# Patient Record
Sex: FEMALE | Race: WHITE | Employment: OTHER | URBAN - NONMETROPOLITAN AREA
[De-identification: names, ages, dates, MRNs, and addresses within clinical notes are randomized per-mention and may not be internally consistent; named-entity substitution may affect disease eponyms.]

---

## 2017-01-14 ENCOUNTER — APPOINTMENT (OUTPATIENT)
Dept: GENERAL RADIOLOGY | Age: 67
End: 2017-01-14
Payer: MEDICARE

## 2017-01-14 ENCOUNTER — HOSPITAL ENCOUNTER (EMERGENCY)
Age: 67
Discharge: HOME | End: 2017-01-14
Attending: EMERGENCY MEDICINE
Payer: MEDICARE

## 2017-01-14 VITALS
BODY MASS INDEX: 33.29 KG/M2 | OXYGEN SATURATION: 96 % | RESPIRATION RATE: 18 BRPM | DIASTOLIC BLOOD PRESSURE: 89 MMHG | HEART RATE: 88 BPM | HEIGHT: 64 IN | TEMPERATURE: 97.1 F | WEIGHT: 195 LBS | SYSTOLIC BLOOD PRESSURE: 176 MMHG

## 2017-01-14 DIAGNOSIS — M25.511 RIGHT SHOULDER PAIN, UNSPECIFIED CHRONICITY: Primary | ICD-10-CM

## 2017-01-14 PROCEDURE — 73030 X-RAY EXAM OF SHOULDER: CPT

## 2017-01-14 PROCEDURE — 99282 EMERGENCY DEPT VISIT SF MDM: CPT | Performed by: EMERGENCY MEDICINE

## 2017-01-14 PROCEDURE — 99283 EMERGENCY DEPT VISIT LOW MDM: CPT

## 2017-01-14 RX ORDER — TRAMADOL HYDROCHLORIDE 50 MG/1
50 TABLET ORAL EVERY 6 HOURS PRN
Qty: 20 TABLET | Refills: 0 | Status: SHIPPED | OUTPATIENT
Start: 2017-01-14 | End: 2017-01-24

## 2017-01-14 ASSESSMENT — ENCOUNTER SYMPTOMS
ALLERGIC/IMMUNOLOGIC NEGATIVE: 1
RESPIRATORY NEGATIVE: 1
GASTROINTESTINAL NEGATIVE: 1
EYES NEGATIVE: 1

## 2017-01-14 ASSESSMENT — PAIN DESCRIPTION - LOCATION: LOCATION: SHOULDER

## 2017-01-14 ASSESSMENT — PAIN SCALES - GENERAL: PAINLEVEL_OUTOF10: 9

## 2017-01-14 ASSESSMENT — PAIN DESCRIPTION - PAIN TYPE: TYPE: ACUTE PAIN

## 2017-01-14 ASSESSMENT — PAIN DESCRIPTION - ORIENTATION: ORIENTATION: RIGHT

## 2017-03-03 ENCOUNTER — OFFICE VISIT (OUTPATIENT)
Dept: PRIMARY CARE CLINIC | Age: 67
End: 2017-03-03
Payer: MEDICARE

## 2017-03-03 VITALS
BODY MASS INDEX: 34.27 KG/M2 | TEMPERATURE: 98 F | SYSTOLIC BLOOD PRESSURE: 124 MMHG | DIASTOLIC BLOOD PRESSURE: 80 MMHG | OXYGEN SATURATION: 98 % | HEIGHT: 64 IN | HEART RATE: 87 BPM | WEIGHT: 200.75 LBS

## 2017-03-03 DIAGNOSIS — E55.9 VITAMIN D DEFICIENCY: ICD-10-CM

## 2017-03-03 DIAGNOSIS — E78.2 MIXED HYPERLIPIDEMIA: ICD-10-CM

## 2017-03-03 DIAGNOSIS — R53.82 CHRONIC FATIGUE: Primary | ICD-10-CM

## 2017-03-03 DIAGNOSIS — R23.2 HOT FLASHES: ICD-10-CM

## 2017-03-03 PROCEDURE — 3014F SCREEN MAMMO DOC REV: CPT | Performed by: NURSE PRACTITIONER

## 2017-03-03 PROCEDURE — 99214 OFFICE O/P EST MOD 30 MIN: CPT | Performed by: NURSE PRACTITIONER

## 2017-03-03 PROCEDURE — 1123F ACP DISCUSS/DSCN MKR DOCD: CPT | Performed by: NURSE PRACTITIONER

## 2017-03-03 PROCEDURE — G8399 PT W/DXA RESULTS DOCUMENT: HCPCS | Performed by: NURSE PRACTITIONER

## 2017-03-03 PROCEDURE — 3017F COLORECTAL CA SCREEN DOC REV: CPT | Performed by: NURSE PRACTITIONER

## 2017-03-03 PROCEDURE — G8419 CALC BMI OUT NRM PARAM NOF/U: HCPCS | Performed by: NURSE PRACTITIONER

## 2017-03-03 PROCEDURE — 1036F TOBACCO NON-USER: CPT | Performed by: NURSE PRACTITIONER

## 2017-03-03 PROCEDURE — G8484 FLU IMMUNIZE NO ADMIN: HCPCS | Performed by: NURSE PRACTITIONER

## 2017-03-03 PROCEDURE — G8427 DOCREV CUR MEDS BY ELIG CLIN: HCPCS | Performed by: NURSE PRACTITIONER

## 2017-03-03 PROCEDURE — 4040F PNEUMOC VAC/ADMIN/RCVD: CPT | Performed by: NURSE PRACTITIONER

## 2017-03-03 PROCEDURE — 1090F PRES/ABSN URINE INCON ASSESS: CPT | Performed by: NURSE PRACTITIONER

## 2017-03-03 RX ORDER — AMITRIPTYLINE HYDROCHLORIDE 10 MG/1
10 TABLET, FILM COATED ORAL 2 TIMES DAILY PRN
Qty: 60 TABLET | Refills: 3 | Status: SHIPPED | OUTPATIENT
Start: 2017-03-03 | End: 2017-03-17

## 2017-03-03 RX ORDER — LORAZEPAM 1 MG/1
TABLET ORAL
COMMUNITY
Start: 2016-08-09 | End: 2018-04-18 | Stop reason: SDUPTHER

## 2017-03-03 RX ORDER — BUTALBITAL, ACETAMINOPHEN AND CAFFEINE 50; 325; 40 MG/1; MG/1; MG/1
TABLET ORAL
COMMUNITY
End: 2017-05-26

## 2017-03-03 RX ORDER — ALBUTEROL SULFATE 90 UG/1
2 AEROSOL, METERED RESPIRATORY (INHALATION)
COMMUNITY
Start: 2016-09-16 | End: 2017-05-17

## 2017-03-03 ASSESSMENT — ENCOUNTER SYMPTOMS
VOMITING: 1
COUGH: 0
SORE THROAT: 0
NAUSEA: 1
CONSTIPATION: 0
EYE REDNESS: 0
BLOOD IN STOOL: 0
RHINORRHEA: 0
DIARRHEA: 0
ABDOMINAL PAIN: 0
SHORTNESS OF BREATH: 0

## 2017-03-06 DIAGNOSIS — R53.82 CHRONIC FATIGUE: ICD-10-CM

## 2017-03-06 DIAGNOSIS — E55.9 VITAMIN D DEFICIENCY: ICD-10-CM

## 2017-03-06 DIAGNOSIS — E78.2 MIXED HYPERLIPIDEMIA: ICD-10-CM

## 2017-03-06 LAB
ALBUMIN SERPL-MCNC: 3.8 G/DL (ref 3.5–5.2)
ALP BLD-CCNC: 79 U/L (ref 35–104)
ALT SERPL-CCNC: 10 U/L (ref 5–33)
ANION GAP SERPL CALCULATED.3IONS-SCNC: 10 MMOL/L (ref 7–19)
AST SERPL-CCNC: 14 U/L (ref 5–32)
BASOPHILS ABSOLUTE: 0 K/UL (ref 0–0.2)
BASOPHILS RELATIVE PERCENT: 0.4 % (ref 0–1)
BILIRUB SERPL-MCNC: 0.3 MG/DL (ref 0.2–1.2)
BUN BLDV-MCNC: 11 MG/DL (ref 8–23)
CALCIUM SERPL-MCNC: 9.4 MG/DL (ref 8.8–10.2)
CHLORIDE BLD-SCNC: 107 MMOL/L (ref 98–111)
CHOLESTEROL, TOTAL: 203 MG/DL (ref 160–199)
CO2: 26 MMOL/L (ref 22–29)
CREAT SERPL-MCNC: 0.7 MG/DL (ref 0.5–0.9)
CREATININE URINE: 100.2 MG/DL (ref 4.2–622)
EOSINOPHILS ABSOLUTE: 0.1 K/UL (ref 0–0.6)
EOSINOPHILS RELATIVE PERCENT: 1.2 % (ref 0–5)
GFR NON-AFRICAN AMERICAN: >60
GLOBULIN: 2.8 G/DL
GLUCOSE BLD-MCNC: 104 MG/DL (ref 74–109)
HCT VFR BLD CALC: 39.9 % (ref 37–47)
HDLC SERPL-MCNC: 43 MG/DL (ref 65–121)
HEMOGLOBIN: 12.7 G/DL (ref 12–16)
LDL CHOLESTEROL CALCULATED: 109 MG/DL
LYMPHOCYTES ABSOLUTE: 1.3 K/UL (ref 1.1–4.5)
LYMPHOCYTES RELATIVE PERCENT: 25.6 % (ref 20–40)
MCH RBC QN AUTO: 29.5 PG (ref 27–31)
MCHC RBC AUTO-ENTMCNC: 31.8 G/DL (ref 33–37)
MCV RBC AUTO: 92.8 FL (ref 81–99)
MICROALBUMIN UR-MCNC: <1.2 MG/DL (ref 0–19)
MICROALBUMIN/CREAT UR-RTO: NORMAL MG/G
MONOCYTES ABSOLUTE: 0.5 K/UL (ref 0–0.9)
MONOCYTES RELATIVE PERCENT: 9.1 % (ref 0–10)
NEUTROPHILS ABSOLUTE: 3.2 K/UL (ref 1.5–7.5)
NEUTROPHILS RELATIVE PERCENT: 63.5 % (ref 50–65)
PDW BLD-RTO: 12.8 % (ref 11.5–14.5)
PLATELET # BLD: 263 K/UL (ref 130–400)
PMV BLD AUTO: 9.7 FL (ref 7.4–10.4)
POTASSIUM SERPL-SCNC: 3.9 MMOL/L (ref 3.5–5)
RBC # BLD: 4.3 M/UL (ref 4.2–5.4)
SODIUM BLD-SCNC: 143 MMOL/L (ref 136–145)
T4 FREE: 0.9 NG/ML (ref 0.9–1.7)
TOTAL PROTEIN: 6.6 G/DL (ref 6.6–8.7)
TRIGL SERPL-MCNC: 254 MG/DL (ref 150–199)
TSH SERPL DL<=0.05 MIU/L-ACNC: 2.94 UIU/ML (ref 0.27–4.2)
VITAMIN B-12: 218 PG/ML (ref 211–946)
VITAMIN D 25-HYDROXY: 9.1 NG/ML
WBC # BLD: 5 K/UL (ref 4.8–10.8)

## 2017-03-07 ENCOUNTER — TELEPHONE (OUTPATIENT)
Dept: PRIMARY CARE CLINIC | Age: 67
End: 2017-03-07

## 2017-03-07 DIAGNOSIS — E78.5 ELEVATED FASTING LIPID PROFILE: ICD-10-CM

## 2017-03-07 DIAGNOSIS — E55.9 VITAMIN D DEFICIENCY: Primary | ICD-10-CM

## 2017-03-07 RX ORDER — ERGOCALCIFEROL 1.25 MG/1
50000 CAPSULE ORAL WEEKLY
Qty: 80 CAPSULE | Refills: 0 | Status: SHIPPED | OUTPATIENT
Start: 2017-03-07 | End: 2017-03-17 | Stop reason: SDUPTHER

## 2017-03-07 RX ORDER — FENOFIBRATE 160 MG/1
160 TABLET ORAL DAILY
Qty: 30 TABLET | Refills: 3 | Status: SHIPPED | OUTPATIENT
Start: 2017-03-07 | End: 2017-03-17

## 2017-03-17 ENCOUNTER — OFFICE VISIT (OUTPATIENT)
Dept: PRIMARY CARE CLINIC | Age: 67
End: 2017-03-17
Payer: MEDICARE

## 2017-03-17 VITALS
BODY MASS INDEX: 34.49 KG/M2 | WEIGHT: 202 LBS | HEART RATE: 86 BPM | TEMPERATURE: 98 F | SYSTOLIC BLOOD PRESSURE: 138 MMHG | HEIGHT: 64 IN | DIASTOLIC BLOOD PRESSURE: 80 MMHG | OXYGEN SATURATION: 98 %

## 2017-03-17 DIAGNOSIS — K21.9 GASTROESOPHAGEAL REFLUX DISEASE, ESOPHAGITIS PRESENCE NOT SPECIFIED: ICD-10-CM

## 2017-03-17 DIAGNOSIS — E53.8 VITAMIN B12 DEFICIENCY: ICD-10-CM

## 2017-03-17 DIAGNOSIS — R11.0 NAUSEA: ICD-10-CM

## 2017-03-17 DIAGNOSIS — R53.82 CHRONIC FATIGUE: ICD-10-CM

## 2017-03-17 DIAGNOSIS — E55.9 VITAMIN D DEFICIENCY: ICD-10-CM

## 2017-03-17 DIAGNOSIS — E78.2 MIXED HYPERLIPIDEMIA: ICD-10-CM

## 2017-03-17 DIAGNOSIS — R13.14 PHARYNGOESOPHAGEAL DYSPHAGIA: ICD-10-CM

## 2017-03-17 DIAGNOSIS — R10.10 PAIN OF UPPER ABDOMEN: Primary | ICD-10-CM

## 2017-03-17 PROCEDURE — 1036F TOBACCO NON-USER: CPT | Performed by: NURSE PRACTITIONER

## 2017-03-17 PROCEDURE — 99214 OFFICE O/P EST MOD 30 MIN: CPT | Performed by: NURSE PRACTITIONER

## 2017-03-17 PROCEDURE — 1123F ACP DISCUSS/DSCN MKR DOCD: CPT | Performed by: NURSE PRACTITIONER

## 2017-03-17 PROCEDURE — 3014F SCREEN MAMMO DOC REV: CPT | Performed by: NURSE PRACTITIONER

## 2017-03-17 PROCEDURE — G8399 PT W/DXA RESULTS DOCUMENT: HCPCS | Performed by: NURSE PRACTITIONER

## 2017-03-17 PROCEDURE — 4040F PNEUMOC VAC/ADMIN/RCVD: CPT | Performed by: NURSE PRACTITIONER

## 2017-03-17 PROCEDURE — G8417 CALC BMI ABV UP PARAM F/U: HCPCS | Performed by: NURSE PRACTITIONER

## 2017-03-17 PROCEDURE — 1090F PRES/ABSN URINE INCON ASSESS: CPT | Performed by: NURSE PRACTITIONER

## 2017-03-17 PROCEDURE — G8484 FLU IMMUNIZE NO ADMIN: HCPCS | Performed by: NURSE PRACTITIONER

## 2017-03-17 PROCEDURE — 3017F COLORECTAL CA SCREEN DOC REV: CPT | Performed by: NURSE PRACTITIONER

## 2017-03-17 PROCEDURE — 96372 THER/PROPH/DIAG INJ SC/IM: CPT | Performed by: NURSE PRACTITIONER

## 2017-03-17 PROCEDURE — G8427 DOCREV CUR MEDS BY ELIG CLIN: HCPCS | Performed by: NURSE PRACTITIONER

## 2017-03-17 RX ORDER — ERGOCALCIFEROL 1.25 MG/1
50000 CAPSULE ORAL WEEKLY
Qty: 4 CAPSULE | Refills: 3 | Status: SHIPPED | OUTPATIENT
Start: 2017-03-17 | End: 2017-05-18 | Stop reason: SDUPTHER

## 2017-03-17 RX ORDER — CHLORAL HYDRATE 500 MG
2000 CAPSULE ORAL 2 TIMES DAILY
Qty: 120 CAPSULE | Refills: 3 | COMMUNITY
Start: 2017-03-17 | End: 2019-01-22 | Stop reason: ALTCHOICE

## 2017-03-17 RX ORDER — OMEPRAZOLE 20 MG/1
20 CAPSULE, DELAYED RELEASE ORAL 2 TIMES DAILY
Qty: 60 CAPSULE | Refills: 11 | Status: SHIPPED | OUTPATIENT
Start: 2017-03-17 | End: 2018-03-23 | Stop reason: SDUPTHER

## 2017-03-17 RX ORDER — ONDANSETRON 4 MG/1
4 TABLET, FILM COATED ORAL EVERY 8 HOURS PRN
Qty: 20 TABLET | Refills: 0 | Status: SHIPPED | OUTPATIENT
Start: 2017-03-17 | End: 2018-03-01

## 2017-03-17 RX ORDER — CYANOCOBALAMIN 1000 UG/ML
1000 INJECTION INTRAMUSCULAR; SUBCUTANEOUS ONCE
Status: COMPLETED | OUTPATIENT
Start: 2017-03-17 | End: 2017-03-17

## 2017-03-17 RX ADMIN — CYANOCOBALAMIN 1000 MCG: 1000 INJECTION INTRAMUSCULAR; SUBCUTANEOUS at 13:27

## 2017-03-17 ASSESSMENT — ENCOUNTER SYMPTOMS
SHORTNESS OF BREATH: 0
CONSTIPATION: 0
NAUSEA: 1
ABDOMINAL PAIN: 1
SORE THROAT: 0
RHINORRHEA: 0
BLOOD IN STOOL: 0
DIARRHEA: 0
VOMITING: 1
EYE REDNESS: 0
COUGH: 0

## 2017-03-24 ENCOUNTER — PROCEDURE VISIT (OUTPATIENT)
Dept: PRIMARY CARE CLINIC | Age: 67
End: 2017-03-24
Payer: MEDICARE

## 2017-03-24 DIAGNOSIS — E53.8 B12 DEFICIENCY: Primary | ICD-10-CM

## 2017-03-24 PROCEDURE — 96372 THER/PROPH/DIAG INJ SC/IM: CPT | Performed by: NURSE PRACTITIONER

## 2017-03-24 RX ORDER — CYANOCOBALAMIN 1000 UG/ML
1000 INJECTION INTRAMUSCULAR; SUBCUTANEOUS ONCE
Status: COMPLETED | OUTPATIENT
Start: 2017-03-24 | End: 2017-03-24

## 2017-03-24 RX ADMIN — CYANOCOBALAMIN 1000 MCG: 1000 INJECTION INTRAMUSCULAR; SUBCUTANEOUS at 11:40

## 2017-03-30 ENCOUNTER — HOSPITAL ENCOUNTER (OUTPATIENT)
Dept: GENERAL RADIOLOGY | Age: 67
Discharge: HOME | End: 2017-03-30
Payer: MEDICARE

## 2017-03-30 ENCOUNTER — TELEPHONE (OUTPATIENT)
Dept: PRIMARY CARE CLINIC | Age: 67
End: 2017-03-30

## 2017-03-30 DIAGNOSIS — R13.14 PHARYNGOESOPHAGEAL DYSPHAGIA: ICD-10-CM

## 2017-03-30 DIAGNOSIS — K21.9 GASTROESOPHAGEAL REFLUX DISEASE, ESOPHAGITIS PRESENCE NOT SPECIFIED: ICD-10-CM

## 2017-03-30 DIAGNOSIS — R10.13 EPIGASTRIC PAIN: Primary | ICD-10-CM

## 2017-03-30 PROCEDURE — 74220 X-RAY XM ESOPHAGUS 1CNTRST: CPT

## 2017-03-31 ENCOUNTER — PROCEDURE VISIT (OUTPATIENT)
Dept: PRIMARY CARE CLINIC | Age: 67
End: 2017-03-31
Payer: MEDICARE

## 2017-03-31 DIAGNOSIS — E53.8 B12 DEFICIENCY: Primary | ICD-10-CM

## 2017-03-31 PROCEDURE — 96372 THER/PROPH/DIAG INJ SC/IM: CPT | Performed by: PEDIATRICS

## 2017-03-31 RX ORDER — CYANOCOBALAMIN 1000 UG/ML
1000 INJECTION INTRAMUSCULAR; SUBCUTANEOUS ONCE
Status: COMPLETED | OUTPATIENT
Start: 2017-03-31 | End: 2017-03-31

## 2017-03-31 RX ADMIN — CYANOCOBALAMIN 1000 MCG: 1000 INJECTION INTRAMUSCULAR; SUBCUTANEOUS at 10:30

## 2017-04-07 ENCOUNTER — OFFICE VISIT (OUTPATIENT)
Dept: PRIMARY CARE CLINIC | Age: 67
End: 2017-04-07
Payer: MEDICARE

## 2017-04-07 VITALS
OXYGEN SATURATION: 96 % | WEIGHT: 201.25 LBS | TEMPERATURE: 98.2 F | HEIGHT: 64 IN | BODY MASS INDEX: 34.36 KG/M2 | SYSTOLIC BLOOD PRESSURE: 142 MMHG | DIASTOLIC BLOOD PRESSURE: 86 MMHG | HEART RATE: 86 BPM

## 2017-04-07 DIAGNOSIS — E53.8 B12 DEFICIENCY: ICD-10-CM

## 2017-04-07 DIAGNOSIS — R23.2 HOT FLASHES: ICD-10-CM

## 2017-04-07 DIAGNOSIS — K21.9 GASTROESOPHAGEAL REFLUX DISEASE, ESOPHAGITIS PRESENCE NOT SPECIFIED: Primary | ICD-10-CM

## 2017-04-07 DIAGNOSIS — K22.9 ESOPHAGEAL ABNORMALITY: ICD-10-CM

## 2017-04-07 DIAGNOSIS — R10.13 EPIGASTRIC PAIN: ICD-10-CM

## 2017-04-07 PROCEDURE — G8427 DOCREV CUR MEDS BY ELIG CLIN: HCPCS | Performed by: NURSE PRACTITIONER

## 2017-04-07 PROCEDURE — 3014F SCREEN MAMMO DOC REV: CPT | Performed by: NURSE PRACTITIONER

## 2017-04-07 PROCEDURE — G8417 CALC BMI ABV UP PARAM F/U: HCPCS | Performed by: NURSE PRACTITIONER

## 2017-04-07 PROCEDURE — 99213 OFFICE O/P EST LOW 20 MIN: CPT | Performed by: NURSE PRACTITIONER

## 2017-04-07 PROCEDURE — 3017F COLORECTAL CA SCREEN DOC REV: CPT | Performed by: NURSE PRACTITIONER

## 2017-04-07 PROCEDURE — 4040F PNEUMOC VAC/ADMIN/RCVD: CPT | Performed by: NURSE PRACTITIONER

## 2017-04-07 PROCEDURE — G8399 PT W/DXA RESULTS DOCUMENT: HCPCS | Performed by: NURSE PRACTITIONER

## 2017-04-07 PROCEDURE — 1090F PRES/ABSN URINE INCON ASSESS: CPT | Performed by: NURSE PRACTITIONER

## 2017-04-07 PROCEDURE — 1123F ACP DISCUSS/DSCN MKR DOCD: CPT | Performed by: NURSE PRACTITIONER

## 2017-04-07 PROCEDURE — 96372 THER/PROPH/DIAG INJ SC/IM: CPT | Performed by: NURSE PRACTITIONER

## 2017-04-07 PROCEDURE — 1036F TOBACCO NON-USER: CPT | Performed by: NURSE PRACTITIONER

## 2017-04-07 RX ORDER — CYANOCOBALAMIN 1000 UG/ML
1000 INJECTION INTRAMUSCULAR; SUBCUTANEOUS ONCE
Status: COMPLETED | OUTPATIENT
Start: 2017-04-07 | End: 2017-04-07

## 2017-04-07 RX ORDER — CYANOCOBALAMIN 1000 UG/ML
1000 INJECTION INTRAMUSCULAR; SUBCUTANEOUS ONCE
Status: DISCONTINUED | OUTPATIENT
Start: 2017-04-07 | End: 2017-04-07

## 2017-04-07 RX ADMIN — CYANOCOBALAMIN 1000 MCG: 1000 INJECTION INTRAMUSCULAR; SUBCUTANEOUS at 14:23

## 2017-04-07 ASSESSMENT — ENCOUNTER SYMPTOMS
CONSTIPATION: 0
BLOOD IN STOOL: 0
DIARRHEA: 0
RHINORRHEA: 0
EYE REDNESS: 0
SORE THROAT: 0
COUGH: 0
ABDOMINAL PAIN: 1
SHORTNESS OF BREATH: 0
NAUSEA: 1
VOMITING: 0

## 2017-04-20 ENCOUNTER — OFFICE VISIT (OUTPATIENT)
Dept: GASTROENTEROLOGY | Age: 67
End: 2017-04-20
Payer: MEDICARE

## 2017-04-20 VITALS
DIASTOLIC BLOOD PRESSURE: 70 MMHG | WEIGHT: 200.4 LBS | HEIGHT: 64 IN | HEART RATE: 86 BPM | OXYGEN SATURATION: 95 % | SYSTOLIC BLOOD PRESSURE: 130 MMHG | BODY MASS INDEX: 34.21 KG/M2

## 2017-04-20 DIAGNOSIS — R93.3 ABNORMAL ESOPHAGRAM: ICD-10-CM

## 2017-04-20 DIAGNOSIS — K22.89 ESOPHAGEAL PAIN: Primary | ICD-10-CM

## 2017-04-20 DIAGNOSIS — R12 CHRONIC HEARTBURN: ICD-10-CM

## 2017-04-20 DIAGNOSIS — R09.89 GLOBUS SENSATION: ICD-10-CM

## 2017-04-20 PROCEDURE — 99214 OFFICE O/P EST MOD 30 MIN: CPT | Performed by: NURSE PRACTITIONER

## 2017-04-20 PROCEDURE — G8427 DOCREV CUR MEDS BY ELIG CLIN: HCPCS | Performed by: NURSE PRACTITIONER

## 2017-04-20 PROCEDURE — 1090F PRES/ABSN URINE INCON ASSESS: CPT | Performed by: NURSE PRACTITIONER

## 2017-04-20 PROCEDURE — 1123F ACP DISCUSS/DSCN MKR DOCD: CPT | Performed by: NURSE PRACTITIONER

## 2017-04-20 PROCEDURE — 3017F COLORECTAL CA SCREEN DOC REV: CPT | Performed by: NURSE PRACTITIONER

## 2017-04-20 PROCEDURE — G8399 PT W/DXA RESULTS DOCUMENT: HCPCS | Performed by: NURSE PRACTITIONER

## 2017-04-20 PROCEDURE — 1036F TOBACCO NON-USER: CPT | Performed by: NURSE PRACTITIONER

## 2017-04-20 PROCEDURE — 4040F PNEUMOC VAC/ADMIN/RCVD: CPT | Performed by: NURSE PRACTITIONER

## 2017-04-20 PROCEDURE — 3014F SCREEN MAMMO DOC REV: CPT | Performed by: NURSE PRACTITIONER

## 2017-04-20 PROCEDURE — G8417 CALC BMI ABV UP PARAM F/U: HCPCS | Performed by: NURSE PRACTITIONER

## 2017-04-20 ASSESSMENT — ENCOUNTER SYMPTOMS
BACK PAIN: 0
RECTAL PAIN: 0
ABDOMINAL PAIN: 0
VOMITING: 1
DIARRHEA: 0
SORE THROAT: 0
ANAL BLEEDING: 0
CHOKING: 0
NAUSEA: 1
PHOTOPHOBIA: 0
CONSTIPATION: 0
COUGH: 0
ABDOMINAL DISTENTION: 0
WHEEZING: 0
BLOOD IN STOOL: 0

## 2017-05-04 RX ORDER — ATORVASTATIN CALCIUM 40 MG/1
40 TABLET, FILM COATED ORAL DAILY
Qty: 30 TABLET | Refills: 5 | Status: SHIPPED | OUTPATIENT
Start: 2017-05-04 | End: 2018-04-19 | Stop reason: ALTCHOICE

## 2017-05-17 ENCOUNTER — OFFICE VISIT (OUTPATIENT)
Dept: PRIMARY CARE CLINIC | Age: 67
End: 2017-05-17
Payer: MEDICARE

## 2017-05-17 VITALS
TEMPERATURE: 98 F | HEART RATE: 83 BPM | WEIGHT: 201 LBS | HEIGHT: 64 IN | OXYGEN SATURATION: 97 % | BODY MASS INDEX: 34.31 KG/M2 | SYSTOLIC BLOOD PRESSURE: 158 MMHG | RESPIRATION RATE: 18 BRPM | DIASTOLIC BLOOD PRESSURE: 90 MMHG

## 2017-05-17 DIAGNOSIS — E78.2 MIXED HYPERLIPIDEMIA: ICD-10-CM

## 2017-05-17 DIAGNOSIS — I10 ESSENTIAL HYPERTENSION: Primary | ICD-10-CM

## 2017-05-17 DIAGNOSIS — E78.5 ELEVATED FASTING LIPID PROFILE: ICD-10-CM

## 2017-05-17 DIAGNOSIS — E55.9 VITAMIN D DEFICIENCY: ICD-10-CM

## 2017-05-17 DIAGNOSIS — E53.8 VITAMIN B 12 DEFICIENCY: ICD-10-CM

## 2017-05-17 DIAGNOSIS — K21.9 GASTROESOPHAGEAL REFLUX DISEASE, ESOPHAGITIS PRESENCE NOT SPECIFIED: ICD-10-CM

## 2017-05-17 DIAGNOSIS — R13.14 PHARYNGOESOPHAGEAL DYSPHAGIA: ICD-10-CM

## 2017-05-17 LAB
CHOLESTEROL, TOTAL: 205 MG/DL (ref 160–199)
HDLC SERPL-MCNC: 60 MG/DL (ref 65–121)
LDL CHOLESTEROL CALCULATED: 111 MG/DL
TRIGL SERPL-MCNC: 169 MG/DL (ref 150–199)
VITAMIN D 25-HYDROXY: 16.1 NG/ML

## 2017-05-17 PROCEDURE — 1090F PRES/ABSN URINE INCON ASSESS: CPT | Performed by: NURSE PRACTITIONER

## 2017-05-17 PROCEDURE — 3017F COLORECTAL CA SCREEN DOC REV: CPT | Performed by: NURSE PRACTITIONER

## 2017-05-17 PROCEDURE — 3014F SCREEN MAMMO DOC REV: CPT | Performed by: NURSE PRACTITIONER

## 2017-05-17 PROCEDURE — 1123F ACP DISCUSS/DSCN MKR DOCD: CPT | Performed by: NURSE PRACTITIONER

## 2017-05-17 PROCEDURE — 1036F TOBACCO NON-USER: CPT | Performed by: NURSE PRACTITIONER

## 2017-05-17 PROCEDURE — 96372 THER/PROPH/DIAG INJ SC/IM: CPT | Performed by: NURSE PRACTITIONER

## 2017-05-17 PROCEDURE — G8417 CALC BMI ABV UP PARAM F/U: HCPCS | Performed by: NURSE PRACTITIONER

## 2017-05-17 PROCEDURE — 99213 OFFICE O/P EST LOW 20 MIN: CPT | Performed by: NURSE PRACTITIONER

## 2017-05-17 PROCEDURE — 4040F PNEUMOC VAC/ADMIN/RCVD: CPT | Performed by: NURSE PRACTITIONER

## 2017-05-17 PROCEDURE — G8399 PT W/DXA RESULTS DOCUMENT: HCPCS | Performed by: NURSE PRACTITIONER

## 2017-05-17 PROCEDURE — G8427 DOCREV CUR MEDS BY ELIG CLIN: HCPCS | Performed by: NURSE PRACTITIONER

## 2017-05-17 RX ORDER — AMLODIPINE BESYLATE 2.5 MG/1
2.5 TABLET ORAL DAILY
Qty: 30 TABLET | Refills: 3 | Status: SHIPPED | OUTPATIENT
Start: 2017-05-17 | End: 2017-11-11 | Stop reason: SDUPTHER

## 2017-05-17 RX ORDER — CYANOCOBALAMIN 1000 UG/ML
1000 INJECTION INTRAMUSCULAR; SUBCUTANEOUS ONCE
Status: COMPLETED | OUTPATIENT
Start: 2017-05-17 | End: 2017-05-17

## 2017-05-17 RX ADMIN — CYANOCOBALAMIN 1000 MCG: 1000 INJECTION INTRAMUSCULAR; SUBCUTANEOUS at 14:54

## 2017-05-17 ASSESSMENT — ENCOUNTER SYMPTOMS
CONSTIPATION: 0
DIARRHEA: 0
SORE THROAT: 0
BLOOD IN STOOL: 0
COUGH: 0
ABDOMINAL PAIN: 0
SHORTNESS OF BREATH: 0
VOMITING: 0
EYE REDNESS: 0
NAUSEA: 1
RHINORRHEA: 0

## 2017-05-18 ENCOUNTER — TELEPHONE (OUTPATIENT)
Dept: PRIMARY CARE CLINIC | Age: 67
End: 2017-05-18

## 2017-05-18 DIAGNOSIS — E55.9 VITAMIN D DEFICIENCY: ICD-10-CM

## 2017-05-18 RX ORDER — ERGOCALCIFEROL 1.25 MG/1
50000 CAPSULE ORAL WEEKLY
Qty: 4 CAPSULE | Refills: 3 | Status: SHIPPED | OUTPATIENT
Start: 2017-05-18 | End: 2018-04-19 | Stop reason: ALTCHOICE

## 2017-05-18 RX ORDER — MULTIVIT-MIN/IRON/FOLIC ACID/K 18-600-40
2 CAPSULE ORAL DAILY
COMMUNITY
End: 2018-03-28

## 2017-05-23 ENCOUNTER — ANESTHESIA (OUTPATIENT)
Dept: ENDOSCOPY | Age: 67
End: 2017-05-23
Payer: MEDICARE

## 2017-05-23 ENCOUNTER — ANESTHESIA EVENT (OUTPATIENT)
Dept: ENDOSCOPY | Age: 67
End: 2017-05-23
Payer: MEDICARE

## 2017-05-23 ENCOUNTER — APPOINTMENT (OUTPATIENT)
Dept: CT IMAGING | Age: 67
End: 2017-05-23
Attending: INTERNAL MEDICINE
Payer: MEDICARE

## 2017-05-23 ENCOUNTER — HOSPITAL ENCOUNTER (OUTPATIENT)
Age: 67
Setting detail: OUTPATIENT SURGERY
Discharge: HOME | End: 2017-05-23
Attending: INTERNAL MEDICINE | Admitting: INTERNAL MEDICINE
Payer: MEDICARE

## 2017-05-23 VITALS
OXYGEN SATURATION: 100 % | HEART RATE: 60 BPM | HEIGHT: 64 IN | RESPIRATION RATE: 18 BRPM | TEMPERATURE: 97.5 F | BODY MASS INDEX: 34.15 KG/M2 | SYSTOLIC BLOOD PRESSURE: 126 MMHG | DIASTOLIC BLOOD PRESSURE: 67 MMHG | WEIGHT: 200 LBS

## 2017-05-23 VITALS
SYSTOLIC BLOOD PRESSURE: 110 MMHG | RESPIRATION RATE: 18 BRPM | DIASTOLIC BLOOD PRESSURE: 58 MMHG | OXYGEN SATURATION: 97 %

## 2017-05-23 DIAGNOSIS — K22.89 ESOPHAGEAL PAIN: ICD-10-CM

## 2017-05-23 DIAGNOSIS — R10.13 EPIGASTRIC PAIN: Primary | ICD-10-CM

## 2017-05-23 DIAGNOSIS — R93.3 ABNORMAL ESOPHAGRAM: ICD-10-CM

## 2017-05-23 LAB
CREAT SERPL-MCNC: 0.7 MG/DL (ref 0.5–0.9)
GFR NON-AFRICAN AMERICAN: >60

## 2017-05-23 PROCEDURE — 2580000003 HC RX 258: Performed by: INTERNAL MEDICINE

## 2017-05-23 PROCEDURE — 2720000001 HC MISC SURG SUPPLY STERILE $51-500: Performed by: INTERNAL MEDICINE

## 2017-05-23 PROCEDURE — 3700000000 HC ANESTHESIA ATTENDED CARE: Performed by: INTERNAL MEDICINE

## 2017-05-23 PROCEDURE — 43249 ESOPH EGD DILATION <30 MM: CPT | Performed by: INTERNAL MEDICINE

## 2017-05-23 PROCEDURE — 2500000003 HC RX 250 WO HCPCS: Performed by: INTERNAL MEDICINE

## 2017-05-23 PROCEDURE — C1726 CATH, BAL DIL, NON-VASCULAR: HCPCS | Performed by: INTERNAL MEDICINE

## 2017-05-23 PROCEDURE — 87077 CULTURE AEROBIC IDENTIFY: CPT

## 2017-05-23 PROCEDURE — 7100000010 HC PHASE II RECOVERY - FIRST 15 MIN: Performed by: INTERNAL MEDICINE

## 2017-05-23 PROCEDURE — 82565 ASSAY OF CREATININE: CPT

## 2017-05-23 PROCEDURE — 6360000002 HC RX W HCPCS: Performed by: NURSE ANESTHETIST, CERTIFIED REGISTERED

## 2017-05-23 PROCEDURE — 2500000003 HC RX 250 WO HCPCS: Performed by: NURSE ANESTHETIST, CERTIFIED REGISTERED

## 2017-05-23 PROCEDURE — 71270 CT THORAX DX C-/C+: CPT

## 2017-05-23 PROCEDURE — 3700000001 HC ADD 15 MINUTES (ANESTHESIA): Performed by: INTERNAL MEDICINE

## 2017-05-23 PROCEDURE — 7100000011 HC PHASE II RECOVERY - ADDTL 15 MIN: Performed by: INTERNAL MEDICINE

## 2017-05-23 PROCEDURE — 88305 TISSUE EXAM BY PATHOLOGIST: CPT

## 2017-05-23 PROCEDURE — 6360000004 HC RX CONTRAST MEDICATION: Performed by: INTERNAL MEDICINE

## 2017-05-23 PROCEDURE — 3609012500 HC EGD DILATION BALLOON: Performed by: INTERNAL MEDICINE

## 2017-05-23 PROCEDURE — 43239 EGD BIOPSY SINGLE/MULTIPLE: CPT | Performed by: INTERNAL MEDICINE

## 2017-05-23 PROCEDURE — 36415 COLL VENOUS BLD VENIPUNCTURE: CPT

## 2017-05-23 RX ORDER — PROPOFOL 10 MG/ML
INJECTION, EMULSION INTRAVENOUS PRN
Status: DISCONTINUED | OUTPATIENT
Start: 2017-05-23 | End: 2017-05-23 | Stop reason: SDUPTHER

## 2017-05-23 RX ORDER — LIDOCAINE HYDROCHLORIDE 10 MG/ML
INJECTION, SOLUTION EPIDURAL; INFILTRATION; INTRACAUDAL; PERINEURAL PRN
Status: DISCONTINUED | OUTPATIENT
Start: 2017-05-23 | End: 2017-05-23 | Stop reason: SDUPTHER

## 2017-05-23 RX ORDER — PROMETHAZINE HYDROCHLORIDE 25 MG/ML
6.25 INJECTION, SOLUTION INTRAMUSCULAR; INTRAVENOUS
Status: DISCONTINUED | OUTPATIENT
Start: 2017-05-23 | End: 2017-05-23 | Stop reason: HOSPADM

## 2017-05-23 RX ORDER — LIDOCAINE HYDROCHLORIDE 10 MG/ML
1 INJECTION, SOLUTION EPIDURAL; INFILTRATION; INTRACAUDAL; PERINEURAL ONCE
Status: COMPLETED | OUTPATIENT
Start: 2017-05-23 | End: 2017-05-23

## 2017-05-23 RX ORDER — DIPHENHYDRAMINE HYDROCHLORIDE 50 MG/ML
12.5 INJECTION INTRAMUSCULAR; INTRAVENOUS
Status: DISCONTINUED | OUTPATIENT
Start: 2017-05-23 | End: 2017-05-23 | Stop reason: HOSPADM

## 2017-05-23 RX ORDER — ONDANSETRON 2 MG/ML
4 INJECTION INTRAMUSCULAR; INTRAVENOUS
Status: DISCONTINUED | OUTPATIENT
Start: 2017-05-23 | End: 2017-05-23 | Stop reason: HOSPADM

## 2017-05-23 RX ORDER — SODIUM CHLORIDE, SODIUM LACTATE, POTASSIUM CHLORIDE, CALCIUM CHLORIDE 600; 310; 30; 20 MG/100ML; MG/100ML; MG/100ML; MG/100ML
INJECTION, SOLUTION INTRAVENOUS CONTINUOUS
Status: DISCONTINUED | OUTPATIENT
Start: 2017-05-23 | End: 2017-05-23 | Stop reason: HOSPADM

## 2017-05-23 RX ADMIN — SODIUM CHLORIDE, POTASSIUM CHLORIDE, SODIUM LACTATE AND CALCIUM CHLORIDE: 600; 310; 30; 20 INJECTION, SOLUTION INTRAVENOUS at 07:31

## 2017-05-23 RX ADMIN — LIDOCAINE HYDROCHLORIDE 1 ML: 10 INJECTION, SOLUTION EPIDURAL; INFILTRATION; INTRACAUDAL; PERINEURAL at 07:31

## 2017-05-23 RX ADMIN — IOVERSOL 90 ML: 741 INJECTION INTRA-ARTERIAL; INTRAVENOUS at 10:26

## 2017-05-23 RX ADMIN — PROPOFOL 320 MG: 10 INJECTION, EMULSION INTRAVENOUS at 08:04

## 2017-05-23 RX ADMIN — LIDOCAINE HYDROCHLORIDE 5 ML: 10 INJECTION, SOLUTION EPIDURAL; INFILTRATION; INTRACAUDAL; PERINEURAL at 08:04

## 2017-05-23 ASSESSMENT — PAIN SCALES - GENERAL: PAINLEVEL_OUTOF10: 0

## 2017-05-23 ASSESSMENT — PAIN - FUNCTIONAL ASSESSMENT: PAIN_FUNCTIONAL_ASSESSMENT: 0-10

## 2017-05-24 ENCOUNTER — TELEPHONE (OUTPATIENT)
Dept: GASTROENTEROLOGY | Age: 67
End: 2017-05-24

## 2017-05-24 LAB — CLOTEST: NEGATIVE

## 2017-05-26 ENCOUNTER — OFFICE VISIT (OUTPATIENT)
Dept: PRIMARY CARE CLINIC | Age: 67
End: 2017-05-26
Payer: MEDICARE

## 2017-05-26 VITALS
DIASTOLIC BLOOD PRESSURE: 82 MMHG | SYSTOLIC BLOOD PRESSURE: 136 MMHG | HEART RATE: 91 BPM | HEIGHT: 64 IN | OXYGEN SATURATION: 97 % | BODY MASS INDEX: 34.66 KG/M2 | TEMPERATURE: 97.9 F | WEIGHT: 203 LBS

## 2017-05-26 DIAGNOSIS — R59.1 LYMPHADENOPATHY: ICD-10-CM

## 2017-05-26 DIAGNOSIS — R91.1 NODULE OF RIGHT LUNG: Primary | ICD-10-CM

## 2017-05-26 PROCEDURE — 1090F PRES/ABSN URINE INCON ASSESS: CPT | Performed by: NURSE PRACTITIONER

## 2017-05-26 PROCEDURE — G8399 PT W/DXA RESULTS DOCUMENT: HCPCS | Performed by: NURSE PRACTITIONER

## 2017-05-26 PROCEDURE — 99213 OFFICE O/P EST LOW 20 MIN: CPT | Performed by: NURSE PRACTITIONER

## 2017-05-26 PROCEDURE — 1036F TOBACCO NON-USER: CPT | Performed by: NURSE PRACTITIONER

## 2017-05-26 PROCEDURE — G8417 CALC BMI ABV UP PARAM F/U: HCPCS | Performed by: NURSE PRACTITIONER

## 2017-05-26 PROCEDURE — 4040F PNEUMOC VAC/ADMIN/RCVD: CPT | Performed by: NURSE PRACTITIONER

## 2017-05-26 PROCEDURE — 1123F ACP DISCUSS/DSCN MKR DOCD: CPT | Performed by: NURSE PRACTITIONER

## 2017-05-26 PROCEDURE — 3017F COLORECTAL CA SCREEN DOC REV: CPT | Performed by: NURSE PRACTITIONER

## 2017-05-26 PROCEDURE — 3014F SCREEN MAMMO DOC REV: CPT | Performed by: NURSE PRACTITIONER

## 2017-05-26 PROCEDURE — G8427 DOCREV CUR MEDS BY ELIG CLIN: HCPCS | Performed by: NURSE PRACTITIONER

## 2017-05-26 RX ORDER — CYANOCOBALAMIN 1000 UG/ML
1000 INJECTION INTRAMUSCULAR; SUBCUTANEOUS
COMMUNITY
End: 2017-08-17

## 2017-05-26 ASSESSMENT — ENCOUNTER SYMPTOMS
WHEEZING: 0
EYE REDNESS: 0
DIARRHEA: 0
SHORTNESS OF BREATH: 0
COUGH: 0
EYE DISCHARGE: 0
BACK PAIN: 0
CHEST TIGHTNESS: 0
ANAL BLEEDING: 0
VOMITING: 0
EYE PAIN: 0
RHINORRHEA: 0
ABDOMINAL PAIN: 0
CONSTIPATION: 0
NAUSEA: 0
SORE THROAT: 0

## 2017-06-12 DIAGNOSIS — F33.0 MILD EPISODE OF RECURRENT MAJOR DEPRESSIVE DISORDER (HCC): ICD-10-CM

## 2017-06-12 RX ORDER — VENLAFAXINE HYDROCHLORIDE 150 MG/1
150 CAPSULE, EXTENDED RELEASE ORAL DAILY
Qty: 30 CAPSULE | Refills: 5 | Status: SHIPPED | OUTPATIENT
Start: 2017-06-12 | End: 2018-03-23 | Stop reason: SDUPTHER

## 2017-06-23 DIAGNOSIS — R60.0 LOCALIZED EDEMA: ICD-10-CM

## 2017-06-26 RX ORDER — FUROSEMIDE 40 MG/1
40 TABLET ORAL DAILY
Qty: 30 TABLET | Refills: 11 | Status: SHIPPED | OUTPATIENT
Start: 2017-06-26 | End: 2018-08-14 | Stop reason: SDUPTHER

## 2017-07-24 RX ORDER — LISINOPRIL 20 MG/1
20 TABLET ORAL DAILY
Qty: 30 TABLET | Refills: 11 | Status: SHIPPED | OUTPATIENT
Start: 2017-07-24 | End: 2018-08-14 | Stop reason: SDUPTHER

## 2017-08-17 ENCOUNTER — OFFICE VISIT (OUTPATIENT)
Dept: PRIMARY CARE CLINIC | Age: 67
End: 2017-08-17
Payer: MEDICARE

## 2017-08-17 VITALS
DIASTOLIC BLOOD PRESSURE: 80 MMHG | WEIGHT: 210 LBS | BODY MASS INDEX: 35.85 KG/M2 | TEMPERATURE: 98.4 F | SYSTOLIC BLOOD PRESSURE: 122 MMHG | HEART RATE: 81 BPM | OXYGEN SATURATION: 97 % | HEIGHT: 64 IN

## 2017-08-17 DIAGNOSIS — E55.9 VITAMIN D DEFICIENCY: ICD-10-CM

## 2017-08-17 DIAGNOSIS — E78.2 MIXED HYPERLIPIDEMIA: ICD-10-CM

## 2017-08-17 DIAGNOSIS — Z12.31 ENCOUNTER FOR SCREENING MAMMOGRAM FOR BREAST CANCER: ICD-10-CM

## 2017-08-17 DIAGNOSIS — K21.9 GASTROESOPHAGEAL REFLUX DISEASE, ESOPHAGITIS PRESENCE NOT SPECIFIED: ICD-10-CM

## 2017-08-17 DIAGNOSIS — I10 ESSENTIAL HYPERTENSION: Primary | ICD-10-CM

## 2017-08-17 DIAGNOSIS — R91.1 LUNG NODULE: ICD-10-CM

## 2017-08-17 DIAGNOSIS — Z11.59 NEED FOR HEPATITIS C SCREENING TEST: ICD-10-CM

## 2017-08-17 PROCEDURE — 4040F PNEUMOC VAC/ADMIN/RCVD: CPT | Performed by: NURSE PRACTITIONER

## 2017-08-17 PROCEDURE — 99214 OFFICE O/P EST MOD 30 MIN: CPT | Performed by: NURSE PRACTITIONER

## 2017-08-17 PROCEDURE — 1123F ACP DISCUSS/DSCN MKR DOCD: CPT | Performed by: NURSE PRACTITIONER

## 2017-08-17 PROCEDURE — G8417 CALC BMI ABV UP PARAM F/U: HCPCS | Performed by: NURSE PRACTITIONER

## 2017-08-17 PROCEDURE — G8427 DOCREV CUR MEDS BY ELIG CLIN: HCPCS | Performed by: NURSE PRACTITIONER

## 2017-08-17 PROCEDURE — 3017F COLORECTAL CA SCREEN DOC REV: CPT | Performed by: NURSE PRACTITIONER

## 2017-08-17 PROCEDURE — 1036F TOBACCO NON-USER: CPT | Performed by: NURSE PRACTITIONER

## 2017-08-17 PROCEDURE — 3014F SCREEN MAMMO DOC REV: CPT | Performed by: NURSE PRACTITIONER

## 2017-08-17 PROCEDURE — G8399 PT W/DXA RESULTS DOCUMENT: HCPCS | Performed by: NURSE PRACTITIONER

## 2017-08-17 PROCEDURE — 1090F PRES/ABSN URINE INCON ASSESS: CPT | Performed by: NURSE PRACTITIONER

## 2017-08-17 ASSESSMENT — ENCOUNTER SYMPTOMS
RHINORRHEA: 0
VOMITING: 0
BACK PAIN: 0
ANAL BLEEDING: 0
CONSTIPATION: 0
WHEEZING: 0
EYE DISCHARGE: 0
SORE THROAT: 0
CHEST TIGHTNESS: 0
EYE REDNESS: 0
ABDOMINAL PAIN: 0
DIARRHEA: 0
COUGH: 0
NAUSEA: 0
EYE PAIN: 0
SHORTNESS OF BREATH: 0

## 2017-09-07 ENCOUNTER — HOSPITAL ENCOUNTER (OUTPATIENT)
Dept: CT IMAGING | Age: 67
Discharge: HOME | End: 2017-09-07
Payer: MEDICARE

## 2017-09-07 DIAGNOSIS — R93.89 ABNORMAL FINDING ON IMAGING: ICD-10-CM

## 2017-09-07 LAB
GFR NON-AFRICAN AMERICAN: >60
PERFORMED ON: NORMAL
POC CREATININE: 0.8 MG/DL (ref 0.3–1.3)
POC SAMPLE TYPE: NORMAL

## 2017-09-07 PROCEDURE — 82565 ASSAY OF CREATININE: CPT

## 2017-09-07 PROCEDURE — 71260 CT THORAX DX C+: CPT

## 2017-09-07 PROCEDURE — 6360000004 HC RX CONTRAST MEDICATION: Performed by: INTERNAL MEDICINE

## 2017-09-07 RX ADMIN — IOVERSOL 90 ML: 741 INJECTION INTRA-ARTERIAL; INTRAVENOUS at 09:59

## 2017-09-18 ENCOUNTER — HOSPITAL ENCOUNTER (OUTPATIENT)
Dept: WOMENS IMAGING | Age: 67
Discharge: HOME | End: 2017-09-18
Payer: MEDICARE

## 2017-09-18 ENCOUNTER — TELEPHONE (OUTPATIENT)
Dept: PRIMARY CARE CLINIC | Age: 67
End: 2017-09-18

## 2017-09-18 DIAGNOSIS — Z12.31 ENCOUNTER FOR SCREENING MAMMOGRAM FOR BREAST CANCER: ICD-10-CM

## 2017-09-18 PROCEDURE — 77063 BREAST TOMOSYNTHESIS BI: CPT

## 2017-10-13 ENCOUNTER — OFFICE VISIT (OUTPATIENT)
Dept: FAMILY MEDICINE CLINIC | Facility: CLINIC | Age: 67
End: 2017-10-13

## 2017-10-13 VITALS
OXYGEN SATURATION: 97 % | SYSTOLIC BLOOD PRESSURE: 137 MMHG | HEART RATE: 76 BPM | DIASTOLIC BLOOD PRESSURE: 82 MMHG | WEIGHT: 214.6 LBS | HEIGHT: 64 IN | RESPIRATION RATE: 18 BRPM | BODY MASS INDEX: 36.64 KG/M2 | TEMPERATURE: 98.2 F

## 2017-10-13 DIAGNOSIS — Z91.09 ENVIRONMENTAL ALLERGIES: ICD-10-CM

## 2017-10-13 DIAGNOSIS — J06.9 URI WITH COUGH AND CONGESTION: Primary | ICD-10-CM

## 2017-10-13 PROCEDURE — 99213 OFFICE O/P EST LOW 20 MIN: CPT | Performed by: FAMILY MEDICINE

## 2017-10-13 RX ORDER — BENZONATATE 200 MG/1
200 CAPSULE ORAL 3 TIMES DAILY PRN
Qty: 21 CAPSULE | Refills: 0 | Status: SHIPPED | OUTPATIENT
Start: 2017-10-13 | End: 2018-06-14

## 2017-10-13 NOTE — PROGRESS NOTES
Chief Complaint   Patient presents with   • Sinusitis     Patient is here today for  a cough, sinius pressure in head and head ache. Symptoms started 2 days ago.         History:  Kaya Hatfield is a 67 y.o. female presents who today for evaluation of the above problems.  PCP currently listed as Deondre Giordano DO.   2 day history of URI symptoms, sick contacts none.  Sx started with sore throat 2 day sago and progressed to cough, dry intermittently productive of phlegm.  Nasal congestion on right side.  Rhinorrhea prominently on left side. She is concerned that cough is getting worse.  Low grade subjective fever and Right sided otalgia.   So far she has used cough medication, chloroseptic spray.  Cannot sleep without this agent.  Cough is worse symptoms.  Cough medication has not helped. No tobacco use.  No recent travel.  The patient has no history of diabetes.  She has tried to hold coughing back. She has flonase not using this regularly.  Not using claritin/allegra or zyrtec.  Using singulair.  She thought It was allergies and this did not help much.     Kaay Hatfield  has a past medical history of Anxiety; Depression; GERD (gastroesophageal reflux disease); Hyperlipidemia; and Hypertension.    Allergies   Allergen Reactions   • Atorvastatin Other (See Comments)     Muscle cramps in legs.     Past Medical History:   Diagnosis Date   • Anxiety    • Depression    • GERD (gastroesophageal reflux disease)    • Hyperlipidemia    • Hypertension      History reviewed. No pertinent surgical history.  Family History   Problem Relation Age of Onset   • Heart disease Mother    • Cancer Sister    • Liver disease Brother    • Heart disease Maternal Grandmother    • Heart disease Maternal Grandfather        Current Outpatient Prescriptions on File Prior to Visit   Medication Sig Dispense Refill   • albuterol (PROVENTIL HFA;VENTOLIN HFA) 108 (90 BASE) MCG/ACT inhaler Inhale 2 puffs every 4 (four) hours as needed for  wheezing. 1 inhaler 2   • aspirin 81 MG tablet Take 81 mg by mouth daily     • atorvastatin (LIPITOR) 40 MG tablet Take 1 tablet by mouth daily     • butalbital-acetaminophen-caffeine (FIORICET, ESGIC) -40 MG per tablet every 4 (four) hours.     • doxycycline (VIBRAMYICN) 100 MG tablet Take 1 tablet by mouth 2 (two) times a day. 14 tablet 0   • fluticasone (FLONASE) 50 MCG/ACT nasal spray 2 sprays by Nasal route daily     • furosemide (LASIX) 40 MG tablet Take 1 tablet by mouth daily     • lisinopril (PRINIVIL,ZESTRIL) 20 MG tablet Take 1 tablet by mouth daily     • LORazepam (ATIVAN) 1 MG tablet Take 1 tablet by mouth daily     • montelukast (SINGULAIR) 10 MG tablet Take 1 tablet by mouth nightly     • omeprazole (PriLOSEC) 20 MG capsule Take 1 capsule by mouth daily     • topiramate (TOPAMAX) 50 MG tablet Take 50 mg by mouth daily      • venlafaxine XR (EFFEXOR XR) 150 MG 24 hr capsule Take 1 capsule by mouth daily       No current facility-administered medications on file prior to visit.        Family history, surgical history, past medical history, Allergies and meds reviewed with patient today and updated in [facility] EMR.     ROS:  Review of Systems   Constitutional: Negative for activity change, appetite change, chills, diaphoresis, fatigue and fever.   HENT: Positive for postnasal drip, rhinorrhea, sinus pressure and trouble swallowing. Negative for congestion, dental problem, ear discharge, ear pain, hearing loss, sneezing, sore throat and tinnitus.    Eyes: Negative for photophobia, pain, discharge, redness, itching and visual disturbance.   Respiratory: Negative for apnea, cough, chest tightness, shortness of breath, wheezing and stridor.    Cardiovascular: Negative for chest pain and leg swelling.   Gastrointestinal: Negative for abdominal pain, blood in stool, constipation, diarrhea, nausea and vomiting.   Endocrine: Negative for cold intolerance, heat intolerance, polydipsia, polyphagia and polyuria.  "  Genitourinary: Negative for decreased urine volume, difficulty urinating, dysuria, flank pain, hematuria, urgency, vaginal bleeding and vaginal discharge.   Musculoskeletal: Negative for arthralgias, back pain, gait problem, myalgias and neck pain.   Skin: Negative for color change and rash.   Allergic/Immunologic: Negative for environmental allergies and food allergies.   Neurological: Negative for dizziness, seizures, facial asymmetry, speech difficulty, weakness, numbness and headaches.   Hematological: Negative for adenopathy.   Psychiatric/Behavioral: Negative for agitation, behavioral problems, confusion, decreased concentration, hallucinations, self-injury, sleep disturbance and suicidal ideas. The patient is not nervous/anxious.        OBJECTIVE:  Vitals:    10/13/17 1504   BP: 137/82   Pulse: 76   Resp: 18   Temp: 98.2 °F (36.8 °C)   SpO2: 97%   Weight: 214 lb 9.6 oz (97.3 kg)   Height: 64\" (162.6 cm)     Physical Exam   Constitutional: She is oriented to person, place, and time. She appears well-developed and well-nourished. No distress.   HENT:   Head: Normocephalic and atraumatic.   Right Ear: Tympanic membrane and external ear normal.   Left Ear: Tympanic membrane and external ear normal.   Nose: Mucosal edema and rhinorrhea present. No epistaxis.  No foreign bodies. Right sinus exhibits maxillary sinus tenderness. Left sinus exhibits maxillary sinus tenderness.   Mouth/Throat: Normal dentition. No uvula swelling. Posterior oropharyngeal erythema present. No oropharyngeal exudate, posterior oropharyngeal edema or tonsillar abscesses.   Eyes: Conjunctivae and EOM are normal. Pupils are equal, round, and reactive to light.   Neck: Normal range of motion. No thyromegaly present.   Cardiovascular: Normal rate, regular rhythm, normal heart sounds and intact distal pulses.    Pulmonary/Chest: No respiratory distress. She has decreased breath sounds. She has no wheezes. She has no rhonchi. She has no rales. " She exhibits no tenderness.   Abdominal: Soft. Bowel sounds are normal. She exhibits no mass. There is no tenderness. There is no rebound and no guarding.   Musculoskeletal: Normal range of motion. She exhibits no tenderness.   Lymphadenopathy:     She has no cervical adenopathy.   Neurological: She is alert and oriented to person, place, and time. No cranial nerve deficit.   Skin: Skin is warm. No rash noted.   Psychiatric: She has a normal mood and affect. Her behavior is normal. Judgment and thought content normal.   Nursing note and vitals reviewed.      Assessment/Plan:  Acute URI suspect viral process.  DDX includes viruses to include corona, adeno, parainfluenza, rhinovirus, noninfectious rhinitis (vasomotor and allergic). Reviewed sick contacts, reviewed recent travel.  Reviewed tobacco history.  Avoidance of all tobacco encouraged today.  Discussed abx not recommended for this treatment at present.  The patient voiced understanding. If no improvement in 3-5 days or worsening, they can contact clinic to start abx to include augmentin 875 PO BID x 10 days (unless PCN allergic and then other arrangements will be d/w patient). Reviewed risks/benefits of Abx discussed today.  Discussed allergies to medications.  Steroid injection discussed with patient today. R/B/A to this reviewed specifically with use in viral URI.  Steroids by mouth d/w patient to include R/B/A.  Nasal GC R/B/A d/w patient. A few OTC options d/w patient.  Can consider Zyrtec every am.  Dayquil per package insert discussed, concern with any BP elevation or history of HTN, DM, CAD.  If no better in 3-5 days or if worsening call and we will consider to start abx. Risks/benefits of current and new medications discussed with the patient today.  The patient is aware if there any side effects they should stop meds and call or return to clinic.  Appropriate F/U discussed. All questions answered to satisfactory state of patient.  Patient left  ambulatory.  Hand washing d/w patient. Cover sneezes/cough.  Avoid work/school for 24 hours if Temp >100.4.  No abx for now. I     Chronic environmental allergies: Continue flonase BID, continue singulair.      Orders Placed Today:  Kaya was seen today for sinusitis.    Diagnoses and all orders for this visit:    URI with cough and congestion    Environmental allergies    BMI 36.0-36.9,adult    Other orders  -     lidocaine viscous (XYLOCAINE) 2 % solution; Take 5 mL by mouth As Needed for Mild Pain .  -     benzonatate (TESSALON) 200 MG capsule; Take 1 capsule by mouth 3 (Three) Times a Day As Needed for Cough.        Risks/benefits of current and new medications discussed with the patient and or family today.  The patient/family are aware and accept that if there any side effects they should call or return to clinic as soon as possible.  Appropriate F/U discussed for topics addressed today. All questions were answered to the satisfactory state of patient/family.  Should symptoms fail to improve or worsen they agree to call or return to clinic or to go to the ER. Education handouts were offered on any new Rx if requested.  Discussed the importance of following up with any needed screening tests/labs/specialist appointments and any requested follow-up recommended by me today.  Importance of maintaining follow-up discussed and patient accepts that missed appointments can delay diagnosis and potentially lead to worsening of conditions.    An After Visit Summary was printed and given to the patient at discharge.  Follow-up: No Follow-up on file.         Yunier Mora M.D. 10/13/2017

## 2017-10-13 NOTE — PATIENT INSTRUCTIONS
Cool Mist Vaporizers  Vaporizers may help relieve the symptoms of a cough and cold. They add moisture to the air, which helps mucus to become thinner and less sticky. This makes it easier to breathe and cough up secretions. Cool mist vaporizers do not cause serious burns like hot mist vaporizers, which may also be called steamers or humidifiers. Vaporizers have not been proven to help with colds. You should not use a vaporizer if you are allergic to mold.  HOME CARE INSTRUCTIONS  · Follow the package instructions for the vaporizer.  · Do not use anything other than distilled water in the vaporizer.  · Do not run the vaporizer all of the time. This can cause mold or bacteria to grow in the vaporizer.  · Clean the vaporizer after each time it is used.  · Clean and dry the vaporizer well before storing it.  · Stop using the vaporizer if worsening respiratory symptoms develop.     This information is not intended to replace advice given to you by your health care provider. Make sure you discuss any questions you have with your health care provider.     Document Released: 09/14/2005 Document Revised: 12/23/2014 Document Reviewed: 05/07/2014  [employer] Interactive Patient Education ©2017 [employer]    Cough, Adult  Coughing is a reflex that clears your throat and your airways. Coughing helps to heal and protect your lungs. It is normal to cough occasionally, but a cough that happens with other symptoms or lasts a long time may be a sign of a condition that needs treatment. A cough may last only 2-3 weeks (acute), or it may last longer than 8 weeks (chronic).  CAUSES  Coughing is commonly caused by:  · Breathing in substances that irritate your lungs.  · A viral or bacterial respiratory infection.  · Allergies.  · Asthma.  · Postnasal drip.  · Smoking.  · Acid backing up from the stomach into the esophagus (gastroesophageal reflux).  · Certain medicines.  · Chronic lung problems, including COPD (or rarely, lung  cancer).  · Other medical conditions such as heart failure.  HOME CARE INSTRUCTIONS   Pay attention to any changes in your symptoms. Take these actions to help with your discomfort:  · Take medicines only as told by your health care provider.    If you were prescribed an antibiotic medicine, take it as told by your health care provider. Do not stop taking the antibiotic even if you start to feel better.    Talk with your health care provider before you take a cough suppressant medicine.  · Drink enough fluid to keep your urine clear or pale yellow.  · If the air is dry, use a cold steam vaporizer or humidifier in your bedroom or your home to help loosen secretions.  · Avoid anything that causes you to cough at work or at home.  · If your cough is worse at night, try sleeping in a semi-upright position.  · Avoid cigarette smoke. If you smoke, quit smoking. If you need help quitting, ask your health care provider.  · Avoid caffeine.  · Avoid alcohol.  · Rest as needed.  SEEK MEDICAL CARE IF:   · You have new symptoms.  · You cough up pus.  · Your cough does not get better after 2-3 weeks, or your cough gets worse.  · You cannot control your cough with suppressant medicines and you are losing sleep.  · You develop pain that is getting worse or pain that is not controlled with pain medicines.  · You have a fever.  · You have unexplained weight loss.  · You have night sweats.  SEEK IMMEDIATE MEDICAL CARE IF:  · You cough up blood.  · You have difficulty breathing.  · Your heartbeat is very fast.     This information is not intended to replace advice given to you by your health care provider. Make sure you discuss any questions you have with your health care provider.     Document Released: 06/15/2012 Document Revised: 09/07/2016 Document Reviewed: 02/24/2016  [employer] Interactive Patient Education ©2017 [employer]    Viral Respiratory Infection  A respiratory infection is an illness that affects part of the  respiratory system, such as the lungs, nose, or throat. Most respiratory infections are caused by either viruses or bacteria. A respiratory infection that is caused by a virus is called a viral respiratory infection.  Common types of viral respiratory infections include:  · A cold.  · The flu (influenza).  · A respiratory syncytial virus (RSV) infection.  HOW DO I KNOW IF I HAVE A VIRAL RESPIRATORY INFECTION?  Most viral respiratory infections cause:  · A stuffy or runny nose.  · Yellow or green nasal discharge.  · A cough.  · Sneezing.  · Fatigue.  · Achy muscles.  · A sore throat.  · Sweating or chills.  · A fever.  · A headache.  HOW ARE VIRAL RESPIRATORY INFECTIONS TREATED?  If influenza is diagnosed early, it may be treated with an antiviral medicine that shortens the length of time a person has symptoms. Symptoms of viral respiratory infections may be treated with over-the-counter and prescription medicines, such as:  · Expectorants. These make it easier to cough up mucus.  · Decongestant nasal sprays.  Health care providers do not prescribe antibiotic medicines for viral infections. This is because antibiotics are designed to kill bacteria. They have no effect on viruses.  HOW DO I KNOW IF I SHOULD STAY HOME FROM WORK OR SCHOOL?  To avoid exposing others to your respiratory infection, stay home if you have:  · A fever.  · A persistent cough.  · A sore throat.  · A runny nose.  · Sneezing.  · Muscles aches.  · Headaches.  · Fatigue.  · Weakness.  · Chills.  · Sweating.  · Nausea.  HOME CARE INSTRUCTIONS  · Rest as much as possible.  · Take over-the-counter and prescription medicines only as told by your health care provider.  · Drink enough fluid to keep your urine clear or pale yellow. This helps prevent dehydration and helps loosen up mucus.  · Gargle with a salt-water mixture 3-4 times per day or as needed. To make a salt-water mixture, completely dissolve ½-1 tsp of salt in 1 cup of warm water.  · Use nose  drops made from salt water to ease congestion and soften raw skin around your nose.  · Do not drink alcohol.  · Do not use tobacco products, including cigarettes, chewing tobacco, and e-cigarettes. If you need help quitting, ask your health care provider.  SEEK MEDICAL CARE IF:  · Your symptoms last for 10 days or longer.  · Your symptoms get worse over time.  · You have a fever.  · You have severe sinus pain in your face or forehead.  · The glands in your jaw or neck become very swollen.  SEEK IMMEDIATE MEDICAL CARE IF:  · You feel pain or pressure in your chest.  · You have shortness of breath.  · You faint or feel like you will faint.  · You have severe and persistent vomiting.  · You feel confused or disoriented.     This information is not intended to replace advice given to you by your health care provider. Make sure you discuss any questions you have with your health care provider.     Document Released: 09/27/2006 Document Revised: 04/10/2017 Document Reviewed: 05/25/2016  [employer] Interactive Patient Education ©2017 [employer]

## 2017-10-21 ENCOUNTER — APPOINTMENT (OUTPATIENT)
Dept: CT IMAGING | Age: 67
End: 2017-10-21
Payer: MEDICARE

## 2017-10-21 ENCOUNTER — HOSPITAL ENCOUNTER (EMERGENCY)
Age: 67
Discharge: HOME | End: 2017-10-21
Attending: EMERGENCY MEDICINE
Payer: MEDICARE

## 2017-10-21 VITALS
HEART RATE: 68 BPM | HEIGHT: 64 IN | OXYGEN SATURATION: 92 % | RESPIRATION RATE: 20 BRPM | BODY MASS INDEX: 34.15 KG/M2 | DIASTOLIC BLOOD PRESSURE: 81 MMHG | SYSTOLIC BLOOD PRESSURE: 139 MMHG | WEIGHT: 200 LBS | TEMPERATURE: 97.8 F

## 2017-10-21 DIAGNOSIS — R10.13 ABDOMINAL PAIN, EPIGASTRIC: Primary | ICD-10-CM

## 2017-10-21 DIAGNOSIS — R11.2 NON-INTRACTABLE VOMITING WITH NAUSEA, UNSPECIFIED VOMITING TYPE: ICD-10-CM

## 2017-10-21 LAB
ALBUMIN SERPL-MCNC: 4.3 G/DL (ref 3.5–5.2)
ALP BLD-CCNC: 84 U/L (ref 35–104)
ALT SERPL-CCNC: 11 U/L (ref 5–33)
ANION GAP SERPL CALCULATED.3IONS-SCNC: 13 MMOL/L (ref 7–19)
APTT: 29.2 SEC (ref 26–36.2)
AST SERPL-CCNC: 18 U/L (ref 5–32)
BASOPHILS ABSOLUTE: 0.1 K/UL (ref 0–0.2)
BASOPHILS RELATIVE PERCENT: 0.6 % (ref 0–1)
BILIRUB SERPL-MCNC: 0.3 MG/DL (ref 0.2–1.2)
BUN BLDV-MCNC: 12 MG/DL (ref 8–23)
CALCIUM SERPL-MCNC: 10.7 MG/DL (ref 8.8–10.2)
CHLORIDE BLD-SCNC: 99 MMOL/L (ref 98–111)
CO2: 30 MMOL/L (ref 22–29)
CREAT SERPL-MCNC: 0.7 MG/DL (ref 0.5–0.9)
EOSINOPHILS ABSOLUTE: 0.1 K/UL (ref 0–0.6)
EOSINOPHILS RELATIVE PERCENT: 1 % (ref 0–5)
GFR NON-AFRICAN AMERICAN: >60
GLUCOSE BLD-MCNC: 98 MG/DL (ref 74–109)
HCT VFR BLD CALC: 40.6 % (ref 37–47)
HEMOGLOBIN: 13.3 G/DL (ref 12–16)
INR BLD: 0.95 (ref 0.88–1.18)
LIPASE: 41 U/L (ref 13–60)
LYMPHOCYTES ABSOLUTE: 1.6 K/UL (ref 1.1–4.5)
LYMPHOCYTES RELATIVE PERCENT: 19.7 % (ref 20–40)
MCH RBC QN AUTO: 29.8 PG (ref 27–31)
MCHC RBC AUTO-ENTMCNC: 32.8 G/DL (ref 33–37)
MCV RBC AUTO: 90.8 FL (ref 81–99)
MONOCYTES ABSOLUTE: 0.8 K/UL (ref 0–0.9)
MONOCYTES RELATIVE PERCENT: 9.8 % (ref 0–10)
NEUTROPHILS ABSOLUTE: 5.7 K/UL (ref 1.5–7.5)
NEUTROPHILS RELATIVE PERCENT: 68.8 % (ref 50–65)
PDW BLD-RTO: 12.1 % (ref 11.5–14.5)
PLATELET # BLD: 249 K/UL (ref 130–400)
PMV BLD AUTO: 9.5 FL (ref 9.4–12.3)
POTASSIUM SERPL-SCNC: 4.2 MMOL/L (ref 3.5–5)
PROTHROMBIN TIME: 12.6 SEC (ref 12–14.6)
RBC # BLD: 4.47 M/UL (ref 4.2–5.4)
SODIUM BLD-SCNC: 142 MMOL/L (ref 136–145)
TOTAL PROTEIN: 7.9 G/DL (ref 6.6–8.7)
WBC # BLD: 8.2 K/UL (ref 4.8–10.8)

## 2017-10-21 PROCEDURE — 2500000003 HC RX 250 WO HCPCS: Performed by: EMERGENCY MEDICINE

## 2017-10-21 PROCEDURE — 85730 THROMBOPLASTIN TIME PARTIAL: CPT

## 2017-10-21 PROCEDURE — 99283 EMERGENCY DEPT VISIT LOW MDM: CPT | Performed by: EMERGENCY MEDICINE

## 2017-10-21 PROCEDURE — 96375 TX/PRO/DX INJ NEW DRUG ADDON: CPT

## 2017-10-21 PROCEDURE — 96374 THER/PROPH/DIAG INJ IV PUSH: CPT

## 2017-10-21 PROCEDURE — 6360000004 HC RX CONTRAST MEDICATION: Performed by: EMERGENCY MEDICINE

## 2017-10-21 PROCEDURE — 85025 COMPLETE CBC W/AUTO DIFF WBC: CPT

## 2017-10-21 PROCEDURE — S0028 INJECTION, FAMOTIDINE, 20 MG: HCPCS | Performed by: EMERGENCY MEDICINE

## 2017-10-21 PROCEDURE — 93005 ELECTROCARDIOGRAM TRACING: CPT

## 2017-10-21 PROCEDURE — 85610 PROTHROMBIN TIME: CPT

## 2017-10-21 PROCEDURE — 83690 ASSAY OF LIPASE: CPT

## 2017-10-21 PROCEDURE — 36415 COLL VENOUS BLD VENIPUNCTURE: CPT

## 2017-10-21 PROCEDURE — 80053 COMPREHEN METABOLIC PANEL: CPT

## 2017-10-21 PROCEDURE — 6360000002 HC RX W HCPCS: Performed by: EMERGENCY MEDICINE

## 2017-10-21 PROCEDURE — 99284 EMERGENCY DEPT VISIT MOD MDM: CPT

## 2017-10-21 PROCEDURE — 74177 CT ABD & PELVIS W/CONTRAST: CPT

## 2017-10-21 RX ORDER — MORPHINE SULFATE 10 MG/ML
6 INJECTION, SOLUTION INTRAMUSCULAR; INTRAVENOUS ONCE
Status: COMPLETED | OUTPATIENT
Start: 2017-10-21 | End: 2017-10-21

## 2017-10-21 RX ORDER — FAMOTIDINE 20 MG/1
20 TABLET, FILM COATED ORAL DAILY
Qty: 15 TABLET | Refills: 0 | Status: SHIPPED | OUTPATIENT
Start: 2017-10-21 | End: 2018-02-27

## 2017-10-21 RX ORDER — KETOROLAC TROMETHAMINE 30 MG/ML
15 INJECTION, SOLUTION INTRAMUSCULAR; INTRAVENOUS ONCE
Status: COMPLETED | OUTPATIENT
Start: 2017-10-21 | End: 2017-10-21

## 2017-10-21 RX ORDER — METOCLOPRAMIDE HYDROCHLORIDE 5 MG/ML
10 INJECTION INTRAMUSCULAR; INTRAVENOUS ONCE
Status: COMPLETED | OUTPATIENT
Start: 2017-10-21 | End: 2017-10-21

## 2017-10-21 RX ORDER — DIPHENHYDRAMINE HYDROCHLORIDE 50 MG/ML
50 INJECTION INTRAMUSCULAR; INTRAVENOUS ONCE
Status: COMPLETED | OUTPATIENT
Start: 2017-10-21 | End: 2017-10-21

## 2017-10-21 RX ADMIN — DIPHENHYDRAMINE HYDROCHLORIDE 50 MG: 50 INJECTION, SOLUTION INTRAMUSCULAR; INTRAVENOUS at 14:12

## 2017-10-21 RX ADMIN — METOCLOPRAMIDE 10 MG: 5 INJECTION, SOLUTION INTRAMUSCULAR; INTRAVENOUS at 14:12

## 2017-10-21 RX ADMIN — KETOROLAC TROMETHAMINE 15 MG: 30 INJECTION, SOLUTION INTRAMUSCULAR at 14:12

## 2017-10-21 RX ADMIN — MORPHINE SULFATE 6 MG: 10 INJECTION INTRAVENOUS at 14:12

## 2017-10-21 RX ADMIN — IOPAMIDOL 90 ML: 755 INJECTION, SOLUTION INTRAVENOUS at 15:26

## 2017-10-21 RX ADMIN — FAMOTIDINE 20 MG: 10 INJECTION, SOLUTION INTRAVENOUS at 14:12

## 2017-10-21 ASSESSMENT — ENCOUNTER SYMPTOMS
VOMITING: 1
COUGH: 0
NAUSEA: 1
CHEST TIGHTNESS: 0
SHORTNESS OF BREATH: 0
ABDOMINAL PAIN: 1
RHINORRHEA: 0
SORE THROAT: 0
BACK PAIN: 0
EYE PAIN: 0
DIARRHEA: 0
PHOTOPHOBIA: 0

## 2017-10-21 ASSESSMENT — PAIN DESCRIPTION - LOCATION: LOCATION: ABDOMEN

## 2017-10-21 ASSESSMENT — PAIN DESCRIPTION - ORIENTATION: ORIENTATION: UPPER

## 2017-10-21 ASSESSMENT — PAIN SCALES - GENERAL
PAINLEVEL_OUTOF10: 4
PAINLEVEL_OUTOF10: 3
PAINLEVEL_OUTOF10: 0

## 2017-10-21 NOTE — ED NOTES
Dr. Roderick Francisco at bedside.      [address] [place] [address]  10/21/17 [facility]

## 2017-10-21 NOTE — ED NOTES
Introduced self to pt and pt's family. Pt is resting comfortably in bed. Pt shows no s/s of distress. Call light is in reach of pt and pt encouraged to call if he needs anything.       Link Barron RN  10/21/17 7450

## 2017-10-21 NOTE — ED NOTES
Patient resting and watching TV. No s/s of distress, call light in reach.      [address] [place] [address]  10/21/17 1594

## 2017-10-21 NOTE — ED NOTES
Voided urine collected and sent.  Urine noted to be clear yellow and without sediment ,malodorous     Dorothy Nash RN  10/21/17 9501

## 2017-10-21 NOTE — ED NOTES
Patient drinking water, denies pain at this time. Tolerating PO fluids well.      [address] [facility]  10/21/17 2497

## 2017-10-21 NOTE — ED PROVIDER NOTES
Genitourinary: Negative for dysuria, flank pain and hematuria. Musculoskeletal: Negative for arthralgias, back pain, myalgias and neck pain. Skin: Negative for rash and wound. Neurological: Positive for headaches. Negative for dizziness, syncope, speech difficulty, weakness and light-headedness. Psychiatric/Behavioral: Negative for confusion, hallucinations and suicidal ideas. PAST MEDICAL HISTORY     Past Medical History:   Diagnosis Date    Anxiety     Depression     Edema     GERD (gastroesophageal reflux disease)     Headache(784.0)     migraines    Hyperlipidemia     Hypertension     Mini stroke ([facility] [address]     TIA (transient ischemic attack)        SURGICAL HISTORY       Past Surgical History:   Procedure Laterality Date    COLONOSCOPY  07/01/2015    Dr. Sulma Mcguire  08/26/2015    Dr. Sulma Mcguire  5/23/2017    Dr Osorio-w/balloon dilation, 12-13. 5-15 mm-esophageal narrowing with diverticulum at 29 cm-Kristin (-), hiatal herni, Dye's (+) dysplasia (-)--1st dx--1 yr recall-Ct chest ordered       CURRENT MEDICATIONS       Discharge Medication List as of 10/21/2017  7:05 PM      CONTINUE these medications which have NOT CHANGED    Details   lisinopril (PRINIVIL;ZESTRIL) 20 MG tablet Take 1 tablet by mouth daily, Disp-30 tablet, R-11Normal      furosemide (LASIX) 40 MG tablet Take 1 tablet by mouth daily, Disp-30 tablet, R-11Normal      venlafaxine (EFFEXOR XR) 150 MG extended release capsule Take 1 capsule by mouth daily, Disp-30 capsule, R-5Normal      Cholecalciferol (VITAMIN D) 2000 UNITS CAPS capsule Take 2 capsules by mouth dailyHistorical Med      vitamin D (ERGOCALCIFEROL) 25600 UNITS CAPS capsule Take 1 capsule by mouth once a week, Disp-4 capsule, R-3Normal      amLODIPine (NORVASC) 2.5 MG tablet Take 1 tablet by mouth daily, Disp-30 tablet, R-3Normal      atorvastatin (LIPITOR) 40 MG tablet Take 1 tablet by mouth daily, Disp-30 tablet, R-5Normal      Omega-3 Fatty Acids (FISH OIL) 1000 MG CAPS Take 2 capsules by mouth 2 times daily, Disp-120 capsule, R-3OTC      ondansetron (ZOFRAN) 4 MG tablet Take 1 tablet by mouth every 8 hours as needed for Nausea or Vomiting, Disp-20 tablet, R-0Normal      omeprazole (PRILOSEC) 20 MG delayed release capsule Take 1 capsule by mouth 2 times daily Take 30 minutes before breakfast and supper, Disp-60 capsule, R-11Normal      LORazepam (ATIVAN) 1 MG tablet Take 1 tablet by mouth dailyHistorical Med             ALLERGIES     Review of patient's allergies indicates no known allergies. FAMILY HISTORY       Family History   Problem Relation Age of Onset    Heart Disease Mother     Colon Cancer Neg Hx     Colon Polyps Neg Hx     Liver Cancer Neg Hx     Liver Disease Neg Hx     Esophageal Cancer Neg Hx     Rectal Cancer Neg Hx     Stomach Cancer Neg Hx        SOCIAL HISTORY       Social History     Social History    Marital status:      Spouse name: N/A    Number of children: N/A    Years of education: N/A     Social History Main Topics    Smoking status: Never Smoker    Smokeless tobacco: Never Used    Alcohol use No    Drug use: No    Sexual activity: Not Asked     Other Topics Concern    None     Social History Narrative    None       SCREENINGS           PHYSICAL EXAM    (up to 7 for level 4, 8 or more for level 5)     ED Triage Vitals [10/21/17 1334]   BP Temp Temp Source Pulse Resp SpO2 Height Weight   (!) 149/87 97.7 °F (36.5 °C) Oral 86 18 99 % 5' 4\" (1.626 m) 200 lb (90.7 kg)       Physical Exam   Constitutional: She is oriented to person, place, and time. She appears well-developed and well-nourished. No distress. HENT:   Head: Normocephalic and atraumatic. Mouth/Throat: Oropharynx is clear and moist.   Eyes: EOM are normal. Pupils are equal, round, and reactive to light. No scleral icterus. Neck: Normal range of motion. Neck supple.  No tracheal deviation present. Cardiovascular: Normal rate, regular rhythm, normal heart sounds and intact distal pulses. Exam reveals no gallop and no friction rub. No murmur heard. Pulmonary/Chest: Effort normal and breath sounds normal. No respiratory distress. She has no wheezes. She has no rales. She exhibits no tenderness. Abdominal: Soft. She exhibits no distension and no mass. There is tenderness. There is no rebound and no guarding. Diffuse upper abdominal tenderness. No rebound or guarding. Musculoskeletal: Normal range of motion. She exhibits no edema, tenderness or deformity. Neurological: She is alert and oriented to person, place, and time. No cranial nerve deficit. She exhibits normal muscle tone. Coordination normal.   Skin: Skin is warm and dry. No rash noted. Psychiatric: She has a normal mood and affect. Her behavior is normal. Judgment and thought content normal.   Nursing note and vitals reviewed. DIAGNOSTIC RESULTS     EKG: All EKG's are interpreted by the Emergency Department Physician who either signs or Co-signs this chart in the absence of a cardiologist.    1444: Normal sinus rhythm at a rate of 73 bpm, no ST elevations or depressions consistent with STEMI or ischemia, CA interval 36, QRS 92 ms,  ms, no ectopy, normal access. RADIOLOGY:   Non-plain film images such as CT, Ultrasound and MRI are read by the radiologist. Plain radiographic images are visualized and preliminarily interpreted by the emergency physician with the below findings:      CT ABDOMEN PELVIS W IV CONTRAST Additional Contrast? None   Final Result   Impression:   1. No acute findings within the abdomen or pelvis. 2. Enhancing lesion near the dome of the liver is of uncertain   etiology and could represent a flash filling hemangioma or may be   vascular in nature. Consider further evaluation with nonemergent liver   ultrasound. 3. Atherosclerotic disease. 4. Presumed left renal cysts.    5. Colonic diverticulosis without evidence of diverticulitis. Signed by Dr Scooby Mike on 10/21/2017 3:58 PM          LABS:  Labs Reviewed   CBC WITH AUTO DIFFERENTIAL - Abnormal; Notable for the following:        Result Value    MCHC 32.8 (*)     Neutrophils % 68.8 (*)     Lymphocytes % 19.7 (*)     All other components within normal limits   COMPREHENSIVE METABOLIC PANEL - Abnormal; Notable for the following:     CO2 30 (*)     Calcium 10.7 (*)     All other components within normal limits   APTT   PROTIME-INR   LIPASE       All other labs were within normal range or not returned as of this dictation. EMERGENCY DEPARTMENT COURSE and DIFFERENTIAL DIAGNOSIS/MDM:   Vitals:    Vitals:    10/21/17 1432 10/21/17 1502 10/21/17 1707 10/21/17 1858   BP: (!) 171/78 (!) 184/81 119/64 139/81   Pulse:    68   Resp:    20   Temp:    97.8 °F (36.6 °C)   TempSrc:    Oral   SpO2:   90% 92%   Weight:       Height:           MDM  Number of Diagnoses or Management Options  Diagnosis management comments: 79-year-old female presents to emergency department with upper abdominal pain nausea vomiting. Exam reveals tenderness diffusely around the upper abdomen. We'll obtain IV, labs, fluids, make patient nothing by mouth, antiemetics, pain relief, CT abdomen and pelvis to evaluate for any acute or life-threatening abdominal pathology. Emergency Department Course  Patient was seen and evaluated, IV placed, labs drawn and sent, medications master. CT abdomen pelvis was obtained. No findings to explain the patient's pain. No acute findings. As reviewed with patient and . Questions invited and answered. Patient feeling better after medication. By mouth challenge past. Do not feel the patient has a life-threatening cause of her abdominal pain. It is possible the patient had either a viral enteritis or potentially some food poisoning given that the patient had the symptoms a few hours after eating.  Discussed with patient need for

## 2017-10-23 ENCOUNTER — CARE COORDINATION (OUTPATIENT)
Dept: CARE COORDINATION | Age: 67
End: 2017-10-23

## 2017-10-23 LAB
EKG P AXIS: 35 DEGREES
EKG P-R INTERVAL: 140 MS
EKG Q-T INTERVAL: 386 MS
EKG QRS DURATION: 86 MS
EKG QTC CALCULATION (BAZETT): 408 MS
EKG T AXIS: 41 DEGREES

## 2017-10-23 NOTE — CARE COORDINATION
Ambulatory Care Coordination ED Follow up Call       Reason for ED Visit: ABD PAIN; EPIGASTRIC PAIN; N&V  Care Management Risk Score: CMRS 0  How are you feeling? :     significantly improved  Patient Reports the following:  none             Contact RNCC regarding any worsening symptoms from above. Did you call your PCP prior to going to the ED? No  - SHARED INFORMATION ABOUT PROVIDER ON-CALL SERVICES. Post Discharge Status:  What health concerns since you left the Emergency Room? NONE    Do you have wounds that you are caring for at home? No    Do you have a follow up appt scheduled? Yes - TRANSFERRED THE PATIENT TO [place] AT THE [facility] TO SCHEDULE A FOLLOW-UP APPT THIS [address] Review of Instructions:                                 Do you have any questions regarding your discharge instructions?:  No  Medications:    What questions do you have about your medications? No  Are you taking your medications as directed? If not - why? Yes   Can you afford your medications? yes  ADLS:  Do you need assistance of any kind at home? No   What assistance is needed? NONE      FU appts/Provider:    Future Appointments  Date Time Provider Kristyn Christopher   11/17/2017 8:30 AM Susanne Naqvi, [facility] Maintenance Due   Topic Date Due    Hepatitis C screen  1950    DTaP/Tdap/Td vaccine (1 - Tdap) 02/15/1970    Zostavax vaccine  02/15/2011    Pneumococcal low/med risk (1 of 2 - PCV13) 02/15/2016    Flu vaccine (1) 09/01/2017     Patient advised to contact PCP office to have HM items/records faxed to PCP Office directly?  no PATIENT TREATED AT [address] [place] [address] 64.     Submitted by Courtney/BEBETO

## 2017-10-25 ENCOUNTER — OFFICE VISIT (OUTPATIENT)
Dept: PRIMARY CARE CLINIC | Age: 67
End: 2017-10-25
Payer: MEDICARE

## 2017-10-25 VITALS
HEIGHT: 64 IN | BODY MASS INDEX: 35.42 KG/M2 | TEMPERATURE: 98.6 F | SYSTOLIC BLOOD PRESSURE: 142 MMHG | OXYGEN SATURATION: 96 % | HEART RATE: 62 BPM | WEIGHT: 207.5 LBS | DIASTOLIC BLOOD PRESSURE: 78 MMHG

## 2017-10-25 DIAGNOSIS — K76.9 LIVER LESION: ICD-10-CM

## 2017-10-25 DIAGNOSIS — K59.00 CONSTIPATION, UNSPECIFIED CONSTIPATION TYPE: ICD-10-CM

## 2017-10-25 DIAGNOSIS — R10.11 RUQ PAIN: ICD-10-CM

## 2017-10-25 DIAGNOSIS — R11.0 NAUSEA: Primary | ICD-10-CM

## 2017-10-25 DIAGNOSIS — R10.13 EPIGASTRIC PAIN: ICD-10-CM

## 2017-10-25 PROCEDURE — G8417 CALC BMI ABV UP PARAM F/U: HCPCS | Performed by: NURSE PRACTITIONER

## 2017-10-25 PROCEDURE — G8399 PT W/DXA RESULTS DOCUMENT: HCPCS | Performed by: NURSE PRACTITIONER

## 2017-10-25 PROCEDURE — 1090F PRES/ABSN URINE INCON ASSESS: CPT | Performed by: NURSE PRACTITIONER

## 2017-10-25 PROCEDURE — 99213 OFFICE O/P EST LOW 20 MIN: CPT | Performed by: NURSE PRACTITIONER

## 2017-10-25 PROCEDURE — G8427 DOCREV CUR MEDS BY ELIG CLIN: HCPCS | Performed by: NURSE PRACTITIONER

## 2017-10-25 PROCEDURE — 4040F PNEUMOC VAC/ADMIN/RCVD: CPT | Performed by: NURSE PRACTITIONER

## 2017-10-25 PROCEDURE — G8484 FLU IMMUNIZE NO ADMIN: HCPCS | Performed by: NURSE PRACTITIONER

## 2017-10-25 PROCEDURE — 1123F ACP DISCUSS/DSCN MKR DOCD: CPT | Performed by: NURSE PRACTITIONER

## 2017-10-25 PROCEDURE — 3014F SCREEN MAMMO DOC REV: CPT | Performed by: NURSE PRACTITIONER

## 2017-10-25 PROCEDURE — 1036F TOBACCO NON-USER: CPT | Performed by: NURSE PRACTITIONER

## 2017-10-25 PROCEDURE — 3017F COLORECTAL CA SCREEN DOC REV: CPT | Performed by: NURSE PRACTITIONER

## 2017-10-25 ASSESSMENT — ENCOUNTER SYMPTOMS
DIARRHEA: 0
RHINORRHEA: 0
NAUSEA: 1
COUGH: 0
EYE REDNESS: 0
ABDOMINAL PAIN: 1
SHORTNESS OF BREATH: 0
SORE THROAT: 0
CONSTIPATION: 1
VOMITING: 0

## 2017-10-25 NOTE — PROGRESS NOTES
Alcohol use No       Review of Systems   Constitutional: Negative for chills, fatigue and fever. HENT: Negative for congestion, ear pain, rhinorrhea and sore throat. Eyes: Negative for redness. Respiratory: Negative for cough and shortness of breath. Cardiovascular: Negative for chest pain. Gastrointestinal: Positive for abdominal pain (improved), constipation and nausea. Negative for diarrhea and vomiting. Skin: Negative for rash. Neurological: Negative for dizziness and headaches. Physical Exam   Constitutional: She appears well-developed. Overweight   HENT:   Head: Normocephalic. Right Ear: Hearing and external ear normal.   Left Ear: Hearing and external ear normal.   Nose: Nose normal.   Mouth/Throat: Oropharynx is clear and moist.   Neck: Normal range of motion. Cardiovascular: Normal rate and regular rhythm. Pulmonary/Chest: Effort normal and breath sounds normal. No respiratory distress. She has no wheezes. She has no rales. Abdominal: Soft. Bowel sounds are normal. There is tenderness in the right upper quadrant, epigastric area, periumbilical area and left upper quadrant. Neurological: She is alert. Skin: Skin is dry. Psychiatric: She has a normal mood and affect. Her behavior is normal. Judgment and thought content normal.   Vitals reviewed. ASSESSMENT      ICD-10-CM ICD-9-CM    1. Nausea R11.0 787.02 US Gallbladder Ruq  Zofran prn  Continue Pepcid and Omeprazole   2. RUQ pain R10.11 789.01 US Gallbladder Ruq  If negative will proceed with HIDA scan   3. Epigastric pain R10.13 789.06 US Gallbladder Ruq   4. Liver lesion K76.9 573.8 US Gallbladder Ruq   5. Constipation, unspecified constipation type K59.00 564.00 See GI cleanout below         PLAN    Orders Placed This Encounter   Procedures    US Gallbladder Ruq        Return in about 2 weeks (around 11/8/2017), or if symptoms worsen or fail to improve.      Patient Instructions     Patient Education Abdominal Pain: Care Instructions  Your Care Instructions    Abdominal pain has many possible causes. Some aren't serious and get better on their own in a few days. Others need more testing and treatment. If your pain continues or gets worse, you need to be rechecked and may need more tests to find out what is wrong. You may need surgery to correct the problem. Don't ignore new symptoms, such as fever, nausea and vomiting, urination problems, pain that gets worse, and dizziness. These may be signs of a more serious problem. Your doctor may have recommended a follow-up visit in the next 8 to 12 hours. If you are not getting better, you may need more tests or treatment. The doctor has checked you carefully, but problems can develop later. If you notice any problems or new symptoms, get medical treatment right away. Follow-up care is a key part of your treatment and safety. Be sure to make and go to all appointments, and call your doctor if you are having problems. It's also a good idea to know your test results and keep a list of the medicines you take. How can you care for yourself at home? · Rest until you feel better. · To prevent dehydration, drink plenty of fluids, enough so that your urine is light yellow or clear like water. Choose water and other caffeine-free clear liquids until you feel better. If you have kidney, heart, or liver disease and have to limit fluids, talk with your doctor before you increase the amount of fluids you drink. · If your stomach is upset, eat mild foods, such as rice, dry toast or crackers, bananas, and applesauce. Try eating several small meals instead of two or three large ones. · Wait until 48 hours after all symptoms have gone away before you have spicy foods, alcohol, and drinks that contain caffeine. · Do not eat foods that are high in fat. · Avoid anti-inflammatory medicines such as aspirin, ibuprofen (Advil, Motrin), and naproxen (Aleve).  These can cause stomach upset. Talk to your doctor if you take daily aspirin for another health problem. When should you call for help? Call 911 anytime you think you may need emergency care. For example, call if:  · You passed out (lost consciousness). · You pass maroon or very bloody stools. · You vomit blood or what looks like coffee grounds. · You have new, severe belly pain. Call your doctor now or seek immediate medical care if:  · Your pain gets worse, especially if it becomes focused in one area of your belly. · You have a new or higher fever. · Your stools are black and look like tar, or they have streaks of blood. · You have unexpected vaginal bleeding. · You have symptoms of a urinary tract infection. These may include:  ¨ Pain when you urinate. ¨ Urinating more often than usual.  ¨ Blood in your urine. · You are dizzy or lightheaded, or you feel like you may faint. Watch closely for changes in your health, and be sure to contact your doctor if:  · You are not getting better after 1 day (24 hours). Where can you learn more? Go to https://AppSame.NormOxys. org and sign in to your [employer] account. Enter J705 in the [employer] box to learn more about \"Abdominal Pain: Care Instructions. \"     If you do not have an account, please click on the \"Sign Up Now\" link. Current as of: March 20, 2017  Content Version: 11.3  © 7594-0780 [employer]. Care instructions adapted under license by [facility] ([facility]). If you have questions about a medical condition or this instruction, always ask your healthcare professional. [place] [address] any warranty or liability for your use of this information. Stage 1: CLEAN OUT PERIOD   -Magnesium Citrate 10 oz bottle  1 early evening        1 next morning   -Lots of fluids before, during and after.      Stage 2: MAINTENANCE PHASE (Goal is to maintain empty colon for 2 months)   Goal - 1 or 2 soft bowel movements every day     -Milk of Magnesia  1 tablespoon twice a day    Adjunct dose to achieve 1-2 stools a day     -Fiber -high fiber diet (fruits, vegetables, whole grain cereals, etc)    Substitute fiber (benefiber, fibercon, and fibersure)    1 heaping tablespoon with beverage of choice once or twice a day     It is extremely important to maintain 2 or more soft stool everyday       for at least 2 months. After 2 months continue fiber and occasional Milk of Magnesia. Controlled Substances Monitoring:  n/a            Additional Instructions: As always, patient is advised to bring in medication bottles in order to correctly reconcile with our current list.    Tegan Gregory received counseling on the following healthy behaviors: n/a    Patient given educational materials on dx    I have instructed Tegan Gregory to complete a self tracking handout on n/a and instructed them to bring it with them to her next appointment. Discussed use, benefit, and side effects of prescribed medications. Barriers to medication compliance addressed. All patient questions answered. Pt voiced understanding.      LUIZ Medina

## 2017-10-25 NOTE — PATIENT INSTRUCTIONS
These can cause stomach upset. Talk to your doctor if you take daily aspirin for another health problem. When should you call for help? Call 911 anytime you think you may need emergency care. For example, call if:  · You passed out (lost consciousness). · You pass maroon or very bloody stools. · You vomit blood or what looks like coffee grounds. · You have new, severe belly pain. Call your doctor now or seek immediate medical care if:  · Your pain gets worse, especially if it becomes focused in one area of your belly. · You have a new or higher fever. · Your stools are black and look like tar, or they have streaks of blood. · You have unexpected vaginal bleeding. · You have symptoms of a urinary tract infection. These may include:  ¨ Pain when you urinate. ¨ Urinating more often than usual.  ¨ Blood in your urine. · You are dizzy or lightheaded, or you feel like you may faint. Watch closely for changes in your health, and be sure to contact your doctor if:  · You are not getting better after 1 day (24 hours). Where can you learn more? Go to https://NiftyThrifty.BeatTheBushes. org and sign in to your [employer] account. Enter R254 in the [employer] box to learn more about \"Abdominal Pain: Care Instructions. \"     If you do not have an account, please click on the \"Sign Up Now\" link. Current as of: March 20, 2017  Content Version: 11.3  © 9657-2708 [employer]. Care instructions adapted under license by [facility] ([facility]). If you have questions about a medical condition or this instruction, always ask your healthcare professional. [place] [address] any warranty or liability for your use of this information. Stage 1: CLEAN OUT PERIOD   -Magnesium Citrate 10 oz bottle  1 early evening        1 next morning   -Lots of fluids before, during and after.      Stage 2: MAINTENANCE PHASE (Goal is to maintain empty colon for 2 months)   Goal - 1 or 2 soft bowel movements every day     -Milk of Magnesia  1 tablespoon twice a day    Adjunct dose to achieve 1-2 stools a day     -Fiber -high fiber diet (fruits, vegetables, whole grain cereals, etc)    Substitute fiber (benefiber, fibercon, and fibersure)    1 heaping tablespoon with beverage of choice once or twice a day     It is extremely important to maintain 2 or more soft stool everyday       for at least 2 months. After 2 months continue fiber and occasional Milk of Magnesia.

## 2017-10-31 ENCOUNTER — HOSPITAL ENCOUNTER (EMERGENCY)
Age: 67
Discharge: ANOTHER HOSPITAL | End: 2017-10-31
Attending: EMERGENCY MEDICINE
Payer: MEDICARE

## 2017-10-31 ENCOUNTER — TELEPHONE (OUTPATIENT)
Dept: PRIMARY CARE CLINIC | Age: 67
End: 2017-10-31

## 2017-10-31 ENCOUNTER — APPOINTMENT (OUTPATIENT)
Dept: CT IMAGING | Age: 67
End: 2017-10-31
Payer: MEDICARE

## 2017-10-31 ENCOUNTER — APPOINTMENT (OUTPATIENT)
Dept: GENERAL RADIOLOGY | Age: 67
End: 2017-10-31
Payer: MEDICARE

## 2017-10-31 VITALS
HEART RATE: 80 BPM | TEMPERATURE: 98.2 F | DIASTOLIC BLOOD PRESSURE: 79 MMHG | SYSTOLIC BLOOD PRESSURE: 153 MMHG | OXYGEN SATURATION: 94 % | HEIGHT: 64 IN | BODY MASS INDEX: 35.34 KG/M2 | RESPIRATION RATE: 18 BRPM | WEIGHT: 207 LBS

## 2017-10-31 DIAGNOSIS — S62.172A CLOSED DISPLACED FRACTURE OF TRAPEZIUM OF LEFT WRIST, INITIAL ENCOUNTER: Primary | ICD-10-CM
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
## 2017-10-31 LAB
ALBUMIN SERPL-MCNC: 4.1 G/DL (ref 3.5–5.2)
ALP BLD-CCNC: 78 U/L (ref 35–104)
ALT SERPL-CCNC: 14 U/L (ref 5–33)
ANION GAP SERPL CALCULATED.3IONS-SCNC: 11 MMOL/L (ref 7–19)
AST SERPL-CCNC: 19 U/L (ref 5–32)
BILIRUB SERPL-MCNC: 0.3 MG/DL (ref 0.2–1.2)
BUN BLDV-MCNC: 11 MG/DL (ref 8–23)
CALCIUM SERPL-MCNC: 10.3 MG/DL (ref 8.8–10.2)
CHLORIDE BLD-SCNC: 102 MMOL/L (ref 98–111)
CO2: 29 MMOL/L (ref 22–29)
CREAT SERPL-MCNC: 0.8 MG/DL (ref 0.5–0.9)
GFR NON-AFRICAN AMERICAN: >60
GLUCOSE BLD-MCNC: 120 MG/DL (ref 74–109)
HCT VFR BLD CALC: 39.3 % (ref 37–47)
HEMOGLOBIN: 13 G/DL (ref 12–16)
LIPASE: 36 U/L (ref 13–60)
MCH RBC QN AUTO: 30.8 PG (ref 27–31)
MCHC RBC AUTO-ENTMCNC: 33.1 G/DL (ref 33–37)
MCV RBC AUTO: 93.1 FL (ref 81–99)
PDW BLD-RTO: 12.2 % (ref 11.5–14.5)
PLATELET # BLD: 247 K/UL (ref 130–400)
PMV BLD AUTO: 9.6 FL (ref 9.4–12.3)
POTASSIUM SERPL-SCNC: 4.4 MMOL/L (ref 3.5–5)
RBC # BLD: 4.22 M/UL (ref 4.2–5.4)
SODIUM BLD-SCNC: 142 MMOL/L (ref 136–145)
TOTAL PROTEIN: 7.3 G/DL (ref 6.6–8.7)
WBC # BLD: 8.6 K/UL (ref 4.8–10.8)

## 2017-10-31 PROCEDURE — 73110 X-RAY EXAM OF WRIST: CPT

## 2017-10-31 PROCEDURE — 73560 X-RAY EXAM OF KNEE 1 OR 2: CPT

## 2017-10-31 PROCEDURE — 83690 ASSAY OF LIPASE: CPT

## 2017-10-31 PROCEDURE — 72125 CT NECK SPINE W/O DYE: CPT

## 2017-10-31 PROCEDURE — 6360000002 HC RX W HCPCS: Performed by: EMERGENCY MEDICINE

## 2017-10-31 PROCEDURE — 99283 EMERGENCY DEPT VISIT LOW MDM: CPT | Performed by: EMERGENCY MEDICINE

## 2017-10-31 PROCEDURE — 70450 CT HEAD/BRAIN W/O DYE: CPT

## 2017-10-31 PROCEDURE — 36415 COLL VENOUS BLD VENIPUNCTURE: CPT

## 2017-10-31 PROCEDURE — 80053 COMPREHEN METABOLIC PANEL: CPT

## 2017-10-31 PROCEDURE — 73090 X-RAY EXAM OF FOREARM: CPT

## 2017-10-31 PROCEDURE — 71260 CT THORAX DX C+: CPT

## 2017-10-31 PROCEDURE — 96374 THER/PROPH/DIAG INJ IV PUSH: CPT

## 2017-10-31 PROCEDURE — 73070 X-RAY EXAM OF ELBOW: CPT

## 2017-10-31 PROCEDURE — 85027 COMPLETE CBC AUTOMATED: CPT

## 2017-10-31 PROCEDURE — 73562 X-RAY EXAM OF KNEE 3: CPT

## 2017-10-31 PROCEDURE — 6360000004 HC RX CONTRAST MEDICATION: Performed by: EMERGENCY MEDICINE

## 2017-10-31 PROCEDURE — 99285 EMERGENCY DEPT VISIT HI MDM: CPT

## 2017-10-31 RX ORDER — MORPHINE SULFATE 10 MG/ML
4 INJECTION, SOLUTION INTRAMUSCULAR; INTRAVENOUS ONCE
Status: COMPLETED | OUTPATIENT
Start: 2017-10-31 | End: 2017-10-31

## 2017-10-31 RX ORDER — ONDANSETRON 4 MG/1
4 TABLET, ORALLY DISINTEGRATING ORAL ONCE
Status: COMPLETED | OUTPATIENT
Start: 2017-10-31 | End: 2017-10-31

## 2017-10-31 RX ADMIN — MORPHINE SULFATE 4 MG: 10 INJECTION, SOLUTION INTRAMUSCULAR; INTRAVENOUS at 12:11

## 2017-10-31 RX ADMIN — IOPAMIDOL 90 ML: 755 INJECTION, SOLUTION INTRAVENOUS at 12:20

## 2017-10-31 RX ADMIN — ONDANSETRON 4 MG: 4 TABLET, ORALLY DISINTEGRATING ORAL at 12:11

## 2017-10-31 RX ADMIN — MORPHINE SULFATE 4 MG: 10 INJECTION, SOLUTION INTRAMUSCULAR; INTRAVENOUS at 18:24

## 2017-10-31 RX ADMIN — MORPHINE SULFATE 4 MG: 10 INJECTION, SOLUTION INTRAMUSCULAR; INTRAVENOUS at 14:32

## 2017-10-31 ASSESSMENT — ENCOUNTER SYMPTOMS
EYE DISCHARGE: 0
EYES NEGATIVE: 1
EYE ITCHING: 0
COUGH: 0
EYE REDNESS: 0
EYE PAIN: 0
ABDOMINAL PAIN: 0
RESPIRATORY NEGATIVE: 1
SINUS PAIN: 0
ABDOMINAL DISTENTION: 0
APNEA: 0
CHEST TIGHTNESS: 0

## 2017-10-31 ASSESSMENT — PAIN SCALES - GENERAL
PAINLEVEL_OUTOF10: 9
PAINLEVEL_OUTOF10: 6
PAINLEVEL_OUTOF10: 6
PAINLEVEL_OUTOF10: 9

## 2017-10-31 ASSESSMENT — PAIN DESCRIPTION - ORIENTATION
ORIENTATION: LEFT
ORIENTATION: RIGHT;LEFT

## 2017-10-31 ASSESSMENT — PAIN DESCRIPTION - LOCATION
LOCATION: KNEE;WRIST
LOCATION: WRIST
LOCATION: WRIST;ARM

## 2017-10-31 ASSESSMENT — PAIN DESCRIPTION - PAIN TYPE
TYPE: ACUTE PAIN
TYPE: ACUTE PAIN
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
## 2017-10-31 NOTE — ED NOTES
[facility] about transfer to [address] [place] [address] @ [address]  10/31/17 [id number]

## 2017-10-31 NOTE — ED NOTES
[facility] has [facility] on the line to talk to [facility] @ [address] [place] [address]  10/31/17 6734
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
## 2017-11-08 ENCOUNTER — TELEPHONE (OUTPATIENT)
Dept: PRIMARY CARE CLINIC | Age: 67
End: 2017-11-08

## 2017-11-08 DIAGNOSIS — R11.0 NAUSEA: Primary | ICD-10-CM

## 2017-11-08 DIAGNOSIS — R10.11 RUQ PAIN: ICD-10-CM

## 2017-11-08 NOTE — TELEPHONE ENCOUNTER
[facility] called, stating pt was scheduled for a US gallbladder and HIDA. Needs order for HIDA if that is correct.

## 2017-11-11 DIAGNOSIS — I10 ESSENTIAL HYPERTENSION: ICD-10-CM

## 2017-11-13 RX ORDER — AMLODIPINE BESYLATE 2.5 MG/1
TABLET ORAL
Qty: 30 TABLET | Refills: 11 | Status: SHIPPED | OUTPATIENT
Start: 2017-11-13 | End: 2018-09-18 | Stop reason: SDUPTHER

## 2017-11-16 ENCOUNTER — HOSPITAL ENCOUNTER (OUTPATIENT)
Dept: NUCLEAR MEDICINE | Age: 67
Discharge: HOME | End: 2017-11-16

## 2018-01-18 DIAGNOSIS — K76.9 LIVER LESION: ICD-10-CM

## 2018-01-18 DIAGNOSIS — R12 CHRONIC HEARTBURN: ICD-10-CM

## 2018-01-18 DIAGNOSIS — N28.1 RENAL CYST: ICD-10-CM

## 2018-01-18 DIAGNOSIS — R10.13 EPIGASTRIC PAIN: ICD-10-CM

## 2018-01-18 DIAGNOSIS — R10.11 RUQ PAIN: ICD-10-CM

## 2018-01-18 DIAGNOSIS — R10.10 PAIN OF UPPER ABDOMEN: Primary | ICD-10-CM

## 2018-01-18 DIAGNOSIS — R11.2 NAUSEA AND VOMITING, INTRACTABILITY OF VOMITING NOT SPECIFIED, UNSPECIFIED VOMITING TYPE: ICD-10-CM

## 2018-01-18 DIAGNOSIS — R09.89 GLOBUS SENSATION: ICD-10-CM

## 2018-01-29 ENCOUNTER — HOSPITAL ENCOUNTER (OUTPATIENT)
Dept: NUCLEAR MEDICINE | Age: 68
Discharge: HOME | End: 2018-01-29
Payer: MEDICARE

## 2018-01-29 ENCOUNTER — TELEPHONE (OUTPATIENT)
Dept: PRIMARY CARE CLINIC | Age: 68
End: 2018-01-29

## 2018-01-29 ENCOUNTER — HOSPITAL ENCOUNTER (OUTPATIENT)
Dept: ULTRASOUND IMAGING | Age: 68
Discharge: HOME | End: 2018-01-29
Payer: MEDICARE

## 2018-01-29 DIAGNOSIS — R10.10 PAIN OF UPPER ABDOMEN: ICD-10-CM

## 2018-01-29 DIAGNOSIS — R11.2 NAUSEA AND VOMITING, INTRACTABILITY OF VOMITING NOT SPECIFIED, UNSPECIFIED VOMITING TYPE: ICD-10-CM

## 2018-01-29 DIAGNOSIS — R10.13 EPIGASTRIC PAIN: ICD-10-CM

## 2018-01-29 DIAGNOSIS — R09.89 GLOBUS SENSATION: ICD-10-CM

## 2018-01-29 DIAGNOSIS — R10.11 RUQ PAIN: ICD-10-CM

## 2018-01-29 DIAGNOSIS — R12 CHRONIC HEARTBURN: ICD-10-CM

## 2018-01-29 DIAGNOSIS — N28.1 RENAL CYST: ICD-10-CM

## 2018-01-29 DIAGNOSIS — K76.9 LIVER LESION: ICD-10-CM

## 2018-01-29 PROCEDURE — 76700 US EXAM ABDOM COMPLETE: CPT

## 2018-01-29 PROCEDURE — 3430000000 HC RX DIAGNOSTIC RADIOPHARMACEUTICAL: Performed by: NURSE PRACTITIONER

## 2018-01-29 PROCEDURE — A9537 TC99M MEBROFENIN: HCPCS | Performed by: NURSE PRACTITIONER

## 2018-01-29 PROCEDURE — 78227 HEPATOBIL SYST IMAGE W/DRUG: CPT

## 2018-01-29 PROCEDURE — 6360000004 HC RX CONTRAST MEDICATION: Performed by: NURSE PRACTITIONER

## 2018-01-29 RX ADMIN — SINCALIDE 2 MCG: 5 INJECTION, POWDER, LYOPHILIZED, FOR SOLUTION INTRAVENOUS at 14:54

## 2018-01-29 RX ADMIN — Medication 5 MILLICURIE: at 14:54

## 2018-02-27 ENCOUNTER — APPOINTMENT (OUTPATIENT)
Dept: GENERAL RADIOLOGY | Age: 68
End: 2018-02-27
Payer: MEDICARE

## 2018-02-27 ENCOUNTER — APPOINTMENT (OUTPATIENT)
Dept: CT IMAGING | Age: 68
End: 2018-02-27
Payer: MEDICARE

## 2018-02-27 ENCOUNTER — HOSPITAL ENCOUNTER (EMERGENCY)
Age: 68
Discharge: HOME | End: 2018-02-27
Payer: MEDICARE

## 2018-02-27 VITALS
DIASTOLIC BLOOD PRESSURE: 71 MMHG | TEMPERATURE: 98 F | RESPIRATION RATE: 16 BRPM | HEIGHT: 64 IN | BODY MASS INDEX: 34.15 KG/M2 | OXYGEN SATURATION: 91 % | SYSTOLIC BLOOD PRESSURE: 156 MMHG | HEART RATE: 75 BPM | WEIGHT: 200 LBS

## 2018-02-27 DIAGNOSIS — F41.1 ANXIETY STATE: ICD-10-CM

## 2018-02-27 DIAGNOSIS — R10.13 ABDOMINAL PAIN, EPIGASTRIC: Primary | ICD-10-CM

## 2018-02-27 LAB
ALBUMIN SERPL-MCNC: 4.3 G/DL (ref 3.5–5.2)
ALP BLD-CCNC: 91 U/L (ref 35–104)
ALT SERPL-CCNC: 9 U/L (ref 5–33)
ANION GAP SERPL CALCULATED.3IONS-SCNC: 18 MMOL/L (ref 7–19)
APTT: 36.5 SEC (ref 26–36.2)
AST SERPL-CCNC: 12 U/L (ref 5–32)
BASOPHILS ABSOLUTE: 0.1 K/UL (ref 0–0.2)
BASOPHILS RELATIVE PERCENT: 0.4 % (ref 0–1)
BILIRUB SERPL-MCNC: 0.3 MG/DL (ref 0.2–1.2)
BILIRUBIN URINE: NEGATIVE
BLOOD, URINE: NEGATIVE
BUN BLDV-MCNC: 8 MG/DL (ref 8–23)
CALCIUM SERPL-MCNC: 10.3 MG/DL (ref 8.8–10.2)
CHLORIDE BLD-SCNC: 97 MMOL/L (ref 98–111)
CLARITY: ABNORMAL
CO2: 25 MMOL/L (ref 22–29)
COLOR: YELLOW
CREAT SERPL-MCNC: 0.6 MG/DL (ref 0.5–0.9)
EOSINOPHILS ABSOLUTE: 0.1 K/UL (ref 0–0.6)
EOSINOPHILS RELATIVE PERCENT: 0.4 % (ref 0–5)
GFR NON-AFRICAN AMERICAN: >60
GLUCOSE BLD-MCNC: 146 MG/DL (ref 74–109)
GLUCOSE URINE: NEGATIVE MG/DL
HCT VFR BLD CALC: 41.5 % (ref 37–47)
HEMOGLOBIN: 13.7 G/DL (ref 12–16)
INR BLD: 0.96 (ref 0.88–1.18)
KETONES, URINE: 15 MG/DL
LEUKOCYTE ESTERASE, URINE: NEGATIVE
LIPASE: 20 U/L (ref 13–60)
LYMPHOCYTES ABSOLUTE: 2 K/UL (ref 1.1–4.5)
LYMPHOCYTES RELATIVE PERCENT: 16.6 % (ref 20–40)
MCH RBC QN AUTO: 29.3 PG (ref 27–31)
MCHC RBC AUTO-ENTMCNC: 33 G/DL (ref 33–37)
MCV RBC AUTO: 88.7 FL (ref 81–99)
MONOCYTES ABSOLUTE: 0.9 K/UL (ref 0–0.9)
MONOCYTES RELATIVE PERCENT: 7.3 % (ref 0–10)
NEUTROPHILS ABSOLUTE: 8.9 K/UL (ref 1.5–7.5)
NEUTROPHILS RELATIVE PERCENT: 75 % (ref 50–65)
NITRITE, URINE: NEGATIVE
PDW BLD-RTO: 12.3 % (ref 11.5–14.5)
PERFORMED ON: NORMAL
PERFORMED ON: NORMAL
PH UA: 8
PLATELET # BLD: 280 K/UL (ref 130–400)
PMV BLD AUTO: 9.5 FL (ref 9.4–12.3)
POC TROPONIN I: 0 NG/ML (ref 0–0.08)
POC TROPONIN I: 0.01 NG/ML (ref 0–0.08)
POTASSIUM SERPL-SCNC: 3.6 MMOL/L (ref 3.5–5)
PROTEIN UA: NEGATIVE MG/DL
PROTHROMBIN TIME: 12.7 SEC (ref 12–14.6)
RBC # BLD: 4.68 M/UL (ref 4.2–5.4)
SODIUM BLD-SCNC: 140 MMOL/L (ref 136–145)
SPECIFIC GRAVITY UA: 1.02
TOTAL PROTEIN: 7.9 G/DL (ref 6.6–8.7)
URINE REFLEX TO CULTURE: YES
UROBILINOGEN, URINE: 0.2 E.U./DL
WBC # BLD: 11.8 K/UL (ref 4.8–10.8)

## 2018-02-27 PROCEDURE — 81003 URINALYSIS AUTO W/O SCOPE: CPT

## 2018-02-27 PROCEDURE — 99284 EMERGENCY DEPT VISIT MOD MDM: CPT | Performed by: PHYSICIAN ASSISTANT

## 2018-02-27 PROCEDURE — 2580000003 HC RX 258: Performed by: PHYSICIAN ASSISTANT

## 2018-02-27 PROCEDURE — 74174 CTA ABD&PLVS W/CONTRAST: CPT

## 2018-02-27 PROCEDURE — 96375 TX/PRO/DX INJ NEW DRUG ADDON: CPT

## 2018-02-27 PROCEDURE — 96374 THER/PROPH/DIAG INJ IV PUSH: CPT

## 2018-02-27 PROCEDURE — 93005 ELECTROCARDIOGRAM TRACING: CPT

## 2018-02-27 PROCEDURE — 84484 ASSAY OF TROPONIN QUANT: CPT

## 2018-02-27 PROCEDURE — 83690 ASSAY OF LIPASE: CPT

## 2018-02-27 PROCEDURE — 71045 X-RAY EXAM CHEST 1 VIEW: CPT

## 2018-02-27 PROCEDURE — 99285 EMERGENCY DEPT VISIT HI MDM: CPT

## 2018-02-27 PROCEDURE — 71275 CT ANGIOGRAPHY CHEST: CPT

## 2018-02-27 PROCEDURE — 85610 PROTHROMBIN TIME: CPT

## 2018-02-27 PROCEDURE — 6360000002 HC RX W HCPCS: Performed by: PHYSICIAN ASSISTANT

## 2018-02-27 PROCEDURE — 36415 COLL VENOUS BLD VENIPUNCTURE: CPT

## 2018-02-27 PROCEDURE — 6360000004 HC RX CONTRAST MEDICATION: Performed by: PHYSICIAN ASSISTANT

## 2018-02-27 PROCEDURE — 85730 THROMBOPLASTIN TIME PARTIAL: CPT

## 2018-02-27 PROCEDURE — 80053 COMPREHEN METABOLIC PANEL: CPT

## 2018-02-27 PROCEDURE — 85025 COMPLETE CBC W/AUTO DIFF WBC: CPT

## 2018-02-27 RX ORDER — ONDANSETRON 2 MG/ML
4 INJECTION INTRAMUSCULAR; INTRAVENOUS ONCE
Status: COMPLETED | OUTPATIENT
Start: 2018-02-27 | End: 2018-02-27

## 2018-02-27 RX ORDER — 0.9 % SODIUM CHLORIDE 0.9 %
1000 INTRAVENOUS SOLUTION INTRAVENOUS ONCE
Status: COMPLETED | OUTPATIENT
Start: 2018-02-27 | End: 2018-02-27

## 2018-02-27 RX ORDER — FAMOTIDINE 20 MG/1
20 TABLET, FILM COATED ORAL DAILY
Qty: 15 TABLET | Refills: 0 | Status: SHIPPED | OUTPATIENT
Start: 2018-02-27 | End: 2018-04-18 | Stop reason: SDUPTHER

## 2018-02-27 RX ORDER — MORPHINE SULFATE 4 MG/ML
4 INJECTION, SOLUTION INTRAMUSCULAR; INTRAVENOUS ONCE
Status: COMPLETED | OUTPATIENT
Start: 2018-02-27 | End: 2018-02-27

## 2018-02-27 RX ORDER — ONDANSETRON 4 MG/1
4 TABLET, ORALLY DISINTEGRATING ORAL EVERY 8 HOURS PRN
Qty: 20 TABLET | Refills: 0 | Status: SHIPPED | OUTPATIENT
Start: 2018-02-27 | End: 2019-01-22 | Stop reason: ALTCHOICE

## 2018-02-27 RX ORDER — MIDAZOLAM HYDROCHLORIDE 1 MG/ML
INJECTION INTRAMUSCULAR; INTRAVENOUS
Status: DISCONTINUED
Start: 2018-02-27 | End: 2018-02-27

## 2018-02-27 RX ORDER — HYDROMORPHONE HCL 110MG/55ML
0.5 PATIENT CONTROLLED ANALGESIA SYRINGE INTRAVENOUS ONCE
Status: COMPLETED | OUTPATIENT
Start: 2018-02-27 | End: 2018-02-27

## 2018-02-27 RX ADMIN — SODIUM CHLORIDE 1000 ML: 9 INJECTION, SOLUTION INTRAVENOUS at 20:15

## 2018-02-27 RX ADMIN — Medication 4 MG: at 20:15

## 2018-02-27 RX ADMIN — ONDANSETRON 4 MG: 2 INJECTION, SOLUTION INTRAMUSCULAR; INTRAVENOUS at 20:15

## 2018-02-27 RX ADMIN — HYDROMORPHONE HYDROCHLORIDE 0.5 MG: 2 INJECTION INTRAMUSCULAR; INTRAVENOUS; SUBCUTANEOUS at 20:58

## 2018-02-27 RX ADMIN — IOPAMIDOL 90 ML: 755 INJECTION, SOLUTION INTRAVENOUS at 20:22

## 2018-02-27 ASSESSMENT — ENCOUNTER SYMPTOMS
EYE PAIN: 0
SHORTNESS OF BREATH: 0
CHEST TIGHTNESS: 0
ABDOMINAL DISTENTION: 0
NAUSEA: 1
ABDOMINAL PAIN: 1
WHEEZING: 0
APNEA: 0
VOMITING: 1
COUGH: 0
SORE THROAT: 0
RHINORRHEA: 0
SINUS PRESSURE: 0
CONSTIPATION: 0
DIARRHEA: 0

## 2018-02-27 ASSESSMENT — PAIN SCALES - GENERAL
PAINLEVEL_OUTOF10: 10
PAINLEVEL_OUTOF10: 9
PAINLEVEL_OUTOF10: 10

## 2018-02-27 ASSESSMENT — PAIN DESCRIPTION - PAIN TYPE: TYPE: ACUTE PAIN

## 2018-02-28 NOTE — ED PROVIDER NOTES
eMERGENCY dEPARTMENT eNCOUnter      Pt Name: Nury Aviles  MRN: [id number]  [place] 1950  Date of evaluation: 2/27/2018  Provider: Jacki Griffith Dr       Chief Complaint   Patient presents with    Chest Pain     c/o chest pain, Like someone\" is pushing iN\" C/O HEADACHE, BACK PAIN ,CHEST PAIN, ABD PAIN    Abdominal Pain    Hypertension         HISTORY OF PRESENT ILLNESS  (Location/Symptom, Timing/Onset, Context/Setting, Quality, Duration, Modifying Factors, Severity.)   Nury Aviles is a 80 y.o. female who presents to the emergency department Chest pain abdominal pain and back pain. Patient is writhing on the exam bed, vomiting. Patient states it started this afternoon while she was at home. Feels like somebody is standing on her stomach. Bowel movements have been normal.  She denies any fever at home. No prior history of aneurysm or cardiac infarction. She denies syncope. Patient states they've been working her up for gallbladder problems recently through her primary care doctor's office. States the pain started earlier this afternoon after she ate dinner. Nursing Notes were reviewed and I agree. REVIEW OF SYSTEMS    (2-9 systems for level 4, 10 or more for level 5)     Review of Systems   Constitutional: Negative for activity change, chills, fatigue and fever. HENT: Negative for ear pain, hearing loss, postnasal drip, rhinorrhea, sinus pressure and sore throat. Eyes: Negative for pain and visual disturbance. Respiratory: Negative for apnea, cough, chest tightness, shortness of breath and wheezing. Cardiovascular: Positive for chest pain. Gastrointestinal: Positive for abdominal pain, nausea and vomiting. Negative for abdominal distention, constipation and diarrhea. Genitourinary: Negative for difficulty urinating, dysuria, flank pain and hematuria. Musculoskeletal: Negative for arthralgias and joint swelling. Skin: Negative for rash. Neurological: Negative for dizziness, syncope, light-headedness and headaches. Psychiatric/Behavioral: Negative for agitation and confusion. Except as noted above the remainder of the review of systems was reviewed and negative. PAST MEDICAL HISTORY     Past Medical History:   Diagnosis Date    Anxiety     Depression     Edema     GERD (gastroesophageal reflux disease)     Headache(784.0)     migraines    Hyperlipidemia     Hypertension     Mini stroke ([facility] [address]     TIA (transient ischemic attack)          SURGICAL HISTORY       Past Surgical History:   Procedure Laterality Date    COLONOSCOPY  07/01/2015    Dr. Christina Sr  08/26/2015    Dr. Christina Sr  5/23/2017    Dr Osorio-w/balloon dilation, 12-13. 5-15 mm-esophageal narrowing with diverticulum at 29 cm-Kristin (-), hiatal herni, Dye's (+) dysplasia (-)--1st dx--1 yr recall-Ct chest ordered         CURRENT MEDICATIONS       Discharge Medication List as of 2/27/2018 10:12 PM      CONTINUE these medications which have NOT CHANGED    Details   amLODIPine (NORVASC) 2.5 MG tablet TAKE ONE TABLET BY MOUTH ONCE DAILY, Disp-30 tablet, R-11Please consider 90 day supplies to promote better adherenceNormal      lisinopril (PRINIVIL;ZESTRIL) 20 MG tablet Take 1 tablet by mouth daily, Disp-30 tablet, R-11Normal      furosemide (LASIX) 40 MG tablet Take 1 tablet by mouth daily, Disp-30 tablet, R-11Normal      venlafaxine (EFFEXOR XR) 150 MG extended release capsule Take 1 capsule by mouth daily, Disp-30 capsule, R-5Normal      Cholecalciferol (VITAMIN D) 2000 UNITS CAPS capsule Take 2 capsules by mouth dailyHistorical Med      vitamin D (ERGOCALCIFEROL) 30462 UNITS CAPS capsule Take 1 capsule by mouth once a week, Disp-4 capsule, R-3Normal      atorvastatin (LIPITOR) 40 MG tablet Take 1 tablet by mouth daily, Disp-30 tablet, R-5Normal      Omega-3 Fatty Acids (FISH OIL) 1000 MG CAPS Take 2 capsules by mouth 2 times daily, Disp-120 capsule, R-3OTC      ondansetron (ZOFRAN) 4 MG tablet Take 1 tablet by mouth every 8 hours as needed for Nausea or Vomiting, Disp-20 tablet, R-0Normal      omeprazole (PRILOSEC) 20 MG delayed release capsule Take 1 capsule by mouth 2 times daily Take 30 minutes before breakfast and supper, Disp-60 capsule, R-11Normal      LORazepam (ATIVAN) 1 MG tablet Take 1 tablet by mouth dailyHistorical Med             ALLERGIES     Patient has no known allergies. FAMILY HISTORY       Family History   Problem Relation Age of Onset    Heart Disease Mother     Colon Cancer Neg Hx     Colon Polyps Neg Hx     Liver Cancer Neg Hx     Liver Disease Neg Hx     Esophageal Cancer Neg Hx     Rectal Cancer Neg Hx     Stomach Cancer Neg Hx           SOCIAL HISTORY       Social History     Social History    Marital status:      Spouse name: N/A    Number of children: N/A    Years of education: N/A     Social History Main Topics    Smoking status: Never Smoker    Smokeless tobacco: Never Used    Alcohol use No    Drug use: No    Sexual activity: Not Asked     Other Topics Concern    None     Social History Narrative    None       SCREENINGS           PHYSICAL EXAM    (up to 7 for level 4, 8 or more for level 5)     ED Triage Vitals   BP Temp Temp src Pulse Resp SpO2 Height Weight   02/27/18 1808 02/27/18 1807 -- 02/27/18 1808 02/27/18 1808 02/27/18 1808 02/27/18 1809 02/27/18 1809   (!) 184/92 97.6 °F (36.4 °C)  81 20 99 % 5' 4\" (1.626 m) 200 lb (90.7 kg)       Physical Exam   Constitutional: She is oriented to person, place, and time. She appears well-developed and well-nourished. She appears distressed. Appears to be in pain   HENT:   Head: Normocephalic and atraumatic. Cardiovascular: Normal rate, regular rhythm and normal heart sounds. Exam reveals no gallop and no friction rub. No murmur heard.   Pulmonary/Chest: Effort normal and breath sounds normal. No respiratory distress. She has no wheezes. She has no rales. Abdominal: Soft. Bowel sounds are normal. She exhibits no distension, no fluid wave, no ascites and no mass. There is tenderness. There is no rigidity, no rebound, no guarding and no CVA tenderness. Neurological: She is alert and oriented to person, place, and time. Skin: Skin is warm and dry. No rash noted. She is not diaphoretic. Psychiatric: She has a normal mood and affect. Nursing note and vitals reviewed. DIAGNOSTIC RESULTS     RADIOLOGY:   Non-plain film images such as CT, Ultrasound and MRI are read by the radiologist.   Interpretation per the Radiologist below, if available at the time of this note:     [facility] [address]   Final Result   1. Normal CT angiogram of the abdomen and pelvis. Signed by Dr Jean Paul Mix on 2/27/2018 8:52 PM      CTA CHEST [address]   Final Result   1. The aorta is well visualized there is no evidence of dissection. The pulmonary arteries are not well opacified on this exam.       Signed by Dr Jean Paul Mix on 2/27/2018 8:45 PM      XR CHEST PORTABLE   Final Result   1. No radiographic evidence of acute cardiopulmonary process.        Signed by Dr Jean Paul Mix on 2/27/2018 8:07 PM          LABS:  Labs Reviewed   CBC WITH AUTO DIFFERENTIAL - Abnormal; Notable for the following:        Result Value    WBC 11.8 (*)     Neutrophils % 75.0 (*)     Lymphocytes % 16.6 (*)     Neutrophils # 8.9 (*)     All other components within normal limits   COMPREHENSIVE METABOLIC PANEL - Abnormal; Notable for the following:     Chloride 97 (*)     Glucose 146 (*)     Calcium 10.3 (*)     All other components within normal limits   URINE RT REFLEX TO CULTURE - Abnormal; Notable for the following:     Clarity, UA CLOUDY (*)     Ketones, Urine 15 (*)     All other components within normal limits   APTT - Abnormal; Notable for the following:     aPTT 36.5 (*)     All other components within normal limits   LIPASE   PROTIME-INR   POCT TROPONIN   POCT VENOUS   POCT VENOUS       All other labs were within normal range or not returned as of this dictation. EMERGENCY DEPARTMENT COURSE and DIFFERENTIAL DIAGNOSIS/MDM:   Vitals:    Vitals:    02/27/18 2101 02/27/18 2135 02/27/18 2202 02/27/18 2232   BP: (!) 170/86 (!) 164/71 (!) 144/79 (!) 156/71   Pulse: 71 70 70 75   Resp: 16 16 16 16   Temp:    98 °F (36.7 °C)   TempSrc:    Oral   SpO2: 98% 94% 93% 91%   Weight:       Height:               MDM  Number of Diagnoses or Management Options  Abdominal pain, epigastric:   Anxiety state:   Diagnosis management comments: EKG interpreted by Dr. Yi Ojeda, normal sinus rhythm. Cardiac workup has been negative for any evidence of an acute ischemic event. Patient's pain resolved completely after she received pain medicine in the emergency department. Tolerating p.o. liquids without difficulty. CTA of the chest and abdomen were normal, no acute abnormalities. Lipase is not elevated. Patient had a HIDA scan done at the end of January which showed a normal ejection fraction of the gallbladder. However patient's presentation could be consistent with a gallbladder attack. There are strong return precautions the ED if symptoms change or worsen in any way. Follow-up with her primary care doctor this week. Stable ready for discharge. PROCEDURES:    Procedures      FINAL IMPRESSION      1. Abdominal pain, epigastric    2. Anxiety state          DISPOSITION/PLAN   DISPOSITION Decision To Discharge 02/27/2018 10:10:20 PM      Patient was told that if symptoms worsen or new symptoms develop they are to return to the emergency department immediately. Patient was educated on diagnosis and treatment plan. All of patient's questions were answered, and the patient understands the discharge plan. I do not feel the patient has a life-threatening condition at this time. Patient is to be discharged.        PATIENT REFERRED

## 2018-03-01 ENCOUNTER — HOSPITAL ENCOUNTER (OUTPATIENT)
Dept: GENERAL RADIOLOGY | Age: 68
Discharge: HOME | DRG: 418 | End: 2018-03-01
Payer: MEDICARE

## 2018-03-01 ENCOUNTER — OFFICE VISIT (OUTPATIENT)
Dept: PRIMARY CARE CLINIC | Age: 68
End: 2018-03-01
Payer: MEDICARE

## 2018-03-01 ENCOUNTER — HOSPITAL ENCOUNTER (INPATIENT)
Age: 68
LOS: 6 days | Discharge: HOME | DRG: 418 | End: 2018-03-07
Attending: INTERNAL MEDICINE | Admitting: INTERNAL MEDICINE
Payer: MEDICARE

## 2018-03-01 VITALS
WEIGHT: 182 LBS | HEART RATE: 110 BPM | OXYGEN SATURATION: 95 % | DIASTOLIC BLOOD PRESSURE: 76 MMHG | SYSTOLIC BLOOD PRESSURE: 124 MMHG | TEMPERATURE: 98.4 F | BODY MASS INDEX: 31.24 KG/M2

## 2018-03-01 DIAGNOSIS — R11.2 NAUSEA AND VOMITING, INTRACTABILITY OF VOMITING NOT SPECIFIED, UNSPECIFIED VOMITING TYPE: ICD-10-CM

## 2018-03-01 DIAGNOSIS — K85.90 ACUTE PANCREATITIS, UNSPECIFIED COMPLICATION STATUS, UNSPECIFIED PANCREATITIS TYPE: Primary | ICD-10-CM

## 2018-03-01 DIAGNOSIS — R10.10 PAIN OF UPPER ABDOMEN: ICD-10-CM

## 2018-03-01 DIAGNOSIS — K75.9 HEPATITIS: ICD-10-CM

## 2018-03-01 DIAGNOSIS — K56.7 ILEUS (HCC): Primary | ICD-10-CM

## 2018-03-01 LAB
ALBUMIN SERPL-MCNC: 3.7 G/DL (ref 3.5–5.2)
ALP BLD-CCNC: 217 U/L (ref 35–104)
ALT SERPL-CCNC: 228 U/L (ref 5–33)
AMYLASE: 201 U/L (ref 28–100)
ANION GAP SERPL CALCULATED.3IONS-SCNC: 12 MMOL/L (ref 7–19)
AST SERPL-CCNC: 166 U/L (ref 5–32)
BACTERIA: ABNORMAL /HPF
BASOPHILS ABSOLUTE: 0 K/UL (ref 0–0.2)
BASOPHILS RELATIVE PERCENT: 0.4 % (ref 0–1)
BILIRUB SERPL-MCNC: 3.4 MG/DL (ref 0.2–1.2)
BILIRUB SERPL-MCNC: 3.5 MG/DL (ref 0.2–1.2)
BILIRUBIN DIRECT: 2.6 MG/DL (ref 0–0.3)
BILIRUBIN URINE: ABNORMAL
BILIRUBIN, INDIRECT: 0.8 MG/DL (ref 0.1–1)
BLOOD, URINE: NEGATIVE
BUN BLDV-MCNC: 15 MG/DL (ref 8–23)
CALCIUM SERPL-MCNC: 10.2 MG/DL (ref 8.8–10.2)
CHLORIDE BLD-SCNC: 93 MMOL/L (ref 98–111)
CLARITY: ABNORMAL
CO2: 29 MMOL/L (ref 22–29)
COLOR: ABNORMAL
CREAT SERPL-MCNC: 0.7 MG/DL (ref 0.5–0.9)
EOSINOPHILS ABSOLUTE: 0 K/UL (ref 0–0.6)
EOSINOPHILS RELATIVE PERCENT: 0.4 % (ref 0–5)
EPITHELIAL CELLS, UA: 3 /HPF (ref 0–5)
EPITHELIAL CELLS, UA: ABNORMAL /HPF
GFR NON-AFRICAN AMERICAN: >60
GLUCOSE BLD-MCNC: 90 MG/DL (ref 74–109)
GLUCOSE URINE: NEGATIVE MG/DL
HCT VFR BLD CALC: 39.1 % (ref 37–47)
HEMOGLOBIN: 12.6 G/DL (ref 12–16)
HYALINE CASTS: 5 /HPF (ref 0–8)
KETONES, URINE: ABNORMAL MG/DL
LACTIC ACID: 1.3 MMOL/L (ref 0.5–1.9)
LEUKOCYTE ESTERASE, URINE: ABNORMAL
LIPASE: 579 U/L (ref 13–60)
LYMPHOCYTES ABSOLUTE: 1.1 K/UL (ref 1.1–4.5)
LYMPHOCYTES RELATIVE PERCENT: 9.9 % (ref 20–40)
MCH RBC QN AUTO: 29.4 PG (ref 27–31)
MCHC RBC AUTO-ENTMCNC: 32.2 G/DL (ref 33–37)
MCV RBC AUTO: 91.4 FL (ref 81–99)
MONOCYTES ABSOLUTE: 1.1 K/UL (ref 0–0.9)
MONOCYTES RELATIVE PERCENT: 10.1 % (ref 0–10)
MUCUS: ABNORMAL /LPF
NEUTROPHILS ABSOLUTE: 8.9 K/UL (ref 1.5–7.5)
NEUTROPHILS RELATIVE PERCENT: 78.8 % (ref 50–65)
NITRITE, URINE: POSITIVE
PDW BLD-RTO: 12.6 % (ref 11.5–14.5)
PERFORMED ON: NORMAL
PH UA: 6
PLATELET # BLD: 211 K/UL (ref 130–400)
PMV BLD AUTO: 9.4 FL (ref 9.4–12.3)
POC TROPONIN I: 0 NG/ML (ref 0–0.08)
POTASSIUM SERPL-SCNC: 3.8 MMOL/L (ref 3.5–5)
PROTEIN UA: 30 MG/DL
RBC # BLD: 4.28 M/UL (ref 4.2–5.4)
SODIUM BLD-SCNC: 134 MMOL/L (ref 136–145)
SPECIFIC GRAVITY UA: 1.02
TOTAL PROTEIN: 7 G/DL (ref 6.6–8.7)
UROBILINOGEN, URINE: 1 E.U./DL
WBC # BLD: 11.2 K/UL (ref 4.8–10.8)
WBC UA: 4 /HPF (ref 0–5)
WBC UA: ABNORMAL /HPF (ref 0–5)

## 2018-03-01 PROCEDURE — 80074 ACUTE HEPATITIS PANEL: CPT

## 2018-03-01 PROCEDURE — 82248 BILIRUBIN DIRECT: CPT

## 2018-03-01 PROCEDURE — 93005 ELECTROCARDIOGRAM TRACING: CPT

## 2018-03-01 PROCEDURE — 6360000002 HC RX W HCPCS: Performed by: NURSE PRACTITIONER

## 2018-03-01 PROCEDURE — G8417 CALC BMI ABV UP PARAM F/U: HCPCS | Performed by: NURSE PRACTITIONER

## 2018-03-01 PROCEDURE — 74022 RADEX COMPL AQT ABD SERIES: CPT

## 2018-03-01 PROCEDURE — 80053 COMPREHEN METABOLIC PANEL: CPT

## 2018-03-01 PROCEDURE — 83690 ASSAY OF LIPASE: CPT

## 2018-03-01 PROCEDURE — G8399 PT W/DXA RESULTS DOCUMENT: HCPCS | Performed by: NURSE PRACTITIONER

## 2018-03-01 PROCEDURE — G8484 FLU IMMUNIZE NO ADMIN: HCPCS | Performed by: NURSE PRACTITIONER

## 2018-03-01 PROCEDURE — 3017F COLORECTAL CA SCREEN DOC REV: CPT | Performed by: NURSE PRACTITIONER

## 2018-03-01 PROCEDURE — 1123F ACP DISCUSS/DSCN MKR DOCD: CPT | Performed by: NURSE PRACTITIONER

## 2018-03-01 PROCEDURE — 85025 COMPLETE CBC W/AUTO DIFF WBC: CPT

## 2018-03-01 PROCEDURE — 87086 URINE CULTURE/COLONY COUNT: CPT

## 2018-03-01 PROCEDURE — 99285 EMERGENCY DEPT VISIT HI MDM: CPT | Performed by: EMERGENCY MEDICINE

## 2018-03-01 PROCEDURE — 96375 TX/PRO/DX INJ NEW DRUG ADDON: CPT

## 2018-03-01 PROCEDURE — 82150 ASSAY OF AMYLASE: CPT

## 2018-03-01 PROCEDURE — 99285 EMERGENCY DEPT VISIT HI MDM: CPT

## 2018-03-01 PROCEDURE — 4040F PNEUMOC VAC/ADMIN/RCVD: CPT | Performed by: NURSE PRACTITIONER

## 2018-03-01 PROCEDURE — 83605 ASSAY OF LACTIC ACID: CPT

## 2018-03-01 PROCEDURE — 1111F DSCHRG MED/CURRENT MED MERGE: CPT | Performed by: NURSE PRACTITIONER

## 2018-03-01 PROCEDURE — 1210000000 HC MED SURG R&B

## 2018-03-01 PROCEDURE — 99214 OFFICE O/P EST MOD 30 MIN: CPT | Performed by: NURSE PRACTITIONER

## 2018-03-01 PROCEDURE — 81001 URINALYSIS AUTO W/SCOPE: CPT

## 2018-03-01 PROCEDURE — 99223 1ST HOSP IP/OBS HIGH 75: CPT | Performed by: INTERNAL MEDICINE

## 2018-03-01 PROCEDURE — 1036F TOBACCO NON-USER: CPT | Performed by: NURSE PRACTITIONER

## 2018-03-01 PROCEDURE — 1090F PRES/ABSN URINE INCON ASSESS: CPT | Performed by: NURSE PRACTITIONER

## 2018-03-01 PROCEDURE — G8427 DOCREV CUR MEDS BY ELIG CLIN: HCPCS | Performed by: NURSE PRACTITIONER

## 2018-03-01 PROCEDURE — 82247 BILIRUBIN TOTAL: CPT

## 2018-03-01 PROCEDURE — 36415 COLL VENOUS BLD VENIPUNCTURE: CPT

## 2018-03-01 PROCEDURE — 96374 THER/PROPH/DIAG INJ IV PUSH: CPT

## 2018-03-01 PROCEDURE — 3014F SCREEN MAMMO DOC REV: CPT | Performed by: NURSE PRACTITIONER

## 2018-03-01 PROCEDURE — 2580000003 HC RX 258: Performed by: NURSE PRACTITIONER

## 2018-03-01 PROCEDURE — 84484 ASSAY OF TROPONIN QUANT: CPT

## 2018-03-01 RX ORDER — MORPHINE SULFATE 4 MG/ML
2 INJECTION, SOLUTION INTRAMUSCULAR; INTRAVENOUS
Status: COMPLETED | OUTPATIENT
Start: 2018-03-01 | End: 2018-03-06

## 2018-03-01 RX ORDER — ONDANSETRON 2 MG/ML
4 INJECTION INTRAMUSCULAR; INTRAVENOUS ONCE
Status: COMPLETED | OUTPATIENT
Start: 2018-03-01 | End: 2018-03-01

## 2018-03-01 RX ORDER — MORPHINE SULFATE 4 MG/ML
2 INJECTION, SOLUTION INTRAMUSCULAR; INTRAVENOUS ONCE
Status: COMPLETED | OUTPATIENT
Start: 2018-03-01 | End: 2018-03-01

## 2018-03-01 RX ORDER — 0.9 % SODIUM CHLORIDE 0.9 %
1000 INTRAVENOUS SOLUTION INTRAVENOUS ONCE
Status: COMPLETED | OUTPATIENT
Start: 2018-03-01 | End: 2018-03-01

## 2018-03-01 RX ADMIN — ONDANSETRON 4 MG: 2 INJECTION INTRAMUSCULAR; INTRAVENOUS at 20:50

## 2018-03-01 RX ADMIN — SODIUM CHLORIDE 1000 ML: 9 INJECTION, SOLUTION INTRAVENOUS at 20:38

## 2018-03-01 RX ADMIN — Medication 2 MG: at 20:50

## 2018-03-01 ASSESSMENT — ENCOUNTER SYMPTOMS
DIARRHEA: 0
RHINORRHEA: 0
VOMITING: 1
ABDOMINAL PAIN: 1
EYES NEGATIVE: 1
SHORTNESS OF BREATH: 0
SORE THROAT: 0
SINUS PRESSURE: 0
CONSTIPATION: 1
ALLERGIC/IMMUNOLOGIC NEGATIVE: 1
TROUBLE SWALLOWING: 0
NAUSEA: 1
COUGH: 0
RESPIRATORY NEGATIVE: 1
VOMITING: 1
ABDOMINAL PAIN: 1

## 2018-03-01 ASSESSMENT — PAIN SCALES - GENERAL
PAINLEVEL_OUTOF10: 3
PAINLEVEL_OUTOF10: 9

## 2018-03-01 ASSESSMENT — PAIN DESCRIPTION - PAIN TYPE: TYPE: ACUTE PAIN

## 2018-03-01 NOTE — PROGRESS NOTES
K/uL    Monocytes # 0.90 0.00 - 0.90 K/uL    Eosinophils # 0.10 0.00 - 0.60 K/uL    Basophils # 0.10 0.00 - 0.20 K/uL   Comprehensive Metabolic Panel   Result Value Ref Range    Sodium 140 136 - 145 mmol/L    Potassium 3.6 3.5 - 5.0 mmol/L    Chloride 97 (L) 98 - 111 mmol/L    CO2 25 22 - 29 mmol/L    Anion Gap 18 7 - 19 mmol/L    Glucose 146 (H) 74 - 109 mg/dL    BUN 8 8 - 23 mg/dL    CREATININE 0.6 0.5 - 0.9 mg/dL    GFR Non-African American >60 >60    Calcium 10.3 (H) 8.8 - 10.2 mg/dL    Total Protein 7.9 6.6 - 8.7 g/dL    Alb 4.3 3.5 - 5.2 g/dL    Total Bilirubin 0.3 0.2 - 1.2 mg/dL    Alkaline Phosphatase 91 35 - 104 U/L    ALT 9 5 - 33 U/L    AST 12 5 - 32 U/L   Urinalysis Reflex to Culture   Result Value Ref Range    Color, UA YELLOW Straw/Yellow    Clarity, UA CLOUDY (A) Clear    Glucose, Ur Negative Negative mg/dL    Bilirubin Urine Negative Negative    Ketones, Urine 15 (A) Negative mg/dL    Specific Gravity, UA 1.022 1.005 - 1.030    Blood, Urine Negative Negative    pH, UA 8.0 5.0 - 8.0    Protein, UA Negative Negative mg/dL    Urobilinogen, Urine 0.2 <2.0 E.U./dL    Nitrite, Urine Negative Negative    Leukocyte Esterase, Urine Negative Negative    Urine Reflex to Culture YES    Lipase   Result Value Ref Range    Lipase 20 13 - 60 U/L   Protime-INR   Result Value Ref Range    Protime 12.7 12.0 - 14.6 sec    INR 0.96 0.88 - 1.18   APTT   Result Value Ref Range    aPTT 36.5 (H) 26.0 - 36.2 sec   POCT Venous   Result Value Ref Range    POC Troponin I 0.00 0.00 - 0.08 ng/mL    Performed on i-Stat    POCT Venous   Result Value Ref Range    POC Troponin I 0.01 0.00 - 0.08 ng/mL    Performed on i-Stat    EKG 12 Lead   Result Value Ref Range    P-R Interval 126 ms    QRS Duration 82 ms    Q-T Interval 366 ms    QTc Calculation (Bacurtistt) 397 ms    P Axis 6 degrees    T Axis 38 degrees       I have reviewed the following with the Ms.  [address]   Lab Review   Admission on 02/27/2018, Discharged on 02/27/2018 on 02/27/2018 i-[facility]     POC Troponin I 02/27/2018 0.01     Performed on 02/27/2018 i-Stat    Admission on 10/31/2017, Discharged on 10/31/2017   Component Date Value    WBC 10/31/2017 8.6     RBC 10/31/2017 4.22     Hemoglobin 10/31/2017 13.0     Hematocrit 10/31/2017 39.3     MCV 10/31/2017 93.1     MCH 10/31/2017 30.8     MCHC 10/31/2017 33.1     RDW 10/31/2017 12.2     Platelets 35/37/5640 247     MPV 10/31/2017 9.6     Sodium 10/31/2017 142     Potassium 10/31/2017 4.4     Chloride 10/31/2017 102     CO2 10/31/2017 29     Anion Gap 10/31/2017 11     Glucose 10/31/2017 120*    BUN 10/31/2017 11     CREATININE 10/31/2017 0.8     GFR Non- 10/31/2017 >60     Calcium 10/31/2017 10.3*    Total Protein 10/31/2017 7.3     Alb 10/31/2017 4.1     Total Bilirubin 10/31/2017 0.3     Alkaline Phosphatase 10/31/2017 78     ALT 10/31/2017 14     AST 10/31/2017 19     Lipase 10/31/2017 36    Admission on 10/21/2017, Discharged on 10/21/2017   Component Date Value    P-R Interval 10/21/2017 140     QRS Duration 10/21/2017 86     Q-T Interval 10/21/2017 386     QTc Calculation (Bazett) 10/21/2017 408     P Axis 10/21/2017 35     T Axis 10/21/2017 41     aPTT 10/21/2017 29.2     Protime 10/21/2017 12.6     INR 10/21/2017 0.95     WBC 10/21/2017 8.2     RBC 10/21/2017 4.47     Hemoglobin 10/21/2017 13.3     Hematocrit 10/21/2017 40.6     MCV 10/21/2017 90.8     MCH 10/21/2017 29.8     MCHC 10/21/2017 32.8*    RDW 10/21/2017 12.1     Platelets 95/61/5106 249     MPV 10/21/2017 9.5     Neutrophils % 10/21/2017 68.8*    Lymphocytes % 10/21/2017 19.7*    Monocytes % 10/21/2017 9.8     Eosinophils % 10/21/2017 1.0     Basophils % 10/21/2017 0.6     Neutrophils # 10/21/2017 5.7     Lymphocytes # 10/21/2017 1.6     Monocytes # 10/21/2017 0.80     Eosinophils # 10/21/2017 0.10     Basophils # 10/21/2017 0.10     Sodium 10/21/2017 142     Potassium 10/21/2017 4.2 oriented to person, place, and time. She appears well-developed and well-nourished. HENT:   Head: Normocephalic and atraumatic. Right Ear: Tympanic membrane, external ear and ear canal normal.   Left Ear: Tympanic membrane, external ear and ear canal normal.   Nose: Nose normal.   Mouth/Throat: Oropharynx is clear and moist. Mucous membranes are dry. Eyes: Conjunctivae are normal. Pupils are equal, round, and reactive to light. No scleral icterus. Neck: Normal range of motion. Neck supple. Cardiovascular: Regular rhythm, normal heart sounds and intact distal pulses. Tachycardia present. No murmur heard. Pulmonary/Chest: Effort normal and breath sounds normal.   Abdominal: Soft. Bowel sounds are normal. She exhibits no distension. There is tenderness (generalized). There is no rebound. Musculoskeletal: Normal range of motion. She exhibits no edema. Neurological: She is alert and oriented to person, place, and time. Skin: Skin is warm, dry and intact. No lesion and no rash noted. Psychiatric: She has a normal mood and affect. Her speech is normal and behavior is normal. Judgment and thought content normal.   Vitals reviewed. ASSESSMENT      ICD-10-CM ICD-9-CM    1. Ileus ([facility]) K56.7 560.1    2. Pain of upper abdomen R10.10 789.09 XR Acute Abd Series Chest 1 VW      [place] DISCHARGE MEDS RECONCILED W/ CURRENT OUTPATIENT MED LIST   3. Nausea and vomiting, intractability of vomiting not specified, unspecified vomiting type R11.2 787.01 XR Acute Abd Series Chest 1 VW      [place] DISCHARGE MEDS RECONCILED W/ CURRENT OUTPATIENT MED LIST         PLAN  1. Pain of upper abdomen    - XR Acute Abd Series Chest 1 VW; Future  - [place] DISCHARGE MEDS RECONCILED W/ CURRENT OUTPATIENT MED LIST    2. Nausea and vomiting, intractability of vomiting not specified, unspecified vomiting type    - XR Acute Abd Series Chest 1 VW; Future  - [place] DISCHARGE MEDS RECONCILED W/ CURRENT OUTPATIENT MED LIST    3.  Ileus ([address]  Spoke

## 2018-03-02 ENCOUNTER — APPOINTMENT (OUTPATIENT)
Dept: MRI IMAGING | Age: 68
DRG: 418 | End: 2018-03-02
Payer: MEDICARE

## 2018-03-02 ENCOUNTER — APPOINTMENT (OUTPATIENT)
Dept: NUCLEAR MEDICINE | Age: 68
DRG: 418 | End: 2018-03-02
Payer: MEDICARE

## 2018-03-02 ENCOUNTER — APPOINTMENT (OUTPATIENT)
Dept: ULTRASOUND IMAGING | Age: 68
DRG: 418 | End: 2018-03-02
Payer: MEDICARE

## 2018-03-02 PROBLEM — R79.89 LFTS ABNORMAL: Status: ACTIVE | Noted: 2018-03-02

## 2018-03-02 PROBLEM — K85.10 ACUTE GALLSTONE PANCREATITIS: Status: ACTIVE | Noted: 2018-03-01

## 2018-03-02 LAB
ALBUMIN SERPL-MCNC: 2.8 G/DL (ref 3.5–5.2)
ALP BLD-CCNC: 181 U/L (ref 35–104)
ALT SERPL-CCNC: 165 U/L (ref 5–33)
ANION GAP SERPL CALCULATED.3IONS-SCNC: 13 MMOL/L (ref 7–19)
AST SERPL-CCNC: 111 U/L (ref 5–32)
BILIRUB SERPL-MCNC: 2.2 MG/DL (ref 0.2–1.2)
BUN BLDV-MCNC: 13 MG/DL (ref 8–23)
CA 19-9: 26 U/ML (ref 0–35)
CALCIUM SERPL-MCNC: 9.2 MG/DL (ref 8.8–10.2)
CHLORIDE BLD-SCNC: 101 MMOL/L (ref 98–111)
CO2: 24 MMOL/L (ref 22–29)
CREAT SERPL-MCNC: 0.5 MG/DL (ref 0.5–0.9)
GFR NON-AFRICAN AMERICAN: >60
GLUCOSE BLD-MCNC: 84 MG/DL (ref 74–109)
HAV IGM SER IA-ACNC: REACTIVE
HCT VFR BLD CALC: 36.1 % (ref 37–47)
HEMOGLOBIN: 11.5 G/DL (ref 12–16)
HEPATITIS B CORE IGM ANTIBODY: NORMAL
HEPATITIS B SURFACE ANTIGEN INTERPRETATION: NORMAL
HEPATITIS C ANTIBODY INTERPRETATION: NORMAL
LACTIC ACID: 0.7 MMOL/L (ref 0.5–1.9)
LIPASE: 589 U/L (ref 13–60)
MCH RBC QN AUTO: 29.9 PG (ref 27–31)
MCHC RBC AUTO-ENTMCNC: 31.9 G/DL (ref 33–37)
MCV RBC AUTO: 93.8 FL (ref 81–99)
PDW BLD-RTO: 12.6 % (ref 11.5–14.5)
PLATELET # BLD: 184 K/UL (ref 130–400)
PMV BLD AUTO: 10 FL (ref 9.4–12.3)
POTASSIUM SERPL-SCNC: 3.9 MMOL/L (ref 3.5–5)
RBC # BLD: 3.85 M/UL (ref 4.2–5.4)
SODIUM BLD-SCNC: 138 MMOL/L (ref 136–145)
TOTAL PROTEIN: 5.9 G/DL (ref 6.6–8.7)
WBC # BLD: 8.2 K/UL (ref 4.8–10.8)

## 2018-03-02 PROCEDURE — 2580000003 HC RX 258: Performed by: INTERNAL MEDICINE

## 2018-03-02 PROCEDURE — 86301 IMMUNOASSAY TUMOR CA 19-9: CPT

## 2018-03-02 PROCEDURE — 99221 1ST HOSP IP/OBS SF/LOW 40: CPT | Performed by: INTERNAL MEDICINE

## 2018-03-02 PROCEDURE — 74181 MRI ABDOMEN W/O CONTRAST: CPT

## 2018-03-02 PROCEDURE — APPSS30 APP SPLIT SHARED TIME 16-30 MINUTES: Performed by: NURSE PRACTITIONER

## 2018-03-02 PROCEDURE — 99221 1ST HOSP IP/OBS SF/LOW 40: CPT | Performed by: PHYSICIAN ASSISTANT

## 2018-03-02 PROCEDURE — 6360000002 HC RX W HCPCS: Performed by: INTERNAL MEDICINE

## 2018-03-02 PROCEDURE — 2500000003 HC RX 250 WO HCPCS: Performed by: INTERNAL MEDICINE

## 2018-03-02 PROCEDURE — 3430000000 HC RX DIAGNOSTIC RADIOPHARMACEUTICAL: Performed by: INTERNAL MEDICINE

## 2018-03-02 PROCEDURE — 6360000002 HC RX W HCPCS: Performed by: EMERGENCY MEDICINE

## 2018-03-02 PROCEDURE — 1210000000 HC MED SURG R&B

## 2018-03-02 PROCEDURE — 85027 COMPLETE CBC AUTOMATED: CPT

## 2018-03-02 PROCEDURE — 36415 COLL VENOUS BLD VENIPUNCTURE: CPT

## 2018-03-02 PROCEDURE — 83690 ASSAY OF LIPASE: CPT

## 2018-03-02 PROCEDURE — C9113 INJ PANTOPRAZOLE SODIUM, VIA: HCPCS | Performed by: INTERNAL MEDICINE

## 2018-03-02 PROCEDURE — 76700 US EXAM ABDOM COMPLETE: CPT

## 2018-03-02 PROCEDURE — 80053 COMPREHEN METABOLIC PANEL: CPT

## 2018-03-02 PROCEDURE — 83605 ASSAY OF LACTIC ACID: CPT

## 2018-03-02 PROCEDURE — 78226 HEPATOBILIARY SYSTEM IMAGING: CPT

## 2018-03-02 PROCEDURE — A9537 TC99M MEBROFENIN: HCPCS | Performed by: INTERNAL MEDICINE

## 2018-03-02 RX ORDER — 0.9 % SODIUM CHLORIDE 0.9 %
1000 INTRAVENOUS SOLUTION INTRAVENOUS ONCE
Status: COMPLETED | OUTPATIENT
Start: 2018-03-02 | End: 2018-03-02

## 2018-03-02 RX ORDER — LORAZEPAM 1 MG/1
1 TABLET ORAL DAILY PRN
Status: DISCONTINUED | OUTPATIENT
Start: 2018-03-02 | End: 2018-03-07 | Stop reason: HOSPADM

## 2018-03-02 RX ORDER — DEXTROSE, SODIUM CHLORIDE, AND POTASSIUM CHLORIDE 5; .45; .15 G/100ML; G/100ML; G/100ML
INJECTION INTRAVENOUS CONTINUOUS
Status: DISCONTINUED | OUTPATIENT
Start: 2018-03-02 | End: 2018-03-03

## 2018-03-02 RX ORDER — SODIUM CHLORIDE 0.9 % (FLUSH) 0.9 %
10 SYRINGE (ML) INJECTION EVERY 12 HOURS SCHEDULED
Status: DISCONTINUED | OUTPATIENT
Start: 2018-03-02 | End: 2018-03-06 | Stop reason: SDUPTHER

## 2018-03-02 RX ORDER — ONDANSETRON 2 MG/ML
4 INJECTION INTRAMUSCULAR; INTRAVENOUS EVERY 6 HOURS PRN
Status: DISCONTINUED | OUTPATIENT
Start: 2018-03-02 | End: 2018-03-07 | Stop reason: HOSPADM

## 2018-03-02 RX ORDER — ACETAMINOPHEN 325 MG/1
650 TABLET ORAL EVERY 4 HOURS PRN
Status: DISCONTINUED | OUTPATIENT
Start: 2018-03-02 | End: 2018-03-02

## 2018-03-02 RX ORDER — BISACODYL 10 MG
10 SUPPOSITORY, RECTAL RECTAL DAILY PRN
Status: DISCONTINUED | OUTPATIENT
Start: 2018-03-02 | End: 2018-03-07 | Stop reason: HOSPADM

## 2018-03-02 RX ORDER — SODIUM CHLORIDE 0.9 % (FLUSH) 0.9 %
10 SYRINGE (ML) INJECTION PRN
Status: DISCONTINUED | OUTPATIENT
Start: 2018-03-02 | End: 2018-03-06 | Stop reason: SDUPTHER

## 2018-03-02 RX ORDER — PANTOPRAZOLE SODIUM 40 MG/10ML
40 INJECTION, POWDER, LYOPHILIZED, FOR SOLUTION INTRAVENOUS DAILY
Status: DISCONTINUED | OUTPATIENT
Start: 2018-03-02 | End: 2018-03-07 | Stop reason: HOSPADM

## 2018-03-02 RX ADMIN — DEXTROSE MONOHYDRATE, SODIUM CHLORIDE, AND POTASSIUM CHLORIDE: 50; 4.5; 1.49 INJECTION, SOLUTION INTRAVENOUS at 17:46

## 2018-03-02 RX ADMIN — Medication 10 ML: at 10:10

## 2018-03-02 RX ADMIN — VALPROATE SODIUM 250 MG: 100 INJECTION, SOLUTION INTRAVENOUS at 19:28

## 2018-03-02 RX ADMIN — Medication 5 MILLICURIE: at 17:26

## 2018-03-02 RX ADMIN — PANTOPRAZOLE SODIUM 40 MG: 40 INJECTION, POWDER, FOR SOLUTION INTRAVENOUS at 10:07

## 2018-03-02 RX ADMIN — SODIUM CHLORIDE 1000 ML: 9 INJECTION, SOLUTION INTRAVENOUS at 02:01

## 2018-03-02 RX ADMIN — ENOXAPARIN SODIUM 40 MG: 40 INJECTION SUBCUTANEOUS at 10:07

## 2018-03-02 RX ADMIN — Medication 2 MG: at 04:01

## 2018-03-02 RX ADMIN — DEXTROSE MONOHYDRATE, SODIUM CHLORIDE, AND POTASSIUM CHLORIDE: 50; 4.5; 1.49 INJECTION, SOLUTION INTRAVENOUS at 04:01

## 2018-03-02 ASSESSMENT — ENCOUNTER SYMPTOMS
COUGH: 0
BACK PAIN: 1
SPUTUM PRODUCTION: 0
HEARTBURN: 1
ABDOMINAL PAIN: 1
EYES NEGATIVE: 1
CONSTIPATION: 1
VOMITING: 1
NAUSEA: 1

## 2018-03-02 ASSESSMENT — PAIN SCALES - GENERAL: PAINLEVEL_OUTOF10: 8

## 2018-03-03 LAB
ALBUMIN SERPL-MCNC: 3 G/DL (ref 3.5–5.2)
ALP BLD-CCNC: 167 U/L (ref 35–104)
ALT SERPL-CCNC: 108 U/L (ref 5–33)
ANION GAP SERPL CALCULATED.3IONS-SCNC: 10 MMOL/L (ref 7–19)
APTT: 36.4 SEC (ref 26–36.2)
AST SERPL-CCNC: 49 U/L (ref 5–32)
BILIRUB SERPL-MCNC: 0.6 MG/DL (ref 0.2–1.2)
BUN BLDV-MCNC: 10 MG/DL (ref 8–23)
CALCIUM SERPL-MCNC: 9.4 MG/DL (ref 8.8–10.2)
CHLORIDE BLD-SCNC: 107 MMOL/L (ref 98–111)
CO2: 24 MMOL/L (ref 22–29)
CREAT SERPL-MCNC: 0.5 MG/DL (ref 0.5–0.9)
GFR NON-AFRICAN AMERICAN: >60
GLUCOSE BLD-MCNC: 110 MG/DL (ref 74–109)
HCT VFR BLD CALC: 36.5 % (ref 37–47)
HEMOGLOBIN: 11.7 G/DL (ref 12–16)
INR BLD: 0.98 (ref 0.88–1.18)
LIPASE: 453 U/L (ref 13–60)
MCH RBC QN AUTO: 29.3 PG (ref 27–31)
MCHC RBC AUTO-ENTMCNC: 32.1 G/DL (ref 33–37)
MCV RBC AUTO: 91.5 FL (ref 81–99)
PDW BLD-RTO: 12.4 % (ref 11.5–14.5)
PLATELET # BLD: 203 K/UL (ref 130–400)
PMV BLD AUTO: 9.6 FL (ref 9.4–12.3)
POTASSIUM REFLEX MAGNESIUM: 3.9 MMOL/L (ref 3.5–5)
PROTHROMBIN TIME: 12.9 SEC (ref 12–14.6)
RBC # BLD: 3.99 M/UL (ref 4.2–5.4)
SODIUM BLD-SCNC: 141 MMOL/L (ref 136–145)
TOTAL PROTEIN: 6.1 G/DL (ref 6.6–8.7)
WBC # BLD: 5.9 K/UL (ref 4.8–10.8)

## 2018-03-03 PROCEDURE — 83690 ASSAY OF LIPASE: CPT

## 2018-03-03 PROCEDURE — 80053 COMPREHEN METABOLIC PANEL: CPT

## 2018-03-03 PROCEDURE — 6370000000 HC RX 637 (ALT 250 FOR IP): Performed by: PHYSICIAN ASSISTANT

## 2018-03-03 PROCEDURE — 6360000002 HC RX W HCPCS: Performed by: INTERNAL MEDICINE

## 2018-03-03 PROCEDURE — 85610 PROTHROMBIN TIME: CPT

## 2018-03-03 PROCEDURE — 1210000000 HC MED SURG R&B

## 2018-03-03 PROCEDURE — 36415 COLL VENOUS BLD VENIPUNCTURE: CPT

## 2018-03-03 PROCEDURE — C9113 INJ PANTOPRAZOLE SODIUM, VIA: HCPCS | Performed by: INTERNAL MEDICINE

## 2018-03-03 PROCEDURE — 2580000003 HC RX 258: Performed by: INTERNAL MEDICINE

## 2018-03-03 PROCEDURE — 85027 COMPLETE CBC AUTOMATED: CPT

## 2018-03-03 PROCEDURE — 99232 SBSQ HOSP IP/OBS MODERATE 35: CPT | Performed by: SURGERY

## 2018-03-03 PROCEDURE — APPSS30 APP SPLIT SHARED TIME 16-30 MINUTES: Performed by: PHYSICIAN ASSISTANT

## 2018-03-03 PROCEDURE — 99232 SBSQ HOSP IP/OBS MODERATE 35: CPT | Performed by: INTERNAL MEDICINE

## 2018-03-03 PROCEDURE — 85730 THROMBOPLASTIN TIME PARTIAL: CPT

## 2018-03-03 RX ORDER — SODIUM CHLORIDE 9 MG/ML
INJECTION, SOLUTION INTRAVENOUS CONTINUOUS
Status: DISCONTINUED | OUTPATIENT
Start: 2018-03-03 | End: 2018-03-07 | Stop reason: HOSPADM

## 2018-03-03 RX ORDER — LISINOPRIL 20 MG/1
20 TABLET ORAL DAILY
Status: DISCONTINUED | OUTPATIENT
Start: 2018-03-03 | End: 2018-03-07 | Stop reason: HOSPADM

## 2018-03-03 RX ADMIN — Medication 10 ML: at 10:53

## 2018-03-03 RX ADMIN — ENOXAPARIN SODIUM 40 MG: 40 INJECTION SUBCUTANEOUS at 10:53

## 2018-03-03 RX ADMIN — PANTOPRAZOLE SODIUM 40 MG: 40 INJECTION, POWDER, FOR SOLUTION INTRAVENOUS at 10:53

## 2018-03-03 RX ADMIN — LISINOPRIL 20 MG: 20 TABLET ORAL at 15:27

## 2018-03-03 RX ADMIN — SODIUM CHLORIDE: 9 INJECTION, SOLUTION INTRAVENOUS at 15:27

## 2018-03-03 ASSESSMENT — PAIN - FUNCTIONAL ASSESSMENT: PAIN_FUNCTIONAL_ASSESSMENT: 0-10

## 2018-03-04 LAB
ALBUMIN SERPL-MCNC: 3.1 G/DL (ref 3.5–5.2)
ALP BLD-CCNC: 159 U/L (ref 35–104)
ALT SERPL-CCNC: 77 U/L (ref 5–33)
ANION GAP SERPL CALCULATED.3IONS-SCNC: 12 MMOL/L (ref 7–19)
AST SERPL-CCNC: 28 U/L (ref 5–32)
BASOPHILS ABSOLUTE: 0 K/UL (ref 0–0.2)
BASOPHILS RELATIVE PERCENT: 0.3 % (ref 0–1)
BILIRUB SERPL-MCNC: 0.5 MG/DL (ref 0.2–1.2)
BUN BLDV-MCNC: 7 MG/DL (ref 8–23)
CALCIUM SERPL-MCNC: 9.4 MG/DL (ref 8.8–10.2)
CHLORIDE BLD-SCNC: 108 MMOL/L (ref 98–111)
CO2: 24 MMOL/L (ref 22–29)
CREAT SERPL-MCNC: 0.6 MG/DL (ref 0.5–0.9)
EOSINOPHILS ABSOLUTE: 0.2 K/UL (ref 0–0.6)
EOSINOPHILS RELATIVE PERCENT: 2.9 % (ref 0–5)
GFR NON-AFRICAN AMERICAN: >60
GLUCOSE BLD-MCNC: 89 MG/DL (ref 74–109)
HCT VFR BLD CALC: 34.7 % (ref 37–47)
HEMOGLOBIN: 11.2 G/DL (ref 12–16)
LIPASE: 236 U/L (ref 13–60)
LYMPHOCYTES ABSOLUTE: 1 K/UL (ref 1.1–4.5)
LYMPHOCYTES RELATIVE PERCENT: 17.5 % (ref 20–40)
MCH RBC QN AUTO: 29.1 PG (ref 27–31)
MCHC RBC AUTO-ENTMCNC: 32.3 G/DL (ref 33–37)
MCV RBC AUTO: 90.1 FL (ref 81–99)
MONOCYTES ABSOLUTE: 0.5 K/UL (ref 0–0.9)
MONOCYTES RELATIVE PERCENT: 9 % (ref 0–10)
NEUTROPHILS ABSOLUTE: 4 K/UL (ref 1.5–7.5)
NEUTROPHILS RELATIVE PERCENT: 70 % (ref 50–65)
PDW BLD-RTO: 12.4 % (ref 11.5–14.5)
PLATELET # BLD: 222 K/UL (ref 130–400)
PMV BLD AUTO: 9.9 FL (ref 9.4–12.3)
POTASSIUM SERPL-SCNC: 3.7 MMOL/L (ref 3.5–5)
RBC # BLD: 3.85 M/UL (ref 4.2–5.4)
SODIUM BLD-SCNC: 144 MMOL/L (ref 136–145)
TOTAL PROTEIN: 6 G/DL (ref 6.6–8.7)
URINE CULTURE, ROUTINE: NORMAL
WBC # BLD: 5.8 K/UL (ref 4.8–10.8)

## 2018-03-04 PROCEDURE — 6370000000 HC RX 637 (ALT 250 FOR IP): Performed by: PHYSICIAN ASSISTANT

## 2018-03-04 PROCEDURE — 83690 ASSAY OF LIPASE: CPT

## 2018-03-04 PROCEDURE — APPSS30 APP SPLIT SHARED TIME 16-30 MINUTES: Performed by: PHYSICIAN ASSISTANT

## 2018-03-04 PROCEDURE — C9113 INJ PANTOPRAZOLE SODIUM, VIA: HCPCS | Performed by: INTERNAL MEDICINE

## 2018-03-04 PROCEDURE — 99232 SBSQ HOSP IP/OBS MODERATE 35: CPT | Performed by: INTERNAL MEDICINE

## 2018-03-04 PROCEDURE — 6370000000 HC RX 637 (ALT 250 FOR IP): Performed by: INTERNAL MEDICINE

## 2018-03-04 PROCEDURE — 6360000002 HC RX W HCPCS: Performed by: INTERNAL MEDICINE

## 2018-03-04 PROCEDURE — 80053 COMPREHEN METABOLIC PANEL: CPT

## 2018-03-04 PROCEDURE — 36415 COLL VENOUS BLD VENIPUNCTURE: CPT

## 2018-03-04 PROCEDURE — 1210000000 HC MED SURG R&B

## 2018-03-04 PROCEDURE — 2580000003 HC RX 258: Performed by: INTERNAL MEDICINE

## 2018-03-04 PROCEDURE — 85025 COMPLETE CBC W/AUTO DIFF WBC: CPT

## 2018-03-04 RX ADMIN — Medication 10 ML: at 08:27

## 2018-03-04 RX ADMIN — ENOXAPARIN SODIUM 40 MG: 40 INJECTION SUBCUTANEOUS at 08:27

## 2018-03-04 RX ADMIN — PANTOPRAZOLE SODIUM 40 MG: 40 INJECTION, POWDER, FOR SOLUTION INTRAVENOUS at 08:27

## 2018-03-04 RX ADMIN — LISINOPRIL 20 MG: 20 TABLET ORAL at 08:26

## 2018-03-04 RX ADMIN — LORAZEPAM 1 MG: 1 TABLET ORAL at 08:26

## 2018-03-04 ASSESSMENT — PAIN SCALES - GENERAL: PAINLEVEL_OUTOF10: 0

## 2018-03-05 LAB
ANION GAP SERPL CALCULATED.3IONS-SCNC: 10 MMOL/L (ref 7–19)
BUN BLDV-MCNC: 6 MG/DL (ref 8–23)
CALCIUM SERPL-MCNC: 9.6 MG/DL (ref 8.8–10.2)
CHLORIDE BLD-SCNC: 108 MMOL/L (ref 98–111)
CO2: 27 MMOL/L (ref 22–29)
CREAT SERPL-MCNC: 0.6 MG/DL (ref 0.5–0.9)
GFR NON-AFRICAN AMERICAN: >60
GLUCOSE BLD-MCNC: 115 MG/DL (ref 74–109)
HCT VFR BLD CALC: 34.6 % (ref 37–47)
HEMOGLOBIN: 11.2 G/DL (ref 12–16)
LIPASE: 474 U/L (ref 13–60)
MCH RBC QN AUTO: 29 PG (ref 27–31)
MCHC RBC AUTO-ENTMCNC: 32.4 G/DL (ref 33–37)
MCV RBC AUTO: 89.6 FL (ref 81–99)
PDW BLD-RTO: 12.4 % (ref 11.5–14.5)
PLATELET # BLD: 220 K/UL (ref 130–400)
PMV BLD AUTO: 9.4 FL (ref 9.4–12.3)
POTASSIUM SERPL-SCNC: 3.8 MMOL/L (ref 3.5–5)
RBC # BLD: 3.86 M/UL (ref 4.2–5.4)
SODIUM BLD-SCNC: 145 MMOL/L (ref 136–145)
WBC # BLD: 5.4 K/UL (ref 4.8–10.8)

## 2018-03-05 PROCEDURE — 85027 COMPLETE CBC AUTOMATED: CPT

## 2018-03-05 PROCEDURE — 80048 BASIC METABOLIC PNL TOTAL CA: CPT

## 2018-03-05 PROCEDURE — 83690 ASSAY OF LIPASE: CPT

## 2018-03-05 PROCEDURE — 1210000000 HC MED SURG R&B

## 2018-03-05 PROCEDURE — 6370000000 HC RX 637 (ALT 250 FOR IP): Performed by: INTERNAL MEDICINE

## 2018-03-05 PROCEDURE — 2580000003 HC RX 258: Performed by: INTERNAL MEDICINE

## 2018-03-05 PROCEDURE — 99232 SBSQ HOSP IP/OBS MODERATE 35: CPT | Performed by: INTERNAL MEDICINE

## 2018-03-05 PROCEDURE — 36415 COLL VENOUS BLD VENIPUNCTURE: CPT

## 2018-03-05 PROCEDURE — 6360000002 HC RX W HCPCS: Performed by: INTERNAL MEDICINE

## 2018-03-05 PROCEDURE — 6370000000 HC RX 637 (ALT 250 FOR IP): Performed by: PHYSICIAN ASSISTANT

## 2018-03-05 PROCEDURE — C9113 INJ PANTOPRAZOLE SODIUM, VIA: HCPCS | Performed by: INTERNAL MEDICINE

## 2018-03-05 RX ADMIN — ENOXAPARIN SODIUM 40 MG: 40 INJECTION SUBCUTANEOUS at 09:11

## 2018-03-05 RX ADMIN — PANTOPRAZOLE SODIUM 40 MG: 40 INJECTION, POWDER, FOR SOLUTION INTRAVENOUS at 09:11

## 2018-03-05 RX ADMIN — SODIUM CHLORIDE: 9 INJECTION, SOLUTION INTRAVENOUS at 16:05

## 2018-03-05 RX ADMIN — LORAZEPAM 1 MG: 1 TABLET ORAL at 09:14

## 2018-03-05 RX ADMIN — LISINOPRIL 20 MG: 20 TABLET ORAL at 09:11

## 2018-03-06 ENCOUNTER — ANESTHESIA EVENT (OUTPATIENT)
Dept: OPERATING ROOM | Age: 68
DRG: 418 | End: 2018-03-06
Payer: MEDICARE

## 2018-03-06 ENCOUNTER — ANESTHESIA (OUTPATIENT)
Dept: OPERATING ROOM | Age: 68
DRG: 418 | End: 2018-03-06
Payer: MEDICARE

## 2018-03-06 VITALS
SYSTOLIC BLOOD PRESSURE: 116 MMHG | RESPIRATION RATE: 21 BRPM | DIASTOLIC BLOOD PRESSURE: 64 MMHG | OXYGEN SATURATION: 97 % | TEMPERATURE: 98.1 F

## 2018-03-06 LAB
ANION GAP SERPL CALCULATED.3IONS-SCNC: 13 MMOL/L (ref 7–19)
BASOPHILS ABSOLUTE: 0 K/UL (ref 0–0.2)
BASOPHILS RELATIVE PERCENT: 0.5 % (ref 0–1)
BUN BLDV-MCNC: 8 MG/DL (ref 8–23)
CALCIUM SERPL-MCNC: 9.6 MG/DL (ref 8.8–10.2)
CHLORIDE BLD-SCNC: 107 MMOL/L (ref 98–111)
CO2: 23 MMOL/L (ref 22–29)
CREAT SERPL-MCNC: 0.6 MG/DL (ref 0.5–0.9)
EOSINOPHILS ABSOLUTE: 0.1 K/UL (ref 0–0.6)
EOSINOPHILS RELATIVE PERCENT: 1.9 % (ref 0–5)
GFR NON-AFRICAN AMERICAN: >60
GLUCOSE BLD-MCNC: 116 MG/DL (ref 74–109)
HCT VFR BLD CALC: 34.7 % (ref 37–47)
HEMOGLOBIN: 11.4 G/DL (ref 12–16)
LIPASE: 295 U/L (ref 13–60)
LYMPHOCYTES ABSOLUTE: 1.6 K/UL (ref 1.1–4.5)
LYMPHOCYTES RELATIVE PERCENT: 24.4 % (ref 20–40)
MCH RBC QN AUTO: 29.4 PG (ref 27–31)
MCHC RBC AUTO-ENTMCNC: 32.9 G/DL (ref 33–37)
MCV RBC AUTO: 89.4 FL (ref 81–99)
MONOCYTES ABSOLUTE: 0.6 K/UL (ref 0–0.9)
MONOCYTES RELATIVE PERCENT: 8.8 % (ref 0–10)
NEUTROPHILS ABSOLUTE: 4.1 K/UL (ref 1.5–7.5)
NEUTROPHILS RELATIVE PERCENT: 63.8 % (ref 50–65)
PDW BLD-RTO: 12.5 % (ref 11.5–14.5)
PLATELET # BLD: 250 K/UL (ref 130–400)
PMV BLD AUTO: 9.7 FL (ref 9.4–12.3)
POTASSIUM SERPL-SCNC: 3.6 MMOL/L (ref 3.5–5)
RBC # BLD: 3.88 M/UL (ref 4.2–5.4)
SODIUM BLD-SCNC: 143 MMOL/L (ref 136–145)
WBC # BLD: 6.5 K/UL (ref 4.8–10.8)

## 2018-03-06 PROCEDURE — 85025 COMPLETE CBC W/AUTO DIFF WBC: CPT

## 2018-03-06 PROCEDURE — 3700000001 HC ADD 15 MINUTES (ANESTHESIA): Performed by: SURGERY

## 2018-03-06 PROCEDURE — 6360000002 HC RX W HCPCS: Performed by: EMERGENCY MEDICINE

## 2018-03-06 PROCEDURE — 7100000001 HC PACU RECOVERY - ADDTL 15 MIN: Performed by: SURGERY

## 2018-03-06 PROCEDURE — 1210000000 HC MED SURG R&B

## 2018-03-06 PROCEDURE — 6360000002 HC RX W HCPCS: Performed by: INTERNAL MEDICINE

## 2018-03-06 PROCEDURE — 3600000004 HC SURGERY LEVEL 4 BASE: Performed by: SURGERY

## 2018-03-06 PROCEDURE — 6360000002 HC RX W HCPCS

## 2018-03-06 PROCEDURE — 2500000003 HC RX 250 WO HCPCS

## 2018-03-06 PROCEDURE — 47562 LAPAROSCOPIC CHOLECYSTECTOMY: CPT | Performed by: SURGERY

## 2018-03-06 PROCEDURE — 3700000000 HC ANESTHESIA ATTENDED CARE: Performed by: SURGERY

## 2018-03-06 PROCEDURE — 2500000003 HC RX 250 WO HCPCS: Performed by: SURGERY

## 2018-03-06 PROCEDURE — 3600000014 HC SURGERY LEVEL 4 ADDTL 15MIN: Performed by: SURGERY

## 2018-03-06 PROCEDURE — 2580000003 HC RX 258: Performed by: ANESTHESIOLOGY

## 2018-03-06 PROCEDURE — 0FT44ZZ RESECTION OF GALLBLADDER, PERCUTANEOUS ENDOSCOPIC APPROACH: ICD-10-PCS | Performed by: SURGERY

## 2018-03-06 PROCEDURE — 83690 ASSAY OF LIPASE: CPT

## 2018-03-06 PROCEDURE — 36415 COLL VENOUS BLD VENIPUNCTURE: CPT

## 2018-03-06 PROCEDURE — 80048 BASIC METABOLIC PNL TOTAL CA: CPT

## 2018-03-06 PROCEDURE — 99232 SBSQ HOSP IP/OBS MODERATE 35: CPT | Performed by: INTERNAL MEDICINE

## 2018-03-06 PROCEDURE — 2700000000 HC OXYGEN THERAPY PER DAY

## 2018-03-06 PROCEDURE — 6360000002 HC RX W HCPCS: Performed by: SURGERY

## 2018-03-06 PROCEDURE — 2720000010 HC SURG SUPPLY STERILE: Performed by: SURGERY

## 2018-03-06 PROCEDURE — 2720000001 HC MISC SURG SUPPLY STERILE $51-500: Performed by: SURGERY

## 2018-03-06 PROCEDURE — 7100000000 HC PACU RECOVERY - FIRST 15 MIN: Performed by: SURGERY

## 2018-03-06 PROCEDURE — 88304 TISSUE EXAM BY PATHOLOGIST: CPT

## 2018-03-06 RX ORDER — MORPHINE SULFATE 4 MG/ML
2 INJECTION, SOLUTION INTRAMUSCULAR; INTRAVENOUS EVERY 5 MIN PRN
Status: DISCONTINUED | OUTPATIENT
Start: 2018-03-06 | End: 2018-03-06 | Stop reason: HOSPADM

## 2018-03-06 RX ORDER — EPINEPHRINE 1 MG/ML
INJECTION, SOLUTION, CONCENTRATE INTRAVENOUS PRN
Status: DISCONTINUED | OUTPATIENT
Start: 2018-03-06 | End: 2018-03-06 | Stop reason: HOSPADM

## 2018-03-06 RX ORDER — SODIUM CHLORIDE 0.9 % (FLUSH) 0.9 %
10 SYRINGE (ML) INJECTION EVERY 12 HOURS SCHEDULED
Status: DISCONTINUED | OUTPATIENT
Start: 2018-03-06 | End: 2018-03-07 | Stop reason: HOSPADM

## 2018-03-06 RX ORDER — LIDOCAINE HYDROCHLORIDE 10 MG/ML
INJECTION, SOLUTION EPIDURAL; INFILTRATION; INTRACAUDAL; PERINEURAL PRN
Status: DISCONTINUED | OUTPATIENT
Start: 2018-03-06 | End: 2018-03-06 | Stop reason: SDUPTHER

## 2018-03-06 RX ORDER — MEPERIDINE HYDROCHLORIDE 50 MG/ML
12.5 INJECTION INTRAMUSCULAR; INTRAVENOUS; SUBCUTANEOUS EVERY 5 MIN PRN
Status: DISCONTINUED | OUTPATIENT
Start: 2018-03-06 | End: 2018-03-06 | Stop reason: HOSPADM

## 2018-03-06 RX ORDER — SODIUM CHLORIDE 0.9 % (FLUSH) 0.9 %
10 SYRINGE (ML) INJECTION PRN
Status: DISCONTINUED | OUTPATIENT
Start: 2018-03-06 | End: 2018-03-06 | Stop reason: HOSPADM

## 2018-03-06 RX ORDER — SODIUM CHLORIDE 0.9 % (FLUSH) 0.9 %
10 SYRINGE (ML) INJECTION EVERY 12 HOURS SCHEDULED
Status: DISCONTINUED | OUTPATIENT
Start: 2018-03-06 | End: 2018-03-06 | Stop reason: HOSPADM

## 2018-03-06 RX ORDER — SCOLOPAMINE TRANSDERMAL SYSTEM 1 MG/1
1 PATCH, EXTENDED RELEASE TRANSDERMAL ONCE
Status: DISCONTINUED | OUTPATIENT
Start: 2018-03-06 | End: 2018-03-06 | Stop reason: HOSPADM

## 2018-03-06 RX ORDER — METOCLOPRAMIDE HYDROCHLORIDE 5 MG/ML
10 INJECTION INTRAMUSCULAR; INTRAVENOUS
Status: DISCONTINUED | OUTPATIENT
Start: 2018-03-06 | End: 2018-03-06 | Stop reason: HOSPADM

## 2018-03-06 RX ORDER — PROPOFOL 10 MG/ML
INJECTION, EMULSION INTRAVENOUS PRN
Status: DISCONTINUED | OUTPATIENT
Start: 2018-03-06 | End: 2018-03-06 | Stop reason: SDUPTHER

## 2018-03-06 RX ORDER — HYDROCODONE BITARTRATE AND ACETAMINOPHEN 5; 325 MG/1; MG/1
1 TABLET ORAL EVERY 4 HOURS PRN
Status: DISCONTINUED | OUTPATIENT
Start: 2018-03-06 | End: 2018-03-07 | Stop reason: HOSPADM

## 2018-03-06 RX ORDER — HYDRALAZINE HYDROCHLORIDE 20 MG/ML
5 INJECTION INTRAMUSCULAR; INTRAVENOUS EVERY 10 MIN PRN
Status: DISCONTINUED | OUTPATIENT
Start: 2018-03-06 | End: 2018-03-06 | Stop reason: HOSPADM

## 2018-03-06 RX ORDER — MORPHINE SULFATE 4 MG/ML
4 INJECTION, SOLUTION INTRAMUSCULAR; INTRAVENOUS EVERY 5 MIN PRN
Status: DISCONTINUED | OUTPATIENT
Start: 2018-03-06 | End: 2018-03-06 | Stop reason: HOSPADM

## 2018-03-06 RX ORDER — BUPIVACAINE HYDROCHLORIDE 2.5 MG/ML
INJECTION, SOLUTION INFILTRATION; PERINEURAL PRN
Status: DISCONTINUED | OUTPATIENT
Start: 2018-03-06 | End: 2018-03-06 | Stop reason: HOSPADM

## 2018-03-06 RX ORDER — SODIUM CHLORIDE, SODIUM LACTATE, POTASSIUM CHLORIDE, CALCIUM CHLORIDE 600; 310; 30; 20 MG/100ML; MG/100ML; MG/100ML; MG/100ML
INJECTION, SOLUTION INTRAVENOUS CONTINUOUS
Status: DISCONTINUED | OUTPATIENT
Start: 2018-03-06 | End: 2018-03-06

## 2018-03-06 RX ORDER — LIDOCAINE HYDROCHLORIDE 10 MG/ML
1 INJECTION, SOLUTION EPIDURAL; INFILTRATION; INTRACAUDAL; PERINEURAL
Status: DISCONTINUED | OUTPATIENT
Start: 2018-03-06 | End: 2018-03-06 | Stop reason: HOSPADM

## 2018-03-06 RX ORDER — HYDROCODONE BITARTRATE AND ACETAMINOPHEN 5; 325 MG/1; MG/1
2 TABLET ORAL EVERY 4 HOURS PRN
Status: DISCONTINUED | OUTPATIENT
Start: 2018-03-06 | End: 2018-03-07 | Stop reason: HOSPADM

## 2018-03-06 RX ORDER — FENTANYL CITRATE 50 UG/ML
50 INJECTION, SOLUTION INTRAMUSCULAR; INTRAVENOUS
Status: DISCONTINUED | OUTPATIENT
Start: 2018-03-06 | End: 2018-03-06 | Stop reason: HOSPADM

## 2018-03-06 RX ORDER — ROCURONIUM BROMIDE 10 MG/ML
INJECTION, SOLUTION INTRAVENOUS PRN
Status: DISCONTINUED | OUTPATIENT
Start: 2018-03-06 | End: 2018-03-06 | Stop reason: SDUPTHER

## 2018-03-06 RX ORDER — SUCCINYLCHOLINE/SOD CL,ISO/PF 100 MG/5ML
SYRINGE (ML) INTRAVENOUS PRN
Status: DISCONTINUED | OUTPATIENT
Start: 2018-03-06 | End: 2018-03-06 | Stop reason: SDUPTHER

## 2018-03-06 RX ORDER — MIDAZOLAM HYDROCHLORIDE 1 MG/ML
2 INJECTION INTRAMUSCULAR; INTRAVENOUS
Status: DISCONTINUED | OUTPATIENT
Start: 2018-03-06 | End: 2018-03-06 | Stop reason: HOSPADM

## 2018-03-06 RX ORDER — PROMETHAZINE HYDROCHLORIDE 25 MG/ML
6.25 INJECTION, SOLUTION INTRAMUSCULAR; INTRAVENOUS
Status: DISCONTINUED | OUTPATIENT
Start: 2018-03-06 | End: 2018-03-06 | Stop reason: HOSPADM

## 2018-03-06 RX ORDER — BUPIVACAINE HYDROCHLORIDE AND EPINEPHRINE 2.5; 5 MG/ML; UG/ML
INJECTION, SOLUTION INFILTRATION; PERINEURAL PRN
Status: DISCONTINUED | OUTPATIENT
Start: 2018-03-06 | End: 2018-03-06 | Stop reason: HOSPADM

## 2018-03-06 RX ORDER — SODIUM CHLORIDE 0.9 % (FLUSH) 0.9 %
10 SYRINGE (ML) INJECTION PRN
Status: DISCONTINUED | OUTPATIENT
Start: 2018-03-06 | End: 2018-03-07 | Stop reason: HOSPADM

## 2018-03-06 RX ORDER — ONDANSETRON 2 MG/ML
INJECTION INTRAMUSCULAR; INTRAVENOUS PRN
Status: DISCONTINUED | OUTPATIENT
Start: 2018-03-06 | End: 2018-03-06 | Stop reason: SDUPTHER

## 2018-03-06 RX ORDER — ACETAMINOPHEN 325 MG/1
650 TABLET ORAL EVERY 4 HOURS PRN
Status: DISCONTINUED | OUTPATIENT
Start: 2018-03-06 | End: 2018-03-07 | Stop reason: HOSPADM

## 2018-03-06 RX ORDER — KETOROLAC TROMETHAMINE 30 MG/ML
INJECTION, SOLUTION INTRAMUSCULAR; INTRAVENOUS PRN
Status: DISCONTINUED | OUTPATIENT
Start: 2018-03-06 | End: 2018-03-06 | Stop reason: SDUPTHER

## 2018-03-06 RX ORDER — DIPHENHYDRAMINE HYDROCHLORIDE 50 MG/ML
12.5 INJECTION INTRAMUSCULAR; INTRAVENOUS
Status: DISCONTINUED | OUTPATIENT
Start: 2018-03-06 | End: 2018-03-06 | Stop reason: HOSPADM

## 2018-03-06 RX ORDER — MORPHINE SULFATE 4 MG/ML
4 INJECTION, SOLUTION INTRAMUSCULAR; INTRAVENOUS
Status: DISCONTINUED | OUTPATIENT
Start: 2018-03-06 | End: 2018-03-06 | Stop reason: HOSPADM

## 2018-03-06 RX ORDER — LABETALOL HYDROCHLORIDE 5 MG/ML
5 INJECTION, SOLUTION INTRAVENOUS EVERY 10 MIN PRN
Status: DISCONTINUED | OUTPATIENT
Start: 2018-03-06 | End: 2018-03-06 | Stop reason: HOSPADM

## 2018-03-06 RX ORDER — DEXAMETHASONE SODIUM PHOSPHATE 10 MG/ML
INJECTION INTRAMUSCULAR; INTRAVENOUS PRN
Status: DISCONTINUED | OUTPATIENT
Start: 2018-03-06 | End: 2018-03-06 | Stop reason: SDUPTHER

## 2018-03-06 RX ORDER — EPHEDRINE SULFATE 50 MG/ML
INJECTION, SOLUTION INTRAVENOUS PRN
Status: DISCONTINUED | OUTPATIENT
Start: 2018-03-06 | End: 2018-03-06 | Stop reason: SDUPTHER

## 2018-03-06 RX ORDER — FENTANYL CITRATE 50 UG/ML
INJECTION, SOLUTION INTRAMUSCULAR; INTRAVENOUS PRN
Status: DISCONTINUED | OUTPATIENT
Start: 2018-03-06 | End: 2018-03-06 | Stop reason: SDUPTHER

## 2018-03-06 RX ORDER — MIDAZOLAM HYDROCHLORIDE 1 MG/ML
INJECTION INTRAMUSCULAR; INTRAVENOUS PRN
Status: DISCONTINUED | OUTPATIENT
Start: 2018-03-06 | End: 2018-03-06 | Stop reason: SDUPTHER

## 2018-03-06 RX ADMIN — SUGAMMADEX 169 MG: 100 INJECTION, SOLUTION INTRAVENOUS at 13:13

## 2018-03-06 RX ADMIN — HYDROMORPHONE HYDROCHLORIDE 0.5 MG: 1 INJECTION, SOLUTION INTRAMUSCULAR; INTRAVENOUS; SUBCUTANEOUS at 12:58

## 2018-03-06 RX ADMIN — KETOROLAC TROMETHAMINE 30 MG: 30 INJECTION, SOLUTION INTRAMUSCULAR at 13:04

## 2018-03-06 RX ADMIN — FENTANYL CITRATE 100 MCG: 50 INJECTION, SOLUTION INTRAMUSCULAR; INTRAVENOUS at 12:18

## 2018-03-06 RX ADMIN — Medication 2 MG: at 07:56

## 2018-03-06 RX ADMIN — MIDAZOLAM HYDROCHLORIDE 2 MG: 1 INJECTION, SOLUTION INTRAMUSCULAR; INTRAVENOUS at 12:02

## 2018-03-06 RX ADMIN — SODIUM CHLORIDE, SODIUM LACTATE, POTASSIUM CHLORIDE, AND CALCIUM CHLORIDE: 600; 310; 30; 20 INJECTION, SOLUTION INTRAVENOUS at 12:03

## 2018-03-06 RX ADMIN — Medication 100 MG: at 12:08

## 2018-03-06 RX ADMIN — LIDOCAINE HYDROCHLORIDE 50 MG: 10 INJECTION, SOLUTION EPIDURAL; INFILTRATION; INTRACAUDAL; PERINEURAL at 12:08

## 2018-03-06 RX ADMIN — PROPOFOL 50 MG: 10 INJECTION, EMULSION INTRAVENOUS at 12:18

## 2018-03-06 RX ADMIN — ONDANSETRON HYDROCHLORIDE 4 MG: 2 SOLUTION INTRAMUSCULAR; INTRAVENOUS at 13:04

## 2018-03-06 RX ADMIN — PROPOFOL 150 MG: 10 INJECTION, EMULSION INTRAVENOUS at 12:08

## 2018-03-06 RX ADMIN — Medication 2 G: at 12:13

## 2018-03-06 RX ADMIN — HYDROMORPHONE HYDROCHLORIDE 0.5 MG: 1 INJECTION, SOLUTION INTRAMUSCULAR; INTRAVENOUS; SUBCUTANEOUS at 13:07

## 2018-03-06 RX ADMIN — EPHEDRINE SULFATE 5 MG: 50 INJECTION, SOLUTION INTRAMUSCULAR; INTRAVENOUS; SUBCUTANEOUS at 12:37

## 2018-03-06 RX ADMIN — ROCURONIUM BROMIDE 40 MG: 10 INJECTION INTRAVENOUS at 12:17

## 2018-03-06 RX ADMIN — ONDANSETRON 4 MG: 2 INJECTION INTRAMUSCULAR; INTRAVENOUS at 23:17

## 2018-03-06 RX ADMIN — DEXAMETHASONE SODIUM PHOSPHATE 10 MG: 10 INJECTION INTRAMUSCULAR; INTRAVENOUS at 12:19

## 2018-03-06 RX ADMIN — FENTANYL CITRATE 50 MCG: 50 INJECTION, SOLUTION INTRAMUSCULAR; INTRAVENOUS at 12:08

## 2018-03-06 ASSESSMENT — PAIN SCALES - GENERAL
PAINLEVEL_OUTOF10: 0
PAINLEVEL_OUTOF10: 0
PAINLEVEL_OUTOF10: 8

## 2018-03-06 NOTE — ANESTHESIA PRE PROCEDURE
Department of Anesthesiology  Preprocedure Note       Name:  Amee Perales   Age:  80 y.o.  :  1950                                          MRN:  [id number]         Date:  3/6/2018      Surgeon: Gerry Arredondo):  Amanda Ray MD    Procedure: Procedure(s):  CHOLECYSTECTOMY LAPAROSCOPIC    Medications prior to admission:   Prior to Admission medications    Medication Sig Start Date End Date Taking?  Authorizing Provider   famotidine (PEPCID) 20 MG tablet Take 1 tablet by mouth daily for 15 days 2/27/18 3/14/18  CARISA Guerrier   ondansetron (ZOFRAN ODT) 4 MG disintegrating tablet Take 1 tablet by mouth every 8 hours as needed for Nausea or Vomiting 18   CARISA Reyna   amLODIPine (NORVASC) 2.5 MG tablet TAKE ONE TABLET BY MOUTH ONCE DAILY 17   LUIZ Castañeda   lisinopril (PRINIVIL;ZESTRIL) 20 MG tablet Take 1 tablet by mouth daily 17   AZ Aguilera DO   furosemide (LASIX) 40 MG tablet Take 1 tablet by mouth daily 17   AZ Aguilera DO   venlafaxine (EFFEXOR XR) 150 MG extended release capsule Take 1 capsule by mouth daily 17   LUIZ Castañeda   Cholecalciferol (VITAMIN D) 2000 UNITS CAPS capsule Take 2 capsules by mouth daily    Historical Provider, MD   vitamin D (ERGOCALCIFEROL) 23771 UNITS CAPS capsule Take 1 capsule by mouth once a week 17   LUIZ Castañeda   atorvastatin (LIPITOR) 40 MG tablet Take 1 tablet by mouth daily 17   AZ Aguilera DO   Omega-3 Fatty Acids (FISH OIL) 1000 MG CAPS Take 2 capsules by mouth 2 times daily 3/17/17   LUIZ Castañeda   omeprazole (PRILOSEC) 20 MG delayed release capsule Take 1 capsule by mouth 2 times daily Take 30 minutes before breakfast and supper 3/17/17   LUIZ Castañeda   LORazepam (ATIVAN) 1 MG tablet Take 1 tablet by mouth daily 16   Historical Provider, MD       Current medications:    Current Facility-Administered Medications   Medication Dose Route Frequency Provider Last R93.3    Class 1 obesity due to excess calories without serious comorbidity in adult E66.09    Acute pancreatitis K85.90    LFTs abnormal R79.89       Past Medical History:        Diagnosis Date    Anxiety     Depression     Edema     GERD (gastroesophageal reflux disease)     Headache(784.0)     migraines    Hyperlipidemia     Hypertension     Mini stroke (HCC)     TIA (transient ischemic attack)        Past Surgical History:        Procedure Laterality Date    COLONOSCOPY  07/01/2015    Dr. Maria Antonia Hernandez  08/26/2015    Dr. Maria Antonia Hernandez  5/23/2017    Dr Osorio-w/balloon dilation, 12-13. 5-15 mm-esophageal narrowing with diverticulum at 29 cm-Kristin (-), hiatal herni, Dye's (+) dysplasia (-)--1st dx--1 yr recall-Ct chest ordered       Social History:    Social History   Substance Use Topics    Smoking status: Never Smoker    Smokeless tobacco: Never Used    Alcohol use No                                Counseling given: Not Answered      Vital Signs (Current):   Vitals:    03/06/18 0018 03/06/18 0423 03/06/18 0645 03/06/18 1111   BP: (!) 148/84 138/84 (!) 143/90 (!) 162/85   Pulse: 89 83 83 85   Resp: 16 18 16 16   Temp: 98.8 °F (37.1 °C) 97.2 °F (36.2 °C) 97.6 °F (36.4 °C) 97.6 °F (36.4 °C)   TempSrc: Temporal Temporal Temporal Temporal   SpO2: 94% 97% 97% 96%   Weight:       Height:                                                  BP Readings from Last 3 Encounters:   03/06/18 (!) 162/85   03/01/18 124/76   02/27/18 (!) 156/71       NPO Status: Time of last liquid consumption: 2100                        Time of last solid consumption: 1800                        Date of last liquid consumption: 03/05/18                        Date of last solid food consumption: 03/05/18    BMI:   Wt Readings from Last 3 Encounters:   03/01/18 186 lb (84.4 kg)   03/01/18 182 lb (82.6 kg)   02/27/18 200 lb (90.7 kg)     Body mass index is 31.93 kg/m².    CBC:   Lab Results   Component Value Date    WBC 6.5 03/06/2018    RBC 3.88 03/06/2018    HGB 11.4 03/06/2018    HCT 34.7 03/06/2018    HCT 38.2 03/16/2011    MCV 89.4 03/06/2018    RDW 12.5 03/06/2018     03/06/2018     03/16/2011       CMP:   Lab Results   Component Value Date     03/06/2018     03/14/2011    K 3.6 03/06/2018    K 3.9 03/03/2018    K 4.0 03/14/2011     03/06/2018     03/14/2011    CO2 23 03/06/2018    BUN 8 03/06/2018    CREATININE 0.6 03/06/2018    CREATININE 0.8 03/14/2011    LABGLOM >60 03/06/2018    GLUCOSE 116 03/06/2018    PROT 6.0 03/04/2018    PROT 7.4 03/14/2011    CALCIUM 9.6 03/06/2018    BILITOT 0.5 03/04/2018    ALKPHOS 159 03/04/2018    ALKPHOS 90 03/14/2011    AST 28 03/04/2018    ALT 77 03/04/2018       POC Tests: No results for input(s): POCGLU, POCNA, POCK, POCCL, POCBUN, POCHEMO, POCHCT in the last 72 hours. Coags:   Lab Results   Component Value Date    PROTIME 12.9 03/03/2018    PROTIME 10.80 03/14/2011    INR 0.98 03/03/2018    APTT 36.4 03/03/2018       HCG (If Applicable): No results found for: PREGTESTUR, PREGSERUM, HCG, HCGQUANT     ABGs: No results found for: PHART, PO2ART, DYM8COH, USD3FWR, BEART, U5UIDXAD     Type & Screen (If Applicable):  No results found for: LABABO, [address]    Anesthesia Evaluation  Patient summary reviewed and Nursing notes reviewed  Airway: Mallampati: II  TM distance: >3 FB   Neck ROM: full  Mouth opening: > = 3 FB Dental: normal exam   (+) edentulous      Pulmonary:Negative Pulmonary ROS and normal exam  breath sounds clear to auscultation                             Cardiovascular:Negative CV ROS    (+) hypertension:,         Rhythm: regular  Rate: normal                    Neuro/Psych:   (+) TIA, headaches:, psychiatric history: stable with treatment            GI/Hepatic/Renal:   (+) GERD:,          ROS comment: pancreatits from stones.    Endo/Other:    (+) blood dyscrasia: anemia:.,

## 2018-03-07 VITALS
DIASTOLIC BLOOD PRESSURE: 69 MMHG | RESPIRATION RATE: 17 BRPM | HEART RATE: 86 BPM | WEIGHT: 186 LBS | BODY MASS INDEX: 31.76 KG/M2 | TEMPERATURE: 97.9 F | SYSTOLIC BLOOD PRESSURE: 149 MMHG | HEIGHT: 64 IN | OXYGEN SATURATION: 91 %

## 2018-03-07 PROBLEM — B15.9 VIRAL HEPATITIS A WITHOUT HEPATIC COMA: Status: ACTIVE | Noted: 2018-03-07

## 2018-03-07 LAB
ANION GAP SERPL CALCULATED.3IONS-SCNC: 14 MMOL/L (ref 7–19)
BASOPHILS ABSOLUTE: 0 K/UL (ref 0–0.2)
BASOPHILS RELATIVE PERCENT: 0.1 % (ref 0–1)
BUN BLDV-MCNC: 13 MG/DL (ref 8–23)
CALCIUM SERPL-MCNC: 9.8 MG/DL (ref 8.8–10.2)
CHLORIDE BLD-SCNC: 104 MMOL/L (ref 98–111)
CO2: 22 MMOL/L (ref 22–29)
CREAT SERPL-MCNC: 0.7 MG/DL (ref 0.5–0.9)
EOSINOPHILS ABSOLUTE: 0 K/UL (ref 0–0.6)
EOSINOPHILS RELATIVE PERCENT: 0.1 % (ref 0–5)
GFR NON-AFRICAN AMERICAN: >60
GLUCOSE BLD-MCNC: 141 MG/DL (ref 74–109)
HCT VFR BLD CALC: 36.3 % (ref 37–47)
HEMOGLOBIN: 11.7 G/DL (ref 12–16)
LIPASE: 80 U/L (ref 13–60)
LYMPHOCYTES ABSOLUTE: 1.2 K/UL (ref 1.1–4.5)
LYMPHOCYTES RELATIVE PERCENT: 8.6 % (ref 20–40)
MCH RBC QN AUTO: 29 PG (ref 27–31)
MCHC RBC AUTO-ENTMCNC: 32.2 G/DL (ref 33–37)
MCV RBC AUTO: 90.1 FL (ref 81–99)
MONOCYTES ABSOLUTE: 1 K/UL (ref 0–0.9)
MONOCYTES RELATIVE PERCENT: 7.1 % (ref 0–10)
NEUTROPHILS ABSOLUTE: 11.4 K/UL (ref 1.5–7.5)
NEUTROPHILS RELATIVE PERCENT: 83.3 % (ref 50–65)
PDW BLD-RTO: 12.4 % (ref 11.5–14.5)
PLATELET # BLD: 288 K/UL (ref 130–400)
PMV BLD AUTO: 9.7 FL (ref 9.4–12.3)
POTASSIUM SERPL-SCNC: 4.1 MMOL/L (ref 3.5–5)
RBC # BLD: 4.03 M/UL (ref 4.2–5.4)
SODIUM BLD-SCNC: 140 MMOL/L (ref 136–145)
WBC # BLD: 13.7 K/UL (ref 4.8–10.8)

## 2018-03-07 PROCEDURE — 99024 POSTOP FOLLOW-UP VISIT: CPT | Performed by: SURGERY

## 2018-03-07 PROCEDURE — 99238 HOSP IP/OBS DSCHRG MGMT 30/<: CPT | Performed by: INTERNAL MEDICINE

## 2018-03-07 PROCEDURE — 85025 COMPLETE CBC W/AUTO DIFF WBC: CPT

## 2018-03-07 PROCEDURE — 6360000002 HC RX W HCPCS: Performed by: SURGERY

## 2018-03-07 PROCEDURE — 6360000002 HC RX W HCPCS: Performed by: INTERNAL MEDICINE

## 2018-03-07 PROCEDURE — 36415 COLL VENOUS BLD VENIPUNCTURE: CPT

## 2018-03-07 PROCEDURE — 6370000000 HC RX 637 (ALT 250 FOR IP): Performed by: INTERNAL MEDICINE

## 2018-03-07 PROCEDURE — APPSS30 APP SPLIT SHARED TIME 16-30 MINUTES: Performed by: PHYSICIAN ASSISTANT

## 2018-03-07 PROCEDURE — 83690 ASSAY OF LIPASE: CPT

## 2018-03-07 PROCEDURE — C9113 INJ PANTOPRAZOLE SODIUM, VIA: HCPCS | Performed by: INTERNAL MEDICINE

## 2018-03-07 PROCEDURE — 80048 BASIC METABOLIC PNL TOTAL CA: CPT

## 2018-03-07 PROCEDURE — 6370000000 HC RX 637 (ALT 250 FOR IP): Performed by: PHYSICIAN ASSISTANT

## 2018-03-07 RX ORDER — OXYCODONE HYDROCHLORIDE 5 MG/1
TABLET ORAL
Qty: 30 TABLET | Refills: 0 | Status: SHIPPED | OUTPATIENT
Start: 2018-03-07 | End: 2018-03-14

## 2018-03-07 RX ORDER — HYDROCODONE BITARTRATE AND ACETAMINOPHEN 5; 325 MG/1; MG/1
1 TABLET ORAL EVERY 6 HOURS PRN
Qty: 20 TABLET | Refills: 0 | Status: SHIPPED | OUTPATIENT
Start: 2018-03-07 | End: 2018-03-07 | Stop reason: HOSPADM

## 2018-03-07 RX ADMIN — LORAZEPAM 1 MG: 1 TABLET ORAL at 10:03

## 2018-03-07 RX ADMIN — ENOXAPARIN SODIUM 40 MG: 40 INJECTION SUBCUTANEOUS at 09:45

## 2018-03-07 RX ADMIN — PANTOPRAZOLE SODIUM 40 MG: 40 INJECTION, POWDER, FOR SOLUTION INTRAVENOUS at 09:45

## 2018-03-07 RX ADMIN — LISINOPRIL 20 MG: 20 TABLET ORAL at 09:45

## 2018-03-08 ENCOUNTER — CARE COORDINATION (OUTPATIENT)
Dept: CASE MANAGEMENT | Age: 68
End: 2018-03-08

## 2018-03-08 DIAGNOSIS — K85.90 ACUTE PANCREATITIS, UNSPECIFIED COMPLICATION STATUS, UNSPECIFIED PANCREATITIS TYPE: Primary | ICD-10-CM

## 2018-03-08 LAB
EKG P AXIS: 26 DEGREES
EKG P-R INTERVAL: 136 MS
EKG Q-T INTERVAL: 360 MS
EKG QRS DURATION: 88 MS
EKG QTC CALCULATION (BAZETT): 402 MS
EKG T AXIS: 38 DEGREES

## 2018-03-08 PROCEDURE — 1111F DSCHRG MED/CURRENT MED MERGE: CPT | Performed by: NURSE PRACTITIONER

## 2018-03-13 ENCOUNTER — CARE COORDINATION (OUTPATIENT)
Dept: CASE MANAGEMENT | Age: 68
End: 2018-03-13

## 2018-03-15 ENCOUNTER — OFFICE VISIT (OUTPATIENT)
Dept: PRIMARY CARE CLINIC | Age: 68
End: 2018-03-15
Payer: MEDICARE

## 2018-03-15 ENCOUNTER — TELEPHONE (OUTPATIENT)
Dept: PRIMARY CARE CLINIC | Age: 68
End: 2018-03-15

## 2018-03-15 VITALS
HEIGHT: 64 IN | BODY MASS INDEX: 33.33 KG/M2 | SYSTOLIC BLOOD PRESSURE: 110 MMHG | HEART RATE: 91 BPM | OXYGEN SATURATION: 96 % | DIASTOLIC BLOOD PRESSURE: 80 MMHG | WEIGHT: 195.25 LBS | TEMPERATURE: 97.6 F

## 2018-03-15 DIAGNOSIS — K85.90 ACUTE PANCREATITIS, UNSPECIFIED COMPLICATION STATUS, UNSPECIFIED PANCREATITIS TYPE: Primary | ICD-10-CM

## 2018-03-15 DIAGNOSIS — K80.20 GALLSTONES: ICD-10-CM

## 2018-03-15 DIAGNOSIS — I10 ESSENTIAL HYPERTENSION: Chronic | ICD-10-CM

## 2018-03-15 DIAGNOSIS — K85.90 ACUTE PANCREATITIS, UNSPECIFIED COMPLICATION STATUS, UNSPECIFIED PANCREATITIS TYPE: ICD-10-CM

## 2018-03-15 LAB
ALBUMIN SERPL-MCNC: 3.9 G/DL (ref 3.5–5.2)
ALP BLD-CCNC: 114 U/L (ref 35–104)
ALT SERPL-CCNC: 14 U/L (ref 5–33)
ANION GAP SERPL CALCULATED.3IONS-SCNC: 18 MMOL/L (ref 7–19)
AST SERPL-CCNC: 13 U/L (ref 5–32)
BASOPHILS ABSOLUTE: 0.1 K/UL (ref 0–0.2)
BASOPHILS RELATIVE PERCENT: 0.7 % (ref 0–1)
BILIRUB SERPL-MCNC: 0.3 MG/DL (ref 0.2–1.2)
BUN BLDV-MCNC: 10 MG/DL (ref 8–23)
CALCIUM SERPL-MCNC: 10.2 MG/DL (ref 8.8–10.2)
CHLORIDE BLD-SCNC: 100 MMOL/L (ref 98–111)
CO2: 24 MMOL/L (ref 22–29)
CREAT SERPL-MCNC: 0.7 MG/DL (ref 0.5–0.9)
EOSINOPHILS ABSOLUTE: 0.1 K/UL (ref 0–0.6)
EOSINOPHILS RELATIVE PERCENT: 1 % (ref 0–5)
GFR NON-AFRICAN AMERICAN: >60
GLUCOSE BLD-MCNC: 85 MG/DL (ref 74–109)
HCT VFR BLD CALC: 41.4 % (ref 37–47)
HEMOGLOBIN: 13.3 G/DL (ref 12–16)
LYMPHOCYTES ABSOLUTE: 1.8 K/UL (ref 1.1–4.5)
LYMPHOCYTES RELATIVE PERCENT: 19.7 % (ref 20–40)
MCH RBC QN AUTO: 29.1 PG (ref 27–31)
MCHC RBC AUTO-ENTMCNC: 32.1 G/DL (ref 33–37)
MCV RBC AUTO: 90.6 FL (ref 81–99)
MONOCYTES ABSOLUTE: 0.8 K/UL (ref 0–0.9)
MONOCYTES RELATIVE PERCENT: 8.4 % (ref 0–10)
NEUTROPHILS ABSOLUTE: 6.3 K/UL (ref 1.5–7.5)
NEUTROPHILS RELATIVE PERCENT: 69.9 % (ref 50–65)
PDW BLD-RTO: 12.5 % (ref 11.5–14.5)
PLATELET # BLD: 346 K/UL (ref 130–400)
PMV BLD AUTO: 9.8 FL (ref 9.4–12.3)
POTASSIUM SERPL-SCNC: 4 MMOL/L (ref 3.5–5)
RBC # BLD: 4.57 M/UL (ref 4.2–5.4)
SODIUM BLD-SCNC: 142 MMOL/L (ref 136–145)
TOTAL PROTEIN: 7.4 G/DL (ref 6.6–8.7)
WBC # BLD: 9 K/UL (ref 4.8–10.8)

## 2018-03-15 PROCEDURE — 1123F ACP DISCUSS/DSCN MKR DOCD: CPT | Performed by: NURSE PRACTITIONER

## 2018-03-15 PROCEDURE — 1090F PRES/ABSN URINE INCON ASSESS: CPT | Performed by: NURSE PRACTITIONER

## 2018-03-15 PROCEDURE — 3014F SCREEN MAMMO DOC REV: CPT | Performed by: NURSE PRACTITIONER

## 2018-03-15 PROCEDURE — 1111F DSCHRG MED/CURRENT MED MERGE: CPT | Performed by: NURSE PRACTITIONER

## 2018-03-15 PROCEDURE — G8427 DOCREV CUR MEDS BY ELIG CLIN: HCPCS | Performed by: NURSE PRACTITIONER

## 2018-03-15 PROCEDURE — 99213 OFFICE O/P EST LOW 20 MIN: CPT | Performed by: NURSE PRACTITIONER

## 2018-03-15 PROCEDURE — 1036F TOBACCO NON-USER: CPT | Performed by: NURSE PRACTITIONER

## 2018-03-15 PROCEDURE — G8484 FLU IMMUNIZE NO ADMIN: HCPCS | Performed by: NURSE PRACTITIONER

## 2018-03-15 PROCEDURE — G8417 CALC BMI ABV UP PARAM F/U: HCPCS | Performed by: NURSE PRACTITIONER

## 2018-03-15 PROCEDURE — 3017F COLORECTAL CA SCREEN DOC REV: CPT | Performed by: NURSE PRACTITIONER

## 2018-03-15 PROCEDURE — 4040F PNEUMOC VAC/ADMIN/RCVD: CPT | Performed by: NURSE PRACTITIONER

## 2018-03-15 PROCEDURE — G8399 PT W/DXA RESULTS DOCUMENT: HCPCS | Performed by: NURSE PRACTITIONER

## 2018-03-15 ASSESSMENT — ENCOUNTER SYMPTOMS
SORE THROAT: 0
CONSTIPATION: 0
DIARRHEA: 0
SHORTNESS OF BREATH: 0
ABDOMINAL PAIN: 1
RHINORRHEA: 0
VOMITING: 0
EYE REDNESS: 0
COUGH: 0

## 2018-03-15 NOTE — PROGRESS NOTES
99.0 fL    MCH 29.4 27.0 - 31.0 pg    MCHC 32.2 (L) 33.0 - 37.0 g/dL    RDW 12.6 11.5 - 14.5 %    Platelets 833 388 - 243 K/uL    MPV 9.4 9.4 - 12.3 fL    Neutrophils % 78.8 (H) 50.0 - 65.0 %    Lymphocytes % 9.9 (L) 20.0 - 40.0 %    Monocytes % 10.1 (H) 0.0 - 10.0 %    Eosinophils % 0.4 0.0 - 5.0 %    Basophils % 0.4 0.0 - 1.0 %    Neutrophils # 8.9 (H) 1.5 - 7.5 K/uL    Lymphocytes # 1.1 1.1 - 4.5 K/uL    Monocytes # 1.10 (H) 0.00 - 0.90 K/uL    Eosinophils # 0.00 0.00 - 0.60 K/uL    Basophils # 0.00 0.00 - 0.20 K/uL   Comprehensive Metabolic Panel   Result Value Ref Range    Sodium 134 (L) 136 - 145 mmol/L    Potassium 3.8 3.5 - 5.0 mmol/L    Chloride 93 (L) 98 - 111 mmol/L    CO2 29 22 - 29 mmol/L    Anion Gap 12 7 - 19 mmol/L    Glucose 90 74 - 109 mg/dL    BUN 15 8 - 23 mg/dL    CREATININE 0.7 0.5 - 0.9 mg/dL    GFR Non-African American >60 >60    Calcium 10.2 8.8 - 10.2 mg/dL    Total Protein 7.0 6.6 - 8.7 g/dL    Alb 3.7 3.5 - 5.2 g/dL    Total Bilirubin 3.5 (H) 0.2 - 1.2 mg/dL    Alkaline Phosphatase 217 (H) 35 - 104 U/L     (H) 5 - 33 U/L     (H) 5 - 32 U/L   Lipase   Result Value Ref Range    Lipase 579 (H) 13 - 60 U/L   Urinalysis   Result Value Ref Range    Color, UA ORANGE (A) Straw/Yellow    Clarity, UA CLOUDY (A) Clear    Glucose, Ur Negative Negative mg/dL    Bilirubin Urine LARGE (A) Negative    Ketones, Urine TRACE (A) Negative mg/dL    Specific Gravity, UA 1.020 1.005 - 1.030    Blood, Urine Negative Negative    pH, UA 6.0 5.0 - 8.0    Protein, UA 30 (A) Negative mg/dL    Urobilinogen, Urine 1.0 <2.0 E.U./dL    Nitrite, Urine POSITIVE (A) Negative    Leukocyte Esterase, Urine SMALL (A) Negative   Lactic Acid, Plasma   Result Value Ref Range    Lactic Acid 1.3 0.5 - 1.9 mmol/L   Microscopic Urinalysis   Result Value Ref Range    Mucus, UA 1+ (A) /LPF    WBC, UA 0-1 0 - 5 /HPF    Epi Cells 0-2 /HPF    Bacteria, UA trace /HPF    Hyaline Casts, UA 5 0 - 8 /HPF    WBC, UA 4 0 - 5 /HPF Epi Cells 3 0 - 5 /HPF   Amylase   Result Value Ref Range    Amylase 201 (H) 28 - 100 U/L   HEPATITIS PANEL, ACUTE   Result Value Ref Range    Hep A IgM Reactive Non-reactive    Hep B Core Ab, IgM Non-Reactive Non-reactive    Hep B S Ag Interp Non-Reactive Non-reactive    Hep C Ab Interp Non-Reactive    BILIRUBIN TOTAL DIRECT & INDIRECT   Result Value Ref Range    Total Bilirubin 3.4 (H) 0.2 - 1.2 mg/dL    Bilirubin, Direct 2.6 (H) 0.0 - 0.3 mg/dL    Bilirubin, Indirect 0.8 0.1 - 1.0 mg/dL   Comprehensive Metabolic Panel   Result Value Ref Range    Sodium 138 136 - 145 mmol/L    Potassium 3.9 3.5 - 5.0 mmol/L    Chloride 101 98 - 111 mmol/L    CO2 24 22 - 29 mmol/L    Anion Gap 13 7 - 19 mmol/L    Glucose 84 74 - 109 mg/dL    BUN 13 8 - 23 mg/dL    CREATININE 0.5 0.5 - 0.9 mg/dL    GFR Non-African American >60 >60    Calcium 9.2 8.8 - 10.2 mg/dL    Total Protein 5.9 (L) 6.6 - 8.7 g/dL    Alb 2.8 (L) 3.5 - 5.2 g/dL    Total Bilirubin 2.2 (H) 0.2 - 1.2 mg/dL    Alkaline Phosphatase 181 (H) 35 - 104 U/L     (H) 5 - 33 U/L     (H) 5 - 32 U/L   CBC   Result Value Ref Range    WBC 8.2 4.8 - 10.8 K/uL    RBC 3.85 (L) 4.20 - 5.40 M/uL    Hemoglobin 11.5 (L) 12.0 - 16.0 g/dL    Hematocrit 36.1 (L) 37.0 - 47.0 %    MCV 93.8 81.0 - 99.0 fL    MCH 29.9 27.0 - 31.0 pg    MCHC 31.9 (L) 33.0 - 37.0 g/dL    RDW 12.6 11.5 - 14.5 %    Platelets 433 619 - 904 K/uL    MPV 10.0 9.4 - 12.3 fL   Lactic Acid, Plasma   Result Value Ref Range    Lactic Acid 0.7 0.5 - 1.9 mmol/L   Lipase   Result Value Ref Range    Lipase 589 (H) 13 - 60 U/L   Cancer Antigen 19-9   Result Value Ref Range    CA 19-9 26 0 - 35 U/mL   Comprehensive Metabolic Panel w/ Reflex to MG   Result Value Ref Range    Sodium 141 136 - 145 mmol/L    Potassium reflex Magnesium 3.9 3.5 - 5.0 mmol/L    Chloride 107 98 - 111 mmol/L    CO2 24 22 - 29 mmol/L    Anion Gap 10 7 - 19 mmol/L    Glucose 110 (H) 74 - 109 mg/dL    BUN 10 8 - 23 mg/dL    CREATININE 0.5 0.5 0.5 0.0 - 1.0 %    Neutrophils # 4.1 1.5 - 7.5 K/uL    Lymphocytes # 1.6 1.1 - 4.5 K/uL    Monocytes # 0.60 0.00 - 0.90 K/uL    Eosinophils # 0.10 0.00 - 0.60 K/uL    Basophils # 0.00 0.00 - 0.20 K/uL   CBC Auto Differential   Result Value Ref Range    WBC 13.7 (H) 4.8 - 10.8 K/uL    RBC 4.03 (L) 4.20 - 5.40 M/uL    Hemoglobin 11.7 (L) 12.0 - 16.0 g/dL    Hematocrit 36.3 (L) 37.0 - 47.0 %    MCV 90.1 81.0 - 99.0 fL    MCH 29.0 27.0 - 31.0 pg    MCHC 32.2 (L) 33.0 - 37.0 g/dL    RDW 12.4 11.5 - 14.5 %    Platelets 627 845 - 185 K/uL    MPV 9.7 9.4 - 12.3 fL    Neutrophils % 83.3 (H) 50.0 - 65.0 %    Lymphocytes % 8.6 (L) 20.0 - 40.0 %    Monocytes % 7.1 0.0 - 10.0 %    Eosinophils % 0.1 0.0 - 5.0 %    Basophils % 0.1 0.0 - 1.0 %    Neutrophils # 11.4 (H) 1.5 - 7.5 K/uL    Lymphocytes # 1.2 1.1 - 4.5 K/uL    Monocytes # 1.00 (H) 0.00 - 0.90 K/uL    Eosinophils # 0.00 0.00 - 0.60 K/uL    Basophils # 0.00 0.00 - 0.20 K/uL   Basic Metabolic Panel   Result Value Ref Range    Sodium 140 136 - 145 mmol/L    Potassium 4.1 3.5 - 5.0 mmol/L    Chloride 104 98 - 111 mmol/L    CO2 22 22 - 29 mmol/L    Anion Gap 14 7 - 19 mmol/L    Glucose 141 (H) 74 - 109 mg/dL    BUN 13 8 - 23 mg/dL    CREATININE 0.7 0.5 - 0.9 mg/dL    GFR Non-African American >60 >60    Calcium 9.8 8.8 - 10.2 mg/dL   Lipase   Result Value Ref Range    Lipase 80 (H) 13 - 60 U/L   POCT Venous   Result Value Ref Range    POC Troponin I 0.00 0.00 - 0.08 ng/mL    Performed on i-Stat    EKG 12 Lead   Result Value Ref Range    P-R Interval 136 ms    QRS Duration 88 ms    Q-T Interval 360 ms    QTc Calculation (Bazett) 402 ms    P Axis 26 degrees    T Axis 38 degrees       I have reviewed the following with the Ms. [address]   Lab Review   Admission on 03/01/2018, Discharged on 03/07/2018   No results displayed because visit has over 200 results.       Admission on 02/27/2018, Discharged on 02/27/2018   Component Date Value    WBC 02/27/2018 11.8*    RBC 02/27/2018 4.68 10/21/2017 13     Glucose 10/21/2017 98     BUN 10/21/2017 12     CREATININE 10/21/2017 0.7     GFR Non- 10/21/2017 >60     Calcium 10/21/2017 10.7*    Total Protein 10/21/2017 7.9     Alb 10/21/2017 4.3     Total Bilirubin 10/21/2017 0.3     Alkaline Phosphatase 10/21/2017 84     ALT 10/21/2017 11     AST 10/21/2017 18     Lipase 10/21/2017 41      Copies of these are in the chart. Prior to Visit Medications    Medication Sig Taking? Authorizing Provider   famotidine (PEPCID) 20 MG tablet Take 1 tablet by mouth daily for 15 days  CARISA Steward   ondansetron (ZOFRAN ODT) 4 MG disintegrating tablet Take 1 tablet by mouth every 8 hours as needed for Nausea or Vomiting  CARISA Steward   amLODIPine (NORVASC) 2.5 MG tablet TAKE ONE TABLET BY MOUTH ONCE DAILY  LUIZ Castañeda   lisinopril (PRINIVIL;ZESTRIL) 20 MG tablet Take 1 tablet by mouth daily  AZ Manning DO   furosemide (LASIX) 40 MG tablet Take 1 tablet by mouth daily  AZ Manning DO   venlafaxine (EFFEXOR XR) 150 MG extended release capsule Take 1 capsule by mouth daily  LUIZ Castaeñda   Cholecalciferol (VITAMIN D) 2000 UNITS CAPS capsule Take 2 capsules by mouth daily  Historical Provider, MD   vitamin D (ERGOCALCIFEROL) 74821 UNITS CAPS capsule Take 1 capsule by mouth once a week  LUIZ Castañeda   atorvastatin (LIPITOR) 40 MG tablet Take 1 tablet by mouth daily  AZ Manning DO   Omega-3 Fatty Acids (FISH OIL) 1000 MG CAPS Take 2 capsules by mouth 2 times daily  LUIZ Castañeda   omeprazole (PRILOSEC) 20 MG delayed release capsule Take 1 capsule by mouth 2 times daily Take 30 minutes before breakfast and supper  LUIZ Castañeda   LORazepam (ATIVAN) 1 MG tablet Take 1 tablet by mouth daily  Historical Provider, MD       Allergies: Patient has no known allergies.     Past Medical History:   Diagnosis Date    Anxiety     Depression     Edema     GERD (gastroesophageal reflux Care instructions adapted under license by [facility] ([facility]). If you have questions about a medical condition or this instruction, always ask your healthcare professional. [place] [address] any warranty or liability for your use of this information. Controlled Substances Monitoring:  n/a            Additional Instructions: As always, patient is advised to bring in medication bottles in order to correctly reconcile with our current list.    Shelli Jeff received counseling on the following healthy behaviors: n/a    Patient given educational materials on dx    I have instructed Shelli Jeff to complete a self tracking handout on n/a and instructed them to bring it with them to her next appointment. Discussed use, benefit, and side effects of prescribed medications. Barriers to medication compliance addressed. All patient questions answered. Pt voiced understanding.      LUIZ Martin

## 2018-03-16 ENCOUNTER — CARE COORDINATION (OUTPATIENT)
Dept: CASE MANAGEMENT | Age: 68
End: 2018-03-16

## 2018-03-19 ENCOUNTER — CARE COORDINATION (OUTPATIENT)
Dept: CASE MANAGEMENT | Age: 68
End: 2018-03-19

## 2018-03-22 ENCOUNTER — OFFICE VISIT (OUTPATIENT)
Dept: SURGERY | Age: 68
End: 2018-03-22

## 2018-03-22 VITALS
DIASTOLIC BLOOD PRESSURE: 72 MMHG | HEIGHT: 64 IN | SYSTOLIC BLOOD PRESSURE: 138 MMHG | WEIGHT: 195 LBS | TEMPERATURE: 97.9 F | BODY MASS INDEX: 33.29 KG/M2

## 2018-03-22 DIAGNOSIS — K80.00 ACUTE CHOLECYSTITIS DUE TO BILIARY CALCULUS: Primary | ICD-10-CM

## 2018-03-22 PROCEDURE — 99024 POSTOP FOLLOW-UP VISIT: CPT | Performed by: PHYSICIAN ASSISTANT

## 2018-03-23 DIAGNOSIS — F33.0 MILD EPISODE OF RECURRENT MAJOR DEPRESSIVE DISORDER (HCC): ICD-10-CM

## 2018-03-23 DIAGNOSIS — R10.10 PAIN OF UPPER ABDOMEN: ICD-10-CM

## 2018-03-23 DIAGNOSIS — K21.9 GASTROESOPHAGEAL REFLUX DISEASE, ESOPHAGITIS PRESENCE NOT SPECIFIED: ICD-10-CM

## 2018-03-23 RX ORDER — OMEPRAZOLE 20 MG/1
20 CAPSULE, DELAYED RELEASE ORAL 2 TIMES DAILY
Qty: 60 CAPSULE | Refills: 11 | Status: SHIPPED | OUTPATIENT
Start: 2018-03-23 | End: 2019-03-25 | Stop reason: SDUPTHER

## 2018-03-23 RX ORDER — VENLAFAXINE HYDROCHLORIDE 150 MG/1
CAPSULE, EXTENDED RELEASE ORAL
Qty: 30 CAPSULE | Refills: 5 | Status: SHIPPED | OUTPATIENT
Start: 2018-03-23 | End: 2019-01-22 | Stop reason: SDUPTHER

## 2018-03-23 NOTE — TELEPHONE ENCOUNTER
Pt seen 3/15/18      Requested Prescriptions     Pending Prescriptions Disp Refills    omeprazole (PRILOSEC) 20 MG delayed release capsule [Pharmacy Med Name: OMEPRAZOLE 20MG CAP] 60 capsule 11     Sig: TAKE ONE CAPSULE BY MOUTH TWICE DAILY 30  MINUTES  BEFORE  BREAKFAST  AND  SUPPER    venlafaxine (EFFEXOR XR) 150 MG extended release capsule [Pharmacy Med Name: VENLAFAXINE ER 150MG CAP] 30 capsule 5     Sig: TAKE ONE CAPSULE BY MOUTH ONCE DAILY

## 2018-03-26 ENCOUNTER — CARE COORDINATION (OUTPATIENT)
Dept: CASE MANAGEMENT | Age: 68
End: 2018-03-26

## 2018-03-28 ENCOUNTER — TELEPHONE (OUTPATIENT)
Dept: GASTROENTEROLOGY | Age: 68
End: 2018-03-28

## 2018-03-28 ENCOUNTER — OFFICE VISIT (OUTPATIENT)
Dept: GASTROENTEROLOGY | Age: 68
End: 2018-03-28
Payer: MEDICARE

## 2018-03-28 VITALS
HEIGHT: 64 IN | SYSTOLIC BLOOD PRESSURE: 126 MMHG | HEART RATE: 96 BPM | DIASTOLIC BLOOD PRESSURE: 76 MMHG | OXYGEN SATURATION: 97 % | BODY MASS INDEX: 32.95 KG/M2 | WEIGHT: 193 LBS

## 2018-03-28 DIAGNOSIS — Z90.49 S/P CHOLECYSTECTOMY: ICD-10-CM

## 2018-03-28 DIAGNOSIS — K22.70 BARRETT'S ESOPHAGUS DETERMINED BY BIOPSY: Primary | ICD-10-CM

## 2018-03-28 DIAGNOSIS — R11.0 CHRONIC NAUSEA: ICD-10-CM

## 2018-03-28 DIAGNOSIS — Z87.19 HISTORY OF ACUTE PANCREATITIS: ICD-10-CM

## 2018-03-28 LAB
ALBUMIN SERPL-MCNC: 4.1 G/DL (ref 3.5–5.2)
ALP BLD-CCNC: 103 U/L (ref 35–104)
ALT SERPL-CCNC: 11 U/L (ref 5–33)
AST SERPL-CCNC: 15 U/L (ref 5–32)
BILIRUB SERPL-MCNC: 0.3 MG/DL (ref 0.2–1.2)
BILIRUBIN DIRECT: 0.1 MG/DL (ref 0–0.3)
BILIRUBIN, INDIRECT: 0.2 MG/DL (ref 0.1–1)
LIPASE: 65 U/L (ref 13–60)
TOTAL PROTEIN: 7.4 G/DL (ref 6.6–8.7)

## 2018-03-28 PROCEDURE — 1036F TOBACCO NON-USER: CPT | Performed by: NURSE PRACTITIONER

## 2018-03-28 PROCEDURE — 99215 OFFICE O/P EST HI 40 MIN: CPT | Performed by: NURSE PRACTITIONER

## 2018-03-28 PROCEDURE — 4040F PNEUMOC VAC/ADMIN/RCVD: CPT | Performed by: NURSE PRACTITIONER

## 2018-03-28 PROCEDURE — G8427 DOCREV CUR MEDS BY ELIG CLIN: HCPCS | Performed by: NURSE PRACTITIONER

## 2018-03-28 PROCEDURE — 1090F PRES/ABSN URINE INCON ASSESS: CPT | Performed by: NURSE PRACTITIONER

## 2018-03-28 PROCEDURE — G8484 FLU IMMUNIZE NO ADMIN: HCPCS | Performed by: NURSE PRACTITIONER

## 2018-03-28 PROCEDURE — G8399 PT W/DXA RESULTS DOCUMENT: HCPCS | Performed by: NURSE PRACTITIONER

## 2018-03-28 PROCEDURE — 3017F COLORECTAL CA SCREEN DOC REV: CPT | Performed by: NURSE PRACTITIONER

## 2018-03-28 PROCEDURE — 1111F DSCHRG MED/CURRENT MED MERGE: CPT | Performed by: NURSE PRACTITIONER

## 2018-03-28 PROCEDURE — 1123F ACP DISCUSS/DSCN MKR DOCD: CPT | Performed by: NURSE PRACTITIONER

## 2018-03-28 PROCEDURE — G8417 CALC BMI ABV UP PARAM F/U: HCPCS | Performed by: NURSE PRACTITIONER

## 2018-03-28 PROCEDURE — 3014F SCREEN MAMMO DOC REV: CPT | Performed by: NURSE PRACTITIONER

## 2018-03-28 ASSESSMENT — ENCOUNTER SYMPTOMS
VOMITING: 0
BLOOD IN STOOL: 0
ABDOMINAL DISTENTION: 0
DIARRHEA: 0
NAUSEA: 1
COUGH: 0
ABDOMINAL PAIN: 0
BACK PAIN: 0
PHOTOPHOBIA: 0
SORE THROAT: 0
ANAL BLEEDING: 0
CONSTIPATION: 0
VOICE CHANGE: 0
SHORTNESS OF BREATH: 0
TROUBLE SWALLOWING: 0
RECTAL PAIN: 0

## 2018-03-28 NOTE — TELEPHONE ENCOUNTER
I called and spoke with Dr. Suman Coffman office. Pt was seen last year for her enlarged lymph node from the CT scan in 2017. I went ahead and scheduled her an appointment on 4/12/18 at 10:30. I called to talk to the pt about this and she was not home. I will call back later.

## 2018-03-28 NOTE — PROGRESS NOTES
normal  (1) kristin negative  (2)   DISTAL ESOPHAGUS, BIOPSY:    ---  MODERATE CHRONIC ESOPHAGOGASTRITIS.   ---  NO INTESTINAL METAPLASIA.     Last colonoscopy 7/2015 ([facility])- polyp; 5 year recall  (1) RECTAL POLYP, BIOPSY:   --- ADENOMATOUS POLYP, TUBULAR TYPE.        Family HX: Pt denies family hx of colon polyps, colon CA, inflammatory bowel dx, gastric CA and esophageal CA. Past Medical History:   Diagnosis Date    Anxiety     Depression     Edema     GERD (gastroesophageal reflux disease)     Headache(784.0)     migraines    Hyperlipidemia     Hypertension     Mini stroke ([address]     TIA (transient ischemic attack)      Past Surgical History:   Procedure Laterality Date    CHOLECYSTECTOMY      COLONOSCOPY  07/01/2015    Dr. Tracy Barrientos N/A 3/6/2018    CHOLECYSTECTOMY LAPAROSCOPIC performed by Bekah Munguia MD at [address]  08/26/2015    Dr. Inna Houser  5/23/2017    Dr Osorio-w/balloon dilation, 12-13. 5-15 mm-esophageal narrowing with diverticulum at 34 cm-Kristin (-), hiatal herni, Dye's (+) dysplasia (-)--1st dx--1 yr recall-Ct chest ordered     Social History     Social History    Marital status:      Spouse name: N/A    Number of children: N/A    Years of education: N/A     Social History Main Topics    Smoking status: Never Smoker    Smokeless tobacco: Never Used    Alcohol use No    Drug use: No    Sexual activity: Not Asked     Other Topics Concern    None     Social History Narrative    None     No Known Allergies  Current Outpatient Prescriptions   Medication Sig Dispense Refill    Multiple Vitamins-Minerals (MULTIVITAMIN ADULT PO) Take by mouth      omeprazole (PRILOSEC) 20 MG delayed release capsule Take 1 capsule by mouth 2 times daily 30 minutes before breakfast and supper 60 capsule 11    venlafaxine (EFFEXOR XR) 150 MG extended release capsule TAKE ONE CAPSULE BY MOUTH ONCE

## 2018-03-28 NOTE — TELEPHONE ENCOUNTER
Eugenio Colindres, please let [facility] know that her liver enzymes are normal and her lipase (pancreatic enzyme) is essentially normal as well. All good news!

## 2018-03-30 ENCOUNTER — CARE COORDINATION (OUTPATIENT)
Dept: CASE MANAGEMENT | Age: 68
End: 2018-03-30

## 2018-04-06 ENCOUNTER — CARE COORDINATION (OUTPATIENT)
Dept: CASE MANAGEMENT | Age: 68
End: 2018-04-06

## 2018-04-13 ENCOUNTER — CARE COORDINATION (OUTPATIENT)
Dept: CASE MANAGEMENT | Age: 68
End: 2018-04-13

## 2018-04-17 ENCOUNTER — CARE COORDINATION (OUTPATIENT)
Dept: CARE COORDINATION | Age: 68
End: 2018-04-17

## 2018-04-18 ENCOUNTER — OFFICE VISIT (OUTPATIENT)
Dept: PRIMARY CARE CLINIC | Age: 68
End: 2018-04-18
Payer: MEDICARE

## 2018-04-18 VITALS
HEIGHT: 64 IN | WEIGHT: 193.25 LBS | SYSTOLIC BLOOD PRESSURE: 124 MMHG | BODY MASS INDEX: 32.99 KG/M2 | OXYGEN SATURATION: 98 % | DIASTOLIC BLOOD PRESSURE: 70 MMHG | TEMPERATURE: 98.8 F | HEART RATE: 90 BPM

## 2018-04-18 DIAGNOSIS — E78.2 MIXED HYPERLIPIDEMIA: Chronic | ICD-10-CM

## 2018-04-18 DIAGNOSIS — R53.82 CHRONIC FATIGUE: ICD-10-CM

## 2018-04-18 DIAGNOSIS — G47.33 OBSTRUCTIVE SLEEP APNEA SYNDROME: ICD-10-CM

## 2018-04-18 DIAGNOSIS — K21.9 GASTROESOPHAGEAL REFLUX DISEASE WITHOUT ESOPHAGITIS: Chronic | ICD-10-CM

## 2018-04-18 DIAGNOSIS — R53.82 CHRONIC FATIGUE: Primary | ICD-10-CM

## 2018-04-18 DIAGNOSIS — E55.9 VITAMIN D DEFICIENCY: ICD-10-CM

## 2018-04-18 DIAGNOSIS — I10 ESSENTIAL HYPERTENSION: Chronic | ICD-10-CM

## 2018-04-18 DIAGNOSIS — F41.9 ANXIETY: ICD-10-CM

## 2018-04-18 LAB
ALBUMIN SERPL-MCNC: 4.1 G/DL (ref 3.5–5.2)
ALP BLD-CCNC: 99 U/L (ref 35–104)
ALT SERPL-CCNC: 11 U/L (ref 5–33)
ANION GAP SERPL CALCULATED.3IONS-SCNC: 13 MMOL/L (ref 7–19)
AST SERPL-CCNC: 17 U/L (ref 5–32)
BILIRUB SERPL-MCNC: <0.2 MG/DL (ref 0.2–1.2)
BUN BLDV-MCNC: 12 MG/DL (ref 8–23)
CALCIUM SERPL-MCNC: 10.4 MG/DL (ref 8.8–10.2)
CHLORIDE BLD-SCNC: 100 MMOL/L (ref 98–111)
CHOLESTEROL, TOTAL: 200 MG/DL (ref 160–199)
CO2: 29 MMOL/L (ref 22–29)
CREAT SERPL-MCNC: 0.7 MG/DL (ref 0.5–0.9)
GFR NON-AFRICAN AMERICAN: >60
GLUCOSE BLD-MCNC: 99 MG/DL (ref 74–109)
HDLC SERPL-MCNC: 56 MG/DL (ref 65–121)
LDL CHOLESTEROL CALCULATED: 107 MG/DL
POTASSIUM SERPL-SCNC: 3.9 MMOL/L (ref 3.5–5)
SODIUM BLD-SCNC: 142 MMOL/L (ref 136–145)
T4 FREE: 1 NG/DL (ref 0.9–1.7)
TOTAL PROTEIN: 7.4 G/DL (ref 6.6–8.7)
TRIGL SERPL-MCNC: 185 MG/DL (ref 0–149)
TSH SERPL DL<=0.05 MIU/L-ACNC: 2.15 UIU/ML (ref 0.27–4.2)
VITAMIN D 25-HYDROXY: 45.5 NG/ML

## 2018-04-18 PROCEDURE — G8427 DOCREV CUR MEDS BY ELIG CLIN: HCPCS | Performed by: NURSE PRACTITIONER

## 2018-04-18 PROCEDURE — G8417 CALC BMI ABV UP PARAM F/U: HCPCS | Performed by: NURSE PRACTITIONER

## 2018-04-18 PROCEDURE — G8399 PT W/DXA RESULTS DOCUMENT: HCPCS | Performed by: NURSE PRACTITIONER

## 2018-04-18 PROCEDURE — 4040F PNEUMOC VAC/ADMIN/RCVD: CPT | Performed by: NURSE PRACTITIONER

## 2018-04-18 PROCEDURE — 1036F TOBACCO NON-USER: CPT | Performed by: NURSE PRACTITIONER

## 2018-04-18 PROCEDURE — 1090F PRES/ABSN URINE INCON ASSESS: CPT | Performed by: NURSE PRACTITIONER

## 2018-04-18 PROCEDURE — 1123F ACP DISCUSS/DSCN MKR DOCD: CPT | Performed by: NURSE PRACTITIONER

## 2018-04-18 PROCEDURE — 3014F SCREEN MAMMO DOC REV: CPT | Performed by: NURSE PRACTITIONER

## 2018-04-18 PROCEDURE — 3017F COLORECTAL CA SCREEN DOC REV: CPT | Performed by: NURSE PRACTITIONER

## 2018-04-18 PROCEDURE — 99214 OFFICE O/P EST MOD 30 MIN: CPT | Performed by: NURSE PRACTITIONER

## 2018-04-18 RX ORDER — FAMOTIDINE 20 MG/1
20 TABLET, FILM COATED ORAL 2 TIMES DAILY
Qty: 60 TABLET | Refills: 5 | Status: SHIPPED | OUTPATIENT
Start: 2018-04-18 | End: 2019-10-02 | Stop reason: SDUPTHER

## 2018-04-18 RX ORDER — MELOXICAM 7.5 MG/1
7.5 TABLET ORAL
COMMUNITY
Start: 2018-04-06 | End: 2018-04-18

## 2018-04-18 RX ORDER — MELOXICAM 7.5 MG/1
7.5 TABLET ORAL
Qty: 30 TABLET | Status: CANCELLED | OUTPATIENT
Start: 2018-04-18 | End: 2019-04-19

## 2018-04-18 RX ORDER — LORAZEPAM 1 MG/1
1 TABLET ORAL EVERY 8 HOURS PRN
Qty: 30 TABLET | Refills: 0 | Status: SHIPPED | OUTPATIENT
Start: 2018-04-18 | End: 2019-01-25

## 2018-04-18 ASSESSMENT — ENCOUNTER SYMPTOMS
VOMITING: 0
ABDOMINAL PAIN: 0
DIARRHEA: 0
SHORTNESS OF BREATH: 0
RHINORRHEA: 0
EYE REDNESS: 0
CONSTIPATION: 0
SORE THROAT: 0
COUGH: 0

## 2018-04-18 ASSESSMENT — PATIENT HEALTH QUESTIONNAIRE - PHQ9
1. LITTLE INTEREST OR PLEASURE IN DOING THINGS: 1
SUM OF ALL RESPONSES TO PHQ9 QUESTIONS 1 & 2: 2
2. FEELING DOWN, DEPRESSED OR HOPELESS: 1
SUM OF ALL RESPONSES TO PHQ QUESTIONS 1-9: 2

## 2018-04-19 ENCOUNTER — TELEPHONE (OUTPATIENT)
Dept: PRIMARY CARE CLINIC | Age: 68
End: 2018-04-19

## 2018-04-19 DIAGNOSIS — E78.00 ELEVATED CHOLESTEROL: Primary | ICD-10-CM

## 2018-04-19 LAB — VITAMIN B-12: 639 PG/ML (ref 211–946)

## 2018-04-19 RX ORDER — ROSUVASTATIN CALCIUM 20 MG/1
20 TABLET, COATED ORAL NIGHTLY
Qty: 30 TABLET | Refills: 3 | Status: SHIPPED | OUTPATIENT
Start: 2018-04-19 | End: 2018-07-27 | Stop reason: ALTCHOICE

## 2018-04-19 RX ORDER — CHOLECALCIFEROL (VITAMIN D3) 50 MCG
2000 TABLET ORAL DAILY
Qty: 30 TABLET | Refills: 0 | Status: ON HOLD | OUTPATIENT
Start: 2018-04-19 | End: 2018-05-08 | Stop reason: ALTCHOICE

## 2018-04-20 ENCOUNTER — TELEPHONE (OUTPATIENT)
Dept: PRIMARY CARE CLINIC | Age: 68
End: 2018-04-20

## 2018-05-08 ENCOUNTER — ANESTHESIA (OUTPATIENT)
Dept: ENDOSCOPY | Age: 68
End: 2018-05-08
Payer: MEDICARE

## 2018-05-08 ENCOUNTER — HOSPITAL ENCOUNTER (OUTPATIENT)
Age: 68
Setting detail: OUTPATIENT SURGERY
Discharge: HOME | End: 2018-05-08
Attending: INTERNAL MEDICINE | Admitting: INTERNAL MEDICINE
Payer: MEDICARE

## 2018-05-08 ENCOUNTER — ANESTHESIA EVENT (OUTPATIENT)
Dept: ENDOSCOPY | Age: 68
End: 2018-05-08
Payer: MEDICARE

## 2018-05-08 VITALS
HEART RATE: 88 BPM | TEMPERATURE: 97.6 F | HEIGHT: 64 IN | WEIGHT: 195 LBS | BODY MASS INDEX: 33.29 KG/M2 | RESPIRATION RATE: 18 BRPM | SYSTOLIC BLOOD PRESSURE: 149 MMHG | OXYGEN SATURATION: 98 % | DIASTOLIC BLOOD PRESSURE: 82 MMHG

## 2018-05-08 VITALS — DIASTOLIC BLOOD PRESSURE: 83 MMHG | OXYGEN SATURATION: 93 % | SYSTOLIC BLOOD PRESSURE: 139 MMHG

## 2018-05-08 PROCEDURE — 3700000000 HC ANESTHESIA ATTENDED CARE: Performed by: INTERNAL MEDICINE

## 2018-05-08 PROCEDURE — 2500000003 HC RX 250 WO HCPCS: Performed by: NURSE ANESTHETIST, CERTIFIED REGISTERED

## 2018-05-08 PROCEDURE — 6360000002 HC RX W HCPCS: Performed by: NURSE ANESTHETIST, CERTIFIED REGISTERED

## 2018-05-08 PROCEDURE — 7100000010 HC PHASE II RECOVERY - FIRST 15 MIN: Performed by: INTERNAL MEDICINE

## 2018-05-08 PROCEDURE — 2580000003 HC RX 258: Performed by: INTERNAL MEDICINE

## 2018-05-08 PROCEDURE — 87077 CULTURE AEROBIC IDENTIFY: CPT

## 2018-05-08 PROCEDURE — 43239 EGD BIOPSY SINGLE/MULTIPLE: CPT | Performed by: INTERNAL MEDICINE

## 2018-05-08 PROCEDURE — 3609012400 HC EGD TRANSORAL BIOPSY SINGLE/MULTIPLE: Performed by: INTERNAL MEDICINE

## 2018-05-08 PROCEDURE — 7100000011 HC PHASE II RECOVERY - ADDTL 15 MIN: Performed by: INTERNAL MEDICINE

## 2018-05-08 PROCEDURE — 88305 TISSUE EXAM BY PATHOLOGIST: CPT

## 2018-05-08 RX ORDER — SODIUM CHLORIDE, SODIUM LACTATE, POTASSIUM CHLORIDE, CALCIUM CHLORIDE 600; 310; 30; 20 MG/100ML; MG/100ML; MG/100ML; MG/100ML
INJECTION, SOLUTION INTRAVENOUS CONTINUOUS
Status: DISCONTINUED | OUTPATIENT
Start: 2018-05-08 | End: 2018-05-08 | Stop reason: HOSPADM

## 2018-05-08 RX ORDER — LIDOCAINE HYDROCHLORIDE 10 MG/ML
1 INJECTION, SOLUTION EPIDURAL; INFILTRATION; INTRACAUDAL; PERINEURAL ONCE
Status: DISCONTINUED | OUTPATIENT
Start: 2018-05-08 | End: 2018-05-08 | Stop reason: HOSPADM

## 2018-05-08 RX ORDER — PROPOFOL 10 MG/ML
INJECTION, EMULSION INTRAVENOUS PRN
Status: DISCONTINUED | OUTPATIENT
Start: 2018-05-08 | End: 2018-05-08 | Stop reason: SDUPTHER

## 2018-05-08 RX ORDER — LIDOCAINE HYDROCHLORIDE 20 MG/ML
INJECTION, SOLUTION INFILTRATION; PERINEURAL PRN
Status: DISCONTINUED | OUTPATIENT
Start: 2018-05-08 | End: 2018-05-08 | Stop reason: SDUPTHER

## 2018-05-08 RX ADMIN — PROPOFOL 200 MG: 10 INJECTION, EMULSION INTRAVENOUS at 13:52

## 2018-05-08 RX ADMIN — LIDOCAINE HYDROCHLORIDE 40 MG: 20 INJECTION, SOLUTION INFILTRATION; PERINEURAL at 13:52

## 2018-05-08 RX ADMIN — SODIUM CHLORIDE, POTASSIUM CHLORIDE, SODIUM LACTATE AND CALCIUM CHLORIDE: 600; 310; 30; 20 INJECTION, SOLUTION INTRAVENOUS at 10:42

## 2018-05-08 ASSESSMENT — PAIN DESCRIPTION - DESCRIPTORS: DESCRIPTORS: HEADACHE

## 2018-05-08 ASSESSMENT — PAIN SCALES - GENERAL
PAINLEVEL_OUTOF10: 0
PAINLEVEL_OUTOF10: 0

## 2018-05-08 ASSESSMENT — PAIN - FUNCTIONAL ASSESSMENT: PAIN_FUNCTIONAL_ASSESSMENT: 0-10

## 2018-05-09 LAB — CLOTEST: NEGATIVE

## 2018-05-10 LAB
EKG P AXIS: 6 DEGREES
EKG P-R INTERVAL: 126 MS
EKG Q-T INTERVAL: 366 MS
EKG QRS DURATION: 82 MS
EKG QTC CALCULATION (BAZETT): 397 MS
EKG T AXIS: 38 DEGREES

## 2018-06-14 ENCOUNTER — OFFICE VISIT (OUTPATIENT)
Dept: RETAIL CLINIC | Facility: CLINIC | Age: 68
End: 2018-06-14

## 2018-06-14 VITALS
HEART RATE: 102 BPM | BODY MASS INDEX: 34.33 KG/M2 | TEMPERATURE: 98.6 F | OXYGEN SATURATION: 98 % | RESPIRATION RATE: 20 BRPM | WEIGHT: 200 LBS | DIASTOLIC BLOOD PRESSURE: 74 MMHG | SYSTOLIC BLOOD PRESSURE: 134 MMHG

## 2018-06-14 DIAGNOSIS — J20.9 ACUTE BRONCHITIS, UNSPECIFIED ORGANISM: Primary | ICD-10-CM

## 2018-06-14 PROCEDURE — 99214 OFFICE O/P EST MOD 30 MIN: CPT | Performed by: NURSE PRACTITIONER

## 2018-06-14 RX ORDER — METHYLPREDNISOLONE 4 MG/1
TABLET ORAL
Qty: 21 EACH | Refills: 0 | Status: SHIPPED | OUTPATIENT
Start: 2018-06-14 | End: 2018-12-21

## 2018-06-14 RX ORDER — ALBUTEROL SULFATE 90 UG/1
2 AEROSOL, METERED RESPIRATORY (INHALATION) EVERY 4 HOURS PRN
Qty: 1 INHALER | Refills: 0 | Status: SHIPPED | OUTPATIENT
Start: 2018-06-14

## 2018-06-14 RX ORDER — AMOXICILLIN AND CLAVULANATE POTASSIUM 875; 125 MG/1; MG/1
1 TABLET, FILM COATED ORAL 2 TIMES DAILY
Qty: 20 TABLET | Refills: 0 | Status: SHIPPED | OUTPATIENT
Start: 2018-06-14 | End: 2018-06-24

## 2018-06-14 RX ORDER — LORATADINE 10 MG/1
10 TABLET ORAL DAILY PRN
Qty: 30 TABLET | Refills: 0 | Status: SHIPPED | OUTPATIENT
Start: 2018-06-14

## 2018-06-14 RX ORDER — DEXTROMETHORPHAN HYDROBROMIDE AND PROMETHAZINE HYDROCHLORIDE 15; 6.25 MG/5ML; MG/5ML
5 SYRUP ORAL 4 TIMES DAILY PRN
Qty: 180 ML | Refills: 0 | Status: SHIPPED | OUTPATIENT
Start: 2018-06-14 | End: 2018-12-21

## 2018-06-14 NOTE — PROGRESS NOTES
Subjective   Kaya Hatfield is a 68 y.o. female.     Cough   This is a new problem. The current episode started in the past 7 days. The problem has been rapidly worsening. The problem occurs constantly. The cough is productive of sputum. Associated symptoms include chills, a fever, headaches, myalgias, nasal congestion, postnasal drip, rhinorrhea and a sore throat. Pertinent negatives include no chest pain or wheezing. Nothing aggravates the symptoms. She has tried OTC cough suppressant (Delsym, OTC antinistamine, not sure which one) for the symptoms. The treatment provided no relief.        The following portions of the patient's history were reviewed and updated as appropriate: allergies, current medications, past family history, past medical history, past social history, past surgical history and problem list.    Review of Systems   Constitutional: Positive for chills, fatigue and fever.   HENT: Positive for congestion, postnasal drip, rhinorrhea, sore throat and voice change.    Respiratory: Positive for cough. Negative for wheezing.    Cardiovascular: Negative for chest pain.   Gastrointestinal: Negative for nausea and vomiting.   Musculoskeletal: Positive for myalgias. Negative for neck pain and neck stiffness.   Neurological: Positive for headache.       Objective      /74   Pulse 102   Temp 98.6 °F (37 °C) (Tympanic)   Resp 20   Wt 90.7 kg (200 lb)   LMP  (LMP Unknown)   SpO2 98%   BMI 34.33 kg/m²     Physical Exam   Constitutional: She is oriented to person, place, and time. She has a sickly appearance.   obese   HENT:   Head: Normocephalic and atraumatic.   Right Ear: Hearing, tympanic membrane, external ear and ear canal normal.   Left Ear: Hearing, tympanic membrane, external ear and ear canal normal.   Nose: Rhinorrhea and congestion present.   Mouth/Throat: Uvula is midline. Posterior oropharyngeal erythema present.       Eyes: Conjunctivae and EOM are normal. Pupils are equal, round, and  reactive to light.   Neck: Normal range of motion. Neck supple.   Cardiovascular: Normal rate and regular rhythm.    Pulmonary/Chest: She has rales.   Frequent bronchospastic cough   Abdominal: Soft.   Musculoskeletal: Normal range of motion.   Lymphadenopathy:     She has no cervical adenopathy.   Neurological: She is alert and oriented to person, place, and time. No cranial nerve deficit.   Skin: Capillary refill takes less than 2 seconds.   Psychiatric: She has a normal mood and affect. Her behavior is normal. Judgment and thought content normal.         Assessment/Plan   Kaya was seen today for sore throat.    Diagnoses and all orders for this visit:    Acute bronchitis, unspecified organism    Other orders  -     promethazine-dextromethorphan (PROMETHAZINE-DM) 6.25-15 MG/5ML syrup; Take 5 mL by mouth 4 (Four) Times a Day As Needed for Cough.  -     amoxicillin-clavulanate (AUGMENTIN) 875-125 MG per tablet; Take 1 tablet by mouth 2 (Two) Times a Day for 10 days.  -     MethylPREDNISolone (MEDROL, TRACY,) 4 MG tablet; Take as directed on package instructions.  -     loratadine (CLARITIN) 10 MG tablet; Take 1 tablet by mouth Daily As Needed for Allergies.  -     albuterol (PROVENTIL HFA;VENTOLIN HFA) 108 (90 Base) MCG/ACT inhaler; Inhale 2 puffs Every 4 (Four) Hours As Needed for Wheezing.    SEEK IMMEDIATE MEDICAL CARE IF:   · You feel little or no relief with your inhalers. You are still wheezing and are feeling shortness of breath or tightness in your chest or both.  · You have dizziness, headaches, or a fast heart rate.  · You have a noticeable increase in phlegm production, or there is blood in the phlegm.    If symptoms persist or worsen, seek higher level of medical care.    JOVAN Marsh

## 2018-07-18 ENCOUNTER — OFFICE VISIT (OUTPATIENT)
Dept: PRIMARY CARE CLINIC | Age: 68
End: 2018-07-18
Payer: MEDICARE

## 2018-07-18 VITALS
DIASTOLIC BLOOD PRESSURE: 80 MMHG | SYSTOLIC BLOOD PRESSURE: 120 MMHG | WEIGHT: 201.25 LBS | HEIGHT: 64 IN | BODY MASS INDEX: 34.36 KG/M2 | OXYGEN SATURATION: 97 % | TEMPERATURE: 97.6 F | HEART RATE: 92 BPM

## 2018-07-18 DIAGNOSIS — M15.9 PRIMARY OSTEOARTHRITIS INVOLVING MULTIPLE JOINTS: ICD-10-CM

## 2018-07-18 DIAGNOSIS — M25.511 CHRONIC RIGHT SHOULDER PAIN: ICD-10-CM

## 2018-07-18 DIAGNOSIS — M67.911 TENDINOPATHY OF RIGHT ROTATOR CUFF: ICD-10-CM

## 2018-07-18 DIAGNOSIS — E78.2 MIXED HYPERLIPIDEMIA: Chronic | ICD-10-CM

## 2018-07-18 DIAGNOSIS — G89.29 CHRONIC RIGHT SHOULDER PAIN: ICD-10-CM

## 2018-07-18 DIAGNOSIS — Z00.00 ROUTINE GENERAL MEDICAL EXAMINATION AT A HEALTH CARE FACILITY: Primary | ICD-10-CM

## 2018-07-18 DIAGNOSIS — I10 ESSENTIAL HYPERTENSION: Chronic | ICD-10-CM

## 2018-07-18 PROCEDURE — 4040F PNEUMOC VAC/ADMIN/RCVD: CPT | Performed by: NURSE PRACTITIONER

## 2018-07-18 PROCEDURE — G0439 PPPS, SUBSEQ VISIT: HCPCS | Performed by: NURSE PRACTITIONER

## 2018-07-18 RX ORDER — PAROXETINE HYDROCHLORIDE 40 MG/1
TABLET, FILM COATED ORAL
COMMUNITY
End: 2018-07-18 | Stop reason: ALTCHOICE

## 2018-07-18 RX ORDER — HYDROXYZINE 50 MG/1
TABLET, FILM COATED ORAL
COMMUNITY

## 2018-07-18 RX ORDER — LORATADINE 10 MG/1
10 TABLET ORAL
COMMUNITY
Start: 2018-06-14 | End: 2018-11-29

## 2018-07-18 RX ORDER — MELOXICAM 15 MG/1
15 TABLET ORAL DAILY
Qty: 30 TABLET | Refills: 3 | Status: SHIPPED | OUTPATIENT
Start: 2018-07-18 | End: 2018-09-18

## 2018-07-18 ASSESSMENT — LIFESTYLE VARIABLES: HOW OFTEN DO YOU HAVE A DRINK CONTAINING ALCOHOL: 0

## 2018-07-18 ASSESSMENT — ANXIETY QUESTIONNAIRES: GAD7 TOTAL SCORE: 0

## 2018-07-18 NOTE — PROGRESS NOTES
respiratory distress  Cardiovascular: normal rate, regular rhythm, normal S1 and S2, distal pulses intact, no carotid bruits  Abdomen: soft, non-tender, non-distended, normal bowel sounds  Extremities: no cyanosis, clubbing or edema, right shoulder pain with decreased ROM  Musculoskeletal: normal range of motion, no joint swelling, deformity or tenderness  Neurologic: reflexes normal and symmetric, no cranial nerve deficit, gait, coordination and speech normal    Patient's complete Health Risk Assessment and screening values have been reviewed and are found in Flowsheets. The following problems were reviewed today and where indicated follow up appointments were made and/or referrals ordered. Positive Risk Factor Screenings with Interventions:     Depression:  PHQ-2 Score: 3  PHQ-9 Total Score: 6  Depression Screening Interpretation: (!) PHQ-9 Score 5-9: Mild Depression  Depression Interventions:  · Patient declines any further evaluation/treatment for this issue   · Moods are well controlled on Effexor 150 mg and Ativan prn  · She does not need an Ativan refill at this time. General Health:  General  In general, how would you say your health is?: Fair  In the past 7 days, have you experienced any of the following?: (!) New or Increased Pain  Do you get the social and emotional support that you need?: Yes  Do you have a Living Will?: (!) No  General Health Risk Interventions:  · Pain issues: orthopaedics referral ordered for evaluation/treatment of right shoulder pain/rotator cuff tear    Health Habits/Nutrition:  Health Habits/Nutrition  Do you exercise for at least 20 minutes 2-3 times per week?: Yes  Have you lost any weight without trying in the past 3 months?: No  Do you eat fewer than 2 meals per day?: No  Have you seen a dentist within the past year?: Yes  Body mass index is 34.54 kg/m².   Health Habits/Nutrition Interventions:  · None indicated    Hearing/Vision:  Hearing/Vision  Do you or your family notice any trouble with your hearing?: (!) Yes  Do you have difficulty driving, watching TV, or doing any of your daily activities because of your eyesight?: (!) Yes  Have you had an eye exam within the past year?: (!) No  Hearing/Vision Interventions:  · Hearing concerns:  patient is going to set up an audiogram  · Vision concerns:  patient encouraged to make appointment with his/her eye specialist    Safety:  Safety  Do you have working smoke detectors?: Yes  Have all throw rugs been removed or fastened?:  (na)  Do you have non-slip mats in all bathtubs?: Yes  Do all of your stairways have a railing or banister?: (!) No  Are your doorways, halls and stairs free of clutter?: Yes  Do you always fasten your seatbelt when you are in a car?: Yes  Safety Interventions:  · Home safety tips provided    ADL:  ADLs  In the past 7 days, did you need help from others to perform any of the following everyday activities?: (!) Dressing  In the past 7 days, did you need help from others to take care of any of the following?: None  ADL Interventions:  · Pain with dressing is due to the shoulder pain. · Orthopaedics referral provided    Personalized Preventive Plan   Current Health Maintenance Status    There is no immunization history on file for this patient. Health Maintenance   Topic Date Due    DTaP/Tdap/Td vaccine (1 - Tdap) 02/15/1970    Shingles Vaccine (1 of 2 - 2 Dose Series) 02/15/2001    Pneumococcal low/med risk (1 of 2 - PCV13) 02/15/2016    Flu vaccine (1) 09/01/2018    Potassium monitoring  04/18/2019    Creatinine monitoring  04/18/2019    Breast cancer screen  09/18/2019    Colon cancer screen colonoscopy  07/01/2020    Lipid screen  04/18/2023    DEXA (modify frequency per FRAX score)  Completed    Hepatitis C screen  Completed     Recommendations for Preventive Services Due: see orders.   Recommended screening schedule for the next 5-10 years is provided to the patient in written form: see Patient lower your cholesterol and your risk. The way you choose to lower your risk will depend on how high your risk is for heart attack and stroke. It will also depend on how you feel about taking medicines. Follow-up care is a key part of your treatment and safety. Be sure to make and go to all appointments, and call your doctor if you are having problems. It's also a good idea to know your test results and keep a list of the medicines you take. How can you care for yourself at home? · Eat a variety of foods every day. Good choices include fruits, vegetables, whole grains (like oatmeal), dried beans and peas, nuts and seeds, soy products (like tofu), and fat-free or low-fat dairy products. · Replace butter, margarine, and hydrogenated or partially hydrogenated oils with olive and canola oils. (Canola oil margarine without trans fat is fine.)  · Replace red meat with fish, poultry, and soy protein (like tofu). · Limit processed and packaged foods like chips, crackers, and cookies. · Bake, broil, or steam foods. Don't barry them. · Be physically active. Get at least 30 minutes of exercise on most days of the week. Walking is a good choice. You also may want to do other activities, such as running, swimming, cycling, or playing tennis or team sports. · Stay at a healthy weight or lose weight by making the changes in eating and physical activity listed above. Losing just a small amount of weight, even 5 to 10 pounds, can reduce your risk for having a heart attack or stroke. · Do not smoke. When should you call for help? Watch closely for changes in your health, and be sure to contact your doctor if:    · You need help making lifestyle changes.     · You have questions about your medicine. Where can you learn more? Go to https://chpepiceweb.[employer]. org and sign in to your [employer] account. Enter F890 in the ShopPad box to learn more about \"High Cholesterol: Care Instructions. \"     If you

## 2018-07-26 ENCOUNTER — TELEPHONE (OUTPATIENT)
Dept: PRIMARY CARE CLINIC | Age: 68
End: 2018-07-26

## 2018-07-26 DIAGNOSIS — E78.2 MIXED HYPERLIPIDEMIA: Chronic | ICD-10-CM

## 2018-07-26 DIAGNOSIS — E78.00 ELEVATED CHOLESTEROL: ICD-10-CM

## 2018-07-26 DIAGNOSIS — E78.2 MIXED HYPERLIPIDEMIA: Primary | Chronic | ICD-10-CM

## 2018-07-26 LAB
ALBUMIN SERPL-MCNC: 3.9 G/DL (ref 3.5–5.2)
ALP BLD-CCNC: 70 U/L (ref 35–104)
ALT SERPL-CCNC: 9 U/L (ref 5–33)
ANION GAP SERPL CALCULATED.3IONS-SCNC: 13 MMOL/L (ref 7–19)
AST SERPL-CCNC: 13 U/L (ref 5–32)
BILIRUB SERPL-MCNC: <0.2 MG/DL (ref 0.2–1.2)
BUN BLDV-MCNC: 10 MG/DL (ref 8–23)
CALCIUM SERPL-MCNC: 10 MG/DL (ref 8.8–10.2)
CHLORIDE BLD-SCNC: 103 MMOL/L (ref 98–111)
CHOLESTEROL, TOTAL: 314 MG/DL (ref 160–199)
CO2: 27 MMOL/L (ref 22–29)
CREAT SERPL-MCNC: 0.7 MG/DL (ref 0.5–0.9)
GFR NON-AFRICAN AMERICAN: >60
GLUCOSE BLD-MCNC: 95 MG/DL (ref 74–109)
HDLC SERPL-MCNC: 46 MG/DL (ref 65–121)
LDL CHOLESTEROL CALCULATED: 189 MG/DL
POTASSIUM SERPL-SCNC: 4.1 MMOL/L (ref 3.5–5)
SODIUM BLD-SCNC: 143 MMOL/L (ref 136–145)
TOTAL PROTEIN: 6.8 G/DL (ref 6.6–8.7)
TRIGL SERPL-MCNC: 397 MG/DL (ref 0–149)

## 2018-07-26 NOTE — TELEPHONE ENCOUNTER
----- Message from LUIZ Colón sent at 7/26/2018  1:51 PM CDT -----  Patient's cholesterol is much higher than 3 months ago. If the patient actually taking her Crestor? Her total cholesterol was 200 three months ago. Today it was 314. Her LDL was 107. Today it is 189. Also the patient's triglycerides were elevated. This is secondary to dietary intake. Need to decrease carbohydrates. Again is the patient taking her quite Crestor? I recommend the patient take this daily. CMP: Within normal limits.  This includes normal electrolytes, kidney function and liver function

## 2018-07-27 RX ORDER — ATORVASTATIN CALCIUM 80 MG/1
80 TABLET, FILM COATED ORAL DAILY
Qty: 30 TABLET | Refills: 5 | Status: SHIPPED | OUTPATIENT
Start: 2018-07-27 | End: 2019-08-19 | Stop reason: SDUPTHER

## 2018-07-27 NOTE — TELEPHONE ENCOUNTER
Since the patient reports she has been taking Crestor regularly, and heart cholesterol is higher since she's been on this medication. I recommend going back to the Lipitor.

## 2018-08-14 DIAGNOSIS — R60.0 LOCALIZED EDEMA: ICD-10-CM

## 2018-08-14 RX ORDER — FUROSEMIDE 40 MG/1
40 TABLET ORAL DAILY
Qty: 30 TABLET | Refills: 11 | Status: SHIPPED | OUTPATIENT
Start: 2018-08-14 | End: 2019-08-19 | Stop reason: SDUPTHER

## 2018-08-14 RX ORDER — LISINOPRIL 20 MG/1
20 TABLET ORAL DAILY
Qty: 30 TABLET | Refills: 11 | Status: SHIPPED | OUTPATIENT
Start: 2018-08-14 | End: 2018-10-30 | Stop reason: SDUPTHER

## 2018-09-18 ENCOUNTER — OFFICE VISIT (OUTPATIENT)
Dept: PRIMARY CARE CLINIC | Age: 68
End: 2018-09-18
Payer: MEDICARE

## 2018-09-18 VITALS
TEMPERATURE: 98.4 F | OXYGEN SATURATION: 96 % | DIASTOLIC BLOOD PRESSURE: 84 MMHG | HEIGHT: 64 IN | SYSTOLIC BLOOD PRESSURE: 144 MMHG | WEIGHT: 203.38 LBS | HEART RATE: 88 BPM | BODY MASS INDEX: 34.72 KG/M2

## 2018-09-18 DIAGNOSIS — I10 ESSENTIAL HYPERTENSION: Chronic | ICD-10-CM

## 2018-09-18 DIAGNOSIS — M67.911 TENDINOPATHY OF RIGHT ROTATOR CUFF: ICD-10-CM

## 2018-09-18 DIAGNOSIS — M25.511 CHRONIC RIGHT SHOULDER PAIN: ICD-10-CM

## 2018-09-18 DIAGNOSIS — G89.29 CHRONIC RIGHT SHOULDER PAIN: ICD-10-CM

## 2018-09-18 DIAGNOSIS — E78.2 MIXED HYPERLIPIDEMIA: Primary | Chronic | ICD-10-CM

## 2018-09-18 PROCEDURE — 1090F PRES/ABSN URINE INCON ASSESS: CPT | Performed by: NURSE PRACTITIONER

## 2018-09-18 PROCEDURE — 1101F PT FALLS ASSESS-DOCD LE1/YR: CPT | Performed by: NURSE PRACTITIONER

## 2018-09-18 PROCEDURE — 3017F COLORECTAL CA SCREEN DOC REV: CPT | Performed by: NURSE PRACTITIONER

## 2018-09-18 PROCEDURE — G8427 DOCREV CUR MEDS BY ELIG CLIN: HCPCS | Performed by: NURSE PRACTITIONER

## 2018-09-18 PROCEDURE — 4040F PNEUMOC VAC/ADMIN/RCVD: CPT | Performed by: NURSE PRACTITIONER

## 2018-09-18 PROCEDURE — 1123F ACP DISCUSS/DSCN MKR DOCD: CPT | Performed by: NURSE PRACTITIONER

## 2018-09-18 PROCEDURE — G8417 CALC BMI ABV UP PARAM F/U: HCPCS | Performed by: NURSE PRACTITIONER

## 2018-09-18 PROCEDURE — G8399 PT W/DXA RESULTS DOCUMENT: HCPCS | Performed by: NURSE PRACTITIONER

## 2018-09-18 PROCEDURE — 99213 OFFICE O/P EST LOW 20 MIN: CPT | Performed by: NURSE PRACTITIONER

## 2018-09-18 PROCEDURE — 1036F TOBACCO NON-USER: CPT | Performed by: NURSE PRACTITIONER

## 2018-09-18 RX ORDER — MELOXICAM 15 MG/1
15 TABLET ORAL DAILY
Qty: 30 TABLET | Refills: 3 | Status: SHIPPED | OUTPATIENT
Start: 2018-09-18 | End: 2019-01-22 | Stop reason: ALTCHOICE

## 2018-09-18 RX ORDER — AMLODIPINE BESYLATE 5 MG/1
5 TABLET ORAL DAILY
Qty: 30 TABLET | Refills: 5 | Status: SHIPPED | OUTPATIENT
Start: 2018-09-18 | End: 2019-09-04 | Stop reason: SDUPTHER

## 2018-09-18 ASSESSMENT — ENCOUNTER SYMPTOMS
EYE REDNESS: 0
DIARRHEA: 0
CONSTIPATION: 0
SHORTNESS OF BREATH: 0
COUGH: 0
VOMITING: 0
SORE THROAT: 0
ABDOMINAL PAIN: 0
RHINORRHEA: 0

## 2018-09-18 NOTE — PROGRESS NOTES
104 U/L    ALT 9 5 - 33 U/L    AST 13 5 - 32 U/L       I have reviewed the following with the Ms. Ibanez   Lab Review   Orders Only on 07/26/2018   Component Date Value    Cholesterol, Total 07/26/2018 314*    Triglycerides 07/26/2018 397*    HDL 07/26/2018 46*    LDL Calculated 07/26/2018 189     Sodium 07/26/2018 143     Potassium 07/26/2018 4.1     Chloride 07/26/2018 103     CO2 07/26/2018 27     Anion Gap 07/26/2018 13     Glucose 07/26/2018 95     BUN 07/26/2018 10     CREATININE 07/26/2018 0.7     GFR Non- 07/26/2018 >60     Calcium 07/26/2018 10.0     Total Protein 07/26/2018 6.8     Alb 07/26/2018 3.9     Total Bilirubin 07/26/2018 <0.2     Alkaline Phosphatase 07/26/2018 70     ALT 07/26/2018 9     AST 07/26/2018 13    Admission on 05/08/2018, Discharged on 05/08/2018   Component Date Value    Clotest 05/08/2018 Negative    Orders Only on 04/18/2018   Component Date Value    T4 Free 04/18/2018 1.0     TSH 04/18/2018 2.150     Vit D, 25-Hydroxy 04/18/2018 45.5     Vitamin B-12 04/18/2018 639     Cholesterol, Total 04/18/2018 200*    Triglycerides 04/18/2018 185*    HDL 04/18/2018 56*    LDL Calculated 04/18/2018 107     Sodium 04/18/2018 142     Potassium 04/18/2018 3.9     Chloride 04/18/2018 100     CO2 04/18/2018 29     Anion Gap 04/18/2018 13     Glucose 04/18/2018 99     BUN 04/18/2018 12     CREATININE 04/18/2018 0.7     GFR Non- 04/18/2018 >60     Calcium 04/18/2018 10.4*    Total Protein 04/18/2018 7.4     Alb 04/18/2018 4.1     Total Bilirubin 04/18/2018 <0.2     Alkaline Phosphatase 04/18/2018 99     ALT 04/18/2018 11     AST 04/18/2018 17    Orders Only on 03/28/2018   Component Date Value    Lipase 03/28/2018 65*    Total Protein 03/28/2018 7.4     Alb 03/28/2018 4.1     Alkaline Phosphatase 03/28/2018 103     ALT 03/28/2018 11     AST 03/28/2018 15     Total Bilirubin 03/28/2018 0.3     Bilirubin, Direct Hypertension     Mini stroke ([facility] [address]     TIA (transient ischemic attack)        Past Surgical History:   Procedure Laterality Date    CHOLECYSTECTOMY      COLONOSCOPY  07/01/2015    Dr. Eliz Ram EGD TRANSORAL BIOPSY SINGLE/MULTIPLE N/A 5/8/2018    Dr Jaye Sims (-) Kristin (+) Dye's (-) dysplasia--3 yr recall    PA LAP,CHOLECYSTECTOMY N/A 3/6/2018    CHOLECYSTECTOMY LAPAROSCOPIC performed by Nicanor Díaz MD at [address]  08/26/2015    Dr. Yamilet Escobar  5/23/2017    Dr Osorio-w/balloon dilation, 12-13. 5-15 mm-esophageal narrowing with diverticulum at 29 cm-Kristin (-), hiatal herni, Dye's (+) dysplasia (-)--1st dx--1 yr recall-Ct chest ordered    WRIST FRACTURE SURGERY         Social History   Substance Use Topics    Smoking status: Never Smoker    Smokeless tobacco: Never Used    Alcohol use No       Review of Systems   Constitutional: Positive for fatigue. Negative for chills and fever. HENT: Negative for congestion, ear pain, rhinorrhea and sore throat. Eyes: Negative for redness. Respiratory: Negative for cough and shortness of breath. Cardiovascular: Negative for chest pain. Gastrointestinal: Negative for abdominal pain, constipation, diarrhea and vomiting. GERD: well controlled   Musculoskeletal: Positive for arthralgias (right shoulder). Skin: Negative for rash. Neurological: Negative for dizziness and headaches. Physical Exam   Constitutional: She appears well-developed. Overweight   HENT:   Head: Normocephalic. Right Ear: Tympanic membrane and external ear normal.   Left Ear: Tympanic membrane and external ear normal.   Nose: Nose normal.   Mouth/Throat: Oropharynx is clear and moist.   Neck: Normal range of motion. Carotid bruit is not present. Cardiovascular: Normal rate and regular rhythm. Pulmonary/Chest: Effort normal and breath sounds normal. No respiratory distress. She has no wheezes.  She has no rales. Abdominal: Soft. Bowel sounds are normal.   Musculoskeletal:        Right shoulder: She exhibits decreased range of motion, tenderness and pain. Neurological: She is alert. Skin: Skin is dry. Psychiatric: She has a normal mood and affect. Her behavior is normal. Judgment and thought content normal.   Vitals reviewed. ASSESSMENT      ICD-10-CM ICD-9-CM    1. Mixed hyperlipidemia E78.2 272.2 CBC Auto Differential      Comprehensive Metabolic Panel      Lipid Panel  Continue Lipitor 80 mg   2. Essential hypertension I10 401.9 CBC Auto Differential      Comprehensive Metabolic Panel      Lipid Panel      amLODIPine (NORVASC) 5 MG tablet (increased from 2.5 mg)  Continue Lisinopril 20 mg daily  Patient is asked to monitor BP at home or work, several times per month and return with written values at next office visit. 3. Chronic right shoulder pain M25.511 719.41 meloxicam (MOBIC) 15 MG tablet    G89.29 338.29    4. Tendinopathy of right rotator cuff M67.911 727.9 meloxicam (MOBIC) 15 MG tablet  Okay to proceed with repeat shoulder injection after 10/27/2018         PLAN    Orders Placed This Encounter   Procedures    CBC Auto Differential    Comprehensive Metabolic Panel    Lipid Panel        Return in about 1 week (around 9/25/2018), or if symptoms worsen or fail to improve. Patient Instructions     Patient Education          meloxicam  Pronunciation:  jon OKS faisal estrada  Brand:  Mobic, Vivlodex  What is the most important information I should know about meloxicam?  Meloxicam can increase your risk of fatal heart attack or stroke, especially if you use it long term or take high doses, or if you have heart disease. Do not use this medicine just before or after heart bypass surgery (coronary artery bypass graft, or CABG).   Meloxicam may also cause stomach or intestinal bleeding, which can be fatal. These conditions can occur without warning while you are using meloxicam, especially in older 1-800-FDA-1088. What other drugs will affect meloxicam?  Ask your doctor before using meloxicam if you take an antidepressant such as citalopram, escitalopram, fluoxetine (Prozac), fluvoxamine, paroxetine, sertraline (Zoloft), trazodone, or vilazodone. Taking any of these medicines with an NSAID may cause you to bruise or bleed easily. Tell your doctor about all your current medicines and any you start or stop using, especially:  · cyclosporine;  · lithium;  · methotrexate;  · sodium polystyrene sulfonate (Kayexalate);  · a blood thinner (warfarin, Coumadin, Jantoven);  · heart or blood pressure medication, including a diuretic or \"water pill\"; or  · steroid medicine (such as prednisone). This list is not complete. Other drugs may interact with meloxicam, including prescription and over-the-counter medicines, vitamins, and herbal products. Not all possible interactions are listed in this medication guide. Where can I get more information? Your pharmacist can provide more information about meloxicam.    Remember, keep this and all other medicines out of the reach of children, never share your medicines with others, and use this medication only for the indication prescribed. Every effort has been made to ensure that the information provided by [facility] [address] is accurate, up-to-date, and complete, but no guarantee is made to that effect. Drug information contained herein may be time sensitive. [facility] information has been compiled for use by healthcare practitioners and consumers in the [place] and therefore [facility] does not warrant that uses outside of the [place] are appropriate, unless specifically indicated otherwise. [facility]'s drug information does not endorse drugs, diagnose patients or recommend therapy.  [facility]'s drug information is an informational resource designed to assist licensed healthcare practitioners in caring for their patients and/or to serve consumers viewing this service as a supplement to, and not a substitute for, the expertise, skill, knowledge and judgment of healthcare practitioners. The absence of a warning for a given drug or drug combination in no way should be construed to indicate that the drug or drug combination is safe, effective or appropriate for any given patient. [facility] does not assume any responsibility for any aspect of healthcare administered with the aid of information [facility] provides. The information contained herein is not intended to cover all possible uses, directions, precautions, warnings, drug interactions, allergic reactions, or adverse effects. If you have questions about the drugs you are taking, check with your doctor, nurse or pharmacist.  Copyright 6769-8807 [address]. Version: 12.03. Revision date: 12/4/2015. Care instructions adapted under license by [facility] ([facility]). If you have questions about a medical condition or this instruction, always ask your healthcare professional. [place] [address] any warranty or liability for your use of this information. Controlled Substances Monitoring:    n/a          Additional Instructions: As always, patient is advised to bring in medication bottles in order to correctly reconcile with our current list.    Ekta La received counseling on the following healthy behaviors: n/a    Patient given educational materials on dx    I have instructed Ekta La to complete a self tracking handout on n/a and instructed them to bring it with them to her next appointment. Discussed use, benefit, and side effects of prescribed medications. Barriers to medication compliance addressed. All patient questions answered. Pt voiced understanding.      LUIZ Davidson

## 2018-09-18 NOTE — PATIENT INSTRUCTIONS
dose if it is almost time for your next scheduled dose. Do not take extra medicine to make up the missed dose. What happens if I overdose? Seek emergency medical attention or call the Poison Help line at [phone]. What should I avoid while taking meloxicam?  Avoid drinking alcohol. It may increase your risk of stomach bleeding. Avoid taking aspirin while you are taking meloxicam.  Ask a doctor or pharmacist before using any cold, allergy, or pain medication. Many medicines available over the counter contain aspirin or other medicines similar to meloxicam. Taking certain products together can cause you to get too much of this type of medication. Check the label to see if a medicine contains aspirin, ibuprofen, ketoprofen, or naproxen. What are the possible side effects of meloxicam?  Get emergency medical help if you have signs of an allergic reaction: sneezing, runny or stuffy nose; wheezing or trouble breathing; hives; swelling of your face, lips, tongue, or throat. Get emergency medical help if you have signs of a heart attack or stroke: chest pain spreading to your jaw or shoulder, sudden numbness or weakness on one side of the body, slurred speech, feeling short of breath. Stop using meloxicam and call your doctor at once if you have:  · the first sign of any skin rash, no matter how mild;  · shortness of breath (even with mild exertion);   · swelling or rapid weight gain;  · signs of stomach bleeding --bloody or tarry stools, coughing up blood or vomit that looks like coffee grounds;  · liver problems --nausea, upper stomach pain, itching, tired feeling, flu-like symptoms, loss of appetite, dark urine, shailesh-colored stools, jaundice (yellowing of the skin or eyes);  · kidney problems --little or no urinating, painful or difficult urination, swelling in your feet or ankles, feeling tired or short of breath;  · low red blood cells (anemia) --pale skin, feeling light-headed or short of breath, rapid heart rate, trouble concentrating; or  · severe skin reaction --fever, sore throat, swelling in your face or tongue, burning in your eyes, skin pain followed by a red or purple skin rash that spreads (especially in the face or upper body) and causes blistering and peeling. Common side effects may include:  · upset stomach, nausea, vomiting, heartburn;  · diarrhea, constipation, gas;  · dizziness; or  · cold symptoms, flu symptoms. This is not a complete list of side effects and others may occur. Call your doctor for medical advice about side effects. You may report side effects to FDA at 3-308-DHV-4068. What other drugs will affect meloxicam?  Ask your doctor before using meloxicam if you take an antidepressant such as citalopram, escitalopram, fluoxetine (Prozac), fluvoxamine, paroxetine, sertraline (Zoloft), trazodone, or vilazodone. Taking any of these medicines with an NSAID may cause you to bruise or bleed easily. Tell your doctor about all your current medicines and any you start or stop using, especially:  · cyclosporine;  · lithium;  · methotrexate;  · sodium polystyrene sulfonate (Kayexalate);  · a blood thinner (warfarin, Coumadin, Jantoven);  · heart or blood pressure medication, including a diuretic or \"water pill\"; or  · steroid medicine (such as prednisone). This list is not complete. Other drugs may interact with meloxicam, including prescription and over-the-counter medicines, vitamins, and herbal products. Not all possible interactions are listed in this medication guide. Where can I get more information? Your pharmacist can provide more information about meloxicam.    Remember, keep this and all other medicines out of the reach of children, never share your medicines with others, and use this medication only for the indication prescribed.   Every effort has been made to ensure that the information provided by [address] is accurate, up-to-date, and complete, but no guarantee is made to that effect. Drug information contained herein may be time sensitive. [employer] information has been compiled for use by healthcare practitioners and consumers in the [place] and therefore [employer] does not warrant that uses outside of the [place] are appropriate, unless specifically indicated otherwise. [facility]'s drug information does not endorse drugs, diagnose patients or recommend therapy. [facility] drug information is an informational resource designed to assist licensed healthcare practitioners in caring for their patients and/or to serve consumers viewing this service as a supplement to, and not a substitute for, the expertise, skill, knowledge and judgment of healthcare practitioners. The absence of a warning for a given drug or drug combination in no way should be construed to indicate that the drug or drug combination is safe, effective or appropriate for any given patient. [facility] does not assume any responsibility for any aspect of healthcare administered with the aid of information [facility] provides. The information contained herein is not intended to cover all possible uses, directions, precautions, warnings, drug interactions, allergic reactions, or adverse effects. If you have questions about the drugs you are taking, check with your doctor, nurse or pharmacist.  Copyright 8499-3602 [address]. Version: 12.03. Revision date: 12/4/2015. Care instructions adapted under license by [facility] ([facility]). If you have questions about a medical condition or this instruction, always ask your healthcare professional. [place] [address] any warranty or liability for your use of this information.

## 2018-10-01 ENCOUNTER — TELEPHONE (OUTPATIENT)
Dept: PRIMARY CARE CLINIC | Age: 68
End: 2018-10-01

## 2018-10-01 DIAGNOSIS — E78.2 MIXED HYPERLIPIDEMIA: Chronic | ICD-10-CM

## 2018-10-01 DIAGNOSIS — I10 ESSENTIAL HYPERTENSION: Chronic | ICD-10-CM

## 2018-10-01 LAB
ALBUMIN SERPL-MCNC: 3.9 G/DL (ref 3.5–5.2)
ALP BLD-CCNC: 87 U/L (ref 35–104)
ALT SERPL-CCNC: 12 U/L (ref 5–33)
ANION GAP SERPL CALCULATED.3IONS-SCNC: 14 MMOL/L (ref 7–19)
AST SERPL-CCNC: 14 U/L (ref 5–32)
BASOPHILS ABSOLUTE: 0 K/UL (ref 0–0.2)
BASOPHILS RELATIVE PERCENT: 0.4 % (ref 0–1)
BILIRUB SERPL-MCNC: 0.3 MG/DL (ref 0.2–1.2)
BUN BLDV-MCNC: 13 MG/DL (ref 8–23)
CALCIUM SERPL-MCNC: 10.2 MG/DL (ref 8.8–10.2)
CHLORIDE BLD-SCNC: 106 MMOL/L (ref 98–111)
CHOLESTEROL, TOTAL: 175 MG/DL (ref 160–199)
CO2: 25 MMOL/L (ref 22–29)
CREAT SERPL-MCNC: 0.7 MG/DL (ref 0.5–0.9)
EOSINOPHILS ABSOLUTE: 0.1 K/UL (ref 0–0.6)
EOSINOPHILS RELATIVE PERCENT: 2.5 % (ref 0–5)
GFR NON-AFRICAN AMERICAN: >60
GLUCOSE BLD-MCNC: 99 MG/DL (ref 74–109)
HCT VFR BLD CALC: 36.8 % (ref 37–47)
HDLC SERPL-MCNC: 50 MG/DL (ref 65–121)
HEMOGLOBIN: 11.5 G/DL (ref 12–16)
LDL CHOLESTEROL CALCULATED: 94 MG/DL
LYMPHOCYTES ABSOLUTE: 1.3 K/UL (ref 1.1–4.5)
LYMPHOCYTES RELATIVE PERCENT: 23 % (ref 20–40)
MCH RBC QN AUTO: 29.4 PG (ref 27–31)
MCHC RBC AUTO-ENTMCNC: 31.3 G/DL (ref 33–37)
MCV RBC AUTO: 94.1 FL (ref 81–99)
MONOCYTES ABSOLUTE: 0.6 K/UL (ref 0–0.9)
MONOCYTES RELATIVE PERCENT: 9.7 % (ref 0–10)
NEUTROPHILS ABSOLUTE: 3.7 K/UL (ref 1.5–7.5)
NEUTROPHILS RELATIVE PERCENT: 64 % (ref 50–65)
PDW BLD-RTO: 12.6 % (ref 11.5–14.5)
PLATELET # BLD: 206 K/UL (ref 130–400)
PMV BLD AUTO: 9.4 FL (ref 9.4–12.3)
POTASSIUM SERPL-SCNC: 3.9 MMOL/L (ref 3.5–5)
RBC # BLD: 3.91 M/UL (ref 4.2–5.4)
SODIUM BLD-SCNC: 145 MMOL/L (ref 136–145)
TOTAL PROTEIN: 6.6 G/DL (ref 6.6–8.7)
TRIGL SERPL-MCNC: 157 MG/DL (ref 0–149)
WBC # BLD: 5.7 K/UL (ref 4.8–10.8)

## 2018-10-10 ENCOUNTER — PROCEDURE VISIT (OUTPATIENT)
Dept: PRIMARY CARE CLINIC | Age: 68
End: 2018-10-10
Payer: MEDICARE

## 2018-10-10 DIAGNOSIS — Z12.11 COLON CANCER SCREENING: Primary | ICD-10-CM

## 2018-10-10 LAB
CONTROL: NORMAL
HEMOCCULT STL QL: NEGATIVE

## 2018-10-10 PROCEDURE — 82274 ASSAY TEST FOR BLOOD FECAL: CPT | Performed by: PEDIATRICS

## 2018-10-11 ENCOUNTER — TELEPHONE (OUTPATIENT)
Dept: PRIMARY CARE CLINIC | Age: 68
End: 2018-10-11

## 2018-10-30 ENCOUNTER — OFFICE VISIT (OUTPATIENT)
Dept: PRIMARY CARE CLINIC | Age: 68
End: 2018-10-30
Payer: MEDICARE

## 2018-10-30 ENCOUNTER — HOSPITAL ENCOUNTER (OUTPATIENT)
Dept: GENERAL RADIOLOGY | Age: 68
Discharge: HOME | End: 2018-10-30
Payer: MEDICARE

## 2018-10-30 VITALS
TEMPERATURE: 98.7 F | HEART RATE: 89 BPM | OXYGEN SATURATION: 98 % | DIASTOLIC BLOOD PRESSURE: 89 MMHG | HEIGHT: 64 IN | WEIGHT: 210 LBS | BODY MASS INDEX: 35.85 KG/M2 | SYSTOLIC BLOOD PRESSURE: 143 MMHG

## 2018-10-30 DIAGNOSIS — I10 ESSENTIAL HYPERTENSION: Chronic | ICD-10-CM

## 2018-10-30 DIAGNOSIS — R35.0 URINARY FREQUENCY: ICD-10-CM

## 2018-10-30 DIAGNOSIS — N30.01 ACUTE CYSTITIS WITH HEMATURIA: Primary | ICD-10-CM

## 2018-10-30 DIAGNOSIS — N30.01 ACUTE CYSTITIS WITH HEMATURIA: ICD-10-CM

## 2018-10-30 DIAGNOSIS — M19.011 PRIMARY OSTEOARTHRITIS OF RIGHT SHOULDER: ICD-10-CM

## 2018-10-30 DIAGNOSIS — M54.41 ACUTE BILATERAL LOW BACK PAIN WITH RIGHT-SIDED SCIATICA: ICD-10-CM

## 2018-10-30 LAB
APPEARANCE FLUID: CLEAR
BILIRUBIN, POC: ABNORMAL
BLOOD URINE, POC: ABNORMAL
CLARITY, POC: CLEAR
COLOR, POC: YELLOW
GLUCOSE URINE, POC: ABNORMAL
KETONES, POC: ABNORMAL
LEUKOCYTE EST, POC: ABNORMAL
NITRITE, POC: ABNORMAL
PH, POC: 7
PROTEIN, POC: 30
SPECIFIC GRAVITY, POC: 1.02
UROBILINOGEN, POC: 2

## 2018-10-30 PROCEDURE — G8399 PT W/DXA RESULTS DOCUMENT: HCPCS | Performed by: NURSE PRACTITIONER

## 2018-10-30 PROCEDURE — 76770 US EXAM ABDO BACK WALL COMP: CPT

## 2018-10-30 PROCEDURE — 4040F PNEUMOC VAC/ADMIN/RCVD: CPT | Performed by: NURSE PRACTITIONER

## 2018-10-30 PROCEDURE — 1101F PT FALLS ASSESS-DOCD LE1/YR: CPT | Performed by: NURSE PRACTITIONER

## 2018-10-30 PROCEDURE — 1090F PRES/ABSN URINE INCON ASSESS: CPT | Performed by: NURSE PRACTITIONER

## 2018-10-30 PROCEDURE — 81002 URINALYSIS NONAUTO W/O SCOPE: CPT | Performed by: NURSE PRACTITIONER

## 2018-10-30 PROCEDURE — G8417 CALC BMI ABV UP PARAM F/U: HCPCS | Performed by: NURSE PRACTITIONER

## 2018-10-30 PROCEDURE — 1036F TOBACCO NON-USER: CPT | Performed by: NURSE PRACTITIONER

## 2018-10-30 PROCEDURE — G8484 FLU IMMUNIZE NO ADMIN: HCPCS | Performed by: NURSE PRACTITIONER

## 2018-10-30 PROCEDURE — G8427 DOCREV CUR MEDS BY ELIG CLIN: HCPCS | Performed by: NURSE PRACTITIONER

## 2018-10-30 PROCEDURE — 99213 OFFICE O/P EST LOW 20 MIN: CPT | Performed by: NURSE PRACTITIONER

## 2018-10-30 PROCEDURE — 3017F COLORECTAL CA SCREEN DOC REV: CPT | Performed by: NURSE PRACTITIONER

## 2018-10-30 PROCEDURE — 1123F ACP DISCUSS/DSCN MKR DOCD: CPT | Performed by: NURSE PRACTITIONER

## 2018-10-30 RX ORDER — LISINOPRIL 40 MG/1
40 TABLET ORAL DAILY
Qty: 30 TABLET | Refills: 11 | Status: SHIPPED | OUTPATIENT
Start: 2018-10-30 | End: 2019-10-02 | Stop reason: SDUPTHER

## 2018-10-30 RX ORDER — METHYLPREDNISOLONE 4 MG/1
TABLET ORAL
Qty: 1 KIT | Refills: 0 | Status: SHIPPED | OUTPATIENT
Start: 2018-10-30 | End: 2018-11-05

## 2018-10-30 RX ORDER — NITROFURANTOIN 25; 75 MG/1; MG/1
100 CAPSULE ORAL 2 TIMES DAILY
Qty: 20 CAPSULE | Refills: 0 | Status: SHIPPED | OUTPATIENT
Start: 2018-10-30 | End: 2018-11-09

## 2018-10-30 ASSESSMENT — ENCOUNTER SYMPTOMS
DIARRHEA: 0
COUGH: 0
SORE THROAT: 0
SHORTNESS OF BREATH: 0
EYE REDNESS: 0
CONSTIPATION: 0
RHINORRHEA: 0
VOMITING: 0
BACK PAIN: 1

## 2018-10-30 NOTE — PROGRESS NOTES
Alie Su  Phone [phone]   Fax [phone]      OFFICE VISIT: 10/30/2018    Holley Goldberg- : 1950    Chief Complaint:Anabel is a 80 y.o. female who is here for Follow-up     HPI   Low back pain:  The patient presents today for evaluation of low back pain. This started about 3 days ago. Reports R>L low back pain. The right lower back pain is radiating to the pelvic area at times. Reports low back with getting up and getting down. Denies a known injury. Denies a history of kidney stones. Reports urinary urgency and frequency. Right shoulder pain:  This has improved. \"I have been taking it easy with it. \"  \"I have been taking an arthritis pain pill and that seems to be helping some. \"  The Mobic has been helping with the shoulder pain. Hypertension:  Last night it was 119/73. \"I know it is a little high today. \"  She has been taking the Norvasc 5 mg and Lisinopril 20 mg.     height is 5' 4\" (1.626 m) and weight is 210 lb (95.3 kg). Her temporal temperature is 98.7 °F (37.1 °C). Her blood pressure is 143/89 (abnormal) and her pulse is 89. Her oxygen saturation is 98%. Body mass index is 36.05 kg/m². Results for orders placed or performed in visit on 10/30/18   POCT Urinalysis no Micro   Result Value Ref Range    Color, UA yellow     Clarity, UA clear     Glucose, UA POC neg     Bilirubin, UA neg     Ketones, UA neg     Spec Grav, UA 1.025     Blood, UA POC moderate (A)     pH, UA 7.0     Protein, UA POC 30 (A)     Urobilinogen, UA 2.0 (A)     Leukocytes, UA trace (A)     Nitrite, UA neg     Appearance, Fluid Clear Clear, Slightly Cloudy       I have reviewed the following with the Ms. Nichelle Jackson [address]   Lab Review   Procedure visit on 10/10/2018   Component Date Value    OCCULT BLOOD FECAL 10/10/2018 negative    Orders Only on 10/01/2018   Component Date Value    WBC 10/01/2018 5.7     RBC 10/01/2018 3.91*    Hemoglobin 10/01/2018 11.5*    Hematocrit 10/01/2018

## 2018-10-31 ENCOUNTER — APPOINTMENT (OUTPATIENT)
Dept: CT IMAGING | Age: 68
End: 2018-10-31
Payer: MEDICARE

## 2018-10-31 ENCOUNTER — OFFICE VISIT (OUTPATIENT)
Dept: PRIMARY CARE CLINIC | Age: 68
End: 2018-10-31
Payer: MEDICARE

## 2018-10-31 ENCOUNTER — HOSPITAL ENCOUNTER (EMERGENCY)
Age: 68
Discharge: HOME | End: 2018-10-31
Attending: EMERGENCY MEDICINE
Payer: MEDICARE

## 2018-10-31 ENCOUNTER — TELEPHONE (OUTPATIENT)
Dept: PRIMARY CARE CLINIC | Age: 68
End: 2018-10-31

## 2018-10-31 VITALS
DIASTOLIC BLOOD PRESSURE: 104 MMHG | BODY MASS INDEX: 35.68 KG/M2 | HEART RATE: 91 BPM | HEIGHT: 64 IN | WEIGHT: 209 LBS | TEMPERATURE: 96.3 F | OXYGEN SATURATION: 98 % | SYSTOLIC BLOOD PRESSURE: 188 MMHG

## 2018-10-31 VITALS
HEIGHT: 64 IN | HEART RATE: 84 BPM | OXYGEN SATURATION: 96 % | DIASTOLIC BLOOD PRESSURE: 84 MMHG | BODY MASS INDEX: 34.15 KG/M2 | SYSTOLIC BLOOD PRESSURE: 138 MMHG | TEMPERATURE: 97.4 F | WEIGHT: 200 LBS | RESPIRATION RATE: 16 BRPM

## 2018-10-31 DIAGNOSIS — R34 DECREASED URINE OUTPUT: ICD-10-CM

## 2018-10-31 DIAGNOSIS — M54.50 ACUTE LEFT-SIDED LOW BACK PAIN WITHOUT SCIATICA: ICD-10-CM

## 2018-10-31 DIAGNOSIS — N20.1 URETEROLITHIASIS: ICD-10-CM

## 2018-10-31 DIAGNOSIS — N20.0 KIDNEY STONE: Primary | ICD-10-CM

## 2018-10-31 DIAGNOSIS — R10.12 LEFT UPPER QUADRANT PAIN: ICD-10-CM

## 2018-10-31 DIAGNOSIS — R10.32 LEFT LOWER QUADRANT PAIN: Primary | ICD-10-CM

## 2018-10-31 DIAGNOSIS — I10 ESSENTIAL HYPERTENSION: Chronic | ICD-10-CM

## 2018-10-31 DIAGNOSIS — R39.11 URINARY HESITANCY: ICD-10-CM

## 2018-10-31 LAB
ALBUMIN SERPL-MCNC: 4.1 G/DL (ref 3.5–5.2)
ALP BLD-CCNC: 101 U/L (ref 35–104)
ALT SERPL-CCNC: 22 U/L (ref 5–33)
ANION GAP SERPL CALCULATED.3IONS-SCNC: 13 MMOL/L (ref 7–19)
AST SERPL-CCNC: 23 U/L (ref 5–32)
BACTERIA: NEGATIVE /HPF
BASOPHILS ABSOLUTE: 0 K/UL (ref 0–0.2)
BASOPHILS RELATIVE PERCENT: 0.2 % (ref 0–1)
BILIRUB SERPL-MCNC: 0.3 MG/DL (ref 0.2–1.2)
BILIRUBIN URINE: NEGATIVE
BLOOD, URINE: ABNORMAL
BUN BLDV-MCNC: 10 MG/DL (ref 8–23)
CALCIUM SERPL-MCNC: 10.4 MG/DL (ref 8.8–10.2)
CHLORIDE BLD-SCNC: 104 MMOL/L (ref 98–111)
CLARITY: CLEAR
CO2: 25 MMOL/L (ref 22–29)
COLOR: YELLOW
CREAT SERPL-MCNC: 0.6 MG/DL (ref 0.5–0.9)
EOSINOPHILS ABSOLUTE: 0 K/UL (ref 0–0.6)
EOSINOPHILS RELATIVE PERCENT: 0.1 % (ref 0–5)
EPITHELIAL CELLS, UA: 3 /HPF (ref 0–5)
GFR NON-AFRICAN AMERICAN: >60
GLUCOSE BLD-MCNC: 140 MG/DL (ref 74–109)
GLUCOSE URINE: NEGATIVE MG/DL
HCT VFR BLD CALC: 39 % (ref 37–47)
HEMOGLOBIN: 12.8 G/DL (ref 12–16)
HYALINE CASTS: 2 /HPF (ref 0–8)
KETONES, URINE: NEGATIVE MG/DL
LEUKOCYTE ESTERASE, URINE: NEGATIVE
LIPASE: 21 U/L (ref 13–60)
LYMPHOCYTES ABSOLUTE: 0.8 K/UL (ref 1.1–4.5)
LYMPHOCYTES RELATIVE PERCENT: 5.7 % (ref 20–40)
MCH RBC QN AUTO: 29.9 PG (ref 27–31)
MCHC RBC AUTO-ENTMCNC: 32.8 G/DL (ref 33–37)
MCV RBC AUTO: 91.1 FL (ref 81–99)
MONOCYTES ABSOLUTE: 0.4 K/UL (ref 0–0.9)
MONOCYTES RELATIVE PERCENT: 3.1 % (ref 0–10)
NEUTROPHILS ABSOLUTE: 12.2 K/UL (ref 1.5–7.5)
NEUTROPHILS RELATIVE PERCENT: 90.4 % (ref 50–65)
NITRITE, URINE: NEGATIVE
PDW BLD-RTO: 12.2 % (ref 11.5–14.5)
PH UA: 7
PLATELET # BLD: 225 K/UL (ref 130–400)
PMV BLD AUTO: 9.2 FL (ref 9.4–12.3)
POTASSIUM SERPL-SCNC: 4 MMOL/L (ref 3.5–5)
PROTEIN UA: NEGATIVE MG/DL
RBC # BLD: 4.28 M/UL (ref 4.2–5.4)
RBC UA: 7 /HPF (ref 0–4)
SODIUM BLD-SCNC: 142 MMOL/L (ref 136–145)
SPECIFIC GRAVITY UA: 1.01
TOTAL PROTEIN: 7.2 G/DL (ref 6.6–8.7)
URINE REFLEX TO CULTURE: ABNORMAL
UROBILINOGEN, URINE: 0.2 E.U./DL
WBC # BLD: 13.5 K/UL (ref 4.8–10.8)
WBC UA: 4 /HPF (ref 0–5)

## 2018-10-31 PROCEDURE — 3017F COLORECTAL CA SCREEN DOC REV: CPT | Performed by: NURSE PRACTITIONER

## 2018-10-31 PROCEDURE — 83690 ASSAY OF LIPASE: CPT

## 2018-10-31 PROCEDURE — 6360000004 HC RX CONTRAST MEDICATION: Performed by: EMERGENCY MEDICINE

## 2018-10-31 PROCEDURE — 80053 COMPREHEN METABOLIC PANEL: CPT

## 2018-10-31 PROCEDURE — 2580000003 HC RX 258: Performed by: EMERGENCY MEDICINE

## 2018-10-31 PROCEDURE — 1036F TOBACCO NON-USER: CPT | Performed by: NURSE PRACTITIONER

## 2018-10-31 PROCEDURE — 6360000002 HC RX W HCPCS: Performed by: EMERGENCY MEDICINE

## 2018-10-31 PROCEDURE — 96375 TX/PRO/DX INJ NEW DRUG ADDON: CPT

## 2018-10-31 PROCEDURE — 99284 EMERGENCY DEPT VISIT MOD MDM: CPT

## 2018-10-31 PROCEDURE — G8427 DOCREV CUR MEDS BY ELIG CLIN: HCPCS | Performed by: NURSE PRACTITIONER

## 2018-10-31 PROCEDURE — G8484 FLU IMMUNIZE NO ADMIN: HCPCS | Performed by: NURSE PRACTITIONER

## 2018-10-31 PROCEDURE — 85025 COMPLETE CBC W/AUTO DIFF WBC: CPT

## 2018-10-31 PROCEDURE — 36415 COLL VENOUS BLD VENIPUNCTURE: CPT

## 2018-10-31 PROCEDURE — 99284 EMERGENCY DEPT VISIT MOD MDM: CPT | Performed by: EMERGENCY MEDICINE

## 2018-10-31 PROCEDURE — 4040F PNEUMOC VAC/ADMIN/RCVD: CPT | Performed by: NURSE PRACTITIONER

## 2018-10-31 PROCEDURE — 1101F PT FALLS ASSESS-DOCD LE1/YR: CPT | Performed by: NURSE PRACTITIONER

## 2018-10-31 PROCEDURE — 1123F ACP DISCUSS/DSCN MKR DOCD: CPT | Performed by: NURSE PRACTITIONER

## 2018-10-31 PROCEDURE — 96374 THER/PROPH/DIAG INJ IV PUSH: CPT

## 2018-10-31 PROCEDURE — 81001 URINALYSIS AUTO W/SCOPE: CPT

## 2018-10-31 PROCEDURE — 99213 OFFICE O/P EST LOW 20 MIN: CPT | Performed by: NURSE PRACTITIONER

## 2018-10-31 PROCEDURE — 1090F PRES/ABSN URINE INCON ASSESS: CPT | Performed by: NURSE PRACTITIONER

## 2018-10-31 PROCEDURE — G8399 PT W/DXA RESULTS DOCUMENT: HCPCS | Performed by: NURSE PRACTITIONER

## 2018-10-31 PROCEDURE — G8417 CALC BMI ABV UP PARAM F/U: HCPCS | Performed by: NURSE PRACTITIONER

## 2018-10-31 PROCEDURE — 74177 CT ABD & PELVIS W/CONTRAST: CPT

## 2018-10-31 RX ORDER — ONDANSETRON 4 MG/1
4 TABLET, FILM COATED ORAL EVERY 8 HOURS PRN
Qty: 30 TABLET | Refills: 0 | Status: SHIPPED | OUTPATIENT
Start: 2018-10-31 | End: 2018-11-29

## 2018-10-31 RX ORDER — MORPHINE SULFATE/0.9% NACL/PF 1 MG/ML
4 SYRINGE (ML) INJECTION ONCE
Status: COMPLETED | OUTPATIENT
Start: 2018-10-31 | End: 2018-10-31

## 2018-10-31 RX ORDER — HYDROCODONE BITARTRATE AND ACETAMINOPHEN 5; 325 MG/1; MG/1
1 TABLET ORAL EVERY 6 HOURS PRN
Qty: 10 TABLET | Refills: 0 | Status: SHIPPED | OUTPATIENT
Start: 2018-10-31 | End: 2018-11-02 | Stop reason: SDUPTHER

## 2018-10-31 RX ORDER — TAMSULOSIN HYDROCHLORIDE 0.4 MG/1
0.4 CAPSULE ORAL DAILY
Qty: 30 CAPSULE | Refills: 3 | Status: SHIPPED | OUTPATIENT
Start: 2018-10-31 | End: 2019-10-02 | Stop reason: SDUPTHER

## 2018-10-31 RX ORDER — CEPHALEXIN 500 MG/1
500 CAPSULE ORAL 4 TIMES DAILY
Qty: 28 CAPSULE | Refills: 0 | Status: SHIPPED | OUTPATIENT
Start: 2018-10-31 | End: 2018-11-07

## 2018-10-31 RX ORDER — ONDANSETRON 2 MG/ML
4 INJECTION INTRAMUSCULAR; INTRAVENOUS ONCE
Status: COMPLETED | OUTPATIENT
Start: 2018-10-31 | End: 2018-10-31

## 2018-10-31 RX ORDER — 0.9 % SODIUM CHLORIDE 0.9 %
1000 INTRAVENOUS SOLUTION INTRAVENOUS ONCE
Status: COMPLETED | OUTPATIENT
Start: 2018-10-31 | End: 2018-10-31

## 2018-10-31 RX ADMIN — SODIUM CHLORIDE 1000 ML: 9 INJECTION, SOLUTION INTRAVENOUS at 13:52

## 2018-10-31 RX ADMIN — IOPAMIDOL 90 ML: 755 INJECTION, SOLUTION INTRAVENOUS at 14:46

## 2018-10-31 RX ADMIN — Medication 4 MG: at 13:51

## 2018-10-31 RX ADMIN — ONDANSETRON 4 MG: 2 INJECTION INTRAMUSCULAR; INTRAVENOUS at 13:51

## 2018-10-31 ASSESSMENT — PAIN DESCRIPTION - PAIN TYPE: TYPE: ACUTE PAIN

## 2018-10-31 ASSESSMENT — ENCOUNTER SYMPTOMS
EYE DISCHARGE: 0
APNEA: 0
SORE THROAT: 0
ABDOMINAL DISTENTION: 0
COUGH: 0
CONSTIPATION: 0
NAUSEA: 0
DIARRHEA: 0
SHORTNESS OF BREATH: 0
RHINORRHEA: 0
ABDOMINAL PAIN: 1
COUGH: 0
SHORTNESS OF BREATH: 0
ABDOMINAL PAIN: 0
BACK PAIN: 1
PHOTOPHOBIA: 0
EYE PAIN: 0
COLOR CHANGE: 0
RHINORRHEA: 0
SORE THROAT: 0
BACK PAIN: 0
EYE REDNESS: 0
VOMITING: 1

## 2018-10-31 ASSESSMENT — PAIN SCALES - GENERAL: PAINLEVEL_OUTOF10: 9

## 2018-10-31 ASSESSMENT — PAIN DESCRIPTION - ORIENTATION: ORIENTATION: LEFT

## 2018-10-31 ASSESSMENT — PAIN DESCRIPTION - LOCATION: LOCATION: FLANK

## 2018-10-31 NOTE — TELEPHONE ENCOUNTER
LUIZ Castañeda  P [facility] [address] Clinical Staff             Renal ultrasound did not show any evidence of renal stone. This is good news. Both kidneys measure normally. Therefore your low back pain is likely related to the urinary tract infection and possible muscle strain which we discussed in office today. Recommending treatment as prescribed. There was an incidental finding of a 2.5 cm area in the left renal pelvis. This is thought to possibly be a dilated renal pelvis or a peripelvic cyst. Both of these are likely benign etiologies however recommend referral to nephrology for further evaluation to ensure nothing further.

## 2018-10-31 NOTE — ED PROVIDER NOTES
[facility] EMERGENCY DEPT  eMERGENCYdEPARTMENT eNCOUnter      Pt Name: Quique Lee  MRN: [id number]  [place] 1950  Date of evaluation: 10/31/2018  Provider:CARISA Moulton    CHIEF COMPLAINT       Chief Complaint   Patient presents with    Flank Pain     Patient c/o left flank pain starting approx 2-3 days ago. States aching and sent by PCP. HISTORY OF PRESENT ILLNESS  (Location/Symptom, Timing/Onset, Context/Setting, Quality, Duration, Modifying Factors, Severity.)   Quique Lee is a 80 y.o. female who presents to the emergency department Flank pain duration 24 hours. Patient states that her pain started in her right side yesterday she was seen at primary care given a Medrol Dosepak and Macrobid. She continues to have symptoms that worsen. Now she states that her pain is worse with left side being worse than right. She still has right-sided pain but left is now worse. She also admits to suprapubic pain. She denies nausea. She denies fever or cold chills. She does admit to frequency and hesitancy. History of kidney stones pyelonephritis but does admit to the occasional urinary tract infection. HPI    Nursing Notes were reviewed and I agree. REVIEW OF SYSTEMS    (2-9 systems for level 4, 10 or more for level 5)     Review of Systems   Constitutional: Negative for activity change, appetite change, chills and fever. HENT: Negative for congestion, postnasal drip, rhinorrhea and sore throat. Eyes: Negative for photophobia, pain, discharge and visual disturbance. Respiratory: Negative for apnea, cough and shortness of breath. Cardiovascular: Negative for chest pain and leg swelling. Gastrointestinal: Negative for abdominal distention, abdominal pain and nausea. Genitourinary: Positive for dysuria, flank pain, frequency and urgency. Negative for decreased urine volume, vaginal bleeding and vaginal discharge.    Musculoskeletal: Negative for arthralgias, back pain, joint swelling, neck mild dilatation of the   left ureter and left renal collecting system. There is mild stranding   of the fat around the left kidney related to the obstruction. 2. Small low-density renal lesions bilaterally that are likely cysts   but are too small to fully characterize. The largest is on the left   measuring about 1 cm. 3. Small hiatal hernia. 4. Fatty infiltration of the liver. Prior cholecystectomy. 5. Diverticulosis of the colon. No CT findings of diverticulitis. No   small bowel distention. Normal appendix. 6. Other nonacute findings, as discussed above. The full report of this exam was immediately signed and available to   the emergency room. The patient is currently in the emergency room. Signed by Dr Kathy Izquierdo on 10/31/2018 3:01 PM          LABS:  Labs Reviewed   CBC WITH AUTO DIFFERENTIAL - Abnormal; Notable for the following:        Result Value    WBC 13.5 (*)     MCHC 32.8 (*)     MPV 9.2 (*)     Neutrophils % 90.4 (*)     Lymphocytes % 5.7 (*)     Neutrophils # 12.2 (*)     Lymphocytes # 0.8 (*)     All other components within normal limits   COMPREHENSIVE METABOLIC PANEL - Abnormal; Notable for the following:     Glucose 140 (*)     Calcium 10.4 (*)     All other components within normal limits   URINE RT REFLEX TO CULTURE - Abnormal; Notable for the following:     Blood, Urine MODERATE (*)     All other components within normal limits   MICROSCOPIC URINALYSIS - Abnormal; Notable for the following:     RBC, UA 7 (*)     All other components within normal limits   LIPASE       All other labs were within normal range or notreturned as of this dictation.     RE-ASSESSMENT          EMERGENCY DEPARTMENT COURSE and DIFFERENTIAL DIAGNOSIS/MDM:   Vitals:    Vitals:    10/31/18 1318 10/31/18 1321   BP:  (!) 149/82   Pulse:  89   Resp:  16   Temp:  97.4 °F (36.3 °C)   SpO2:  95%   Weight: 200 lb (90.7 kg)    Height: 5' 4\" (1.626 m)          MDM  Number of Diagnoses or Management Options  Kidney

## 2018-10-31 NOTE — PATIENT INSTRUCTIONS
These can cause stomach upset. Talk to your doctor if you take daily aspirin for another health problem. When should you call for help? Call 911 anytime you think you may need emergency care. For example, call if:    · You passed out (lost consciousness).     · You pass maroon or very bloody stools.     · You vomit blood or what looks like coffee grounds.     · You have new, severe belly pain.    Call your doctor now or seek immediate medical care if:    · Your pain gets worse, especially if it becomes focused in one area of your belly.     · You have a new or higher fever.     · Your stools are black and look like tar, or they have streaks of blood.     · You have unexpected vaginal bleeding.     · You have symptoms of a urinary tract infection. These may include:  ¨ Pain when you urinate. ¨ Urinating more often than usual.  ¨ Blood in your urine.     · You are dizzy or lightheaded, or you feel like you may faint.    Watch closely for changes in your health, and be sure to contact your doctor if:    · You are not getting better after 1 day (24 hours). Where can you learn more? Go to [email or web address].[email or web address] org and sign in to your [employer] account. Enter R253 in the [employer] box to learn more about \"Abdominal Pain: Care Instructions. \"     If you do not have an account, please click on the \"Sign Up Now\" link. Current as of: November 20, 2017  Content Version: 11.7  © 0223-1696 [employer]. Care instructions adapted under license by [facility] ([facility]). If you have questions about a medical condition or this instruction, always ask your healthcare professional. [place] [address] any warranty or liability for your use of this information.

## 2018-10-31 NOTE — PROGRESS NOTES
Appearance, Fluid Clear Clear, Slightly Cloudy       I have reviewed the following with the Ms.  Nichelle Jackson [address]   Lab Review   Office Visit on 10/30/2018   Component Date Value    Color, UA 10/30/2018 yellow     Clarity, UA 10/30/2018 clear     Glucose, UA POC 10/30/2018 neg     Bilirubin, UA 10/30/2018 neg     Ketones, UA 10/30/2018 neg     Spec Grav, UA 10/30/2018 1.025     Blood, UA POC 10/30/2018 moderate*    pH, UA 10/30/2018 7.0     Protein, UA POC 10/30/2018 30*    Urobilinogen, UA 10/30/2018 2.0*    Leukocytes, UA 10/30/2018 trace*    Nitrite, UA 10/30/2018 neg     Appearance, Fluid 10/30/2018 Clear    Procedure visit on 10/10/2018   Component Date Value    OCCULT BLOOD FECAL 10/10/2018 negative    Orders Only on 10/01/2018   Component Date Value    WBC 10/01/2018 5.7     RBC 10/01/2018 3.91*    Hemoglobin 10/01/2018 11.5*    Hematocrit 10/01/2018 36.8*    MCV 10/01/2018 94.1     MCH 10/01/2018 29.4     MCHC 10/01/2018 31.3*    RDW 10/01/2018 12.6     Platelets 69/77/2187 206     MPV 10/01/2018 9.4     Neutrophils % 10/01/2018 64.0     Lymphocytes % 10/01/2018 23.0     Monocytes % 10/01/2018 9.7     Eosinophils % 10/01/2018 2.5     Basophils % 10/01/2018 0.4     Neutrophils # 10/01/2018 3.7     Lymphocytes # 10/01/2018 1.3     Monocytes # 10/01/2018 0.60     Eosinophils # 10/01/2018 0.10     Basophils # 10/01/2018 0.00     Sodium 10/01/2018 145     Potassium 10/01/2018 3.9     Chloride 10/01/2018 106     CO2 10/01/2018 25     Anion Gap 10/01/2018 14     Glucose 10/01/2018 99     BUN 10/01/2018 13     CREATININE 10/01/2018 0.7     GFR Non- 10/01/2018 >60     Calcium 10/01/2018 10.2     Total Protein 10/01/2018 6.6     Alb 10/01/2018 3.9     Total Bilirubin 10/01/2018 0.3     Alkaline Phosphatase 10/01/2018 87     ALT 10/01/2018 12     AST 10/01/2018 14     Cholesterol, Total 10/01/2018 175     Triglycerides 10/01/2018 157*    HDL 10/01/2018 50*    LDL Calculated 10/01/2018 94    Orders Only on 07/26/2018   Component Date Value    Cholesterol, Total 07/26/2018 314*    Triglycerides 07/26/2018 397*    HDL 07/26/2018 46*    LDL Calculated 07/26/2018 189     Sodium 07/26/2018 143     Potassium 07/26/2018 4.1     Chloride 07/26/2018 103     CO2 07/26/2018 27     Anion Gap 07/26/2018 13     Glucose 07/26/2018 95     BUN 07/26/2018 10     CREATININE 07/26/2018 0.7     GFR Non- 07/26/2018 >60     Calcium 07/26/2018 10.0     Total Protein 07/26/2018 6.8     Alb 07/26/2018 3.9     Total Bilirubin 07/26/2018 <0.2     Alkaline Phosphatase 07/26/2018 70     ALT 07/26/2018 9     AST 07/26/2018 13    Admission on 05/08/2018, Discharged on 05/08/2018   Component Date Value    Clotest 05/08/2018 Negative      Copies of these are in the chart. Prior to Visit Medications    Medication Sig Taking? Authorizing Provider   nitrofurantoin, macrocrystal-monohydrate, (MACROBID) 100 MG capsule Take 1 capsule by mouth 2 times daily for 10 days Yes LUIZ Castañeda   methylPREDNISolone (MEDROL, CARLOS,) 4 MG tablet Take by mouth. Yes LUIZ Castañeda   lisinopril (PRINIVIL;ZESTRIL) 40 MG tablet Take 1 tablet by mouth daily Yes LUIZ Castañeda   amLODIPine (NORVASC) 5 MG tablet Take 1 tablet by mouth daily Yes LUIZ Castañeda   meloxicam (MOBIC) 15 MG tablet Take 1 tablet by mouth daily Yes LUIZ Castañeda   furosemide (LASIX) 40 MG tablet Take 1 tablet by mouth daily Yes LUIZ Champagne   atorvastatin (LIPITOR) 80 MG tablet Take 1 tablet by mouth daily Yes LUIZ Castañeda   aspirin 81 MG tablet Aspir-81 81 mg tablet,delayed release   Take 1 tablet every day by oral route.  Yes Historical Provider, MD   loratadine (CLARITIN) 10 MG tablet Take 10 mg by mouth Yes Historical Provider, MD   hydrOXYzine (ATARAX) 50 MG tablet hydroxyzine HCl 50 mg tablet   TAKE ONE TABLET BY MOUTH TWICE DAILY AS NEEDED Yes Historical Provider, MD   famotidine (PEPCID) 20 MG tablet Take 1 tablet by mouth 2 times daily Yes LUIZ Castañeda   Multiple Vitamins-Minerals (MULTIVITAMIN ADULT PO) Take by mouth Yes Historical Provider, MD   omeprazole (PRILOSEC) 20 MG delayed release capsule Take 1 capsule by mouth 2 times daily 30 minutes before breakfast and supper Yes LUIZ Bee   venlafaxine (EFFEXOR XR) 150 MG extended release capsule TAKE ONE CAPSULE BY MOUTH ONCE DAILY Yes EzzaLUIZ Coronel   ondansetron (ZOFRAN ODT) 4 MG disintegrating tablet Take 1 tablet by mouth every 8 hours as needed for Nausea or Vomiting Yes CARISA Steward   Omega-3 Fatty Acids (FISH OIL) 1000 MG CAPS Take 2 capsules by mouth 2 times daily Yes LUIZ Castañeda       Allergies: Rosuvastatin calcium    Past Medical History:   Diagnosis Date    Anxiety     Depression     Edema     GERD (gastroesophageal reflux disease)     Headache(784.0)     migraines    Hyperlipidemia     Hypertension     Mini stroke ([facility] [address]     TIA (transient ischemic attack)        Past Surgical History:   Procedure Laterality Date    CHOLECYSTECTOMY      COLONOSCOPY  07/01/2015    Dr. Alba Melissa EGD TRANSORAL BIOPSY SINGLE/MULTIPLE N/A 5/8/2018    Dr Martha Castro (-) Kristin (+) Dye's (-) dysplasia--3 yr recall    DC LAP,CHOLECYSTECTOMY N/A 3/6/2018    CHOLECYSTECTOMY LAPAROSCOPIC performed by Marichuy Arevalo MD at [facility]  08/26/2015    Dr. Laury Howe ENDOSCOPY  5/23/2017    Dr Osorio-w/balloon dilation, 12-13. 5-15 mm-esophageal narrowing with diverticulum at 29 cm-Kristin (-), hiatal herni, Dye's (+) dysplasia (-)--1st dx--1 yr recall-Ct chest ordered    WRIST FRACTURE SURGERY         Social History   Substance Use Topics    Smoking status: Never Smoker    Smokeless tobacco: Never Used    Alcohol use No       Review of Systems   Constitutional: Negative for chills, fatigue and fever.    HENT: Negative for the upper and lower quadrant as well as the left lower back. We discussed options of working this up as an outpatient here in our office with a CAT scan and laboratory work and/or going to the ED for further evaluation and pain treatment. Unfortunately, we would not be able to receive her lab work until after 5 this evening. Therefore the patient decided to proceed with workup at the ED. 2. Essential hypertension I10    3. Left upper quadrant pain R10.12 C-Reactive Protein     Lipase     Comprehensive Metabolic Panel     CBC Auto Differential     CT ABDOMEN PELVIS W IV CONTRAST Additional Contrast? None   4. Acute left-sided low back pain without sciatica M54.5    5. Decreased urine output R34 Urinalysis   6. Urinary hesitancy R39.11 Urinalysis         PLAN    Orders Placed This Encounter   Procedures    CT ABDOMEN PELVIS W IV CONTRAST Additional Contrast? None    C-Reactive Protein    Lipase    Comprehensive Metabolic Panel    CBC Auto Differential    Urinalysis      Call placed to [facility] ED. Report given to the charge nurse. Return if symptoms worsen or fail to improve. Patient Instructions       Patient Education        Abdominal Pain: Care Instructions  Your Care Instructions    Abdominal pain has many possible causes. Some aren't serious and get better on their own in a few days. Others need more testing and treatment. If your pain continues or gets worse, you need to be rechecked and may need more tests to find out what is wrong. You may need surgery to correct the problem. Don't ignore new symptoms, such as fever, nausea and vomiting, urination problems, pain that gets worse, and dizziness. These may be signs of a more serious problem. Your doctor may have recommended a follow-up visit in the next 8 to 12 hours. If you are not getting better, you may need more tests or treatment. The doctor has checked you carefully, but problems can develop later.  If you notice any problems or

## 2018-11-02 ENCOUNTER — OFFICE VISIT (OUTPATIENT)
Dept: UROLOGY | Age: 68
End: 2018-11-02
Payer: MEDICARE

## 2018-11-02 VITALS
HEART RATE: 85 BPM | WEIGHT: 209 LBS | HEIGHT: 64 IN | DIASTOLIC BLOOD PRESSURE: 72 MMHG | SYSTOLIC BLOOD PRESSURE: 126 MMHG | BODY MASS INDEX: 35.68 KG/M2 | TEMPERATURE: 96.5 F

## 2018-11-02 DIAGNOSIS — N28.1 BILATERAL RENAL CYSTS: ICD-10-CM

## 2018-11-02 DIAGNOSIS — N20.1 LEFT URETERAL STONE: Primary | ICD-10-CM

## 2018-11-02 PROBLEM — R60.9 EDEMA: Status: ACTIVE | Noted: 2018-11-02

## 2018-11-02 PROBLEM — F41.9 ANXIETY: Status: ACTIVE | Noted: 2018-11-02

## 2018-11-02 PROBLEM — G43.909 MIGRAINE: Status: ACTIVE | Noted: 2018-11-02

## 2018-11-02 PROBLEM — S22.49XA RIB FRACTURES: Status: ACTIVE | Noted: 2017-12-05

## 2018-11-02 PROBLEM — R06.2 WHEEZING: Status: ACTIVE | Noted: 2018-11-02

## 2018-11-02 PROBLEM — J40 BRONCHITIS: Status: ACTIVE | Noted: 2018-11-02

## 2018-11-02 PROBLEM — T14.90XA TRAUMA: Status: ACTIVE | Noted: 2017-11-02

## 2018-11-02 PROBLEM — S52.92XD CLOSED FRACTURE OF LEFT RADIUS AND ULNA WITH ROUTINE HEALING: Status: ACTIVE | Noted: 2017-12-05

## 2018-11-02 PROBLEM — M25.569 KNEE PAIN: Status: ACTIVE | Noted: 2018-11-02

## 2018-11-02 PROBLEM — R05.9 COUGH: Status: ACTIVE | Noted: 2018-11-02

## 2018-11-02 PROBLEM — S82.001A CLOSED FRACTURE OF RIGHT PATELLA: Status: ACTIVE | Noted: 2017-12-05

## 2018-11-02 PROBLEM — R41.3 MEMORY IMPAIRMENT: Status: ACTIVE | Noted: 2018-11-02

## 2018-11-02 PROBLEM — S63.269A: Status: ACTIVE | Noted: 2017-12-05

## 2018-11-02 PROBLEM — S52.202D CLOSED FRACTURE OF LEFT RADIUS AND ULNA WITH ROUTINE HEALING: Status: ACTIVE | Noted: 2017-12-05

## 2018-11-02 LAB
BILIRUBIN, POC: 0
BLOOD URINE, POC: NORMAL
CLARITY, POC: CLEAR
COLOR, POC: YELLOW
GLUCOSE URINE, POC: NORMAL
KETONES, POC: NORMAL
LEUKOCYTE EST, POC: NORMAL
NITRITE, POC: NORMAL
PH, POC: 5.5
PROTEIN, POC: NORMAL
SPECIFIC GRAVITY, POC: 1.03
UROBILINOGEN, POC: 0.2

## 2018-11-02 PROCEDURE — 1123F ACP DISCUSS/DSCN MKR DOCD: CPT | Performed by: NURSE PRACTITIONER

## 2018-11-02 PROCEDURE — 1036F TOBACCO NON-USER: CPT | Performed by: NURSE PRACTITIONER

## 2018-11-02 PROCEDURE — G8399 PT W/DXA RESULTS DOCUMENT: HCPCS | Performed by: NURSE PRACTITIONER

## 2018-11-02 PROCEDURE — 99213 OFFICE O/P EST LOW 20 MIN: CPT | Performed by: NURSE PRACTITIONER

## 2018-11-02 PROCEDURE — G8484 FLU IMMUNIZE NO ADMIN: HCPCS | Performed by: NURSE PRACTITIONER

## 2018-11-02 PROCEDURE — 81003 URINALYSIS AUTO W/O SCOPE: CPT | Performed by: NURSE PRACTITIONER

## 2018-11-02 PROCEDURE — G8417 CALC BMI ABV UP PARAM F/U: HCPCS | Performed by: NURSE PRACTITIONER

## 2018-11-02 PROCEDURE — G8427 DOCREV CUR MEDS BY ELIG CLIN: HCPCS | Performed by: NURSE PRACTITIONER

## 2018-11-02 PROCEDURE — 4040F PNEUMOC VAC/ADMIN/RCVD: CPT | Performed by: NURSE PRACTITIONER

## 2018-11-02 PROCEDURE — 3017F COLORECTAL CA SCREEN DOC REV: CPT | Performed by: NURSE PRACTITIONER

## 2018-11-02 PROCEDURE — 1101F PT FALLS ASSESS-DOCD LE1/YR: CPT | Performed by: NURSE PRACTITIONER

## 2018-11-02 PROCEDURE — 1090F PRES/ABSN URINE INCON ASSESS: CPT | Performed by: NURSE PRACTITIONER

## 2018-11-02 RX ORDER — HYDROCODONE BITARTRATE AND ACETAMINOPHEN 5; 325 MG/1; MG/1
1 TABLET ORAL EVERY 8 HOURS PRN
Qty: 10 TABLET | Refills: 0 | Status: SHIPPED | OUTPATIENT
Start: 2018-11-02 | End: 2018-11-05

## 2018-11-02 NOTE — PROGRESS NOTES
Never Used    Alcohol use No      Current Outpatient Prescriptions   Medication Sig Dispense Refill    HYDROcodone-acetaminophen (NORCO) 5-325 MG per tablet Take 1 tablet by mouth every 8 hours as needed for Pain for up to 3 days. Intended supply: 3 days. Take lowest dose possible to manage pain. 10 tablet 0    tamsulosin (FLOMAX) 0.4 MG capsule Take 1 capsule by mouth daily 30 capsule 3    cephALEXin (KEFLEX) 500 MG capsule Take 1 capsule by mouth 4 times daily for 7 days 28 capsule 0    ondansetron (ZOFRAN) 4 MG tablet Take 1 tablet by mouth every 8 hours as needed for Nausea or Vomiting 30 tablet 0    nitrofurantoin, macrocrystal-monohydrate, (MACROBID) 100 MG capsule Take 1 capsule by mouth 2 times daily for 10 days 20 capsule 0    methylPREDNISolone (MEDROL, CARLOS,) 4 MG tablet Take by mouth. 1 kit 0    lisinopril (PRINIVIL;ZESTRIL) 40 MG tablet Take 1 tablet by mouth daily 30 tablet 11    amLODIPine (NORVASC) 5 MG tablet Take 1 tablet by mouth daily 30 tablet 5    meloxicam (MOBIC) 15 MG tablet Take 1 tablet by mouth daily 30 tablet 3    furosemide (LASIX) 40 MG tablet Take 1 tablet by mouth daily 30 tablet 11    atorvastatin (LIPITOR) 80 MG tablet Take 1 tablet by mouth daily 30 tablet 5    aspirin 81 MG tablet Aspir-81 81 mg tablet,delayed release   Take 1 tablet every day by oral route.       loratadine (CLARITIN) 10 MG tablet Take 10 mg by mouth      hydrOXYzine (ATARAX) 50 MG tablet hydroxyzine HCl 50 mg tablet   TAKE ONE TABLET BY MOUTH TWICE DAILY AS NEEDED      Multiple Vitamins-Minerals (MULTIVITAMIN ADULT PO) Take by mouth      omeprazole (PRILOSEC) 20 MG delayed release capsule Take 1 capsule by mouth 2 times daily 30 minutes before breakfast and supper 60 capsule 11    venlafaxine (EFFEXOR XR) 150 MG extended release capsule TAKE ONE CAPSULE BY MOUTH ONCE DAILY 30 capsule 5    ondansetron (ZOFRAN ODT) 4 MG disintegrating tablet Take 1 tablet by mouth every 8 hours as needed for Nausea or Vomiting 20 tablet 0    Omega-3 Fatty Acids (FISH OIL) 1000 MG CAPS Take 2 capsules by mouth 2 times daily 120 capsule 3    famotidine (PEPCID) 20 MG tablet Take 1 tablet by mouth 2 times daily 60 tablet 5     No current facility-administered medications for this visit. Allergies   Allergen Reactions    Rosuvastatin Calcium      Muscle cramps         Subjective:      Review of Systems   Constitutional: Negative for chills and fever. Genitourinary: Positive for flank pain and pelvic pain. Negative for dysuria, hematuria and urgency. All other systems reviewed and are negative. Objective:     Physical Exam   Constitutional: She is oriented to person, place, and time. She appears well-developed and well-nourished. No distress. HENT:   Head: Normocephalic and atraumatic. Eyes: Pupils are equal, round, and reactive to light. Conjunctivae are normal.   Neck: Normal range of motion. Neck supple. Cardiovascular: Normal rate. Pulmonary/Chest: Effort normal. No respiratory distress. Abdominal: Soft. There is CVA tenderness (minimal bilateral). Musculoskeletal: Normal range of motion. Neurological: She is alert and oriented to person, place, and time. Skin: Skin is warm and dry. Psychiatric: She has a normal mood and affect. Her behavior is normal. Judgment and thought content normal.   Vitals reviewed.     /72   Pulse 85   Temp 96.5 °F (35.8 °C) (Temporal)   Ht 5' 4\" (1.626 m)   Wt 209 lb (94.8 kg)   BMI 35.87 kg/m²       DATA:    Results for orders placed or performed in visit on 11/02/18   POCT Urinalysis No Micro (Auto)   Result Value Ref Range    Color, UA yellow     Clarity, UA clear     Glucose, UA POC neg     Bilirubin, UA 0     Ketones, UA neg     Spec Grav, UA 1.030     Blood, UA POC neg     pH, UA 5.5     Protein, UA POC neg     Urobilinogen, UA 0.2     Leukocytes, UA neg     Nitrite, UA neg        IMAGING:   CT abdomen and pelvis with contrast 10/31/18

## 2018-11-13 ENCOUNTER — OFFICE VISIT (OUTPATIENT)
Dept: UROLOGY | Age: 68
End: 2018-11-13
Payer: MEDICARE

## 2018-11-13 VITALS
BODY MASS INDEX: 35.51 KG/M2 | HEIGHT: 64 IN | DIASTOLIC BLOOD PRESSURE: 79 MMHG | SYSTOLIC BLOOD PRESSURE: 143 MMHG | HEART RATE: 100 BPM | TEMPERATURE: 97.4 F | WEIGHT: 208 LBS

## 2018-11-13 DIAGNOSIS — N20.1 LEFT URETERAL STONE: Primary | ICD-10-CM

## 2018-11-13 LAB
APPEARANCE FLUID: CLEAR
BILIRUBIN, POC: NEGATIVE
BLOOD URINE, POC: ABNORMAL
CLARITY, POC: CLEAR
COLOR, POC: YELLOW
GLUCOSE URINE, POC: NEGATIVE
KETONES, POC: NEGATIVE
LEUKOCYTE EST, POC: NEGATIVE
NITRITE, POC: NEGATIVE
PH, POC: 5.5
PROTEIN, POC: NEGATIVE
SPECIFIC GRAVITY, POC: 1.02
UROBILINOGEN, POC: 0.2

## 2018-11-13 PROCEDURE — G8417 CALC BMI ABV UP PARAM F/U: HCPCS | Performed by: NURSE PRACTITIONER

## 2018-11-13 PROCEDURE — 99213 OFFICE O/P EST LOW 20 MIN: CPT | Performed by: NURSE PRACTITIONER

## 2018-11-13 PROCEDURE — 4040F PNEUMOC VAC/ADMIN/RCVD: CPT | Performed by: NURSE PRACTITIONER

## 2018-11-13 PROCEDURE — G8484 FLU IMMUNIZE NO ADMIN: HCPCS | Performed by: NURSE PRACTITIONER

## 2018-11-13 PROCEDURE — 3017F COLORECTAL CA SCREEN DOC REV: CPT | Performed by: NURSE PRACTITIONER

## 2018-11-13 PROCEDURE — 1101F PT FALLS ASSESS-DOCD LE1/YR: CPT | Performed by: NURSE PRACTITIONER

## 2018-11-13 PROCEDURE — G8427 DOCREV CUR MEDS BY ELIG CLIN: HCPCS | Performed by: NURSE PRACTITIONER

## 2018-11-13 PROCEDURE — 1123F ACP DISCUSS/DSCN MKR DOCD: CPT | Performed by: NURSE PRACTITIONER

## 2018-11-13 PROCEDURE — 1036F TOBACCO NON-USER: CPT | Performed by: NURSE PRACTITIONER

## 2018-11-13 PROCEDURE — G8399 PT W/DXA RESULTS DOCUMENT: HCPCS | Performed by: NURSE PRACTITIONER

## 2018-11-13 PROCEDURE — 81002 URINALYSIS NONAUTO W/O SCOPE: CPT | Performed by: NURSE PRACTITIONER

## 2018-11-13 PROCEDURE — 1090F PRES/ABSN URINE INCON ASSESS: CPT | Performed by: NURSE PRACTITIONER

## 2018-11-13 ASSESSMENT — ENCOUNTER SYMPTOMS: ABDOMINAL PAIN: 1

## 2018-11-13 NOTE — PROGRESS NOTES
Tahir Sousa is a 80 y.o. female who presents today   Chief Complaint   Patient presents with    Follow-up     Patient presents today for a follow up for left ureteral stone. Patient states she does not think she has passed the stone, she states she is still having pain. HPI:       Tahir Sousa presents For follow-up of left UVJ stone. She was seen at [facility] ED on 10/31/18, she was then seen at [facility] urology on 11/2/18. A CT of the abdomen and pelvis with IV contrast that was obtained in the ED showed mild dilation of the ureter and renal collecting system. Patient started Flomax on 11/2/18, pain has been controlled. At this time patient has not passed the stone and she continues to have left lower quadrant pain. Urinalysis is negative for infection. Past Medical History:   Diagnosis Date    Anxiety     Depression     Edema     GERD (gastroesophageal reflux disease)     Headache(784.0)     migraines    Hyperlipidemia     Hypertension     Mini stroke ([address]     TIA (transient ischemic attack)       Past Surgical History:   Procedure Laterality Date    CHOLECYSTECTOMY      COLONOSCOPY  07/01/2015    Dr. Remy Wells EGD TRANSORAL BIOPSY SINGLE/MULTIPLE N/A 5/8/2018    Dr Fly Zayas (-) Kristin (+) Dye's (-) dysplasia--3 yr recall    [place] LAP,CHOLECYSTECTOMY N/A 3/6/2018    CHOLECYSTECTOMY LAPAROSCOPIC performed by Baldemar Orourke MD at [address] [place] [address]  08/26/2015    Dr. Rosibel Lugo  5/23/2017    Dr Osorio-w/balloon dilation, 12-13. 5-15 mm-esophageal narrowing with diverticulum at 34 cm-Kristin (-), hiatal herni, Dye's (+) dysplasia (-)--1st dx--1 yr recall-Ct chest ordered    WRIST FRACTURE SURGERY         Family History   Problem Relation Age of Onset    Heart Disease Mother     Colon Cancer Neg Hx     Colon Polyps Neg Hx     Liver Cancer Neg Hx     Liver Disease Neg Hx     Esophageal Cancer Neg Hx     Rectal Cancer Neg Hx  Stomach Cancer Neg Hx        Social History   Substance Use Topics    Smoking status: Never Smoker    Smokeless tobacco: Never Used    Alcohol use No      Current Outpatient Prescriptions   Medication Sig Dispense Refill    tamsulosin (FLOMAX) 0.4 MG capsule Take 1 capsule by mouth daily 30 capsule 3    ondansetron (ZOFRAN) 4 MG tablet Take 1 tablet by mouth every 8 hours as needed for Nausea or Vomiting 30 tablet 0    lisinopril (PRINIVIL;ZESTRIL) 40 MG tablet Take 1 tablet by mouth daily 30 tablet 11    amLODIPine (NORVASC) 5 MG tablet Take 1 tablet by mouth daily 30 tablet 5    meloxicam (MOBIC) 15 MG tablet Take 1 tablet by mouth daily 30 tablet 3    furosemide (LASIX) 40 MG tablet Take 1 tablet by mouth daily 30 tablet 11    atorvastatin (LIPITOR) 80 MG tablet Take 1 tablet by mouth daily 30 tablet 5    aspirin 81 MG tablet Aspir-81 81 mg tablet,delayed release   Take 1 tablet every day by oral route.  loratadine (CLARITIN) 10 MG tablet Take 10 mg by mouth      hydrOXYzine (ATARAX) 50 MG tablet hydroxyzine HCl 50 mg tablet   TAKE ONE TABLET BY MOUTH TWICE DAILY AS NEEDED      Multiple Vitamins-Minerals (MULTIVITAMIN ADULT PO) Take by mouth      omeprazole (PRILOSEC) 20 MG delayed release capsule Take 1 capsule by mouth 2 times daily 30 minutes before breakfast and supper 60 capsule 11    venlafaxine (EFFEXOR XR) 150 MG extended release capsule TAKE ONE CAPSULE BY MOUTH ONCE DAILY 30 capsule 5    ondansetron (ZOFRAN ODT) 4 MG disintegrating tablet Take 1 tablet by mouth every 8 hours as needed for Nausea or Vomiting 20 tablet 0    Omega-3 Fatty Acids (FISH OIL) 1000 MG CAPS Take 2 capsules by mouth 2 times daily 120 capsule 3    famotidine (PEPCID) 20 MG tablet Take 1 tablet by mouth 2 times daily 60 tablet 5     No current facility-administered medications for this visit. Allergies   Allergen Reactions    Lipitor [Atorvastatin] Other (See Comments)     Other reaction(s):  Other There is a small distal left ureteral stone near the ureterovesical   junction measuring 2-3 mm in size. There is mild dilatation of the   left ureter and left renal collecting system. There is mild stranding   of the fat around the left kidney related to the obstruction. 2. Small low-density renal lesions bilaterally that are likely cysts   but are too small to fully characterize. The largest is on the left   measuring about 1 cm. 3. Small hiatal hernia. 4. Fatty infiltration of the liver. Prior cholecystectomy. 5. Diverticulosis of the colon. No CT findings of diverticulitis. No   small bowel distention. Normal appendix. 6. Other nonacute findings, as discussed above. Assessment/ Plan       Diagnosis Orders   1. Left ureteral stone     2. Pain with urination  POCT Urinalysis no Micro     Risk and benefits of surgery discussed with patient including infection, postop pain, anesthesia events, perforation of ureter. Patient chooses to proceed with cystoscopy, ureteroscopy, laser lithotripsy, and placement of double-J stent. Discussed use, benefit, and side effects of prescribed medications. All patient questions answered. Pt voiced understanding. Patient agreed with treatment plan.        Electronically signed by LUIZ Jenkins on 11/13/2018 at 5:03 PM

## 2018-11-29 ENCOUNTER — HOSPITAL ENCOUNTER (OUTPATIENT)
Dept: PREADMISSION TESTING | Age: 68
Discharge: HOME | End: 2018-12-03
Payer: MEDICARE

## 2018-11-29 VITALS — BODY MASS INDEX: 35.51 KG/M2 | WEIGHT: 208 LBS | HEIGHT: 64 IN

## 2018-11-29 LAB
ANION GAP SERPL CALCULATED.3IONS-SCNC: 10 MMOL/L (ref 7–19)
BASOPHILS ABSOLUTE: 0.1 K/UL (ref 0–0.2)
BASOPHILS RELATIVE PERCENT: 0.6 % (ref 0–1)
BILIRUBIN URINE: NEGATIVE
BLOOD, URINE: NEGATIVE
BUN BLDV-MCNC: 13 MG/DL (ref 8–23)
CALCIUM SERPL-MCNC: 10 MG/DL (ref 8.8–10.2)
CHLORIDE BLD-SCNC: 104 MMOL/L (ref 98–111)
CLARITY: ABNORMAL
CO2: 27 MMOL/L (ref 22–29)
COLOR: YELLOW
CREAT SERPL-MCNC: 0.6 MG/DL (ref 0.5–0.9)
EKG P AXIS: 36 DEGREES
EKG P-R INTERVAL: 136 MS
EKG Q-T INTERVAL: 362 MS
EKG QRS DURATION: 80 MS
EKG QTC CALCULATION (BAZETT): 388 MS
EKG T AXIS: 34 DEGREES
EOSINOPHILS ABSOLUTE: 0.1 K/UL (ref 0–0.6)
EOSINOPHILS RELATIVE PERCENT: 1.6 % (ref 0–5)
GFR NON-AFRICAN AMERICAN: >60
GLUCOSE BLD-MCNC: 99 MG/DL (ref 74–109)
GLUCOSE URINE: NEGATIVE MG/DL
HCT VFR BLD CALC: 36.6 % (ref 37–47)
HEMOGLOBIN: 11.8 G/DL (ref 12–16)
KETONES, URINE: NEGATIVE MG/DL
LEUKOCYTE ESTERASE, URINE: NEGATIVE
LYMPHOCYTES ABSOLUTE: 1.7 K/UL (ref 1.1–4.5)
LYMPHOCYTES RELATIVE PERCENT: 22.1 % (ref 20–40)
MCH RBC QN AUTO: 29.9 PG (ref 27–31)
MCHC RBC AUTO-ENTMCNC: 32.2 G/DL (ref 33–37)
MCV RBC AUTO: 92.9 FL (ref 81–99)
MONOCYTES ABSOLUTE: 0.7 K/UL (ref 0–0.9)
MONOCYTES RELATIVE PERCENT: 9 % (ref 0–10)
NEUTROPHILS ABSOLUTE: 5.2 K/UL (ref 1.5–7.5)
NEUTROPHILS RELATIVE PERCENT: 66.3 % (ref 50–65)
NITRITE, URINE: NEGATIVE
PDW BLD-RTO: 11.9 % (ref 11.5–14.5)
PH UA: 6
PLATELET # BLD: 226 K/UL (ref 130–400)
PMV BLD AUTO: 9.5 FL (ref 9.4–12.3)
POTASSIUM SERPL-SCNC: 3.9 MMOL/L (ref 3.5–5)
PROTEIN UA: NEGATIVE MG/DL
RBC # BLD: 3.94 M/UL (ref 4.2–5.4)
SODIUM BLD-SCNC: 141 MMOL/L (ref 136–145)
SPECIFIC GRAVITY UA: 1.02
URINE REFLEX TO CULTURE: ABNORMAL
UROBILINOGEN, URINE: 0.2 E.U./DL
WBC # BLD: 7.9 K/UL (ref 4.8–10.8)

## 2018-11-29 PROCEDURE — 85025 COMPLETE CBC W/AUTO DIFF WBC: CPT

## 2018-11-29 PROCEDURE — 93005 ELECTROCARDIOGRAM TRACING: CPT

## 2018-11-29 PROCEDURE — 80048 BASIC METABOLIC PNL TOTAL CA: CPT

## 2018-11-29 PROCEDURE — 81003 URINALYSIS AUTO W/O SCOPE: CPT

## 2018-11-29 RX ORDER — CIPROFLOXACIN 2 MG/ML
400 INJECTION, SOLUTION INTRAVENOUS ONCE
Status: CANCELLED | OUTPATIENT
Start: 2018-12-06

## 2018-12-02 PROBLEM — R05.9 COUGH: Status: RESOLVED | Noted: 2018-11-02 | Resolved: 2018-12-02

## 2018-12-04 ENCOUNTER — TELEPHONE (OUTPATIENT)
Dept: UROLOGY | Age: 68
End: 2018-12-04

## 2018-12-05 ENCOUNTER — ANESTHESIA EVENT (OUTPATIENT)
Dept: OPERATING ROOM | Age: 68
End: 2018-12-05
Payer: MEDICARE

## 2018-12-05 ENCOUNTER — APPOINTMENT (OUTPATIENT)
Dept: GENERAL RADIOLOGY | Age: 68
End: 2018-12-05
Attending: UROLOGY
Payer: MEDICARE

## 2018-12-05 ENCOUNTER — ANESTHESIA (OUTPATIENT)
Dept: OPERATING ROOM | Age: 68
End: 2018-12-05
Payer: MEDICARE

## 2018-12-05 ENCOUNTER — HOSPITAL ENCOUNTER (OUTPATIENT)
Age: 68
Setting detail: OUTPATIENT SURGERY
Discharge: HOME | End: 2018-12-05
Attending: UROLOGY | Admitting: UROLOGY
Payer: MEDICARE

## 2018-12-05 VITALS
SYSTOLIC BLOOD PRESSURE: 130 MMHG | HEIGHT: 64 IN | WEIGHT: 208 LBS | DIASTOLIC BLOOD PRESSURE: 66 MMHG | BODY MASS INDEX: 35.51 KG/M2 | HEART RATE: 85 BPM | OXYGEN SATURATION: 94 % | RESPIRATION RATE: 18 BRPM | TEMPERATURE: 97.8 F

## 2018-12-05 VITALS
TEMPERATURE: 97 F | SYSTOLIC BLOOD PRESSURE: 114 MMHG | OXYGEN SATURATION: 96 % | RESPIRATION RATE: 4 BRPM | DIASTOLIC BLOOD PRESSURE: 62 MMHG

## 2018-12-05 DIAGNOSIS — G89.18 POSTOPERATIVE PAIN: Primary | ICD-10-CM

## 2018-12-05 PROBLEM — N20.1 LEFT URETERAL STONE: Status: RESOLVED | Noted: 2018-11-13 | Resolved: 2018-12-05

## 2018-12-05 PROBLEM — N81.4 UTERINE PROLAPSE: Status: ACTIVE | Noted: 2018-12-05

## 2018-12-05 PROCEDURE — 74420 UROGRAPHY RTRGR +-KUB: CPT

## 2018-12-05 PROCEDURE — 52332 CYSTOSCOPY AND TREATMENT: CPT | Performed by: UROLOGY

## 2018-12-05 PROCEDURE — C1726 CATH, BAL DIL, NON-VASCULAR: HCPCS | Performed by: UROLOGY

## 2018-12-05 PROCEDURE — 3700000001 HC ADD 15 MINUTES (ANESTHESIA): Performed by: UROLOGY

## 2018-12-05 PROCEDURE — 3700000000 HC ANESTHESIA ATTENDED CARE: Performed by: UROLOGY

## 2018-12-05 PROCEDURE — 6360000002 HC RX W HCPCS: Performed by: UROLOGY

## 2018-12-05 PROCEDURE — 7100000010 HC PHASE II RECOVERY - FIRST 15 MIN: Performed by: UROLOGY

## 2018-12-05 PROCEDURE — 2580000003 HC RX 258: Performed by: ANESTHESIOLOGY

## 2018-12-05 PROCEDURE — 6360000002 HC RX W HCPCS: Performed by: NURSE ANESTHETIST, CERTIFIED REGISTERED

## 2018-12-05 PROCEDURE — 6360000002 HC RX W HCPCS: Performed by: ANESTHESIOLOGY

## 2018-12-05 PROCEDURE — C1769 GUIDE WIRE: HCPCS | Performed by: UROLOGY

## 2018-12-05 PROCEDURE — 6370000000 HC RX 637 (ALT 250 FOR IP): Performed by: UROLOGY

## 2018-12-05 PROCEDURE — 2500000003 HC RX 250 WO HCPCS: Performed by: NURSE ANESTHETIST, CERTIFIED REGISTERED

## 2018-12-05 PROCEDURE — 6370000000 HC RX 637 (ALT 250 FOR IP): Performed by: ANESTHESIOLOGY

## 2018-12-05 PROCEDURE — 3600000004 HC SURGERY LEVEL 4 BASE: Performed by: UROLOGY

## 2018-12-05 PROCEDURE — C2617 STENT, NON-COR, TEM W/O DEL: HCPCS | Performed by: UROLOGY

## 2018-12-05 PROCEDURE — 2709999900 HC NON-CHARGEABLE SUPPLY: Performed by: UROLOGY

## 2018-12-05 PROCEDURE — 3600000014 HC SURGERY LEVEL 4 ADDTL 15MIN: Performed by: UROLOGY

## 2018-12-05 PROCEDURE — 99999 PR OFFICE/OUTPT VISIT,PROCEDURE ONLY: CPT | Performed by: UROLOGY

## 2018-12-05 PROCEDURE — 7100000000 HC PACU RECOVERY - FIRST 15 MIN: Performed by: UROLOGY

## 2018-12-05 PROCEDURE — C1758 CATHETER, URETERAL: HCPCS | Performed by: UROLOGY

## 2018-12-05 PROCEDURE — 52351 CYSTOURETERO & OR PYELOSCOPE: CPT | Performed by: UROLOGY

## 2018-12-05 PROCEDURE — 7100000001 HC PACU RECOVERY - ADDTL 15 MIN: Performed by: UROLOGY

## 2018-12-05 PROCEDURE — 7100000011 HC PHASE II RECOVERY - ADDTL 15 MIN: Performed by: UROLOGY

## 2018-12-05 PROCEDURE — 74420 UROGRAPHY RTRGR +-KUB: CPT | Performed by: UROLOGY

## 2018-12-05 DEVICE — URETERAL STENT
Type: IMPLANTABLE DEVICE | Site: URETER | Status: FUNCTIONAL
Brand: POLARIS™ ULTRA

## 2018-12-05 RX ORDER — LIDOCAINE HYDROCHLORIDE 10 MG/ML
1 INJECTION, SOLUTION EPIDURAL; INFILTRATION; INTRACAUDAL; PERINEURAL
Status: DISCONTINUED | OUTPATIENT
Start: 2018-12-05 | End: 2018-12-05 | Stop reason: HOSPADM

## 2018-12-05 RX ORDER — LABETALOL HYDROCHLORIDE 5 MG/ML
5 INJECTION, SOLUTION INTRAVENOUS EVERY 10 MIN PRN
Status: DISCONTINUED | OUTPATIENT
Start: 2018-12-05 | End: 2018-12-05 | Stop reason: HOSPADM

## 2018-12-05 RX ORDER — PHENAZOPYRIDINE HYDROCHLORIDE 100 MG/1
100 TABLET, FILM COATED ORAL 3 TIMES DAILY PRN
Qty: 30 TABLET | Refills: 1 | Status: SHIPPED | OUTPATIENT
Start: 2018-12-05 | End: 2019-01-22 | Stop reason: ALTCHOICE

## 2018-12-05 RX ORDER — OXYCODONE HYDROCHLORIDE AND ACETAMINOPHEN 5; 325 MG/1; MG/1
2 TABLET ORAL EVERY 4 HOURS PRN
Status: DISCONTINUED | OUTPATIENT
Start: 2018-12-05 | End: 2018-12-05 | Stop reason: HOSPADM

## 2018-12-05 RX ORDER — HYDRALAZINE HYDROCHLORIDE 20 MG/ML
5 INJECTION INTRAMUSCULAR; INTRAVENOUS EVERY 10 MIN PRN
Status: DISCONTINUED | OUTPATIENT
Start: 2018-12-05 | End: 2018-12-05 | Stop reason: HOSPADM

## 2018-12-05 RX ORDER — LIDOCAINE HYDROCHLORIDE 10 MG/ML
INJECTION, SOLUTION INFILTRATION; PERINEURAL PRN
Status: DISCONTINUED | OUTPATIENT
Start: 2018-12-05 | End: 2018-12-05 | Stop reason: SDUPTHER

## 2018-12-05 RX ORDER — MIDAZOLAM HYDROCHLORIDE 1 MG/ML
2 INJECTION INTRAMUSCULAR; INTRAVENOUS
Status: DISCONTINUED | OUTPATIENT
Start: 2018-12-05 | End: 2018-12-05 | Stop reason: HOSPADM

## 2018-12-05 RX ORDER — SODIUM CHLORIDE 9 MG/ML
INJECTION, SOLUTION INTRAVENOUS CONTINUOUS
Status: DISCONTINUED | OUTPATIENT
Start: 2018-12-05 | End: 2018-12-05 | Stop reason: HOSPADM

## 2018-12-05 RX ORDER — MORPHINE SULFATE 2 MG/ML
4 INJECTION, SOLUTION INTRAMUSCULAR; INTRAVENOUS EVERY 5 MIN PRN
Status: DISCONTINUED | OUTPATIENT
Start: 2018-12-05 | End: 2018-12-05 | Stop reason: HOSPADM

## 2018-12-05 RX ORDER — PROPOFOL 10 MG/ML
INJECTION, EMULSION INTRAVENOUS PRN
Status: DISCONTINUED | OUTPATIENT
Start: 2018-12-05 | End: 2018-12-05 | Stop reason: SDUPTHER

## 2018-12-05 RX ORDER — KETAMINE HYDROCHLORIDE 100 MG/ML
INJECTION, SOLUTION INTRAMUSCULAR; INTRAVENOUS PRN
Status: DISCONTINUED | OUTPATIENT
Start: 2018-12-05 | End: 2018-12-05 | Stop reason: SDUPTHER

## 2018-12-05 RX ORDER — FENTANYL CITRATE 50 UG/ML
50 INJECTION, SOLUTION INTRAMUSCULAR; INTRAVENOUS
Status: DISCONTINUED | OUTPATIENT
Start: 2018-12-05 | End: 2018-12-05 | Stop reason: HOSPADM

## 2018-12-05 RX ORDER — ATROPA BELLADONNA AND OPIUM 16.2; 6 MG/1; MG/1
SUPPOSITORY RECTAL PRN
Status: DISCONTINUED | OUTPATIENT
Start: 2018-12-05 | End: 2018-12-05 | Stop reason: HOSPADM

## 2018-12-05 RX ORDER — APREPITANT 40 MG/1
40 CAPSULE ORAL ONCE
Status: COMPLETED | OUTPATIENT
Start: 2018-12-05 | End: 2018-12-05

## 2018-12-05 RX ORDER — SODIUM CHLORIDE, SODIUM LACTATE, POTASSIUM CHLORIDE, CALCIUM CHLORIDE 600; 310; 30; 20 MG/100ML; MG/100ML; MG/100ML; MG/100ML
INJECTION, SOLUTION INTRAVENOUS CONTINUOUS
Status: DISCONTINUED | OUTPATIENT
Start: 2018-12-05 | End: 2018-12-05 | Stop reason: HOSPADM

## 2018-12-05 RX ORDER — DIPHENHYDRAMINE HYDROCHLORIDE 50 MG/ML
12.5 INJECTION INTRAMUSCULAR; INTRAVENOUS
Status: DISCONTINUED | OUTPATIENT
Start: 2018-12-05 | End: 2018-12-05 | Stop reason: HOSPADM

## 2018-12-05 RX ORDER — OXYCODONE HYDROCHLORIDE AND ACETAMINOPHEN 5; 325 MG/1; MG/1
1 TABLET ORAL EVERY 6 HOURS PRN
Qty: 20 TABLET | Refills: 0 | Status: SHIPPED | OUTPATIENT
Start: 2018-12-05 | End: 2018-12-10

## 2018-12-05 RX ORDER — FENTANYL CITRATE 50 UG/ML
25 INJECTION, SOLUTION INTRAMUSCULAR; INTRAVENOUS
Status: DISCONTINUED | OUTPATIENT
Start: 2018-12-05 | End: 2018-12-05 | Stop reason: HOSPADM

## 2018-12-05 RX ORDER — EPHEDRINE SULFATE 50 MG/ML
INJECTION, SOLUTION INTRAVENOUS PRN
Status: DISCONTINUED | OUTPATIENT
Start: 2018-12-05 | End: 2018-12-05 | Stop reason: SDUPTHER

## 2018-12-05 RX ORDER — MEPERIDINE HYDROCHLORIDE 50 MG/ML
12.5 INJECTION INTRAMUSCULAR; INTRAVENOUS; SUBCUTANEOUS EVERY 5 MIN PRN
Status: DISCONTINUED | OUTPATIENT
Start: 2018-12-05 | End: 2018-12-05 | Stop reason: HOSPADM

## 2018-12-05 RX ORDER — CIPROFLOXACIN 500 MG/1
500 TABLET, FILM COATED ORAL 2 TIMES DAILY
Qty: 6 TABLET | Refills: 0 | Status: SHIPPED | OUTPATIENT
Start: 2018-12-05 | End: 2018-12-08

## 2018-12-05 RX ORDER — PHENAZOPYRIDINE HYDROCHLORIDE 100 MG/1
100 TABLET, FILM COATED ORAL ONCE
Status: COMPLETED | OUTPATIENT
Start: 2018-12-05 | End: 2018-12-05

## 2018-12-05 RX ORDER — KETOROLAC TROMETHAMINE 30 MG/ML
15 INJECTION, SOLUTION INTRAMUSCULAR; INTRAVENOUS ONCE
Status: COMPLETED | OUTPATIENT
Start: 2018-12-05 | End: 2018-12-05

## 2018-12-05 RX ORDER — ENALAPRILAT 2.5 MG/2ML
1.25 INJECTION INTRAVENOUS
Status: DISCONTINUED | OUTPATIENT
Start: 2018-12-05 | End: 2018-12-05 | Stop reason: HOSPADM

## 2018-12-05 RX ORDER — SODIUM CHLORIDE 0.9 % (FLUSH) 0.9 %
10 SYRINGE (ML) INJECTION PRN
Status: DISCONTINUED | OUTPATIENT
Start: 2018-12-05 | End: 2018-12-05 | Stop reason: HOSPADM

## 2018-12-05 RX ORDER — TAMSULOSIN HYDROCHLORIDE 0.4 MG/1
0.4 CAPSULE ORAL ONCE
Status: COMPLETED | OUTPATIENT
Start: 2018-12-05 | End: 2018-12-05

## 2018-12-05 RX ORDER — FENTANYL CITRATE 50 UG/ML
INJECTION, SOLUTION INTRAMUSCULAR; INTRAVENOUS PRN
Status: DISCONTINUED | OUTPATIENT
Start: 2018-12-05 | End: 2018-12-05 | Stop reason: SDUPTHER

## 2018-12-05 RX ORDER — SODIUM CHLORIDE 0.9 % (FLUSH) 0.9 %
10 SYRINGE (ML) INJECTION EVERY 12 HOURS SCHEDULED
Status: DISCONTINUED | OUTPATIENT
Start: 2018-12-05 | End: 2018-12-05 | Stop reason: HOSPADM

## 2018-12-05 RX ORDER — METOCLOPRAMIDE HYDROCHLORIDE 5 MG/ML
10 INJECTION INTRAMUSCULAR; INTRAVENOUS
Status: DISCONTINUED | OUTPATIENT
Start: 2018-12-05 | End: 2018-12-05 | Stop reason: HOSPADM

## 2018-12-05 RX ORDER — DEXAMETHASONE SODIUM PHOSPHATE 10 MG/ML
INJECTION INTRAMUSCULAR; INTRAVENOUS PRN
Status: DISCONTINUED | OUTPATIENT
Start: 2018-12-05 | End: 2018-12-05 | Stop reason: SDUPTHER

## 2018-12-05 RX ORDER — ROCURONIUM BROMIDE 10 MG/ML
INJECTION, SOLUTION INTRAVENOUS PRN
Status: DISCONTINUED | OUTPATIENT
Start: 2018-12-05 | End: 2018-12-05 | Stop reason: SDUPTHER

## 2018-12-05 RX ORDER — MORPHINE SULFATE 2 MG/ML
2 INJECTION, SOLUTION INTRAMUSCULAR; INTRAVENOUS EVERY 5 MIN PRN
Status: DISCONTINUED | OUTPATIENT
Start: 2018-12-05 | End: 2018-12-05 | Stop reason: HOSPADM

## 2018-12-05 RX ORDER — SCOLOPAMINE TRANSDERMAL SYSTEM 1 MG/1
1 PATCH, EXTENDED RELEASE TRANSDERMAL ONCE
Status: DISCONTINUED | OUTPATIENT
Start: 2018-12-05 | End: 2018-12-05 | Stop reason: HOSPADM

## 2018-12-05 RX ORDER — PROMETHAZINE HYDROCHLORIDE 25 MG/ML
6.25 INJECTION, SOLUTION INTRAMUSCULAR; INTRAVENOUS
Status: DISCONTINUED | OUTPATIENT
Start: 2018-12-05 | End: 2018-12-05 | Stop reason: HOSPADM

## 2018-12-05 RX ORDER — ONDANSETRON 2 MG/ML
4 INJECTION INTRAMUSCULAR; INTRAVENOUS EVERY 4 HOURS PRN
Status: DISCONTINUED | OUTPATIENT
Start: 2018-12-05 | End: 2018-12-05 | Stop reason: HOSPADM

## 2018-12-05 RX ORDER — CIPROFLOXACIN 2 MG/ML
400 INJECTION, SOLUTION INTRAVENOUS ONCE
Status: COMPLETED | OUTPATIENT
Start: 2018-12-05 | End: 2018-12-05

## 2018-12-05 RX ORDER — ONDANSETRON 2 MG/ML
INJECTION INTRAMUSCULAR; INTRAVENOUS PRN
Status: DISCONTINUED | OUTPATIENT
Start: 2018-12-05 | End: 2018-12-05 | Stop reason: SDUPTHER

## 2018-12-05 RX ADMIN — SUGAMMADEX 200 MG: 100 INJECTION, SOLUTION INTRAVENOUS at 13:31

## 2018-12-05 RX ADMIN — EPHEDRINE SULFATE 10 MG: 50 INJECTION, SOLUTION INTRAMUSCULAR; INTRAVENOUS; SUBCUTANEOUS at 12:49

## 2018-12-05 RX ADMIN — PROPOFOL 150 MG: 10 INJECTION, EMULSION INTRAVENOUS at 12:44

## 2018-12-05 RX ADMIN — FENTANYL CITRATE 50 MCG: 50 INJECTION INTRAMUSCULAR; INTRAVENOUS at 12:44

## 2018-12-05 RX ADMIN — PHENAZOPYRIDINE HYDROCHLORIDE 100 MG: 100 TABLET ORAL at 14:16

## 2018-12-05 RX ADMIN — CIPROFLOXACIN 400 MG: 2 INJECTION, SOLUTION INTRAVENOUS at 12:50

## 2018-12-05 RX ADMIN — KETOROLAC TROMETHAMINE 15 MG: 30 INJECTION, SOLUTION INTRAMUSCULAR; INTRAVENOUS at 14:17

## 2018-12-05 RX ADMIN — TAMSULOSIN HYDROCHLORIDE 0.4 MG: 0.4 CAPSULE ORAL at 14:16

## 2018-12-05 RX ADMIN — Medication 30 MG: at 12:44

## 2018-12-05 RX ADMIN — ROCURONIUM BROMIDE 40 MG: 10 INJECTION INTRAVENOUS at 12:44

## 2018-12-05 RX ADMIN — LIDOCAINE HYDROCHLORIDE 50 MG: 10 INJECTION, SOLUTION INFILTRATION; PERINEURAL at 12:44

## 2018-12-05 RX ADMIN — DEXAMETHASONE SODIUM PHOSPHATE 10 MG: 10 INJECTION INTRAMUSCULAR; INTRAVENOUS at 12:51

## 2018-12-05 RX ADMIN — SODIUM CHLORIDE, SODIUM LACTATE, POTASSIUM CHLORIDE, AND CALCIUM CHLORIDE: 600; 310; 30; 20 INJECTION, SOLUTION INTRAVENOUS at 10:18

## 2018-12-05 RX ADMIN — ONDANSETRON HYDROCHLORIDE 4 MG: 2 INJECTION, SOLUTION INTRAMUSCULAR; INTRAVENOUS at 13:31

## 2018-12-05 RX ADMIN — EPHEDRINE SULFATE 10 MG: 50 INJECTION, SOLUTION INTRAMUSCULAR; INTRAVENOUS; SUBCUTANEOUS at 12:57

## 2018-12-05 RX ADMIN — APREPITANT 40 MG: 40 CAPSULE ORAL at 10:27

## 2018-12-05 RX ADMIN — FENTANYL CITRATE 50 MCG: 50 INJECTION INTRAMUSCULAR; INTRAVENOUS at 13:13

## 2018-12-05 RX ADMIN — EPHEDRINE SULFATE 10 MG: 50 INJECTION, SOLUTION INTRAMUSCULAR; INTRAVENOUS; SUBCUTANEOUS at 13:04

## 2018-12-05 RX ADMIN — ROCURONIUM BROMIDE 10 MG: 10 INJECTION INTRAVENOUS at 12:53

## 2018-12-05 ASSESSMENT — PAIN SCALES - GENERAL
PAINLEVEL_OUTOF10: 0

## 2018-12-05 ASSESSMENT — LIFESTYLE VARIABLES: SMOKING_STATUS: 0

## 2018-12-05 ASSESSMENT — ENCOUNTER SYMPTOMS: SHORTNESS OF BREATH: 0

## 2018-12-05 NOTE — ANESTHESIA PRE PROCEDURE
Department of Anesthesiology  Preprocedure Note       Name:  Sandra Sweeney   Age:  80 y.o.  :  1950                                          MRN:  [id number]         Date:  2018      Surgeon: Tabitha Beltran):  Papo Chaparro MD    Procedure: CYSTOSCOPY URETEROSCOPY LASER LITHOTRIPSY STONE BASKET EXTRACTION PLACEMENT DOUBLE J STENT (Left )    Medications prior to admission:   Prior to Admission medications    Medication Sig Start Date End Date Taking? Authorizing Provider   tamsulosin (FLOMAX) 0.4 MG capsule Take 1 capsule by mouth daily 10/31/18   Avril De La Garza MD   lisinopril (PRINIVIL;ZESTRIL) 40 MG tablet Take 1 tablet by mouth daily 10/30/18   LUIZ Castañeda   amLODIPine (NORVASC) 5 MG tablet Take 1 tablet by mouth daily 18   LUIZ Castañeda   meloxicam (MOBIC) 15 MG tablet Take 1 tablet by mouth daily 18   LUIZ Castañeda   furosemide (LASIX) 40 MG tablet Take 1 tablet by mouth daily 18   LUIZ Proctor   atorvastatin (LIPITOR) 80 MG tablet Take 1 tablet by mouth daily 18   LUIZ Castañeda   aspirin 81 MG tablet Aspir-81 81 mg tablet,delayed release   Take 1 tablet every day by oral route.     Historical Provider, MD   hydrOXYzine (ATARAX) 50 MG tablet hydroxyzine HCl 50 mg tablet   TAKE ONE TABLET BY MOUTH TWICE DAILY AS NEEDED    Historical Provider, MD   famotidine (PEPCID) 20 MG tablet Take 1 tablet by mouth 2 times daily 4/18/18 10/31/18  LUIZ Castañeda   Multiple Vitamins-Minerals (MULTIVITAMIN ADULT PO) Take by mouth    Historical Provider, MD   omeprazole (PRILOSEC) 20 MG delayed release capsule Take 1 capsule by mouth 2 times daily 30 minutes before breakfast and supper 3/23/18   LUIZ Keith   venlafaxine (EFFEXOR XR) 150 MG extended release capsule TAKE ONE CAPSULE BY MOUTH ONCE DAILY 3/23/18   LUIZ Keith   ondansetron (ZOFRAN ODT) 4 MG disintegrating tablet Take 1 tablet by mouth every 8 hours as needed for Nausea or Surgical History:        Procedure Laterality Date    CHOLECYSTECTOMY      COLONOSCOPY  07/01/2015    Dr. Esequiel Clark EGD TRANSORAL BIOPSY SINGLE/MULTIPLE N/A 5/8/2018    Dr Vinicio Quiroz (-) Kristin (+) Dye's (-) dysplasia--3 yr recall    [place] LAP,CHOLECYSTECTOMY N/A 3/6/2018    CHOLECYSTECTOMY LAPAROSCOPIC performed by Harsha Mackay MD at [address]  08/26/2015    Dr. Nico Araya  5/23/2017    Dr Osorio-w/balloon dilation, 12-13. 5-15 mm-esophageal narrowing with diverticulum at 29 cm-Kristin (-), hiatal herni, Dye's (+) dysplasia (-)--1st dx--1 yr recall-Ct chest ordered    WRIST FRACTURE SURGERY         Social History:    Social History   Substance Use Topics    Smoking status: Never Smoker    Smokeless tobacco: Never Used    Alcohol use No                                Counseling given: Not Answered      Vital Signs (Current): There were no vitals filed for this visit.                                            BP Readings from Last 3 Encounters:   11/13/18 (!) 143/79   11/02/18 126/72   10/31/18 138/84       NPO Status:                                                                                 BMI:   Wt Readings from Last 3 Encounters:   11/29/18 208 lb (94.3 kg)   11/13/18 208 lb (94.3 kg)   11/02/18 209 lb (94.8 kg)     There is no height or weight on file to calculate BMI.    CBC:   Lab Results   Component Value Date    WBC 7.9 11/29/2018    RBC 3.94 11/29/2018    HGB 11.8 11/29/2018    HCT 36.6 11/29/2018    HCT 38.2 03/16/2011    MCV 92.9 11/29/2018    RDW 11.9 11/29/2018     11/29/2018     03/16/2011       CMP:   Lab Results   Component Value Date     11/29/2018     03/14/2011    K 3.9 11/29/2018    K 3.9 03/03/2018    K 4.0 03/14/2011     11/29/2018     03/14/2011    CO2 27 11/29/2018    BUN 13 11/29/2018    CREATININE 0.6 11/29/2018    CREATININE 0.8 03/14/2011    LABGLOM >60 11/29/2018

## 2018-12-05 NOTE — H&P (VIEW-ONLY)
Pilar De Anda is a 80 y.o. female who presents today   Chief Complaint   Patient presents with    Follow-up     Patient presents today for a follow up for left ureteral stone. Patient states she does not think she has passed the stone, she states she is still having pain. HPI:       Pilar De Anda presents For follow-up of left UVJ stone. She was seen at [facility] ED on 10/31/18, she was then seen at [facility] urology on 11/2/18. A CT of the abdomen and pelvis with IV contrast that was obtained in the ED showed mild dilation of the ureter and renal collecting system. Patient started Flomax on 11/2/18, pain has been controlled. At this time patient has not passed the stone and she continues to have left lower quadrant pain. Urinalysis is negative for infection. Past Medical History:   Diagnosis Date    Anxiety     Depression     Edema     GERD (gastroesophageal reflux disease)     Headache(784.0)     migraines    Hyperlipidemia     Hypertension     Mini stroke ([address]     TIA (transient ischemic attack)       Past Surgical History:   Procedure Laterality Date    CHOLECYSTECTOMY      COLONOSCOPY  07/01/2015    Dr. Aicha Rivas EGD TRANSORAL BIOPSY SINGLE/MULTIPLE N/A 5/8/2018    Dr Loni Solomon (-) Kristin (+) Dye's (-) dysplasia--3 yr recall    [place] LAP,CHOLECYSTECTOMY N/A 3/6/2018    CHOLECYSTECTOMY LAPAROSCOPIC performed by Divya Darby MD at [address] [facility]  08/26/2015    Dr. Nayeli Byrne  5/23/2017    Dr Osorio-w/balloon dilation, 12-13. 5-15 mm-esophageal narrowing with diverticulum at 34 cm-Kristin (-), hiatal herni, Dye's (+) dysplasia (-)--1st dx--1 yr recall-Ct chest ordered    WRIST FRACTURE SURGERY         Family History   Problem Relation Age of Onset    Heart Disease Mother     Colon Cancer Neg Hx     Colon Polyps Neg Hx     Liver Cancer Neg Hx     Liver Disease Neg Hx     Esophageal Cancer Neg Hx     Rectal Cancer Neg Hx  Stomach Cancer Neg Hx        Social History   Substance Use Topics    Smoking status: Never Smoker    Smokeless tobacco: Never Used    Alcohol use No      Current Outpatient Prescriptions   Medication Sig Dispense Refill    tamsulosin (FLOMAX) 0.4 MG capsule Take 1 capsule by mouth daily 30 capsule 3    ondansetron (ZOFRAN) 4 MG tablet Take 1 tablet by mouth every 8 hours as needed for Nausea or Vomiting 30 tablet 0    lisinopril (PRINIVIL;ZESTRIL) 40 MG tablet Take 1 tablet by mouth daily 30 tablet 11    amLODIPine (NORVASC) 5 MG tablet Take 1 tablet by mouth daily 30 tablet 5    meloxicam (MOBIC) 15 MG tablet Take 1 tablet by mouth daily 30 tablet 3    furosemide (LASIX) 40 MG tablet Take 1 tablet by mouth daily 30 tablet 11    atorvastatin (LIPITOR) 80 MG tablet Take 1 tablet by mouth daily 30 tablet 5    aspirin 81 MG tablet Aspir-81 81 mg tablet,delayed release   Take 1 tablet every day by oral route.  loratadine (CLARITIN) 10 MG tablet Take 10 mg by mouth      hydrOXYzine (ATARAX) 50 MG tablet hydroxyzine HCl 50 mg tablet   TAKE ONE TABLET BY MOUTH TWICE DAILY AS NEEDED      Multiple Vitamins-Minerals (MULTIVITAMIN ADULT PO) Take by mouth      omeprazole (PRILOSEC) 20 MG delayed release capsule Take 1 capsule by mouth 2 times daily 30 minutes before breakfast and supper 60 capsule 11    venlafaxine (EFFEXOR XR) 150 MG extended release capsule TAKE ONE CAPSULE BY MOUTH ONCE DAILY 30 capsule 5    ondansetron (ZOFRAN ODT) 4 MG disintegrating tablet Take 1 tablet by mouth every 8 hours as needed for Nausea or Vomiting 20 tablet 0    Omega-3 Fatty Acids (FISH OIL) 1000 MG CAPS Take 2 capsules by mouth 2 times daily 120 capsule 3    famotidine (PEPCID) 20 MG tablet Take 1 tablet by mouth 2 times daily 60 tablet 5     No current facility-administered medications for this visit. Allergies   Allergen Reactions    Lipitor [Atorvastatin] Other (See Comments)     Other reaction(s):  Other There is a small distal left ureteral stone near the ureterovesical   junction measuring 2-3 mm in size. There is mild dilatation of the   left ureter and left renal collecting system. There is mild stranding   of the fat around the left kidney related to the obstruction. 2. Small low-density renal lesions bilaterally that are likely cysts   but are too small to fully characterize. The largest is on the left   measuring about 1 cm. 3. Small hiatal hernia. 4. Fatty infiltration of the liver. Prior cholecystectomy. 5. Diverticulosis of the colon. No CT findings of diverticulitis. No   small bowel distention. Normal appendix. 6. Other nonacute findings, as discussed above. Assessment/ Plan       Diagnosis Orders   1. Left ureteral stone     2. Pain with urination  POCT Urinalysis no Micro     Risk and benefits of surgery discussed with patient including infection, postop pain, anesthesia events, perforation of ureter. Patient chooses to proceed with cystoscopy, ureteroscopy, laser lithotripsy, and placement of double-J stent. Discussed use, benefit, and side effects of prescribed medications. All patient questions answered. Pt voiced understanding. Patient agreed with treatment plan.        Electronically signed by LUIZ Hawkins on 11/13/2018 at 5:03 PM

## 2018-12-06 ENCOUNTER — OFFICE VISIT (OUTPATIENT)
Dept: OBGYN | Age: 68
End: 2018-12-06
Payer: MEDICARE

## 2018-12-06 ENCOUNTER — HOSPITAL ENCOUNTER (OUTPATIENT)
Age: 68
Setting detail: SPECIMEN
Discharge: HOME | End: 2018-12-06
Payer: MEDICARE

## 2018-12-06 VITALS
BODY MASS INDEX: 36.54 KG/M2 | WEIGHT: 214 LBS | TEMPERATURE: 99.8 F | SYSTOLIC BLOOD PRESSURE: 142 MMHG | HEART RATE: 81 BPM | HEIGHT: 64 IN | DIASTOLIC BLOOD PRESSURE: 74 MMHG

## 2018-12-06 DIAGNOSIS — N81.4 UTERINE PROLAPSE: Primary | ICD-10-CM

## 2018-12-06 PROCEDURE — 1123F ACP DISCUSS/DSCN MKR DOCD: CPT | Performed by: OBSTETRICS & GYNECOLOGY

## 2018-12-06 PROCEDURE — 1036F TOBACCO NON-USER: CPT | Performed by: OBSTETRICS & GYNECOLOGY

## 2018-12-06 PROCEDURE — 1090F PRES/ABSN URINE INCON ASSESS: CPT | Performed by: OBSTETRICS & GYNECOLOGY

## 2018-12-06 PROCEDURE — 3017F COLORECTAL CA SCREEN DOC REV: CPT | Performed by: OBSTETRICS & GYNECOLOGY

## 2018-12-06 PROCEDURE — 4040F PNEUMOC VAC/ADMIN/RCVD: CPT | Performed by: OBSTETRICS & GYNECOLOGY

## 2018-12-06 PROCEDURE — 88305 TISSUE EXAM BY PATHOLOGIST: CPT

## 2018-12-06 PROCEDURE — G8399 PT W/DXA RESULTS DOCUMENT: HCPCS | Performed by: OBSTETRICS & GYNECOLOGY

## 2018-12-06 PROCEDURE — G8427 DOCREV CUR MEDS BY ELIG CLIN: HCPCS | Performed by: OBSTETRICS & GYNECOLOGY

## 2018-12-06 PROCEDURE — 99203 OFFICE O/P NEW LOW 30 MIN: CPT | Performed by: OBSTETRICS & GYNECOLOGY

## 2018-12-06 PROCEDURE — 58100 BIOPSY OF UTERUS LINING: CPT | Performed by: OBSTETRICS & GYNECOLOGY

## 2018-12-06 PROCEDURE — G8417 CALC BMI ABV UP PARAM F/U: HCPCS | Performed by: OBSTETRICS & GYNECOLOGY

## 2018-12-06 PROCEDURE — 1101F PT FALLS ASSESS-DOCD LE1/YR: CPT | Performed by: OBSTETRICS & GYNECOLOGY

## 2018-12-06 PROCEDURE — G8484 FLU IMMUNIZE NO ADMIN: HCPCS | Performed by: OBSTETRICS & GYNECOLOGY

## 2018-12-06 ASSESSMENT — ENCOUNTER SYMPTOMS
GASTROINTESTINAL NEGATIVE: 1
EYES NEGATIVE: 1
RESPIRATORY NEGATIVE: 1

## 2018-12-06 NOTE — PROGRESS NOTES
Hx      Social History   Substance Use Topics    Smoking status: Never Smoker    Smokeless tobacco: Never Used    Alcohol use No       Current Outpatient Prescriptions   Medication Sig Dispense Refill    phenazopyridine (PYRIDIUM) 100 MG tablet Take 1 tablet by mouth 3 times daily as needed for Pain (for burning and spasms) 30 tablet 1    oxyCODONE-acetaminophen (PERCOCET) 5-325 MG per tablet Take 1 tablet by mouth every 6 hours as needed for Pain for up to 5 days. . 20 tablet 0    ciprofloxacin (CIPRO) 500 MG tablet Take 1 tablet by mouth 2 times daily for 3 days 6 tablet 0    tamsulosin (FLOMAX) 0.4 MG capsule Take 1 capsule by mouth daily 30 capsule 3    lisinopril (PRINIVIL;ZESTRIL) 40 MG tablet Take 1 tablet by mouth daily 30 tablet 11    amLODIPine (NORVASC) 5 MG tablet Take 1 tablet by mouth daily 30 tablet 5    meloxicam (MOBIC) 15 MG tablet Take 1 tablet by mouth daily 30 tablet 3    furosemide (LASIX) 40 MG tablet Take 1 tablet by mouth daily 30 tablet 11    atorvastatin (LIPITOR) 80 MG tablet Take 1 tablet by mouth daily 30 tablet 5    aspirin 81 MG tablet Aspir-81 81 mg tablet,delayed release   Take 1 tablet every day by oral route.       hydrOXYzine (ATARAX) 50 MG tablet hydroxyzine HCl 50 mg tablet   TAKE ONE TABLET BY MOUTH TWICE DAILY AS NEEDED      famotidine (PEPCID) 20 MG tablet Take 1 tablet by mouth 2 times daily 60 tablet 5    Multiple Vitamins-Minerals (MULTIVITAMIN ADULT PO) Take by mouth      omeprazole (PRILOSEC) 20 MG delayed release capsule Take 1 capsule by mouth 2 times daily 30 minutes before breakfast and supper 60 capsule 11    venlafaxine (EFFEXOR XR) 150 MG extended release capsule TAKE ONE CAPSULE BY MOUTH ONCE DAILY 30 capsule 5    ondansetron (ZOFRAN ODT) 4 MG disintegrating tablet Take 1 tablet by mouth every 8 hours as needed for Nausea or Vomiting 20 tablet 0    Omega-3 Fatty Acids (FISH OIL) 1000 MG CAPS Take 2 capsules by mouth 2 times daily 120 capsule 3 the anterior lip of the cervix for stabilization. A sterile uterine sound was used to sound the uterus to a depth of 10 cm. A Pipelle endometrial aspirator was used to sample the endometrium. Sample was sent for pathologic examination. Condition:  Stable    Complications:  None    Plan:    The patient was advised to call for any fever or for prolonged or severe pain or bleeding. She was advised to use OTC ibuprofen as needed for mild to moderate pain. She was advised to avoid vaginal intercourse for 48 hours or until the bleeding has completely stopped. Attending Physician Documentation:  I was present for or participated in the entire procedure, including opening and closing. Diagnosis Orders   1. Uterine prolapse  46961 - DE BIOPSY OF UTERUS LINING    Specimen to Pathology Outpatient       MEDICATIONS:  No orders of the defined types were placed in this encounter. ORDERS:  Orders Placed This Encounter   Procedures    Specimen to Pathology Outpatient    75537 - DE BIOPSY OF UTERUS LINING       PLAN:    Will plan on hysterectomy for treatment. Surgery tentatively aaron'd on 1/31/19 and pt will RTO for preop. EMB obtained today prior to surgery. All questions answered. Maynor Oleary MD, personally performed services described in this document as scribed by Jaime Jefferson RN in my presence, and it is both accurate and complete. There are no Patient Instructions on file for this visit.

## 2018-12-06 NOTE — OP NOTE
[facility]ON[facility] OF [facility] [address] [facility]                                OPERATIVE REPORT    PATIENT NAME: Stephanie Bridges                     :        1950  MED REC NO:   [id number]                              ROOM:  ACCOUNT NO:   [de-identified]                           ADMIT DATE: 2018  PROVIDER:     Ginger Jackson MD    DATE OF PROCEDURE:  2018    TITLE OF OPERATION:  1. Cystoscopy, left ureteroscopy and placement of a left 5-Italian x 26  cm double-J ureteral stent. 2.  Right ureteral catheterization. 3.  Right retrograde pyelogram.    PREOPERATIVE DIAGNOSES:  1. Left ureteral calculus. 2.  Abdominal/pelvic pain. POSTOPERATIVE DIAGNOSES:  1.  No stone seen. 2.  Normal bilateral ureters without stones. 3.  Uterine prolapse. ANESTHESIA:  General anesthetic. SURGEON:  Ginger Jackson MD    HISTORY:  The patient is a 50-year-old female who was seen for symptoms  of pelvic pain, localized to the left side. She had a CT urogram, which  showed left hydronephrosis down to what appeared to be about 2-3 mm  calcific density in the area of the distal left ureter. It was felt  that this was a distal left UVJ calculus. She failed to pass this with  conservative management. She continued to complain of pain and actually  complained of bilateral pelvic pain. Therefore, she now is to undergo  left ureteroscopy, stone extraction and stent placement. Because of the  pain on the right side as well, I planned a right retrograde pyelogram.   The risks and complications of the procedure were discussed with her. OPERATIVE PROCEDURE:  The patient was brought to the operating room and  underwent general anesthetic. She was placed in the lithotomy position. It became immediate that she had prolapse of her vaginal cuff and cervix  all the way out the introitus.   I did a bimanual examination, and  indeed, this confirmed that she had

## 2018-12-11 ENCOUNTER — TELEPHONE (OUTPATIENT)
Dept: OBGYN | Age: 68
End: 2018-12-11

## 2018-12-11 ENCOUNTER — APPOINTMENT (OUTPATIENT)
Dept: CT IMAGING | Age: 68
End: 2018-12-11
Payer: MEDICARE

## 2018-12-11 ENCOUNTER — OFFICE VISIT (OUTPATIENT)
Dept: UROLOGY | Age: 68
End: 2018-12-11
Payer: MEDICARE

## 2018-12-11 ENCOUNTER — HOSPITAL ENCOUNTER (EMERGENCY)
Age: 68
Discharge: HOME | End: 2018-12-11
Attending: EMERGENCY MEDICINE
Payer: MEDICARE

## 2018-12-11 VITALS
WEIGHT: 204 LBS | BODY MASS INDEX: 34.83 KG/M2 | HEART RATE: 87 BPM | OXYGEN SATURATION: 94 % | DIASTOLIC BLOOD PRESSURE: 66 MMHG | SYSTOLIC BLOOD PRESSURE: 136 MMHG | HEIGHT: 64 IN | TEMPERATURE: 97.9 F | RESPIRATION RATE: 17 BRPM

## 2018-12-11 VITALS — TEMPERATURE: 96.8 F | HEIGHT: 64 IN | BODY MASS INDEX: 34.83 KG/M2 | WEIGHT: 204 LBS

## 2018-12-11 DIAGNOSIS — N20.1 LEFT URETERAL STONE: ICD-10-CM

## 2018-12-11 DIAGNOSIS — Z46.6 ENCOUNTER FOR REMOVAL OF URETERAL STENT: Primary | ICD-10-CM

## 2018-12-11 DIAGNOSIS — B37.31 VAGINAL YEAST INFECTION: ICD-10-CM

## 2018-12-11 DIAGNOSIS — Z87.442 HISTORY OF NEPHROLITHIASIS: ICD-10-CM

## 2018-12-11 DIAGNOSIS — N12 PYELONEPHRITIS: Primary | ICD-10-CM

## 2018-12-11 DIAGNOSIS — N81.4 UTERINE PROLAPSE: ICD-10-CM

## 2018-12-11 LAB
ALBUMIN SERPL-MCNC: 4 G/DL (ref 3.5–5.2)
ALP BLD-CCNC: 97 U/L (ref 35–104)
ALT SERPL-CCNC: 12 U/L (ref 5–33)
ANION GAP SERPL CALCULATED.3IONS-SCNC: 17 MMOL/L (ref 7–19)
AST SERPL-CCNC: 13 U/L (ref 5–32)
BACTERIA URINE, POC: NORMAL
BASOPHILS ABSOLUTE: 0.1 K/UL (ref 0–0.2)
BASOPHILS RELATIVE PERCENT: 0.5 % (ref 0–1)
BILIRUB SERPL-MCNC: 0.4 MG/DL (ref 0.2–1.2)
BILIRUBIN URINE: 1 MG/DL
BLOOD, URINE: POSITIVE
BUN BLDV-MCNC: 12 MG/DL (ref 8–23)
CALCIUM SERPL-MCNC: 10.5 MG/DL (ref 8.8–10.2)
CASTS URINE, POC: NORMAL
CHLORIDE BLD-SCNC: 99 MMOL/L (ref 98–111)
CLARITY: CLEAR
CO2: 24 MMOL/L (ref 22–29)
COLOR: NORMAL
CREAT SERPL-MCNC: 0.8 MG/DL (ref 0.5–0.9)
CRYSTALS URINE, POC: NORMAL
EOSINOPHILS ABSOLUTE: 0.1 K/UL (ref 0–0.6)
EOSINOPHILS RELATIVE PERCENT: 1.2 % (ref 0–5)
EPI CELLS URINE, POC: NORMAL
GFR NON-AFRICAN AMERICAN: >60
GLUCOSE BLD-MCNC: 182 MG/DL (ref 74–109)
GLUCOSE URINE: POSITIVE
HCT VFR BLD CALC: 42.8 % (ref 37–47)
HEMOGLOBIN: 13.7 G/DL (ref 12–16)
KETONES, URINE: NEGATIVE
LEUKOCYTE EST, POC: NORMAL
LYMPHOCYTES ABSOLUTE: 1.1 K/UL (ref 1.1–4.5)
LYMPHOCYTES RELATIVE PERCENT: 9.8 % (ref 20–40)
MCH RBC QN AUTO: 29.5 PG (ref 27–31)
MCHC RBC AUTO-ENTMCNC: 32 G/DL (ref 33–37)
MCV RBC AUTO: 92.2 FL (ref 81–99)
MONOCYTES ABSOLUTE: 0.7 K/UL (ref 0–0.9)
MONOCYTES RELATIVE PERCENT: 6 % (ref 0–10)
NEUTROPHILS ABSOLUTE: 9 K/UL (ref 1.5–7.5)
NEUTROPHILS RELATIVE PERCENT: 82.1 % (ref 50–65)
NITRITE, URINE: POSITIVE
PDW BLD-RTO: 12.2 % (ref 11.5–14.5)
PH UA: 6 (ref 4.5–8)
PLATELET # BLD: 286 K/UL (ref 130–400)
PMV BLD AUTO: 9.6 FL (ref 9.4–12.3)
POTASSIUM SERPL-SCNC: 3.8 MMOL/L (ref 3.5–5)
PROTEIN UA: POSITIVE
RBC # BLD: 4.64 M/UL (ref 4.2–5.4)
RBC URINE, POC: NORMAL
SODIUM BLD-SCNC: 140 MMOL/L (ref 136–145)
SPECIFIC GRAVITY UA: 1.03 (ref 1–1.03)
TOTAL PROTEIN: 7.7 G/DL (ref 6.6–8.7)
UROBILINOGEN, URINE: NORMAL
WBC # BLD: 11 K/UL (ref 4.8–10.8)
WBC URINE, POC: NORMAL
YEAST URINE, POC: NORMAL

## 2018-12-11 PROCEDURE — 96376 TX/PRO/DX INJ SAME DRUG ADON: CPT

## 2018-12-11 PROCEDURE — 1036F TOBACCO NON-USER: CPT | Performed by: NURSE PRACTITIONER

## 2018-12-11 PROCEDURE — 81001 URINALYSIS AUTO W/SCOPE: CPT | Performed by: NURSE PRACTITIONER

## 2018-12-11 PROCEDURE — G8427 DOCREV CUR MEDS BY ELIG CLIN: HCPCS | Performed by: NURSE PRACTITIONER

## 2018-12-11 PROCEDURE — 80053 COMPREHEN METABOLIC PANEL: CPT

## 2018-12-11 PROCEDURE — 74150 CT ABDOMEN W/O CONTRAST: CPT

## 2018-12-11 PROCEDURE — 2580000003 HC RX 258: Performed by: EMERGENCY MEDICINE

## 2018-12-11 PROCEDURE — 99284 EMERGENCY DEPT VISIT MOD MDM: CPT

## 2018-12-11 PROCEDURE — G8484 FLU IMMUNIZE NO ADMIN: HCPCS | Performed by: NURSE PRACTITIONER

## 2018-12-11 PROCEDURE — 6360000002 HC RX W HCPCS: Performed by: EMERGENCY MEDICINE

## 2018-12-11 PROCEDURE — 1090F PRES/ABSN URINE INCON ASSESS: CPT | Performed by: NURSE PRACTITIONER

## 2018-12-11 PROCEDURE — 99213 OFFICE O/P EST LOW 20 MIN: CPT | Performed by: NURSE PRACTITIONER

## 2018-12-11 PROCEDURE — 85025 COMPLETE CBC W/AUTO DIFF WBC: CPT

## 2018-12-11 PROCEDURE — G8417 CALC BMI ABV UP PARAM F/U: HCPCS | Performed by: NURSE PRACTITIONER

## 2018-12-11 PROCEDURE — 4040F PNEUMOC VAC/ADMIN/RCVD: CPT | Performed by: NURSE PRACTITIONER

## 2018-12-11 PROCEDURE — 1123F ACP DISCUSS/DSCN MKR DOCD: CPT | Performed by: NURSE PRACTITIONER

## 2018-12-11 PROCEDURE — 96374 THER/PROPH/DIAG INJ IV PUSH: CPT

## 2018-12-11 PROCEDURE — 36415 COLL VENOUS BLD VENIPUNCTURE: CPT

## 2018-12-11 PROCEDURE — G8399 PT W/DXA RESULTS DOCUMENT: HCPCS | Performed by: NURSE PRACTITIONER

## 2018-12-11 PROCEDURE — 1101F PT FALLS ASSESS-DOCD LE1/YR: CPT | Performed by: NURSE PRACTITIONER

## 2018-12-11 PROCEDURE — 99284 EMERGENCY DEPT VISIT MOD MDM: CPT | Performed by: EMERGENCY MEDICINE

## 2018-12-11 PROCEDURE — 3017F COLORECTAL CA SCREEN DOC REV: CPT | Performed by: NURSE PRACTITIONER

## 2018-12-11 RX ORDER — 0.9 % SODIUM CHLORIDE 0.9 %
1000 INTRAVENOUS SOLUTION INTRAVENOUS ONCE
Status: COMPLETED | OUTPATIENT
Start: 2018-12-11 | End: 2018-12-11

## 2018-12-11 RX ORDER — LEVOFLOXACIN 500 MG/1
500 TABLET, FILM COATED ORAL DAILY
Qty: 14 TABLET | Refills: 0 | Status: SHIPPED | OUTPATIENT
Start: 2018-12-11 | End: 2018-12-25

## 2018-12-11 RX ORDER — OXYCODONE AND ACETAMINOPHEN 7.5; 325 MG/1; MG/1
1 TABLET ORAL EVERY 6 HOURS PRN
Qty: 15 TABLET | Refills: 0 | Status: SHIPPED | OUTPATIENT
Start: 2018-12-11 | End: 2018-12-18

## 2018-12-11 RX ADMIN — Medication 1 MG: at 16:41

## 2018-12-11 RX ADMIN — SODIUM CHLORIDE 1000 ML: 9 INJECTION, SOLUTION INTRAVENOUS at 14:58

## 2018-12-11 RX ADMIN — Medication 1 MG: at 14:58

## 2018-12-11 ASSESSMENT — PAIN SCALES - GENERAL
PAINLEVEL_OUTOF10: 10
PAINLEVEL_OUTOF10: 7
PAINLEVEL_OUTOF10: 5
PAINLEVEL_OUTOF10: 10

## 2018-12-11 ASSESSMENT — ENCOUNTER SYMPTOMS
DIARRHEA: 0
ABDOMINAL PAIN: 1
SHORTNESS OF BREATH: 0
CHEST TIGHTNESS: 0
VOMITING: 0
SHORTNESS OF BREATH: 0

## 2018-12-11 ASSESSMENT — PAIN DESCRIPTION - ORIENTATION: ORIENTATION: LEFT

## 2018-12-11 ASSESSMENT — PAIN DESCRIPTION - LOCATION: LOCATION: FLANK

## 2018-12-11 NOTE — PROGRESS NOTES
Rajesh Arenas is a 80 y.o. female who presents today   Chief Complaint   Patient presents with    Follow-up     pt here today for stent removal  pt still having flank pain  and burning while urinating            HPI:       Rajesh Arenas presents For stent removal.  Patient was seen 11/02/18 for left flank pain, a CT showed a 2-3 mm distal left ureteral stone at the UVJ. Patient then followed up on 11/13/18, at which time the stone was still present. She was then underwent cystoscopy, ureteroscopy and removal of the stone on 12/5/18. No stone was seen in the OR, right ureteral catheterization, right retrograde pyelogram, and placement of double-J stent was completed. Due to bilateral pelvic pain patient was referred to gynecology for uterine prolapse. Past Medical History:   Diagnosis Date    Anxiety     Depression     Edema     GERD (gastroesophageal reflux disease)     Headache(784.0)     migraines    Hyperlipidemia     Hypertension     Mini stroke (HCC)     Sleep apnea     CPAP    TIA (transient ischemic attack)       Past Surgical History:   Procedure Laterality Date    CHOLECYSTECTOMY      COLONOSCOPY  07/01/2015    Dr. Sada Gonzalez Left 12/5/2018    CYSTOSCOPY URETEROSCOPY  PLACEMENT DOUBLE J STENT ON LEFT RIGHT  RETROGRADE PYELOGRAMS performed by Bj Yates MD at [facility] EGD TRANSORAL BIOPSY SINGLE/MULTIPLE N/A 5/8/2018    Dr Ac Conley (-) Kristin (+) Dye's (-) dysplasia--3 yr recall    [place] LAP,CHOLECYSTECTOMY N/A 3/6/2018    CHOLECYSTECTOMY LAPAROSCOPIC performed by Caridad Dale MD at [facility]  08/26/2015    Dr. Andrzej Marie  5/23/2017    Dr Osorio-w/balloon dilation, 12-13. 5-15 mm-esophageal narrowing with diverticulum at 29 cm-Kristin (-), hiatal herni, Dye's (+) dysplasia (-)--1st dx--1 yr recall-Ct chest ordered    WRIST FRACTURE SURGERY         Family History   Problem Relation Age of Onset    Heart Disease Mother     Colon Cancer Neg Hx     Colon Polyps Neg Hx     Liver Cancer Neg Hx     Liver Disease Neg Hx     Esophageal Cancer Neg Hx     Rectal Cancer Neg Hx     Stomach Cancer Neg Hx        Social History   Substance Use Topics    Smoking status: Never Smoker    Smokeless tobacco: Never Used    Alcohol use No      Current Outpatient Prescriptions   Medication Sig Dispense Refill    terconazole (TERAZOL 3) 0.8 % vaginal cream Place vaginally nightly for 3 nights. 20 g 0    phenazopyridine (PYRIDIUM) 100 MG tablet Take 1 tablet by mouth 3 times daily as needed for Pain (for burning and spasms) 30 tablet 1    tamsulosin (FLOMAX) 0.4 MG capsule Take 1 capsule by mouth daily 30 capsule 3    lisinopril (PRINIVIL;ZESTRIL) 40 MG tablet Take 1 tablet by mouth daily 30 tablet 11    amLODIPine (NORVASC) 5 MG tablet Take 1 tablet by mouth daily 30 tablet 5    meloxicam (MOBIC) 15 MG tablet Take 1 tablet by mouth daily 30 tablet 3    furosemide (LASIX) 40 MG tablet Take 1 tablet by mouth daily 30 tablet 11    atorvastatin (LIPITOR) 80 MG tablet Take 1 tablet by mouth daily 30 tablet 5    aspirin 81 MG tablet Aspir-81 81 mg tablet,delayed release   Take 1 tablet every day by oral route.       hydrOXYzine (ATARAX) 50 MG tablet hydroxyzine HCl 50 mg tablet   TAKE ONE TABLET BY MOUTH TWICE DAILY AS NEEDED      Multiple Vitamins-Minerals (MULTIVITAMIN ADULT PO) Take by mouth      omeprazole (PRILOSEC) 20 MG delayed release capsule Take 1 capsule by mouth 2 times daily 30 minutes before breakfast and supper 60 capsule 11    venlafaxine (EFFEXOR XR) 150 MG extended release capsule TAKE ONE CAPSULE BY MOUTH ONCE DAILY 30 capsule 5    ondansetron (ZOFRAN ODT) 4 MG disintegrating tablet Take 1 tablet by mouth every 8 hours as needed for Nausea or Vomiting 20 tablet 0    Omega-3 Fatty Acids (FISH OIL) 1000 MG CAPS Take 2 capsules by mouth 2 times daily 120 capsule 3    famotidine (PEPCID) 20 MG

## 2018-12-11 NOTE — ED PROVIDER NOTES
[facility]  eMERGENCY dEPARTMENT eNCOUnter      Pt Name: Jake Hu  MRN: [id number]  [place] 1950  Date of evaluation: 12/11/2018  Provider: Lis Fallon MD    CHIEF COMPLAINT       Chief Complaint   Patient presents with    Post-op Problem         HISTORY OF PRESENT ILLNESS   (Location/Symptom, Timing/Onset,Context/Setting, Quality, Duration, Modifying Factors, Severity)  Note limiting factors. Jake Hu is a 80 y.o. female who presents to the emergency department For evaluation of moderate severity left flank pain. Patient reports that she was seen in the office of Dr. Brianna Chen today and underwent stent removal states that after the stent was removed and she went in Turkish sharp in nature, located over the left flank with some radiation to the left lower quadrant area. States that it has had no relieving factors. In review of her records from the office and appears to stent removal was unremarkable. Patient states that she has not had any fevers, chills or vomiting. HPI    NursingNotes were reviewed. REVIEW OF SYSTEMS    (2-9 systems for level 4, 10 or more for level 5)     Review of Systems   Constitutional: Negative for chills and fever. Respiratory: Negative for chest tightness and shortness of breath. Gastrointestinal: Positive for abdominal pain. Negative for diarrhea and vomiting. Genitourinary: Positive for flank pain. All other systems reviewed and are negative.            PAST MEDICALHISTORY     Past Medical History:   Diagnosis Date    Anxiety     Depression     Edema     GERD (gastroesophageal reflux disease)     Headache(784.0)     migraines    Hyperlipidemia     Hypertension     Mini stroke ([address]     Sleep apnea     CPAP    TIA (transient ischemic attack)          SURGICAL HISTORY       Past Surgical History:   Procedure Laterality Date    CHOLECYSTECTOMY      COLONOSCOPY  07/01/2015    Dr. Hubert Metcalf Left 12/5/2018    CYSTOSCOPY person, place, and time. She has normal strength. Skin: Capillary refill takes less than 2 seconds. No rash (left flank, left lower quadrant) noted. No pallor. Nursing note and vitals reviewed. DIAGNOSTIC RESULTS       RADIOLOGY:  Non-plain film images such as CT, Ultrasound and MRI are read by the radiologist. Plain radiographic images are visualized and preliminarily interpreted bythe emergency physician with the below findings:        CT Kidney WO Contrast   Final Result   Impression:   1. Mild to moderate left hydronephrosis and hydroureter to the level   of the urinary bladder. Marked surrounding inflammatory change   suggests infection. Urinary bladder wall thickening is felt to be   related to inflammation/infection. A mass is difficult to exclude   without contrast. Correlate clinically. 2. Mild left perinephric stranding. 3. Atherosclerotic disease. 4. Small hiatal hernia. Signed by Dr Adonis Tabor on 12/11/2018 3:46 PM              LABS:  Labs Reviewed   COMPREHENSIVE METABOLIC PANEL - Abnormal; Notable for the following:        Result Value    Glucose 182 (*)     Calcium 10.5 (*)     All other components within normal limits   CBC WITH AUTO DIFFERENTIAL - Abnormal; Notable for the following:     WBC 11.0 (*)     MCHC 32.0 (*)     Neutrophils % 82.1 (*)     Lymphocytes % 9.8 (*)     Neutrophils # 9.0 (*)     All other components within normal limits   URINE RT REFLEX TO CULTURE       All other labs were within normal range or not returned as of this dictation.     EMERGENCY DEPARTMENT COURSE and DIFFERENTIAL DIAGNOSIS/MDM:   Vitals:    Vitals:    12/11/18 1419 12/11/18 1606   BP: (!) 158/99 (!) 151/76   Pulse: 86 88   Resp: 19 18   Temp: 97.9 °F (36.6 °C)    SpO2: 100% 93%   Weight: 204 lb (92.5 kg)    Height: 5' 4\" (1.626 m)        MDM  Number of Diagnoses or Management Options  Pyelonephritis: new and requires workup     Amount and/or Complexity of Data Reviewed  Clinical lab tests: ordered and reviewed  Tests in the radiology section of CPT®: ordered and reviewed  Discuss the patient with other providers: yes  Independent visualization of images, tracings, or specimens: yes    Risk of Complications, Morbidity, and/or Mortality  Presenting problems: high  Management options: moderate        Reassessment    Patient's pain is improved at this time. I discussed with her the plan of care and she is agreeable. CONSULTS:    Case was reviewed with our Mary Dancer for Dr. Rambo Camarena regarding urology consultation. We reviewed patient's CT scan findings. Patient's pain is controlled at this time. We'll plan to start her on oral antibiotics and she will follow-up in the office on Friday of this week. PROCEDURES:  Unless otherwise noted below, none     Procedures    FINAL IMPRESSION      1. Pyelonephritis          DISPOSITION/PLAN   DISPOSITION Discharge - Pending Orders Complete 12/11/2018 04:21:03 PM      PATIENT REFERRED TO:  Jonathon Solsasha, [address]  [place] [address] [address]      Appointment Scheduled - Friday December 14th @ 1:30pm      DISCHARGE MEDICATIONS:  New Prescriptions    LEVOFLOXACIN (LEVAQUIN) 500 MG TABLET    Take 1 tablet by mouth daily for 14 days    OXYCODONE-ACETAMINOPHEN (PERCOCET) 7.5-325 MG PER TABLET    Take 1 tablet by mouth every 6 hours as needed for Pain for up to 7 days. .          (Please note that portions of this note were completed with a voice recognition program.  Efforts were made to edit thedictations but occasionally words are mis-transcribed.)    Fredo Maldonado MD (electronically signed)  Attending Emergency Physician         Fredo Maldonado MD  12/11/18 [id number]

## 2018-12-14 ENCOUNTER — OFFICE VISIT (OUTPATIENT)
Dept: UROLOGY | Age: 68
End: 2018-12-14
Payer: MEDICARE

## 2018-12-14 VITALS
HEART RATE: 92 BPM | TEMPERATURE: 98.1 F | HEIGHT: 64 IN | OXYGEN SATURATION: 95 % | BODY MASS INDEX: 35.56 KG/M2 | WEIGHT: 208.3 LBS | SYSTOLIC BLOOD PRESSURE: 162 MMHG | DIASTOLIC BLOOD PRESSURE: 84 MMHG

## 2018-12-14 DIAGNOSIS — N81.4 UTERINE PROLAPSE: ICD-10-CM

## 2018-12-14 DIAGNOSIS — N12 PYELONEPHRITIS: Primary | ICD-10-CM

## 2018-12-14 LAB
BACTERIA URINE, POC: NORMAL
BILIRUBIN URINE: 0 MG/DL
BLOOD, URINE: NEGATIVE
CASTS URINE, POC: NORMAL
CLARITY: CLEAR
COLOR: YELLOW
CRYSTALS URINE, POC: NORMAL
EPI CELLS URINE, POC: NORMAL
GLUCOSE URINE: NORMAL
KETONES, URINE: NEGATIVE
LEUKOCYTE EST, POC: NORMAL
NITRITE, URINE: NEGATIVE
PH UA: 7 (ref 4.5–8)
PROTEIN UA: NEGATIVE
RBC URINE, POC: NORMAL
SPECIFIC GRAVITY UA: 1.02 (ref 1–1.03)
UROBILINOGEN, URINE: NORMAL
WBC URINE, POC: NORMAL
YEAST URINE, POC: NORMAL

## 2018-12-14 PROCEDURE — 1090F PRES/ABSN URINE INCON ASSESS: CPT | Performed by: NURSE PRACTITIONER

## 2018-12-14 PROCEDURE — G8417 CALC BMI ABV UP PARAM F/U: HCPCS | Performed by: NURSE PRACTITIONER

## 2018-12-14 PROCEDURE — G8484 FLU IMMUNIZE NO ADMIN: HCPCS | Performed by: NURSE PRACTITIONER

## 2018-12-14 PROCEDURE — 4040F PNEUMOC VAC/ADMIN/RCVD: CPT | Performed by: NURSE PRACTITIONER

## 2018-12-14 PROCEDURE — G8427 DOCREV CUR MEDS BY ELIG CLIN: HCPCS | Performed by: NURSE PRACTITIONER

## 2018-12-14 PROCEDURE — 1036F TOBACCO NON-USER: CPT | Performed by: NURSE PRACTITIONER

## 2018-12-14 PROCEDURE — 3017F COLORECTAL CA SCREEN DOC REV: CPT | Performed by: NURSE PRACTITIONER

## 2018-12-14 PROCEDURE — 81001 URINALYSIS AUTO W/SCOPE: CPT | Performed by: NURSE PRACTITIONER

## 2018-12-14 PROCEDURE — 99213 OFFICE O/P EST LOW 20 MIN: CPT | Performed by: NURSE PRACTITIONER

## 2018-12-14 PROCEDURE — 1101F PT FALLS ASSESS-DOCD LE1/YR: CPT | Performed by: NURSE PRACTITIONER

## 2018-12-14 PROCEDURE — G8399 PT W/DXA RESULTS DOCUMENT: HCPCS | Performed by: NURSE PRACTITIONER

## 2018-12-14 PROCEDURE — 1123F ACP DISCUSS/DSCN MKR DOCD: CPT | Performed by: NURSE PRACTITIONER

## 2018-12-14 RX ORDER — LEVOFLOXACIN 500 MG/1
500 TABLET, FILM COATED ORAL DAILY
Qty: 7 TABLET | Refills: 0 | Status: SHIPPED | OUTPATIENT
Start: 2018-12-14 | End: 2018-12-21

## 2018-12-14 ASSESSMENT — ENCOUNTER SYMPTOMS: SHORTNESS OF BREATH: 0

## 2018-12-21 ENCOUNTER — OFFICE VISIT (OUTPATIENT)
Dept: RETAIL CLINIC | Facility: CLINIC | Age: 68
End: 2018-12-21

## 2018-12-21 VITALS
HEART RATE: 113 BPM | TEMPERATURE: 100.3 F | SYSTOLIC BLOOD PRESSURE: 138 MMHG | OXYGEN SATURATION: 96 % | DIASTOLIC BLOOD PRESSURE: 86 MMHG

## 2018-12-21 DIAGNOSIS — R68.89 FLU-LIKE SYMPTOMS: Primary | ICD-10-CM

## 2018-12-21 LAB
EXPIRATION DATE: NORMAL
FLUAV AG NPH QL: NEGATIVE
FLUBV AG NPH QL: NEGATIVE
INTERNAL CONTROL: NORMAL
Lab: NORMAL

## 2018-12-21 PROCEDURE — 87804 INFLUENZA ASSAY W/OPTIC: CPT | Performed by: NURSE PRACTITIONER

## 2018-12-21 PROCEDURE — 99213 OFFICE O/P EST LOW 20 MIN: CPT | Performed by: NURSE PRACTITIONER

## 2018-12-21 RX ORDER — DEXTROMETHORPHAN HYDROBROMIDE AND PROMETHAZINE HYDROCHLORIDE 15; 6.25 MG/5ML; MG/5ML
5 SYRUP ORAL NIGHTLY PRN
Qty: 118 ML | Refills: 0 | Status: SHIPPED | OUTPATIENT
Start: 2018-12-21 | End: 2019-12-03 | Stop reason: SDUPTHER

## 2018-12-21 RX ORDER — ACETAMINOPHEN 500 MG
1000 TABLET ORAL ONCE
Status: SHIPPED | OUTPATIENT
Start: 2018-12-21

## 2018-12-21 NOTE — PROGRESS NOTES
Subjective   Kaya Hatfield is a 68 y.o. female.     URI    This is a new problem. The current episode started in the past 7 days. The problem has been gradually worsening. The maximum temperature recorded prior to her arrival was 101 - 101.9 F. Associated symptoms include congestion (One side stopped up; the other side runs), coughing, headaches and a sore throat (Has improved). Pertinent negatives include no diarrhea, dysuria, nausea, vomiting or wheezing.    Patient just finished 21 day course of Levaquin for pyelonephritis within the last couple days.  She denies any of these symptoms.      The following portions of the patient's history were reviewed and updated as appropriate: allergies, current medications, past family history, past medical history, past social history, past surgical history and problem list.    Review of Systems   Constitutional: Positive for fatigue and fever.   HENT: Positive for congestion (One side stopped up; the other side runs) and sore throat (Has improved).    Respiratory: Positive for cough. Negative for shortness of breath and wheezing.    Gastrointestinal: Negative for diarrhea, nausea and vomiting.   Genitourinary: Negative for dysuria, flank pain, frequency, hematuria and urgency.   Musculoskeletal: Positive for myalgias.   Neurological: Positive for headache.       Objective   Physical Exam   Constitutional: She appears well-developed and well-nourished. She appears ill (mild; looks like she doesn't feel well). No distress.   HENT:   Right Ear: External ear normal. Tympanic membrane is bulging. Tympanic membrane is not erythematous (Pink).   Left Ear: External ear normal. Tympanic membrane is bulging. Tympanic membrane is not erythematous.   Nose: Right sinus exhibits no maxillary sinus tenderness and no frontal sinus tenderness. Left sinus exhibits no maxillary sinus tenderness and no frontal sinus tenderness.   Mouth/Throat: Posterior oropharyngeal erythema (mild) present.  No oropharyngeal exudate.   Neck: Neck supple.   Cardiovascular: Normal rate, regular rhythm and normal heart sounds. Exam reveals no gallop and no friction rub.   No murmur heard.  Pulmonary/Chest: Effort normal and breath sounds normal. No stridor. No respiratory distress. She has no decreased breath sounds. She has no wheezes. She has no rhonchi. She has no rales. She exhibits no tenderness.   Lymphadenopathy:     She has no cervical adenopathy.   Neurological: She is alert.   Skin: She is not diaphoretic.   Psychiatric: She has a normal mood and affect. Her behavior is normal.         Assessment/Plan   Kaya was seen today for uri.    Diagnoses and all orders for this visit:    Flu-like symptoms  -     POC Influenza A / B  -     acetaminophen (TYLENOL) tablet 1,000 mg; Take 2 tablets by mouth 1 (One) Time.    Other orders  -     promethazine-dextromethorphan (PROMETHAZINE-DM) 6.25-15 MG/5ML syrup; Take 5 mL by mouth At Night As Needed for Cough.    Flu negative    FLUIDS FLUIDS FLUIDS!  Take deep breaths and cough to keep lungs clear  REST!  May use Tylenol as needed for fever/body aches  If no improvement over the next 2 days or symptoms worsen, return to clinic for re-revaluation.

## 2018-12-21 NOTE — PATIENT INSTRUCTIONS
"FLUIDS FLUIDS FLUIDS!  Take deep breaths and cough to keep lungs clear  REST!  May use Tylenol as needed for fever/body aches  If no improvement over the next 2 days or symptoms worsen, return to clinic for re-revaluation.      Influenza, Adult  Influenza (“the flu\") is an infection in the lungs, nose, and throat (respiratory tract). It is caused by a virus. The flu causes many common cold symptoms, as well as a high fever and body aches. It can make you feel very sick.  The flu spreads easily from person to person (is contagious). Getting a flu shot (influenza vaccination) every year is the best way to prevent the flu.  Follow these instructions at home:  · Take over-the-counter and prescription medicines only as told by your doctor.  · Use a cool mist humidifier to add moisture (humidity) to the air in your home. This can make it easier to breathe.  · Rest as needed.  · Drink enough fluid to keep your pee (urine) clear or pale yellow.  · Cover your mouth and nose when you cough or sneeze.  · Wash your hands with soap and water often, especially after you cough or sneeze. If you cannot use soap and water, use hand .  · Stay home from work or school as told by your doctor. Unless you are visiting your doctor, try to avoid leaving home until your fever has been gone for 24 hours without the use of medicine.  · Keep all follow-up visits as told by your doctor. This is important.  How is this prevented?  · Getting a yearly (annual) flu shot is the best way to avoid getting the flu. You may get the flu shot in late summer, fall, or winter. Ask your doctor when you should get your flu shot.  · Wash your hands often or use hand  often.  · Avoid contact with people who are sick during cold and flu season.  · Eat healthy foods.  · Drink plenty of fluids.  · Get enough sleep.  · Exercise regularly.  Contact a doctor if:  · You get new symptoms.  · You have:  ? Chest pain.  ? Watery poop (diarrhea).  ? A " fever.  · Your cough gets worse.  · You start to have more mucus.  · You feel sick to your stomach (nauseous).  · You throw up (vomit).  Get help right away if:  · You start to be short of breath or have trouble breathing.  · Your skin or nails turn a bluish color.  · You have very bad pain or stiffness in your neck.  · You get a sudden headache.  · You get sudden pain in your face or ear.  · You cannot stop throwing up.  This information is not intended to replace advice given to you by your health care provider. Make sure you discuss any questions you have with your health care provider.  Document Released: 09/26/2009 Document Revised: 05/25/2017 Document Reviewed: 10/11/2016  [employer] Interactive Patient Education © 2017 [employer]

## 2018-12-22 ENCOUNTER — TELEPHONE (OUTPATIENT)
Dept: RETAIL CLINIC | Facility: CLINIC | Age: 68
End: 2018-12-22

## 2018-12-23 NOTE — TELEPHONE ENCOUNTER
Called to check on Mrs. Hatfield and she reports she is feeling better. She is still tired, but better. No new symptoms. Ask her to call or follow up if any new or worsening symptoms develop.

## 2019-01-10 ENCOUNTER — OFFICE VISIT (OUTPATIENT)
Dept: UROLOGY | Age: 69
End: 2019-01-10
Payer: MEDICARE

## 2019-01-10 ENCOUNTER — HOSPITAL ENCOUNTER (OUTPATIENT)
Dept: ULTRASOUND IMAGING | Age: 69
Discharge: HOME | End: 2019-01-10
Payer: MEDICARE

## 2019-01-10 VITALS — TEMPERATURE: 97.5 F | HEIGHT: 66 IN | BODY MASS INDEX: 33.91 KG/M2 | WEIGHT: 211 LBS

## 2019-01-10 DIAGNOSIS — Z87.442 HISTORY OF NEPHROLITHIASIS: Primary | ICD-10-CM

## 2019-01-10 DIAGNOSIS — N81.4 UTERINE PROLAPSE: ICD-10-CM

## 2019-01-10 DIAGNOSIS — Z87.442 HISTORY OF NEPHROLITHIASIS: ICD-10-CM

## 2019-01-10 LAB
BACTERIA URINE, POC: NORMAL
BILIRUBIN URINE: 0 MG/DL
BLOOD, URINE: NEGATIVE
CASTS URINE, POC: NORMAL
CLARITY: CLEAR
COLOR: YELLOW
CRYSTALS URINE, POC: NORMAL
EPI CELLS URINE, POC: NORMAL
GLUCOSE URINE: NORMAL
KETONES, URINE: NEGATIVE
LEUKOCYTE EST, POC: NORMAL
NITRITE, URINE: NEGATIVE
PH UA: 6 (ref 4.5–8)
PROTEIN UA: NEGATIVE
RBC URINE, POC: NORMAL
SPECIFIC GRAVITY UA: 1.03 (ref 1–1.03)
UROBILINOGEN, URINE: NORMAL
WBC URINE, POC: NORMAL
YEAST URINE, POC: NORMAL

## 2019-01-10 PROCEDURE — 76770 US EXAM ABDO BACK WALL COMP: CPT

## 2019-01-10 PROCEDURE — 1090F PRES/ABSN URINE INCON ASSESS: CPT | Performed by: NURSE PRACTITIONER

## 2019-01-10 PROCEDURE — 81001 URINALYSIS AUTO W/SCOPE: CPT | Performed by: NURSE PRACTITIONER

## 2019-01-10 PROCEDURE — 99213 OFFICE O/P EST LOW 20 MIN: CPT | Performed by: NURSE PRACTITIONER

## 2019-01-10 PROCEDURE — G8428 CUR MEDS NOT DOCUMENT: HCPCS | Performed by: NURSE PRACTITIONER

## 2019-01-10 PROCEDURE — 1123F ACP DISCUSS/DSCN MKR DOCD: CPT | Performed by: NURSE PRACTITIONER

## 2019-01-10 PROCEDURE — G8484 FLU IMMUNIZE NO ADMIN: HCPCS | Performed by: NURSE PRACTITIONER

## 2019-01-10 PROCEDURE — 4040F PNEUMOC VAC/ADMIN/RCVD: CPT | Performed by: NURSE PRACTITIONER

## 2019-01-10 PROCEDURE — 1036F TOBACCO NON-USER: CPT | Performed by: NURSE PRACTITIONER

## 2019-01-10 PROCEDURE — 1101F PT FALLS ASSESS-DOCD LE1/YR: CPT | Performed by: NURSE PRACTITIONER

## 2019-01-10 PROCEDURE — G8399 PT W/DXA RESULTS DOCUMENT: HCPCS | Performed by: NURSE PRACTITIONER

## 2019-01-10 PROCEDURE — 3017F COLORECTAL CA SCREEN DOC REV: CPT | Performed by: NURSE PRACTITIONER

## 2019-01-10 PROCEDURE — G8417 CALC BMI ABV UP PARAM F/U: HCPCS | Performed by: NURSE PRACTITIONER

## 2019-01-22 ENCOUNTER — APPOINTMENT (OUTPATIENT)
Dept: CT IMAGING | Age: 69
End: 2019-01-22
Payer: MEDICARE

## 2019-01-22 ENCOUNTER — HOSPITAL ENCOUNTER (EMERGENCY)
Age: 69
Discharge: HOME | End: 2019-01-22
Payer: MEDICARE

## 2019-01-22 VITALS
BODY MASS INDEX: 34.15 KG/M2 | TEMPERATURE: 98 F | DIASTOLIC BLOOD PRESSURE: 77 MMHG | OXYGEN SATURATION: 90 % | HEIGHT: 64 IN | HEART RATE: 111 BPM | WEIGHT: 200 LBS | RESPIRATION RATE: 18 BRPM | SYSTOLIC BLOOD PRESSURE: 158 MMHG

## 2019-01-22 DIAGNOSIS — F41.1 ANXIETY STATE: Primary | ICD-10-CM

## 2019-01-22 DIAGNOSIS — K62.5 RECTAL BLEEDING: ICD-10-CM

## 2019-01-22 DIAGNOSIS — F33.0 MILD EPISODE OF RECURRENT MAJOR DEPRESSIVE DISORDER (HCC): ICD-10-CM

## 2019-01-22 LAB
ALBUMIN SERPL-MCNC: 4.1 G/DL (ref 3.5–5.2)
ALP BLD-CCNC: 95 U/L (ref 35–104)
ALT SERPL-CCNC: 27 U/L (ref 5–33)
ANION GAP SERPL CALCULATED.3IONS-SCNC: 7 MMOL/L (ref 7–19)
AST SERPL-CCNC: 23 U/L (ref 5–32)
BASOPHILS ABSOLUTE: 0 K/UL (ref 0–0.2)
BASOPHILS RELATIVE PERCENT: 0.3 % (ref 0–1)
BILIRUB SERPL-MCNC: 0.3 MG/DL (ref 0.2–1.2)
BUN BLDV-MCNC: 13 MG/DL (ref 8–23)
CALCIUM SERPL-MCNC: 10.2 MG/DL (ref 8.8–10.2)
CHLORIDE BLD-SCNC: 103 MMOL/L (ref 98–111)
CO2: 32 MMOL/L (ref 22–29)
CREAT SERPL-MCNC: 0.8 MG/DL (ref 0.5–0.9)
EOSINOPHILS ABSOLUTE: 0.1 K/UL (ref 0–0.6)
EOSINOPHILS RELATIVE PERCENT: 0.8 % (ref 0–5)
GFR NON-AFRICAN AMERICAN: >60
GLUCOSE BLD-MCNC: 106 MG/DL (ref 74–109)
HCT VFR BLD CALC: 39.6 % (ref 37–47)
HEMOGLOBIN: 12.8 G/DL (ref 12–16)
INR BLD: 0.97 (ref 0.88–1.18)
LIPASE: 25 U/L (ref 13–60)
LYMPHOCYTES ABSOLUTE: 1.3 K/UL (ref 1.1–4.5)
LYMPHOCYTES RELATIVE PERCENT: 16.1 % (ref 20–40)
MCH RBC QN AUTO: 29.6 PG (ref 27–31)
MCHC RBC AUTO-ENTMCNC: 32.3 G/DL (ref 33–37)
MCV RBC AUTO: 91.7 FL (ref 81–99)
MONOCYTES ABSOLUTE: 0.8 K/UL (ref 0–0.9)
MONOCYTES RELATIVE PERCENT: 10.3 % (ref 0–10)
NEUTROPHILS ABSOLUTE: 5.8 K/UL (ref 1.5–7.5)
NEUTROPHILS RELATIVE PERCENT: 72.2 % (ref 50–65)
PDW BLD-RTO: 12.5 % (ref 11.5–14.5)
PLATELET # BLD: 215 K/UL (ref 130–400)
PMV BLD AUTO: 9.3 FL (ref 9.4–12.3)
POTASSIUM SERPL-SCNC: 3.9 MMOL/L (ref 3.5–5)
PROTHROMBIN TIME: 12.3 SEC (ref 12–14.6)
RBC # BLD: 4.32 M/UL (ref 4.2–5.4)
SODIUM BLD-SCNC: 142 MMOL/L (ref 136–145)
TOTAL PROTEIN: 6.8 G/DL (ref 6.6–8.7)
WBC # BLD: 8 K/UL (ref 4.8–10.8)

## 2019-01-22 PROCEDURE — 2580000003 HC RX 258: Performed by: NURSE PRACTITIONER

## 2019-01-22 PROCEDURE — 99284 EMERGENCY DEPT VISIT MOD MDM: CPT | Performed by: NURSE PRACTITIONER

## 2019-01-22 PROCEDURE — 80053 COMPREHEN METABOLIC PANEL: CPT

## 2019-01-22 PROCEDURE — 85025 COMPLETE CBC W/AUTO DIFF WBC: CPT

## 2019-01-22 PROCEDURE — 83690 ASSAY OF LIPASE: CPT

## 2019-01-22 PROCEDURE — 6360000002 HC RX W HCPCS: Performed by: NURSE PRACTITIONER

## 2019-01-22 PROCEDURE — 96374 THER/PROPH/DIAG INJ IV PUSH: CPT

## 2019-01-22 PROCEDURE — 85610 PROTHROMBIN TIME: CPT

## 2019-01-22 PROCEDURE — 36415 COLL VENOUS BLD VENIPUNCTURE: CPT

## 2019-01-22 PROCEDURE — 99283 EMERGENCY DEPT VISIT LOW MDM: CPT

## 2019-01-22 PROCEDURE — 74177 CT ABD & PELVIS W/CONTRAST: CPT

## 2019-01-22 PROCEDURE — 6360000004 HC RX CONTRAST MEDICATION: Performed by: NURSE PRACTITIONER

## 2019-01-22 RX ORDER — LORAZEPAM 2 MG/ML
1 INJECTION INTRAMUSCULAR ONCE
Status: COMPLETED | OUTPATIENT
Start: 2019-01-22 | End: 2019-01-22

## 2019-01-22 RX ORDER — 0.9 % SODIUM CHLORIDE 0.9 %
500 INTRAVENOUS SOLUTION INTRAVENOUS ONCE
Status: COMPLETED | OUTPATIENT
Start: 2019-01-22 | End: 2019-01-22

## 2019-01-22 RX ORDER — VENLAFAXINE HYDROCHLORIDE 150 MG/1
150 CAPSULE, EXTENDED RELEASE ORAL DAILY
Qty: 30 CAPSULE | Refills: 5 | Status: SHIPPED | OUTPATIENT
Start: 2019-01-22 | End: 2019-01-25 | Stop reason: SDUPTHER

## 2019-01-22 RX ADMIN — SODIUM CHLORIDE 500 ML: 9 INJECTION, SOLUTION INTRAVENOUS at 20:23

## 2019-01-22 RX ADMIN — LORAZEPAM 1 MG: 2 INJECTION, SOLUTION INTRAMUSCULAR; INTRAVENOUS at 19:22

## 2019-01-22 RX ADMIN — IOPAMIDOL 95 ML: 755 INJECTION, SOLUTION INTRAVENOUS at 20:29

## 2019-01-22 ASSESSMENT — ENCOUNTER SYMPTOMS
HEMATOCHEZIA: 1
BLOOD IN STOOL: 1
CONSTIPATION: 0
DIARRHEA: 0
VOMITING: 0
ABDOMINAL PAIN: 0

## 2019-01-22 ASSESSMENT — PAIN SCALES - GENERAL: PAINLEVEL_OUTOF10: 2

## 2019-01-23 ENCOUNTER — OFFICE VISIT (OUTPATIENT)
Dept: OBGYN | Age: 69
End: 2019-01-23

## 2019-01-23 VITALS
TEMPERATURE: 98.5 F | HEIGHT: 64 IN | WEIGHT: 215 LBS | DIASTOLIC BLOOD PRESSURE: 95 MMHG | HEART RATE: 100 BPM | BODY MASS INDEX: 36.7 KG/M2 | SYSTOLIC BLOOD PRESSURE: 156 MMHG

## 2019-01-23 DIAGNOSIS — N81.4 UTERINE PROLAPSE: Primary | ICD-10-CM

## 2019-01-23 PROCEDURE — 99213 OFFICE O/P EST LOW 20 MIN: CPT | Performed by: OBSTETRICS & GYNECOLOGY

## 2019-01-23 ASSESSMENT — ENCOUNTER SYMPTOMS
RESPIRATORY NEGATIVE: 1
EYES NEGATIVE: 1
ANAL BLEEDING: 1

## 2019-01-25 ENCOUNTER — OFFICE VISIT (OUTPATIENT)
Dept: PRIMARY CARE CLINIC | Age: 69
End: 2019-01-25
Payer: MEDICARE

## 2019-01-25 VITALS
WEIGHT: 215 LBS | BODY MASS INDEX: 36.7 KG/M2 | DIASTOLIC BLOOD PRESSURE: 106 MMHG | TEMPERATURE: 97.8 F | OXYGEN SATURATION: 98 % | HEIGHT: 64 IN | SYSTOLIC BLOOD PRESSURE: 180 MMHG | HEART RATE: 95 BPM

## 2019-01-25 DIAGNOSIS — F41.1 GAD (GENERALIZED ANXIETY DISORDER): ICD-10-CM

## 2019-01-25 DIAGNOSIS — F33.1 MODERATE EPISODE OF RECURRENT MAJOR DEPRESSIVE DISORDER (HCC): Primary | ICD-10-CM

## 2019-01-25 DIAGNOSIS — R45.89 CRYING ASSOCIATED WITH MOOD: ICD-10-CM

## 2019-01-25 DIAGNOSIS — I10 ESSENTIAL HYPERTENSION: Chronic | ICD-10-CM

## 2019-01-25 PROCEDURE — 4040F PNEUMOC VAC/ADMIN/RCVD: CPT | Performed by: NURSE PRACTITIONER

## 2019-01-25 PROCEDURE — G8427 DOCREV CUR MEDS BY ELIG CLIN: HCPCS | Performed by: NURSE PRACTITIONER

## 2019-01-25 PROCEDURE — G8484 FLU IMMUNIZE NO ADMIN: HCPCS | Performed by: NURSE PRACTITIONER

## 2019-01-25 PROCEDURE — 1101F PT FALLS ASSESS-DOCD LE1/YR: CPT | Performed by: NURSE PRACTITIONER

## 2019-01-25 PROCEDURE — G8399 PT W/DXA RESULTS DOCUMENT: HCPCS | Performed by: NURSE PRACTITIONER

## 2019-01-25 PROCEDURE — G8417 CALC BMI ABV UP PARAM F/U: HCPCS | Performed by: NURSE PRACTITIONER

## 2019-01-25 PROCEDURE — 3017F COLORECTAL CA SCREEN DOC REV: CPT | Performed by: NURSE PRACTITIONER

## 2019-01-25 PROCEDURE — 1090F PRES/ABSN URINE INCON ASSESS: CPT | Performed by: NURSE PRACTITIONER

## 2019-01-25 PROCEDURE — 1123F ACP DISCUSS/DSCN MKR DOCD: CPT | Performed by: NURSE PRACTITIONER

## 2019-01-25 PROCEDURE — 1036F TOBACCO NON-USER: CPT | Performed by: NURSE PRACTITIONER

## 2019-01-25 PROCEDURE — 99213 OFFICE O/P EST LOW 20 MIN: CPT | Performed by: NURSE PRACTITIONER

## 2019-01-25 RX ORDER — VENLAFAXINE HYDROCHLORIDE 150 MG/1
150 CAPSULE, EXTENDED RELEASE ORAL DAILY
Qty: 30 CAPSULE | Refills: 5 | Status: SHIPPED | OUTPATIENT
Start: 2019-01-25 | End: 2019-10-02 | Stop reason: SDUPTHER

## 2019-01-25 RX ORDER — DIAZEPAM 5 MG/1
5 TABLET ORAL EVERY 8 HOURS PRN
Qty: 60 TABLET | Refills: 0 | Status: SHIPPED | OUTPATIENT
Start: 2019-01-25 | End: 2019-08-21

## 2019-01-25 RX ORDER — VENLAFAXINE HYDROCHLORIDE 75 MG/1
75 CAPSULE, EXTENDED RELEASE ORAL DAILY
Qty: 30 CAPSULE | Refills: 3 | Status: SHIPPED | OUTPATIENT
Start: 2019-01-25 | End: 2019-10-02 | Stop reason: SDUPTHER

## 2019-01-25 RX ORDER — METOPROLOL SUCCINATE 50 MG/1
50 TABLET, EXTENDED RELEASE ORAL DAILY
Qty: 30 TABLET | Refills: 3 | Status: SHIPPED | OUTPATIENT
Start: 2019-01-25 | End: 2019-08-21 | Stop reason: SDUPTHER

## 2019-01-25 ASSESSMENT — ENCOUNTER SYMPTOMS
RHINORRHEA: 0
DIARRHEA: 0
VOMITING: 0
CONSTIPATION: 0
EYE REDNESS: 0
COUGH: 0
SHORTNESS OF BREATH: 0
SORE THROAT: 0

## 2019-01-31 ENCOUNTER — ANESTHESIA (OUTPATIENT)
Dept: OPERATING ROOM | Age: 69
End: 2019-01-31
Payer: MEDICARE

## 2019-01-31 ENCOUNTER — HOSPITAL ENCOUNTER (OUTPATIENT)
Age: 69
Discharge: HOME | End: 2019-02-01
Attending: OBSTETRICS & GYNECOLOGY | Admitting: OBSTETRICS & GYNECOLOGY
Payer: MEDICARE

## 2019-01-31 ENCOUNTER — ANESTHESIA EVENT (OUTPATIENT)
Dept: OPERATING ROOM | Age: 69
End: 2019-01-31
Payer: MEDICARE

## 2019-01-31 VITALS
TEMPERATURE: 97 F | RESPIRATION RATE: 14 BRPM | SYSTOLIC BLOOD PRESSURE: 127 MMHG | OXYGEN SATURATION: 98 % | DIASTOLIC BLOOD PRESSURE: 92 MMHG

## 2019-01-31 DIAGNOSIS — G89.18 POSTOPERATIVE PAIN: Primary | ICD-10-CM

## 2019-01-31 PROBLEM — N81.4 UTERINE PROLAPSE: Status: ACTIVE | Noted: 2019-01-31

## 2019-01-31 PROBLEM — N81.3 COMPLETE UTERINE PROLAPSE: Status: ACTIVE | Noted: 2018-12-05

## 2019-01-31 LAB
ABO/RH: NORMAL
ANTIBODY SCREEN: NORMAL

## 2019-01-31 PROCEDURE — 6370000000 HC RX 637 (ALT 250 FOR IP): Performed by: OBSTETRICS & GYNECOLOGY

## 2019-01-31 PROCEDURE — 36415 COLL VENOUS BLD VENIPUNCTURE: CPT

## 2019-01-31 PROCEDURE — 3600000009 HC SURGERY ROBOT BASE: Performed by: OBSTETRICS & GYNECOLOGY

## 2019-01-31 PROCEDURE — 6360000002 HC RX W HCPCS: Performed by: ANESTHESIOLOGY

## 2019-01-31 PROCEDURE — 58571 TLH W/T/O 250 G OR LESS: CPT | Performed by: OBSTETRICS & GYNECOLOGY

## 2019-01-31 PROCEDURE — 7100000000 HC PACU RECOVERY - FIRST 15 MIN: Performed by: OBSTETRICS & GYNECOLOGY

## 2019-01-31 PROCEDURE — 2500000003 HC RX 250 WO HCPCS: Performed by: NURSE ANESTHETIST, CERTIFIED REGISTERED

## 2019-01-31 PROCEDURE — 6360000002 HC RX W HCPCS: Performed by: NURSE ANESTHETIST, CERTIFIED REGISTERED

## 2019-01-31 PROCEDURE — S2900 ROBOTIC SURGICAL SYSTEM: HCPCS | Performed by: OBSTETRICS & GYNECOLOGY

## 2019-01-31 PROCEDURE — 3700000000 HC ANESTHESIA ATTENDED CARE: Performed by: OBSTETRICS & GYNECOLOGY

## 2019-01-31 PROCEDURE — 6360000002 HC RX W HCPCS: Performed by: OBSTETRICS & GYNECOLOGY

## 2019-01-31 PROCEDURE — 2580000003 HC RX 258: Performed by: OBSTETRICS & GYNECOLOGY

## 2019-01-31 PROCEDURE — 94664 DEMO&/EVAL PT USE INHALER: CPT

## 2019-01-31 PROCEDURE — 2580000003 HC RX 258: Performed by: ANESTHESIOLOGY

## 2019-01-31 PROCEDURE — 2709999900 HC NON-CHARGEABLE SUPPLY: Performed by: OBSTETRICS & GYNECOLOGY

## 2019-01-31 PROCEDURE — 3600000019 HC SURGERY ROBOT ADDTL 15MIN: Performed by: OBSTETRICS & GYNECOLOGY

## 2019-01-31 PROCEDURE — 88305 TISSUE EXAM BY PATHOLOGIST: CPT

## 2019-01-31 PROCEDURE — 6370000000 HC RX 637 (ALT 250 FOR IP): Performed by: ANESTHESIOLOGY

## 2019-01-31 PROCEDURE — 86900 BLOOD TYPING SEROLOGIC ABO: CPT

## 2019-01-31 PROCEDURE — 86850 RBC ANTIBODY SCREEN: CPT

## 2019-01-31 PROCEDURE — 7100000001 HC PACU RECOVERY - ADDTL 15 MIN: Performed by: OBSTETRICS & GYNECOLOGY

## 2019-01-31 PROCEDURE — 86901 BLOOD TYPING SEROLOGIC RH(D): CPT

## 2019-01-31 PROCEDURE — 3700000001 HC ADD 15 MINUTES (ANESTHESIA): Performed by: OBSTETRICS & GYNECOLOGY

## 2019-01-31 RX ORDER — DOCUSATE SODIUM 100 MG/1
100 CAPSULE, LIQUID FILLED ORAL 2 TIMES DAILY
Status: DISCONTINUED | OUTPATIENT
Start: 2019-01-31 | End: 2019-02-01 | Stop reason: HOSPADM

## 2019-01-31 RX ORDER — FENTANYL CITRATE 50 UG/ML
INJECTION, SOLUTION INTRAMUSCULAR; INTRAVENOUS PRN
Status: DISCONTINUED | OUTPATIENT
Start: 2019-01-31 | End: 2019-01-31 | Stop reason: SDUPTHER

## 2019-01-31 RX ORDER — DEXAMETHASONE SODIUM PHOSPHATE 10 MG/ML
INJECTION INTRAMUSCULAR; INTRAVENOUS PRN
Status: DISCONTINUED | OUTPATIENT
Start: 2019-01-31 | End: 2019-01-31 | Stop reason: SDUPTHER

## 2019-01-31 RX ORDER — MORPHINE SULFATE/0.9% NACL/PF 1 MG/ML
2 SYRINGE (ML) INJECTION EVERY 5 MIN PRN
Status: DISCONTINUED | OUTPATIENT
Start: 2019-01-31 | End: 2019-01-31 | Stop reason: HOSPADM

## 2019-01-31 RX ORDER — TAMSULOSIN HYDROCHLORIDE 0.4 MG/1
0.4 CAPSULE ORAL DAILY
Status: DISCONTINUED | OUTPATIENT
Start: 2019-02-01 | End: 2019-02-01 | Stop reason: HOSPADM

## 2019-01-31 RX ORDER — HYDROCODONE BITARTRATE AND ACETAMINOPHEN 5; 325 MG/1; MG/1
1 TABLET ORAL EVERY 4 HOURS PRN
Status: DISCONTINUED | OUTPATIENT
Start: 2019-01-31 | End: 2019-02-01 | Stop reason: HOSPADM

## 2019-01-31 RX ORDER — MORPHINE SULFATE/0.9% NACL/PF 1 MG/ML
4 SYRINGE (ML) INJECTION
Status: DISCONTINUED | OUTPATIENT
Start: 2019-01-31 | End: 2019-02-01 | Stop reason: HOSPADM

## 2019-01-31 RX ORDER — SODIUM CHLORIDE 0.9 % (FLUSH) 0.9 %
10 SYRINGE (ML) INJECTION EVERY 12 HOURS SCHEDULED
Status: DISCONTINUED | OUTPATIENT
Start: 2019-01-31 | End: 2019-02-01 | Stop reason: HOSPADM

## 2019-01-31 RX ORDER — KETOROLAC TROMETHAMINE 30 MG/ML
INJECTION, SOLUTION INTRAMUSCULAR; INTRAVENOUS PRN
Status: DISCONTINUED | OUTPATIENT
Start: 2019-01-31 | End: 2019-01-31 | Stop reason: SDUPTHER

## 2019-01-31 RX ORDER — ROCURONIUM BROMIDE 10 MG/ML
INJECTION, SOLUTION INTRAVENOUS PRN
Status: DISCONTINUED | OUTPATIENT
Start: 2019-01-31 | End: 2019-01-31 | Stop reason: SDUPTHER

## 2019-01-31 RX ORDER — EPHEDRINE SULFATE 50 MG/ML
INJECTION, SOLUTION INTRAVENOUS PRN
Status: DISCONTINUED | OUTPATIENT
Start: 2019-01-31 | End: 2019-01-31 | Stop reason: SDUPTHER

## 2019-01-31 RX ORDER — PHENAZOPYRIDINE HYDROCHLORIDE 100 MG/1
100 TABLET, FILM COATED ORAL ONCE
Status: COMPLETED | OUTPATIENT
Start: 2019-01-31 | End: 2019-01-31

## 2019-01-31 RX ORDER — MIDAZOLAM HYDROCHLORIDE 1 MG/ML
2 INJECTION INTRAMUSCULAR; INTRAVENOUS
Status: COMPLETED | OUTPATIENT
Start: 2019-01-31 | End: 2019-01-31

## 2019-01-31 RX ORDER — SUCCINYLCHOLINE CHLORIDE 20 MG/ML
INJECTION INTRAMUSCULAR; INTRAVENOUS PRN
Status: DISCONTINUED | OUTPATIENT
Start: 2019-01-31 | End: 2019-01-31 | Stop reason: SDUPTHER

## 2019-01-31 RX ORDER — METOPROLOL SUCCINATE 50 MG/1
50 TABLET, EXTENDED RELEASE ORAL DAILY
Status: DISCONTINUED | OUTPATIENT
Start: 2019-02-01 | End: 2019-02-01 | Stop reason: HOSPADM

## 2019-01-31 RX ORDER — DIAZEPAM 5 MG/1
5 TABLET ORAL EVERY 8 HOURS PRN
Status: DISCONTINUED | OUTPATIENT
Start: 2019-01-31 | End: 2019-02-01 | Stop reason: HOSPADM

## 2019-01-31 RX ORDER — LIDOCAINE HYDROCHLORIDE 10 MG/ML
INJECTION, SOLUTION INFILTRATION; PERINEURAL PRN
Status: DISCONTINUED | OUTPATIENT
Start: 2019-01-31 | End: 2019-01-31 | Stop reason: SDUPTHER

## 2019-01-31 RX ORDER — SODIUM CHLORIDE 0.9 % (FLUSH) 0.9 %
10 SYRINGE (ML) INJECTION PRN
Status: DISCONTINUED | OUTPATIENT
Start: 2019-01-31 | End: 2019-01-31 | Stop reason: HOSPADM

## 2019-01-31 RX ORDER — PROMETHAZINE HYDROCHLORIDE 25 MG/ML
6.25 INJECTION, SOLUTION INTRAMUSCULAR; INTRAVENOUS
Status: DISCONTINUED | OUTPATIENT
Start: 2019-01-31 | End: 2019-01-31 | Stop reason: HOSPADM

## 2019-01-31 RX ORDER — MORPHINE SULFATE/0.9% NACL/PF 1 MG/ML
4 SYRINGE (ML) INJECTION EVERY 5 MIN PRN
Status: DISCONTINUED | OUTPATIENT
Start: 2019-01-31 | End: 2019-01-31 | Stop reason: HOSPADM

## 2019-01-31 RX ORDER — AMLODIPINE BESYLATE 5 MG/1
5 TABLET ORAL DAILY
Status: DISCONTINUED | OUTPATIENT
Start: 2019-01-31 | End: 2019-02-01 | Stop reason: HOSPADM

## 2019-01-31 RX ORDER — FAMOTIDINE 20 MG/1
20 TABLET, FILM COATED ORAL 2 TIMES DAILY
Status: DISCONTINUED | OUTPATIENT
Start: 2019-01-31 | End: 2019-02-01 | Stop reason: HOSPADM

## 2019-01-31 RX ORDER — ONDANSETRON 2 MG/ML
4 INJECTION INTRAMUSCULAR; INTRAVENOUS EVERY 6 HOURS PRN
Status: DISCONTINUED | OUTPATIENT
Start: 2019-01-31 | End: 2019-02-01 | Stop reason: HOSPADM

## 2019-01-31 RX ORDER — METOCLOPRAMIDE HYDROCHLORIDE 5 MG/ML
10 INJECTION INTRAMUSCULAR; INTRAVENOUS
Status: DISCONTINUED | OUTPATIENT
Start: 2019-01-31 | End: 2019-01-31 | Stop reason: HOSPADM

## 2019-01-31 RX ORDER — SODIUM CHLORIDE 0.9 % (FLUSH) 0.9 %
10 SYRINGE (ML) INJECTION PRN
Status: DISCONTINUED | OUTPATIENT
Start: 2019-01-31 | End: 2019-02-01 | Stop reason: HOSPADM

## 2019-01-31 RX ORDER — PROPOFOL 10 MG/ML
INJECTION, EMULSION INTRAVENOUS PRN
Status: DISCONTINUED | OUTPATIENT
Start: 2019-01-31 | End: 2019-01-31 | Stop reason: SDUPTHER

## 2019-01-31 RX ORDER — LISINOPRIL 20 MG/1
40 TABLET ORAL DAILY
Status: DISCONTINUED | OUTPATIENT
Start: 2019-02-01 | End: 2019-02-01 | Stop reason: HOSPADM

## 2019-01-31 RX ORDER — LIDOCAINE HYDROCHLORIDE 10 MG/ML
1 INJECTION, SOLUTION EPIDURAL; INFILTRATION; INTRACAUDAL; PERINEURAL
Status: DISCONTINUED | OUTPATIENT
Start: 2019-01-31 | End: 2019-01-31 | Stop reason: HOSPADM

## 2019-01-31 RX ORDER — SODIUM CHLORIDE 0.9 % (FLUSH) 0.9 %
10 SYRINGE (ML) INJECTION EVERY 12 HOURS SCHEDULED
Status: DISCONTINUED | OUTPATIENT
Start: 2019-01-31 | End: 2019-01-31 | Stop reason: HOSPADM

## 2019-01-31 RX ORDER — DIPHENHYDRAMINE HYDROCHLORIDE 50 MG/ML
12.5 INJECTION INTRAMUSCULAR; INTRAVENOUS
Status: DISCONTINUED | OUTPATIENT
Start: 2019-01-31 | End: 2019-01-31 | Stop reason: HOSPADM

## 2019-01-31 RX ORDER — MEPERIDINE HYDROCHLORIDE 50 MG/ML
12.5 INJECTION INTRAMUSCULAR; INTRAVENOUS; SUBCUTANEOUS EVERY 5 MIN PRN
Status: DISCONTINUED | OUTPATIENT
Start: 2019-01-31 | End: 2019-01-31 | Stop reason: HOSPADM

## 2019-01-31 RX ORDER — KETAMINE HYDROCHLORIDE 100 MG/ML
INJECTION, SOLUTION INTRAMUSCULAR; INTRAVENOUS PRN
Status: DISCONTINUED | OUTPATIENT
Start: 2019-01-31 | End: 2019-01-31 | Stop reason: SDUPTHER

## 2019-01-31 RX ORDER — HYDROCODONE BITARTRATE AND ACETAMINOPHEN 5; 325 MG/1; MG/1
2 TABLET ORAL EVERY 4 HOURS PRN
Status: DISCONTINUED | OUTPATIENT
Start: 2019-01-31 | End: 2019-02-01 | Stop reason: HOSPADM

## 2019-01-31 RX ORDER — ONDANSETRON 2 MG/ML
INJECTION INTRAMUSCULAR; INTRAVENOUS PRN
Status: DISCONTINUED | OUTPATIENT
Start: 2019-01-31 | End: 2019-01-31 | Stop reason: SDUPTHER

## 2019-01-31 RX ORDER — LABETALOL HYDROCHLORIDE 5 MG/ML
5 INJECTION, SOLUTION INTRAVENOUS EVERY 10 MIN PRN
Status: DISCONTINUED | OUTPATIENT
Start: 2019-01-31 | End: 2019-01-31 | Stop reason: HOSPADM

## 2019-01-31 RX ORDER — SODIUM CHLORIDE, SODIUM LACTATE, POTASSIUM CHLORIDE, CALCIUM CHLORIDE 600; 310; 30; 20 MG/100ML; MG/100ML; MG/100ML; MG/100ML
INJECTION, SOLUTION INTRAVENOUS CONTINUOUS
Status: DISCONTINUED | OUTPATIENT
Start: 2019-01-31 | End: 2019-01-31

## 2019-01-31 RX ORDER — SCOLOPAMINE TRANSDERMAL SYSTEM 1 MG/1
1 PATCH, EXTENDED RELEASE TRANSDERMAL ONCE
Status: DISCONTINUED | OUTPATIENT
Start: 2019-01-31 | End: 2019-01-31

## 2019-01-31 RX ORDER — MORPHINE SULFATE 4 MG/ML
4 INJECTION, SOLUTION INTRAMUSCULAR; INTRAVENOUS
Status: DISCONTINUED | OUTPATIENT
Start: 2019-01-31 | End: 2019-01-31 | Stop reason: HOSPADM

## 2019-01-31 RX ORDER — HYDRALAZINE HYDROCHLORIDE 20 MG/ML
5 INJECTION INTRAMUSCULAR; INTRAVENOUS EVERY 10 MIN PRN
Status: DISCONTINUED | OUTPATIENT
Start: 2019-01-31 | End: 2019-01-31 | Stop reason: HOSPADM

## 2019-01-31 RX ORDER — FENTANYL CITRATE 50 UG/ML
50 INJECTION, SOLUTION INTRAMUSCULAR; INTRAVENOUS
Status: DISCONTINUED | OUTPATIENT
Start: 2019-01-31 | End: 2019-01-31 | Stop reason: HOSPADM

## 2019-01-31 RX ORDER — SIMETHICONE 80 MG
160 TABLET,CHEWABLE ORAL ONCE
Status: COMPLETED | OUTPATIENT
Start: 2019-01-31 | End: 2019-01-31

## 2019-01-31 RX ORDER — FUROSEMIDE 40 MG/1
40 TABLET ORAL DAILY
Status: DISCONTINUED | OUTPATIENT
Start: 2019-01-31 | End: 2019-02-01 | Stop reason: HOSPADM

## 2019-01-31 RX ADMIN — LIDOCAINE HYDROCHLORIDE 50 MG: 10 INJECTION, SOLUTION INFILTRATION; PERINEURAL at 10:12

## 2019-01-31 RX ADMIN — FAMOTIDINE 20 MG: 20 TABLET ORAL at 21:54

## 2019-01-31 RX ADMIN — Medication 40 MG: at 10:12

## 2019-01-31 RX ADMIN — ONDANSETRON HYDROCHLORIDE 4 MG: 2 INJECTION, SOLUTION INTRAMUSCULAR; INTRAVENOUS at 11:10

## 2019-01-31 RX ADMIN — HYDROCODONE BITARTRATE AND ACETAMINOPHEN 2 TABLET: 5; 325 TABLET ORAL at 13:13

## 2019-01-31 RX ADMIN — Medication 4 MG: at 14:39

## 2019-01-31 RX ADMIN — DOCUSATE SODIUM 100 MG: 100 CAPSULE, LIQUID FILLED ORAL at 14:32

## 2019-01-31 RX ADMIN — MIDAZOLAM 2 MG: 1 INJECTION INTRAMUSCULAR; INTRAVENOUS at 10:07

## 2019-01-31 RX ADMIN — DEXAMETHASONE SODIUM PHOSPHATE 10 MG: 10 INJECTION INTRAMUSCULAR; INTRAVENOUS at 10:20

## 2019-01-31 RX ADMIN — SIMETHICONE CHEW TAB 80 MG 160 MG: 80 TABLET ORAL at 14:32

## 2019-01-31 RX ADMIN — FAMOTIDINE 20 MG: 20 TABLET ORAL at 14:33

## 2019-01-31 RX ADMIN — EPHEDRINE SULFATE 15 MG: 50 INJECTION, SOLUTION INTRAMUSCULAR; INTRAVENOUS; SUBCUTANEOUS at 10:56

## 2019-01-31 RX ADMIN — FENTANYL CITRATE 50 MCG: 50 INJECTION INTRAMUSCULAR; INTRAVENOUS at 11:24

## 2019-01-31 RX ADMIN — SUGAMMADEX 200 MG: 100 INJECTION, SOLUTION INTRAVENOUS at 11:14

## 2019-01-31 RX ADMIN — AMLODIPINE BESYLATE 5 MG: 5 TABLET ORAL at 14:33

## 2019-01-31 RX ADMIN — PHENAZOPYRIDINE HYDROCHLORIDE 100 MG: 100 TABLET ORAL at 08:13

## 2019-01-31 RX ADMIN — SUCCINYLCHOLINE CHLORIDE 100 MG: 20 INJECTION, SOLUTION INTRAMUSCULAR; INTRAVENOUS; PARENTERAL at 10:14

## 2019-01-31 RX ADMIN — FUROSEMIDE 40 MG: 40 TABLET ORAL at 14:32

## 2019-01-31 RX ADMIN — ONDANSETRON 4 MG: 2 INJECTION INTRAMUSCULAR; INTRAVENOUS at 23:01

## 2019-01-31 RX ADMIN — PROPOFOL 150 MG: 10 INJECTION, EMULSION INTRAVENOUS at 10:12

## 2019-01-31 RX ADMIN — FENTANYL CITRATE 50 MCG: 50 INJECTION INTRAMUSCULAR; INTRAVENOUS at 10:12

## 2019-01-31 RX ADMIN — SODIUM CHLORIDE, POTASSIUM CHLORIDE, SODIUM LACTATE AND CALCIUM CHLORIDE: 600; 310; 30; 20 INJECTION, SOLUTION INTRAVENOUS at 08:34

## 2019-01-31 RX ADMIN — KETOROLAC TROMETHAMINE 15 MG: 30 INJECTION, SOLUTION INTRAMUSCULAR; INTRAVENOUS at 11:23

## 2019-01-31 RX ADMIN — FENTANYL CITRATE 25 MCG: 50 INJECTION INTRAMUSCULAR; INTRAVENOUS at 11:15

## 2019-01-31 RX ADMIN — FENTANYL CITRATE 50 MCG: 50 INJECTION INTRAMUSCULAR; INTRAVENOUS at 10:32

## 2019-01-31 RX ADMIN — Medication 10 ML: at 21:55

## 2019-01-31 RX ADMIN — DOCUSATE SODIUM 100 MG: 100 CAPSULE, LIQUID FILLED ORAL at 21:54

## 2019-01-31 RX ADMIN — ROCURONIUM BROMIDE 50 MG: 10 INJECTION INTRAVENOUS at 10:24

## 2019-01-31 RX ADMIN — WATER 2 G: 1 INJECTION INTRAMUSCULAR; INTRAVENOUS; SUBCUTANEOUS at 10:23

## 2019-01-31 ASSESSMENT — PAIN DESCRIPTION - LOCATION: LOCATION: ABDOMEN

## 2019-01-31 ASSESSMENT — ENCOUNTER SYMPTOMS: SHORTNESS OF BREATH: 0

## 2019-01-31 ASSESSMENT — LIFESTYLE VARIABLES: SMOKING_STATUS: 0

## 2019-01-31 ASSESSMENT — PAIN DESCRIPTION - PAIN TYPE: TYPE: SURGICAL PAIN

## 2019-01-31 ASSESSMENT — PAIN SCALES - GENERAL
PAINLEVEL_OUTOF10: 7
PAINLEVEL_OUTOF10: 7
PAINLEVEL_OUTOF10: 0

## 2019-02-01 VITALS
DIASTOLIC BLOOD PRESSURE: 69 MMHG | RESPIRATION RATE: 18 BRPM | HEIGHT: 64 IN | BODY MASS INDEX: 35.51 KG/M2 | SYSTOLIC BLOOD PRESSURE: 124 MMHG | OXYGEN SATURATION: 96 % | TEMPERATURE: 98.9 F | HEART RATE: 74 BPM | WEIGHT: 208 LBS

## 2019-02-01 LAB
BASOPHILS ABSOLUTE: 0 K/UL (ref 0–0.2)
BASOPHILS RELATIVE PERCENT: 0.2 % (ref 0–1)
EOSINOPHILS ABSOLUTE: 0 K/UL (ref 0–0.6)
EOSINOPHILS RELATIVE PERCENT: 0.2 % (ref 0–5)
HCT VFR BLD CALC: 37.6 % (ref 37–47)
HEMOGLOBIN: 12 G/DL (ref 12–16)
LYMPHOCYTES ABSOLUTE: 1.4 K/UL (ref 1.1–4.5)
LYMPHOCYTES RELATIVE PERCENT: 10.7 % (ref 20–40)
MCH RBC QN AUTO: 29.3 PG (ref 27–31)
MCHC RBC AUTO-ENTMCNC: 31.9 G/DL (ref 33–37)
MCV RBC AUTO: 91.7 FL (ref 81–99)
MONOCYTES ABSOLUTE: 1.1 K/UL (ref 0–0.9)
MONOCYTES RELATIVE PERCENT: 8.6 % (ref 0–10)
NEUTROPHILS ABSOLUTE: 10.4 K/UL (ref 1.5–7.5)
NEUTROPHILS RELATIVE PERCENT: 80 % (ref 50–65)
PDW BLD-RTO: 12.4 % (ref 11.5–14.5)
PLATELET # BLD: 216 K/UL (ref 130–400)
PMV BLD AUTO: 9.3 FL (ref 9.4–12.3)
RBC # BLD: 4.1 M/UL (ref 4.2–5.4)
WBC # BLD: 13 K/UL (ref 4.8–10.8)

## 2019-02-01 PROCEDURE — 2580000003 HC RX 258: Performed by: OBSTETRICS & GYNECOLOGY

## 2019-02-01 PROCEDURE — 36415 COLL VENOUS BLD VENIPUNCTURE: CPT

## 2019-02-01 PROCEDURE — 6370000000 HC RX 637 (ALT 250 FOR IP): Performed by: OBSTETRICS & GYNECOLOGY

## 2019-02-01 PROCEDURE — 85025 COMPLETE CBC W/AUTO DIFF WBC: CPT

## 2019-02-01 RX ORDER — HYDROCODONE BITARTRATE AND ACETAMINOPHEN 5; 325 MG/1; MG/1
2 TABLET ORAL EVERY 4 HOURS PRN
Qty: 36 TABLET | Refills: 0 | Status: SHIPPED | OUTPATIENT
Start: 2019-02-01 | End: 2019-02-04

## 2019-02-01 RX ADMIN — METOPROLOL SUCCINATE 50 MG: 50 TABLET, EXTENDED RELEASE ORAL at 08:36

## 2019-02-01 RX ADMIN — AMLODIPINE BESYLATE 5 MG: 5 TABLET ORAL at 08:36

## 2019-02-01 RX ADMIN — DOCUSATE SODIUM 100 MG: 100 CAPSULE, LIQUID FILLED ORAL at 08:36

## 2019-02-01 RX ADMIN — TAMSULOSIN HYDROCHLORIDE 0.4 MG: 0.4 CAPSULE ORAL at 08:36

## 2019-02-01 RX ADMIN — FAMOTIDINE 20 MG: 20 TABLET ORAL at 08:36

## 2019-02-01 RX ADMIN — Medication 10 ML: at 08:38

## 2019-02-01 RX ADMIN — FUROSEMIDE 40 MG: 40 TABLET ORAL at 08:36

## 2019-02-01 RX ADMIN — LISINOPRIL 40 MG: 20 TABLET ORAL at 08:37

## 2019-02-04 ENCOUNTER — TELEPHONE (OUTPATIENT)
Dept: PRIMARY CARE CLINIC | Age: 69
End: 2019-02-04

## 2019-02-04 ENCOUNTER — TELEPHONE (OUTPATIENT)
Dept: OBGYN | Age: 69
End: 2019-02-04

## 2019-02-08 ENCOUNTER — OFFICE VISIT (OUTPATIENT)
Dept: PRIMARY CARE CLINIC | Age: 69
End: 2019-02-08
Payer: MEDICARE

## 2019-02-08 VITALS
DIASTOLIC BLOOD PRESSURE: 75 MMHG | HEIGHT: 64 IN | WEIGHT: 206 LBS | OXYGEN SATURATION: 97 % | TEMPERATURE: 97.9 F | HEART RATE: 88 BPM | BODY MASS INDEX: 35.17 KG/M2 | SYSTOLIC BLOOD PRESSURE: 127 MMHG

## 2019-02-08 DIAGNOSIS — N81.3 COMPLETE UTERINE PROLAPSE: Primary | ICD-10-CM

## 2019-02-08 DIAGNOSIS — F41.1 GAD (GENERALIZED ANXIETY DISORDER): ICD-10-CM

## 2019-02-08 DIAGNOSIS — J02.0 STREP THROAT: ICD-10-CM

## 2019-02-08 DIAGNOSIS — R09.81 CONGESTED NOSE: ICD-10-CM

## 2019-02-08 DIAGNOSIS — R50.9 FEVER, UNSPECIFIED FEVER CAUSE: ICD-10-CM

## 2019-02-08 DIAGNOSIS — R05.9 COUGH: ICD-10-CM

## 2019-02-08 DIAGNOSIS — J02.9 SORE THROAT: ICD-10-CM

## 2019-02-08 PROBLEM — R60.9 EDEMA: Status: RESOLVED | Noted: 2018-11-02 | Resolved: 2019-02-08

## 2019-02-08 PROBLEM — N81.4 UTERINE PROLAPSE: Status: RESOLVED | Noted: 2019-01-31 | Resolved: 2019-02-08

## 2019-02-08 LAB
INFLUENZA A ANTIBODY: NEGATIVE
INFLUENZA B ANTIBODY: NEGATIVE
S PYO AG THROAT QL: POSITIVE

## 2019-02-08 PROCEDURE — 1111F DSCHRG MED/CURRENT MED MERGE: CPT | Performed by: NURSE PRACTITIONER

## 2019-02-08 PROCEDURE — 87880 STREP A ASSAY W/OPTIC: CPT | Performed by: NURSE PRACTITIONER

## 2019-02-08 PROCEDURE — 99495 TRANSJ CARE MGMT MOD F2F 14D: CPT | Performed by: NURSE PRACTITIONER

## 2019-02-08 PROCEDURE — 87804 INFLUENZA ASSAY W/OPTIC: CPT | Performed by: NURSE PRACTITIONER

## 2019-02-08 RX ORDER — AMOXICILLIN AND CLAVULANATE POTASSIUM 875; 125 MG/1; MG/1
1 TABLET, FILM COATED ORAL 2 TIMES DAILY
Qty: 20 TABLET | Refills: 0 | Status: SHIPPED | OUTPATIENT
Start: 2019-02-08 | End: 2019-02-18

## 2019-02-08 RX ORDER — DEXTROMETHORPHAN HYDROBROMIDE AND PROMETHAZINE HYDROCHLORIDE 15; 6.25 MG/5ML; MG/5ML
5 SYRUP ORAL 4 TIMES DAILY PRN
Qty: 118 ML | Refills: 0 | Status: SHIPPED | OUTPATIENT
Start: 2019-02-08 | End: 2019-02-15

## 2019-02-08 ASSESSMENT — ENCOUNTER SYMPTOMS
EYE REDNESS: 0
SINUS PRESSURE: 0
CONSTIPATION: 0
WHEEZING: 1
VOMITING: 0
SHORTNESS OF BREATH: 0
DIARRHEA: 0
SINUS PAIN: 0
RHINORRHEA: 1
SORE THROAT: 1
COUGH: 1

## 2019-02-12 ENCOUNTER — OFFICE VISIT (OUTPATIENT)
Dept: GASTROENTEROLOGY | Age: 69
End: 2019-02-12
Payer: MEDICARE

## 2019-02-12 VITALS
HEART RATE: 90 BPM | HEIGHT: 64 IN | DIASTOLIC BLOOD PRESSURE: 70 MMHG | SYSTOLIC BLOOD PRESSURE: 123 MMHG | WEIGHT: 205 LBS | BODY MASS INDEX: 35 KG/M2 | OXYGEN SATURATION: 98 %

## 2019-02-12 DIAGNOSIS — Z86.010 PERSONAL HISTORY OF COLONIC POLYPS: ICD-10-CM

## 2019-02-12 DIAGNOSIS — K62.5 BRBPR (BRIGHT RED BLOOD PER RECTUM): Primary | ICD-10-CM

## 2019-02-12 PROBLEM — Z86.0100 PERSONAL HISTORY OF COLONIC POLYPS: Status: ACTIVE | Noted: 2019-02-12

## 2019-02-12 PROCEDURE — G8427 DOCREV CUR MEDS BY ELIG CLIN: HCPCS | Performed by: NURSE PRACTITIONER

## 2019-02-12 PROCEDURE — 1036F TOBACCO NON-USER: CPT | Performed by: NURSE PRACTITIONER

## 2019-02-12 PROCEDURE — 4040F PNEUMOC VAC/ADMIN/RCVD: CPT | Performed by: NURSE PRACTITIONER

## 2019-02-12 PROCEDURE — 99214 OFFICE O/P EST MOD 30 MIN: CPT | Performed by: NURSE PRACTITIONER

## 2019-02-12 PROCEDURE — G8399 PT W/DXA RESULTS DOCUMENT: HCPCS | Performed by: NURSE PRACTITIONER

## 2019-02-12 PROCEDURE — 1090F PRES/ABSN URINE INCON ASSESS: CPT | Performed by: NURSE PRACTITIONER

## 2019-02-12 PROCEDURE — 1123F ACP DISCUSS/DSCN MKR DOCD: CPT | Performed by: NURSE PRACTITIONER

## 2019-02-12 PROCEDURE — G8484 FLU IMMUNIZE NO ADMIN: HCPCS | Performed by: NURSE PRACTITIONER

## 2019-02-12 PROCEDURE — 3017F COLORECTAL CA SCREEN DOC REV: CPT | Performed by: NURSE PRACTITIONER

## 2019-02-12 PROCEDURE — G8417 CALC BMI ABV UP PARAM F/U: HCPCS | Performed by: NURSE PRACTITIONER

## 2019-02-12 PROCEDURE — 1101F PT FALLS ASSESS-DOCD LE1/YR: CPT | Performed by: NURSE PRACTITIONER

## 2019-02-12 ASSESSMENT — ENCOUNTER SYMPTOMS
CONSTIPATION: 0
ANAL BLEEDING: 1
RECTAL PAIN: 0
SHORTNESS OF BREATH: 0
VOMITING: 0
SORE THROAT: 1
BACK PAIN: 0
COUGH: 1
ABDOMINAL PAIN: 0
TROUBLE SWALLOWING: 0
DIARRHEA: 0
NAUSEA: 0
ABDOMINAL DISTENTION: 0
VOICE CHANGE: 0

## 2019-02-14 ENCOUNTER — OFFICE VISIT (OUTPATIENT)
Dept: OBGYN | Age: 69
End: 2019-02-14

## 2019-02-14 VITALS
DIASTOLIC BLOOD PRESSURE: 81 MMHG | TEMPERATURE: 98.2 F | HEART RATE: 84 BPM | HEIGHT: 64 IN | WEIGHT: 212 LBS | SYSTOLIC BLOOD PRESSURE: 137 MMHG | BODY MASS INDEX: 36.19 KG/M2

## 2019-02-14 DIAGNOSIS — Z09 POSTOPERATIVE FOLLOW-UP: Primary | ICD-10-CM

## 2019-02-14 PROCEDURE — 99024 POSTOP FOLLOW-UP VISIT: CPT | Performed by: OBSTETRICS & GYNECOLOGY

## 2019-02-14 ASSESSMENT — ENCOUNTER SYMPTOMS
EYES NEGATIVE: 1
GASTROINTESTINAL NEGATIVE: 1
RESPIRATORY NEGATIVE: 1

## 2019-03-14 ENCOUNTER — HOSPITAL ENCOUNTER (OUTPATIENT)
Dept: WOMENS IMAGING | Age: 69
Discharge: HOME | End: 2019-03-14
Payer: MEDICARE

## 2019-03-14 ENCOUNTER — OFFICE VISIT (OUTPATIENT)
Dept: OBGYN | Age: 69
End: 2019-03-14

## 2019-03-14 VITALS
HEIGHT: 64 IN | HEART RATE: 83 BPM | WEIGHT: 211 LBS | DIASTOLIC BLOOD PRESSURE: 85 MMHG | BODY MASS INDEX: 36.02 KG/M2 | SYSTOLIC BLOOD PRESSURE: 159 MMHG

## 2019-03-14 DIAGNOSIS — Z09 POSTOPERATIVE FOLLOW-UP: Primary | ICD-10-CM

## 2019-03-14 DIAGNOSIS — Z12.31 ENCOUNTER FOR SCREENING MAMMOGRAM FOR MALIGNANT NEOPLASM OF BREAST: ICD-10-CM

## 2019-03-14 PROCEDURE — 99024 POSTOP FOLLOW-UP VISIT: CPT | Performed by: OBSTETRICS & GYNECOLOGY

## 2019-03-14 PROCEDURE — 77063 BREAST TOMOSYNTHESIS BI: CPT

## 2019-03-14 ASSESSMENT — ENCOUNTER SYMPTOMS
RESPIRATORY NEGATIVE: 1
GASTROINTESTINAL NEGATIVE: 1
EYES NEGATIVE: 1

## 2019-03-18 ENCOUNTER — TELEPHONE (OUTPATIENT)
Dept: OBGYN | Age: 69
End: 2019-03-18

## 2019-03-20 ENCOUNTER — TELEPHONE (OUTPATIENT)
Dept: WOMENS IMAGING | Age: 69
End: 2019-03-20

## 2019-03-25 ENCOUNTER — HOSPITAL ENCOUNTER (OUTPATIENT)
Dept: WOMENS IMAGING | Age: 69
Discharge: HOME | End: 2019-03-25
Payer: MEDICARE

## 2019-03-25 DIAGNOSIS — R92.8 ABNORMAL MAMMOGRAM: ICD-10-CM

## 2019-03-25 DIAGNOSIS — R10.10 PAIN OF UPPER ABDOMEN: ICD-10-CM

## 2019-03-25 DIAGNOSIS — K21.9 GASTROESOPHAGEAL REFLUX DISEASE, ESOPHAGITIS PRESENCE NOT SPECIFIED: ICD-10-CM

## 2019-03-25 PROCEDURE — 76642 ULTRASOUND BREAST LIMITED: CPT

## 2019-03-25 PROCEDURE — G0279 TOMOSYNTHESIS, MAMMO: HCPCS

## 2019-03-26 RX ORDER — OMEPRAZOLE 20 MG/1
20 CAPSULE, DELAYED RELEASE ORAL
Qty: 180 CAPSULE | Refills: 3 | Status: SHIPPED | OUTPATIENT
Start: 2019-03-26 | End: 2019-10-02 | Stop reason: SDUPTHER

## 2019-06-05 ENCOUNTER — LAB REQUISITION (OUTPATIENT)
Dept: LAB | Facility: HOSPITAL | Age: 69
End: 2019-06-05

## 2019-06-05 DIAGNOSIS — Z00.00 ROUTINE GENERAL MEDICAL EXAMINATION AT A HEALTH CARE FACILITY: ICD-10-CM

## 2019-06-05 PROCEDURE — 88305 TISSUE EXAM BY PATHOLOGIST: CPT | Performed by: INTERNAL MEDICINE

## 2019-06-07 LAB
CYTO UR: NORMAL
LAB AP CASE REPORT: NORMAL
LAB AP CLINICAL INFORMATION: NORMAL
PATH REPORT.FINAL DX SPEC: NORMAL
PATH REPORT.GROSS SPEC: NORMAL

## 2019-08-19 DIAGNOSIS — E78.2 MIXED HYPERLIPIDEMIA: Chronic | ICD-10-CM

## 2019-08-19 DIAGNOSIS — R60.0 LOCALIZED EDEMA: ICD-10-CM

## 2019-08-19 RX ORDER — FUROSEMIDE 40 MG/1
40 TABLET ORAL DAILY
Qty: 30 TABLET | Refills: 0 | Status: SHIPPED | OUTPATIENT
Start: 2019-08-19 | End: 2019-10-02 | Stop reason: SDUPTHER

## 2019-08-19 RX ORDER — ATORVASTATIN CALCIUM 80 MG/1
80 TABLET, FILM COATED ORAL NIGHTLY
Qty: 30 TABLET | Refills: 0 | Status: SHIPPED | OUTPATIENT
Start: 2019-08-19 | End: 2019-10-02 | Stop reason: SDUPTHER

## 2019-08-19 NOTE — TELEPHONE ENCOUNTER
Received fax from pharmacy requesting refill on pts medication(s). Pt was last seen in office on 2/8/2019  and has a follow up scheduled for 8/21/2019. Will send request to  [facility]  for patient.      Requested Prescriptions     Pending Prescriptions Disp Refills    atorvastatin (LIPITOR) 80 MG tablet [Pharmacy Med Name: ATORVASTATIN 80MG   TAB] 30 tablet 5     Sig: TAKE 1 TABLET BY MOUTH ONCE DAILY

## 2019-08-21 ENCOUNTER — OFFICE VISIT (OUTPATIENT)
Dept: PRIMARY CARE CLINIC | Age: 69
End: 2019-08-21
Payer: MEDICARE

## 2019-08-21 VITALS
HEART RATE: 85 BPM | OXYGEN SATURATION: 97 % | WEIGHT: 217.38 LBS | HEIGHT: 64 IN | DIASTOLIC BLOOD PRESSURE: 102 MMHG | BODY MASS INDEX: 37.11 KG/M2 | SYSTOLIC BLOOD PRESSURE: 176 MMHG | TEMPERATURE: 98.2 F

## 2019-08-21 DIAGNOSIS — Z28.21 REFUSED PNEUMOCOCCAL VACCINATION: ICD-10-CM

## 2019-08-21 DIAGNOSIS — Z96.1 BILATERAL ARTIFICIAL LENS IMPLANT: ICD-10-CM

## 2019-08-21 DIAGNOSIS — Z00.00 ROUTINE GENERAL MEDICAL EXAMINATION AT A HEALTH CARE FACILITY: Primary | ICD-10-CM

## 2019-08-21 DIAGNOSIS — R73.9 ELEVATED BLOOD SUGAR: ICD-10-CM

## 2019-08-21 DIAGNOSIS — E55.9 VITAMIN D DEFICIENCY: ICD-10-CM

## 2019-08-21 DIAGNOSIS — E78.2 MIXED HYPERLIPIDEMIA: Chronic | ICD-10-CM

## 2019-08-21 DIAGNOSIS — I10 ESSENTIAL HYPERTENSION: Chronic | ICD-10-CM

## 2019-08-21 DIAGNOSIS — H53.8 BLURRED VISION: ICD-10-CM

## 2019-08-21 PROBLEM — S22.49XA RIB FRACTURES: Status: RESOLVED | Noted: 2017-12-05 | Resolved: 2019-08-21

## 2019-08-21 PROBLEM — T14.90XA TRAUMA: Status: RESOLVED | Noted: 2017-11-02 | Resolved: 2019-08-21

## 2019-08-21 PROBLEM — R06.2 WHEEZING: Status: RESOLVED | Noted: 2018-11-02 | Resolved: 2019-08-21

## 2019-08-21 PROCEDURE — G8399 PT W/DXA RESULTS DOCUMENT: HCPCS | Performed by: NURSE PRACTITIONER

## 2019-08-21 PROCEDURE — 99213 OFFICE O/P EST LOW 20 MIN: CPT | Performed by: NURSE PRACTITIONER

## 2019-08-21 PROCEDURE — 1090F PRES/ABSN URINE INCON ASSESS: CPT | Performed by: NURSE PRACTITIONER

## 2019-08-21 PROCEDURE — G0439 PPPS, SUBSEQ VISIT: HCPCS | Performed by: NURSE PRACTITIONER

## 2019-08-21 PROCEDURE — 1036F TOBACCO NON-USER: CPT | Performed by: NURSE PRACTITIONER

## 2019-08-21 PROCEDURE — 3017F COLORECTAL CA SCREEN DOC REV: CPT | Performed by: NURSE PRACTITIONER

## 2019-08-21 PROCEDURE — G8417 CALC BMI ABV UP PARAM F/U: HCPCS | Performed by: NURSE PRACTITIONER

## 2019-08-21 PROCEDURE — 4040F PNEUMOC VAC/ADMIN/RCVD: CPT | Performed by: NURSE PRACTITIONER

## 2019-08-21 PROCEDURE — 1123F ACP DISCUSS/DSCN MKR DOCD: CPT | Performed by: NURSE PRACTITIONER

## 2019-08-21 PROCEDURE — G8427 DOCREV CUR MEDS BY ELIG CLIN: HCPCS | Performed by: NURSE PRACTITIONER

## 2019-08-21 RX ORDER — METOPROLOL SUCCINATE 100 MG/1
100 TABLET, EXTENDED RELEASE ORAL DAILY
Qty: 30 TABLET | Refills: 11 | Status: SHIPPED | OUTPATIENT
Start: 2019-08-21 | End: 2020-09-18

## 2019-08-21 ASSESSMENT — ENCOUNTER SYMPTOMS
VOMITING: 0
DIARRHEA: 0
CONSTIPATION: 0
ABDOMINAL PAIN: 0
SHORTNESS OF BREATH: 0
RHINORRHEA: 0
NAUSEA: 0
COUGH: 0
SORE THROAT: 0
EYE REDNESS: 0

## 2019-08-21 ASSESSMENT — PATIENT HEALTH QUESTIONNAIRE - PHQ9
SUM OF ALL RESPONSES TO PHQ QUESTIONS 1-9: 0
SUM OF ALL RESPONSES TO PHQ QUESTIONS 1-9: 0

## 2019-08-21 ASSESSMENT — LIFESTYLE VARIABLES: HOW OFTEN DO YOU HAVE A DRINK CONTAINING ALCOHOL: 0

## 2019-08-21 NOTE — PROGRESS NOTES
rectal cream Place rectally 2 times daily. Patient taking differently: Place rectally 2 times daily as needed Place rectally 2 times daily. Yes LUIZ Mistry   tamsulosin (FLOMAX) 0.4 MG capsule Take 1 capsule by mouth daily Yes Latoya Go MD   lisinopril (PRINIVIL;ZESTRIL) 40 MG tablet Take 1 tablet by mouth daily Yes LUIZ Castañeda   amLODIPine (NORVASC) 5 MG tablet Take 1 tablet by mouth daily Yes LUIZ Castañeda   aspirin 81 MG tablet Aspir-81 81 mg tablet,delayed release   Take 1 tablet every day by oral route. Yes Historical Provider, MD   hydrOXYzine (ATARAX) 50 MG tablet hydroxyzine HCl 50 mg tablet   TAKE ONE TABLET BY MOUTH TWICE DAILY AS NEEDED Yes Historical Provider, MD   famotidine (PEPCID) 20 MG tablet Take 1 tablet by mouth 2 times daily Yes LUIZ Castañeda   Multiple Vitamins-Minerals (MULTIVITAMIN ADULT PO) Take by mouth Yes Historical Provider, MD    up before refills Yes LUIZ Snell   omeprazole (PRILOSEC) 20 MG delayed release capsule Take 1 capsule by mouth 2 times daily (before meals) Yes LUIZ Castañeda   diazepam (VALIUM) 5 MG tablet Take 1 tablet by mouth every 8 hours as needed for Anxiety or Sleep for up to 30 days. Aleisha Basia LUIZ Castañeda   venlafaxine (EFFEXOR XR) 150 MG extended release capsule Take 1 capsule by mouth daily Yes LUIZ Castañeda   venlafaxine (EFFEXOR XR) 75 MG extended release capsule Take 1 capsule by mouth daily Yes LUIZ Castañeda   hydrocortisone (ANUSOL-HC) 2.5 % rectal cream Place rectally 2 times daily. Patient taking differently: Place rectally 2 times daily as needed Place rectally 2 times daily.  Yes LUIZ Mistry   tamsulosin (FLOMAX) 0.4 MG capsule Take 1 capsule by mouth daily Yes Latoya Go MD   lisinopril (PRINIVIL;ZESTRIL) 40 MG tablet Take 1 tablet by mouth daily Yes LUIZ Castañeda   amLODIPine (NORVASC) 5 MG tablet Take 1 tablet by mouth daily Yes LUIZ Castañeda   aspirin 81 MG tablet Aspir-81 81 mg tablet,delayed release   Take 1 tablet every day by oral route. Yes Historical Provider, MD   hydrOXYzine (ATARAX) 50 MG tablet hydroxyzine HCl 50 mg tablet   TAKE ONE TABLET BY MOUTH TWICE DAILY AS NEEDED Yes Historical Provider, MD   famotidine (PEPCID) 20 MG tablet Take 1 tablet by mouth 2 times daily Yes LUIZ Castañeda   Multiple Vitamins-Minerals (MULTIVITAMIN ADULT PO) Take by mouth Yes Historical Provider, MD      Diagnosis Date    Anxiety     Depression     Edema     GERD (gastroesophageal reflux disease)     Headache(784.0)     migraines    Hyperlipidemia     Hypertension     Mini stroke ([address]     Sleep apnea     CPAP    TIA (transient ischemic attack)      Past Surgical History:   Procedure Laterality Date    CHOLECYSTECTOMY      COLONOSCOPY  07/01/2015    Dr. Gabriella Grace COLONOSCOPY  06/05/2019    Dr [facility]) Non-bleeding internal hemorrhoids, diverticulosis-Tubular AP (-) dysplasia    CYSTOSCOPY Left 12/5/2018    CYSTOSCOPY URETEROSCOPY  PLACEMENT DOUBLE J STENT ON LEFT RIGHT  RETROGRADE PYELOGRAMS performed by Rolinda Phoenix, MD at [address] Bilateral 1/31/2019    TOTAL LAPAROSCOPIC HYSTERECTOMY BILATERAL SALPINGOOPHERECTOMY WITH Gerline Kingfisher performed by Marta Biswas MD at [facility]      IN EGD TRANSORAL BIOPSY SINGLE/MULTIPLE N/A 5/8/2018    Dr Arley Blair (-) Kristin (+) Dye's (-) dysplasia--3 yr recall    IN LAP,CHOLECYSTECTOMY N/A 3/6/2018    CHOLECYSTECTOMY LAPAROSCOPIC performed by Gabriella Carreno MD at [address] [facility]  08/26/2015    Dr. Daisy East  5/23/2017    Dr Osorio-w/balloon dilation, 12-13. 5-15 mm-esophageal narrowing with diverticulum at 29 cm-Kristin (-), hiatal herni, Dye's (+) dysplasia (-)--1st dx--1 yr recall-Ct chest ordered    WRIST FRACTURE SURGERY         Family History   Problem Relation Age of Onset    Heart Disease Mother     Colon Cancer Neg Hx     Colon Polyps Neg Hx     Liver Cancer Neg Hx     Liver Disease Neg Hx     Esophageal Cancer Neg Hx     Rectal Cancer Neg Hx     Stomach Cancer Neg Hx        CareTeam (Including outside providers/suppliers regularly involved in providing care):   Patient Care Team:  LUIZ Pelaez as PCP - General (Family Nurse Practitioner)  LUIZ Pelaez as PCP - [facility] Empaneled Provider  William Melendez DO as Consulting Physician (Gastroenterology)  Tra Bailey MD as Consulting Physician (Obstetrics & Gynecology)  LUIZ Bonilla as Advanced Practice Nurse (Gastroenterology)    Wt Readings from Last 3 Encounters:   08/21/19 217 lb 6 oz (98.6 kg)   03/14/19 211 lb (95.7 kg)   02/14/19 212 lb (96.2 kg)     Vitals:    08/21/19 1406 08/21/19 1436   BP: (!) 171/94 (!) 176/102   Pulse: 85 85   Temp: 98.2 °F (36.8 °C)    TempSrc: Temporal    SpO2: 97%    Weight: 217 lb 6 oz (98.6 kg)    Height: 5' 4\" (1.626 m)      Body mass index is 37.31 kg/m². Based upon direct observation of the patient, evaluation of cognition reveals recent and remote memory intact. Patient's complete Health Risk Assessment and screening values have been reviewed and are found in Flowsheets. The following problems were reviewed today and where indicated follow up appointments were made and/or referrals ordered. Positive Risk Factor Screenings with Interventions:     Health Habits/Nutrition:  Health Habits/Nutrition  Do you exercise for at least 20 minutes 2-3 times per week?: Yes  Have you lost any weight without trying in the past 3 months?: No  Do you eat fewer than 2 meals per day?: No  Have you seen a dentist within the past year?: Yes  Body mass index is 37.31 kg/m².   Health Habits/Nutrition Interventions:  · No concerns    Hearing/Vision:  No exam data present  Hearing/Vision  Do you or your family notice any trouble with your hearing?: No  Do you have difficulty driving, watching TV, or doing extended release tablet; Take 1 tablet by mouth daily    Refused pneumococcal vaccination    Elevated blood sugar  -     Hemoglobin A1C; Future    Vitamin D deficiency  -     Vitamin D 25 Hydroxy; Future    Mixed hyperlipidemia  -     Comprehensive Metabolic Panel; Future  -     Lipid Panel; Future            [address]  Jeovany Breaux  Phone [phone]   Fax [phone]      OFFICE VISIT: 2019    Frantz Shankar- : 1950    Chief Complaint:Anabel is a 77 y.o. female who is here for Medicare AWV     HPI  The patient presents today for Medicare Annual Wellness Exam.    VISION:  Reports bilateral blurred vision; however, the left is worse. About 6-7 years, she had lens implants per Dr. Yoandy Magdaleno. Recently she went to [facility]. She got new glasses and vision remains blurry. The left eye is blurry all the time. The right eye is blurry when she goes outside. HTN:  She is taking Toprol XL 50 mg, Lisinopril 40 mg and Norvasc 5 mg. She tolerating regimen. She sometimes checks her BP at home. height is 5' 4\" (1.626 m) and weight is 217 lb 6 oz (98.6 kg). Her temporal temperature is 98.2 °F (36.8 °C). Her blood pressure is 176/102 (abnormal) and her pulse is 85. Her oxygen saturation is 97%. Body mass index is 37.31 kg/m². Results for orders placed or performed in visit on 19   POCT Influenza A/B   Result Value Ref Range    Influenza A Ab negative     Influenza B Ab negative    POCT rapid strep A   Result Value Ref Range    Strep A Ag Positive (A) None Detected     have reviewed the following with the Ms. Nichelle Jackson    Lab Review   No visits with results within 6 Month(s) from this visit. Latest known visit with results is:   Office Visit on 2019   Component Date Value    Influenza A Ab 2019 negative     Influenza B Ab 2019 negative     Strep A Ag 2019 Positive*     Copies of these are in the chart. Prior to Visit Medications    Medication Sig Taking? Authorizing Provider   metoprolol succinate (TOPROL XL) 100 MG extended release tablet Take 1 tablet by mouth daily Yes LUIZ Castañeda   furosemide (LASIX) 40 MG tablet Take 1 tablet by mouth daily Needs labs and follow up before refills Yes Maite Organ APRJERAMIE   atorvastatin (LIPITOR) 80 MG tablet Take 1 tablet by mouth nightly Needs labs and follow up before refills Yes Maite Organ, APRJERAMIE   omeprazole (PRILOSEC) 20 MG delayed release capsule Take 1 capsule by mouth 2 times daily (before meals) Yes LUIZ Castañeda   diazepam (VALIUM) 5 MG tablet Take 1 tablet by mouth every 8 hours as needed for Anxiety or Sleep for up to 30 days. Itzel Crowley LUIZ Castañeda   venlafaxine (EFFEXOR XR) 150 MG extended release capsule Take 1 capsule by mouth daily Yes LUIZ Castañeda   venlafaxine (EFFEXOR XR) 75 MG extended release capsule Take 1 capsule by mouth daily Yes LUIZ Castañeda   hydrocortisone (ANUSOL-HC) 2.5 % rectal cream Place rectally 2 times daily. Patient taking differently: Place rectally 2 times daily as needed Place rectally 2 times daily. Yes LUIZ Montano   tamsulosin (FLOMAX) 0.4 MG capsule Take 1 capsule by mouth daily Yes Claudia Drake MD   lisinopril (PRINIVIL;ZESTRIL) 40 MG tablet Take 1 tablet by mouth daily Yes LUIZ Castañeda   amLODIPine (NORVASC) 5 MG tablet Take 1 tablet by mouth daily Yes LUIZ Castañeda   aspirin 81 MG tablet Aspir-81 81 mg tablet,delayed release   Take 1 tablet every day by oral route.  Yes Historical Provider, MD   hydrOXYzine (ATARAX) 50 MG tablet hydroxyzine HCl 50 mg tablet   TAKE ONE TABLET BY MOUTH TWICE DAILY AS NEEDED Yes Historical Provider, MD   famotidine (PEPCID) 20 MG tablet Take 1 tablet by mouth 2 times daily Yes LUIZ Castañeda   Multiple Vitamins-Minerals (MULTIVITAMIN ADULT PO) Take by mouth Yes Historical Provider, MD       Allergies: Atorvastatin; Nabumetone; and Rosuvastatin calcium    Past Medical History: Diagnosis Date    Anxiety     Depression     Edema     GERD (gastroesophageal reflux disease)     Headache(784.0)     migraines    Hyperlipidemia     Hypertension     Mini stroke (HCC)     Sleep apnea     CPAP    TIA (transient ischemic attack)        Past Surgical History:   Procedure Laterality Date    CHOLECYSTECTOMY      COLONOSCOPY  07/01/2015    Dr. Anthony Zhu COLONOSCOPY  06/05/2019    Dr [facility]) Non-bleeding internal hemorrhoids, diverticulosis-Tubular AP (-) dysplasia    CYSTOSCOPY Left 12/5/2018    CYSTOSCOPY URETEROSCOPY  PLACEMENT DOUBLE J STENT ON LEFT RIGHT  RETROGRADE PYELOGRAMS performed by Hammad Pollard MD at [address] Bilateral 1/31/2019    TOTAL LAPAROSCOPIC HYSTERECTOMY BILATERAL SALPINGOOPHERECTOMY WITH Benny Holley performed by Rylee Baker MD at [facility], VAGINAL      [place] EGD TRANSORAL BIOPSY SINGLE/MULTIPLE N/A 5/8/2018    Dr Melvin Sims (-) Kristin (+) Dye's (-) dysplasia--3 yr recall    [place] LAP,CHOLECYSTECTOMY N/A 3/6/2018    CHOLECYSTECTOMY LAPAROSCOPIC performed by Vani Connolly MD at [address] [place] [address]  08/26/2015    Dr. Tutu Murray  5/23/2017    Dr Osorio-w/balloon dilation, 12-13. 5-15 mm-esophageal narrowing with diverticulum at 34 cm-Kristin (-), hiatal herni, Dye's (+) dysplasia (-)--1st dx--1 yr recall-Ct chest ordered    WRIST FRACTURE SURGERY         Social History     Tobacco Use    Smoking status: Never Smoker    Smokeless tobacco: Never Used   Substance Use Topics    Alcohol use: No     Alcohol/week: 0.0 standard drinks       Family History   Problem Relation Age of Onset    Heart Disease Mother     Colon Cancer Neg Hx     Colon Polyps Neg Hx     Liver Cancer Neg Hx     Liver Disease Neg Hx     Esophageal Cancer Neg Hx     Rectal Cancer Neg Hx     Stomach Cancer Neg Hx        Review of Systems   Constitutional: Negative for chills, glaucoma; cataracts, macular degeneration, and other eye disorders. · A preventive dental visit is recommended every 6 months. · Try to get at least 150 minutes of exercise per week or 10,000 steps per day on a pedometer . · Order or download the FREE \"Exercise & Physical Activity: Your Everyday Guide\" from [employer]. Call 0-118.523.7393 or search [employer] online. · You need 2933-8044 mg of calcium and 8829-7965 IU of vitamin D per day. It is possible to meet your calcium requirement with diet alone, but a vitamin D supplement is usually necessary to meet this goal.  · When exposed to the sun, use a sunscreen that protects against both UVA and UVB radiation with an SPF of 30 or greater. Reapply every 2 to 3 hours or after sweating, drying off with a towel, or swimming. · Always wear a seat belt when traveling in a car. Always wear a helmet when riding a bicycle or motorcycle. Controlled Substances Monitoring:    n/a          Additional Instructions: As always, patient is advised to bring in medication bottles in order to correctly reconcile with our current list.    Delmi Malik received counseling on the following healthy behaviors: DASH    Patient given educational materials on dx    I have instructed Delmi Malik to complete a self tracking handout on BP's and instructed them to bring it with them to her next appointment. Discussed use, benefit, and side effects of prescribed medications. Barriers to medication compliance addressed. All patient questions answered. Pt voiced understanding.      LUIZ Mcclain

## 2019-08-22 ENCOUNTER — TELEPHONE (OUTPATIENT)
Dept: PRIMARY CARE CLINIC | Age: 69
End: 2019-08-22

## 2019-08-22 DIAGNOSIS — E78.2 MIXED HYPERLIPIDEMIA: Primary | ICD-10-CM

## 2019-08-22 DIAGNOSIS — R10.32 LEFT LOWER QUADRANT PAIN: ICD-10-CM

## 2019-08-22 DIAGNOSIS — E55.9 VITAMIN D DEFICIENCY: ICD-10-CM

## 2019-08-22 DIAGNOSIS — R34 DECREASED URINE OUTPUT: ICD-10-CM

## 2019-08-22 DIAGNOSIS — R73.9 ELEVATED BLOOD SUGAR: ICD-10-CM

## 2019-08-22 DIAGNOSIS — E78.2 MIXED HYPERLIPIDEMIA: Chronic | ICD-10-CM

## 2019-08-22 DIAGNOSIS — R39.11 URINARY HESITANCY: ICD-10-CM

## 2019-08-22 DIAGNOSIS — R10.12 LEFT UPPER QUADRANT PAIN: ICD-10-CM

## 2019-08-22 DIAGNOSIS — I10 ESSENTIAL HYPERTENSION: Chronic | ICD-10-CM

## 2019-08-22 LAB
ALBUMIN SERPL-MCNC: 4.2 G/DL (ref 3.5–5.2)
ALP BLD-CCNC: 88 U/L (ref 35–104)
ALT SERPL-CCNC: 12 U/L (ref 5–33)
ANION GAP SERPL CALCULATED.3IONS-SCNC: 12 MMOL/L (ref 7–19)
AST SERPL-CCNC: 14 U/L (ref 5–32)
BASOPHILS ABSOLUTE: 0 K/UL (ref 0–0.2)
BASOPHILS RELATIVE PERCENT: 0.7 % (ref 0–1)
BILIRUB SERPL-MCNC: <0.2 MG/DL (ref 0.2–1.2)
BILIRUBIN URINE: NEGATIVE
BLOOD, URINE: NEGATIVE
BUN BLDV-MCNC: 11 MG/DL (ref 8–23)
C-REACTIVE PROTEIN: 0.34 MG/DL (ref 0–0.5)
CALCIUM SERPL-MCNC: 9.7 MG/DL (ref 8.8–10.2)
CHLORIDE BLD-SCNC: 103 MMOL/L (ref 98–111)
CHOLESTEROL, TOTAL: 205 MG/DL (ref 160–199)
CLARITY: CLEAR
CO2: 27 MMOL/L (ref 22–29)
COLOR: YELLOW
CREAT SERPL-MCNC: 0.7 MG/DL (ref 0.5–0.9)
CREATININE URINE: 43.1 MG/DL (ref 4.2–622)
EOSINOPHILS ABSOLUTE: 0.1 K/UL (ref 0–0.6)
EOSINOPHILS RELATIVE PERCENT: 1.3 % (ref 0–5)
GFR NON-AFRICAN AMERICAN: >60
GLUCOSE BLD-MCNC: 111 MG/DL (ref 74–109)
GLUCOSE URINE: >=1000 MG/DL
HBA1C MFR BLD: 5.8 % (ref 4–6)
HCT VFR BLD CALC: 39.8 % (ref 37–47)
HDLC SERPL-MCNC: 51 MG/DL (ref 65–121)
HEMOGLOBIN: 12.6 G/DL (ref 12–16)
IMMATURE GRANULOCYTES #: 0 K/UL
KETONES, URINE: NEGATIVE MG/DL
LDL CHOLESTEROL CALCULATED: 109 MG/DL
LEUKOCYTE ESTERASE, URINE: NEGATIVE
LIPASE: 27 U/L (ref 13–60)
LYMPHOCYTES ABSOLUTE: 1.3 K/UL (ref 1.1–4.5)
LYMPHOCYTES RELATIVE PERCENT: 24.1 % (ref 20–40)
MCH RBC QN AUTO: 29.3 PG (ref 27–31)
MCHC RBC AUTO-ENTMCNC: 31.7 G/DL (ref 33–37)
MCV RBC AUTO: 92.6 FL (ref 81–99)
MICROALBUMIN UR-MCNC: <1.2 MG/DL (ref 0–19)
MICROALBUMIN/CREAT UR-RTO: NORMAL MG/G
MONOCYTES ABSOLUTE: 0.6 K/UL (ref 0–0.9)
MONOCYTES RELATIVE PERCENT: 11.5 % (ref 0–10)
NEUTROPHILS ABSOLUTE: 3.4 K/UL (ref 1.5–7.5)
NEUTROPHILS RELATIVE PERCENT: 62 % (ref 50–65)
NITRITE, URINE: NEGATIVE
PDW BLD-RTO: 12.4 % (ref 11.5–14.5)
PH UA: 6 (ref 5–8)
PLATELET # BLD: 231 K/UL (ref 130–400)
PMV BLD AUTO: 9.9 FL (ref 9.4–12.3)
POTASSIUM SERPL-SCNC: 4.2 MMOL/L (ref 3.5–5)
PROTEIN UA: NEGATIVE MG/DL
RBC # BLD: 4.3 M/UL (ref 4.2–5.4)
SODIUM BLD-SCNC: 142 MMOL/L (ref 136–145)
SPECIFIC GRAVITY UA: 1.01 (ref 1–1.03)
T4 FREE: 0.9 NG/DL (ref 0.9–1.7)
TOTAL PROTEIN: 6.9 G/DL (ref 6.6–8.7)
TRIGL SERPL-MCNC: 223 MG/DL (ref 0–149)
TSH SERPL DL<=0.05 MIU/L-ACNC: 2.8 UIU/ML (ref 0.27–4.2)
UROBILINOGEN, URINE: 0.2 E.U./DL
VITAMIN D 25-HYDROXY: 21.9 NG/ML
WBC # BLD: 5.6 K/UL (ref 4.8–10.8)

## 2019-08-22 RX ORDER — ERGOCALCIFEROL 1.25 MG/1
50000 CAPSULE ORAL WEEKLY
Qty: 4 CAPSULE | Refills: 5 | Status: SHIPPED | OUTPATIENT
Start: 2019-08-22 | End: 2021-05-25 | Stop reason: ALTCHOICE

## 2019-08-22 RX ORDER — EZETIMIBE 10 MG/1
10 TABLET ORAL DAILY
Qty: 30 TABLET | Refills: 11 | Status: SHIPPED | OUTPATIENT
Start: 2019-08-22

## 2019-08-22 NOTE — TELEPHONE ENCOUNTER
----- Message from LUIZ Salinas sent at 8/22/2019 12:40 PM CDT -----  No significant microalbumin in the urine  Urinalysis is negative for infection  Thyroid is within normal limits  CMP is within normal limits except for a mildly elevated blood sugar at 111. This includes normal electrolytes, kidney function and liver function  Cholesterol is higher than 10 months ago. Total cholesterol is 205. We recommend this value be less than 200. LDL is 109. We recommend this be less than 100 and even closer to 70 heart attack and stroke. According to patient's medication list she is on the highest dose of atorvastatin. Please ensure she is taking atorvastatin 80 mg nightly. Also it is reported that Crestor a allergy. Therefore recommend adding Zetia 10 mg daily and rechecking lipid profile in 3 months  Lipase is within normal limits  CRP is within normal limits  A1c is within normal limits  CBC is within normal limits.   No evidence of infection or anemia

## 2019-08-22 NOTE — TELEPHONE ENCOUNTER
LUIZ Castañeda [facility] [address] Clinical Staff             Vitamin D is low.    Recommend vitamin D 50,000 units, 1 tablet weekly.  Dispense #4.  Refills #5.  Recommend rechecking vitamin D in 3 months

## 2019-09-04 ENCOUNTER — OFFICE VISIT (OUTPATIENT)
Dept: PRIMARY CARE CLINIC | Age: 69
End: 2019-09-04
Payer: MEDICARE

## 2019-09-04 ENCOUNTER — HOSPITAL ENCOUNTER (OUTPATIENT)
Dept: GENERAL RADIOLOGY | Age: 69
Discharge: HOME | End: 2019-09-04
Payer: MEDICARE

## 2019-09-04 ENCOUNTER — TELEPHONE (OUTPATIENT)
Dept: PRIMARY CARE CLINIC | Age: 69
End: 2019-09-04

## 2019-09-04 VITALS
OXYGEN SATURATION: 98 % | BODY MASS INDEX: 36.7 KG/M2 | SYSTOLIC BLOOD PRESSURE: 143 MMHG | WEIGHT: 215 LBS | HEART RATE: 69 BPM | HEIGHT: 64 IN | TEMPERATURE: 98.3 F | DIASTOLIC BLOOD PRESSURE: 84 MMHG

## 2019-09-04 DIAGNOSIS — M79.642 LEFT HAND PAIN: ICD-10-CM

## 2019-09-04 DIAGNOSIS — I10 ESSENTIAL HYPERTENSION: Chronic | ICD-10-CM

## 2019-09-04 DIAGNOSIS — M79.632 LEFT FOREARM PAIN: ICD-10-CM

## 2019-09-04 DIAGNOSIS — S62.102D CLOSED FRACTURE OF LEFT WRIST WITH ROUTINE HEALING, SUBSEQUENT ENCOUNTER: ICD-10-CM
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
## 2019-09-04 DIAGNOSIS — M25.532 LEFT WRIST PAIN: ICD-10-CM

## 2019-09-04 PROCEDURE — 73110 X-RAY EXAM OF WRIST: CPT

## 2019-09-04 PROCEDURE — 1090F PRES/ABSN URINE INCON ASSESS: CPT | Performed by: NURSE PRACTITIONER

## 2019-09-04 PROCEDURE — G8427 DOCREV CUR MEDS BY ELIG CLIN: HCPCS | Performed by: NURSE PRACTITIONER

## 2019-09-04 PROCEDURE — 1123F ACP DISCUSS/DSCN MKR DOCD: CPT | Performed by: NURSE PRACTITIONER

## 2019-09-04 PROCEDURE — 3017F COLORECTAL CA SCREEN DOC REV: CPT | Performed by: NURSE PRACTITIONER

## 2019-09-04 PROCEDURE — 73090 X-RAY EXAM OF FOREARM: CPT

## 2019-09-04 PROCEDURE — 73130 X-RAY EXAM OF HAND: CPT

## 2019-09-04 PROCEDURE — 4040F PNEUMOC VAC/ADMIN/RCVD: CPT | Performed by: NURSE PRACTITIONER

## 2019-09-04 PROCEDURE — 99213 OFFICE O/P EST LOW 20 MIN: CPT | Performed by: NURSE PRACTITIONER

## 2019-09-04 PROCEDURE — G8399 PT W/DXA RESULTS DOCUMENT: HCPCS | Performed by: NURSE PRACTITIONER

## 2019-09-04 PROCEDURE — G8417 CALC BMI ABV UP PARAM F/U: HCPCS | Performed by: NURSE PRACTITIONER

## 2019-09-04 PROCEDURE — 1036F TOBACCO NON-USER: CPT | Performed by: NURSE PRACTITIONER

## 2019-09-04 RX ORDER — AMLODIPINE BESYLATE 10 MG/1
10 TABLET ORAL DAILY
Qty: 30 TABLET | Refills: 5 | Status: SHIPPED | OUTPATIENT
Start: 2019-09-04 | End: 2019-11-13 | Stop reason: DRUGHIGH

## 2019-09-04 ASSESSMENT — ENCOUNTER SYMPTOMS
DIARRHEA: 0
COUGH: 0
CONSTIPATION: 0
RHINORRHEA: 0
SORE THROAT: 0
VOMITING: 0
SHORTNESS OF BREATH: 0
EYE REDNESS: 0

## 2019-09-04 NOTE — PATIENT INSTRUCTIONS
that will allow you to sit while showering. · Get into a tub or shower by putting the weaker leg in first. Get out of a tub or shower with your strong side first.  · Repair loose toilet seats and consider installing a raised toilet seat to make getting on and off the toilet easier. · Keep your bathroom door unlocked while you are in the shower. Where can you learn more? Go to [email or web address] and sign in to your [employer] account. Enter [phone] in the [employer] box to learn more about \"Preventing Falls: Care Instructions. \"     If you do not have an account, please click on the \"Sign Up Now\" link. Current as of: November 7, 2018  Content Version: 12.1  © 5200-3601 Healthwise, Incorporated. Care instructions adapted under license by [facility] ([facility]). If you have questions about a medical condition or this instruction, always ask your healthcare professional. [address] any warranty or liability for your use of this information.

## 2019-09-04 NOTE — PROGRESS NOTES
08/22/2019 Negative     Leukocyte Esterase, Urine 08/22/2019 Negative      Copies of these are in the chart. Prior to Visit Medications    Medication Sig Taking? Authorizing Provider   vitamin D (ERGOCALCIFEROL) 50372 units CAPS capsule Take 1 capsule by mouth once a week Yes LUIZ Castañeda   ezetimibe (ZETIA) 10 MG tablet Take 1 tablet by mouth daily Yes LUIZ Castañeda   metoprolol succinate (TOPROL XL) 100 MG extended release tablet Take 1 tablet by mouth daily Yes LUIZ Castañeda   furosemide (LASIX) 40 MG tablet Take 1 tablet by mouth daily Needs labs and follow up before refills Yes LUIZ Bahena   atorvastatin (LIPITOR) 80 MG tablet Take 1 tablet by mouth nightly Needs labs and follow up before refills Yes LUIZ Bahena   omeprazole (PRILOSEC) 20 MG delayed release capsule Take 1 capsule by mouth 2 times daily (before meals) Yes LUIZ Castañeda   venlafaxine (EFFEXOR XR) 150 MG extended release capsule Take 1 capsule by mouth daily Yes LUIZ Castañeda   venlafaxine (EFFEXOR XR) 75 MG extended release capsule Take 1 capsule by mouth daily Yes LUIZ Castañeda   hydrocortisone (ANUSOL-HC) 2.5 % rectal cream Place rectally 2 times daily. Patient taking differently: Place rectally 2 times daily as needed Place rectally 2 times daily. Yes LUIZ Henderson   tamsulosin (FLOMAX) 0.4 MG capsule Take 1 capsule by mouth daily Yes Tono Whitten MD   lisinopril (PRINIVIL;ZESTRIL) 40 MG tablet Take 1 tablet by mouth daily Yes LUIZ Castañeda   amLODIPine (NORVASC) 5 MG tablet Take 1 tablet by mouth daily Yes LUZI Castañeda   aspirin 81 MG tablet Aspir-81 81 mg tablet,delayed release   Take 1 tablet every day by oral route.  Yes Historical Provider, MD   hydrOXYzine (ATARAX) 50 MG tablet hydroxyzine HCl 50 mg tablet   TAKE ONE TABLET BY MOUTH TWICE DAILY AS NEEDED Yes Historical Provider, MD   famotidine (PEPCID) 20 MG tablet Take 1 tablet by mouth 2 times medication compliance addressed. All patient questions answered. Pt voiced understanding.      LUIZ Dominguez

## 2019-09-17 ENCOUNTER — OFFICE VISIT (OUTPATIENT)
Dept: PRIMARY CARE CLINIC | Age: 69
End: 2019-09-17
Payer: MEDICARE

## 2019-09-17 ENCOUNTER — TELEPHONE (OUTPATIENT)
Dept: PRIMARY CARE CLINIC | Age: 69
End: 2019-09-17

## 2019-09-17 ENCOUNTER — HOSPITAL ENCOUNTER (OUTPATIENT)
Dept: GENERAL RADIOLOGY | Age: 69
Discharge: HOME | End: 2019-09-17
Payer: MEDICARE

## 2019-09-17 VITALS
HEIGHT: 64 IN | BODY MASS INDEX: 36.7 KG/M2 | TEMPERATURE: 96.9 F | DIASTOLIC BLOOD PRESSURE: 90 MMHG | HEART RATE: 100 BPM | SYSTOLIC BLOOD PRESSURE: 170 MMHG | WEIGHT: 215 LBS | OXYGEN SATURATION: 98 %

## 2019-09-17 DIAGNOSIS — R05.9 COUGH: ICD-10-CM

## 2019-09-17 DIAGNOSIS — M25.532 LEFT WRIST PAIN: ICD-10-CM

## 2019-09-17 DIAGNOSIS — J31.0 CHRONIC RHINITIS: ICD-10-CM

## 2019-09-17 DIAGNOSIS — M25.532 LEFT WRIST PAIN: Primary | ICD-10-CM
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
## 2019-09-17 DIAGNOSIS — I10 ESSENTIAL HYPERTENSION: Chronic | ICD-10-CM

## 2019-09-17 DIAGNOSIS — S62.102D CLOSED FRACTURE OF LEFT WRIST WITH ROUTINE HEALING, SUBSEQUENT ENCOUNTER: ICD-10-CM

## 2019-09-17 DIAGNOSIS — J01.90 ACUTE NON-RECURRENT SINUSITIS, UNSPECIFIED LOCATION: Primary | ICD-10-CM

## 2019-09-17 PROCEDURE — 3017F COLORECTAL CA SCREEN DOC REV: CPT | Performed by: NURSE PRACTITIONER

## 2019-09-17 PROCEDURE — 4040F PNEUMOC VAC/ADMIN/RCVD: CPT | Performed by: NURSE PRACTITIONER

## 2019-09-17 PROCEDURE — 1090F PRES/ABSN URINE INCON ASSESS: CPT | Performed by: NURSE PRACTITIONER

## 2019-09-17 PROCEDURE — 73110 X-RAY EXAM OF WRIST: CPT

## 2019-09-17 PROCEDURE — 1123F ACP DISCUSS/DSCN MKR DOCD: CPT | Performed by: NURSE PRACTITIONER

## 2019-09-17 PROCEDURE — G8427 DOCREV CUR MEDS BY ELIG CLIN: HCPCS | Performed by: NURSE PRACTITIONER

## 2019-09-17 PROCEDURE — 99213 OFFICE O/P EST LOW 20 MIN: CPT | Performed by: NURSE PRACTITIONER

## 2019-09-17 PROCEDURE — 1036F TOBACCO NON-USER: CPT | Performed by: NURSE PRACTITIONER

## 2019-09-17 PROCEDURE — G8417 CALC BMI ABV UP PARAM F/U: HCPCS | Performed by: NURSE PRACTITIONER

## 2019-09-17 PROCEDURE — G8399 PT W/DXA RESULTS DOCUMENT: HCPCS | Performed by: NURSE PRACTITIONER

## 2019-09-17 RX ORDER — GUAIFENESIN 600 MG/1
1200 TABLET, EXTENDED RELEASE ORAL 2 TIMES DAILY
Qty: 56 TABLET | Refills: 1 | Status: SHIPPED | OUTPATIENT
Start: 2019-09-17 | End: 2019-10-01

## 2019-09-17 RX ORDER — DEXTROMETHORPHAN HYDROBROMIDE AND PROMETHAZINE HYDROCHLORIDE 15; 6.25 MG/5ML; MG/5ML
5 SYRUP ORAL 4 TIMES DAILY PRN
Qty: 1 BOTTLE | Refills: 0 | Status: SHIPPED | OUTPATIENT
Start: 2019-09-17 | End: 2019-09-24

## 2019-09-17 RX ORDER — AMOXICILLIN AND CLAVULANATE POTASSIUM 875; 125 MG/1; MG/1
1 TABLET, FILM COATED ORAL 2 TIMES DAILY
Qty: 20 TABLET | Refills: 0 | Status: SHIPPED | OUTPATIENT
Start: 2019-09-17 | End: 2019-09-27

## 2019-09-17 ASSESSMENT — ENCOUNTER SYMPTOMS
VOMITING: 1
CONSTIPATION: 0
NAUSEA: 1
COUGH: 1
SINUS PRESSURE: 0
EYE REDNESS: 0
SORE THROAT: 1
DIARRHEA: 1
ABDOMINAL PAIN: 0
SHORTNESS OF BREATH: 1
RHINORRHEA: 1

## 2019-09-17 NOTE — PROGRESS NOTES
Discussed use, benefit, and side effects of prescribed medications. Barriers to medication compliance addressed. All patient questions answered. Pt voiced understanding.      LUIZ England

## 2019-09-17 NOTE — PATIENT INSTRUCTIONS
pregnant. Amoxicillin and clavulanate potassium can make birth control pills less effective. Ask your doctor about using a non-hormonal birth control (condom, diaphragm with spermicide) to prevent pregnancy. Amoxicillin and clavulanate potassium can pass into breast milk and may affect the nursing baby. Tell your doctor if you are breast-feeding. Do not give this medicine to a child without medical advice. The liquid or chewable tablet may contain phenylalanine. Talk to your doctor before using these forms of this medicine if you have phenylketonuria (PKU). How should I take amoxicillin and clavulanate potassium? Follow all directions on your prescription label. Do not take this medicine in larger or smaller amounts or for longer than recommended. Take the medicine every 12 hours, at the start of a meal to reduce stomach upset. Do not crush or chew the extended-release tablet. Swallow the pill whole, or break the pill in half and take both halves one at a time. If you have trouble swallowing a whole or half pill, talk with your doctor about using another form of amoxicillin and clavulanate potassium. The chewable tablet must be chewed before you swallow it. Shake the liquid medicine well just before you measure a dose. Measure liquid medicine with the dosing syringe provided, or with a special dose-measuring spoon or medicine cup. If you do not have a dose-measuring device, ask your pharmacist for one. Use this medicine for the full prescribed length of time. Your symptoms may improve before the infection is completely cleared. Skipping doses may also increase your risk of further infection that is resistant to antibiotics. Amoxicillin and clavulanate potassium will not treat a viral infection such as the flu or a common cold. This medicine can cause unusual results with certain lab tests for glucose (sugar) in the urine.  Tell any doctor who treats you that you are using amoxicillin and clavulanate effects and others may occur. Call your doctor for medical advice about side effects. You may report side effects to FDA at 7-121-BSR-5716. What other drugs will affect amoxicillin and clavulanate potassium? Tell your doctor about all your current medicines and any you start or stop using, especially:  · allopurinol;  · probenecid; or  · a blood thinner --warfarin, Coumadin, Jantoven. This list is not complete. Other drugs may interact with amoxicillin and clavulanate potassium, including prescription and over-the-counter medicines, vitamins, and herbal products. Not all possible interactions are listed in this medication guide. Where can I get more information? Your pharmacist can provide more information about amoxicillin and clavulanate potassium. Remember, keep this and all other medicines out of the reach of children, never share your medicines with others, and use this medication only for the indication prescribed. Every effort has been made to ensure that the information provided by [address] is accurate, up-to-date, and complete, but no guarantee is made to that effect. Drug information contained herein may be time sensitive. [employer] information has been compiled for use by healthcare practitioners and consumers in the [place] and therefore [employer] does not warrant that uses outside of the [place] are appropriate, unless specifically indicated otherwise. [facility]'s drug information does not endorse drugs, diagnose patients or recommend therapy. [facility] drug information is an informational resource designed to assist licensed healthcare practitioners in caring for their patients and/or to serve consumers viewing this service as a supplement to, and not a substitute for, the expertise, skill, knowledge and judgment of healthcare practitioners.  The absence of a warning for a given drug or drug combination in no way should be construed to indicate that the drug or drug

## 2019-09-19 ENCOUNTER — TELEPHONE (OUTPATIENT)
Dept: PRIMARY CARE CLINIC | Age: 69
End: 2019-09-19

## 2019-10-02 ENCOUNTER — TELEPHONE (OUTPATIENT)
Dept: PRIMARY CARE CLINIC | Age: 69
End: 2019-10-02

## 2019-10-02 ENCOUNTER — OFFICE VISIT (OUTPATIENT)
Dept: PRIMARY CARE CLINIC | Age: 69
End: 2019-10-02
Payer: MEDICARE

## 2019-10-02 VITALS
BODY MASS INDEX: 37.3 KG/M2 | TEMPERATURE: 98.4 F | WEIGHT: 218.5 LBS | HEIGHT: 64 IN | SYSTOLIC BLOOD PRESSURE: 158 MMHG | HEART RATE: 91 BPM | DIASTOLIC BLOOD PRESSURE: 84 MMHG | OXYGEN SATURATION: 98 %

## 2019-10-02 DIAGNOSIS — I10 ESSENTIAL HYPERTENSION: Primary | ICD-10-CM

## 2019-10-02 DIAGNOSIS — E78.2 MIXED HYPERLIPIDEMIA: Chronic | ICD-10-CM

## 2019-10-02 DIAGNOSIS — E55.9 VITAMIN D DEFICIENCY: ICD-10-CM

## 2019-10-02 DIAGNOSIS — K21.9 GASTROESOPHAGEAL REFLUX DISEASE WITHOUT ESOPHAGITIS: Chronic | ICD-10-CM

## 2019-10-02 DIAGNOSIS — K21.9 GASTROESOPHAGEAL REFLUX DISEASE, ESOPHAGITIS PRESENCE NOT SPECIFIED: ICD-10-CM

## 2019-10-02 DIAGNOSIS — Z23 NEED FOR PNEUMOCOCCAL VACCINATION: ICD-10-CM

## 2019-10-02 DIAGNOSIS — R45.89 CRYING ASSOCIATED WITH MOOD: ICD-10-CM

## 2019-10-02 DIAGNOSIS — E78.2 MIXED HYPERLIPIDEMIA: ICD-10-CM

## 2019-10-02 DIAGNOSIS — F41.1 GAD (GENERALIZED ANXIETY DISORDER): ICD-10-CM

## 2019-10-02 DIAGNOSIS — R10.10 PAIN OF UPPER ABDOMEN: ICD-10-CM

## 2019-10-02 DIAGNOSIS — M25.532 LEFT WRIST PAIN: ICD-10-CM

## 2019-10-02 DIAGNOSIS — I10 ESSENTIAL HYPERTENSION: Chronic | ICD-10-CM

## 2019-10-02 DIAGNOSIS — R60.0 LOCALIZED EDEMA: ICD-10-CM

## 2019-10-02 DIAGNOSIS — F33.1 MODERATE EPISODE OF RECURRENT MAJOR DEPRESSIVE DISORDER (HCC): ICD-10-CM

## 2019-10-02 LAB
CHOLESTEROL, TOTAL: 149 MG/DL (ref 160–199)
HDLC SERPL-MCNC: 50 MG/DL (ref 65–121)
LDL CHOLESTEROL CALCULATED: 67 MG/DL
TRIGL SERPL-MCNC: 162 MG/DL (ref 0–149)
VITAMIN D 25-HYDROXY: 29.9 NG/ML

## 2019-10-02 PROCEDURE — G8427 DOCREV CUR MEDS BY ELIG CLIN: HCPCS | Performed by: NURSE PRACTITIONER

## 2019-10-02 PROCEDURE — 90670 PCV13 VACCINE IM: CPT | Performed by: NURSE PRACTITIONER

## 2019-10-02 PROCEDURE — 1036F TOBACCO NON-USER: CPT | Performed by: NURSE PRACTITIONER

## 2019-10-02 PROCEDURE — 99213 OFFICE O/P EST LOW 20 MIN: CPT | Performed by: NURSE PRACTITIONER

## 2019-10-02 PROCEDURE — G0009 ADMIN PNEUMOCOCCAL VACCINE: HCPCS | Performed by: NURSE PRACTITIONER

## 2019-10-02 PROCEDURE — 4040F PNEUMOC VAC/ADMIN/RCVD: CPT | Performed by: NURSE PRACTITIONER

## 2019-10-02 PROCEDURE — G8399 PT W/DXA RESULTS DOCUMENT: HCPCS | Performed by: NURSE PRACTITIONER

## 2019-10-02 PROCEDURE — 1123F ACP DISCUSS/DSCN MKR DOCD: CPT | Performed by: NURSE PRACTITIONER

## 2019-10-02 PROCEDURE — G8484 FLU IMMUNIZE NO ADMIN: HCPCS | Performed by: NURSE PRACTITIONER

## 2019-10-02 PROCEDURE — 1090F PRES/ABSN URINE INCON ASSESS: CPT | Performed by: NURSE PRACTITIONER

## 2019-10-02 PROCEDURE — G8417 CALC BMI ABV UP PARAM F/U: HCPCS | Performed by: NURSE PRACTITIONER

## 2019-10-02 PROCEDURE — 3017F COLORECTAL CA SCREEN DOC REV: CPT | Performed by: NURSE PRACTITIONER

## 2019-10-02 RX ORDER — HYDRALAZINE HYDROCHLORIDE 10 MG/1
10 TABLET, FILM COATED ORAL 3 TIMES DAILY
Qty: 90 TABLET | Refills: 3 | Status: SHIPPED | OUTPATIENT
Start: 2019-10-02 | End: 2021-02-17

## 2019-10-02 ASSESSMENT — ENCOUNTER SYMPTOMS
RHINORRHEA: 0
COUGH: 0
EYE REDNESS: 0
VOMITING: 0
DIARRHEA: 0
SORE THROAT: 0
CONSTIPATION: 0
SHORTNESS OF BREATH: 0

## 2019-10-03 RX ORDER — FAMOTIDINE 20 MG/1
20 TABLET, FILM COATED ORAL 2 TIMES DAILY
Qty: 60 TABLET | Refills: 5 | Status: SHIPPED | OUTPATIENT
Start: 2019-10-03 | End: 2019-11-13 | Stop reason: SDUPTHER

## 2019-10-03 RX ORDER — ATORVASTATIN CALCIUM 80 MG/1
80 TABLET, FILM COATED ORAL NIGHTLY
Qty: 30 TABLET | Refills: 5 | Status: SHIPPED | OUTPATIENT
Start: 2019-10-03 | End: 2020-04-13

## 2019-10-03 RX ORDER — OMEPRAZOLE 20 MG/1
20 CAPSULE, DELAYED RELEASE ORAL
Qty: 180 CAPSULE | Refills: 3 | Status: SHIPPED | OUTPATIENT
Start: 2019-10-03 | End: 2020-05-26

## 2019-10-03 RX ORDER — LISINOPRIL 40 MG/1
40 TABLET ORAL DAILY
Qty: 30 TABLET | Refills: 11 | Status: SHIPPED | OUTPATIENT
Start: 2019-10-03 | End: 2020-09-18

## 2019-10-03 RX ORDER — FUROSEMIDE 40 MG/1
40 TABLET ORAL DAILY
Qty: 30 TABLET | Refills: 5 | Status: SHIPPED | OUTPATIENT
Start: 2019-10-03 | End: 2019-11-13 | Stop reason: SDUPTHER

## 2019-10-03 RX ORDER — VENLAFAXINE HYDROCHLORIDE 75 MG/1
75 CAPSULE, EXTENDED RELEASE ORAL DAILY
Qty: 30 CAPSULE | Refills: 5 | Status: SHIPPED | OUTPATIENT
Start: 2019-10-03 | End: 2020-08-27

## 2019-10-03 RX ORDER — AMLODIPINE BESYLATE 5 MG/1
5 TABLET ORAL DAILY
Qty: 90 TABLET | Refills: 3 | Status: SHIPPED | OUTPATIENT
Start: 2019-10-03 | End: 2022-01-14 | Stop reason: SDUPTHER

## 2019-10-03 RX ORDER — VENLAFAXINE HYDROCHLORIDE 150 MG/1
150 CAPSULE, EXTENDED RELEASE ORAL DAILY
Qty: 30 CAPSULE | Refills: 5 | Status: SHIPPED | OUTPATIENT
Start: 2019-10-03 | End: 2020-07-06

## 2019-10-03 RX ORDER — TAMSULOSIN HYDROCHLORIDE 0.4 MG/1
0.4 CAPSULE ORAL DAILY
Qty: 30 CAPSULE | Refills: 5 | Status: SHIPPED | OUTPATIENT
Start: 2019-10-03 | End: 2020-07-06

## 2019-10-09 ENCOUNTER — TELEPHONE (OUTPATIENT)
Dept: PRIMARY CARE CLINIC | Age: 69
End: 2019-10-09

## 2019-11-13 ENCOUNTER — OFFICE VISIT (OUTPATIENT)
Dept: PRIMARY CARE CLINIC | Age: 69
End: 2019-11-13
Payer: MEDICARE

## 2019-11-13 VITALS
DIASTOLIC BLOOD PRESSURE: 77 MMHG | SYSTOLIC BLOOD PRESSURE: 125 MMHG | TEMPERATURE: 98.1 F | HEIGHT: 64 IN | OXYGEN SATURATION: 98 % | HEART RATE: 78 BPM | WEIGHT: 231.13 LBS | BODY MASS INDEX: 39.46 KG/M2

## 2019-11-13 DIAGNOSIS — K21.9 GASTROESOPHAGEAL REFLUX DISEASE WITHOUT ESOPHAGITIS: Chronic | ICD-10-CM

## 2019-11-13 DIAGNOSIS — I10 ESSENTIAL HYPERTENSION: Primary | ICD-10-CM

## 2019-11-13 DIAGNOSIS — R60.0 LOCALIZED EDEMA: ICD-10-CM

## 2019-11-13 PROCEDURE — 99213 OFFICE O/P EST LOW 20 MIN: CPT | Performed by: NURSE PRACTITIONER

## 2019-11-13 RX ORDER — FUROSEMIDE 40 MG/1
40 TABLET ORAL DAILY
Qty: 30 TABLET | Refills: 5 | Status: SHIPPED | OUTPATIENT
Start: 2019-11-13 | End: 2020-10-19

## 2019-11-13 RX ORDER — FAMOTIDINE 20 MG/1
20 TABLET, FILM COATED ORAL 2 TIMES DAILY
Qty: 60 TABLET | Refills: 11 | Status: SHIPPED | OUTPATIENT
Start: 2019-11-13 | End: 2021-02-18

## 2019-11-13 ASSESSMENT — ENCOUNTER SYMPTOMS
SHORTNESS OF BREATH: 0
SORE THROAT: 0
DIARRHEA: 0
CONSTIPATION: 0
RHINORRHEA: 0
EYE REDNESS: 0
COUGH: 0
VOMITING: 0

## 2019-12-03 ENCOUNTER — OFFICE VISIT (OUTPATIENT)
Dept: FAMILY MEDICINE CLINIC | Facility: CLINIC | Age: 69
End: 2019-12-03

## 2019-12-03 VITALS
OXYGEN SATURATION: 98 % | BODY MASS INDEX: 38.1 KG/M2 | HEIGHT: 64 IN | HEART RATE: 103 BPM | TEMPERATURE: 98.4 F | DIASTOLIC BLOOD PRESSURE: 76 MMHG | WEIGHT: 223.2 LBS | SYSTOLIC BLOOD PRESSURE: 150 MMHG | RESPIRATION RATE: 18 BRPM

## 2019-12-03 DIAGNOSIS — J20.9 ACUTE BRONCHITIS, UNSPECIFIED ORGANISM: ICD-10-CM

## 2019-12-03 DIAGNOSIS — H65.93 BILATERAL NON-SUPPURATIVE OTITIS MEDIA: Primary | ICD-10-CM

## 2019-12-03 DIAGNOSIS — J02.9 PHARYNGITIS, UNSPECIFIED ETIOLOGY: ICD-10-CM

## 2019-12-03 DIAGNOSIS — J30.2 SEASONAL ALLERGIES: ICD-10-CM

## 2019-12-03 PROCEDURE — 99213 OFFICE O/P EST LOW 20 MIN: CPT | Performed by: NURSE PRACTITIONER

## 2019-12-03 PROCEDURE — 96372 THER/PROPH/DIAG INJ SC/IM: CPT | Performed by: NURSE PRACTITIONER

## 2019-12-03 RX ORDER — AMLODIPINE BESYLATE 5 MG/1
5 TABLET ORAL DAILY
Refills: 3 | COMMUNITY
Start: 2019-10-04

## 2019-12-03 RX ORDER — AMITRIPTYLINE HYDROCHLORIDE 10 MG/1
TABLET, FILM COATED ORAL
COMMUNITY
Start: 2017-03-03 | End: 2019-12-03

## 2019-12-03 RX ORDER — TOPIRAMATE 50 MG/1
TABLET, FILM COATED ORAL EVERY 12 HOURS SCHEDULED
COMMUNITY
Start: 2017-03-04 | End: 2019-12-03

## 2019-12-03 RX ORDER — BETHANECHOL CHLORIDE 25 MG/1
TABLET ORAL
COMMUNITY
End: 2019-12-03

## 2019-12-03 RX ORDER — PRAVASTATIN SODIUM 40 MG
TABLET ORAL
COMMUNITY

## 2019-12-03 RX ORDER — DEXTROMETHORPHAN HYDROBROMIDE AND PROMETHAZINE HYDROCHLORIDE 15; 6.25 MG/5ML; MG/5ML
5 SYRUP ORAL NIGHTLY PRN
Qty: 118 ML | Refills: 0 | Status: SHIPPED | OUTPATIENT
Start: 2019-12-03 | End: 2019-12-23

## 2019-12-03 RX ORDER — FLUTICASONE PROPIONATE 50 MCG
1 SPRAY, SUSPENSION (ML) NASAL DAILY
Qty: 1 BOTTLE | Refills: 2 | Status: SHIPPED | OUTPATIENT
Start: 2019-12-03

## 2019-12-03 RX ORDER — DEXAMETHASONE SODIUM PHOSPHATE 10 MG/ML
10 INJECTION INTRAMUSCULAR; INTRAVENOUS ONCE
Status: COMPLETED | OUTPATIENT
Start: 2019-12-03 | End: 2019-12-03

## 2019-12-03 RX ORDER — LISINOPRIL 40 MG/1
40 TABLET ORAL DAILY
Refills: 11 | COMMUNITY
Start: 2019-11-30 | End: 2019-12-03 | Stop reason: SDUPTHER

## 2019-12-03 RX ORDER — AMOXICILLIN AND CLAVULANATE POTASSIUM 875; 125 MG/1; MG/1
1 TABLET, FILM COATED ORAL 2 TIMES DAILY
Qty: 14 TABLET | Refills: 0 | Status: SHIPPED | OUTPATIENT
Start: 2019-12-03 | End: 2019-12-10

## 2019-12-03 RX ORDER — METOPROLOL SUCCINATE 100 MG/1
100 TABLET, EXTENDED RELEASE ORAL DAILY
Refills: 11 | COMMUNITY
Start: 2019-11-30

## 2019-12-03 RX ORDER — HYDROXYZINE 50 MG/1
TABLET, FILM COATED ORAL
COMMUNITY
End: 2019-12-03

## 2019-12-03 RX ORDER — TAMSULOSIN HYDROCHLORIDE 0.4 MG/1
CAPSULE ORAL
COMMUNITY
Start: 2019-10-03

## 2019-12-03 RX ORDER — FUROSEMIDE 40 MG/1
TABLET ORAL
COMMUNITY
Start: 2017-11-11 | End: 2019-12-03 | Stop reason: SDUPTHER

## 2019-12-03 RX ORDER — MULTIVIT-MIN/IRON/FOLIC ACID/K 18-600-40
1 CAPSULE ORAL WEEKLY
COMMUNITY
Start: 2017-11-11

## 2019-12-03 RX ORDER — HYDRALAZINE HYDROCHLORIDE 10 MG/1
10 TABLET, FILM COATED ORAL
COMMUNITY
Start: 2019-10-02 | End: 2020-10-01

## 2019-12-03 RX ORDER — ATORVASTATIN CALCIUM 80 MG/1
TABLET, FILM COATED ORAL
COMMUNITY
Start: 2019-10-03

## 2019-12-03 RX ADMIN — DEXAMETHASONE SODIUM PHOSPHATE 10 MG: 10 INJECTION INTRAMUSCULAR; INTRAVENOUS at 11:13

## 2019-12-03 NOTE — PROGRESS NOTES
Chief Complaint   Patient presents with   • URI     Cough, congestion and complaints of ears stopped up.          Allergies   Allergen Reactions   • Atorvastatin Other (See Comments)     Muscle cramps in legs.   • Crestor  [Rosuvastatin Calcium] Other (See Comments)   • Nabumetone Other (See Comments)     Patient unsure of this medication; may not be allergy       HPI:  Kaya Hatfield is a 69 y.o. female presents today with complaints of URI, sore throat, ears plugged and painful, hoarse, sinus pain and pressure for the last 2 weeks.  She has tried otc cold and cough meds with no improvement.  She says now it sounds like she has water running in her ears all the time and doesn't feel good.  Says the ears have gotten progressively worse over the last 3 days.  Pt has no idea what meds she is taking and doesn't have a list.  Meds were reviewed with nurse and she said she was on them, when I asked about meds she said no I don't take this. Educated her to bring either a list of meds and dosage or bring pill bottles to appointment    Chronic problems: hyperlipidemia stable with pravastatin, atorvastatin and rosuvastatin, HTN stable with lisinopril, amlodipine and hydralazine, edema stable with furosemide and flomax, seasonal allergies stable with flonase and loratadine, anxiety stable with lorazepam, gerd stable with omeprazole, depression stable with venlafaxine      Past Medical History:   Diagnosis Date   • Anxiety    • Depression    • GERD (gastroesophageal reflux disease)    • Hyperlipidemia    • Hypertension      History reviewed. No pertinent surgical history.  Social History     Socioeconomic History   • Marital status:      Spouse name: Not on file   • Number of children: Not on file   • Years of education: Not on file   • Highest education level: Not on file   Tobacco Use   • Smoking status: Never Smoker   • Smokeless tobacco: Never Used   Substance and Sexual Activity   • Alcohol use: No   • Drug use:  No   • Sexual activity: Defer     Family History   Problem Relation Age of Onset   • Heart disease Mother    • Cancer Sister    • Liver disease Brother    • Heart disease Maternal Grandmother    • Heart disease Maternal Grandfather        Current Outpatient Medications on File Prior to Visit   Medication Sig Dispense Refill   • albuterol (PROVENTIL HFA;VENTOLIN HFA) 108 (90 Base) MCG/ACT inhaler Inhale 2 puffs Every 4 (Four) Hours As Needed for Wheezing. 1 inhaler 0   • amLODIPine (NORVASC) 5 MG tablet Take 5 mg by mouth Daily.  3   • aspirin 81 MG tablet Take 81 mg by mouth daily     • atorvastatin (LIPITOR) 80 MG tablet atorvastatin 80 mg tablet   daily     • bethanechol (URECHOLINE) 25 MG tablet bethanechol chloride 25 mg tablet     • butalbital-acetaminophen-caffeine (FIORICET, ESGIC) -40 MG per tablet every 4 (four) hours.     • fluticasone (FLONASE) 50 MCG/ACT nasal spray 2 sprays by Nasal route daily     • furosemide (LASIX) 40 MG tablet Take 1 tablet by mouth daily     • furosemide (LASIX) 40 MG tablet furosemide 40 mg tablet   Take 1 tablet every day by oral route.     • hydrALAZINE (APRESOLINE) 10 MG tablet Take 10 mg by mouth.     • lisinopril (PRINIVIL,ZESTRIL) 20 MG tablet Take 1 tablet by mouth daily     • loratadine (CLARITIN) 10 MG tablet Take 1 tablet by mouth Daily As Needed for Allergies. 30 tablet 0   • LORazepam (ATIVAN) 1 MG tablet Take 1 tablet by mouth daily     • omeprazole (PriLOSEC) 20 MG capsule Take 1 capsule by mouth daily     • OMEPRAZOLE PO Take 20 mg by mouth Daily.     • pravastatin (PRAVACHOL) 40 MG tablet pravastatin 40 mg tablet   TAKE ONE TABLET BY MOUTH ONCE DAILY     • promethazine-dextromethorphan (PROMETHAZINE-DM) 6.25-15 MG/5ML syrup Take 5 mL by mouth At Night As Needed for Cough. 118 mL 0   • Rosuvastatin Calcium (CRESTOR PO) Take  by mouth.     • tamsulosin (FLOMAX) 0.4 MG capsule 24 hr capsule tamsulosin 0.4 mg capsule   daily     • topiramate (TOPAMAX) 50 MG  "tablet Take 50 mg by mouth daily      • topiramate (TOPAMAX) 50 MG tablet Every 12 (Twelve) Hours.     • venlafaxine XR (EFFEXOR XR) 150 MG 24 hr capsule Take 1 capsule by mouth daily     • Vitamin D, Cholecalciferol, 50 MCG (2000 UT) capsule Take 1 tablet by mouth 1 (One) Time Per Week.     • lisinopril (PRINIVIL,ZESTRIL) 40 MG tablet Take 40 mg by mouth Daily.  11   • metoprolol succinate XL (TOPROL-XL) 100 MG 24 hr tablet Take 100 mg by mouth Daily.  11   • [DISCONTINUED] amitriptyline (ELAVIL) 10 MG tablet      • [DISCONTINUED] hydrOXYzine (ATARAX) 50 MG tablet hydroxyzine HCl 50 mg tablet   TAKE ONE TABLET BY MOUTH TWICE DAILY AS NEEDED       Current Facility-Administered Medications on File Prior to Visit   Medication Dose Route Frequency Provider Last Rate Last Dose   • acetaminophen (TYLENOL) tablet 1,000 mg  1,000 mg Oral Once Brenda Parsons APRN            ROS:  REVIEW OF SYMPTOMS: (Positives bolded)  General:  weight loss, fever, chills, night sweats, fatigue, appetite loss  HEENT:   sore throat, tinnitus, bloody nose, hearing loss, sinus pain/pressure, ear pain/pressure.   Respiratory: shortness of breath, cough, hemoptysis, wheezing, pleurisy,   Cardiovascular:  chest pain, PND, palpitation, edema, orthopnea, syncope, swelling of extremities  Gastro: Nausea, vomiting, diarrhea, hematemesis, abdominal pain, constipation  Neuro:  Migraine, numbness, ataxia, tremor, vertigo, weakness, memory loss, Irritability, dizziness  Allergic/immune: allergic rhinitis, hay fever, asthma, hives  \"All other systems reviewed and negative, except as listed above.”      OBJECTIVE:  Constitutional:  Alert, oriented x 3, well developed, well nourished. Consistent with stated age. Not in acute distress.  Has normal posture. Gait and station normal. .  Behavior appropriate. Patient is pleasant and cooperative with the interview and exam.    Skin: No rashes, no visible scars or suspicious moles noted.  Skin is warm to touch. " Normal appropriate skin turgor.  Capillary refill is normal bilateral Upper and lower extremity.     Head/Neck: Head is normocephalic and atraumatic.  Neck without visible/palpable lumps.  No thyromegaly.    Eye: Bilaterally EOMI.  PERRLA.  Sclera/conjunctiva is normal, no discharge. Cornea is normal and clear. Lens is normal.  Eyeball normal. Upper eyelid normal.  Lower eyelid normal.     OROPHARYNX: Mucosa pink and moist, posterior pharynx erythematous. Dentition average for age. No obvious dental carries. No lesions. Tongue normal.    EARS: Bilateral auditory canal normal, without redness or cerumen impaction. Bilateral Tympanic membrane Normal, pearly gray with good cone of light and landmarks.  Hearing grossly intact to normal conversation.     NOSE: Purulent drainage, mucosa is erythematous, edematous and congested, nares patent    SINUS:  Frontal and maxillary sinus tenderness on palpation.       CHEST/LUNG: No use of accessory muscles, chest non-tender on palpation.  Breath sounds normal throughout all lung fields.  No wheezes, rales, rhonchi.    CARDIOVASCULAR:  Inspection: Carotid artery bilateral normal, no bruits, pulse regular.  Palpation/Percussion Bilateral normal pulsations.  Auscultation: Regular rate and rhythm. No murmur noted in sitting, supine, standing or squatting positions.    LYMPH: Cervical Nodes-normal, size; non-tender to palpation. Axillary Nodes- normal size; non-tender to palpation.     Assessment:  Kaya was seen today for uri.    Diagnoses and all orders for this visit:    Bilateral non-suppurative otitis media  -     amoxicillin-clavulanate (AUGMENTIN) 875-125 MG per tablet; Take 1 tablet by mouth 2 (Two) Times a Day for 7 days.    Acute bronchitis, unspecified organism  -     dexamethasone (DECADRON) injection 10 mg  -     promethazine-dextromethorphan (PROMETHAZINE-DM) 6.25-15 MG/5ML syrup; Take 5 mL by mouth At Night As Needed for Cough.    Pharyngitis, unspecified etiology  -      amoxicillin-clavulanate (AUGMENTIN) 875-125 MG per tablet; Take 1 tablet by mouth 2 (Two) Times a Day for 7 days.    Seasonal allergies  -     fluticasone (FLONASE) 50 MCG/ACT nasal spray; 1 spray into the nostril(s) as directed by provider Daily.      • Adjunctive Therapy  a. Intranasal saline irrigation with either physiologic or hypertonic saline is recommended  b. Intranasal corticosteroids in addition to antibiotics  c. Netti pot  d. Good handwashing  e. If smoke-recommend smoking cessation  f.  Humidify the air; steam inhalation and warm compresses often help relieve pressure  g. Increase fluid intake  h. Sleep with bed elevated  i. Avoid allergens and excessively dry heat  j. Avoid swimming/diving and air travel during acute period  k. Avoid use of antihistamines unless there is an allergic basis   l. Teach proper application of nasal sprays      I spoke with the patient about the benefits and risks associated with antibiotic therapy; the benefits include treating a bacterial infection, preventing the spread of disease, and minimizing serious complications associated with bacterial diseases; the risks include antibiotic resistance, allergic reactions, oral and/ or vaginal candidia, and GI related side effects such as an upset stomach and diarrhea, as well as placing the patient at an increase risk for C-diff; verbal acknowledgement of these risk were obtained; based on the patients complaint and clinical finding, no antibiotics are required at this time.             Return in about 1 week (around 12/10/2019), or if symptoms worsen or fail to improve.    Electronically signed by Pretty Burt, MARION, APRN, 12/03/19, 10:59 AM.

## 2019-12-03 NOTE — PATIENT INSTRUCTIONS
"Upper Respiratory Infection, Adult  An upper respiratory infection (URI) affects the nose, throat, and upper air passages. URIs are caused by germs (viruses). The most common type of URI is often called \"the common cold.\"  Medicines cannot cure URIs, but you can do things at home to relieve your symptoms. URIs usually get better within 7-10 days.  Follow these instructions at home:  Activity  · Rest as needed.  · If you have a fever, stay home from work or school until your fever is gone, or until your doctor says you may return to work or school.  ? You should stay home until you cannot spread the infection anymore (you are not contagious).  ? Your doctor may have you wear a face mask so you have less risk of spreading the infection.  Relieving symptoms  · Gargle with a salt-water mixture 3-4 times a day or as needed. To make a salt-water mixture, completely dissolve ½-1 tsp of salt in 1 cup of warm water.  · Use a cool-mist humidifier to add moisture to the air. This can help you breathe more easily.  Eating and drinking    · Drink enough fluid to keep your pee (urine) pale yellow.  · Eat soups and other clear broths.  General instructions    · Take over-the-counter and prescription medicines only as told by your doctor. These include cold medicines, fever reducers, and cough suppressants.  · Do not use any products that contain nicotine or tobacco. These include cigarettes and e-cigarettes. If you need help quitting, ask your doctor.  · Avoid being where people are smoking (avoid secondhand smoke).  · Make sure you get regular shots and get the flu shot every year.  · Keep all follow-up visits as told by your doctor. This is important.  How to avoid spreading infection to others    · Wash your hands often with soap and water. If you do not have soap and water, use hand .  · Avoid touching your mouth, face, eyes, or nose.  · Cough or sneeze into a tissue or your sleeve or elbow. Do not cough or sneeze " "into your hand or into the air.  Contact a doctor if:  · You are getting worse, not better.  · You have any of these:  ? A fever.  ? Chills.  ? Brown or red mucus in your nose.  ? Yellow or brown fluid (discharge)coming from your nose.  ? Pain in your face, especially when you bend forward.  ? Swollen neck glands.  ? Pain with swallowing.  ? White areas in the back of your throat.  Get help right away if:  · You have shortness of breath that gets worse.  · You have very bad or constant:  ? Headache.  ? Ear pain.  ? Pain in your forehead, behind your eyes, and over your cheekbones (sinus pain).  ? Chest pain.  · You have long-lasting (chronic) lung disease along with any of these:  ? Wheezing.  ? Long-lasting cough.  ? Coughing up blood.  ? A change in your usual mucus.  · You have a stiff neck.  · You have changes in your:  ? Vision.  ? Hearing.  ? Thinking.  ? Mood.  Summary  · An upper respiratory infection (URI) is caused by a germ called a virus. The most common type of URI is often called \"the common cold.\"  · URIs usually get better within 7-10 days.  · Take over-the-counter and prescription medicines only as told by your doctor.  This information is not intended to replace advice given to you by your health care provider. Make sure you discuss any questions you have with your health care provider.  Document Released: 06/05/2009 Document Revised: 08/10/2018 Document Reviewed: 08/10/2018  [employer] Interactive Patient Education © 2019 [employer]    "

## 2019-12-23 ENCOUNTER — OFFICE VISIT (OUTPATIENT)
Dept: RETAIL CLINIC | Facility: CLINIC | Age: 69
End: 2019-12-23

## 2019-12-23 VITALS
SYSTOLIC BLOOD PRESSURE: 128 MMHG | RESPIRATION RATE: 18 BRPM | HEART RATE: 98 BPM | OXYGEN SATURATION: 99 % | TEMPERATURE: 97.5 F | DIASTOLIC BLOOD PRESSURE: 74 MMHG

## 2019-12-23 DIAGNOSIS — Z20.828 EXPOSURE TO THE FLU: ICD-10-CM

## 2019-12-23 DIAGNOSIS — J21.9 ACUTE BRONCHIOLITIS DUE TO UNSPECIFIED ORGANISM: Primary | ICD-10-CM

## 2019-12-23 PROCEDURE — 99213 OFFICE O/P EST LOW 20 MIN: CPT | Performed by: NURSE PRACTITIONER

## 2019-12-23 RX ORDER — AZITHROMYCIN 250 MG/1
TABLET, FILM COATED ORAL
Qty: 6 TABLET | Refills: 0 | Status: SHIPPED | OUTPATIENT
Start: 2019-12-23

## 2019-12-23 RX ORDER — HYDROXYZINE 50 MG/1
TABLET, FILM COATED ORAL
COMMUNITY

## 2019-12-23 RX ORDER — OMEPRAZOLE 20 MG/1
20 CAPSULE, DELAYED RELEASE ORAL
COMMUNITY
Start: 2019-10-03

## 2019-12-23 RX ORDER — FAMOTIDINE 20 MG/1
20 TABLET, FILM COATED ORAL
COMMUNITY
Start: 2019-11-13

## 2019-12-23 RX ORDER — DEXTROMETHORPHAN HYDROBROMIDE AND PROMETHAZINE HYDROCHLORIDE 15; 6.25 MG/5ML; MG/5ML
5 SYRUP ORAL NIGHTLY PRN
Qty: 118 ML | Refills: 0 | Status: SHIPPED | OUTPATIENT
Start: 2019-12-23

## 2019-12-23 RX ORDER — OSELTAMIVIR PHOSPHATE 75 MG/1
75 CAPSULE ORAL DAILY
Qty: 10 CAPSULE | Refills: 0 | Status: SHIPPED | OUTPATIENT
Start: 2019-12-23 | End: 2020-01-02

## 2019-12-23 RX ORDER — METHYLPREDNISOLONE 4 MG/1
TABLET ORAL
Qty: 21 TABLET | Refills: 0 | Status: SHIPPED | OUTPATIENT
Start: 2019-12-23

## 2019-12-23 RX ORDER — ERGOCALCIFEROL 1.25 MG/1
50000 CAPSULE ORAL
COMMUNITY
Start: 2019-08-22

## 2019-12-23 NOTE — PROGRESS NOTES
Chief Complaint   Patient presents with   • Cough     Subjective   Kaya Hatfield is a 69 y.o. female who presents to the clinic today with complaints of: cough. She has been sick for about 4 weeks. She was seen on 12/3 and started antibiotics and had a steroid injection. She was feeling better, but suddenly got worse again. She had some Tesselon Perles, but these did not help so she stopped taking them. She does not feel any worse, but is not any better. She is here today with her granddaughter who is positive for influenza A.   Cough   This is a new problem. The current episode started 1 to 4 weeks ago. The problem has been unchanged. The problem occurs every few minutes. The cough is non-productive. Associated symptoms include ear congestion, nasal congestion, postnasal drip and wheezing. Pertinent negatives include no chest pain, chills, ear pain, fever, headaches, heartburn, hemoptysis, myalgias, rash, rhinorrhea, sore throat, shortness of breath, sweats or weight loss. Nothing aggravates the symptoms. She has tried a beta-agonist inhaler (antibiotic and steroid shot. ) for the symptoms. The treatment provided mild relief. There is no history of asthma, bronchiectasis, bronchitis, COPD, emphysema, environmental allergies or pneumonia.         Current Outpatient Medications:   •  albuterol (PROVENTIL HFA;VENTOLIN HFA) 108 (90 Base) MCG/ACT inhaler, Inhale 2 puffs Every 4 (Four) Hours As Needed for Wheezing., Disp: 1 inhaler, Rfl: 0  •  amLODIPine (NORVASC) 5 MG tablet, Take 5 mg by mouth Daily., Disp: , Rfl: 3  •  ergocalciferol (ERGOCALCIFEROL) 1.25 MG (70479 UT) capsule, Take 50,000 Units by mouth., Disp: , Rfl:   •  fluticasone (FLONASE) 50 MCG/ACT nasal spray, 1 spray into the nostril(s) as directed by provider Daily., Disp: 1 bottle, Rfl: 2  •  furosemide (LASIX) 40 MG tablet, Take 1 tablet by mouth daily, Disp: , Rfl:   •  hydrALAZINE (APRESOLINE) 10 MG tablet, Take 10 mg by mouth., Disp: , Rfl:   •   lisinopril (PRINIVIL,ZESTRIL) 20 MG tablet, Take 1 tablet by mouth daily, Disp: , Rfl:   •  loratadine (CLARITIN) 10 MG tablet, Take 1 tablet by mouth Daily As Needed for Allergies., Disp: 30 tablet, Rfl: 0  •  LORazepam (ATIVAN) 1 MG tablet, Take 1 tablet by mouth daily, Disp: , Rfl:   •  metoprolol succinate XL (TOPROL-XL) 100 MG 24 hr tablet, Take 100 mg by mouth Daily., Disp: , Rfl: 11  •  omeprazole (PrilOSEC) 20 MG capsule, Take 20 mg by mouth., Disp: , Rfl:   •  tamsulosin (FLOMAX) 0.4 MG capsule 24 hr capsule, tamsulosin 0.4 mg capsule  daily, Disp: , Rfl:   •  venlafaxine XR (EFFEXOR XR) 150 MG 24 hr capsule, Take 1 capsule by mouth daily, Disp: , Rfl:   •  Vitamin D, Cholecalciferol, 50 MCG (2000 UT) capsule, Take 1 tablet by mouth 1 (One) Time Per Week., Disp: , Rfl:   •  aspirin 81 MG tablet, Aspir-81 81 mg tablet,delayed release  Take 1 tablet every day by oral route., Disp: , Rfl:   •  atorvastatin (LIPITOR) 80 MG tablet, atorvastatin 80 mg tablet  daily, Disp: , Rfl:   •  azithromycin (ZITHROMAX Z-TRACY) 250 MG tablet, Take 2 tablets the first day, then 1 tablet daily for 4 days., Disp: 6 tablet, Rfl: 0  •  famotidine (PEPCID) 20 MG tablet, Take 20 mg by mouth., Disp: , Rfl:   •  hydrOXYzine (ATARAX) 50 MG tablet, hydroxyzine HCl 50 mg tablet  TAKE ONE TABLET BY MOUTH TWICE DAILY AS NEEDED, Disp: , Rfl:   •  methylPREDNISolone (MEDROL, TRACY,) 4 MG tablet, Take as directed on package instructions., Disp: 21 tablet, Rfl: 0  •  oseltamivir (TAMIFLU) 75 MG capsule, Take 1 capsule by mouth Daily for 10 days., Disp: 10 capsule, Rfl: 0  •  pravastatin (PRAVACHOL) 40 MG tablet, pravastatin 40 mg tablet  TAKE ONE TABLET BY MOUTH ONCE DAILY, Disp: , Rfl:   •  promethazine-dextromethorphan (PROMETHAZINE-DM) 6.25-15 MG/5ML syrup, Take 5 mL by mouth At Night As Needed for Cough., Disp: 118 mL, Rfl: 0  •  Rosuvastatin Calcium (CRESTOR PO), Take  by mouth., Disp: , Rfl:     Current Facility-Administered Medications:    •  acetaminophen (TYLENOL) tablet 1,000 mg, 1,000 mg, Oral, Once, [place], Brenda, APRN    Allergies:  Atorvastatin; Crestor  [rosuvastatin calcium]; and Nabumetone    Past Medical History:   Diagnosis Date   • Anxiety    • Depression    • GERD (gastroesophageal reflux disease)    • Hyperlipidemia    • Hypertension      No past surgical history on file.  Family History   Problem Relation Age of Onset   • Heart disease Mother    • Cancer Sister    • Liver disease Brother    • Heart disease Maternal Grandmother    • Heart disease Maternal Grandfather      Social History     Tobacco Use   • Smoking status: Never Smoker   • Smokeless tobacco: Never Used   Substance Use Topics   • Alcohol use: No   • Drug use: No       Review of Systems  Review of Systems   Constitutional: Positive for fatigue. Negative for chills, diaphoresis, fever and weight loss.   HENT: Positive for postnasal drip. Negative for congestion, ear pain, rhinorrhea, sinus pressure, sinus pain, sneezing and sore throat.    Respiratory: Positive for cough and wheezing. Negative for hemoptysis and shortness of breath.    Cardiovascular: Negative for chest pain.   Gastrointestinal: Negative for heartburn.   Musculoskeletal: Negative for myalgias.   Skin: Negative for rash.   Allergic/Immunologic: Negative for environmental allergies.   Neurological: Negative for headaches.   Hematological: Negative for adenopathy.       Objective   /74 (BP Location: Right arm, Patient Position: Sitting, Cuff Size: Adult)   Pulse 98   Temp 97.5 °F (36.4 °C) (Tympanic)   Resp 18   LMP  (LMP Unknown)   SpO2 99%       Physical Exam   Constitutional: She is oriented to person, place, and time. She appears well-developed and well-nourished.   HENT:   Head: Normocephalic and atraumatic.   Right Ear: Hearing, tympanic membrane, external ear and ear canal normal.   Left Ear: Hearing, tympanic membrane, external ear and ear canal normal.   Nose: Nose normal. Right sinus  exhibits no maxillary sinus tenderness and no frontal sinus tenderness. Left sinus exhibits no maxillary sinus tenderness and no frontal sinus tenderness.   Mouth/Throat: Uvula is midline, oropharynx is clear and moist and mucous membranes are normal. No tonsillar exudate.   Eyes: Pupils are equal, round, and reactive to light.   Neck: Normal range of motion. Neck supple.   Cardiovascular: Normal rate, regular rhythm and normal heart sounds.   Pulmonary/Chest: Effort normal and breath sounds normal. No stridor. No respiratory distress. She has no wheezes. She has no rales. She exhibits no tenderness.   Harsh nonproductive cough noted during exam.    Lymphadenopathy:     She has no cervical adenopathy.   Neurological: She is alert and oriented to person, place, and time.   Skin: Skin is warm and dry.   Vitals reviewed.      Assessment/Plan     Kaya was seen today for cough.    Diagnoses and all orders for this visit:    Acute bronchiolitis due to unspecified organism    Exposure to the flu    Other orders  -     methylPREDNISolone (MEDROL, TRACY,) 4 MG tablet; Take as directed on package instructions.  -     azithromycin (ZITHROMAX Z-TRACY) 250 MG tablet; Take 2 tablets the first day, then 1 tablet daily for 4 days.  -     promethazine-dextromethorphan (PROMETHAZINE-DM) 6.25-15 MG/5ML syrup; Take 5 mL by mouth At Night As Needed for Cough.  -     oseltamivir (TAMIFLU) 75 MG capsule; Take 1 capsule by mouth Daily for 10 days.      Take Tamiflu (oseltamivir) once per day for 10 days. If you develop flu like symptoms then start taking it twice per day ( am and pm) until gone.   Follow up if cough does not resolve with treatment or if it worsens   Follow up at emergency room for any shortness of breath or worsening of symptoms.

## 2019-12-23 NOTE — PATIENT INSTRUCTIONS
Take Tamiflu (oseltamivir) once per day for 10 days. If you develop flu like symptoms then start taking it twice per day ( am and pm) until gone.   Follow up if cough does not resolve with treatment or if it worsens           Acute Bronchitis, Adult  Acute bronchitis is when air tubes (bronchi) in the lungs suddenly get swollen. The condition can make it hard to breathe. It can also cause these symptoms:  · A cough.  · Coughing up clear, yellow, or green mucus.  · Wheezing.  · Chest congestion.  · Shortness of breath.  · A fever.  · Body aches.  · Chills.  · A sore throat.  Follow these instructions at home:    Medicines  · Take over-the-counter and prescription medicines only as told by your doctor.  · If you were prescribed an antibiotic medicine, take it as told by your doctor. Do not stop taking the antibiotic even if you start to feel better.  General instructions  · Rest.  · Drink enough fluids to keep your pee (urine) pale yellow.  · Avoid smoking and secondhand smoke. If you smoke and you need help quitting, ask your doctor. Quitting will help your lungs heal faster.  · Use an inhaler, cool mist vaporizer, or humidifier as told by your doctor.  · Keep all follow-up visits as told by your doctor. This is important.  How is this prevented?  To lower your risk of getting this condition again:  · Wash your hands often with soap and water. If you cannot use soap and water, use hand .  · Avoid contact with people who have cold symptoms.  · Try not to touch your hands to your mouth, nose, or eyes.  · Make sure to get the flu shot every year.  Contact a doctor if:  · Your symptoms do not get better in 2 weeks.  Get help right away if:  · You cough up blood.  · You have chest pain.  · You have very bad shortness of breath.  · You become dehydrated.  · You faint (pass out) or keep feeling like you are going to pass out.  · You keep throwing up (vomiting).  · You have a very bad headache.  · Your fever or  chills gets worse.  This information is not intended to replace advice given to you by your health care provider. Make sure you discuss any questions you have with your health care provider.  Document Released: 06/05/2009 Document Revised: 08/01/2018 Document Reviewed: 06/07/2017  [employer] Interactive Patient Education © 2019 [employer]

## 2020-01-05 ENCOUNTER — OFFICE VISIT (OUTPATIENT)
Dept: URGENT CARE | Age: 70
End: 2020-01-05
Payer: MEDICARE

## 2020-01-05 VITALS
OXYGEN SATURATION: 98 % | HEIGHT: 64 IN | HEART RATE: 86 BPM | SYSTOLIC BLOOD PRESSURE: 108 MMHG | TEMPERATURE: 98.3 F | RESPIRATION RATE: 16 BRPM | BODY MASS INDEX: 38.07 KG/M2 | WEIGHT: 223 LBS | DIASTOLIC BLOOD PRESSURE: 69 MMHG

## 2020-01-05 LAB
INFLUENZA A ANTIBODY: NEGATIVE
INFLUENZA B ANTIBODY: NEGATIVE

## 2020-01-05 PROCEDURE — 99213 OFFICE O/P EST LOW 20 MIN: CPT | Performed by: FAMILY MEDICINE

## 2020-01-05 PROCEDURE — 87804 INFLUENZA ASSAY W/OPTIC: CPT | Performed by: FAMILY MEDICINE

## 2020-01-05 RX ORDER — CEFDINIR 300 MG/1
300 CAPSULE ORAL 2 TIMES DAILY
Qty: 20 CAPSULE | Refills: 0 | Status: SHIPPED | OUTPATIENT
Start: 2020-01-05 | End: 2020-01-15

## 2020-01-05 RX ORDER — FLUTICASONE PROPIONATE 50 MCG
2 SPRAY, SUSPENSION (ML) NASAL DAILY
Qty: 1 BOTTLE | Refills: 0 | Status: SHIPPED | OUTPATIENT
Start: 2020-01-05 | End: 2021-05-25 | Stop reason: SDUPTHER

## 2020-01-05 RX ORDER — BROMPHENIRAMINE MALEATE, PSEUDOEPHEDRINE HYDROCHLORIDE, AND DEXTROMETHORPHAN HYDROBROMIDE 2; 30; 10 MG/5ML; MG/5ML; MG/5ML
5 SYRUP ORAL 4 TIMES DAILY PRN
Qty: 180 ML | Refills: 0 | Status: SHIPPED | OUTPATIENT
Start: 2020-01-05 | End: 2020-01-09

## 2020-01-05 ASSESSMENT — ENCOUNTER SYMPTOMS
EYE PAIN: 0
CONSTIPATION: 0
RHINORRHEA: 1
SORE THROAT: 1
DIARRHEA: 0
SHORTNESS OF BREATH: 0
EYE DISCHARGE: 0
VOMITING: 0
COUGH: 1
ABDOMINAL PAIN: 0
NAUSEA: 0

## 2020-01-05 NOTE — PATIENT INSTRUCTIONS
good.  When should you call for help? Call 911 anytime you think you may need emergency care. For example, call if:    · You have severe trouble breathing.    Call your doctor now or seek immediate medical care if:    · You have new or worse trouble breathing.     · You cough up dark brown or bloody mucus (sputum).     · You have a new or higher fever.     · You have a new rash.    Watch closely for changes in your health, and be sure to contact your doctor if:    · You cough more deeply or more often, especially if you notice more mucus or a change in the color of your mucus.     · You are not getting better as expected. Where can you learn more? Go to [email or web address]://[email or web address]. org and sign in to your [employer] account. Enter H333 in the [employer] box to learn more about \"Bronchitis: Care Instructions. \"     If you do not have an account, please click on the \"Sign Up Now\" link. Current as of: September 5, 2018  Content Version: 12.1  © 3027-1548 Healthwise, Incorporated. Care instructions adapted under license by [facility] ([facility]). If you have questions about a medical condition or this instruction, always ask your healthcare professional. [place] [address] any warranty or liability for your use of this information.

## 2020-01-05 NOTE — PROGRESS NOTES
Lizbeth Mcarthur is a 77 y.o. female who presents today for   Chief Complaint   Patient presents with    Cough     exposure to flu    Fever    Chills    Ear Fullness    Generalized Body Aches       Chief Complaint: cough    HPI  Patient reports that she has been having a cough with some earache and decreased hearing as well as a sore throat with fever and chills is been ongoing for the past 3 days. She states that she has been sick on and off for the past 5 weeks and has been taking Mucinex DM and Motrin. About 2 or 3 weeks ago she was prescribed Tamiflu. She denies any specific aggravating relieving factors for symptoms. She reports that she does have multiple sick contacts that have had the flu and is reached a T-max of 102. Review of Systems   Constitutional: Positive for chills and fever. Negative for activity change and appetite change. HENT: Positive for congestion, ear pain, hearing loss, rhinorrhea and sore throat. Eyes: Negative for pain and discharge. Respiratory: Positive for cough. Negative for shortness of breath. Cardiovascular: Negative for chest pain and palpitations. Gastrointestinal: Negative for abdominal pain, constipation, diarrhea, nausea and vomiting. Endocrine: Negative for cold intolerance and heat intolerance. Genitourinary: Negative for dysuria and hematuria. Musculoskeletal: Positive for myalgias. Negative for gait problem and neck pain. Skin: Negative for rash and wound.        Past Medical History:   Diagnosis Date    Anxiety     Depression     Edema     GERD (gastroesophageal reflux disease)     Headache(784.0)     migraines    Hyperlipidemia     Hypertension     Mini stroke (HCC)     Sleep apnea     CPAP    TIA (transient ischemic attack)        Current Outpatient Medications   Medication Sig Dispense Refill    cefdinir (OMNICEF) 300 MG capsule Take 1 capsule by mouth 2 times daily for 10 days 20 capsule 0    brompheniramine-pseudoephedrine-DM rectally 2 times daily. (Patient taking differently: Place rectally 2 times daily as needed Place rectally 2 times daily. ) 1 Tube 0     No current facility-administered medications for this visit. Allergies   Allergen Reactions    Atorvastatin Other (See Comments)     Other reaction(s): Other (See Comments)  Muscle cramps in legs. Other reaction(s): Other (See Comments)  Muscle cramps in legs. Pt is back on it  Muscle cramps in legs. Muscle cramps in legs.  Nabumetone Other (See Comments)     Patient unsure of this medication; may not be allergy    Rosuvastatin Calcium Other (See Comments)     Muscle cramps       Past Surgical History:   Procedure Laterality Date    CHOLECYSTECTOMY      COLONOSCOPY  07/01/2015    Dr. Emily Coppola COLONOSCOPY  06/05/2019    Dr Harley [facility]) Non-bleeding internal hemorrhoids, diverticulosis-Tubular AP (-) dysplasia    CYSTOSCOPY Left 12/5/2018    CYSTOSCOPY URETEROSCOPY  PLACEMENT DOUBLE J STENT ON LEFT RIGHT  RETROGRADE PYELOGRAMS performed by Deepali Chavira MD at [address] Bilateral 1/31/2019    TOTAL LAPAROSCOPIC HYSTERECTOMY BILATERAL SALPINGOOPHERECTOMY WITH Radha Font performed by Bernice Cedeno MD at [facility], [facility]      [facility] EGD TRANSORAL BIOPSY SINGLE/MULTIPLE N/A 5/8/2018    Dr Jason Tran (-) Kristin (+) Dye's (-) dysplasia--3 yr recall    [place] LAP,CHOLECYSTECTOMY N/A 3/6/2018    CHOLECYSTECTOMY LAPAROSCOPIC performed by Claudia Art MD at [facility].  08/26/2015    Dr. Jolly Mcdowell  5/23/2017    Dr Osorio-w/balloon dilation, 12-13. 5-15 mm-esophageal narrowing with diverticulum at 29 cm-Kristin (-), hiatal herni, Dye's (+) dysplasia (-)--1st dx--1 yr recall-Ct chest ordered    WRIST FRACTURE SURGERY         Social History     Tobacco Use    Smoking status: Never Smoker    Smokeless tobacco: Never Used   Substance Use Topics    instructed. Patient agrees to follow up as instructed and sooner if needed. Patient agrees to go to ER if condition becomes emergent. Return if symptoms worsen or fail to improve, for next scheduled follow up with PCP.

## 2020-01-09 ENCOUNTER — TELEPHONE (OUTPATIENT)
Dept: PRIMARY CARE CLINIC | Age: 70
End: 2020-01-09

## 2020-01-09 ENCOUNTER — HOSPITAL ENCOUNTER (OUTPATIENT)
Dept: GENERAL RADIOLOGY | Age: 70
Discharge: HOME | End: 2020-01-09
Payer: MEDICARE

## 2020-01-09 ENCOUNTER — OFFICE VISIT (OUTPATIENT)
Dept: PRIMARY CARE CLINIC | Age: 70
End: 2020-01-09
Payer: MEDICARE

## 2020-01-09 VITALS
WEIGHT: 216 LBS | OXYGEN SATURATION: 97 % | SYSTOLIC BLOOD PRESSURE: 136 MMHG | DIASTOLIC BLOOD PRESSURE: 72 MMHG | HEART RATE: 126 BPM | TEMPERATURE: 98.6 F | BODY MASS INDEX: 36.88 KG/M2 | HEIGHT: 64 IN

## 2020-01-09 DIAGNOSIS — R50.9 FEVER, UNSPECIFIED FEVER CAUSE: ICD-10-CM

## 2020-01-09 DIAGNOSIS — R05.9 COUGH: ICD-10-CM

## 2020-01-09 LAB
APPEARANCE FLUID: NORMAL
BILIRUBIN, POC: NORMAL
BLOOD URINE, POC: NORMAL
CLARITY, POC: CLEAR
COLOR, POC: NORMAL
GLUCOSE URINE, POC: 100
INFLUENZA A ANTIBODY: ABNORMAL
INFLUENZA B ANTIBODY: ABNORMAL
KETONES, POC: NORMAL
LEUKOCYTE EST, POC: NORMAL
NITRITE, POC: NORMAL
PH, POC: 6
PROTEIN, POC: 30
SPECIFIC GRAVITY, POC: 1.02
UROBILINOGEN, POC: 0.2

## 2020-01-09 PROCEDURE — 99214 OFFICE O/P EST MOD 30 MIN: CPT | Performed by: NURSE PRACTITIONER

## 2020-01-09 PROCEDURE — 87804 INFLUENZA ASSAY W/OPTIC: CPT | Performed by: NURSE PRACTITIONER

## 2020-01-09 PROCEDURE — 81002 URINALYSIS NONAUTO W/O SCOPE: CPT | Performed by: NURSE PRACTITIONER

## 2020-01-09 PROCEDURE — 71046 X-RAY EXAM CHEST 2 VIEWS: CPT

## 2020-01-09 PROCEDURE — 87086 URINE CULTURE/COLONY COUNT: CPT

## 2020-01-09 PROCEDURE — 96372 THER/PROPH/DIAG INJ SC/IM: CPT | Performed by: NURSE PRACTITIONER

## 2020-01-09 RX ORDER — PREDNISONE 10 MG/1
10 TABLET ORAL DAILY
Qty: 42 TABLET | Refills: 0 | Status: SHIPPED | OUTPATIENT
Start: 2020-01-09 | End: 2020-01-19

## 2020-01-09 RX ORDER — TRIAMCINOLONE ACETONIDE 40 MG/ML
60 INJECTION, SUSPENSION INTRA-ARTICULAR; INTRAMUSCULAR ONCE
Status: COMPLETED | OUTPATIENT
Start: 2020-01-09 | End: 2020-01-09

## 2020-01-09 RX ADMIN — TRIAMCINOLONE ACETONIDE 60 MG: 40 INJECTION, SUSPENSION INTRA-ARTICULAR; INTRAMUSCULAR at 10:48

## 2020-01-09 ASSESSMENT — ENCOUNTER SYMPTOMS
TROUBLE SWALLOWING: 0
COUGH: 1
RHINORRHEA: 1
BLOOD IN STOOL: 0
EYE REDNESS: 0
ABDOMINAL PAIN: 0
SORE THROAT: 0
CONSTIPATION: 0
EYE DISCHARGE: 0
DIARRHEA: 0
WHEEZING: 1

## 2020-01-09 ASSESSMENT — PATIENT HEALTH QUESTIONNAIRE - PHQ9
2. FEELING DOWN, DEPRESSED OR HOPELESS: 0
SUM OF ALL RESPONSES TO PHQ QUESTIONS 1-9: 0
1. LITTLE INTEREST OR PLEASURE IN DOING THINGS: 0
SUM OF ALL RESPONSES TO PHQ QUESTIONS 1-9: 0
SUM OF ALL RESPONSES TO PHQ9 QUESTIONS 1 & 2: 0

## 2020-01-09 NOTE — PROGRESS NOTES
After obtaining consent, and per orders of LUIZ Kaba, injection of Kenalog 60 mg given in Left upper quad. gluteus by Carmelina Fontaine. Patient instructed to remain in clinic for 20 minutes afterwards, and to report any adverse reaction to me immediately.

## 2020-01-09 NOTE — TELEPHONE ENCOUNTER
----- Message from LUIZ Sandhu sent at 1/9/2020 11:33 AM CST -----  Please inform patient results show  No pneumonia is seen.  Start inhaler and prednisone and continue omnicef

## 2020-01-09 NOTE — PROGRESS NOTES
Margaret Shea MD at [facility] ENDOSCOPY  08/26/2015    Dr. Bud Gottlieb  5/23/2017    Dr Osorio-w/balloon dilation, 12-13. 5-15 mm-esophageal narrowing with diverticulum at 29 cm-Kristin (-), hiatal herni, Dye's (+) dysplasia (-)--1st dx--1 yr recall-Ct chest ordered    WRIST FRACTURE SURGERY         Family History   Problem Relation Age of Onset    Heart Disease Mother     Colon Cancer Neg Hx     Colon Polyps Neg Hx     Liver Cancer Neg Hx     Liver Disease Neg Hx     Esophageal Cancer Neg Hx     Rectal Cancer Neg Hx     Stomach Cancer Neg Hx        Social History     Tobacco Use    Smoking status: Never Smoker    Smokeless tobacco: Never Used   Substance Use Topics    Alcohol use: No     Alcohol/week: 0.0 standard drinks      Current Outpatient Medications   Medication Sig Dispense Refill    mometasone-formoterol (DULERA) 100-5 MCG/ACT inhaler Inhale 2 puffs into the lungs 2 times daily 1 Inhaler 1    predniSONE (DELTASONE) 10 MG tablet Take 1 tablet by mouth daily for 10 days Days 1-2 (6 tablets), 3-4 (5 tabs), 5-6 (4 tabs), 7-8 (3 tabs), 9-10 (2 tabs) and 11-12 (1 tab) 42 tablet 0    cefdinir (OMNICEF) 300 MG capsule Take 1 capsule by mouth 2 times daily for 10 days 20 capsule 0    fluticasone (FLONASE) 50 MCG/ACT nasal spray 2 sprays by Each Nostril route daily 1 Bottle 0    furosemide (LASIX) 40 MG tablet Take 1 tablet by mouth daily 30 tablet 5    amLODIPine (NORVASC) 5 MG tablet Take 1 tablet by mouth daily 90 tablet 3    atorvastatin (LIPITOR) 80 MG tablet Take 1 tablet by mouth nightly Needs labs and follow up before refills 30 tablet 5    omeprazole (PRILOSEC) 20 MG delayed release capsule Take 1 capsule by mouth 2 times daily (before meals) 180 capsule 3    venlafaxine (EFFEXOR XR) 150 MG extended release capsule Take 1 capsule by mouth daily 30 capsule 5    venlafaxine (EFFEXOR XR) 75 MG extended release capsule Take 1 capsule by mouth or Prediabetic)  08/22/2020    Potassium monitoring  08/22/2020    Creatinine monitoring  08/22/2020    Lipid screen  10/02/2020    Pneumococcal 65+ years Vaccine (2 of 2 - PPSV23) 10/02/2020    Breast cancer screen  03/25/2021    Colon cancer screen colonoscopy  06/05/2022    DEXA (modify frequency per FRAX score)  Completed    Hepatitis C screen  Completed        Subjective:     Review of Systems   Constitutional: Positive for appetite change, fatigue and fever. Negative for unexpected weight change. HENT: Positive for congestion and rhinorrhea. Negative for sore throat and trouble swallowing. Eyes: Negative for discharge and redness. Respiratory: Positive for cough and wheezing. Cardiovascular: Negative for chest pain. Gastrointestinal: Negative for abdominal pain, blood in stool, constipation and diarrhea. Genitourinary: Negative for dysuria. Skin: Negative for rash. Neurological: Negative for weakness. Hematological: Negative for adenopathy. Objective:      Physical Exam  Vitals signs reviewed. Constitutional:       Appearance: She is well-developed. HENT:      Head: Normocephalic. Right Ear: A middle ear effusion is present. Tympanic membrane is erythematous. Left Ear: A middle ear effusion is present. Eyes:      Conjunctiva/sclera: Conjunctivae normal.   Neck:      Musculoskeletal: Normal range of motion and neck supple. Cardiovascular:      Rate and Rhythm: Normal rate and regular rhythm. Heart sounds: Normal heart sounds. Pulmonary:      Effort: Pulmonary effort is normal.      Breath sounds: Wheezing present. Abdominal:      General: Bowel sounds are normal.      Palpations: Abdomen is soft. Tenderness: There is no tenderness. Musculoskeletal: Normal range of motion. Skin:     General: Skin is warm and dry. Neurological:      Mental Status: She is alert and oriented to person, place, and time.    Psychiatric:         Behavior: Behavior normal.       /72   Pulse 126   Temp 98.6 °F (37 °C) (Temporal)   Ht 5' 4\" (1.626 m)   Wt 216 lb (98 kg)   SpO2 97%   BMI 37.08 kg/m²     Assessment:           ICD-10-CM    1. Influenza A J10.1    2. Cough R05 POCT Influenza A/B     XR CHEST STANDARD (2 VW)     CBC Auto Differential     Basic Metabolic Panel     Urine Culture   3. Fever, unspecified fever cause R50.9 POCT Influenza A/B     XR CHEST STANDARD (2 VW)     CBC Auto Differential     Basic Metabolic Panel     Urine Culture     POCT Urinalysis no Micro   4. Bronchitis J40 mometasone-formoterol (DULERA) 100-5 MCG/ACT inhaler     predniSONE (DELTASONE) 10 MG tablet       Plan:   She was negative for the flu previously and now she is positive. Return in about 1 week (around 1/16/2020). Orders Placed This Encounter   Procedures    Urine Culture     Standing Status:   Future     Standing Expiration Date:   1/8/2021     Order Specific Question:   Specify (ex-cath, midstream, cysto, etc)? Answer:   midstream    XR CHEST STANDARD (2 VW)     Standing Status:   Future     Number of Occurrences:   1     Standing Expiration Date:   1/8/2021    CBC Auto Differential     Standing Status:   Future     Standing Expiration Date:   1/8/2021    Basic Metabolic Panel     Standing Status:   Future     Standing Expiration Date:   1/8/2021    POCT Influenza A/B    POCT Urinalysis no Micro     Orders Placed This Encounter   Medications    mometasone-formoterol (DULERA) 100-5 MCG/ACT inhaler     Sig: Inhale 2 puffs into the lungs 2 times daily     Dispense:  1 Inhaler     Refill:  1    predniSONE (DELTASONE) 10 MG tablet     Sig: Take 1 tablet by mouth daily for 10 days Days 1-2 (6 tablets), 3-4 (5 tabs), 5-6 (4 tabs), 7-8 (3 tabs), 9-10 (2 tabs) and 11-12 (1 tab)     Dispense:  42 tablet     Refill:  0    triamcinolone acetonide (KENALOG-40) injection 60 mg         Discussed use, benefit, and side effectsof prescribed medications.   All patient questions answered. Pt voiced understanding. Reviewed health maintenance. .  Patient agreed with treatment plan. Follow up asdirected. There are no Patient Instructions on file for this visit.       Electronically signed by LUIZ Cordero on1/9/2020 at 12:06 PM

## 2020-01-10 ENCOUNTER — TELEPHONE (OUTPATIENT)
Dept: PRIMARY CARE CLINIC | Age: 70
End: 2020-01-10

## 2020-01-11 LAB — URINE CULTURE, ROUTINE: NORMAL

## 2020-03-23 ENCOUNTER — LAB REQUISITION (OUTPATIENT)
Dept: LAB | Facility: HOSPITAL | Age: 70
End: 2020-03-23

## 2020-03-23 ENCOUNTER — HOSPITAL ENCOUNTER (EMERGENCY)
Age: 70
Discharge: HOME | End: 2020-03-23
Attending: EMERGENCY MEDICINE
Payer: MEDICARE

## 2020-03-23 ENCOUNTER — APPOINTMENT (OUTPATIENT)
Dept: GENERAL RADIOLOGY | Age: 70
End: 2020-03-23
Payer: MEDICARE

## 2020-03-23 VITALS
SYSTOLIC BLOOD PRESSURE: 148 MMHG | WEIGHT: 210 LBS | DIASTOLIC BLOOD PRESSURE: 79 MMHG | OXYGEN SATURATION: 95 % | HEART RATE: 76 BPM | RESPIRATION RATE: 17 BRPM | HEIGHT: 64 IN | TEMPERATURE: 98.3 F | BODY MASS INDEX: 35.85 KG/M2

## 2020-03-23 DIAGNOSIS — Z00.00 ROUTINE GENERAL MEDICAL EXAMINATION AT A HEALTH CARE FACILITY: ICD-10-CM

## 2020-03-23 LAB
ALBUMIN SERPL-MCNC: 3.6 G/DL (ref 3.5–5.2)
ALP BLD-CCNC: 85 U/L (ref 35–104)
ALT SERPL-CCNC: 12 U/L (ref 5–33)
ANION GAP SERPL CALCULATED.3IONS-SCNC: 13 MMOL/L (ref 7–19)
AST SERPL-CCNC: 13 U/L (ref 5–32)
B PARAPERT DNA SPEC QL NAA+PROBE: NOT DETECTED
B PERT DNA SPEC QL NAA+PROBE: NOT DETECTED
BASOPHILS ABSOLUTE: 0 K/UL (ref 0–0.2)
BASOPHILS RELATIVE PERCENT: 0.5 % (ref 0–1)
BILIRUB SERPL-MCNC: <0.2 MG/DL (ref 0.2–1.2)
BUN BLDV-MCNC: 12 MG/DL (ref 8–23)
C PNEUM DNA NPH QL NAA+NON-PROBE: NOT DETECTED
CALCIUM SERPL-MCNC: 9.8 MG/DL (ref 8.8–10.2)
CHLORIDE BLD-SCNC: 106 MMOL/L (ref 98–111)
CO2: 25 MMOL/L (ref 22–29)
CREAT SERPL-MCNC: 0.7 MG/DL (ref 0.5–0.9)
EOSINOPHILS ABSOLUTE: 0.1 K/UL (ref 0–0.6)
EOSINOPHILS RELATIVE PERCENT: 1.4 % (ref 0–5)
FLUAV H1 2009 PAND RNA NPH QL NAA+PROBE: NOT DETECTED
FLUAV H1 HA GENE NPH QL NAA+PROBE: NOT DETECTED
FLUAV H3 RNA NPH QL NAA+PROBE: NOT DETECTED
FLUAV SUBTYP SPEC NAA+PROBE: NOT DETECTED
FLUBV RNA ISLT QL NAA+PROBE: NOT DETECTED
GFR NON-AFRICAN AMERICAN: >60
GLUCOSE BLD-MCNC: 120 MG/DL (ref 74–109)
HADV DNA SPEC NAA+PROBE: NOT DETECTED
HCOV 229E RNA SPEC QL NAA+PROBE: NOT DETECTED
HCOV HKU1 RNA SPEC QL NAA+PROBE: NOT DETECTED
HCOV NL63 RNA SPEC QL NAA+PROBE: NOT DETECTED
HCOV OC43 RNA SPEC QL NAA+PROBE: NOT DETECTED
HCT VFR BLD CALC: 39.1 % (ref 37–47)
HEMOGLOBIN: 12.5 G/DL (ref 12–16)
HMPV RNA NPH QL NAA+NON-PROBE: NOT DETECTED
HPIV1 RNA SPEC QL NAA+PROBE: NOT DETECTED
HPIV2 RNA SPEC QL NAA+PROBE: NOT DETECTED
HPIV3 RNA NPH QL NAA+PROBE: NOT DETECTED
HPIV4 P GENE NPH QL NAA+PROBE: NOT DETECTED
IMMATURE GRANULOCYTES #: 0 K/UL
INR BLD: 0.85 (ref 0.88–1.18)
LACTIC ACID: 2.3 MMOL/L (ref 0.5–1.9)
LIPASE: 28 U/L (ref 13–60)
LYMPHOCYTES ABSOLUTE: 1.2 K/UL (ref 1.1–4.5)
LYMPHOCYTES RELATIVE PERCENT: 18.6 % (ref 20–40)
Lab: NORMAL
M PNEUMO IGG SER IA-ACNC: NOT DETECTED
MCH RBC QN AUTO: 30.3 PG (ref 27–31)
MCHC RBC AUTO-ENTMCNC: 32 G/DL (ref 33–37)
MCV RBC AUTO: 94.7 FL (ref 81–99)
MONOCYTES ABSOLUTE: 0.6 K/UL (ref 0–0.9)
MONOCYTES RELATIVE PERCENT: 8.6 % (ref 0–10)
NEUTROPHILS ABSOLUTE: 4.5 K/UL (ref 1.5–7.5)
NEUTROPHILS RELATIVE PERCENT: 70.6 % (ref 50–65)
PDW BLD-RTO: 12.3 % (ref 11.5–14.5)
PLATELET # BLD: 204 K/UL (ref 130–400)
PMV BLD AUTO: 9.7 FL (ref 9.4–12.3)
POTASSIUM SERPL-SCNC: 3.7 MMOL/L (ref 3.5–5)
PRO-BNP: 129 PG/ML (ref 0–900)
PROTHROMBIN TIME: 11.4 SEC (ref 12–14.6)
RAPID INFLUENZA  B AGN: NEGATIVE
RAPID INFLUENZA A AGN: NEGATIVE
RBC # BLD: 4.13 M/UL (ref 4.2–5.4)
REPORT: NORMAL
RHINOVIRUS RNA SPEC NAA+PROBE: NOT DETECTED
RSV RNA NPH QL NAA+NON-PROBE: NOT DETECTED
SODIUM BLD-SCNC: 144 MMOL/L (ref 136–145)
THIS TEST SENT TO: NORMAL
TOTAL PROTEIN: 6.3 G/DL (ref 6.6–8.7)
TROPONIN: <0.01 NG/ML (ref 0–0.03)
WBC # BLD: 6.4 K/UL (ref 4.8–10.8)

## 2020-03-23 PROCEDURE — 85610 PROTHROMBIN TIME: CPT

## 2020-03-23 PROCEDURE — 83605 ASSAY OF LACTIC ACID: CPT

## 2020-03-23 PROCEDURE — 83690 ASSAY OF LIPASE: CPT

## 2020-03-23 PROCEDURE — 87040 BLOOD CULTURE FOR BACTERIA: CPT

## 2020-03-23 PROCEDURE — 85025 COMPLETE CBC W/AUTO DIFF WBC: CPT

## 2020-03-23 PROCEDURE — 84484 ASSAY OF TROPONIN QUANT: CPT

## 2020-03-23 PROCEDURE — 83880 ASSAY OF NATRIURETIC PEPTIDE: CPT

## 2020-03-23 PROCEDURE — 87804 INFLUENZA ASSAY W/OPTIC: CPT

## 2020-03-23 PROCEDURE — 71045 X-RAY EXAM CHEST 1 VIEW: CPT

## 2020-03-23 PROCEDURE — 36415 COLL VENOUS BLD VENIPUNCTURE: CPT

## 2020-03-23 PROCEDURE — 80053 COMPREHEN METABOLIC PANEL: CPT

## 2020-03-23 PROCEDURE — 93005 ELECTROCARDIOGRAM TRACING: CPT | Performed by: EMERGENCY MEDICINE

## 2020-03-23 PROCEDURE — 0100U HC BIOFIRE FILMARRAY RESP PANEL 2: CPT

## 2020-03-23 PROCEDURE — 99285 EMERGENCY DEPT VISIT HI MDM: CPT

## 2020-03-23 PROCEDURE — 0100U HC RESPIRPTHGN MULT REV TRANS & AMP PRB TECH 21 TRGT: CPT

## 2020-03-23 RX ORDER — DEXTROMETHORPHAN HYDROBROMIDE AND PROMETHAZINE HYDROCHLORIDE 15; 6.25 MG/5ML; MG/5ML
5 SYRUP ORAL 4 TIMES DAILY PRN
Qty: 1 BOTTLE | Refills: 0 | Status: SHIPPED | OUTPATIENT
Start: 2020-03-23 | End: 2020-03-28

## 2020-03-23 RX ORDER — ALBUTEROL SULFATE 90 UG/1
2 AEROSOL, METERED RESPIRATORY (INHALATION) EVERY 6 HOURS PRN
Qty: 1 INHALER | Refills: 0 | Status: SHIPPED | OUTPATIENT
Start: 2020-03-23 | End: 2021-10-18

## 2020-03-23 ASSESSMENT — ENCOUNTER SYMPTOMS
BACK PAIN: 1
WHEEZING: 1
COUGH: 1
RHINORRHEA: 1
SHORTNESS OF BREATH: 1
ABDOMINAL PAIN: 0

## 2020-03-23 NOTE — ED PROVIDER NOTES
[facility] EMERGENCY DEPT  eMERGENCY dEPARTMENT eNCOUnter      Pt Name: Valeria Person  MRN: [id number]  [place] 1950  Date of evaluation: 3/23/2020  Provider: Samantha Villa MD    CHIEF COMPLAINT       Chief Complaint   Patient presents with    Cough    Shortness of Breath         HISTORY OF PRESENT ILLNESS   (Location/Symptom, Timing/Onset,Context/Setting, Quality, Duration, Modifying Factors, Severity)  Note limiting factors. Valeria Person is a 72 y.o. female who presents to the emergency department with shortness of breath and cough x10 days. The patient states that she has clear sputum production. She had subjective chills no objective fever. She denies tobacco abuse she denies any cardiac or COPD issues. She states her -3year-old great granddaughter is sick with a cough. Times days. Patient feels like there is some pain in her back as well with cough. Is on both sides. The patient has no history of DVT or PE. Patient denies any trauma. The patient has not been on antibiotics or steroids uses nose inhalers. She tells me in terms of COVID exposure that she has a granddaughter who works with another lady whose  apparently is admitted at [facility]. This happened in the last couple days. Otherwise she has no known direct contact with a person with Covid. On arrival to the ER and walking up the hill the patient had a sat of 91%, her heart rate was 120. The patient was brought back into a room. The history is provided by the patient. NursingNotes were reviewed. REVIEW OF SYSTEMS    (2-9 systems for level 4, 10 or more for level 5)     Review of Systems   Constitutional: Positive for chills. Negative for fever. HENT: Positive for postnasal drip and rhinorrhea. Respiratory: Positive for cough, shortness of breath and wheezing. Cardiovascular: Positive for chest pain. Negative for palpitations and leg swelling. Gastrointestinal: Negative for abdominal pain. Genitourinary: Negative for dysuria. Musculoskeletal: Positive for back pain. Neurological: Negative for seizures and syncope. Psychiatric/Behavioral: Negative for confusion. A complete review of systems was performed and is negative except as noted above in the HPI. PAST MEDICAL HISTORY     Past Medical History:   Diagnosis Date    Anxiety     Depression     Edema     GERD (gastroesophageal reflux disease)     Headache(784.0)     migraines    Hyperlipidemia     Hypertension     Mini stroke ([address]     Sleep apnea     CPAP    TIA (transient ischemic attack)          SURGICAL HISTORY       Past Surgical History:   Procedure Laterality Date    CHOLECYSTECTOMY      COLONOSCOPY  07/01/2015    Dr. Maria R Mendoza COLONOSCOPY  06/05/2019    Dr Baldev Sy [place] [facility]) Non-bleeding internal hemorrhoids, diverticulosis-Tubular AP (-) dysplasia    CYSTOSCOPY Left 12/5/2018    CYSTOSCOPY URETEROSCOPY  PLACEMENT DOUBLE J STENT ON LEFT RIGHT  RETROGRADE PYELOGRAMS performed by Zahraa Pineda MD at [address] Bilateral 1/31/2019    TOTAL LAPAROSCOPIC HYSTERECTOMY BILATERAL SALPINGOOPHERECTOMY WITH Mavis Imam performed by Monae Shepherd MD at [facility]      [facility] EGD TRANSORAL BIOPSY SINGLE/MULTIPLE N/A 5/8/2018    Dr Vitaliy Burrell (-) Kristin (+) Dye's (-) dysplasia--3 yr recall    [facility] LAP,CHOLECYSTECTOMY N/A 3/6/2018    CHOLECYSTECTOMY LAPAROSCOPIC performed by Luanne Fields MD at [address] [place] [address]  08/26/2015    Dr. Meza Para  5/23/2017    Dr Osorio-w/balloon dilation, 12-13. 5-15 mm-esophageal narrowing with diverticulum at 29 cm-Kristin (-), hiatal herni, Dye's (+) dysplasia (-)--1st dx--1 yr recall-Ct chest ordered    WRIST FRACTURE SURGERY           CURRENT MEDICATIONS       Discharge Medication List as of 3/23/2020 10:59 AM      CONTINUE these medications which have NOT CHANGED    Details Mood and Affect: Mood normal.         Behavior: Behavior normal.         DIAGNOSTIC RESULTS     EKG: All EKG's are interpreted by the Emergency Department Physician who either signs or Co-signs this chart in the absence of a cardiologist.    EKG sinus nonspecific T wave flattening somewhat low voltage no acute ischemia    RADIOLOGY:   Non-plain film images such as CT, Ultrasound and MRI are read by the radiologist. Patricia Bars images are visualized and preliminarily interpreted by the emergency physician with the below findings:        Interpretation per the Radiologist below, if available at the time of this note:    XR CHEST PORTABLE   Final Result   No acute cardiopulmonary process.    Signed by Dr Genevieve Fofana on 3/23/2020 9:48 AM            ED BEDSIDE ULTRASOUND:   Performed by ED Physician - none    LABS:  Labs Reviewed   COMPREHENSIVE METABOLIC PANEL - Abnormal; Notable for the following components:       Result Value    Glucose 120 (*)     Total Protein 6.3 (*)     All other components within normal limits   CBC WITH AUTO DIFFERENTIAL - Abnormal; Notable for the following components:    RBC 4.13 (*)     MCHC 32.0 (*)     Neutrophils % 70.6 (*)     Lymphocytes % 18.6 (*)     All other components within normal limits   PROTIME-INR - Abnormal; Notable for the following components:    Protime 11.4 (*)     INR 0.85 (*)     All other components within normal limits   LACTIC ACID, PLASMA - Abnormal; Notable for the following components:    Lactic Acid 2.3 (*)     All other components within normal limits    Narrative:     Bartley January tel. ,  Chemistry results called to and read back by Ale/WANG/ER, 03/23/2020 10:12,  by [facility]   CULTURE, BLOOD 1    Narrative:     ORDER#: [id number]                          ORDERED BY: TRINA SHABAZZ  SOURCE: Blood rt ac                        COLLECTED:  03/23/20 09:25  ANTIBIOTICS AT TRU.:                      RECEIVED :  03/23/20 09:59   CULTURE, BLOOD 2    Narrative: ORDER#: [id number]                          ORDERED BY: TRINA SHABAZZ  SOURCE: Blood lt ac                        COLLECTED:  03/23/20 09:40  ANTIBIOTICS AT TRU.:                      RECEIVED :  03/23/20 09:59   RAPID INFLUENZA A/B ANTIGENS   LIPASE   TROPONIN   BRAIN NATRIURETIC PEPTIDE   MISCELLANEOUS SENDOUT 1   URINE RT REFLEX TO CULTURE       All other labs were within normal range or not returned as of this dictation. EMERGENCY DEPARTMENT COURSE and DIFFERENTIALDIAGNOSIS/MDM:   Vitals:    Vitals:    03/23/20 0903 03/23/20 0904 03/23/20 1000 03/23/20 1100   BP:  (!) 159/80 (!) 160/74 (!) 148/79   Pulse:  80 77 76   Resp: 22 22 17 17   Temp:  98.3 °F (36.8 °C)     SpO2: 95% 94% 95% 95%   Weight: 210 lb (95.3 kg)      Height: 5' 4\" (1.626 m)          MDM  Number of Diagnoses or Management Options  Bronchitis:   Cough:   Diagnosis management comments:     10:55 AM CDT  Patient in no distress well-appearing has not taken any  medications today. Patient lives at home with her  in [address] [place] [address] she drove herself here. She been sick x10 days. [id number]. Pt in nad has no sob and is able to walk fine. No direct known covid exposures or travel. The story was the granddaughter may have been around a person with covid unknown time frame as well. Review of chart shows uri and cough issues since last dec and chart reviewed    Pt asking for some cough med    PCR resp panel neg    Flu neg      Start albuterol as well prn      3/26/2020    I called the patient and checked on her. I thought I had ordered the COVID test on Monday. It is not in the computer I called her to check to see how she was doing. The patient states she is improved. She is not any distress she is been sick for 13 days now she feels better. She tells me that her family member who could have come in contact with a Covid patient actually had the test and it was NEG.     So she has had no travel or exposure to anyone

## 2020-03-24 LAB
EKG P AXIS: 29 DEGREES
EKG P-R INTERVAL: 130 MS
EKG Q-T INTERVAL: 376 MS
EKG QRS DURATION: 78 MS
EKG QTC CALCULATION (BAZETT): 398 MS
EKG T AXIS: 48 DEGREES

## 2020-03-24 PROCEDURE — 93010 ELECTROCARDIOGRAM REPORT: CPT | Performed by: INTERNAL MEDICINE

## 2020-03-28 LAB
BLOOD CULTURE, ROUTINE: NORMAL
CULTURE, BLOOD 2: NORMAL

## 2020-04-13 RX ORDER — ATORVASTATIN CALCIUM 80 MG/1
80 TABLET, FILM COATED ORAL NIGHTLY
Qty: 90 TABLET | Refills: 0 | Status: SHIPPED | OUTPATIENT
Start: 2020-04-13 | End: 2020-08-11

## 2020-04-13 NOTE — TELEPHONE ENCOUNTER
Received fax from pharmacy requesting refill on pts medication(s). Pt was last seen in office on 1/9/2020  and has a follow up scheduled for 5/13/2020. Will send request to  [facility]  for patient.      Requested Prescriptions     Pending Prescriptions Disp Refills    atorvastatin (LIPITOR) 80 MG tablet [Pharmacy Med Name: Atorvastatin Calcium 80 MG Oral Tablet] 90 tablet 0     Sig: TAKE 1 TABLET BY MOUTH NIGHTLY -  NEEDS  NEW  LABS  AND  FOLLOW  UP  BEFORE  REFILLS

## 2020-05-13 ENCOUNTER — VIRTUAL VISIT (OUTPATIENT)
Dept: PRIMARY CARE CLINIC | Age: 70
End: 2020-05-13
Payer: MEDICARE

## 2020-05-13 PROBLEM — F33.1 MODERATE EPISODE OF RECURRENT MAJOR DEPRESSIVE DISORDER (HCC): Status: ACTIVE | Noted: 2020-05-13

## 2020-05-13 PROCEDURE — 99442 PR PHYS/QHP TELEPHONE EVALUATION 11-20 MIN: CPT | Performed by: NURSE PRACTITIONER

## 2020-05-13 ASSESSMENT — ENCOUNTER SYMPTOMS
VOMITING: 0
NAUSEA: 0
COUGH: 0
SORE THROAT: 0
ABDOMINAL PAIN: 0
DIARRHEA: 0
RHINORRHEA: 0
SHORTNESS OF BREATH: 0
SINUS PRESSURE: 0

## 2020-05-13 NOTE — PROGRESS NOTES
Result Value Ref Range    WBC 6.4 4.8 - 10.8 K/uL    RBC 4.13 (L) 4.20 - 5.40 M/uL    Hemoglobin 12.5 12.0 - 16.0 g/dL    Hematocrit 39.1 37.0 - 47.0 %    MCV 94.7 81.0 - 99.0 fL    MCH 30.3 27.0 - 31.0 pg    MCHC 32.0 (L) 33.0 - 37.0 g/dL    RDW 12.3 11.5 - 14.5 %    Platelets 542 389 - 241 K/uL    MPV 9.7 9.4 - 12.3 fL    Neutrophils % 70.6 (H) 50.0 - 65.0 %    Lymphocytes % 18.6 (L) 20.0 - 40.0 %    Monocytes % 8.6 0.0 - 10.0 %    Eosinophils % 1.4 0.0 - 5.0 %    Basophils % 0.5 0.0 - 1.0 %    Neutrophils Absolute 4.5 1.5 - 7.5 K/uL    Immature Granulocytes # 0.0 K/uL    Lymphocytes Absolute 1.2 1.1 - 4.5 K/uL    Monocytes Absolute 0.60 0.00 - 0.90 K/uL    Eosinophils Absolute 0.10 0.00 - 0.60 K/uL    Basophils Absolute 0.00 0.00 - 0.20 K/uL   Lipase   Result Value Ref Range    Lipase 28 13 - 60 U/L   Protime-INR   Result Value Ref Range    Protime 11.4 (L) 12.0 - 14.6 sec    INR 0.85 (L) 0.88 - 1.18   Troponin   Result Value Ref Range    Troponin <0.01 0.00 - 0.03 ng/mL   Brain Natriuretic Peptide   Result Value Ref Range    Pro- 0 - 900 pg/mL   Lactic Acid, Plasma   Result Value Ref Range    Lactic Acid 2.3 (HH) 0.5 - 1.9 mmol/L   Miscellaneous Sendout 1   Result Value Ref Range    test code respiratory pcr     This Test Sent To [facility]     Report See report    EKG 12 Lead   Result Value Ref Range    P-R Interval 130 ms    QRS Duration 78 ms    Q-T Interval 376 ms    QTc Calculation (Bazett) 398 ms    P Axis 29 degrees    T Axis 48 degrees     have reviewed the following with the Ms.  Nichelle Jackson    Lab Review   Admission on 03/23/2020, Discharged on 03/23/2020   Component Date Value    Sodium 03/23/2020 144     Potassium 03/23/2020 3.7     Chloride 03/23/2020 106     CO2 03/23/2020 25     Anion Gap 03/23/2020 13     Glucose 03/23/2020 120*    BUN 03/23/2020 12     CREATININE 03/23/2020 0.7     GFR Non- 03/23/2020 >60     Calcium 03/23/2020 9.8     Total Protein 03/23/2020 6.3*    01/09/2020 neg     Ketones, UA 01/09/2020 neg     Spec Grav, UA 01/09/2020 1.020     Blood, UA POC 01/09/2020 neg     pH, UA 01/09/2020 6.0     Protein, UA POC 01/09/2020 30     Urobilinogen, UA 01/09/2020 0.2     Leukocytes, UA 01/09/2020 neg     Nitrite, UA 01/09/2020 neg    Office Visit on 01/05/2020   Component Date Value    Influenza A Ab 01/05/2020 Negative     Influenza B Ab 01/05/2020 Negative      Copies of these are in the chart. Prior to Visit Medications    Medication Sig Taking?  Authorizing Provider   atorvastatin (LIPITOR) 80 MG tablet Take 1 tablet by mouth nightly  LUIZ Valverde   albuterol sulfate HFA (PROVENTIL HFA) 108 (90 Base) MCG/ACT inhaler Inhale 2 puffs into the lungs every 6 hours as needed for Wheezing  Ghassan Salvador MD   mometasone-formoterol [facility]) 100-5 MCG/ACT inhaler Inhale 2 puffs into the lungs 2 times daily  LUIZ Mccullough   fluticasone (FLONASE) 50 MCG/ACT nasal spray 2 sprays by Each Nostril route daily  Timothy Brink MD   famotidine (PEPCID) 20 MG tablet Take 1 tablet by mouth 2 times daily  LUIZ Castañeda   furosemide (LASIX) 40 MG tablet Take 1 tablet by mouth daily  LUIZ Castañeda   amLODIPine (NORVASC) 5 MG tablet Take 1 tablet by mouth daily  LUIZ Valverde   omeprazole (PRILOSEC) 20 MG delayed release capsule Take 1 capsule by mouth 2 times daily (before meals)  LUIZ Valverde   venlafaxine (EFFEXOR XR) 150 MG extended release capsule Take 1 capsule by mouth daily  LUIZ Valverde   venlafaxine (EFFEXOR XR) 75 MG extended release capsule Take 1 capsule by mouth daily  LUIZ Valverde   tamsulosin (FLOMAX) 0.4 MG capsule Take 1 capsule by mouth daily  LUIZ Valverde   lisinopril (PRINIVIL;ZESTRIL) 40 MG tablet Take 1 tablet by mouth daily  LUIZ Valverde   hydrALAZINE (APRESOLINE) 10 MG tablet Take 1 tablet by mouth 3 times daily  Patient taking differently: Take 10 mg by mouth daily   LUIZ Castañeda   vitamin D (ERGOCALCIFEROL) 44786 units CAPS capsule Take 1 capsule by mouth once a week  LUIZ Castañeda   ezetimibe (ZETIA) 10 MG tablet Take 1 tablet by mouth daily  LUIZ Castañeda   metoprolol succinate (TOPROL XL) 100 MG extended release tablet Take 1 tablet by mouth daily  LUIZ Castañeda   hydrocortisone (ANUSOL-HC) 2.5 % rectal cream Place rectally 2 times daily. Patient taking differently: Place rectally 2 times daily as needed Place rectally 2 times daily. LUIZ Thomas   aspirin 81 MG tablet Aspir-81 81 mg tablet,delayed release   Take 1 tablet every day by oral route.   Historical Provider, MD   hydrOXYzine (ATARAX) 50 MG tablet hydroxyzine HCl 50 mg tablet   TAKE ONE TABLET BY MOUTH TWICE DAILY AS NEEDED  Historical Provider, MD   Multiple Vitamins-Minerals (MULTIVITAMIN ADULT PO) Take by mouth  Historical Provider, MD       Allergies: Atorvastatin; Nabumetone; and Rosuvastatin calcium    Past Medical History:   Diagnosis Date    Anxiety     Depression     Edema     GERD (gastroesophageal reflux disease)     Headache(784.0)     migraines    Hyperlipidemia     Hypertension     Mini stroke ([address]     Sleep apnea     CPAP    TIA (transient ischemic attack)        Past Surgical History:   Procedure Laterality Date    CHOLECYSTECTOMY      COLONOSCOPY  07/01/2015    Dr. Vinicio Stephenson COLONOSCOPY  06/05/2019    Dr Doron Corrales ([facility]) Non-bleeding internal hemorrhoids, diverticulosis-Tubular AP (-) dysplasia, 3 yr recall    CYSTOSCOPY Left 12/5/2018    CYSTOSCOPY URETEROSCOPY  PLACEMENT DOUBLE J STENT ON LEFT RIGHT  RETROGRADE PYELOGRAMS performed by Thuy Crowley MD at [address] Bilateral 1/31/2019    TOTAL LAPAROSCOPIC HYSTERECTOMY BILATERAL SALPINGOOPHERECTOMY WITH Marilyn Vargas performed by Aleena Freedman MD at [facility]

## 2020-05-26 RX ORDER — OMEPRAZOLE 20 MG/1
20 CAPSULE, DELAYED RELEASE ORAL DAILY
Qty: 180 CAPSULE | Refills: 3 | Status: SHIPPED | OUTPATIENT
Start: 2020-05-26 | End: 2021-09-10

## 2020-05-26 NOTE — TELEPHONE ENCOUNTER
Received fax from pharmacy requesting refill on pts medication(s). Pt was last seen in office on 1/9/2020  and has a follow up scheduled for 8/25/2020. Will send request to  [facility]  for patient.      Requested Prescriptions     Pending Prescriptions Disp Refills    omeprazole (PRILOSEC) 20 MG delayed release capsule [Pharmacy Med Name: Omeprazole 20 MG Oral Capsule Delayed Release] 180 capsule 0     Sig: TAKE 1 CAPSULE BY MOUTH TWICE DAILY BEFORE  MEALS

## 2020-06-15 DIAGNOSIS — E78.2 MIXED HYPERLIPIDEMIA: Chronic | ICD-10-CM

## 2020-06-15 DIAGNOSIS — I10 ESSENTIAL HYPERTENSION: Chronic | ICD-10-CM

## 2020-06-15 LAB
ALBUMIN SERPL-MCNC: 3.8 G/DL (ref 3.5–5.2)
ALP BLD-CCNC: 85 U/L (ref 35–104)
ALT SERPL-CCNC: 15 U/L (ref 5–33)
ANION GAP SERPL CALCULATED.3IONS-SCNC: 8 MMOL/L (ref 7–19)
AST SERPL-CCNC: 14 U/L (ref 5–32)
BASOPHILS ABSOLUTE: 0 K/UL (ref 0–0.2)
BASOPHILS RELATIVE PERCENT: 0.6 % (ref 0–1)
BILIRUB SERPL-MCNC: <0.2 MG/DL (ref 0.2–1.2)
BUN BLDV-MCNC: 12 MG/DL (ref 8–23)
CALCIUM SERPL-MCNC: 9.5 MG/DL (ref 8.8–10.2)
CHLORIDE BLD-SCNC: 106 MMOL/L (ref 98–111)
CHOLESTEROL, TOTAL: 156 MG/DL (ref 160–199)
CO2: 26 MMOL/L (ref 22–29)
CREAT SERPL-MCNC: 0.8 MG/DL (ref 0.5–0.9)
CREATININE URINE: 206.2 MG/DL (ref 4.2–622)
EOSINOPHILS ABSOLUTE: 0.1 K/UL (ref 0–0.6)
EOSINOPHILS RELATIVE PERCENT: 1.1 % (ref 0–5)
GFR NON-AFRICAN AMERICAN: >60
GLUCOSE BLD-MCNC: 107 MG/DL (ref 74–109)
HCT VFR BLD CALC: 39.2 % (ref 37–47)
HDLC SERPL-MCNC: 48 MG/DL (ref 65–121)
HEMOGLOBIN: 12.5 G/DL (ref 12–16)
IMMATURE GRANULOCYTES #: 0 K/UL
LDL CHOLESTEROL CALCULATED: 80 MG/DL
LYMPHOCYTES ABSOLUTE: 1.3 K/UL (ref 1.1–4.5)
LYMPHOCYTES RELATIVE PERCENT: 21 % (ref 20–40)
MCH RBC QN AUTO: 30.2 PG (ref 27–31)
MCHC RBC AUTO-ENTMCNC: 31.9 G/DL (ref 33–37)
MCV RBC AUTO: 94.7 FL (ref 81–99)
MICROALBUMIN UR-MCNC: <1.2 MG/DL (ref 0–19)
MICROALBUMIN/CREAT UR-RTO: NORMAL MG/G
MONOCYTES ABSOLUTE: 0.7 K/UL (ref 0–0.9)
MONOCYTES RELATIVE PERCENT: 10.9 % (ref 0–10)
NEUTROPHILS ABSOLUTE: 4.1 K/UL (ref 1.5–7.5)
NEUTROPHILS RELATIVE PERCENT: 65.9 % (ref 50–65)
PDW BLD-RTO: 12.5 % (ref 11.5–14.5)
PLATELET # BLD: 231 K/UL (ref 130–400)
PMV BLD AUTO: 10.1 FL (ref 9.4–12.3)
POTASSIUM SERPL-SCNC: 4 MMOL/L (ref 3.5–5)
RBC # BLD: 4.14 M/UL (ref 4.2–5.4)
SODIUM BLD-SCNC: 140 MMOL/L (ref 136–145)
T4 FREE: 0.99 NG/DL (ref 0.93–1.7)
TOTAL PROTEIN: 6.3 G/DL (ref 6.6–8.7)
TRIGL SERPL-MCNC: 140 MG/DL (ref 0–149)
TSH SERPL DL<=0.05 MIU/L-ACNC: 2.46 UIU/ML (ref 0.27–4.2)
WBC # BLD: 6.3 K/UL (ref 4.8–10.8)

## 2020-06-16 ENCOUNTER — TELEPHONE (OUTPATIENT)
Dept: PRIMARY CARE CLINIC | Age: 70
End: 2020-06-16

## 2020-07-06 RX ORDER — VENLAFAXINE HYDROCHLORIDE 150 MG/1
150 CAPSULE, EXTENDED RELEASE ORAL DAILY
Qty: 30 CAPSULE | Refills: 11 | Status: SHIPPED | OUTPATIENT
Start: 2020-07-06 | End: 2021-08-02

## 2020-07-06 RX ORDER — TAMSULOSIN HYDROCHLORIDE 0.4 MG/1
0.4 CAPSULE ORAL DAILY
Qty: 30 CAPSULE | Refills: 11 | Status: SHIPPED | OUTPATIENT
Start: 2020-07-06 | End: 2021-08-17

## 2020-08-11 RX ORDER — ATORVASTATIN CALCIUM 80 MG/1
80 TABLET, FILM COATED ORAL NIGHTLY
Qty: 90 TABLET | Refills: 0 | Status: SHIPPED | OUTPATIENT
Start: 2020-08-11 | End: 2020-09-18

## 2020-08-27 ENCOUNTER — OFFICE VISIT (OUTPATIENT)
Dept: PRIMARY CARE CLINIC | Age: 70
End: 2020-08-27
Payer: MEDICARE

## 2020-08-27 VITALS
DIASTOLIC BLOOD PRESSURE: 70 MMHG | TEMPERATURE: 96.8 F | OXYGEN SATURATION: 98 % | WEIGHT: 232 LBS | SYSTOLIC BLOOD PRESSURE: 114 MMHG | HEIGHT: 64 IN | HEART RATE: 73 BPM | BODY MASS INDEX: 39.61 KG/M2

## 2020-08-27 PROCEDURE — 99213 OFFICE O/P EST LOW 20 MIN: CPT | Performed by: NURSE PRACTITIONER

## 2020-08-27 PROCEDURE — G0439 PPPS, SUBSEQ VISIT: HCPCS | Performed by: NURSE PRACTITIONER

## 2020-08-27 PROCEDURE — G0444 DEPRESSION SCREEN ANNUAL: HCPCS | Performed by: NURSE PRACTITIONER

## 2020-08-27 PROCEDURE — G8431 POS CLIN DEPRES SCRN F/U DOC: HCPCS | Performed by: NURSE PRACTITIONER

## 2020-08-27 ASSESSMENT — LIFESTYLE VARIABLES: HOW OFTEN DO YOU HAVE A DRINK CONTAINING ALCOHOL: 0

## 2020-08-27 ASSESSMENT — ENCOUNTER SYMPTOMS
SORE THROAT: 0
RHINORRHEA: 0
CONSTIPATION: 0
DIARRHEA: 0
SHORTNESS OF BREATH: 0
ABDOMINAL PAIN: 0
VOMITING: 0
EYE REDNESS: 0
NAUSEA: 0
COUGH: 0

## 2020-08-27 ASSESSMENT — PATIENT HEALTH QUESTIONNAIRE - PHQ9: SUM OF ALL RESPONSES TO PHQ QUESTIONS 1-9: 14

## 2020-08-27 NOTE — PROGRESS NOTES
On the basis of positive PHQ-9 screening (PHQ-9 Total Score: 14), the following plan was implemented: medication prescribed: Effexor- 150 mg- patient will call for any significant medication side effects or worsening symptoms of depression. Patient will follow-up in 3 month(s) with PCP. Medicare Annual Wellness Visit  Name: Stef Rose Mary Date: 2020   MRN: [id number] Sex: Female   Age: 72 y.o. Ethnicity: Non-/Non    : 1950 Race: Nuvia Peter is here for Medicare AWV    Screenings for behavioral, psychosocial and functional/safety risks, and cognitive dysfunction are all negative except as indicated below. These results, as well as other patient data from the [address] form, are documented in Flowsheets linked to this Encounter. Allergies   Allergen Reactions    Atorvastatin Other (See Comments)     Other reaction(s): Other (See Comments)  Muscle cramps in legs. Other reaction(s): Other (See Comments)  Muscle cramps in legs. Pt is back on it  Muscle cramps in legs. Muscle cramps in legs.  Nabumetone Other (See Comments)     Patient unsure of this medication; may not be allergy    Rosuvastatin Calcium Other (See Comments)     Muscle cramps         Prior to Visit Medications    Medication Sig Taking?  Authorizing Provider   atorvastatin (LIPITOR) 80 MG tablet Take 1 tablet by mouth nightly Yes LUIZ Jimenez   tamsulosin (FLOMAX) 0.4 MG capsule Take 1 capsule by mouth daily Yes LUIZ Jimenez   venlafaxine (EFFEXOR XR) 150 MG extended release capsule Take 1 capsule by mouth daily Yes LUIZ Jimenez   omeprazole (PRILOSEC) 20 MG delayed release capsule Take 1 capsule by mouth Daily Yes LUIZ Jimenez   albuterol sulfate HFA (PROVENTIL HFA) 108 (90 Base) MCG/ACT inhaler Inhale 2 puffs into the lungs every 6 hours as needed for Wheezing Yes Derrick Cazares MD   fluticasone [facility]) Dr Xavier Cheese [facility]) Non-bleeding internal hemorrhoids, diverticulosis-Tubular AP (-) dysplasia, 3 yr recall    CYSTOSCOPY Left 12/5/2018    CYSTOSCOPY URETEROSCOPY  PLACEMENT DOUBLE J STENT ON LEFT RIGHT  RETROGRADE PYELOGRAMS performed by Yumi Gross MD at [address] Bilateral 1/31/2019    TOTAL LAPAROSCOPIC HYSTERECTOMY BILATERAL SALPINGOOPHERECTOMY WITH Lillette Lab performed by Patricio Jesus MD at [facility], VAGINAL      [place] EGD TRANSORAL BIOPSY SINGLE/MULTIPLE N/A 5/8/2018    Dr Sivakumar Keane (-) Kristin (+) Dye's (-) dysplasia--3 yr recall    [place] LAP,CHOLECYSTECTOMY N/A 3/6/2018    CHOLECYSTECTOMY LAPAROSCOPIC performed by Karlene Patterson MD at [address]  08/26/2015    Dr. Noriega Doctor  5/23/2017    Dr Osorio-w/balloon dilation, 12-13. 5-15 mm-esophageal narrowing with diverticulum at 34 cm-Kristin (-), hiatal herni, Dye's (+) dysplasia (-)--1st dx--1 yr recall-Ct chest ordered    WRIST FRACTURE SURGERY           Family History   Problem Relation Age of Onset    Heart Disease Mother     Colon Cancer Neg Hx     Colon Polyps Neg Hx     Liver Cancer Neg Hx     Liver Disease Neg Hx     Esophageal Cancer Neg Hx     Rectal Cancer Neg Hx     Stomach Cancer Neg Hx        CareTeam (Including outside providers/suppliers regularly involved in providing care):   Patient Care Team:  LUIZ Garcia as PCP - General (Family Nurse Practitioner)  LUIZ Garcia as PCP - [address] [place] [address] Provider  Donaldo Slater DO as Consulting Physician (Gastroenterology)  Patricio Jesus MD as Consulting Physician (Obstetrics & Gynecology)  LUIZ Munoz as Advanced Practice Nurse (Gastroenterology)    Wt Readings from Last 3 Encounters:   08/27/20 232 lb (105.2 kg)   03/23/20 210 lb (95.3 kg)   01/09/20 216 lb (98 kg)     Vitals:    08/27/20 1125   BP: 114/70   Pulse: 73   Temp: 96.8 °F (36 °C)   TempSrc: Hydralazine 10 mg  She is tolerating this medication regimen. Denies any BP lows. MOODS:  \"The only thing I am depressed about is my weight. \"  She continues on Effexor 150 mg daily. SLEEP APNEA:  She continues on her CPAP. She is sleeping well other than waking up and being hungry. GERD:  Well controlled  She is taking Pepcid 20 mg BID & Prilosec once daily. LABS, 6-:  No pathologic protein in urine. Normal cholesterol   Normal Thyroid   Your metabolic profile is normal.  This includes kidney and liver functions as well as electrolytes. Blood counts are normal      height is 5' 4\" (1.626 m) and weight is 232 lb (105.2 kg). Her temporal temperature is 96.8 °F (36 °C). Her blood pressure is 114/70 and her pulse is 73. Her oxygen saturation is 98%. Body mass index is 39.82 kg/m².     Results for orders placed or performed in visit on 06/15/20   Microalbumin / Creatinine Urine Ratio   Result Value Ref Range    Microalbumin, Random Urine <1.20 0.00 - 19.00 mg/dL    Creatinine, Ur 206.2 4.2 - 622.0 mg/dL    Microalbumin Creatinine Ratio see below mg/g   Lipid Panel   Result Value Ref Range    Cholesterol, Total 156 (L) 160 - 199 mg/dL    Triglycerides 140 0 - 149 mg/dL    HDL 48 (L) 65 - 121 mg/dL    LDL Calculated 80 <100 mg/dL   T4, Free   Result Value Ref Range    T4 Free 0.99 0.93 - 1.70 ng/dL   TSH without Reflex   Result Value Ref Range    TSH 2.460 0.270 - 4.200 uIU/mL   Comprehensive Metabolic Panel   Result Value Ref Range    Sodium 140 136 - 145 mmol/L    Potassium 4.0 3.5 - 5.0 mmol/L    Chloride 106 98 - 111 mmol/L    CO2 26 22 - 29 mmol/L    Anion Gap 8 7 - 19 mmol/L    Glucose 107 74 - 109 mg/dL    BUN 12 8 - 23 mg/dL    CREATININE 0.8 0.5 - 0.9 mg/dL    GFR Non-African American >60 >60    Calcium 9.5 8.8 - 10.2 mg/dL    Total Protein 6.3 (L) 6.6 - 8.7 g/dL    Alb 3.8 3.5 - 5.2 g/dL    Total Bilirubin <0.2 0.2 - 1.2 mg/dL    Alkaline Phosphatase 85 35 - 104 U/L    ALT 15 5 - 33 U/L    AST 14 5 - 32 U/L   CBC Auto Differential   Result Value Ref Range    WBC 6.3 4.8 - 10.8 K/uL    RBC 4.14 (L) 4.20 - 5.40 M/uL    Hemoglobin 12.5 12.0 - 16.0 g/dL    Hematocrit 39.2 37.0 - 47.0 %    MCV 94.7 81.0 - 99.0 fL    MCH 30.2 27.0 - 31.0 pg    MCHC 31.9 (L) 33.0 - 37.0 g/dL    RDW 12.5 11.5 - 14.5 %    Platelets 610 011 - 092 K/uL    MPV 10.1 9.4 - 12.3 fL    Neutrophils % 65.9 (H) 50.0 - 65.0 %    Lymphocytes % 21.0 20.0 - 40.0 %    Monocytes % 10.9 (H) 0.0 - 10.0 %    Eosinophils % 1.1 0.0 - 5.0 %    Basophils % 0.6 0.0 - 1.0 %    Neutrophils Absolute 4.1 1.5 - 7.5 K/uL    Immature Granulocytes # 0.0 K/uL    Lymphocytes Absolute 1.3 1.1 - 4.5 K/uL    Monocytes Absolute 0.70 0.00 - 0.90 K/uL    Eosinophils Absolute 0.10 0.00 - 0.60 K/uL    Basophils Absolute 0.00 0.00 - 0.20 K/uL     have reviewed the following with the Ms.  Nichelle Jackson    Lab Review   Orders Only on 06/15/2020   Component Date Value    Microalbumin, Random Uri* 06/15/2020 <1.20     Creatinine, Ur 06/15/2020 206.2     Microalbumin Creatinine * 06/15/2020 see below     Cholesterol, Total 06/15/2020 156*    Triglycerides 06/15/2020 140     HDL 06/15/2020 48*    LDL Calculated 06/15/2020 80     T4 Free 06/15/2020 0.99     TSH 06/15/2020 2.460     Sodium 06/15/2020 140     Potassium 06/15/2020 4.0     Chloride 06/15/2020 106     CO2 06/15/2020 26     Anion Gap 06/15/2020 8     Glucose 06/15/2020 107     BUN 06/15/2020 12     CREATININE 06/15/2020 0.8     GFR Non- 06/15/2020 >60     Calcium 06/15/2020 9.5     Total Protein 06/15/2020 6.3*    Alb 06/15/2020 3.8     Total Bilirubin 06/15/2020 <0.2     Alkaline Phosphatase 06/15/2020 85     ALT 06/15/2020 15     AST 06/15/2020 14     WBC 06/15/2020 6.3     RBC 06/15/2020 4.14*    Hemoglobin 06/15/2020 12.5     Hematocrit 06/15/2020 39.2     MCV 06/15/2020 94.7     MCH 06/15/2020 30.2     MCHC 06/15/2020 31.9*    RDW 06/15/2020 12.5     Platelets 06/15/2020 231     MPV 06/15/2020 10.1     Neutrophils % 06/15/2020 65.9*    Lymphocytes % 06/15/2020 21.0     Monocytes % 06/15/2020 10.9*    Eosinophils % 06/15/2020 1.1     Basophils % 06/15/2020 0.6     Neutrophils Absolute 06/15/2020 4.1     Immature Granulocytes # 06/15/2020 0.0     Lymphocytes Absolute 06/15/2020 1.3     Monocytes Absolute 06/15/2020 0.70     Eosinophils Absolute 06/15/2020 0.10     Basophils Absolute 06/15/2020 0.00    Admission on 03/23/2020, Discharged on 03/23/2020   Component Date Value    Sodium 03/23/2020 144     Potassium 03/23/2020 3.7     Chloride 03/23/2020 106     CO2 03/23/2020 25     Anion Gap 03/23/2020 13     Glucose 03/23/2020 120*    BUN 03/23/2020 12     CREATININE 03/23/2020 0.7     GFR Non- 03/23/2020 >60     Calcium 03/23/2020 9.8     Total Protein 03/23/2020 6.3*    Alb 03/23/2020 3.6     Total Bilirubin 03/23/2020 <0.2     Alkaline Phosphatase 03/23/2020 85     ALT 03/23/2020 12     AST 03/23/2020 13     WBC 03/23/2020 6.4     RBC 03/23/2020 4.13*    Hemoglobin 03/23/2020 12.5     Hematocrit 03/23/2020 39.1     MCV 03/23/2020 94.7     MCH 03/23/2020 30.3     MCHC 03/23/2020 32.0*    RDW 03/23/2020 12.3     Platelets 12/91/4687 204     MPV 03/23/2020 9.7     Neutrophils % 03/23/2020 70.6*    Lymphocytes % 03/23/2020 18.6*    Monocytes % 03/23/2020 8.6     Eosinophils % 03/23/2020 1.4     Basophils % 03/23/2020 0.5     Neutrophils Absolute 03/23/2020 4.5     Immature Granulocytes # 03/23/2020 0.0     Lymphocytes Absolute 03/23/2020 1.2     Monocytes Absolute 03/23/2020 0.60     Eosinophils Absolute 03/23/2020 0.10     Basophils Absolute 03/23/2020 0.00     Lipase 03/23/2020 28     Protime 03/23/2020 11.4*    INR 03/23/2020 0.85*    P-R Interval 03/23/2020 130     QRS Duration 03/23/2020 78     Q-T Interval 03/23/2020 376     QTc Calculation (Bazett) 03/23/2020 398     P Axis 03/23/2020 29     T Axis 03/23/2020 48     Troponin 03/23/2020 <0.01     Pro-BNP 03/23/2020 129     Lactic Acid 03/23/2020 2.3*    Blood Culture, Routine 03/23/2020 No growth after 5 days of incubation.  Culture, Blood 2 03/23/2020 No growth after 5 days of incubation.  Rapid Influenza A Ag 03/23/2020 Negative     Rapid Influenza B Ag 03/23/2020 Negative     test code 03/23/2020 respiratory pcr     This Test Sent To 03/23/2020 Yarsanism     Report 03/23/2020 See report      Copies of these are in the chart. Prior to Visit Medications    Medication Sig Taking?  Authorizing Provider   atorvastatin (LIPITOR) 80 MG tablet Take 1 tablet by mouth nightly Yes LUIZ Castaneda   tamsulosin (FLOMAX) 0.4 MG capsule Take 1 capsule by mouth daily Yes LUIZ Castaneda   venlafaxine (EFFEXOR XR) 150 MG extended release capsule Take 1 capsule by mouth daily Yes LUIZ Castaneda   omeprazole (PRILOSEC) 20 MG delayed release capsule Take 1 capsule by mouth Daily Yes LUIZ Castaneda   albuterol sulfate HFA (PROVENTIL HFA) 108 (90 Base) MCG/ACT inhaler Inhale 2 puffs into the lungs every 6 hours as needed for Wheezing Yes Rodger Parr MD   fluticasone (FLONASE) 50 MCG/ACT nasal spray 2 sprays by Each Nostril route daily Yes Kuldeep Nicolas MD   famotidine (PEPCID) 20 MG tablet Take 1 tablet by mouth 2 times daily Yes LUIZ Castañeda   furosemide (LASIX) 40 MG tablet Take 1 tablet by mouth daily Yes LUIZ Castañeda   amLODIPine (NORVASC) 5 MG tablet Take 1 tablet by mouth daily Yes LUIZ Castaneda   lisinopril (PRINIVIL;ZESTRIL) 40 MG tablet Take 1 tablet by mouth daily Yes LUIZ Castaneda   hydrALAZINE (APRESOLINE) 10 MG tablet Take 1 tablet by mouth 3 times daily  Patient taking differently: Take 10 mg by mouth daily  Yes LUIZ Castañeda   vitamin D (ERGOCALCIFEROL) 06633 units CAPS capsule Take 1 capsule by mouth once a week Yes Damien Mera LUIZ Larson   ezetimibe (ZETIA) 10 MG tablet Take 1 tablet by mouth daily Yes LUIZ Castañeda   metoprolol succinate (TOPROL XL) 100 MG extended release tablet Take 1 tablet by mouth daily Yes LUIZ Castañeda   hydrocortisone (ANUSOL-HC) 2.5 % rectal cream Place rectally 2 times daily. Patient taking differently: Place rectally 2 times daily as needed Place rectally 2 times daily. Yes LUIZ Mcdaniel   aspirin 81 MG tablet Aspir-81 81 mg tablet,delayed release   Take 1 tablet every day by oral route.  Yes Historical Provider, MD   hydrOXYzine (ATARAX) 50 MG tablet hydroxyzine HCl 50 mg tablet   TAKE ONE TABLET BY MOUTH TWICE DAILY AS NEEDED Yes Historical Provider, MD   Multiple Vitamins-Minerals (MULTIVITAMIN ADULT PO) Take by mouth Yes Historical Provider, MD       Allergies: Atorvastatin; Nabumetone; and Rosuvastatin calcium    Past Medical History:   Diagnosis Date    Anxiety     Depression     Edema     GERD (gastroesophageal reflux disease)     Headache(784.0)     migraines    Hyperlipidemia     Hypertension     Mini stroke ([address]     Sleep apnea     CPAP    TIA (transient ischemic attack)        Past Surgical History:   Procedure Laterality Date    CHOLECYSTECTOMY      COLONOSCOPY  07/01/2015    Dr. Rochelle Gagnon COLONOSCOPY  06/05/2019    Dr Rosalio Mcghee [facility]) Non-bleeding internal hemorrhoids, diverticulosis-Tubular AP (-) dysplasia, 3 yr recall    CYSTOSCOPY Left 12/5/2018    CYSTOSCOPY URETEROSCOPY  PLACEMENT DOUBLE J STENT ON LEFT RIGHT  RETROGRADE PYELOGRAMS performed by Monica Graf MD at [address] Bilateral 1/31/2019    TOTAL LAPAROSCOPIC HYSTERECTOMY BILATERAL SALPINGOOPHERECTOMY WITH Kathryn Hilt performed by Boni Velasquez MD at [facility], VAGINAL      [place] E[place] TRANSORAL BIOPSY SINGLE/MULTIPLE N/A 5/8/2018    Dr Horacio Rhodes (-) Kristin (+) Dye's (-) dysplasia--3 yr recall    [place] LAP,CHOLECYSTECTOMY N/A 3/6/2018    CHOLECYSTECTOMY LAPAROSCOPIC performed by Curtis Allen MD at [address]  08/26/2015    Dr. Tripp Holguin  5/23/2017    Dr Osorio-w/balloon dilation, 12-13. 5-15 mm-esophageal narrowing with diverticulum at 34 cm-Kristin (-), hiatal herni, Dye's (+) dysplasia (-)--1st dx--1 yr recall-Ct chest ordered    WRIST FRACTURE SURGERY         Social History     Tobacco Use    Smoking status: Never Smoker    Smokeless tobacco: Never Used   Substance Use Topics    Alcohol use: No     Alcohol/week: 0.0 standard drinks       Family History   Problem Relation Age of Onset    Heart Disease Mother     Colon Cancer Neg Hx     Colon Polyps Neg Hx     Liver Cancer Neg Hx     Liver Disease Neg Hx     Esophageal Cancer Neg Hx     Rectal Cancer Neg Hx     Stomach Cancer Neg Hx        Review of Systems   Constitutional: Negative for chills, fatigue and fever. HENT: Negative for congestion, ear pain, rhinorrhea and sore throat. Eyes: Negative for redness. Respiratory: Negative for cough and shortness of breath. Cardiovascular: Negative for chest pain. Gastrointestinal: Negative for abdominal pain, constipation, diarrhea, nausea and vomiting. Genitourinary: Negative for dysuria, frequency and urgency. Skin: Negative for rash. Neurological: Negative for dizziness and headaches. Psychiatric/Behavioral: Positive for sleep disturbance (stable with CPAP). The patient is nervous/anxious (stable with Effexor). Physical Exam  Vitals signs reviewed. Constitutional:       Appearance: She is well-developed. She is obese. HENT:      Head: Normocephalic. Right Ear: Tympanic membrane and external ear normal.      Left Ear: Tympanic membrane and external ear normal.      Nose: Nose normal.   Neck:      Musculoskeletal: Normal range of motion. Vascular: No carotid bruit. Cardiovascular:      Rate and Rhythm: Normal rate and regular rhythm.    Pulmonary: Effort: Pulmonary effort is normal.      Breath sounds: Normal breath sounds. No wheezing, rhonchi or rales. Abdominal:      General: Bowel sounds are normal.      Palpations: Abdomen is soft. Skin:     General: Skin is dry. Neurological:      General: No focal deficit present. Mental Status: She is alert and oriented to person, place, and time. Mental status is at baseline. Psychiatric:         Mood and Affect: Mood normal.         Behavior: Behavior normal.         Thought Content: Thought content normal.         Judgment: Judgment normal.       ASSESSMENT      ICD-10-CM    1. Routine general medical examination at a health care facility  Z00.00    2. Positive depression screening  Z13.31 Positive Screen for Clinical Depression with a Documented Follow-up Plan    3. Essential hypertension  I10 The current medical regimen is effective;  continue present plan and medications. Patient is asked to monitor BP at home or work, several times per month and return with written values at next office visit. 4. Mixed hyperlipidemia  E78.2 Continue Lipitor 80 mg   5. Gastroesophageal reflux disease without esophagitis  K21.9 Continue Prilosec   6. Anxiety  F41.9 Continue Effexor 150 mg   7. Class 2 severe obesity due to excess calories with serious comorbidity and body mass index (BMI) of 39.0 to 39.9 in adult [facility])  E66.01 Recommend diet and exercise  Recommend speaking with dietician. Offered [facility] health department  She defers, has a family member that is a dietician    Z68.39    8. Obstructive sleep apnea syndrome  G47.33 Continue CPAP   9.  Encounter for screening mammogram for breast cancer  Z12.31 MYLES DIGITAL SCREEN W OR WO CAD BILATERAL         PLAN    Orders Placed This Encounter   Procedures    MYLES DIGITAL SCREEN W OR WO CAD BILATERAL    Positive Screen for Clinical Depression with a Documented Follow-up Plan         Return in 3 months (on 11/27/2020), or if symptoms worsen or fail to improve, for Medicare Annual Wellness Visit in 1 year. Patient Instructions          Learning About Low-Carbohydrate Diets  What is a low-carbohydrate diet? A low-carbohydrate (or \"low-carb\") diet limits foods and drinks that have carbohydrates. This includes grains, fruits, milk and yogurt, and starchy vegetables like potatoes, beans, and corn. It also avoids foods and drinks that have added sugar. Instead, low-carb diets include foods that are high in protein and fat. Why might you follow a low-carb diet? Low-carb diets may be used for a variety of reasons, such as for weight loss. People who have diabetes may use a low-carb diet to help manage their blood sugar levels. What should you do before you start the diet? Talk to your doctor before you try any diet. This is even more important if you have health problems like kidney disease, heart disease, or diabetes. Your doctor may suggest that you meet with a registered dietitian. A dietitian can help you make an eating plan that works for you. What foods do you eat on a low-carb diet? On a low-carb diet, you choose foods that are high in protein and fat. Examples of these are:  · Meat, poultry, and fish. · Eggs. · Nuts, such as walnuts, pecans, almonds, and peanuts. · Peanut butter and other nut butters. · Tofu. · Avocado. · Allison Arianne. · Non-starchy vegetables like broccoli, cauliflower, green beans, mushrooms, peppers, lettuce, and spinach. · Unsweetened non-dairy milks like almond milk and coconut milk. · Cheese, cottage cheese, and cream cheese. Current as of: August 22, 2019               Content Version: 12.5  © 4434-5602 Healthwise, Incorporated. Care instructions adapted under license by [facility] ([facility]). If you have questions about a medical condition or this instruction, always ask your healthcare professional. [address] any warranty or liability for your use of this information.            Eating Healthy Foods: Care Instructions  Your Care Instructions     Eating healthy foods can help lower your risk for disease. Healthy food gives you energy and keeps your heart strong, your brain active, your muscles working, and your bones strong. A healthy diet includes a variety of foods from the basic food groups: grains, vegetables, fruits, milk and milk products, and meat and beans. Some people may eat more of their favorite foods from only one food group and, as a result, miss getting the nutrients they need. So, it is important to pay attention not only to what you eat but also to what you are missing from your diet. You can eat a healthy, balanced diet by making a few small changes. Follow-up care is a key part of your treatment and safety. Be sure to make and go to all appointments, and call your doctor if you are having problems. It's also a good idea to know your test results and keep a list of the medicines you take. How can you care for yourself at home? Look at what you eat  · Keep a food diary for a week or two and record everything you eat or drink. Track the number of servings you eat from each food group. · For a balanced diet every day, eat a variety of:  ? 6 or more ounce-equivalents of grains, such as cereals, breads, crackers, rice, or pasta, every day. An ounce-equivalent is 1 slice of bread, 1 cup of ready-to-eat cereal, or ½ cup of cooked rice, cooked pasta, or cooked cereal.  ? 2½ cups of vegetables, especially:  § Dark-green vegetables such as broccoli and spinach. § Orange vegetables such as carrots and sweet potatoes. § Dry beans (such as fitzpatrick and kidney beans) and peas (such as lentils). ? 2 cups of fresh, frozen, or canned fruit. A small apple or 1 banana or orange equals 1 cup. ? 3 cups of nonfat or low-fat milk, yogurt, or other milk products. ? 5½ ounces of meat and beans, such as chicken, fish, lean meat, beans, nuts, and seeds.  One egg, 1 tablespoon of peanut butter, ½ ounce nuts or seeds, or grilled chicken wrap. ? Broiled or poached food instead of fried or breaded items. Make healthy choices easy  · Buy packaged, prewashed, ready-to-eat fresh vegetables and fruits, such as baby carrots, salad mixes, and chopped or shredded broccoli and cauliflower. · Buy packaged, presliced fruits, such as melon or pineapple. · Choose 100% fruit or vegetable juice instead of soda. Limit juice intake to 4 to 6 oz (½ to ¾ cup) a day. · Blend low-fat yogurt, fruit juice, and canned or frozen fruit to make a smoothie for breakfast or a snack. Where can you learn more? Go to https://AquapdesignspeBlueSwarmeb.Cartiva. org and sign in to your [employer] account. Enter C082 in the Popular Pays box to learn more about \"Eating Healthy Foods: Care Instructions. \"     If you do not have an account, please click on the \"Sign Up Now\" link. Current as of: August 22, 2019               Content Version: 12.5  © 8212-6793 Healthwise, Incorporated. Care instructions adapted under license by [facility] ([facility]). If you have questions about a medical condition or this instruction, always ask your healthcare professional. [address] any warranty or liability for your use of this information. Personalized Preventive Plan for Arielle Jewell - 8/27/2020  Medicare offers a range of preventive health benefits. Some of the tests and screenings are paid in full while other may be subject to a deductible, co-insurance, and/or copay. Some of these benefits include a comprehensive review of your medical history including lifestyle, illnesses that may run in your family, and various assessments and screenings as appropriate. After reviewing your medical record and screening and assessments performed today your provider may have ordered immunizations, labs, imaging, and/or referrals for you.   A list of these orders (if applicable) as well as your Preventive Care list are included within your After Visit Summary for your review. Other Preventive Recommendations:    · A preventive eye exam performed by an eye specialist is recommended every 1-2 years to screen for glaucoma; cataracts, macular degeneration, and other eye disorders. · A preventive dental visit is recommended every 6 months. · Try to get at least 150 minutes of exercise per week or 10,000 steps per day on a pedometer . · Order or download the FREE \"Exercise & Physical Activity: Your Everyday Guide\" from [employer]. Call 3-777.630.8122 or search [employer] online. · You need 0744-7336 mg of calcium and 2339-3599 IU of vitamin D per day. It is possible to meet your calcium requirement with diet alone, but a vitamin D supplement is usually necessary to meet this goal.  · When exposed to the sun, use a sunscreen that protects against both UVA and UVB radiation with an SPF of 30 or greater. Reapply every 2 to 3 hours or after sweating, drying off with a towel, or swimming. · Always wear a seat belt when traveling in a car. Always wear a helmet when riding a bicycle or motorcycle. Controlled Substances Monitoring:  n/a            Additional Instructions: As always, patient is advised to bring in medication bottles in order to correctly reconcile with our current list.    Tiffany Beckwith received counseling on the following healthy behaviors: n/a    Patient given educational materials on dx    I have instructed Lizabethjovany Beckwith to complete a self tracking handout on n/a and instructed them to bring it with them to her next appointment. Discussed use, benefit, and side effects of prescribed medications. Barriers to medication compliance addressed. All patient questions answered. Pt voiced understanding.      LUIZ Valdovinos

## 2020-08-27 NOTE — PATIENT INSTRUCTIONS
Learning About Low-Carbohydrate Diets  What is a low-carbohydrate diet? A low-carbohydrate (or \"low-carb\") diet limits foods and drinks that have carbohydrates. This includes grains, fruits, milk and yogurt, and starchy vegetables like potatoes, beans, and corn. It also avoids foods and drinks that have added sugar. Instead, low-carb diets include foods that are high in protein and fat. Why might you follow a low-carb diet? Low-carb diets may be used for a variety of reasons, such as for weight loss. People who have diabetes may use a low-carb diet to help manage their blood sugar levels. What should you do before you start the diet? Talk to your doctor before you try any diet. This is even more important if you have health problems like kidney disease, heart disease, or diabetes. Your doctor may suggest that you meet with a registered dietitian. A dietitian can help you make an eating plan that works for you. What foods do you eat on a low-carb diet? On a low-carb diet, you choose foods that are high in protein and fat. Examples of these are:  · Meat, poultry, and fish. · Eggs. · Nuts, such as walnuts, pecans, almonds, and peanuts. · Peanut butter and other nut butters. · Tofu. · Avocado. · Lylia Purdue. · Non-starchy vegetables like broccoli, cauliflower, green beans, mushrooms, peppers, lettuce, and spinach. · Unsweetened non-dairy milks like almond milk and coconut milk. · Cheese, cottage cheese, and cream cheese. Current as of: August 22, 2019               Content Version: 12.5  © 8043-5159 Healthwise, [employer]. Care instructions adapted under license by [facility] ([facility]). If you have questions about a medical condition or this instruction, always ask your healthcare professional. [address] any warranty or liability for your use of this information.            Eating Healthy Foods: Care Instructions  Your Care Instructions     Eating healthy foods can help lower your risk for disease. Healthy food gives you energy and keeps your heart strong, your brain active, your muscles working, and your bones strong. A healthy diet includes a variety of foods from the basic food groups: grains, vegetables, fruits, milk and milk products, and meat and beans. Some people may eat more of their favorite foods from only one food group and, as a result, miss getting the nutrients they need. So, it is important to pay attention not only to what you eat but also to what you are missing from your diet. You can eat a healthy, balanced diet by making a few small changes. Follow-up care is a key part of your treatment and safety. Be sure to make and go to all appointments, and call your doctor if you are having problems. It's also a good idea to know your test results and keep a list of the medicines you take. How can you care for yourself at home? Look at what you eat  · Keep a food diary for a week or two and record everything you eat or drink. Track the number of servings you eat from each food group. · For a balanced diet every day, eat a variety of:  ? 6 or more ounce-equivalents of grains, such as cereals, breads, crackers, rice, or pasta, every day. An ounce-equivalent is 1 slice of bread, 1 cup of ready-to-eat cereal, or ½ cup of cooked rice, cooked pasta, or cooked cereal.  ? 2½ cups of vegetables, especially:  § Dark-green vegetables such as broccoli and spinach. § Orange vegetables such as carrots and sweet potatoes. § Dry beans (such as fitzpatrick and kidney beans) and peas (such as lentils). ? 2 cups of fresh, frozen, or canned fruit. A small apple or 1 banana or orange equals 1 cup. ? 3 cups of nonfat or low-fat milk, yogurt, or other milk products. ? 5½ ounces of meat and beans, such as chicken, fish, lean meat, beans, nuts, and seeds. One egg, 1 tablespoon of peanut butter, ½ ounce nuts or seeds, or ¼ cup of cooked beans equals 1 ounce of meat.   · Learn how to read food labels for serving sizes and ingredients. Fast-food and convenience-food meals often contain few or no fruits or vegetables. Make sure you eat some fruits and vegetables to make the meal more nutritious. · Look at your food diary. For each food group, add up what you have eaten and then divide the total by the number of days. This will give you an idea of how much you are eating from each food group. See if you can find some ways to change your diet to make it more healthy. Start small  · Do not try to make dramatic changes to your diet all at once. You might feel that you are missing out on your favorite foods and then be more likely to fail. · Start slowly, and gradually change your habits. Try some of the following:  ? Use whole wheat bread instead of white bread. ? Use nonfat or low-fat milk instead of whole milk. ? Eat brown rice instead of white rice, and eat whole wheat pasta instead of white-flour pasta. ? Try low-fat cheeses and low-fat yogurt. ? Add more fruits and vegetables to meals and have them for snacks. ? Add lettuce, tomato, cucumber, and onion to sandwiches. ? Add fruit to yogurt and cereal.  Enjoy food  · You can still eat your favorite foods. You just may need to eat less of them. If your favorite foods are high in fat, salt, and sugar, limit how often you eat them, but do not cut them out entirely. · Eat a wide variety of foods. Make healthy choices when eating out  · The type of restaurant you choose can help you make healthy choices. Even fast-food chains are now offering more low-fat or healthier choices on the menu. · Choose smaller portions, or take half of your meal home. · When eating out, try:  ? A veggie pizza with a whole wheat crust or grilled chicken (instead of sausage or pepperoni). ? Pasta with roasted vegetables, grilled chicken, or marinara sauce instead of cream sauce. ? A vegetable wrap or grilled chicken wrap. ?  Broiled or poached food instead of fried or breaded items. Make healthy choices easy  · Buy packaged, prewashed, ready-to-eat fresh vegetables and fruits, such as baby carrots, salad mixes, and chopped or shredded broccoli and cauliflower. · Buy packaged, presliced fruits, such as melon or pineapple. · Choose 100% fruit or vegetable juice instead of soda. Limit juice intake to 4 to 6 oz (½ to ¾ cup) a day. · Blend low-fat yogurt, fruit juice, and canned or frozen fruit to make a smoothie for breakfast or a snack. Where can you learn more? Go to https://[employer] Mindpe[employer]iceweb.[employer]. org and sign in to your [employer] account. Enter W022 in the Preclick box to learn more about \"Eating Healthy Foods: Care Instructions. \"     If you do not have an account, please click on the \"Sign Up Now\" link. Current as of: August 22, 2019               Content Version: 12.5  © 7484-5937 Healthwise, [employer]. Care instructions adapted under license by [facility] ([facility]). If you have questions about a medical condition or this instruction, always ask your healthcare professional. [place] [address] any warranty or liability for your use of this information. Personalized Preventive Plan for Libia Burrell - 8/27/2020  Medicare offers a range of preventive health benefits. Some of the tests and screenings are paid in full while other may be subject to a deductible, co-insurance, and/or copay. Some of these benefits include a comprehensive review of your medical history including lifestyle, illnesses that may run in your family, and various assessments and screenings as appropriate. After reviewing your medical record and screening and assessments performed today your provider may have ordered immunizations, labs, imaging, and/or referrals for you. A list of these orders (if applicable) as well as your Preventive Care list are included within your After Visit Summary for your review.     Other Preventive Recommendations:    · A preventive eye exam performed by an eye specialist is recommended every 1-2 years to screen for glaucoma; cataracts, macular degeneration, and other eye disorders. · A preventive dental visit is recommended every 6 months. · Try to get at least 150 minutes of exercise per week or 10,000 steps per day on a pedometer . · Order or download the FREE \"Exercise & Physical Activity: Your Everyday Guide\" from [employer]. Call 5-161.796.8340 or search [employer] online. · You need 1921-2585 mg of calcium and 0642-1367 IU of vitamin D per day. It is possible to meet your calcium requirement with diet alone, but a vitamin D supplement is usually necessary to meet this goal.  · When exposed to the sun, use a sunscreen that protects against both UVA and UVB radiation with an SPF of 30 or greater. Reapply every 2 to 3 hours or after sweating, drying off with a towel, or swimming. · Always wear a seat belt when traveling in a car. Always wear a helmet when riding a bicycle or motorcycle.

## 2020-09-09 ENCOUNTER — HOSPITAL ENCOUNTER (OUTPATIENT)
Dept: WOMENS IMAGING | Age: 70
Discharge: HOME | End: 2020-09-09
Payer: MEDICARE

## 2020-09-09 PROCEDURE — 77067 SCR MAMMO BI INCL CAD: CPT

## 2020-09-10 ENCOUNTER — TELEPHONE (OUTPATIENT)
Dept: PRIMARY CARE CLINIC | Age: 70
End: 2020-09-10

## 2020-09-10 NOTE — TELEPHONE ENCOUNTER
----- Message from LUIZ Tam sent at 9/9/2020  2:57 PM CDT -----  Please call patient and let them know results. Mammogram normal. Repeat in one year. Please call if any questions.

## 2020-09-10 NOTE — TELEPHONE ENCOUNTER
Called patient, spoke with: Patient regarding the results of the patients most recent mammorgram.  I advised Patient of Teodora Larson recommendations.    Patient did voice understanding

## 2020-09-18 RX ORDER — ATORVASTATIN CALCIUM 80 MG/1
80 TABLET, FILM COATED ORAL NIGHTLY
Qty: 90 TABLET | Refills: 3 | Status: SHIPPED | OUTPATIENT
Start: 2020-09-18 | End: 2021-12-16

## 2020-09-18 RX ORDER — LISINOPRIL 40 MG/1
40 TABLET ORAL DAILY
Qty: 90 TABLET | Refills: 3 | Status: SHIPPED | OUTPATIENT
Start: 2020-09-18 | End: 2021-10-15 | Stop reason: ALTCHOICE

## 2020-09-18 RX ORDER — METOPROLOL SUCCINATE 100 MG/1
100 TABLET, EXTENDED RELEASE ORAL DAILY
Qty: 90 TABLET | Refills: 3 | Status: SHIPPED | OUTPATIENT
Start: 2020-09-18 | End: 2021-12-20

## 2020-09-18 NOTE — TELEPHONE ENCOUNTER
Received fax from pharmacy requesting refill on pts medication(s). Pt was last seen in office on 8/27/2020  and has a follow up scheduled for 9/17/2020. Will send request to  [facility]  for patient.      Requested Prescriptions     Pending Prescriptions Disp Refills    atorvastatin (LIPITOR) 80 MG tablet [Pharmacy Med Name: Atorvastatin Calcium 80 MG Oral Tablet] 90 tablet 0     Sig: Take 1 tablet by mouth nightly    lisinopril (PRINIVIL;ZESTRIL) 40 MG tablet [Pharmacy Med Name: Lisinopril 40 MG Oral Tablet] 90 tablet 0     Sig: Take 1 tablet by mouth once daily

## 2020-09-18 NOTE — TELEPHONE ENCOUNTER
Received fax from pharmacy requesting refill on pts medication(s). Pt was last seen in office on 8/27/2020  and has a follow up scheduled for 12/1/2020. Will send request to  [facility]  for patient.      Requested Prescriptions     Pending Prescriptions Disp Refills    metoprolol succinate (TOPROL XL) 100 MG extended release tablet [Pharmacy Med Name: Metoprolol Succinate  MG Oral Tablet Extended Release 24 Hour] 30 tablet 0     Sig: Take 1 tablet by mouth once daily

## 2020-10-11 ENCOUNTER — HOSPITAL ENCOUNTER (EMERGENCY)
Age: 70
Discharge: HOME | End: 2020-10-11
Payer: MEDICARE

## 2020-10-11 ENCOUNTER — APPOINTMENT (OUTPATIENT)
Dept: GENERAL RADIOLOGY | Age: 70
End: 2020-10-11
Payer: MEDICARE

## 2020-10-11 VITALS
HEART RATE: 87 BPM | RESPIRATION RATE: 21 BRPM | OXYGEN SATURATION: 97 % | SYSTOLIC BLOOD PRESSURE: 136 MMHG | TEMPERATURE: 98.1 F | DIASTOLIC BLOOD PRESSURE: 69 MMHG

## 2020-10-11 PROCEDURE — 99999 PR OFFICE/OUTPT VISIT,PROCEDURE ONLY: CPT | Performed by: NURSE PRACTITIONER

## 2020-10-11 PROCEDURE — 96372 THER/PROPH/DIAG INJ SC/IM: CPT

## 2020-10-11 PROCEDURE — 73630 X-RAY EXAM OF FOOT: CPT

## 2020-10-11 PROCEDURE — 29515 APPLICATION SHORT LEG SPLINT: CPT

## 2020-10-11 PROCEDURE — 99284 EMERGENCY DEPT VISIT MOD MDM: CPT

## 2020-10-11 PROCEDURE — 6360000002 HC RX W HCPCS: Performed by: NURSE PRACTITIONER

## 2020-10-11 PROCEDURE — 73610 X-RAY EXAM OF ANKLE: CPT

## 2020-10-11 PROCEDURE — 99285 EMERGENCY DEPT VISIT HI MDM: CPT

## 2020-10-11 PROCEDURE — 73590 X-RAY EXAM OF LOWER LEG: CPT

## 2020-10-11 RX ORDER — HYDROCODONE BITARTRATE AND ACETAMINOPHEN 10; 325 MG/1; MG/1
1 TABLET ORAL EVERY 6 HOURS PRN
Qty: 12 TABLET | Refills: 0 | Status: SHIPPED | OUTPATIENT
Start: 2020-10-11 | End: 2020-10-14

## 2020-10-11 RX ORDER — MORPHINE SULFATE 4 MG/ML
4 INJECTION, SOLUTION INTRAMUSCULAR; INTRAVENOUS ONCE
Status: COMPLETED | OUTPATIENT
Start: 2020-10-11 | End: 2020-10-11

## 2020-10-11 RX ORDER — DOCUSATE SODIUM 100 MG/1
100 CAPSULE, LIQUID FILLED ORAL 2 TIMES DAILY
Qty: 20 CAPSULE | Refills: 0 | Status: SHIPPED | OUTPATIENT
Start: 2020-10-11 | End: 2020-10-21

## 2020-10-11 RX ADMIN — MORPHINE SULFATE 4 MG: 4 INJECTION, SOLUTION INTRAMUSCULAR; INTRAVENOUS at 14:52

## 2020-10-11 ASSESSMENT — PAIN DESCRIPTION - ORIENTATION
ORIENTATION: RIGHT
ORIENTATION: RIGHT

## 2020-10-11 ASSESSMENT — PAIN DESCRIPTION - LOCATION
LOCATION: LEG
LOCATION: FOOT

## 2020-10-11 ASSESSMENT — ENCOUNTER SYMPTOMS
ABDOMINAL PAIN: 0
BACK PAIN: 0

## 2020-10-11 ASSESSMENT — PAIN SCALES - GENERAL
PAINLEVEL_OUTOF10: 2
PAINLEVEL_OUTOF10: 8

## 2020-10-11 ASSESSMENT — PAIN DESCRIPTION - DESCRIPTORS: DESCRIPTORS: BURNING

## 2020-10-11 NOTE — ED PROVIDER NOTES
[facility] EMERGENCY DEPT  eMERGENCY dEPARTMENT eNCOUnter      Pt Name: Raymond Daugherty  MRN: [id number]  [address] 1950  Date of evaluation: 10/11/2020  Provider: Nikita Pichardo, [address]       Chief Complaint   Patient presents with    Foot Injury     right foot after tripping yesterday         HISTORY OF PRESENT ILLNESS   (Location/Symptom, Timing/Onset,Context/Setting, Quality, Duration, Modifying Factors, Severity)  Note limiting factors. Morgan Krishna a 72 y.o. female who presents to the emergency department for evaluation of foot injury. Pt tells me that she tripped and fell yesterday injuring her right lower leg, right ankle and right foot. She denies head injury, neck pain as well as any back pain. She has had no chest or abdominal pain. She denies other extremity injury. She tells me that she has not been able to bear weight right foot due to pain. She denies right hip as well as right knee pain to me. HPI    Nursing Notes were reviewed. REVIEW OF SYSTEMS    (2-9 systems for level 4, 10 or more for level 5)     Review of Systems   Constitutional: Negative. Cardiovascular: Negative for chest pain. Gastrointestinal: Negative for abdominal pain. Musculoskeletal: Positive for arthralgias (right ankle/right foot). Negative for back pain and neck pain. A complete review of systems was performed and is negative except as noted above in the HPI.        PAST MEDICAL HISTORY     Past Medical History:   Diagnosis Date    Anxiety     Depression     Edema     GERD (gastroesophageal reflux disease)     Headache(784.0)     migraines    Hyperlipidemia     Hypertension     Mini stroke ([address]     Sleep apnea     CPAP    TIA (transient ischemic attack)          SURGICAL HISTORY       Past Surgical History:   Procedure Laterality Date    CHOLECYSTECTOMY      COLONOSCOPY  07/01/2015    Dr. Margeret Sicard COLONOSCOPY  06/05/2019    [facility] Non-bleeding internal hemorrhoids, diverticulosis-Tubular AP (-) dysplasia, 3 yr recall    CYSTOSCOPY Left 12/5/2018    CYSTOSCOPY URETEROSCOPY  PLACEMENT DOUBLE J STENT ON LEFT RIGHT  RETROGRADE PYELOGRAMS performed by Michel Alejo MD at [address] Bilateral 1/31/2019    TOTAL LAPAROSCOPIC HYSTERECTOMY BILATERAL SALPINGOOPHERECTOMY WITH Lex Dayhoff performed [facility] Terra Chow [facility] at [facility], VAGINAL      OR EGD TRANSORAL BIOPSY SINGLE/MULTIPLE N/A 5/8/2018    Dr Fozia Mayes (-) Kristin (+) Dye's (-) dysplasia--3 yr recall    OR LAP,CHOLECYSTECTOMY N/A 3/6/2018    CHOLECYSTECTOMY LAPAROSCOPIC performed by Federico Mcgowan MD at [place] [id number]  08/26/2015    Dr. James Manuel  5/23/2017    Dr Osorio-w/balloon dilation, 12-13. 5-15 mm-esophageal narrowing with diverticulum at 29 cm-Kristin (-), hiatal herni, Dye's (+) dysplasia (-)--1st dx--1 yr recall-Ct chest ordered    WRIST FRACTURE SURGERY           CURRENT MEDICATIONS       Previous Medications    ALBUTEROL SULFATE HFA (PROVENTIL HFA) 108 (90 BASE) MCG/ACT INHALER    Inhale 2 puffs into the lungs every 6 hours as needed for Wheezing    AMLODIPINE (NORVASC) 5 MG TABLET    Take 1 tablet by mouth daily    ASPIRIN 81 MG TABLET    Aspir-81 81 mg tablet,delayed release   Take 1 tablet every day by oral route. ATORVASTATIN (LIPITOR) 80 MG TABLET    Take 1 tablet by mouth nightly    EZETIMIBE (ZETIA) 10 MG TABLET    Take 1 tablet by mouth daily    FAMOTIDINE (PEPCID) 20 MG TABLET    Take 1 tablet by mouth 2 times daily    FLUTICASONE (FLONASE) 50 MCG/ACT NASAL SPRAY    2 sprays by Each Nostril route daily    FUROSEMIDE (LASIX) 40 MG TABLET    Take 1 tablet by mouth daily    HYDRALAZINE (APRESOLINE) 10 MG TABLET    Take 1 tablet by mouth 3 times daily    HYDROCORTISONE (ANUSOL-HC) 2.5 % RECTAL CREAM    Place rectally 2 times daily.     HYDROXYZINE (ATARAX) 50 MG TABLET    hydroxyzine HCl 50 mg tablet   TAKE ONE TABLET BY MOUTH TWICE DAILY AS NEEDED    LISINOPRIL (PRINIVIL;ZESTRIL) 40 MG TABLET    Take 1 tablet by mouth daily    METOPROLOL SUCCINATE (TOPROL XL) 100 MG EXTENDED RELEASE TABLET    Take 1 tablet by mouth daily    MULTIPLE VITAMINS-MINERALS (MULTIVITAMIN ADULT PO)    Take by mouth    OMEPRAZOLE (PRILOSEC) 20 MG DELAYED RELEASE CAPSULE    Take 1 capsule by mouth Daily    TAMSULOSIN (FLOMAX) 0.4 MG CAPSULE    Take 1 capsule by mouth daily    VENLAFAXINE (EFFEXOR XR) 150 MG EXTENDED RELEASE CAPSULE    Take 1 capsule by mouth daily    VITAMIN D (ERGOCALCIFEROL) 76325 UNITS CAPS CAPSULE    Take 1 capsule by mouth once a week       ALLERGIES     Atorvastatin; Nabumetone; and Rosuvastatin calcium    FAMILY HISTORY       Family History   Problem Relation Age of Onset    Heart Disease Mother     Colon Cancer Neg Hx     Colon Polyps Neg Hx     Liver Cancer Neg Hx     Liver Disease Neg Hx     Esophageal Cancer Neg Hx     Rectal Cancer Neg Hx     Stomach Cancer Neg Hx           SOCIAL HISTORY       Social History     Socioeconomic History    Marital status:      Spouse name: None    Number of children: None    Years of education: None    Highest education level: None   Occupational History    None   Social Needs    Financial resource strain: None    Food insecurity     Worry: None     Inability: None    Transportation needs     Medical: None     Non-medical: None   Tobacco Use    Smoking status: Never Smoker    Smokeless tobacco: Never Used   Substance and Sexual Activity    Alcohol use: No     Alcohol/week: 0.0 standard drinks    Drug use: No    Sexual activity: None   Lifestyle    Physical activity     Days per week: None     Minutes per session: None    Stress: None   Relationships    Social connections     Talks on phone: None     Gets together: None     Attends Sikhism service: None     Active member of club or organization: None     Attends meetings of clubs or organizations: None     Relationship status: None    Intimate partner violence     Fear of current or ex partner: None     Emotionally abused: None     Physically abused: None     Forced sexual activity: None   Other Topics Concern    None   Social History Narrative    None       SCREENINGS    [place] Coma Scale  Eye Opening: Spontaneous  Best Verbal Response: Oriented  Best Motor Response: Obeys commands  [place] Coma Scale Score: 15        PHYSICAL EXAM    (up to 7 for level 4, 8 or more for level 5)     ED Triage Vitals [10/11/20 1427]   BP Temp Temp src Pulse Resp SpO2 Height Weight   (!) 147/65 97.9 °F (36.6 °C) -- 94 15 92 % -- --       Physical Exam  Vitals signs reviewed. HENT:      Head: Normocephalic. Right Ear: External ear normal.      Left Ear: External ear normal.   Eyes:      Conjunctiva/sclera: Conjunctivae normal.   Neck:      Musculoskeletal: Normal range of motion. Cardiovascular:      Rate and Rhythm: Normal rate. Pulmonary:      Effort: Pulmonary effort is normal.   Musculoskeletal: Normal range of motion. Comments: Right hip/right knee with normal active ROM  Painful dorsiflexion/plantar flexion of right ankle/foot  Diffuse moderate ttp right dorsal foot w/bruising discoloration to dorsal lateral foot  No direct ttp right knee  Compartments of right lower leg are soft and nontender  CMS intact right lower extremity    Skin:     General: Skin is warm and dry. Neurological:      Mental Status: She is alert and oriented to person, place, and time.          DIAGNOSTIC RESULTS     EKG: All EKG's are interpreted by the Emergency Department Physician who either signs or Co-signs this chart in the absence of acardiologist.        RADIOLOGY:   Non-plain film images such as CT, Ultrasound andMRI are read by the radiologist. Plain radiographic images are visualized and preliminarily interpreted by the emergency physician with the below findings:        Interpretation per the Radiologist below, if available at the time of this note:    XR FOOT RIGHT (MIN 3 VIEWS)   Final Result   1. Redemonstration mildly displaced oblique fibular fracture. See   separately dictated ankle radiographs of the same day. 2. Bones of foot appear intact. 3. Ankle joint effusion and diffuse soft tissue swelling. Signed by Dr Jaquan Batres on 10/11/2020 3:19 PM      XR ANKLE RIGHT (MIN 3 VIEWS)   Final Result   1. Mildly displaced oblique fracture of the fibula. 2. Small age-indeterminate fractures of the medial malleolus and   posterior malleolus. 3. Ankle joint effusion. Diffuse soft tissue swelling. Signed by Dr Jaquan Batres on 10/11/2020 3:19 PM      XR TIBIA FIBULA RIGHT (2 VIEWS)   Final Result   1. Oblique mildly displaced fracture of the distal fibula. See   separately dictated ankle radiographs of the same day. Signed by Dr Jaquan Batres on 10/11/2020 3:19 PM            ED BEDSIDE ULTRASOUND:   Performed by ED Physician - none    LABS:  Labs Reviewed - No data to display    All other labs were within normal range or not returned as of this dictation. RE-ASSESSMENT     Pt was given prescription for a wheelchair.        EMERGENCY DEPARTMENT COURSE and DIFFERENTIALDIAGNOSIS/MDM:   Vitals:    Vitals:    10/11/20 1427   BP: (!) 147/65   Pulse: 94   Resp: 15   Temp: 97.9 °F (36.6 °C)   SpO2: 92%       MDM      CONSULTS:  None    PROCEDURES:  Unless otherwise notedbelow, none     Splint Application    Date/Time: 10/11/2020 3:47 PM  Performed by: LUIZ Joseph  Authorized by: LUIZ Joseph     Consent:     Consent obtained:  Verbal    Consent given by:  Patient    Risks discussed:  Pain  Pre-procedure details:     Sensation:  Normal  Procedure details:     Laterality:  Right    Location:  Leg    Leg:  R lower leg    Splint type:  Short leg    Supplies:  Cotton padding, Ortho-Glass and elastic bandage  Post-procedure details:     Pain:  Unchanged    Sensation:  Normal    Patient daily     furosemide 40 MG tablet  Commonly known as:  LASIX  Take 1 tablet by mouth daily     hydrALAZINE 10 MG tablet  Commonly known as:  APRESOLINE  Take 1 tablet by mouth 3 times daily     hydrocortisone 2.5 % rectal cream  Commonly known as: Anusol-HC  Place rectally 2 times daily.      hydrOXYzine 50 MG tablet  Commonly known as:  ATARAX     lisinopril 40 MG tablet  Commonly known as:  PRINIVIL;ZESTRIL  Take 1 tablet by mouth daily     metoprolol succinate 100 MG extended release tablet  Commonly known as:  TOPROL XL  Take 1 tablet by mouth daily     MULTIVITAMIN ADULT PO     omeprazole 20 MG delayed release capsule  Commonly known as:  PRILOSEC  Take 1 capsule by mouth Daily     tamsulosin 0.4 MG capsule  Commonly known as:  FLOMAX  Take 1 capsule by mouth daily     venlafaxine 150 MG extended release capsule  Commonly known as:  EFFEXOR XR  Take 1 capsule by mouth daily     vitamin D 1.25 MG (25140 UT) Caps capsule  Commonly known as:  ERGOCALCIFEROL  Take 1 capsule by mouth once a week           Where to Get Your Medications      You can get these medications from any pharmacy    Bring a paper prescription for each of these medications  · docusate sodium 100 MG capsule  · HYDROcodone-acetaminophen  MG per tablet           (Pleasenote that portions of this note were completed with a voice recognition program.  Efforts were made to edit the dictations but occasionally words are mis-transcribed.)              LUIZ Meng  10/11/20 3668

## 2020-10-16 ENCOUNTER — VIRTUAL VISIT (OUTPATIENT)
Dept: PRIMARY CARE CLINIC | Age: 70
End: 2020-10-16
Payer: MEDICARE

## 2020-10-16 PROCEDURE — 99443 PR PHYS/QHP TELEPHONE EVALUATION 21-30 MIN: CPT | Performed by: NURSE PRACTITIONER

## 2020-10-16 RX ORDER — TRAMADOL HYDROCHLORIDE 50 MG/1
50 TABLET ORAL EVERY 4 HOURS PRN
Qty: 18 TABLET | Refills: 0 | Status: SHIPPED | OUTPATIENT
Start: 2020-10-16 | End: 2020-10-19

## 2020-10-16 ASSESSMENT — ENCOUNTER SYMPTOMS
SORE THROAT: 0
EYE DISCHARGE: 0
DIARRHEA: 0
COUGH: 0
EYE REDNESS: 0
WHEEZING: 0
BLOOD IN STOOL: 0
ABDOMINAL PAIN: 0
RHINORRHEA: 0
CONSTIPATION: 0
TROUBLE SWALLOWING: 0

## 2020-10-16 NOTE — PROGRESS NOTES
[address], [address]  Phone [phone]   Fax [phone]    telephone VISIT: 10/16/2020    Genevieve Alves is a 72 y.o. female who presents today for her medical conditions/complaints as noted below. Genevieve Alves isc/o of Fall        :     HPI  Fall   She feel after tripping over something out side. She was seen at the ER on 10-11. Fractured fibula. She is splinted and non weight bearing. Per patient, they did not set pt up with ortho. Past Medical History:   Diagnosis Date    Anxiety     Depression     Edema     GERD (gastroesophageal reflux disease)     Headache(784.0)     migraines    Hyperlipidemia     Hypertension     Mini stroke (HCC)     Sleep apnea     CPAP    TIA (transient ischemic attack)       Past Surgical History:   Procedure Laterality Date    CHOLECYSTECTOMY      COLONOSCOPY  07/01/2015    Dr. Christian Mathew COLONOSCOPY  06/05/2019    Dr May Campos ([facility]) Non-bleeding internal hemorrhoids, diverticulosis-Tubular AP (-) dysplasia, 3 yr recall    CYSTOSCOPY Left 12/5/2018    CYSTOSCOPY URETEROSCOPY  PLACEMENT DOUBLE J STENT ON LEFT RIGHT  RETROGRADE PYELOGRAMS performed by Jenna Mercado MD at [address] Bilateral 1/31/2019    TOTAL LAPAROSCOPIC HYSTERECTOMY BILATERAL SALPINGOOPHERECTOMY WITH Pauly Conception performed by Lurlean Habermann, MD at [facility], VAGINAL      AK EGD TRANSORAL BIOPSY SINGLE/MULTIPLE N/A 5/8/2018    Dr Aba Herron (-) Kristin (+) Dye's (-) dysplasia--3 yr recall    AK LAP,CHOLECYSTECTOMY N/A 3/6/2018    CHOLECYSTECTOMY LAPAROSCOPIC performed by Luna Parada MD at [place] [address]  08/26/2015    Dr. Guallpa Post  5/23/2017    Dr Osorio-w/balloon dilation, 12-13. 5-15 mm-esophageal narrowing with diverticulum at 29 cm-Kristin (-), hiatal herni, Dye's (+) dysplasia (-)--1st dx--1 yr recall-Ct chest ordered    WRIST FRACTURE SURGERY (Patient taking differently: Take 10 mg by mouth daily ) 90 tablet 3    vitamin D (ERGOCALCIFEROL) 79808 units CAPS capsule Take 1 capsule by mouth once a week 4 capsule 5    ezetimibe (ZETIA) 10 MG tablet Take 1 tablet by mouth daily 30 tablet 11    hydrocortisone (ANUSOL-HC) 2.5 % rectal cream Place rectally 2 times daily. (Patient taking differently: Place rectally 2 times daily as needed Place rectally 2 times daily. ) 1 Tube 0    aspirin 81 MG tablet Aspir-81 81 mg tablet,delayed release   Take 1 tablet every day by oral route.  hydrOXYzine (ATARAX) 50 MG tablet hydroxyzine HCl 50 mg tablet   TAKE ONE TABLET BY MOUTH TWICE DAILY AS NEEDED      Multiple Vitamins-Minerals (MULTIVITAMIN ADULT PO) Take by mouth       No current facility-administered medications for this visit. Allergies   Allergen Reactions    Atorvastatin Other (See Comments)     Other reaction(s): Other (See Comments)  Muscle cramps in legs. Other reaction(s): Other (See Comments)  Muscle cramps in legs. Pt is back on it  Muscle cramps in legs. Muscle cramps in legs.     Nabumetone Other (See Comments)     Patient unsure of this medication; may not be allergy    Rosuvastatin Calcium Other (See Comments)     Muscle cramps       Health Maintenance   Topic Date Due    DTaP/Tdap/Td vaccine (1 - Tdap) 02/15/1970    Shingles Vaccine (1 of 2) 02/15/2001    A1C test (Diabetic or Prediabetic)  08/22/2020    Flu vaccine (1) 09/01/2020    Pneumococcal 65+ years Vaccine (2 of 2 - PPSV23) 10/02/2020    Lipid screen  06/15/2021    Potassium monitoring  06/15/2021    Creatinine monitoring  06/15/2021    Annual Wellness Visit (AWV)  08/28/2021    Colon cancer screen colonoscopy  06/05/2022    Breast cancer screen  09/09/2022    DEXA (modify frequency per FRAX score)  Completed    Hepatitis C screen  Completed    Hepatitis A vaccine  Aged Out    Hepatitis B vaccine  Aged Out    Hib vaccine  Aged Out    Meningococcal (ACWY) vaccine  Aged Out        :     Review of Systems   Constitutional: Negative for appetite change and unexpected weight change. HENT: Negative for congestion, rhinorrhea, sore throat and trouble swallowing. Eyes: Negative for discharge and redness. Respiratory: Negative for cough and wheezing. Cardiovascular: Negative for chest pain. Gastrointestinal: Negative for abdominal pain, blood in stool, constipation and diarrhea. Genitourinary: Negative for dysuria. Musculoskeletal: Positive for arthralgias. Skin: Negative for rash. Neurological: Negative for weakness. Hematological: Negative for adenopathy.       :     Physical Exam  Pulmonary:      Effort: Pulmonary effort is normal.   Neurological:      Mental Status: She is alert and oriented to person, place, and time. Psychiatric:         Mood and Affect: Mood normal.       There were no vitals taken for this visit.    :        ICD-10-CM    1. Closed fracture of distal end of right fibula, unspecified fracture morphology, initial encounter  S82.831A traMADol (ULTRAM) 50 MG tablet     External Referral To Orthopedic Surgery   2. Closed fracture of right ankle, initial encounter  S82.891A traMADol (ULTRAM) 50 MG tablet     External Referral To Orthopedic Surgery       :    patient would like to be referred to Dr. Sunita Hui. Controlled Substance Monitoring:    Acute and Chronic Pain Monitoring:   RX Monitoring 10/16/2020   Attestation The Prescription Monitoring Report for this patient was reviewed today. Periodic Controlled Substance Monitoring Possible medication side effects, risk of tolerance/dependence & alternative treatments discussed. ;No signs of potential drug abuse or diversion identified. Due to patients co-morbidities and risk for potential exposure of COVID 19 pandemic, Telehealth was initiated. Verbal consent was given to conduct a teleheath visit. Provider performed history of present illness and review of systems. Diagnosis and treatment plan was discussed with patient. Pharmacy of choice was reviewed along with past medical history, medication allergies, and current medications. Education provided to patient or patient parents/guardian with current illness diagnosis as well as when to seek additional healthcare due to changing or for worsening symptoms. Patient voiced understanding. Services were provided through a telephone encounter to substitute for in-person clinic visit. Patient was located at their individual home and provider was located at the office of [facility]. Patient identification was verified at the start of the visit: Yes    Total time spent on this encounter: 22 min      Return if symptoms worsen or fail to improve. Orders Placed This Encounter   Procedures    External Referral To Orthopedic Surgery     Referral Priority:   Routine     Referral Type:   Eval and Treat     Referral Reason:   Specialty Services Required     Requested Specialty:   Orthopedic Surgery     Number of Visits Requested:   1     Orders Placed This Encounter   Medications    traMADol (ULTRAM) 50 MG tablet     Sig: Take 1 tablet by mouth every 4 hours as needed for Pain for up to 3 days. Intended supply: 3 days. Take lowest dose possible to manage pain     Dispense:  18 tablet     Refill:  0     Reduce doses taken as pain becomes manageable         Discussed use, benefit, and side effectsof prescribed medications. All patient questions answered. Pt voiced understanding. Reviewed health maintenance. .  Patient agreed with treatment plan. Follow up asdirected. There are no Patient Instructions on file for this visit.       Electronically signed by LUIZ Dye on10/16/2020 at 11:27 AM

## 2020-10-19 RX ORDER — FUROSEMIDE 40 MG/1
40 TABLET ORAL DAILY
Qty: 30 TABLET | Refills: 5 | Status: SHIPPED | OUTPATIENT
Start: 2020-10-19 | End: 2021-08-02

## 2020-10-19 NOTE — TELEPHONE ENCOUNTER
Received fax from pharmacy requesting refill on pts medication(s). Pt was last seen in office on 10/16/2020  and has a follow up scheduled for 12/1/2020. Will send request to  Mireya Carr  for patient.      Requested Prescriptions     Pending Prescriptions Disp Refills    furosemide (LASIX) 40 MG tablet [Pharmacy Med Name: Furosemide 40 MG Oral Tablet] 30 tablet 0     Sig: TAKE 1 TABLET BY MOUTH ONCE DAILY (NEEDS  LABS  AND  FOLLOW  UP  BEFORE  REFILLS)

## 2020-10-26 ENCOUNTER — TELEPHONE (OUTPATIENT)
Dept: PRIMARY CARE CLINIC | Age: 70
End: 2020-10-26

## 2020-10-26 NOTE — TELEPHONE ENCOUNTER
Cesar,RN with [facility] called, they picked up pt this weekend from another referring provider, but want you to be aware if you want to change anything. On her D/C summary it shows that she is taking Hydralazine once a day instead of TID, Pepcid once a day vs BID. Also, not listed, she is taking lasix 40mg 1/2 tablet daily.

## 2020-10-27 NOTE — TELEPHONE ENCOUNTER
Is her BP controlled on hydralazine once daily? If so okay to continue at once daily dosing. Okay to continue on Pepcid once daily if her DORIAN symptoms are controlled.     Brenda Escalante to add Lasix to her med list

## 2020-12-01 ENCOUNTER — OFFICE VISIT (OUTPATIENT)
Dept: PRIMARY CARE CLINIC | Age: 70
End: 2020-12-01
Payer: MEDICARE

## 2020-12-01 VITALS
TEMPERATURE: 97 F | SYSTOLIC BLOOD PRESSURE: 118 MMHG | WEIGHT: 230 LBS | BODY MASS INDEX: 39.27 KG/M2 | HEART RATE: 82 BPM | OXYGEN SATURATION: 98 % | DIASTOLIC BLOOD PRESSURE: 70 MMHG | HEIGHT: 64 IN

## 2020-12-01 PROCEDURE — 99214 OFFICE O/P EST MOD 30 MIN: CPT | Performed by: NURSE PRACTITIONER

## 2020-12-01 PROCEDURE — 90732 PPSV23 VACC 2 YRS+ SUBQ/IM: CPT | Performed by: NURSE PRACTITIONER

## 2020-12-01 PROCEDURE — G0009 ADMIN PNEUMOCOCCAL VACCINE: HCPCS | Performed by: NURSE PRACTITIONER

## 2020-12-01 PROCEDURE — G0008 ADMIN INFLUENZA VIRUS VAC: HCPCS | Performed by: NURSE PRACTITIONER

## 2020-12-01 PROCEDURE — 90694 VACC AIIV4 NO PRSRV 0.5ML IM: CPT | Performed by: NURSE PRACTITIONER

## 2020-12-01 RX ORDER — OXYCODONE HYDROCHLORIDE AND ACETAMINOPHEN 5; 325 MG/1; MG/1
TABLET ORAL
COMMUNITY
Start: 2020-10-26 | End: 2021-05-25

## 2020-12-01 ASSESSMENT — ENCOUNTER SYMPTOMS
RHINORRHEA: 0
EYE REDNESS: 0
NAUSEA: 1
ABDOMINAL PAIN: 0
DIARRHEA: 0
SORE THROAT: 0
VOMITING: 0
SHORTNESS OF BREATH: 0
WHEEZING: 0
CONSTIPATION: 0
COUGH: 0

## 2020-12-01 NOTE — PATIENT INSTRUCTIONS
Patient Education        Preventing Falls: Care Instructions  Your Care Instructions     Getting around your home safely can be a challenge if you have injuries or health problems that make it easy for you to fall. Loose rugs and furniture in walkways are among the dangers for many older people who have problems walking or who have poor eyesight. People who have conditions such as arthritis, osteoporosis, or dementia also have to be careful not to fall. You can make your home safer with a few simple measures. Follow-up care is a key part of your treatment and safety. Be sure to make and go to all appointments, and call your doctor if you are having problems. It's also a good idea to know your test results and keep a list of the medicines you take. How can you care for yourself at home? Taking care of yourself  · You may get dizzy if you do not drink enough water. To prevent dehydration, drink plenty of fluids, enough so that your urine is light yellow or clear like water. Choose water and other caffeine-free clear liquids. If you have kidney, heart, or liver disease and have to limit fluids, talk with your doctor before you increase the amount of fluids you drink. · Exercise regularly to improve your strength, muscle tone, and balance. Walk if you can. Swimming may be a good choice if you cannot walk easily. · Have your vision and hearing checked each year or any time you notice a change. If you have trouble seeing and hearing, you might not be able to avoid objects and could lose your balance. · Know the side effects of the medicines you take. Ask your doctor or pharmacist whether the medicines you take can affect your balance. Sleeping pills or sedatives can affect your balance. · Limit the amount of alcohol you drink. Alcohol can impair your balance and other senses. · Ask your doctor whether calluses or corns on your feet need to be removed.  If you wear loose-fitting shoes because of calluses or corns, you can lose your balance and fall. · Talk to your doctor if you have numbness in your feet. Preventing falls at home  · Remove raised doorway thresholds, throw rugs, and clutter. Repair loose carpet or raised areas in the floor. · Move furniture and electrical cords to keep them out of walking paths. · Use nonskid floor wax, and wipe up spills right away, especially on ceramic tile floors. · If you use a walker or cane, put rubber tips on it. If you use crutches, clean the bottoms of them regularly with an abrasive pad, such as steel wool. · Keep your house well lit, especially [facility], and outside walkways. Use night-lights in areas such as hallways and bathrooms. Add extra light switches or use remote switches (such as switches that go on or off when you clap your hands) to make it easier to turn lights on if you have to get up during the night. · Install sturdy handrails on stairways. · Move items in your cabinets so that the things you use a lot are on the lower shelves (about waist level). · Keep a cordless phone and a flashlight with new batteries by your bed. If possible, put a phone in each of the main rooms of your house, or carry a cell phone in case you fall and cannot reach a phone. Or, you can wear a device around your neck or wrist. You push a button that sends a signal for help. · Wear low-heeled shoes that fit well and give your feet good support. Use footwear with nonskid soles. Check the heels and soles of your shoes for wear. Repair or replace worn heels or soles. · Do not wear socks without shoes on wood floors. · Walk on the grass when the sidewalks are slippery. If you live in an area that gets snow and ice in the winter, sprinkle salt on slippery steps and sidewalks. Preventing falls in the bath  · Install grab bars and nonskid mats inside and outside your shower or tub and near the toilet and sinks. · Use shower chairs and bath benches.   · Use a hand-held shower head that will allow you to sit while showering. · Get into a tub or shower by putting the weaker leg in first. Get out of a tub or shower with your strong side first.  · Repair loose toilet seats and consider installing a raised toilet seat to make getting on and off the toilet easier. · Keep your bathroom door unlocked while you are in the shower. Where can you learn more? Go to https://[employer]. org and sign in to your [employer] account. Enter [phone] in the [place] box to learn more about \"Preventing Falls: Care Instructions. \"     If you do not have an account, please click on the \"Sign Up Now\" link. Current as of: April 15, 2020               Content Version: 12.6  © 5039-2679 [employer]. Care instructions adapted under license by [facility] ([facility]). If you have questions about a medical condition or this instruction, always ask your healthcare professional. [address] any warranty or liability for your use of this information.

## 2020-12-01 NOTE — PROGRESS NOTES
[address]  Ori Huggins  Phone [phone]   Fax [phone]      OFFICE VISIT: 2020    Aline Clolado- : 1950    Chief Complaint:Anabel is a 72 y.o. female who is here for Follow-up     HPI  The patient presents today for follow-up. FALL:  She fell on 10-  She was seen in the ED on 10-  XR TIBIA FIBULA RIGHT (2 VIEWS)   Final Result   1. Oblique mildly displaced fracture of the distal fibula. She had surgery per Dr. Jaquan Ferrera at [facility]. She had PT. \"My toes draw on me. \"  \"The pain is going away. \"  She follows up with orthopaedics on 2020    HTN:  She continues on Norvasc 5 mg, hydralazine 10 mg, lisinopril 40 mg and Toprol 100 mg.  BP has been controlled unless pain is increased    ANXIETY:  Well controlled on Effexor 150 mg. She has not had a flu shot this year    Overall, she denies any other new acute complaints. height is 5' 4\" (1.626 m) and weight is 230 lb (104.3 kg). Her temporal temperature is 97 °F (36.1 °C). Her blood pressure is 118/70 and her pulse is 82. Her oxygen saturation is 98%. Body mass index is 39.48 kg/m².     Results for orders placed or performed in visit on 06/15/20   Microalbumin / Creatinine Urine Ratio   Result Value Ref Range    Microalbumin, Random Urine <1.20 0.00 - 19.00 mg/dL    Creatinine, Ur 206.2 4.2 - 622.0 mg/dL    Microalbumin Creatinine Ratio see below mg/g   Lipid Panel   Result Value Ref Range    Cholesterol, Total 156 (L) 160 - 199 mg/dL    Triglycerides 140 0 - 149 mg/dL    HDL 48 (L) 65 - 121 mg/dL    LDL Calculated 80 <100 mg/dL   T4, Free   Result Value Ref Range    T4 Free 0.99 0.93 - 1.70 ng/dL   TSH without Reflex   Result Value Ref Range    TSH 2.460 0.270 - 4.200 uIU/mL   Comprehensive Metabolic Panel   Result Value Ref Range    Sodium 140 136 - 145 mmol/L    Potassium 4.0 3.5 - 5.0 mmol/L    Chloride 106 98 - 111 mmol/L    CO2 26 22 - 29 mmol/L    Anion Gap 8 7 - 19 mmol/L    Glucose 107 74 - 109 mg/dL BUN 12 8 - 23 mg/dL    CREATININE 0.8 0.5 - 0.9 mg/dL    GFR Non-African American >60 >60    Calcium 9.5 8.8 - 10.2 mg/dL    Total Protein 6.3 (L) 6.6 - 8.7 g/dL    Alb 3.8 3.5 - 5.2 g/dL    Total Bilirubin <0.2 0.2 - 1.2 mg/dL    Alkaline Phosphatase 85 35 - 104 U/L    ALT 15 5 - 33 U/L    AST 14 5 - 32 U/L   CBC Auto Differential   Result Value Ref Range    WBC 6.3 4.8 - 10.8 K/uL    RBC 4.14 (L) 4.20 - 5.40 M/uL    Hemoglobin 12.5 12.0 - 16.0 g/dL    Hematocrit 39.2 37.0 - 47.0 %    MCV 94.7 81.0 - 99.0 fL    MCH 30.2 27.0 - 31.0 pg    MCHC 31.9 (L) 33.0 - 37.0 g/dL    RDW 12.5 11.5 - 14.5 %    Platelets 334 872 - 157 K/uL    MPV 10.1 9.4 - 12.3 fL    Neutrophils % 65.9 (H) 50.0 - 65.0 %    Lymphocytes % 21.0 20.0 - 40.0 %    Monocytes % 10.9 (H) 0.0 - 10.0 %    Eosinophils % 1.1 0.0 - 5.0 %    Basophils % 0.6 0.0 - 1.0 %    Neutrophils Absolute 4.1 1.5 - 7.5 K/uL    Immature Granulocytes # 0.0 K/uL    Lymphocytes Absolute 1.3 1.1 - 4.5 K/uL    Monocytes Absolute 0.70 0.00 - 0.90 K/uL    Eosinophils Absolute 0.10 0.00 - 0.60 K/uL    Basophils Absolute 0.00 0.00 - 0.20 K/uL     have reviewed the following with the Ms.  [address]    Lab Review   Orders Only on 06/15/2020   Component Date Value    Microalbumin, Random Uri* 06/15/2020 <1.20     Creatinine, Ur 06/15/2020 206.2     Microalbumin Creatinine * 06/15/2020 see below     Cholesterol, Total 06/15/2020 156*    Triglycerides 06/15/2020 140     HDL 06/15/2020 48*    LDL Calculated 06/15/2020 80     T4 Free 06/15/2020 0.99     TSH 06/15/2020 2.460     Sodium 06/15/2020 140     Potassium 06/15/2020 4.0     Chloride 06/15/2020 106     CO2 06/15/2020 26     Anion Gap 06/15/2020 8     Glucose 06/15/2020 107     BUN 06/15/2020 12     CREATININE 06/15/2020 0.8     GFR Non- 06/15/2020 >60     Calcium 06/15/2020 9.5     Total Protein 06/15/2020 6.3*    Alb 06/15/2020 3.8     Total Bilirubin 06/15/2020 <0.2     Alkaline Phosphatase 06/15/2020 85     ALT 06/15/2020 15     AST 06/15/2020 14     WBC 06/15/2020 6.3     RBC 06/15/2020 4.14*    Hemoglobin 06/15/2020 12.5     Hematocrit 06/15/2020 39.2     MCV 06/15/2020 94.7     MCH 06/15/2020 30.2     MCHC 06/15/2020 31.9*    RDW 06/15/2020 12.5     Platelets 94/57/0577 231     MPV 06/15/2020 10.1     Neutrophils % 06/15/2020 65.9*    Lymphocytes % 06/15/2020 21.0     Monocytes % 06/15/2020 10.9*    Eosinophils % 06/15/2020 1.1     Basophils % 06/15/2020 0.6     Neutrophils Absolute 06/15/2020 4.1     Immature Granulocytes # 06/15/2020 0.0     Lymphocytes Absolute 06/15/2020 1.3     Monocytes Absolute 06/15/2020 0.70     Eosinophils Absolute 06/15/2020 0.10     Basophils Absolute 06/15/2020 0.00      Copies of these are in the chart. Prior to Visit Medications    Medication Sig Taking?  Authorizing Provider   oxyCODONE-acetaminophen (PERCOCET) 5-325 MG per tablet TAKE 1 TABLET BY MOUTH EVERY 6 HOURS AS NEEDED FOR PAIN Yes Historical Provider, MD   furosemide (LASIX) 40 MG tablet Take 1 tablet by mouth daily Yes LUIZ Beaulieu   atorvastatin (LIPITOR) 80 MG tablet Take 1 tablet by mouth nightly Yes LUIZ Castañeda   lisinopril (PRINIVIL;ZESTRIL) 40 MG tablet Take 1 tablet by mouth daily Yes LUIZ Castañeda   metoprolol succinate (TOPROL XL) 100 MG extended release tablet Take 1 tablet by mouth daily Yes LUIZ Castañeda   tamsulosin (FLOMAX) 0.4 MG capsule Take 1 capsule by mouth daily Yes LUIZ Beaulieu   venlafaxine (EFFEXOR XR) 150 MG extended release capsule Take 1 capsule by mouth daily Yes LUIZ Beaulieu   omeprazole (PRILOSEC) 20 MG delayed release capsule Take 1 capsule by mouth Daily Yes LUIZ Beaulieu   albuterol sulfate HFA (PROVENTIL HFA) 108 (90 Base) MCG/ACT inhaler Inhale 2 puffs into the lungs every 6 hours as needed for Wheezing Yes Cristiane Pham MD   fluticasone (FLONASE) 50 MCG/ACT nasal spray 2 sprays by Each Nostril route daily Yes Peterson Ralph MD   famotidine (PEPCID) 20 MG tablet Take 1 tablet by mouth 2 times daily Yes LUIZ Castañeda   amLODIPine (NORVASC) 5 MG tablet Take 1 tablet by mouth daily Yes LUIZ Moe   hydrALAZINE (APRESOLINE) 10 MG tablet Take 1 tablet by mouth 3 times daily  Patient taking differently: Take 10 mg by mouth daily  Yes LUIZ Castañeda   vitamin D (ERGOCALCIFEROL) 16560 units CAPS capsule Take 1 capsule by mouth once a week Yes LUIZ Castañeda   ezetimibe (ZETIA) 10 MG tablet Take 1 tablet by mouth daily Yes LUIZ Castañeda   hydrocortisone (ANUSOL-HC) 2.5 % rectal cream Place rectally 2 times daily. Patient taking differently: Place rectally 2 times daily as needed Place rectally 2 times daily. Yes LUIZ Mcelroy   aspirin 81 MG tablet Aspir-81 81 mg tablet,delayed release   Take 1 tablet every day by oral route.  Yes Historical Provider, MD   hydrOXYzine (ATARAX) 50 MG tablet hydroxyzine HCl 50 mg tablet   TAKE ONE TABLET BY MOUTH TWICE DAILY AS NEEDED Yes Historical Provider, MD   Multiple Vitamins-Minerals (MULTIVITAMIN ADULT PO) Take by mouth Yes Historical Provider, MD       Allergies: Atorvastatin; Nabumetone; and Rosuvastatin calcium    Past Medical History:   Diagnosis Date    Anxiety     Depression     Edema     GERD (gastroesophageal reflux disease)     Headache(784.0)     migraines    Hyperlipidemia     Hypertension     Mini stroke ([address]     Sleep apnea     CPAP    TIA (transient ischemic attack)        Past Surgical History:   Procedure Laterality Date    CHOLECYSTECTOMY      COLONOSCOPY  07/01/2015    Dr. Hammond Liter COLONOSCOPY  06/05/2019    Dr Oliver Ontiveros [facility]) Non-bleeding internal hemorrhoids, diverticulosis-Tubular AP (-) dysplasia, 3 yr recall    CYSTOSCOPY Left 12/5/2018    CYSTOSCOPY URETEROSCOPY  PLACEMENT DOUBLE J STENT ON LEFT RIGHT  RETROGRADE PYELOGRAMS performed by Rei Arambula Brent Mc MD at [address] Bilateral 1/31/2019    TOTAL LAPAROSCOPIC HYSTERECTOMY BILATERAL SALPINGOOPHERECTOMY WITH Jamison Holstein performed by Franco Simon MD at [facility], VAGINAL      [facility] EGD TRANSORAL BIOPSY SINGLE/MULTIPLE N/A 5/8/2018    Dr Jasiel De Anda (-) Kristin (+) Dye's (-) dysplasia--3 yr recall    VA LAP,CHOLECYSTECTOMY N/A 3/6/2018    CHOLECYSTECTOMY LAPAROSCOPIC performed by Chucho Gonzales MD at [address] [place] [address]  08/26/2015    Dr. Ade Huerta  5/23/2017    Dr Osorio-w/balloon dilation, 12-13. 5-15 mm-esophageal narrowing with diverticulum at 34 cm-Kristin (-), hiatal herni, Dye's (+) dysplasia (-)--1st dx--1 yr recall-Ct chest ordered    WRIST FRACTURE SURGERY         Social History     Tobacco Use    Smoking status: Never Smoker    Smokeless tobacco: Never Used   Substance Use Topics    Alcohol use: No     Alcohol/week: 0.0 standard drinks       Family History   Problem Relation Age of Onset    Heart Disease Mother     Colon Cancer Neg Hx     Colon Polyps Neg Hx     Liver Cancer Neg Hx     Liver Disease Neg Hx     Esophageal Cancer Neg Hx     Rectal Cancer Neg Hx     Stomach Cancer Neg Hx        Review of Systems   Constitutional: Negative for chills, fatigue and fever. HENT: Negative for congestion, ear pain, rhinorrhea and sore throat. Eyes: Negative for redness. Respiratory: Negative for cough, shortness of breath and wheezing. Cardiovascular: Positive for leg swelling (right lower leg). Negative for chest pain. Gastrointestinal: Positive for nausea (\"after taking my pills in the morning\"). Negative for abdominal pain, constipation, diarrhea and vomiting. Genitourinary: Negative for dysuria and urgency. Musculoskeletal: Positive for arthralgias (right ankle/foot). Skin: Negative for rash. Neurological: Negative for dizziness and headaches.    Psychiatric/Behavioral: The vaccination  Z23 Pneumococcal polysaccharide vaccine 23-valent greater than or equal to 1yo subcutaneous/IM   6. Essential hypertension  I10 The current medical regimen is effective;  continue present plan and medications. Patient is asked to monitor BP at home or work, several times per month and return with written values at next office visit. 7. Anxiety  F41.9 Continue Effexor   8. Abnormal findings on diagnostic imaging of limbs   R93.6 DEXA BONE DENSITY AXIAL SKELETON         PLAN    Orders Placed This Encounter   Procedures    DEXA BONE DENSITY AXIAL SKELETON    INFLUENZA, QUADV, ADJUVANTED, 65 YRS =, IM, PF, PREFILL SYR, 0.5ML (FLUAD)    Pneumococcal polysaccharide vaccine 23-valent greater than or equal to 1yo subcutaneous/IM        Return in about 3 months (around 3/1/2021), or if symptoms worsen or fail to improve, for 30 minute appointment. Patient Instructions     Patient Education        Preventing Falls: Care Instructions  Your Care Instructions     Getting around your home safely can be a challenge if you have injuries or health problems that make it easy for you to fall. Loose rugs and furniture in walkways are among the dangers for many older people who have problems walking or who have poor eyesight. People who have conditions such as arthritis, osteoporosis, or dementia also have to be careful not to fall. You can make your home safer with a few simple measures. Follow-up care is a key part of your treatment and safety. Be sure to make and go to all appointments, and call your doctor if you are having problems. It's also a good idea to know your test results and keep a list of the medicines you take. How can you care for yourself at home? Taking care of yourself  · You may get dizzy if you do not drink enough water. To prevent dehydration, drink plenty of fluids, enough so that your urine is light yellow or clear like water. Choose water and other caffeine-free clear liquids.  If you have kidney, heart, or liver disease and have to limit fluids, talk with your doctor before you increase the amount of fluids you drink. · Exercise regularly to improve your strength, muscle tone, and balance. Walk if you can. Swimming may be a good choice if you cannot walk easily. · Have your vision and hearing checked each year or any time you notice a change. If you have trouble seeing and hearing, you might not be able to avoid objects and could lose your balance. · Know the side effects of the medicines you take. Ask your doctor or pharmacist whether the medicines you take can affect your balance. Sleeping pills or sedatives can affect your balance. · Limit the amount of alcohol you drink. Alcohol can impair your balance and other senses. · Ask your doctor whether calluses or corns on your feet need to be removed. If you wear loose-fitting shoes because of calluses or corns, you can lose your balance and fall. · Talk to your doctor if you have numbness in your feet. Preventing falls at home  · Remove raised doorway thresholds, throw rugs, and clutter. Repair loose carpet or raised areas in the floor. · Move furniture and electrical cords to keep them out of walking paths. · Use nonskid floor wax, and wipe up spills right away, especially on ceramic tile floors. · If you use a walker or cane, put rubber tips on it. If you use crutches, clean the bottoms of them regularly with an abrasive pad, such as steel wool. · Keep your house well lit, especially [place], and outside walkways. Use night-lights in areas such as hallways and bathrooms. Add extra light switches or use remote switches (such as switches that go on or off when you clap your hands) to make it easier to turn lights on if you have to get up during the night. · Install sturdy handrails on stairways. · Move items in your cabinets so that the things you use a lot are on the lower shelves (about waist level).   · Keep a cordless phone and a flashlight with new batteries by your bed. If possible, put a phone in each of the main rooms of your house, or carry a cell phone in case you fall and cannot reach a phone. Or, you can wear a device around your neck or wrist. You push a button that sends a signal for help. · Wear low-heeled shoes that fit well and give your feet good support. Use footwear with nonskid soles. Check the heels and soles of your shoes for wear. Repair or replace worn heels or soles. · Do not wear socks without shoes on wood floors. · Walk on the grass when the sidewalks are slippery. If you live in an area that gets snow and ice in the winter, sprinkle salt on slippery steps and sidewalks. Preventing falls in the bath  · Install grab bars and nonskid mats inside and outside your shower or tub and near the toilet and sinks. · Use shower chairs and bath benches. · Use a hand-held shower head that will allow you to sit while showering. · Get into a tub or shower by putting the weaker leg in first. Get out of a tub or shower with your strong side first.  · Repair loose toilet seats and consider installing a raised toilet seat to make getting on and off the toilet easier. · Keep your bathroom door unlocked while you are in the shower. Where can you learn more? Go to https://[employer]. org and sign in to your [employer] account. Enter [phone] in the [facility] box to learn more about \"Preventing Falls: Care Instructions. \"     If you do not have an account, please click on the \"Sign Up Now\" link. Current as of: April 15, 2020               Content Version: 12.6  © 6259-7033 [employer]. Care instructions adapted under license by [facility] [place] ([facility]). If you have questions about a medical condition or this instruction, always ask your healthcare professional. [place] [address] any warranty or liability for your use of this information.                        Controlled Substances Monitoring:  n/a            Additional Instructions: As always, patient is advised to bring in medication bottles in order to correctly reconcile with our current list.    Vitor Lizarraga received counseling on the following healthy behaviors: n/a    Patient given educational materials on dx    I have instructed Vitor Lizarraga to complete a self tracking handout on n/a and instructed them to bring it with them to her next appointment. Discussed use, benefit, and side effects of prescribed medications. Barriers to medication compliance addressed. All patient questions answered. Pt voiced understanding.      Nila Rutherford, LUIZ

## 2020-12-01 NOTE — PROGRESS NOTES
After obtaining consent, and per orders of ASHLEY DEE, injection of pcv23 given in Left arm  by Baldemar Schaffer. Patient tolerated well. Medication was not supplied by patient. Vaccine Information Sheet, \"Influenza - Inactivated\"  given to Joan Escalante, or parent/legal guardian of  Joan Escalante and verbalized understanding. Patient responses:    Have you ever had a reaction to a flu vaccine? No  Are you able to eat eggs without adverse effects? Yes  Do you have any current illness? No  Have you ever had [place] Syndrome? No    Flu vaccine given per order. Please see immunization tab.

## 2020-12-09 ENCOUNTER — HOSPITAL ENCOUNTER (OUTPATIENT)
Dept: ULTRASOUND IMAGING | Age: 70
Discharge: HOME | End: 2020-12-09
Payer: MEDICARE

## 2020-12-09 ENCOUNTER — TELEPHONE (OUTPATIENT)
Dept: PRIMARY CARE CLINIC | Age: 70
End: 2020-12-09

## 2020-12-09 PROCEDURE — 77080 DXA BONE DENSITY AXIAL: CPT

## 2020-12-09 NOTE — TELEPHONE ENCOUNTER
Called patient, spoke with: Patient regarding the results of the patients most recent bone density study. I advised Patient of Teodora Larson recommendations.    Patient did voice understanding

## 2021-02-17 DIAGNOSIS — I10 ESSENTIAL HYPERTENSION: ICD-10-CM

## 2021-02-17 RX ORDER — HYDRALAZINE HYDROCHLORIDE 10 MG/1
10 TABLET, FILM COATED ORAL 3 TIMES DAILY
Qty: 90 TABLET | Refills: 3 | Status: SHIPPED | OUTPATIENT
Start: 2021-02-17 | End: 2021-10-19

## 2021-02-17 NOTE — TELEPHONE ENCOUNTER
Requested Prescriptions     Pending Prescriptions Disp Refills    hydrALAZINE (APRESOLINE) 10 MG tablet [Pharmacy Med Name: hydrALAZINE HCl 10 MG Oral Tablet] 90 tablet 0     Sig: TAKE 1 TABLET BY MOUTH THREE TIMES DAILY

## 2021-03-02 ENCOUNTER — OFFICE VISIT (OUTPATIENT)
Dept: PRIMARY CARE CLINIC | Age: 71
End: 2021-03-02
Payer: MEDICARE

## 2021-03-02 VITALS
TEMPERATURE: 97.3 F | SYSTOLIC BLOOD PRESSURE: 130 MMHG | BODY MASS INDEX: 40.31 KG/M2 | HEIGHT: 64 IN | HEART RATE: 78 BPM | WEIGHT: 236.13 LBS | DIASTOLIC BLOOD PRESSURE: 70 MMHG | OXYGEN SATURATION: 97 %

## 2021-03-02 DIAGNOSIS — E78.2 MIXED HYPERLIPIDEMIA: Chronic | ICD-10-CM

## 2021-03-02 DIAGNOSIS — G47.33 OBSTRUCTIVE SLEEP APNEA SYNDROME: Primary | ICD-10-CM

## 2021-03-02 DIAGNOSIS — F41.9 ANXIETY: ICD-10-CM

## 2021-03-02 DIAGNOSIS — I10 ESSENTIAL HYPERTENSION: Chronic | ICD-10-CM

## 2021-03-02 PROBLEM — K62.5 BRBPR (BRIGHT RED BLOOD PER RECTUM): Status: RESOLVED | Noted: 2019-02-12 | Resolved: 2021-03-02

## 2021-03-02 PROBLEM — G43.909 MIGRAINE: Status: RESOLVED | Noted: 2018-11-02 | Resolved: 2021-03-02

## 2021-03-02 PROBLEM — K22.89 ESOPHAGEAL PAIN: Status: RESOLVED | Noted: 2017-04-20 | Resolved: 2021-03-02

## 2021-03-02 PROBLEM — R11.0 CHRONIC NAUSEA: Status: RESOLVED | Noted: 2018-03-28 | Resolved: 2021-03-02

## 2021-03-02 PROBLEM — J40 BRONCHITIS: Status: RESOLVED | Noted: 2018-11-02 | Resolved: 2021-03-02

## 2021-03-02 PROBLEM — K85.90 ACUTE PANCREATITIS: Status: RESOLVED | Noted: 2018-03-01 | Resolved: 2021-03-02

## 2021-03-02 PROCEDURE — 99214 OFFICE O/P EST MOD 30 MIN: CPT | Performed by: NURSE PRACTITIONER

## 2021-03-02 ASSESSMENT — ENCOUNTER SYMPTOMS
DIARRHEA: 0
SHORTNESS OF BREATH: 0
VOMITING: 0
SORE THROAT: 0
COUGH: 0
RHINORRHEA: 0
EYE REDNESS: 0
NAUSEA: 0
CONSTIPATION: 0
ABDOMINAL PAIN: 0

## 2021-03-02 NOTE — PROGRESS NOTES
Sharla Blake (:  1950) is a 80 y.o. female,Established patient, here for evaluation of the following chief complaint(s):  Follow-up      ASSESSMENT/PLAN:  1. Obstructive sleep apnea syndrome--compliant. Continue CPAP nightly  2. Essential hypertension--The current medical regimen is effective;  continue present plan and medications. Patient is asked to monitor BP at home or work, several times per month and return with written values at next office visit. 3. Mixed hyperlipidemia--continue Lipitor 80 mg nightly and Zetia 10 mg  4. Anxiety--continue Effexor 150 mg daily      Return in about 3 months (around 2021), or if symptoms worsen or fail to improve. SUBJECTIVE/OBJECTIVE:  HPI     SLEEP APNEA:  She is wearing her CPAP nightly. \"I don't think I could sleep without it. \"  She sleeps on average 8-10 hours a night. She wakes up feeling well rested most of the time. She does not take it off during the night. She is requesting CPAP supplies. (She uses [facility])    HTN:  It has been running good at home. She continues on Hydralazine 10 mg, 1-2x/day, Toprol 100 mg, Lisinopril 40 mg and Norvasc 5 mg. She is tolerating this regimen. Denies any blood pressure. MOODS:  Controlled on Effexor 150 mg daily    HYPERLIPIDEMIA:  Well controlled on last labs in 2020  She continues on Lipitor 80 mg and Zetia 10 mg. She is tolerating this regimen. Review of Systems   Constitutional: Negative for chills, fatigue and fever. HENT: Negative for congestion, ear pain, rhinorrhea and sore throat. Sleep apnea with CPAP   Eyes: Negative for redness. Respiratory: Negative for cough and shortness of breath. Cardiovascular: Negative for chest pain. Gastrointestinal: Negative for abdominal pain, constipation, diarrhea, nausea and vomiting. Genitourinary: Negative for dysuria. Skin: Negative for rash. Neurological: Negative for dizziness and headaches. Psychiatric/Behavioral: Negative for sleep disturbance (well controlled on CPAP). The patient is not nervous/anxious (well controlled with Effexor). Physical Exam  Vitals signs reviewed. Constitutional:       Appearance: Normal appearance. She is well-developed and normal weight. HENT:      Head: Normocephalic. Right Ear: Tympanic membrane and external ear normal.      Left Ear: Tympanic membrane and external ear normal.      Nose: Nose normal.   Eyes:      General:         Right eye: No discharge. Left eye: No discharge. Neck:      Musculoskeletal: Normal range of motion. Vascular: No carotid bruit. Cardiovascular:      Rate and Rhythm: Normal rate and regular rhythm. Pulmonary:      Effort: Pulmonary effort is normal.      Breath sounds: Normal breath sounds. No wheezing, rhonchi or rales. Abdominal:      General: Bowel sounds are normal.      Palpations: Abdomen is soft. Skin:     General: Skin is dry. Neurological:      General: No focal deficit present. Mental Status: She is alert and oriented to person, place, and time. Mental status is at baseline. Psychiatric:         Mood and Affect: Mood normal.         Behavior: Behavior normal.         Thought Content: Thought content normal.         Judgment: Judgment normal.           An electronic signature was used to authenticate this note.     --LUIZ Giron

## 2021-03-10 ENCOUNTER — IMMUNIZATION (OUTPATIENT)
Age: 71
End: 2021-03-10
Payer: MEDICARE

## 2021-03-10 PROCEDURE — 0001A PR IMM ADMN SARSCOV2 30MCG/0.3ML DIL RECON 1ST DOSE: CPT | Performed by: FAMILY MEDICINE

## 2021-03-10 PROCEDURE — 91300 COVID-19, PFIZER VACCINE 30MCG/0.3ML DOSE: CPT | Performed by: FAMILY MEDICINE

## 2021-03-31 ENCOUNTER — IMMUNIZATION (OUTPATIENT)
Age: 71
End: 2021-03-31
Payer: MEDICARE

## 2021-03-31 PROCEDURE — 91300 COVID-19, PFIZER VACCINE 30MCG/0.3ML DOSE: CPT | Performed by: FAMILY MEDICINE

## 2021-03-31 PROCEDURE — 0002A COVID-19, PFIZER VACCINE 30MCG/0.3ML DOSE: CPT | Performed by: FAMILY MEDICINE

## 2021-05-12 ENCOUNTER — OFFICE VISIT (OUTPATIENT)
Dept: PRIMARY CARE CLINIC | Age: 71
End: 2021-05-12
Payer: MEDICARE

## 2021-05-12 VITALS
TEMPERATURE: 98.5 F | BODY MASS INDEX: 40.52 KG/M2 | WEIGHT: 237.38 LBS | SYSTOLIC BLOOD PRESSURE: 130 MMHG | HEIGHT: 64 IN | OXYGEN SATURATION: 98 % | HEART RATE: 75 BPM | DIASTOLIC BLOOD PRESSURE: 70 MMHG

## 2021-05-12 DIAGNOSIS — R68.83 CHILLS: ICD-10-CM

## 2021-05-12 DIAGNOSIS — R06.00 ACUTE DYSPNEA: ICD-10-CM

## 2021-05-12 DIAGNOSIS — R05.9 COUGH: ICD-10-CM

## 2021-05-12 DIAGNOSIS — J40 BRONCHITIS: Primary | ICD-10-CM

## 2021-05-12 DIAGNOSIS — J01.00 ACUTE NON-RECURRENT MAXILLARY SINUSITIS: ICD-10-CM

## 2021-05-12 LAB — SARS-COV-2, PCR: NOT DETECTED

## 2021-05-12 PROCEDURE — 96372 THER/PROPH/DIAG INJ SC/IM: CPT | Performed by: NURSE PRACTITIONER

## 2021-05-12 PROCEDURE — 99213 OFFICE O/P EST LOW 20 MIN: CPT | Performed by: NURSE PRACTITIONER

## 2021-05-12 RX ORDER — CEFDINIR 300 MG/1
300 CAPSULE ORAL 2 TIMES DAILY
Qty: 20 CAPSULE | Refills: 0 | Status: SHIPPED | OUTPATIENT
Start: 2021-05-12 | End: 2021-12-27 | Stop reason: SDUPTHER

## 2021-05-12 RX ORDER — METHYLPREDNISOLONE 4 MG/1
TABLET ORAL
Qty: 1 KIT | Refills: 0 | Status: SHIPPED | OUTPATIENT
Start: 2021-05-12 | End: 2021-12-27 | Stop reason: SDUPTHER

## 2021-05-12 RX ORDER — DEXAMETHASONE SODIUM PHOSPHATE 4 MG/ML
6 INJECTION, SOLUTION INTRA-ARTICULAR; INTRALESIONAL; INTRAMUSCULAR; INTRAVENOUS; SOFT TISSUE ONCE
Status: COMPLETED | OUTPATIENT
Start: 2021-05-12 | End: 2021-05-12

## 2021-05-12 RX ORDER — DEXTROMETHORPHAN HYDROBROMIDE AND PROMETHAZINE HYDROCHLORIDE 15; 6.25 MG/5ML; MG/5ML
5 SYRUP ORAL 4 TIMES DAILY PRN
Qty: 118 ML | Refills: 0 | Status: SHIPPED | OUTPATIENT
Start: 2021-05-12 | End: 2021-12-27 | Stop reason: SDUPTHER

## 2021-05-12 RX ADMIN — DEXAMETHASONE SODIUM PHOSPHATE 6 MG: 4 INJECTION, SOLUTION INTRA-ARTICULAR; INTRALESIONAL; INTRAMUSCULAR; INTRAVENOUS; SOFT TISSUE at 13:40

## 2021-05-12 ASSESSMENT — ENCOUNTER SYMPTOMS
COUGH: 1
RHINORRHEA: 1
VOMITING: 0
SHORTNESS OF BREATH: 1
ABDOMINAL PAIN: 0
EYE DISCHARGE: 1
DIARRHEA: 0
NAUSEA: 0
SORE THROAT: 1
SINUS PRESSURE: 1

## 2021-05-12 NOTE — PROGRESS NOTES
After obtaining consent, and per orders of ASHLEY DEE, injection of 6mg decadron given in Left upper quad. gluteus  by Eugenio Jones. Patient tolerated well. Medication was not supplied by patient.

## 2021-05-12 NOTE — PROGRESS NOTES
Essie Jane (:  1950) is a 80 y.o. female,Established patient, here for evaluation of the following chief complaint(s):  Cough (started about 2 weeks ago but is getting worse), Congestion, Otalgia, Pharyngitis, Eye Drainage, Rib Pain (from coughing so hard), Chills, and Allergies      ASSESSMENT/PLAN:    ICD-10-CM    1. Bronchitis  J40 dexamethasone (DECADRON) injection 6 mg     methylPREDNISolone (MEDROL DOSEPACK) 4 MG tablet (start oral steroids tomorrow)     COVID-19     promethazine-dextromethorphan (PROMETHAZINE-DM) 6.25-15 MG/5ML syrup   2. Acute non-recurrent maxillary sinusitis  J01.00 cefdinir (OMNICEF) 300 MG capsule   3. Chills  R68.83 ESAOR-62  Self-isolation until COVID-19 results return   4. Cough  R05 COVID-19     promethazine-dextromethorphan (PROMETHAZINE-DM) 6.25-15 MG/5ML syrup   5. Acute dyspnea  R06.00 COVID-19       Return in about 1 week (around 2021), or if symptoms worsen or fail to improve. SUBJECTIVE/OBJECTIVE:  HPI     Allergy symptoms started about 2 weeks ago. 3 days ago, she developed a sore throat, otalgia (R>L) and a cough  Reports chills  Denies a fever. Reports increased SOA  \"I have been taking allergy tablets, Mucinex and Robitussin. Nothing seems to help. \"  Reports headache  Denies N, V or D. Reports myalgias    She completed her Covid vaccines series on 3-    /70   Pulse 75   Temp 98.5 °F (36.9 °C) (Temporal)   Ht 5' 4\" (1.626 m)   Wt 237 lb 6 oz (107.7 kg)   SpO2 98%   BMI 40.75 kg/m²     Review of Systems   Constitutional: Positive for chills. Negative for fever. HENT: Positive for congestion, ear pain, postnasal drip, rhinorrhea, sinus pressure and sore throat. Eyes: Positive for discharge. Respiratory: Positive for cough and shortness of breath. Gastrointestinal: Negative for abdominal pain, diarrhea, nausea and vomiting. Musculoskeletal: Positive for myalgias. Neurological: Positive for headaches.        Physical Exam  Vitals signs reviewed. Constitutional:       Appearance: She is well-developed. HENT:      Head: Normocephalic. Right Ear: Tympanic membrane, ear canal and external ear normal.      Left Ear: Tympanic membrane, ear canal and external ear normal.      Nose: Congestion and rhinorrhea present. Right Sinus: Maxillary sinus tenderness present. Left Sinus: Maxillary sinus tenderness present. Mouth/Throat:      Pharynx: Posterior oropharyngeal erythema (PND) present. Neck:      Musculoskeletal: Normal range of motion. Cardiovascular:      Rate and Rhythm: Normal rate and regular rhythm. Pulmonary:      Effort: Pulmonary effort is normal.      Breath sounds: Examination of the right-lower field reveals decreased breath sounds. Examination of the left-lower field reveals decreased breath sounds. Decreased breath sounds and rhonchi (scattered) present. Abdominal:      General: Bowel sounds are normal.      Palpations: Abdomen is soft. Skin:     General: Skin is dry. Neurological:      General: No focal deficit present. Mental Status: She is alert and oriented to person, place, and time. Mental status is at baseline. Psychiatric:         Mood and Affect: Mood normal.         Behavior: Behavior normal.         Thought Content: Thought content normal.         Judgment: Judgment normal.           An electronic signature was used to authenticate this note.     --Sheldon Ludwig APRN

## 2021-05-13 ENCOUNTER — TELEPHONE (OUTPATIENT)
Dept: PRIMARY CARE CLINIC | Age: 71
End: 2021-05-13

## 2021-05-13 NOTE — TELEPHONE ENCOUNTER
Pt aware and voiced understanding. Informed patient of any recommendations from providers. Will call with any further questions. She is feeling better.

## 2021-05-13 NOTE — TELEPHONE ENCOUNTER
----- Message from LUIZ Kapadia sent at 5/13/2021  7:43 AM CDT -----  Please call and notify patient that COVID-19 results are negative    Also please check on her symptoms and see how she is doing since we have started treatment earlier this week.

## 2021-05-25 ENCOUNTER — OFFICE VISIT (OUTPATIENT)
Dept: PRIMARY CARE CLINIC | Age: 71
End: 2021-05-25
Payer: MEDICARE

## 2021-05-25 VITALS
BODY MASS INDEX: 39.84 KG/M2 | TEMPERATURE: 97.6 F | HEART RATE: 98 BPM | HEIGHT: 64 IN | SYSTOLIC BLOOD PRESSURE: 118 MMHG | OXYGEN SATURATION: 97 % | WEIGHT: 233.38 LBS | DIASTOLIC BLOOD PRESSURE: 70 MMHG

## 2021-05-25 DIAGNOSIS — R73.9 ELEVATED BLOOD SUGAR: ICD-10-CM

## 2021-05-25 DIAGNOSIS — R63.2 POLYPHAGIA: ICD-10-CM

## 2021-05-25 DIAGNOSIS — R51.9 HEADACHE DISORDER: ICD-10-CM

## 2021-05-25 DIAGNOSIS — E78.2 MIXED HYPERLIPIDEMIA: ICD-10-CM

## 2021-05-25 DIAGNOSIS — I10 ESSENTIAL HYPERTENSION: ICD-10-CM

## 2021-05-25 DIAGNOSIS — R09.82 PND (POST-NASAL DRIP): Primary | ICD-10-CM

## 2021-05-25 LAB
ALBUMIN SERPL-MCNC: 3.7 G/DL (ref 3.5–5.2)
ALP BLD-CCNC: 103 U/L (ref 35–104)
ALT SERPL-CCNC: 13 U/L (ref 5–33)
ANION GAP SERPL CALCULATED.3IONS-SCNC: 10 MMOL/L (ref 7–19)
AST SERPL-CCNC: 13 U/L (ref 5–32)
BASOPHILS ABSOLUTE: 0.1 K/UL (ref 0–0.2)
BASOPHILS RELATIVE PERCENT: 0.6 % (ref 0–1)
BILIRUB SERPL-MCNC: 0.3 MG/DL (ref 0.2–1.2)
BUN BLDV-MCNC: 13 MG/DL (ref 8–23)
CALCIUM SERPL-MCNC: 9.8 MG/DL (ref 8.8–10.2)
CHLORIDE BLD-SCNC: 100 MMOL/L (ref 98–111)
CO2: 28 MMOL/L (ref 22–29)
CREAT SERPL-MCNC: 0.8 MG/DL (ref 0.5–0.9)
CREATININE URINE: 129.8 MG/DL (ref 4.2–622)
EOSINOPHILS ABSOLUTE: 0.2 K/UL (ref 0–0.6)
EOSINOPHILS RELATIVE PERCENT: 1.6 % (ref 0–5)
GFR AFRICAN AMERICAN: >59
GFR NON-AFRICAN AMERICAN: >60
GLUCOSE BLD-MCNC: 106 MG/DL (ref 74–109)
HBA1C MFR BLD: 6.4 % (ref 4–6)
HCT VFR BLD CALC: 40 % (ref 37–47)
HEMOGLOBIN: 12.8 G/DL (ref 12–16)
IMMATURE GRANULOCYTES #: 0 K/UL
LYMPHOCYTES ABSOLUTE: 1.4 K/UL (ref 1.1–4.5)
LYMPHOCYTES RELATIVE PERCENT: 13.2 % (ref 20–40)
MCH RBC QN AUTO: 30.2 PG (ref 27–31)
MCHC RBC AUTO-ENTMCNC: 32 G/DL (ref 33–37)
MCV RBC AUTO: 94.3 FL (ref 81–99)
MICROALBUMIN UR-MCNC: 2.3 MG/DL (ref 0–19)
MICROALBUMIN/CREAT UR-RTO: 17.7 MG/G
MONOCYTES ABSOLUTE: 0.9 K/UL (ref 0–0.9)
MONOCYTES RELATIVE PERCENT: 8.4 % (ref 0–10)
NEUTROPHILS ABSOLUTE: 7.8 K/UL (ref 1.5–7.5)
NEUTROPHILS RELATIVE PERCENT: 75.8 % (ref 50–65)
PDW BLD-RTO: 13 % (ref 11.5–14.5)
PLATELET # BLD: 235 K/UL (ref 130–400)
PMV BLD AUTO: 10.4 FL (ref 9.4–12.3)
POTASSIUM SERPL-SCNC: 4 MMOL/L (ref 3.5–5)
RBC # BLD: 4.24 M/UL (ref 4.2–5.4)
SODIUM BLD-SCNC: 138 MMOL/L (ref 136–145)
T4 FREE: 0.94 NG/DL (ref 0.93–1.7)
TOTAL PROTEIN: 7.1 G/DL (ref 6.6–8.7)
TSH SERPL DL<=0.05 MIU/L-ACNC: 1.55 UIU/ML (ref 0.27–4.2)
WBC # BLD: 10.3 K/UL (ref 4.8–10.8)

## 2021-05-25 PROCEDURE — 99213 OFFICE O/P EST LOW 20 MIN: CPT | Performed by: NURSE PRACTITIONER

## 2021-05-25 RX ORDER — FLUTICASONE PROPIONATE 50 MCG
2 SPRAY, SUSPENSION (ML) NASAL DAILY
Qty: 1 BOTTLE | Refills: 0 | Status: SHIPPED | OUTPATIENT
Start: 2021-05-25 | End: 2021-12-16

## 2021-05-25 RX ORDER — GUAIFENESIN 600 MG/1
1200 TABLET, EXTENDED RELEASE ORAL 2 TIMES DAILY
Qty: 40 TABLET | Refills: 1 | Status: SHIPPED | OUTPATIENT
Start: 2021-05-25 | End: 2021-06-04

## 2021-05-25 SDOH — ECONOMIC STABILITY: FOOD INSECURITY: WITHIN THE PAST 12 MONTHS, YOU WORRIED THAT YOUR FOOD WOULD RUN OUT BEFORE YOU GOT MONEY TO BUY MORE.: NEVER TRUE

## 2021-05-25 ASSESSMENT — ENCOUNTER SYMPTOMS
COUGH: 0
SORE THROAT: 1
DIARRHEA: 0
RHINORRHEA: 0
SINUS PRESSURE: 0
ABDOMINAL PAIN: 0
EYE DISCHARGE: 0
SHORTNESS OF BREATH: 0
VOMITING: 0
NAUSEA: 0

## 2021-05-25 NOTE — PROGRESS NOTES
Christin Hart (:  1950) is a 80 y.o. female,Established patient, here for evaluation of the following chief complaint(s):  Follow-up      ASSESSMENT/PLAN:    ICD-10-CM    1. PND (post-nasal drip)  R09.82 guaiFENesin (MUCINEX) 600 MG extended release tablet  Increase water     fluticasone (FLONASE) 50 MCG/ACT nasal spray   2. Essential hypertension  I10 CBC Auto Differential     Comprehensive Metabolic Panel     TSH without Reflex     T4, Free     Lipid Panel     Microalbumin / Creatinine Urine Ratio     Hemoglobin A1C  Continue hydralazine 10 mg 3 times daily, lisinopril 40 mg daily, metoprolol 100 mg daily, and Norvasc 5 mg daily   3. Mixed hyperlipidemia  E78.2 Comprehensive Metabolic Panel     Lipid Panel  Continue Lipitor 80 mg nightly   4. Elevated blood sugar  R73.9 Hemoglobin A1C   5. Headache disorder  R51.9 Hemoglobin A1C  Further recommendations pending A1c results     fluticasone (FLONASE) 50 MCG/ACT nasal spray  Discussed rebound headaches. Recommend limiting Tylenol usage. 6. Polyphagia  R63.2 Hemoglobin A1C       Return in about 14 weeks (around 2021), or if symptoms worsen or fail to improve, for Annual Wellness Exam, Physical, 30 minute appointment. SUBJECTIVE/OBJECTIVE:  HPI     She is here for follow-up of bronchitis. She has completed Omnicef. She is feeling much better. She was COVID negative  \"I still have some drainage and little bit of a sore throat. \"  Cough & SOA has resolved  Reports a continued headache. \"It is not constant. \"   \"It will come and go. \"   \"I am taking Tylenol. \"   \"I am taking Tylenol every 4 hours if I have headache that long. \"  \"I had migraines for 25 years. \"    \"I am hungry all the time. \"  \"Sometimes I can have the headache, but I will eat and it will gradually go away. \"    Blood sugar: 143    /70   Pulse 98   Temp 97.6 °F (36.4 °C) (Temporal)   Ht 5' 4\" (1.626 m)   Wt 233 lb 6 oz (105.9 kg)   SpO2 97%   BMI 40.06 kg/m² Review of Systems   Constitutional: Negative for chills and fever. HENT: Positive for postnasal drip and sore throat (mild). Negative for congestion, ear pain, rhinorrhea and sinus pressure. Eyes: Negative for discharge. Respiratory: Negative for cough and shortness of breath. Gastrointestinal: Negative for abdominal pain, diarrhea, nausea and vomiting. Musculoskeletal: Negative for myalgias. Neurological: Positive for headaches. Physical Exam  Vitals reviewed. Constitutional:       Appearance: She is well-developed. HENT:      Head: Normocephalic. Right Ear: Tympanic membrane, ear canal and external ear normal.      Left Ear: Tympanic membrane, ear canal and external ear normal.      Nose: No congestion or rhinorrhea. Right Sinus: No maxillary sinus tenderness. Left Sinus: No maxillary sinus tenderness. Mouth/Throat:      Pharynx: Posterior oropharyngeal erythema (PND) present. Cardiovascular:      Rate and Rhythm: Normal rate and regular rhythm. Pulmonary:      Effort: Pulmonary effort is normal.      Breath sounds: Examination of the right-lower field reveals decreased breath sounds. Examination of the left-lower field reveals decreased breath sounds. Decreased breath sounds present. Abdominal:      General: Bowel sounds are normal.      Palpations: Abdomen is soft. Musculoskeletal:      Cervical back: Normal range of motion. Skin:     General: Skin is dry. Neurological:      General: No focal deficit present. Mental Status: She is alert and oriented to person, place, and time. Mental status is at baseline. Psychiatric:         Mood and Affect: Mood normal.         Behavior: Behavior normal.         Thought Content: Thought content normal.         Judgment: Judgment normal.           An electronic signature was used to authenticate this note.     --LUIZ Ruiz

## 2021-05-26 ENCOUNTER — TELEPHONE (OUTPATIENT)
Dept: PRIMARY CARE CLINIC | Age: 71
End: 2021-05-26

## 2021-05-26 NOTE — TELEPHONE ENCOUNTER
----- Message from LUIZ Little sent at 5/26/2021  8:05 AM CDT -----  A1c is 6.4. This does show diabetes. This is only mildly elevated. Recommend adding metformin 500 mg, 1 tablet twice daily. Dispense #60. Refills #3. Unfortunately this medication can sometimes cause diarrhea. Please monitor for this. Thyroid is normal  CMP is within normal limits. This includes normal electrolytes, kidney function and liver function. Blood glucose: 106. CBC is within normal limits.   No evidence of infection or anemia  No significant microalbumin in the urine

## 2021-07-30 DIAGNOSIS — R60.0 LOCALIZED EDEMA: ICD-10-CM

## 2021-07-30 DIAGNOSIS — F33.1 MODERATE EPISODE OF RECURRENT MAJOR DEPRESSIVE DISORDER (HCC): ICD-10-CM

## 2021-07-30 DIAGNOSIS — R45.89 CRYING ASSOCIATED WITH MOOD: ICD-10-CM

## 2021-07-30 DIAGNOSIS — F41.1 GAD (GENERALIZED ANXIETY DISORDER): ICD-10-CM

## 2021-08-02 RX ORDER — VENLAFAXINE HYDROCHLORIDE 150 MG/1
150 CAPSULE, EXTENDED RELEASE ORAL DAILY
Qty: 30 CAPSULE | Refills: 11 | Status: SHIPPED | OUTPATIENT
Start: 2021-08-02 | End: 2022-08-18

## 2021-08-02 RX ORDER — FUROSEMIDE 40 MG/1
40 TABLET ORAL DAILY
Qty: 30 TABLET | Refills: 11 | Status: SHIPPED | OUTPATIENT
Start: 2021-08-02 | End: 2022-09-22

## 2021-08-02 NOTE — TELEPHONE ENCOUNTER
Received fax from pharmacy requesting refill on pts medication(s). Pt was last seen in office on 5/25/2021  and has a follow up scheduled for 8/31/2021. Will send request to  [facility]  for patient.      Requested Prescriptions     Pending Prescriptions Disp Refills    furosemide (LASIX) 40 MG tablet [Pharmacy Med Name: Furosemide 40 MG Oral Tablet] 30 tablet 0     Sig: Take 1 tablet by mouth once daily    venlafaxine (EFFEXOR XR) 150 MG extended release capsule [Pharmacy Med Name: Venlafaxine HCl  MG Oral Capsule Extended Release 24 Hour] 30 capsule 0     Sig: Take 1 capsule by mouth once daily

## 2021-08-18 RX ORDER — TAMSULOSIN HYDROCHLORIDE 0.4 MG/1
CAPSULE ORAL
Qty: 30 CAPSULE | Refills: 11 | Status: SHIPPED | OUTPATIENT
Start: 2021-08-18 | End: 2021-12-16

## 2021-08-31 ENCOUNTER — OFFICE VISIT (OUTPATIENT)
Dept: PRIMARY CARE CLINIC | Age: 71
End: 2021-08-31
Payer: MEDICARE

## 2021-08-31 VITALS
HEIGHT: 64 IN | SYSTOLIC BLOOD PRESSURE: 138 MMHG | DIASTOLIC BLOOD PRESSURE: 62 MMHG | OXYGEN SATURATION: 95 % | HEART RATE: 95 BPM | WEIGHT: 231.25 LBS | BODY MASS INDEX: 39.48 KG/M2

## 2021-08-31 DIAGNOSIS — I10 ESSENTIAL HYPERTENSION: ICD-10-CM

## 2021-08-31 DIAGNOSIS — G89.29 CHRONIC RIGHT-SIDED HEADACHE: ICD-10-CM

## 2021-08-31 DIAGNOSIS — F51.01 PRIMARY INSOMNIA: ICD-10-CM

## 2021-08-31 DIAGNOSIS — Z00.00 ROUTINE GENERAL MEDICAL EXAMINATION AT A HEALTH CARE FACILITY: Primary | ICD-10-CM

## 2021-08-31 DIAGNOSIS — R51.9 CHRONIC RIGHT-SIDED HEADACHE: ICD-10-CM

## 2021-08-31 DIAGNOSIS — R51.9 HEADACHE DISORDER: ICD-10-CM

## 2021-08-31 DIAGNOSIS — E11.9 TYPE 2 DIABETES MELLITUS WITHOUT COMPLICATION, WITHOUT LONG-TERM CURRENT USE OF INSULIN (HCC): ICD-10-CM

## 2021-08-31 PROCEDURE — 99213 OFFICE O/P EST LOW 20 MIN: CPT | Performed by: NURSE PRACTITIONER

## 2021-08-31 PROCEDURE — G0439 PPPS, SUBSEQ VISIT: HCPCS | Performed by: NURSE PRACTITIONER

## 2021-08-31 RX ORDER — LORAZEPAM 1 MG/1
1 TABLET ORAL NIGHTLY PRN
Qty: 30 TABLET | Refills: 0 | Status: SHIPPED | OUTPATIENT
Start: 2021-08-31 | End: 2021-08-31 | Stop reason: SDUPTHER

## 2021-08-31 RX ORDER — LORAZEPAM 1 MG/1
1 TABLET ORAL NIGHTLY PRN
Qty: 30 TABLET | Refills: 0 | Status: SHIPPED | OUTPATIENT
Start: 2021-08-31 | End: 2021-09-28 | Stop reason: SDUPTHER

## 2021-08-31 ASSESSMENT — ENCOUNTER SYMPTOMS
CONSTIPATION: 0
COUGH: 0
RHINORRHEA: 1
VOMITING: 0
SORE THROAT: 0
ABDOMINAL PAIN: 0
EYE REDNESS: 0
SHORTNESS OF BREATH: 0
DIARRHEA: 0

## 2021-08-31 ASSESSMENT — PATIENT HEALTH QUESTIONNAIRE - PHQ9
2. FEELING DOWN, DEPRESSED OR HOPELESS: 0
SUM OF ALL RESPONSES TO PHQ9 QUESTIONS 1 & 2: 0
1. LITTLE INTEREST OR PLEASURE IN DOING THINGS: 0
SUM OF ALL RESPONSES TO PHQ QUESTIONS 1-9: 0

## 2021-08-31 ASSESSMENT — LIFESTYLE VARIABLES: HOW OFTEN DO YOU HAVE A DRINK CONTAINING ALCOHOL: 0

## 2021-08-31 NOTE — PATIENT INSTRUCTIONS
Body Mass Index: Care Instructions  Your Care Instructions     Body mass index (BMI) can help you see if your weight is raising your risk for health problems. It uses a formula to compare how much you weigh with how tall you are. · A BMI lower than 18.5 is considered underweight. · A BMI between 18.5 and 24.9 is considered healthy. · A BMI between 25 and 29.9 is considered overweight. A BMI of 30 or higher is considered obese. If your BMI is in the normal range, it means that you have a lower risk for weight-related health problems. If your BMI is in the overweight or obese range, you may be at increased risk for weight-related health problems, such as high blood pressure, heart disease, stroke, arthritis or joint pain, and diabetes. If your BMI is in the underweight range, you may be at increased risk for health problems such as fatigue, lower protection (immunity) against illness, muscle loss, bone loss, hair loss, and hormone problems. BMI is just one measure of your risk for weight-related health problems. You may be at higher risk for health problems if you are not active, you eat an unhealthy diet, or you drink too much alcohol or use tobacco products. Follow-up care is a key part of your treatment and safety. Be sure to make and go to all appointments, and call your doctor if you are having problems. It's also a good idea to know your test results and keep a list of the medicines you take. How can you care for yourself at home? · Practice healthy eating habits. This includes eating plenty of fruits, vegetables, whole grains, lean protein, and low-fat dairy. · If your doctor recommends it, get more exercise. Walking is a good choice. Bit by bit, increase the amount you walk every day. Try for at least 30 minutes on most days of the week. · Do not smoke. Smoking can increase your risk for health problems. If you need help quitting, talk to your doctor about stop-smoking programs and medicines. These can increase your chances of quitting for good. · Limit alcohol to 2 drinks a day for men and 1 drink a day for women. Too much alcohol can cause health problems. If you have a BMI higher than 25  · Your doctor may do other tests to check your risk for weight-related health problems. This may include measuring the distance around your waist. A waist measurement of more than 40 inches in men or 35 inches in women can increase the risk of weight-related health problems. · Talk with your doctor about steps you can take to stay healthy or improve your health. You may need to make lifestyle changes to lose weight and stay healthy, such as changing your diet and getting regular exercise. If you have a BMI lower than 18.5  · Your doctor may do other tests to check your risk for health problems. · Talk with your doctor about steps you can take to stay healthy or improve your health. You may need to make lifestyle changes to gain or maintain weight and stay healthy, such as getting more healthy foods in your diet and doing exercises to build muscle. Where can you learn more? Go to https://[employer]janaEverlaw.[email or web address] org and sign in to your [employer] account. Enter S176 in the [employer] box to learn more about \"Body Mass Index: Care Instructions. \"     If you do not have an account, please click on the \"Sign Up Now\" link. Current as of: March 17, 2021               Content Version: 12.9  © 8008-3879 Healthwise, Incorporated. Care instructions adapted under license by [facility] ([facility]). If you have questions about a medical condition or this instruction, always ask your healthcare professional. [place] [address] any warranty or liability for your use of this information. DASH Diet: Care Instructions  Your Care Instructions     The DASH diet is an eating plan that can help lower your blood pressure. DASH stands for Dietary Approaches to Stop Hypertension.  Hypertension is high blood pressure. The DASH diet focuses on eating foods that are high in calcium, potassium, and magnesium. These nutrients can lower blood pressure. The foods that are highest in these nutrients are fruits, vegetables, low-fat dairy products, nuts, seeds, and legumes. But taking calcium, potassium, and magnesium supplements instead of eating foods that are high in those nutrients does not have the same effect. The DASH diet also includes whole grains, fish, and poultry. The DASH diet is one of several lifestyle changes your doctor may recommend to lower your high blood pressure. Your doctor may also want you to decrease the amount of sodium in your diet. Lowering sodium while following the DASH diet can lower blood pressure even further than just the DASH diet alone. Follow-up care is a key part of your treatment and safety. Be sure to make and go to all appointments, and call your doctor if you are having problems. It's also a good idea to know your test results and keep a list of the medicines you take. How can you care for yourself at home? Following the DASH diet  · Eat 4 to 5 servings of fruit each day. A serving is 1 medium-sized piece of fruit, ½ cup chopped or canned fruit, 1/4 cup dried fruit, or 4 ounces (½ cup) of fruit juice. Choose fruit more often than fruit juice. · Eat 4 to 5 servings of vegetables each day. A serving is 1 cup of lettuce or raw leafy vegetables, ½ cup of chopped or cooked vegetables, or 4 ounces (½ cup) of vegetable juice. Choose vegetables more often than vegetable juice. · Get 2 to 3 servings of low-fat and fat-free dairy each day. A serving is 8 ounces of milk, 1 cup of yogurt, or 1 ½ ounces of cheese. · Eat 6 to 8 servings of grains each day. A serving is 1 slice of bread, 1 ounce of dry cereal, or ½ cup of cooked rice, pasta, or cooked cereal. Try to choose whole-grain products as much as possible. · Limit lean meat, poultry, and fish to 2 servings each day.  A serving is 3 ounces, about the size of a deck of cards. · Eat 4 to 5 servings of nuts, seeds, and legumes (cooked dried beans, lentils, and split peas) each week. A serving is 1/3 cup of nuts, 2 tablespoons of seeds, or ½ cup of cooked beans or peas. · Limit fats and oils to 2 to 3 servings each day. A serving is 1 teaspoon of vegetable oil or 2 tablespoons of salad dressing. · Limit sweets and added sugars to 5 servings or less a week. A serving is 1 tablespoon jelly or jam, ½ cup sorbet, or 1 cup of lemonade. · Eat less than 2,300 milligrams (mg) of sodium a day. If you limit your sodium to 1,500 mg a day, you can lower your blood pressure even more. · Be aware that all of these are the suggested number of servings for people who eat 1,800 to 2,000 calories a day. Your recommended number of servings may be different if you need more or fewer calories. Tips for success  · Start small. Do not try to make dramatic changes to your diet all at once. You might feel that you are missing out on your favorite foods and then be more likely to not follow the plan. Make small changes, and stick with them. Once those changes become habit, add a few more changes. · Try some of the following:  ? Make it a goal to eat a fruit or vegetable at every meal and at snacks. This will make it easy to get the recommended amount of fruits and vegetables each day. ? Try yogurt topped with fruit and nuts for a snack or healthy dessert. ? Add lettuce, tomato, cucumber, and onion to sandwiches. ? Combine a ready-made pizza crust with low-fat mozzarella cheese and lots of vegetable toppings. Try using tomatoes, squash, spinach, broccoli, carrots, cauliflower, and onions. ? Have a variety of cut-up vegetables with a low-fat dip as an appetizer instead of chips and dip. ? Sprinkle sunflower seeds or chopped almonds over salads. Or try adding chopped walnuts or almonds to cooked vegetables. ? Try some vegetarian meals using beans and peas.  Add garbanzo or kidney beans to salads. Make burritos and tacos with mashed fitzpatrick beans or black beans. Where can you learn more? Go to https://Cell[employer] Technologiespemary kayUCB [employer]arma.[employer]. org and sign in to your [employer] account. Enter A670 in the [facility] box to learn more about \"DASH Diet: Care Instructions. \"     If you do not have an account, please click on the \"Sign Up Now\" link. Current as of: August 31, 2020               Content Version: 12.9  © 7291-5141 Healthwise, [employer]. Care instructions adapted under license by [facility] ([facility]). If you have questions about a medical condition or this instruction, always ask your healthcare professional. [address] any warranty or liability for your use of this information. Personalized Preventive Plan for [place] - 8/31/2021  Medicare offers a range of preventive health benefits. Some of the tests and screenings are paid in full while other may be subject to a deductible, co-insurance, and/or copay. Some of these benefits include a comprehensive review of your medical history including lifestyle, illnesses that may run in your family, and various assessments and screenings as appropriate. After reviewing your medical record and screening and assessments performed today your provider may have ordered immunizations, labs, imaging, and/or referrals for you. A list of these orders (if applicable) as well as your Preventive Care list are included within your After Visit Summary for your review. Other Preventive Recommendations:    · A preventive eye exam performed by an eye specialist is recommended every 1-2 years to screen for glaucoma; cataracts, macular degeneration, and other eye disorders. · A preventive dental visit is recommended every 6 months. · Try to get at least 150 minutes of exercise per week or 10,000 steps per day on a pedometer .   · Order or download the FREE \"Exercise & Physical Activity: Your Everyday

## 2021-08-31 NOTE — PROGRESS NOTES
Medicare Annual Wellness Visit  Name: Andrew Ku Date: 2021   MRN: [id number] Sex: Female   Age: 80 y.o. Ethnicity: Non- / Non    : 1950 Race: White (non-)      Derek Frost is here for Medicare AWV and Headache (headaches on the right side of head. they happen 25/30 days of the month. they are continuous. occasionally will have one that makes her sick to her stomach. more bothersome than anything.)    Screenings for behavioral, psychosocial and functional/safety risks, and cognitive dysfunction are all negative except as indicated below. These results, as well as other patient data from the [address] form, are documented in Flowsheets linked to this Encounter. Allergies   Allergen Reactions    Atorvastatin Other (See Comments)     Other reaction(s): Other (See Comments)  Muscle cramps in legs. Other reaction(s): Other (See Comments)  Muscle cramps in legs. Pt is back on it  Muscle cramps in legs. Muscle cramps in legs.  Nabumetone Other (See Comments)     Patient unsure of this medication; may not be allergy    Rosuvastatin Calcium Other (See Comments)     Muscle cramps         Prior to Visit Medications    Medication Sig Taking? Authorizing Provider   LORazepam (ATIVAN) 1 MG tablet Take 1 tablet by mouth nightly as needed for Anxiety (insomnia) for up to 30 days.  Yes LUIZ Castañeda   tamsulosin (FLOMAX) 0.4 MG capsule Take 1 capsule by mouth once daily Yes LUIZ Castañeda   furosemide (LASIX) 40 MG tablet Take 1 tablet by mouth daily Yes LUIZ Dutton   venlafaxine (EFFEXOR XR) 150 MG extended release capsule Take 1 capsule by mouth daily Yes Ronak Isidro APRJERAMIE   metFORMIN (GLUCOPHAGE) 500 MG tablet Take 1 tablet by mouth 2 times daily (with meals) Yes LUIZ Castañeda   fluticasone (FLONASE) 50 MCG/ACT nasal spray 2 sprays by Each Nostril route daily Yes LUIZ Castañeda   Cholecalciferol 50 MCG ( UT) CAPS (transient ischemic attack)        Past Surgical History:   Procedure Laterality Date    CHOLECYSTECTOMY      COLONOSCOPY  07/01/2015    Dr. Ade Preston COLONOSCOPY  06/05/2019    Dr Mary Anne Valentin ([place]) Non-bleeding internal hemorrhoids, diverticulosis-Tubular AP (-) dysplasia, 3 yr recall    CYSTOSCOPY Left 12/5/2018    CYSTOSCOPY URETEROSCOPY  PLACEMENT DOUBLE J STENT ON LEFT RIGHT  RETROGRADE PYELOGRAMS performed by Marion Vaughn MD at [address] Bilateral 1/31/2019    TOTAL LAPAROSCOPIC HYSTERECTOMY BILATERAL SALPINGOOPHERECTOMY WITH Mandeep Jaelyn performed by Maegan Worley MD at [facility], VAGINAL      NH EGD TRANSORAL BIOPSY SINGLE/MULTIPLE N/A 5/8/2018    Dr Jodi Smith (-) Kristin (+) Dye's (-) dysplasia--3 yr recall    NH LAP,CHOLECYSTECTOMY N/A 3/6/2018    CHOLECYSTECTOMY LAPAROSCOPIC performed by Piero Keller MD at [address]  08/26/2015    Dr. Austin Galvin  5/23/2017    Dr Osorio-w/balloon dilation, 12-13. 5-15 mm-esophageal narrowing with diverticulum at 34 cm-Kristin (-), hiatal herni, Dye's (+) dysplasia (-)--1st dx--1 yr recall-Ct chest ordered    WRIST FRACTURE SURGERY           Family History   Problem Relation Age of Onset    Heart Disease Mother     Colon Cancer Neg Hx     Colon Polyps Neg Hx     Liver Cancer Neg Hx     Liver Disease Neg Hx     Esophageal Cancer Neg Hx     Rectal Cancer Neg Hx     Stomach Cancer Neg Hx        CareTeam (Including outside providers/suppliers regularly involved in providing care):   Patient Care Team:  LUIZ Ozuna as PCP - General (Family Nurse Practitioner)  LUIZ Ozuna as PCP - [address] [place] [address] Provider  Carlos Givens DO as Consulting Physician (Gastroenterology)  Maegan Worley MD as Consulting Physician (Obstetrics & Gynecology)  LUIZ Anne as Advanced Practice Nurse (Gastroenterology)    Wt Readings from Last 3 bathtubs/showers?: Yes  Do all of your stairways have a railing or banister?: (!) No (no stairways)  Are your doorways, halls and stairs free of clutter?: Yes  Do you always fasten your seatbelt when you are in a car?: Yes  Safety Interventions:   · Home safety tips provided     Personalized Preventive Plan   Current Health Maintenance Status  Immunization History   Administered Date(s) Administered    COVID-19, Pfizer, PF, 30mcg/0.3mL 03/10/2021, 03/31/2021    Influenza, Quadv, adjuvanted, 65 yrs +, IM, PF (Fluad) 12/01/2020    Pneumococcal Conjugate 13-valent ([id number]) 10/02/2019    Pneumococcal Polysaccharide ([id number]) 12/01/2020        Health Maintenance   Topic Date Due    DTaP/Tdap/Td vaccine (1 - Tdap) Never done    Shingles Vaccine (1 of 2) Never done   [employer] Visit (AWV)  Never done    Lipid screen  06/15/2021    Flu vaccine (1) 09/01/2021    A1C test (Diabetic or Prediabetic)  05/25/2022    Potassium monitoring  05/25/2022    Creatinine monitoring  05/25/2022    Colon cancer screen colonoscopy  06/05/2022    Breast cancer screen  09/09/2022    DEXA (modify frequency per FRAX score)  Completed    Pneumococcal 65+ years Vaccine  Completed    COVID-19 Vaccine  Completed    Hepatitis C screen  Completed    Hepatitis A vaccine  Aged Out    Hepatitis B vaccine  Aged Out    Hib vaccine  Aged Out    Meningococcal (ACWY) vaccine  Aged Out     Recommendations for [facility] Due: see orders and patient instructions/AVS.  . Recommended screening schedule for the next 5-10 years is provided to the patient in written form: see Patient Varghese Estevez was seen today for medicare awv and headache. Diagnoses and all orders for this visit:    Routine general medical examination at a health care facility    Headache disorder  -     CT HEAD WO CONTRAST; Future    Chronic right-sided headache  -     CT HEAD WO CONTRAST;  Future    Essential hypertension    Type 2 diabetes mellitus without complication, without long-term current use of insulin (HCC)  -     Hemoglobin A1C; Future    Primary insomnia  -     LORazepam (ATIVAN) 1 MG tablet; Take 1 tablet by mouth nightly as needed for Anxiety (insomnia) for up to 30 days. Lai Norman (:  1950) is a 80 y.o. female,Established patient, here for evaluation of the following chief complaint(s):  Medicare AWV and Headache (headaches on the right side of head. they happen 25/30 days of the month. they are continuous. occasionally will have one that makes her sick to her stomach. more bothersome than anything.)      ASSESSMENT/PLAN:    ICD-10-CM    1. Routine general medical examination at a health care facility  Z00.00    2. Headache disorder  R51.9 CT HEAD WO CONTRAST   3. Chronic right-sided headache  R51.9 CT HEAD WO CONTRAST    G89.29    4. Essential hypertension  I10  Continue hydralazine 10 mg 3 times daily, lisinopril 40 mg daily, metoprolol 100 mg daily, amlodipine 5 mg daily  Patient is asked to monitor BP at home or work, several times per month and return with written values at next office visit. 5. Type 2 diabetes mellitus without complication, without long-term current use of insulin (HCC)  E11.9 Hemoglobin A1C  Continue Metformin 500 mg BID  ADA diet  Recommend exercise as tolerated   6. Primary insomnia  F51.01 LORazepam (ATIVAN) 1 MG tablet       Return in 4 weeks (on 2021), or if symptoms worsen or fail to improve, for Medicare Annual Wellness Visit in 1 year. SUBJECTIVE/OBJECTIVE:  HPI     HEADACHES:  Reports right sided headaches. The headache is from the ear to the eye to behind the ear. \"It is only on the right side. \"  These headaches are \"almost\" daily. Loye Manual are not real bad but they are aggravating. \"  She takes ASA   This does not help. Frequency of these headache is increasing. Denies visual changes with headaches.   \"Occasionally I get sick to my stomach if a bad one hits.\"  \"It is just a mild headache. \"  \"I have drainage all the time. \"  She tried Mucinex without improvement in her headaches. BP runs good at home. Denies sinus pressure. \"I've tried Flonase in the past.\"   \"It did not help. \"  This is not a tension headache or a migraine headache. \"I suffered with migraines for 25 years. I know what they are. \"  \"I will check my blood pressure when I have the headaches and it will be normal.\"  She is no longer taking something daily for headaches. \"I have dentures but I don't wear them. They will not stay in my mouth. \"  \"Sometimes it feels like an electrical shock. \"    DM: The highest my sugar has been in 149. She has been taking Metformin 500 mg BID  She is tolerating the Metformin  On average blood sugars are 120-122    INSOMNIA:  She is not sleeping well. \"I am waking up a bunch through the night. \"  She is wearing her CPAP. She is taking Atarax at night and she is still not sleeping  \"I feel like my mind cannot shut down. \"    LABS, 5-:  A1c is 6.4.  This does show diabetes.  This is only mildly elevated.  Recommend adding metformin 500 mg, 1 tablet twice daily.  Dispense #60.  Refills #3.  Unfortunately this medication can sometimes cause diarrhea.  Please monitor for this. Thyroid is normal  CMP is within normal limits.  This includes normal electrolytes, kidney function and liver function.  Blood glucose: 106. CBC is within normal limits.  No evidence of infection or anemia  No significant microalbumin in the urine    /62 (Site: Left Upper Arm, Position: Sitting, Cuff Size: Large Adult)   Pulse 95   Ht 5' 4\" (1.626 m)   Wt 231 lb 4 oz (104.9 kg)   SpO2 95%   BMI 39.69 kg/m²     Review of Systems   Constitutional: Negative for chills, fatigue and fever. HENT: Positive for congestion and rhinorrhea. Negative for ear pain and sore throat. Eyes: Negative for redness. Respiratory: Negative for cough and shortness of breath.     Cardiovascular: Negative for chest pain. Gastrointestinal: Negative for abdominal pain, constipation, diarrhea and vomiting. Genitourinary: Negative for dysuria, frequency and urgency. Skin: Negative for rash. Neurological: Positive for headaches (right sided). Negative for dizziness. Psychiatric/Behavioral: Positive for sleep disturbance. The patient is nervous/anxious. Physical Exam  Vitals reviewed. Constitutional:       Appearance: She is well-developed and normal weight. HENT:      Head: Normocephalic. Right Ear: Tympanic membrane and external ear normal.      Left Ear: Tympanic membrane and external ear normal.      Nose: Nose normal.   Eyes:      General:         Right eye: No discharge. Left eye: No discharge. Cardiovascular:      Rate and Rhythm: Normal rate and regular rhythm. Pulmonary:      Effort: Pulmonary effort is normal.      Breath sounds: Normal breath sounds. No wheezing, rhonchi or rales. Abdominal:      General: Bowel sounds are normal.      Palpations: Abdomen is soft. Musculoskeletal:      Cervical back: Normal range of motion. Skin:     General: Skin is dry. Neurological:      General: No focal deficit present. Mental Status: She is alert and oriented to person, place, and time. Mental status is at baseline. Psychiatric:         Mood and Affect: Mood normal.         Behavior: Behavior normal.         Thought Content: Thought content normal.         Judgment: Judgment normal.             An electronic signature was used to authenticate this note.     --LUIZ Chaudhari

## 2021-09-01 ENCOUNTER — TELEPHONE (OUTPATIENT)
Dept: PRIMARY CARE CLINIC | Age: 71
End: 2021-09-01

## 2021-09-10 DIAGNOSIS — K21.9 GASTROESOPHAGEAL REFLUX DISEASE: ICD-10-CM

## 2021-09-10 DIAGNOSIS — R10.10 PAIN OF UPPER ABDOMEN: ICD-10-CM

## 2021-09-10 RX ORDER — OMEPRAZOLE 20 MG/1
20 CAPSULE, DELAYED RELEASE ORAL 2 TIMES DAILY
Qty: 180 CAPSULE | Refills: 3 | Status: SHIPPED | OUTPATIENT
Start: 2021-09-10

## 2021-09-10 NOTE — TELEPHONE ENCOUNTER
Received fax from pharmacy requesting refill on pts medication(s). Pt was last seen in office on 8/31/2021  and has a follow up scheduled for 9/28/2021. Will send request to  [facility]  for patient.      Requested Prescriptions     Pending Prescriptions Disp Refills    omeprazole (PRILOSEC) 20 MG delayed release capsule [Pharmacy Med Name: Omeprazole 20 MG Oral Capsule Delayed Release] 90 capsule 0     Sig: Take 1 capsule by mouth once daily

## 2021-09-15 ENCOUNTER — HOSPITAL ENCOUNTER (OUTPATIENT)
Dept: CT IMAGING | Age: 71
Discharge: HOME | End: 2021-09-15
Payer: MEDICARE

## 2021-09-15 ENCOUNTER — TELEPHONE (OUTPATIENT)
Dept: PRIMARY CARE CLINIC | Age: 71
End: 2021-09-15

## 2021-09-15 DIAGNOSIS — G89.29 CHRONIC RIGHT-SIDED HEADACHE: ICD-10-CM

## 2021-09-15 DIAGNOSIS — R51.9 HEADACHE DISORDER: ICD-10-CM

## 2021-09-15 DIAGNOSIS — R51.9 CHRONIC RIGHT-SIDED HEADACHE: ICD-10-CM

## 2021-09-15 DIAGNOSIS — E11.9 TYPE 2 DIABETES MELLITUS WITHOUT COMPLICATION, WITHOUT LONG-TERM CURRENT USE OF INSULIN (HCC): Primary | ICD-10-CM

## 2021-09-15 PROCEDURE — 70450 CT HEAD/BRAIN W/O DYE: CPT

## 2021-09-15 NOTE — TELEPHONE ENCOUNTER
Received fax from pharmacy requesting refill on pts medication(s). Pt was last seen in office on 8/31/2021  and has a follow up scheduled for 9/28/2021. Will send request to  [facility]  for patient.      Requested Prescriptions     Pending Prescriptions Disp Refills    metFORMIN (GLUCOPHAGE) 500 MG tablet 180 tablet 3     Sig: Take 1 tablet by mouth 2 times daily (with meals)

## 2021-09-15 NOTE — TELEPHONE ENCOUNTER
Please check with the patient and see if she has any metal implants or other reason she cannot have an MRI. I don't see any contraindications in her medical hx.     If no contraindications okay to change to MRI of head without contrast

## 2021-09-15 NOTE — TELEPHONE ENCOUNTER
Called pt, she has metal in her left wrist and right ankle. Faxed pt summary with this on it to insurance company to have them approve it. Pt case number [id number]    Pt has had surgery on her left wrist that contains metal and right ankle that contains metal. Therefore, she can NOT have a MRI.

## 2021-09-15 NOTE — TELEPHONE ENCOUNTER
[facility] dept called to inform that her ins will not cover the CT of her brain without a peer to peer. They are wanting to know why she cant have a MRI instead.     If there is a documented reason why she can not have a MRI, it needs to be faxed to [phone]    Tracking number: [id number]      Or you can do a peer to peer to get it authorized by calling [phone]

## 2021-09-17 ENCOUNTER — TELEPHONE (OUTPATIENT)
Dept: PRIMARY CARE CLINIC | Age: 71
End: 2021-09-17

## 2021-09-17 DIAGNOSIS — G89.29 CHRONIC RIGHT-SIDED HEADACHE: ICD-10-CM

## 2021-09-17 DIAGNOSIS — R51.9 CHRONIC RIGHT-SIDED HEADACHE: ICD-10-CM

## 2021-09-17 DIAGNOSIS — R51.9 HEADACHE DISORDER: Primary | ICD-10-CM

## 2021-09-17 NOTE — TELEPHONE ENCOUNTER
----- Message from LUIZ Pinto sent at 9/15/2021  9:11 PM CDT -----  CT scan of head:    1. No hemorrhage, edema or mass effect. 2. Chronic lacunar infarct in the left basal ganglia region. 3. Mild atrophy. Low-density in the hemispheric white matter is  nonspecific and likely due to chronic small vessel disease. There were no acute findings to explain your headaches.     Recommend neurology referral

## 2021-09-20 NOTE — TELEPHONE ENCOUNTER
Called patient, spoke with: Patient regarding the results of the patients most recent CT. I advised Patient of Teodora Larson recommendations.    Patient did voice understanding  Referral entered for [facility]

## 2021-09-28 ENCOUNTER — VIRTUAL VISIT (OUTPATIENT)
Dept: PRIMARY CARE CLINIC | Age: 71
End: 2021-09-28
Payer: MEDICARE

## 2021-09-28 DIAGNOSIS — G89.29 CHRONIC RIGHT-SIDED HEADACHE: ICD-10-CM

## 2021-09-28 DIAGNOSIS — E11.9 TYPE 2 DIABETES MELLITUS WITHOUT COMPLICATION, WITHOUT LONG-TERM CURRENT USE OF INSULIN (HCC): ICD-10-CM

## 2021-09-28 DIAGNOSIS — R51.9 HEADACHE DISORDER: Primary | ICD-10-CM

## 2021-09-28 DIAGNOSIS — I10 ESSENTIAL HYPERTENSION: ICD-10-CM

## 2021-09-28 DIAGNOSIS — F51.01 PRIMARY INSOMNIA: ICD-10-CM

## 2021-09-28 DIAGNOSIS — M54.2 CERVICAL PAIN (NECK): ICD-10-CM

## 2021-09-28 DIAGNOSIS — R51.9 CHRONIC RIGHT-SIDED HEADACHE: ICD-10-CM

## 2021-09-28 PROCEDURE — 99442 PR PHYS/QHP TELEPHONE EVALUATION 11-20 MIN: CPT | Performed by: NURSE PRACTITIONER

## 2021-09-28 RX ORDER — LORAZEPAM 1 MG/1
1 TABLET ORAL NIGHTLY PRN
Qty: 30 TABLET | Refills: 0 | Status: SHIPPED | OUTPATIENT
Start: 2021-09-28 | End: 2021-10-28

## 2021-09-28 RX ORDER — BUTALBITAL, ACETAMINOPHEN AND CAFFEINE 50; 325; 40 MG/1; MG/1; MG/1
1 TABLET ORAL EVERY 6 HOURS PRN
Qty: 30 TABLET | Refills: 0 | Status: CANCELLED | OUTPATIENT
Start: 2021-09-28

## 2021-09-28 RX ORDER — TIZANIDINE 2 MG/1
2 TABLET ORAL EVERY 8 HOURS PRN
Qty: 30 TABLET | Refills: 0 | Status: SHIPPED | OUTPATIENT
Start: 2021-09-28 | End: 2021-10-15

## 2021-09-28 ASSESSMENT — ENCOUNTER SYMPTOMS
EYE REDNESS: 0
SHORTNESS OF BREATH: 0
ABDOMINAL PAIN: 0
COUGH: 0
SORE THROAT: 0
VOMITING: 0
DIARRHEA: 0
CONSTIPATION: 0
RHINORRHEA: 0

## 2021-09-28 NOTE — PROGRESS NOTES
Lazarus Lieu (:  1950) is a 80 y.o. female,Established patient, here for evaluation of the following chief complaint(s): Medication Refill    TELE-HEALTH PHONE CALL     ASSESSMENT/PLAN:  1. Headache disorder  -     tiZANidine (ZANAFLEX) 2 MG tablet; Take 1 tablet by mouth every 8 hours as needed (neck, headache), Disp-30 tablet, R-0 Normal  - Keep consultation with neurology  2. Primary insomnia  -     LORazepam (ATIVAN) 1 MG tablet; Take 1 tablet by mouth nightly as needed for Anxiety (insomnia) for up to 30 days. , Disp-30 tablet, R-0Normal  3. Chronic right-sided headache  -     tiZANidine (ZANAFLEX) 2 MG tablet; Take 1 tablet by mouth every 8 hours as needed (neck, headache), Disp-30 tablet, R-0 Normal  - Keep consultation with neurology   4. Type 2 diabetes mellitus without complication, without long-term current use of insulin (HCC)--continue Metformin 500 mg twice daily. Recommend ADA diet. Recommend exercise as tolerated. 5. Essential hypertension--The current medical regimen is effective;  continue present plan and medications. Patient is asked to monitor BP at home or work, several times per month and return with written values at next office visit. 6. Cervical pain (neck)  -     tiZANidine (ZANAFLEX) 2 MG tablet; Take 1 tablet by mouth every 8 hours as needed (neck, headache), Disp-30 tablet, R-0Normal      Return in about 1 month (around 10/28/2021), or if symptoms worsen or fail to improve. SUBJECTIVE/OBJECTIVE:  HPI     HTN:  \"One time it was 118/57\"  She is taking hydralazine 10 mg 3 times daily, lisinopril 40 mg daily, metoprolol 100 mg daily, amlodipine 5 mg daily  BP is well controlled    DM:  \"I have been checking it off and on. \"  \"It has been staying around 136. \"  She continues on Metformin 500 mg BID. HEADACHES:  \"I still have the headache. \"  She sees neurology on 10-  Headache is there most days. She has tried ASA but it did not help her headaches.   Reports increased neck pain. \"If I lie with my head flat, I will wake up and it will be really hurting. \"    CT scan of head:   1. No hemorrhage, edema or mass effect. 2. Chronic lacunar infarct in the left basal ganglia region. 3. Mild atrophy. Low-density in the hemispheric white matter is  nonspecific and likely due to chronic small vessel disease.   There were no acute findings to explain your headaches.   Recommend neurology referral    INSOMNIA:  Improved with Ativan nightly prn. \"I go to sleep and sometimes I wake up and go to the restroom. \"  \"But I go right back to sleep. \"  She is requesting refill today. Review of Systems   Constitutional: Negative for chills, fatigue and fever. HENT: Negative for congestion, ear pain, rhinorrhea and sore throat. Eyes: Negative for redness. Respiratory: Negative for cough and shortness of breath. Cardiovascular: Negative for chest pain. Gastrointestinal: Negative for abdominal pain, constipation, diarrhea and vomiting. Genitourinary: Negative for dysuria, frequency and urgency. Musculoskeletal: Positive for neck pain. Skin: Negative for rash. Neurological: Positive for headaches (right sided). Negative for dizziness. Psychiatric/Behavioral: Positive for sleep disturbance (improved with Ativan prn). The patient is nervous/anxious. No flowsheet data found. Physical Exam   N/A DUE TO TELE-HEALTH PHONE CALL      On this date 9/28/2021 I have spent 15 minutes reviewing previous notes, test results and face to face (virtual) with the patient discussing the diagnosis and importance of compliance with the treatment plan as well as documenting on the day of the visit. Francisco Real, was evaluated through a synchronous (real-time) audio-video encounter. The patient (or guardian if applicable) is aware that this is a billable service. Verbal consent to proceed has been obtained within the past 12 months.  The visit was conducted pursuant to the emergency declaration under the [facility], [address] [place] [address] [facility] authority and the [employer] General Act. Patient identification was verified, and a caregiver was present when appropriate. The patient was located in a state where the provider was credentialed to provide care. An electronic signature was used to authenticate this note.     --Trista Stephen, APRN

## 2021-10-15 ENCOUNTER — NURSE ONLY (OUTPATIENT)
Dept: PRIMARY CARE CLINIC | Age: 71
End: 2021-10-15

## 2021-10-15 VITALS — SYSTOLIC BLOOD PRESSURE: 100 MMHG | DIASTOLIC BLOOD PRESSURE: 60 MMHG

## 2021-10-15 DIAGNOSIS — I95.9 HYPOTENSION, UNSPECIFIED HYPOTENSION TYPE: Primary | ICD-10-CM

## 2021-10-15 DIAGNOSIS — G89.29 CHRONIC RIGHT-SIDED HEADACHE: ICD-10-CM

## 2021-10-15 DIAGNOSIS — R51.9 CHRONIC RIGHT-SIDED HEADACHE: ICD-10-CM

## 2021-10-15 DIAGNOSIS — R51.9 HEADACHE DISORDER: ICD-10-CM

## 2021-10-15 DIAGNOSIS — M54.2 CERVICAL PAIN (NECK): ICD-10-CM

## 2021-10-15 NOTE — TELEPHONE ENCOUNTER
Received fax from pharmacy requesting refill on pts medication(s). Pt was last seen in office on 9/28/2021  and has a follow up scheduled for Visit date not found. Will send request to  [facility]  for patient.      Requested Prescriptions     Pending Prescriptions Disp Refills    tiZANidine (ZANAFLEX) 2 MG tablet [Pharmacy Med Name: tiZANidine HCl 2 MG Oral Tablet] 30 tablet 0     Sig: TAKE 1 TABLET BY MOUTH EVERY 8 HOURS AS NEEDED FOR  NECK  OR  HEADACHE

## 2021-10-15 NOTE — PROGRESS NOTES
Patient came in complaining of low BP and dizziness as well as lip and tongue swelling after taking her medications  Spoke to Beto Thakur who advised patient stop her lisinopril, keep a check on her BP and follow up with [facility] next week with readings

## 2021-10-18 ENCOUNTER — OFFICE VISIT (OUTPATIENT)
Dept: NEUROSURGERY | Age: 71
End: 2021-10-18
Payer: MEDICARE

## 2021-10-18 ENCOUNTER — TELEPHONE (OUTPATIENT)
Dept: NEUROLOGY | Age: 71
End: 2021-10-18

## 2021-10-18 VITALS
DIASTOLIC BLOOD PRESSURE: 70 MMHG | OXYGEN SATURATION: 98 % | WEIGHT: 210 LBS | TEMPERATURE: 98 F | SYSTOLIC BLOOD PRESSURE: 125 MMHG | HEART RATE: 74 BPM | HEIGHT: 64 IN | BODY MASS INDEX: 35.85 KG/M2

## 2021-10-18 DIAGNOSIS — G43.009 MIGRAINE WITHOUT AURA AND WITHOUT STATUS MIGRAINOSUS, NOT INTRACTABLE: Primary | ICD-10-CM

## 2021-10-18 PROCEDURE — 99204 OFFICE O/P NEW MOD 45 MIN: CPT | Performed by: NURSE PRACTITIONER

## 2021-10-18 RX ORDER — UBROGEPANT 100 MG/1
TABLET ORAL
Qty: 10 TABLET | Refills: 3 | Status: SHIPPED | OUTPATIENT
Start: 2021-10-18

## 2021-10-18 RX ORDER — TIZANIDINE 2 MG/1
2 TABLET ORAL EVERY 8 HOURS PRN
Qty: 30 TABLET | Refills: 0 | Status: SHIPPED | OUTPATIENT
Start: 2021-10-18 | End: 2021-12-07 | Stop reason: SDUPTHER

## 2021-10-18 NOTE — PROGRESS NOTES
[facility] Neurology Office Note      Patient:   Yareli Almonte  MR#:    [id number]  Account Number:                         YOB: 1950  Date of Evaluation:  10/18/2021  Time of Note:                          12:46 PM  Primary/Referring Physician:  LUIZ Zabala   Consulting Physician:  Barbara Clark, LAKISHA, APRN    NEW PATIENT CONSULTATION    Chief Complaint   Patient presents with    New Patient     c/o daily headache     Headache     HISTORY OF PRESENT ILLNESS    Yareli Almonte is a 80y.o. year old female here for evaluation of headaches. She has a history of migraines and these overall resolved following chiropractic care. She began noting headaches about 4 months ago, change in characteristics. Pain is posterior, right sided with little radiation of pain. She notes light/sound sensitivity with the headaches. Denies nausea, vomiting. Denies vision changes, vision loss with headaches. Denies clear aura. Denies focal symptoms with the headaches. Denies neck pain. She has noted more labile blood pressure recently. She has been taking Tylenol with little improvement. Has tried Tizanidine as well but not a lot of improvement. She has taken Topamax previously. Already on a beta blocker. No prior triptan therapy. She is noting daily headaches. No other complaints.      Past Medical History:   Diagnosis Date    Anxiety     Depression     Edema     GERD (gastroesophageal reflux disease)     Headache(784.0)     migraines    Hyperlipidemia     Hypertension     Mini stroke (HCC)     Sleep apnea     CPAP    TIA (transient ischemic attack)        Past Surgical History:   Procedure Laterality Date    CHOLECYSTECTOMY      COLONOSCOPY  07/01/2015    Dr. Noemy Moreno COLONOSCOPY  06/05/2019    Dr Virgilio Ann [facility]) Non-bleeding internal hemorrhoids, diverticulosis-Tubular AP (-) dysplasia, 3 yr recall    CYSTOSCOPY Left 12/5/2018    CYSTOSCOPY URETEROSCOPY  PLACEMENT DOUBLE J STENT ON LEFT RIGHT RETROGRADE PYELOGRAMS performed by Jhonny Burgess MD at [address] Bilateral 1/31/2019    TOTAL LAPAROSCOPIC HYSTERECTOMY BILATERAL SALPINGOOPHERECTOMY WITH Sha Elsa performed by Danny Bangura MD at [facility], VAGINAL      [place] EGD TRANSORAL BIOPSY SINGLE/MULTIPLE N/A 5/8/2018    Dr Lottie Zhu (-) Kristin (+) Dye's (-) dysplasia--3 yr recall    [place] LAP,CHOLECYSTECTOMY N/A 3/6/2018    CHOLECYSTECTOMY LAPAROSCOPIC performed by Aime Mcdonough MD at [id number]  08/26/2015    Dr. Ashwin Calderón  5/23/2017    Dr Osorio-w/balloon dilation, 12-13. 5-15 mm-esophageal narrowing with diverticulum at 34 cm-Kristin (-), hiatal herni, Dye's (+) dysplasia (-)--1st dx--1 yr recall-Ct chest ordered    WRIST FRACTURE SURGERY         Family History   Problem Relation Age of Onset    Heart Disease Mother     Colon Cancer Neg Hx     Colon Polyps Neg Hx     Liver Cancer Neg Hx     Liver Disease Neg Hx     Esophageal Cancer Neg Hx     Rectal Cancer Neg Hx     Stomach Cancer Neg Hx        Social History     Socioeconomic History    Marital status:      Spouse name: Not on file    Number of children: Not on file    Years of education: Not on file    Highest education level: Not on file   Occupational History    Not on file   Tobacco Use    Smoking status: Never Smoker    Smokeless tobacco: Never Used   Vaping Use    Vaping Use: Never used   Substance and Sexual Activity    Alcohol use: No     Alcohol/week: 0.0 standard drinks    Drug use: No    Sexual activity: Not on file   Other Topics Concern    Not on file   Social History Narrative    Not on file     Social Determinants of Health     Financial Resource Strain: Low Risk     Difficulty of Paying Living Expenses: Not hard at all   Food Insecurity: No Food Insecurity    Worried About Running Out of Food in the Last Year: Never true    Ho of Food in the Last Year: Never true   Transportation Needs:     Lack of Transportation (Medical):  Lack of Transportation (Non-Medical):    Physical Activity:     Days of Exercise per Week:     Minutes of Exercise per Session:    Stress:     Feeling of Stress :    Social Connections:     Frequency of Communication with Friends and Family:     Frequency of Social Gatherings with Friends and Family:     Attends Congregation Services:     Active Member of Clubs or Organizations:     Attends Club or Organization Meetings:     Marital Status:    Intimate Partner Violence:     Fear of Current or Ex-Partner:     Emotionally Abused:     Physically Abused:     Sexually Abused:        Current Outpatient Medications   Medication Sig Dispense Refill    tiZANidine (ZANAFLEX) 2 MG tablet Take 1 tablet by mouth every 8 hours as needed (spsams) 30 tablet 0    Ubrogepant (UBRELVY) 100 MG TABS Take 1 tablet at the onset of migraine. May repeat once in 2 hours if no improvement. Do not exceed 2 tablets in 24 hours. 10 tablet 3    LORazepam (ATIVAN) 1 MG tablet Take 1 tablet by mouth nightly as needed for Anxiety (insomnia) for up to 30 days.  30 tablet 0    metFORMIN (GLUCOPHAGE) 500 MG tablet Take 1 tablet by mouth 2 times daily (with meals) 180 tablet 3    omeprazole (PRILOSEC) 20 MG delayed release capsule Take 1 capsule by mouth 2 times daily 180 capsule 3    tamsulosin (FLOMAX) 0.4 MG capsule Take 1 capsule by mouth once daily 30 capsule 11    furosemide (LASIX) 40 MG tablet Take 1 tablet by mouth daily 30 tablet 11    venlafaxine (EFFEXOR XR) 150 MG extended release capsule Take 1 capsule by mouth daily 30 capsule 11    fluticasone (FLONASE) 50 MCG/ACT nasal spray 2 sprays by Each Nostril route daily 1 Bottle 0    Cholecalciferol 50 MCG (2000 UT) CAPS Take by mouth daily      famotidine (PEPCID) 20 MG tablet Take 1 tablet by mouth twice daily 60 tablet 11    hydrALAZINE (APRESOLINE) 10 MG tablet Take 1 tablet by mouth 3 times daily 90 tablet 3    atorvastatin (LIPITOR) 80 MG tablet Take 1 tablet by mouth nightly 90 tablet 3    metoprolol succinate (TOPROL XL) 100 MG extended release tablet Take 1 tablet by mouth daily 90 tablet 3    amLODIPine (NORVASC) 5 MG tablet Take 1 tablet by mouth daily 90 tablet 3    ezetimibe (ZETIA) 10 MG tablet Take 1 tablet by mouth daily 30 tablet 11    aspirin 81 MG tablet Aspir-81 81 mg tablet,delayed release   Take 1 tablet every day by oral route.  hydrOXYzine (ATARAX) 50 MG tablet hydroxyzine HCl 50 mg tablet   TAKE ONE TABLET BY MOUTH TWICE DAILY AS NEEDED      Multiple Vitamins-Minerals (MULTIVITAMIN ADULT PO) Take by mouth       No current facility-administered medications for this visit. Allergies   Allergen Reactions    Nabumetone Other (See Comments)     Patient unsure of this medication; may not be allergy     REVIEW OF SYSTEMS  Constitutional: []? Fever []? Sweats []? Chills []? Recent Injury [x]? Denies all unless marked  HEENT:[x]? Headache  []? Head Injury []? Hearing Loss  []? Sore Throat  []? Ear Ache [x]? Denies all unless marked  Spine:  []? Neck pain  []? Back pain  []? Sciaticia  [x]? Denies all unless marked  Cardiovascular:[]? Heart Disease []? Palpitations []? Chest Pain   [x]? Denies all unless marked  Pulmonary: []? Shortness of Breath []? Cough   [x]? Denies all unless marked  Psychiatric/Behavioral:[]? Depression []? Anxiety [x]? Denies all unless marked  Gastrointestinal: [x]? Nausea  []? Vomiting  []? Abdominal Pain  []? Constipation  []? Diarrhea  [x]? Denies all unless marked  Genitourinary:   []? Frequency  []? Urgency  []? Dysuria []? Incontinence  [x]? Denies all unless marked  Extremities: []? Pain  []? Swelling  [x]? Denies all unless marked  Musculoskeletal: []? Myalgias  []? Joint Pain  []? Arthritis []? Muscle Cramps []? Muscle Twitches  [x]? Denies all unless marked  Sleep: []? Insomnia[]? Snoring []? Restless Legs  []? Sleep Apnea  []? Daytime vibration, and proprioception BLE  [x]Sensation intact to light touch, pin prick, vibration, and proprioception BUE  COMMENTS:   Coordination [x]FTN normal bilaterally   [x]HTS normal bilaterally  [x]ANNA normal bilaterally. COMMENTS:   Reflexes  [x]Symmetric and non-pathological  [x]Toes down going bilaterally  [x]No clonus present  COMMENTS:   Gait                  [x]Normal steady gait    []Ataxic    []Spastic     []Magnetic     []Shuffling  COMMENTS:       LABS RECORD AND IMAGING REVIEW (As below and per HPI)    Lab Results   Component Value Date    [id number] 639 04/18/2018     Lab Results   Component Value Date    WBC 10.3 05/25/2021    HGB 12.8 05/25/2021    HCT 40.0 05/25/2021    MCV 94.3 05/25/2021     05/25/2021     Lab Results   Component Value Date     05/25/2021    K 4.0 05/25/2021     05/25/2021    CO2 28 05/25/2021    BUN 13 05/25/2021    CREATININE 0.8 05/25/2021    GLUCOSE 106 05/25/2021    CALCIUM 9.8 05/25/2021    PROT 7.1 05/25/2021    LABALBU 3.7 05/25/2021    BILITOT 0.3 05/25/2021    ALKPHOS 103 05/25/2021    AST 13 05/25/2021    ALT 13 05/25/2021    LABGLOM >60 05/25/2021    GFRAA >59 05/25/2021    GLOB 2.8 03/06/2017     Lab Results   Component Value Date    CHOL 157 (L) 08/31/2021    TRIG 163 (H) 08/31/2021    HDL 45 (L) 08/31/2021    LDLCALC 79 08/31/2021     Lab Results   Component Value Date    TSH 1.550 05/25/2021    T4FREE 0.94 05/25/2021     Lab Results   Component Value Date    CRP 0.34 08/22/2019        CT HEAD WO CONTRAST    Result Date: 9/15/2021  EXAMINATION:  CT HEAD WO CONTRAST  9/15/2021 5:03 PM HISTORY: Chronic right-sided headache. TECHNIQUE: Multiple axial images were obtained through the brain without contrast infusion. Multiplanar images were reconstructed. DLP: 103 mGy-cm. Automated exposure control was utilized. COMPARISON: 10/31/2017. FINDINGS: There are no hemorrhage, edema or mass effect.  There is a chronic lacunar infarct in the left basal ganglia region. There is mild atrophy. There is low-density in the hemispheric white matter. The visualized paranasal sinuses are clear. There is some fluid density in the right mastoid region. The left mastoid air cells are clear. 1. No hemorrhage, edema or mass effect. 2. Chronic lacunar infarct in the left basal ganglia region. 3. Mild atrophy. Low-density in the hemispheric white matter is nonspecific and likely due to chronic small vessel disease. Signed by Dr Elier Red    Reviewed referral records     ASSESSMENT:    Samir Aguila is a 80y.o. year old female here for evaluation of headaches. She has a history of migraines but these overall resolved several years ago. She recently has noted an increase in headaches, change in characteristics as well. Exam is overall non focal today. Will add MRI brain. Given greater than 14 headache days in a month lasting longer than 4 hours will add Botox and Ubrelvy prn. ICD-10-CM    1. Migraine without aura and without status migrainosus, not intractable  G43.009 MRI BRAIN W WO CONTRAST       PLAN:  1. MRI brain   2. Ubrelvy prn. Discussed side effects with patient. 3. Botox therapy for headaches. 4. Stop OTC medications, could have rebound/medication overuse component   5. Return in about 3 months (around 1/18/2022) for follow up, sooner if worsening.     Ronda Kaye DNP, APRN

## 2021-10-18 NOTE — PROGRESS NOTES
REVIEW OF SYSTEMS    Constitutional: []Fever []Sweats []Chills [] Recent Injury [x] Denies all unless marked  HEENT:[x]Headache  [] Head Injury [] Hearing Loss  [] Sore Throat  [] Ear Ache [x] Denies all unless marked  Spine:  [] Neck pain  [] Back pain  [] Sciaticia  [x] Denies all unless marked  Cardiovascular:[]Heart Disease []Palpitations [] Chest Pain   [x] Denies all unless marked  Pulmonary: []Shortness of Breath []Cough   [x] Denies all unless marked  Psychiatric/Behavioral:[] Depression [] Anxiety [x] Denies all unless marked  Gastrointestinal: [x]Nausea  []Vomiting  []Abdominal Pain  []Constipation  []Diarrhea  [x] Denies all unless marked  Genitourinary:   [] Frequency  [] Urgency  [] Dysuria [] Incontinence  [x] Denies all unless marked  Extremities: []Pain  []Swelling  [x] Denies all unless marked  Musculoskeletal: [] Myalgias  [] Joint Pain  [] Arthritis [] Muscle Cramps [] Muscle Twitches  [x] Denies all unless marked  Sleep: []Insomnia[]Snoring []Restless Legs  []Sleep Apnea  []Daytime Sleepiness  [x] Denies all unless marked  Skin:[] Rash [] Color Change [x] Denies all unless marked   Neurological:[]Visual Disturbance [] Memory Loss []Loss of Balance []Slurred Speech []Weakness []Seizures  [] Dizziness [x] Denies all unless marked

## 2021-10-19 ENCOUNTER — OFFICE VISIT (OUTPATIENT)
Dept: PRIMARY CARE CLINIC | Age: 71
End: 2021-10-19
Payer: MEDICARE

## 2021-10-19 VITALS
HEART RATE: 80 BPM | TEMPERATURE: 97.8 F | WEIGHT: 228.25 LBS | DIASTOLIC BLOOD PRESSURE: 60 MMHG | BODY MASS INDEX: 38.97 KG/M2 | OXYGEN SATURATION: 98 % | HEIGHT: 64 IN | SYSTOLIC BLOOD PRESSURE: 110 MMHG

## 2021-10-19 DIAGNOSIS — R20.0 NUMBNESS AND TINGLING: ICD-10-CM

## 2021-10-19 DIAGNOSIS — R53.1 WEAKNESS: ICD-10-CM

## 2021-10-19 DIAGNOSIS — R20.2 NUMBNESS AND TINGLING: ICD-10-CM

## 2021-10-19 DIAGNOSIS — I95.2 HYPOTENSION DUE TO DRUGS: Primary | ICD-10-CM

## 2021-10-19 DIAGNOSIS — I95.2 HYPOTENSION DUE TO DRUGS: ICD-10-CM

## 2021-10-19 LAB
ALBUMIN SERPL-MCNC: 4.3 G/DL (ref 3.5–5.2)
ALP BLD-CCNC: 96 U/L (ref 35–104)
ALT SERPL-CCNC: 13 U/L (ref 5–33)
ANION GAP SERPL CALCULATED.3IONS-SCNC: 14 MMOL/L (ref 7–19)
AST SERPL-CCNC: 18 U/L (ref 5–32)
BASOPHILS ABSOLUTE: 0.1 K/UL (ref 0–0.2)
BASOPHILS RELATIVE PERCENT: 0.5 % (ref 0–1)
BILIRUB SERPL-MCNC: 0.3 MG/DL (ref 0.2–1.2)
BUN BLDV-MCNC: 10 MG/DL (ref 8–23)
CALCIUM SERPL-MCNC: 10 MG/DL (ref 8.8–10.2)
CHLORIDE BLD-SCNC: 102 MMOL/L (ref 98–111)
CO2: 24 MMOL/L (ref 22–29)
CREAT SERPL-MCNC: 0.7 MG/DL (ref 0.5–0.9)
EOSINOPHILS ABSOLUTE: 0.1 K/UL (ref 0–0.6)
EOSINOPHILS RELATIVE PERCENT: 0.8 % (ref 0–5)
GFR AFRICAN AMERICAN: >59
GFR NON-AFRICAN AMERICAN: >60
GLUCOSE BLD-MCNC: 107 MG/DL (ref 74–109)
HCT VFR BLD CALC: 41.9 % (ref 37–47)
HEMOGLOBIN: 13 G/DL (ref 12–16)
IMMATURE GRANULOCYTES #: 0 K/UL
LYMPHOCYTES ABSOLUTE: 1.3 K/UL (ref 1.1–4.5)
LYMPHOCYTES RELATIVE PERCENT: 13.6 % (ref 20–40)
MAGNESIUM: 1.5 MG/DL (ref 1.6–2.4)
MCH RBC QN AUTO: 30.3 PG (ref 27–31)
MCHC RBC AUTO-ENTMCNC: 31 G/DL (ref 33–37)
MCV RBC AUTO: 97.7 FL (ref 81–99)
MONOCYTES ABSOLUTE: 0.7 K/UL (ref 0–0.9)
MONOCYTES RELATIVE PERCENT: 7.5 % (ref 0–10)
NEUTROPHILS ABSOLUTE: 7.5 K/UL (ref 1.5–7.5)
NEUTROPHILS RELATIVE PERCENT: 77.2 % (ref 50–65)
PDW BLD-RTO: 12.5 % (ref 11.5–14.5)
PLATELET # BLD: 277 K/UL (ref 130–400)
PMV BLD AUTO: 10.3 FL (ref 9.4–12.3)
POTASSIUM SERPL-SCNC: 4.1 MMOL/L (ref 3.5–5)
RBC # BLD: 4.29 M/UL (ref 4.2–5.4)
SODIUM BLD-SCNC: 140 MMOL/L (ref 136–145)
TOTAL PROTEIN: 7.1 G/DL (ref 6.6–8.7)
TSH SERPL DL<=0.05 MIU/L-ACNC: 1.88 UIU/ML (ref 0.27–4.2)
VITAMIN B-12: 370 PG/ML (ref 211–946)
WBC # BLD: 9.7 K/UL (ref 4.8–10.8)

## 2021-10-19 PROCEDURE — 93000 ELECTROCARDIOGRAM COMPLETE: CPT | Performed by: NURSE PRACTITIONER

## 2021-10-19 PROCEDURE — 99214 OFFICE O/P EST MOD 30 MIN: CPT | Performed by: NURSE PRACTITIONER

## 2021-10-19 RX ORDER — HYDRALAZINE HYDROCHLORIDE 10 MG/1
10 TABLET, FILM COATED ORAL 3 TIMES DAILY
Qty: 90 TABLET | Refills: 3 | Status: CANCELLED | OUTPATIENT
Start: 2021-10-19

## 2021-10-19 ASSESSMENT — ENCOUNTER SYMPTOMS
RHINORRHEA: 0
DIARRHEA: 0
COUGH: 0
SHORTNESS OF BREATH: 0
SORE THROAT: 0
CONSTIPATION: 0
EYE REDNESS: 0
VOMITING: 0
ABDOMINAL PAIN: 0

## 2021-10-19 NOTE — PROGRESS NOTES
Veronika Holden (:  1950) is a 80 y.o. female,Established patient, here for evaluation of the following chief complaint(s):  Follow-up (low BP)      ASSESSMENT/PLAN:    ICD-10-CM    1. Hypotension due to drugs  I95.2 Vitamin B12     CBC Auto Differential     Comprehensive Metabolic Panel     TSH without Reflex     EKG 12 Lead: Sinus rhythm at 76. No ST segment changes     Magnesium  Stop hydralazine  Continue metoprolol 100 mg daily and Norvasc 5 mg daily  Patient is asked to monitor BP at home or work, several times per month and return with written values at next office visit. 2. Weakness  R53.1 Vitamin B12     CBC Auto Differential     Comprehensive Metabolic Panel     TSH without Reflex     EKG 12 Lead     Magnesium   3. Numbness and tingling  R20.0 Vitamin B12    R20.2 CBC Auto Differential     Comprehensive Metabolic Panel     TSH without Reflex     Magnesium  Awaiting MRI brain as ordered per neurology     I spent 32 minutes in direct conversation with the patient regarding her signs and symptoms and discussing treatment options    Return in about 10 days (around 10/29/2021), or if symptoms worsen or fail to improve. SUBJECTIVE/OBJECTIVE:  HPI     MIGRAINE:  She saw neurology yesterday. She has an MRI of brain ordered. She is taking Zanaflex nightly. She was given Brittnee Prose prn migraines. HYPOTENSION:  She came in for a BP check last week. \"I felt down like I was fixing to fall. \"   Denies a spinning sensation   \"I did not have any strength. \"   \"At the house it was 100/50. \"  These feeling started out of nowhere. She had low BP. She was instructed to stop her Lisinopril 40 mg  She continues on hydralazine 10 mg BID, Toprol 100 mg and Norvasc 5 mg. After she takes her morning medications, her lips start getting numb and her tongue gets numb. The tongue numbness resolves within a few hours   Denies difficulty swallowing. Then she feels like all her strength comes out of her body.   \"When I am walking I feel wobbly. \"  \"I start to feel better in the evening before I have to take my night time pills. \"    BP: 147/86, 121/63, 114/67, 114/65, 145/71, 148/76, 124/73, 121/72, 143/75, 125/70, 133/89, 135/85  This morning before I took my medicine this morning it was 163/103, then after med it was 110/64      /60   Pulse 80   Temp 97.8 °F (36.6 °C) (Temporal)   Ht 5' 4\" (1.626 m)   Wt 228 lb 4 oz (103.5 kg)   SpO2 98%   BMI 39.18 kg/m²     Review of Systems   Constitutional: Negative for chills, fatigue and fever. HENT: Negative for congestion, ear pain, rhinorrhea and sore throat. Eyes: Negative for redness. Respiratory: Negative for cough and shortness of breath. Cardiovascular: Negative for chest pain. Gastrointestinal: Negative for abdominal pain, constipation, diarrhea and vomiting. Genitourinary: Negative for dysuria, frequency and urgency. Musculoskeletal: Positive for neck pain. Skin: Negative for rash. Neurological: Positive for dizziness (Intermittent), weakness, numbness (Intermittent lips and tongue numbness and tingling) and headaches (right sided). Psychiatric/Behavioral: Positive for sleep disturbance (improved with Ativan prn). Physical Exam  Vitals reviewed. Constitutional:       Appearance: Normal appearance. She is well-developed. HENT:      Head: Normocephalic. Right Ear: Tympanic membrane, ear canal and external ear normal.      Left Ear: Tympanic membrane, ear canal and external ear normal.      Nose: Nose normal.   Eyes:      General:         Right eye: No discharge. Left eye: No discharge. Neck:      Vascular: No carotid bruit. Cardiovascular:      Rate and Rhythm: Normal rate and regular rhythm. Pulmonary:      Effort: Pulmonary effort is normal.      Breath sounds: Normal breath sounds. No wheezing, rhonchi or rales. Abdominal:      General: Bowel sounds are normal.      Palpations: Abdomen is soft.    Musculoskeletal: Cervical back: Normal range of motion. Skin:     General: Skin is dry. Neurological:      General: No focal deficit present. Mental Status: She is alert and oriented to person, place, and time. Mental status is at baseline. Cranial Nerves: Cranial nerves are intact. Sensory: Sensation is intact. Motor: Motor function is intact. Psychiatric:         Mood and Affect: Mood normal.         Behavior: Behavior normal.         Thought Content: Thought content normal.         Judgment: Judgment normal.             An electronic signature was used to authenticate this note.     --Donnell Meza, APRN

## 2021-10-20 ENCOUNTER — TELEPHONE (OUTPATIENT)
Dept: PRIMARY CARE CLINIC | Age: 71
End: 2021-10-20

## 2021-10-20 DIAGNOSIS — E53.8 B12 DEFICIENCY: Primary | ICD-10-CM

## 2021-10-20 DIAGNOSIS — R79.0 LOW MAGNESIUM LEVEL: ICD-10-CM

## 2021-10-20 NOTE — TELEPHONE ENCOUNTER
----- Message from LUIZ Calvillo sent at 10/20/2021  8:01 AM CDT -----  Vitamin B12 is low normal.  Recommend Vitamin B12 injections every 2 weeks for a month and then continue monthly. Thyroid is normal.  Magnesium is a little low. Recommend Magnesium 400 mg daily  Recheck Magnesium and Vitamin B12 in 4-6 weeks.   CMP: WNL, this includes normal electrolytes, kidney and liver fxn  CBC: WNL, no evidence of infection or anemia

## 2021-10-21 NOTE — TELEPHONE ENCOUNTER
Called patient, spoke with: Patient regarding the results of the patients most recent labs. I advised Patient of Teodora Larson recommendations.    Patient did voice understanding

## 2021-10-25 ENCOUNTER — NURSE ONLY (OUTPATIENT)
Dept: PRIMARY CARE CLINIC | Age: 71
End: 2021-10-25
Payer: MEDICARE

## 2021-10-25 DIAGNOSIS — E53.8 B12 DEFICIENCY: Primary | ICD-10-CM

## 2021-10-25 PROCEDURE — 96372 THER/PROPH/DIAG INJ SC/IM: CPT | Performed by: NURSE PRACTITIONER

## 2021-10-25 RX ORDER — CYANOCOBALAMIN 1000 UG/ML
1000 INJECTION INTRAMUSCULAR; SUBCUTANEOUS ONCE
Status: COMPLETED | OUTPATIENT
Start: 2021-10-25 | End: 2021-10-25

## 2021-10-25 RX ADMIN — CYANOCOBALAMIN 1000 MCG: 1000 INJECTION INTRAMUSCULAR; SUBCUTANEOUS at 13:01

## 2021-10-25 NOTE — PROGRESS NOTES
After obtaining consent, and per orders of ASHLEY DEE, injection of b12 given in Left arm  by Colby Funes. Patient tolerated well. Medication was not supplied by patient.

## 2021-10-29 ENCOUNTER — TELEPHONE (OUTPATIENT)
Dept: PRIMARY CARE CLINIC | Age: 71
End: 2021-10-29

## 2021-10-29 NOTE — TELEPHONE ENCOUNTER
Patient missed her appt today  Called to check on her BP as it had been running low  She apologized, she forgot about her appt   She gave me some readings from this last week   The lowest was 132/75 highest was 155/91 HR is staying in the 70's-80's  She no longer feels dizzy  Advised patient I would forward these to [facility] and let her know if we needed to make any other adjustments

## 2021-10-29 NOTE — TELEPHONE ENCOUNTER
The current medical regimen is effective;  continue present plan and medications. Patient is asked to monitor BP at home or work, several times per month and return with written values at next office visit.

## 2021-11-01 ENCOUNTER — NURSE ONLY (OUTPATIENT)
Dept: PRIMARY CARE CLINIC | Age: 71
End: 2021-11-01
Payer: MEDICARE

## 2021-11-01 DIAGNOSIS — E53.8 B12 DEFICIENCY: Primary | ICD-10-CM

## 2021-11-01 PROCEDURE — 96372 THER/PROPH/DIAG INJ SC/IM: CPT | Performed by: PEDIATRICS

## 2021-11-01 RX ORDER — CYANOCOBALAMIN 1000 UG/ML
1000 INJECTION INTRAMUSCULAR; SUBCUTANEOUS ONCE
Status: COMPLETED | OUTPATIENT
Start: 2021-11-01 | End: 2021-11-01

## 2021-11-01 RX ADMIN — CYANOCOBALAMIN 1000 MCG: 1000 INJECTION INTRAMUSCULAR; SUBCUTANEOUS at 13:58

## 2021-11-03 ENCOUNTER — HOSPITAL ENCOUNTER (OUTPATIENT)
Dept: MRI IMAGING | Age: 71
Discharge: HOME | End: 2021-11-03
Payer: MEDICARE

## 2021-11-03 DIAGNOSIS — G43.009 MIGRAINE WITHOUT AURA AND WITHOUT STATUS MIGRAINOSUS, NOT INTRACTABLE: ICD-10-CM

## 2021-11-03 LAB
GFR AFRICAN AMERICAN: >60
GFR NON-AFRICAN AMERICAN: >60
PERFORMED ON: NORMAL
POC CREATININE: 0.7 MG/DL (ref 0.3–1.3)
POC SAMPLE TYPE: NORMAL

## 2021-11-03 PROCEDURE — 70553 MRI BRAIN STEM W/O & W/DYE: CPT

## 2021-11-03 PROCEDURE — 6360000004 HC RX CONTRAST MEDICATION: Performed by: NURSE PRACTITIONER

## 2021-11-03 PROCEDURE — 82565 ASSAY OF CREATININE: CPT

## 2021-11-03 PROCEDURE — A9577 INJ MULTIHANCE: HCPCS | Performed by: NURSE PRACTITIONER

## 2021-11-03 RX ADMIN — GADOBENATE DIMEGLUMINE 20 ML: 529 INJECTION, SOLUTION INTRAVENOUS at 10:28

## 2021-11-04 ENCOUNTER — HOSPITAL ENCOUNTER (OUTPATIENT)
Dept: PAIN MANAGEMENT | Age: 71
Discharge: HOME | End: 2021-11-04
Payer: MEDICARE

## 2021-11-04 VITALS
RESPIRATION RATE: 18 BRPM | DIASTOLIC BLOOD PRESSURE: 83 MMHG | HEART RATE: 90 BPM | SYSTOLIC BLOOD PRESSURE: 158 MMHG | TEMPERATURE: 97 F | OXYGEN SATURATION: 96 %

## 2021-11-04 PROCEDURE — 64615 CHEMODENERV MUSC MIGRAINE: CPT

## 2021-11-04 PROCEDURE — 6360000002 HC RX W HCPCS

## 2021-11-04 PROCEDURE — 64615 CHEMODENERV MUSC MIGRAINE: CPT | Performed by: NURSE PRACTITIONER

## 2021-11-04 NOTE — PROGRESS NOTES
[facility] Botox Procedure Note     Patient:   Julianne Ray  MR#:    [id number]  Account Number:                   [id number]      YOB: 1950  Date of Evaluation:  11/4/2021  Time of Note:                          8:31 AM  Primary Physician:    LUIZ Alanis   Consulting Physician:  LUIZ Gould DNP    Consent was signed and on the chart. Risk, benefits, and side effects discussed. Pt has a clear history of having more than 15 days/month of migraine, lasting more than 4 hours with multiple treatment failures. Vial Exp Date: 2/24 x2  Heath Ruth Lot Number:  [id number] x2    Botox was diluted with 0.9% NS to yield a final concentration of 50 units / 1 ml. The following muscles were injected in 0.1 ml (5 unit) increments:    -   5 units left, 5 units right  Procerus-      5 units  Frontalis-      20 units divided into 4 sites left and right  Temporalis-  40 units divided into 4 sites left and 4 sites right  Occipitalis-    30 units divided into 3 sites left and 3 sites right  Cervical Paraspinal-  20 units divided into 2 sites left and 2 sites right  Trapezius-     30 units divided into 3 sites left and 3 sites right    Total units injected: 155  Total units unavoidably discarded: 45    Pt tolerated the procedure well. There were no complications. Pt will follow up in 6 weeks to assess effectiveness and will repeat injections in 12 weeks if continues to benefit.       LUIZ Gould DNP

## 2021-11-04 NOTE — PROGRESS NOTES
Procedure:  Level of Consciousness: [x]Alert []Oriented []Disoriented []Lethargic  Anxiety Level: [x]Calm []Anxious []Depressed []Other  Skin: [x]Warm [x]Dry []Cool []Moist []Intact []Other  Cardiovascular: []Palpitations: [x]Never []Occasionally []Frequently  Chest Pain: [x]No []Yes  Respiratory:  [x]Unlabored []Labored []Cough ([] Productive []Unproductive)  HCG Required: [x]No []Yes   Results: []Negative []Positive  Knowledge Level:        [x]Patient/Other verbalized understanding of pre-procedure instructions. [x]Assessment of post-op care needs (transportation, responsible caregiver)        [x]Able to discuss health care problems and how to deal with it. Factors that Affect Teaching:        Language Barrier: [x]No []Yes - why:        Hearing Loss:        [x]No []Yes            Corrective Device:  []Yes []No        Vision Loss:           []No [x]Yes            Corrective Device:  [x]Yes []No        Memory Loss:       [x]No []Yes            []Short Term []Long Term  Motivational Level:  [x]Asks Questions                  []Extremely Anxious       [x]Seems Interested               []Seems Uninterested                  []Denies need for Education  Risk for Injury:  [x]Patient oriented to person, place and time  []History of frequent falls/loss of balance  Nutritional:  []Change in appetite   []Weight Gain   []Weight Loss  Functional:  []Requires assistance with ADL'sPatient states that he/she has ___30___ headaches out of 30 days each month.   Patient states that he/she has __2____ migraines out of 30 days each month.monthly

## 2021-11-09 ENCOUNTER — NURSE ONLY (OUTPATIENT)
Dept: PRIMARY CARE CLINIC | Age: 71
End: 2021-11-09
Payer: MEDICARE

## 2021-11-09 DIAGNOSIS — E53.8 B12 DEFICIENCY: Primary | ICD-10-CM

## 2021-11-09 PROCEDURE — 96372 THER/PROPH/DIAG INJ SC/IM: CPT | Performed by: NURSE PRACTITIONER

## 2021-11-09 RX ORDER — CYANOCOBALAMIN 1000 UG/ML
1000 INJECTION INTRAMUSCULAR; SUBCUTANEOUS ONCE
Status: COMPLETED | OUTPATIENT
Start: 2021-11-09 | End: 2021-11-09

## 2021-11-09 RX ADMIN — CYANOCOBALAMIN 1000 MCG: 1000 INJECTION INTRAMUSCULAR; SUBCUTANEOUS at 10:03

## 2021-11-09 NOTE — PROGRESS NOTES
After obtaining consent from [facility] LUIZ, gave patient vitamin b12 1000  injection in Left deltoid, patient tolerated well. Medication was not supplied by the patient.

## 2021-11-11 ENCOUNTER — TELEPHONE (OUTPATIENT)
Dept: NEUROLOGY | Age: 71
End: 2021-11-11

## 2021-11-11 NOTE — TELEPHONE ENCOUNTER
Received a request for a PA on Ubrelvy 100MG tablets. Snt PA to plan. Currently waiting on their response.        Juliet Chun (Macrina Valdez)  Rx #: [id number]  [address] 100MG tablets     Form  [facility] Medicare Electronic Prior Authorization Request Form (76[address])

## 2021-12-07 ENCOUNTER — OFFICE VISIT (OUTPATIENT)
Dept: PRIMARY CARE CLINIC | Age: 71
End: 2021-12-07
Payer: MEDICARE

## 2021-12-07 VITALS
WEIGHT: 229.5 LBS | HEIGHT: 64 IN | TEMPERATURE: 97.9 F | OXYGEN SATURATION: 98 % | BODY MASS INDEX: 39.18 KG/M2 | HEART RATE: 88 BPM | SYSTOLIC BLOOD PRESSURE: 90 MMHG | DIASTOLIC BLOOD PRESSURE: 60 MMHG

## 2021-12-07 DIAGNOSIS — E53.8 B12 DEFICIENCY: ICD-10-CM

## 2021-12-07 DIAGNOSIS — Z91.81 AT HIGH RISK FOR FALLS: ICD-10-CM

## 2021-12-07 DIAGNOSIS — R07.89 CHEST TIGHTNESS: ICD-10-CM

## 2021-12-07 DIAGNOSIS — M54.6 ACUTE LEFT-SIDED THORACIC BACK PAIN: Primary | ICD-10-CM

## 2021-12-07 DIAGNOSIS — G62.9 NEUROPATHY: ICD-10-CM

## 2021-12-07 PROCEDURE — 99214 OFFICE O/P EST MOD 30 MIN: CPT | Performed by: NURSE PRACTITIONER

## 2021-12-07 PROCEDURE — 96372 THER/PROPH/DIAG INJ SC/IM: CPT | Performed by: NURSE PRACTITIONER

## 2021-12-07 PROCEDURE — 93000 ELECTROCARDIOGRAM COMPLETE: CPT | Performed by: NURSE PRACTITIONER

## 2021-12-07 RX ORDER — CYANOCOBALAMIN 1000 UG/ML
1000 INJECTION INTRAMUSCULAR; SUBCUTANEOUS ONCE
Status: COMPLETED | OUTPATIENT
Start: 2021-12-07 | End: 2021-12-07

## 2021-12-07 RX ORDER — GABAPENTIN 100 MG/1
100 CAPSULE ORAL 2 TIMES DAILY
Qty: 60 CAPSULE | Refills: 3 | Status: SHIPPED | OUTPATIENT
Start: 2021-12-07 | End: 2022-01-07

## 2021-12-07 RX ORDER — DEXAMETHASONE SODIUM PHOSPHATE 4 MG/ML
8 INJECTION, SOLUTION INTRA-ARTICULAR; INTRALESIONAL; INTRAMUSCULAR; INTRAVENOUS; SOFT TISSUE ONCE
Status: COMPLETED | OUTPATIENT
Start: 2021-12-07 | End: 2021-12-07

## 2021-12-07 RX ORDER — TIZANIDINE 2 MG/1
2 TABLET ORAL EVERY 8 HOURS PRN
Qty: 30 TABLET | Refills: 0 | Status: SHIPPED | OUTPATIENT
Start: 2021-12-07 | End: 2021-12-20

## 2021-12-07 RX ORDER — METHYLPREDNISOLONE 4 MG/1
TABLET ORAL
Qty: 1 KIT | Refills: 0 | Status: SHIPPED | OUTPATIENT
Start: 2021-12-07 | End: 2021-12-13

## 2021-12-07 RX ADMIN — CYANOCOBALAMIN 1000 MCG: 1000 INJECTION INTRAMUSCULAR; SUBCUTANEOUS at 16:24

## 2021-12-07 RX ADMIN — DEXAMETHASONE SODIUM PHOSPHATE 8 MG: 4 INJECTION, SOLUTION INTRA-ARTICULAR; INTRALESIONAL; INTRAMUSCULAR; INTRAVENOUS; SOFT TISSUE at 16:25

## 2021-12-07 ASSESSMENT — ENCOUNTER SYMPTOMS
SORE THROAT: 0
DIARRHEA: 0
BACK PAIN: 1
RHINORRHEA: 0
VOMITING: 0
COUGH: 0
SHORTNESS OF BREATH: 0
CONSTIPATION: 0
EYE REDNESS: 0

## 2021-12-07 NOTE — PROGRESS NOTES
Buck Ibrahim (:  1950) is a 80 y.o. female,Established patient, here for evaluation of the following chief complaint(s):  Rib Pain, Back Pain, and Hip Pain (left side)      ASSESSMENT/PLAN:    ICD-10-CM    1. Acute left-sided thoracic back pain  M54.6 dexamethasone (DECADRON) injection 8 mg     methylPREDNISolone (MEDROL DOSEPACK) 4 MG tablet (start oral steroids tomorrow)  Monitor blood sugars while on steroids     tiZANidine (ZANAFLEX) 2 MG tablet   2. At high risk for falls  Z91.81 Fall precautions   3. Chest tightness  R07.89 EKG 12 Lead: SR @ 80     MT ELECTROCARDIOGRAM, COMPLETE   4. Neuropathy  G62.9 gabapentin (NEURONTIN) 100 MG capsule   5. B12 deficiency  E53.8 cyanocobalamin injection 1,000 mcg       Return in about 1 month (around 2022), or if symptoms worsen or fail to improve. SUBJECTIVE/OBJECTIVE:  HPI     Reports left sided back pain. \"It is burning and aching pain. The more I stand up the worser it gets. Last night it hurt so bad, I could not raise my arm. \"  The pain started yesterday. Movement makes the pain worse. \"If I bend over to put my dishes in the , it flares it up. \"  She has taken some Zanaflex with some improvement. The pain is better today. It hurts in her back if she takes a deep breath. Reports some chest tightness yesterday. It is better today. Denies SOA    \"My feet are burning. \"  This started about a month ago. It is worse after she wears her shoes. Sometimes it is just the outside of her feet  \"If I don't wear no shoes it is better. \"    BP 90/60   Pulse 88   Temp 97.9 °F (36.6 °C) (Temporal)   Ht 5' 4\" (1.626 m)   Wt 229 lb 8 oz (104.1 kg)   SpO2 98%   BMI 39.39 kg/m²     Review of Systems   Constitutional: Negative for chills, fatigue and fever. HENT: Negative for congestion, ear pain, rhinorrhea and sore throat. Eyes: Negative for redness. Respiratory: Negative for cough and shortness of breath.     Cardiovascular: Positive for chest pain (\"chest pressure\" yesterday, resolved today). Gastrointestinal: Negative for constipation, diarrhea and vomiting. Musculoskeletal: Positive for arthralgias (left side) and back pain (left mid back). Skin: Negative for rash. Neurological: Positive for numbness (bilateral). Negative for dizziness and headaches. Physical Exam  Vitals reviewed. Constitutional:       Appearance: She is well-developed. She is obese. HENT:      Head: Normocephalic. Right Ear: Tympanic membrane and external ear normal.      Left Ear: Tympanic membrane and external ear normal.      Nose: Nose normal.   Cardiovascular:      Rate and Rhythm: Normal rate and regular rhythm. Pulmonary:      Effort: Pulmonary effort is normal.      Breath sounds: Normal breath sounds. No wheezing, rhonchi or rales. Abdominal:      General: Bowel sounds are normal.      Palpations: Abdomen is soft. Musculoskeletal:      Cervical back: Normal range of motion. Thoracic back: Tenderness (L>R) present. Lumbar back: Tenderness (left) present. Skin:     General: Skin is dry. Neurological:      General: No focal deficit present. Mental Status: She is alert and oriented to person, place, and time. Mental status is at baseline. Psychiatric:         Mood and Affect: Mood normal.         Behavior: Behavior normal.         Thought Content: Thought content normal.         Judgment: Judgment normal.             An electronic signature was used to authenticate this note. --LUIZ Hart     On the basis of positive falls risk screening, assessment and plan is as follows: home safety tips provided.

## 2021-12-08 ENCOUNTER — NURSE ONLY (OUTPATIENT)
Dept: PRIMARY CARE CLINIC | Age: 71
End: 2021-12-08
Payer: MEDICARE

## 2021-12-08 DIAGNOSIS — Z23 NEED FOR COVID-19 VACCINE: Primary | ICD-10-CM

## 2021-12-08 PROCEDURE — 91300 COVID-19, PFIZER VACCINE 30MCG/0.3ML DOSE: CPT | Performed by: PEDIATRICS

## 2021-12-08 PROCEDURE — 0004A COVID-19, PFIZER VACCINE 30MCG/0.3ML DOSE: CPT | Performed by: PEDIATRICS

## 2021-12-08 NOTE — PROGRESS NOTES
After obtaining consent, and per orders of Dr. Akua Jimenez, injection of Pfizer Covid 0.3 mL Booster was given in the Right deltoid . Patient tolerated it well. Patient instructed to report any adverse reaction to me immediately. COVID-19, Pfizer, PF, 30mcg/0.3mL      Current Status      Updated on: 12/8/2021 10:03 AM    Name Date Status Dose VIS Date Route Site  Lot# Given By Verified By   COVID-19[place] PF, 30mcg/0.3mL 12/8/2021 Given 0.3 mL 10/29/2021 [address]  [id number] Rebecca Taylor --   Exp. Date [facility] # Product Time Location External Comment   1/31/2022 [address]-9 Pfizer-BioNTech COVID-19 Vacc -- -- -- --   Question Answer Comment   VIS/EUA reviewed with patient and questions answered? Yes    VIS/EUA Given Date 12/8/2021    COVID-19 Vaccine Dose Booster    Pandemic Funds used for this vaccine?  Yes    Target Population Age 12+    Updated by: Rebecca Taylor

## 2021-12-13 DIAGNOSIS — G47.33 OSA (OBSTRUCTIVE SLEEP APNEA): Primary | ICD-10-CM

## 2021-12-14 ENCOUNTER — NURSE ONLY (OUTPATIENT)
Dept: PRIMARY CARE CLINIC | Age: 71
End: 2021-12-14
Payer: MEDICARE

## 2021-12-14 DIAGNOSIS — E53.8 B12 DEFICIENCY: Primary | ICD-10-CM

## 2021-12-14 PROCEDURE — 96372 THER/PROPH/DIAG INJ SC/IM: CPT | Performed by: NURSE PRACTITIONER

## 2021-12-14 RX ORDER — CYANOCOBALAMIN 1000 UG/ML
1000 INJECTION INTRAMUSCULAR; SUBCUTANEOUS ONCE
Status: COMPLETED | OUTPATIENT
Start: 2021-12-14 | End: 2021-12-14

## 2021-12-14 RX ADMIN — CYANOCOBALAMIN 1000 MCG: 1000 INJECTION INTRAMUSCULAR; SUBCUTANEOUS at 13:07

## 2021-12-14 NOTE — PROGRESS NOTES
After obtaining consent, and per orders of ASHLEY DEE, injection of B12 given in Left arm  by Shelley Wren. Patient tolerated well. Medication was not supplied by patient.

## 2021-12-15 DIAGNOSIS — E78.2 MIXED HYPERLIPIDEMIA: Chronic | ICD-10-CM

## 2021-12-16 ENCOUNTER — OFFICE VISIT (OUTPATIENT)
Dept: NEUROSURGERY | Age: 71
End: 2021-12-16
Payer: MEDICARE

## 2021-12-16 VITALS
HEART RATE: 78 BPM | BODY MASS INDEX: 38.93 KG/M2 | TEMPERATURE: 98 F | SYSTOLIC BLOOD PRESSURE: 135 MMHG | OXYGEN SATURATION: 97 % | DIASTOLIC BLOOD PRESSURE: 77 MMHG | WEIGHT: 228 LBS | HEIGHT: 64 IN

## 2021-12-16 DIAGNOSIS — E11.9 TYPE 2 DIABETES MELLITUS WITHOUT COMPLICATION, WITHOUT LONG-TERM CURRENT USE OF INSULIN (HCC): ICD-10-CM

## 2021-12-16 DIAGNOSIS — G43.009 MIGRAINE WITHOUT AURA AND WITHOUT STATUS MIGRAINOSUS, NOT INTRACTABLE: Primary | ICD-10-CM

## 2021-12-16 PROCEDURE — 99213 OFFICE O/P EST LOW 20 MIN: CPT | Performed by: NURSE PRACTITIONER

## 2021-12-16 RX ORDER — MAGNESIUM OXIDE 400 MG/1
400 TABLET ORAL DAILY
COMMUNITY
End: 2022-03-08

## 2021-12-16 RX ORDER — ATORVASTATIN CALCIUM 80 MG/1
80 TABLET, FILM COATED ORAL NIGHTLY
Qty: 90 TABLET | Refills: 3 | Status: SHIPPED | OUTPATIENT
Start: 2021-12-16

## 2021-12-16 RX ORDER — CYANOCOBALAMIN 1000 UG/ML
1000 INJECTION INTRAMUSCULAR; SUBCUTANEOUS
COMMUNITY
End: 2022-01-26

## 2021-12-16 NOTE — TELEPHONE ENCOUNTER
Received fax from pharmacy requesting refill on pts medication(s). Pt was last seen in office on 12/7/2021  and has a follow up scheduled for 1/7/2022. Will send request to  [facility]  for patient.      Requested Prescriptions     Pending Prescriptions Disp Refills    metFORMIN (GLUCOPHAGE) 500 MG tablet 180 tablet 3     Sig: Take 1 tablet by mouth 2 times daily (with meals)

## 2021-12-16 NOTE — PROGRESS NOTES
[facility] Neurology Office Note      Patient:   Melanie Cruz  MR#:    [id number]  Account Number:                         YOB: 1950  Date of Evaluation:  12/16/2021  Time of Note:                          9:42 AM  Primary/Referring Physician:  LUIZ Hart   Consulting Physician:  Maddie Pickens DNP, APRN    FOLLOW UP    Chief Complaint   Patient presents with    Follow-up     6 week follow up for botox. pt states things are great since starting botox     HISTORY OF PRESENT ILLNESS    Melanie Cruz is a 80y.o. year old female here for follow up of headaches. She has noted improvement in migraines since starting Botox therapy. She has noted that headache frequency and intensity are much improved. Noting 1 migraine in a month with other more mild headaches. No change in characteristics of headaches. Pain is posterior, right sided with little radiation of pain. She notes light/sound sensitivity with the headaches. Denies nausea, vomiting. Denies vision changes, vision loss with headaches. Denies clear aura. Denies focal symptoms with the headaches. Denies neck pain. She has noted more labile blood pressure recently. Denies daily analgesic use. Has tried and failed Topamax, Tizanidine, Tylenol. Already on a beta blocker. No prior triptan therapy. No other complaints.      Past Medical History:   Diagnosis Date    Anxiety     Depression     Edema     GERD (gastroesophageal reflux disease)     Headache(784.0)     migraines    Hyperlipidemia     Hypertension     Mini stroke (HCC)     Sleep apnea     CPAP    TIA (transient ischemic attack)        Past Surgical History:   Procedure Laterality Date    CHOLECYSTECTOMY      COLONOSCOPY  07/01/2015    Dr. Mei Hsu COLONOSCOPY  06/05/2019    Dr Kaylene [facility]) Non-bleeding internal hemorrhoids, diverticulosis-Tubular AP (-) dysplasia, 3 yr recall    CYSTOSCOPY Left 12/5/2018    CYSTOSCOPY URETEROSCOPY  PLACEMENT DOUBLE J STENT ON LEFT RIGHT  RETROGRADE PYELOGRAMS performed by Vane Pascual MD at [address] Bilateral 1/31/2019    TOTAL LAPAROSCOPIC HYSTERECTOMY BILATERAL SALPINGOOPHERECTOMY WITH Latoya Mura performed by Gabe Lozada MD at [facility], VAGINAL      [place] EGD TRANSORAL BIOPSY SINGLE/MULTIPLE N/A 5/8/2018    Dr Maynor Hyde (-) Kristin (+) Dye's (-) dysplasia--3 yr recall    [place] LAP,CHOLECYSTECTOMY N/A 3/6/2018    CHOLECYSTECTOMY LAPAROSCOPIC performed by Gely Anna MD at [place] [address]  08/26/2015    Dr. Tran Westbrook  5/23/2017    Dr Osorio-w/balloon dilation, 12-13. 5-15 mm-esophageal narrowing with diverticulum at 34 cm-Kristin (-), hiatal herni, Dye's (+) dysplasia (-)--1st dx--1 yr recall-Ct chest ordered    WRIST FRACTURE SURGERY         Family History   Problem Relation Age of Onset    Heart Disease Mother     Colon Cancer Neg Hx     Colon Polyps Neg Hx     Liver Cancer Neg Hx     Liver Disease Neg Hx     Esophageal Cancer Neg Hx     Rectal Cancer Neg Hx     Stomach Cancer Neg Hx        Social History     Socioeconomic History    Marital status:      Spouse name: Not on file    Number of children: Not on file    Years of education: Not on file    Highest education level: Not on file   Occupational History    Not on file   Tobacco Use    Smoking status: Never Smoker    Smokeless tobacco: Never Used   Vaping Use    Vaping Use: Never used   Substance and Sexual Activity    Alcohol use: No     Alcohol/week: 0.0 standard drinks    Drug use: No    Sexual activity: Not on file   Other Topics Concern    Not on file   Social History Narrative    Not on file     Social Determinants of Health     Financial Resource Strain: Low Risk     Difficulty of Paying Living Expenses: Not hard at all   Food Insecurity: No Food Insecurity    Worried About Running Out of Food in the Last Year: Never true   [employer] of Food in the Last Year: Never true   Transportation Needs:     Lack of Transportation (Medical): Not on file    Lack of Transportation (Non-Medical): Not on file   Physical Activity:     Days of Exercise per Week: Not on file    Minutes of Exercise per Session: Not on file   Stress:     Feeling of Stress : Not on file   Social Connections:     Frequency of Communication with Friends and Family: Not on file    Frequency of Social Gatherings with Friends and Family: Not on file    Attends Christianity Services: Not on file    Active Member of [address] [place] [address] [employer] or Organizations: Not on file    Attends Club or Organization Meetings: Not on file    Marital Status: Not on file   Intimate Partner Violence:     Fear of Current or Ex-Partner: Not on file    Emotionally Abused: Not on file    Physically Abused: Not on file    Sexually Abused: Not on file   Housing Stability:     Unable to Pay for Housing in the Last Year: Not on file    Number of [place] in the Last Year: Not on file    Unstable Housing in the Last Year: Not on file       Current Outpatient Medications   Medication Sig Dispense Refill    magnesium oxide (MAG-OX) 400 MG tablet Take 400 mg by mouth daily      cyanocobalamin 1000 MCG/ML injection Inject 1,000 mcg into the muscle every 14 days      gabapentin (NEURONTIN) 100 MG capsule Take 1 capsule by mouth 2 times daily for 30 days. 60 capsule 3    tiZANidine (ZANAFLEX) 2 MG tablet Take 1 tablet by mouth every 8 hours as needed (spsams) 30 tablet 0    Ubrogepant (UBRELVY) 100 MG TABS Take 1 tablet at the onset of migraine. May repeat once in 2 hours if no improvement. Do not exceed 2 tablets in 24 hours.  10 tablet 3    metFORMIN (GLUCOPHAGE) 500 MG tablet Take 1 tablet by mouth 2 times daily (with meals) 180 tablet 3    omeprazole (PRILOSEC) 20 MG delayed release capsule Take 1 capsule by mouth 2 times daily 180 capsule 3    furosemide (LASIX) 40 MG tablet Take 1 tablet by mouth daily 30 Twitches  [x]? Denies all unless marked  Sleep: []? Insomnia[]? Snoring []? Restless Legs  []? Sleep Apnea  []? Daytime Sleepiness  [x]? Denies all unless marked  Skin:[]? Rash []? Color Change [x]? Denies all unless marked   Neurological:[]? Visual Disturbance []? Memory Loss []? Loss of Balance []? Slurred Speech []? Weakness []? Seizures  []? Dizziness [x]? Denies all unless marked    The MA has completed the ROS with the patient. I have reviewed it in its' entirety with the patient and agree with the documentation. PHYSICAL EXAM  /77   Pulse 78   Temp 98 °F (36.7 °C)   Ht 5' 4\" (1.626 m)   Wt 228 lb (103.4 kg)   SpO2 97%   BMI 39.14 kg/m²       Constitutional - No acute distress    HEENT- Conjunctiva normal.  No scars, masses, or lesions over external nose or ears, no neck masses noted, no jugular vein distension, no bruit  Cardiac- Regular rate and rhythm  Pulmonary- Good expansion, normal effort without use of accessory muscles  Musculoskeletal - No significant wasting of muscles noted, no bony deformities  Extremities - No clubbing, cyanosis or edema  Skin - Warm, dry, and intact. No rash, erythema, or pallor  Psychiatric - Mood, affect, and behavior appear normal      NEUROLOGICAL EXAM     Mental status   [x] Awake, alert, oriented   [x]Affect attention and concentration appear appropriate  [x]Recent and remote memory appears unremarkable  [x]Speech normal without dysarthria or aphasia, comprehension and repetition intact.    COMMENTS:    Cranial Nerves [x]No VF deficit to confrontation,  no papilledema on fundoscopic exam.  [x]PERRLA, EOMI, no nystagmus, conjugate eye movements, no ptosis  [x]Face symmetric  [x]Facial sensation intact  [x]Tongue midline no atrophy or fasciculations present  [x]Palate midline, hearing to finger rub normal bilaterally  [x]Shoulder shrug and SCM testing normal bilaterally  COMMENTS:   Motor   [x]5/5 strength x 4 extremities  [x]Normal bulk and tone  [x]No tremor present  [x]No rigidity or bradykinesia noted  COMMENTS:   Sensory  [x]Sensation intact to light touch, pin prick, vibration, and proprioception BLE  [x]Sensation intact to light touch, pin prick, vibration, and proprioception BUE  COMMENTS:   Coordination [x]FTN normal bilaterally   [x]HTS normal bilaterally  [x]ANNA normal bilaterally. COMMENTS:   Reflexes  [x]Symmetric and non-pathological  [x]Toes down going bilaterally  [x]No clonus present  COMMENTS:   Gait                  [x]Normal steady gait    []Ataxic    []Spastic     []Magnetic     []Shuffling  COMMENTS:       LABS RECORD AND IMAGING REVIEW (As below and per HPI)    Lab Results   Component Value Date    TZAPIVYT77 370 10/19/2021     Lab Results   Component Value Date    WBC 9.7 10/19/2021    HGB 13.0 10/19/2021    HCT 41.9 10/19/2021    MCV 97.7 10/19/2021     10/19/2021     Lab Results   Component Value Date     10/19/2021    K 4.1 10/19/2021     10/19/2021    CO2 24 10/19/2021    BUN 10 10/19/2021    CREATININE 0.7 11/03/2021    GLUCOSE 107 10/19/2021    CALCIUM 10.0 10/19/2021    PROT 7.1 10/19/2021    LABALBU 4.3 10/19/2021    BILITOT 0.3 10/19/2021    ALKPHOS 96 10/19/2021    AST 18 10/19/2021    ALT 13 10/19/2021    LABGLOM >60 11/03/2021    GFRAA >60 11/03/2021    GLOB 2.8 03/06/2017     Lab Results   Component Value Date    CHOL 157 (L) 08/31/2021    TRIG 163 (H) 08/31/2021    HDL 45 (L) 08/31/2021    LDLCALC 79 08/31/2021     Lab Results   Component Value Date    TSH 1.880 10/19/2021    T4FREE 0.94 05/25/2021     Lab Results   Component Value Date    CRP 0.34 08/22/2019        CT HEAD WO CONTRAST    Result Date: 9/15/2021  EXAMINATION:  CT HEAD WO CONTRAST  9/15/2021 5:03 PM HISTORY: Chronic right-sided headache. TECHNIQUE: Multiple axial images were obtained through the brain without contrast infusion. Multiplanar images were reconstructed. DLP: 775 mGy-cm. Automated exposure control was utilized. COMPARISON: 10/31/2017. FINDINGS: There are no hemorrhage, edema or mass effect. There is a chronic lacunar infarct in the left basal ganglia region. There is mild atrophy. There is low-density in the hemispheric white matter. The visualized paranasal sinuses are clear. There is some fluid density in the right mastoid region. The left mastoid air cells are clear. 1. No hemorrhage, edema or mass effect. 2. Chronic lacunar infarct in the left basal ganglia region. 3. Mild atrophy. Low-density in the hemispheric white matter is nonspecific and likely due to chronic small vessel disease. Signed by Dr Eladia Sanford    MRI brain (11/2021)- normal     Reviewed referral records     ASSESSMENT:    Gunnar Canavan is a 80y.o. year old female here for follow up of Botox therapy, migraines. She has had a reduction in headache days since Botox therapy. Recent MRI brain was normal. Exam is non focal. Suspect migraine headaches, given improvement with Botox will continue this. ICD-10-CM    1. Migraine without aura and without status migrainosus, not intractable  G43.009        PLAN:  1. Continue Botox therapy for migraines   2. Continue Ubrelvy prn.   3. Headache diary to identify triggers   4. Return in about 3 months (around 3/16/2022) for follow up, sooner if worsening.     Makenzie Aguirre DNP, APRN

## 2021-12-16 NOTE — TELEPHONE ENCOUNTER
Received fax from pharmacy requesting refill on pts medication(s). Pt was last seen in office on 12/7/2021  and has a follow up scheduled for 1/7/2022. Will send request to  [facility]  for patient.      Requested Prescriptions     Pending Prescriptions Disp Refills    atorvastatin (LIPITOR) 80 MG tablet [Pharmacy Med Name: Atorvastatin Calcium 80 MG Oral Tablet] 90 tablet 0     Sig: Take 1 tablet by mouth nightly

## 2021-12-20 DIAGNOSIS — M54.6 ACUTE LEFT-SIDED THORACIC BACK PAIN: ICD-10-CM

## 2021-12-20 DIAGNOSIS — I10 ESSENTIAL HYPERTENSION: Chronic | ICD-10-CM

## 2021-12-20 RX ORDER — TIZANIDINE 2 MG/1
2 TABLET ORAL EVERY 8 HOURS PRN
Qty: 30 TABLET | Refills: 0 | Status: SHIPPED | OUTPATIENT
Start: 2021-12-20 | End: 2022-03-08

## 2021-12-20 RX ORDER — METOPROLOL SUCCINATE 100 MG/1
100 TABLET, EXTENDED RELEASE ORAL DAILY
Qty: 90 TABLET | Refills: 3 | Status: SHIPPED | OUTPATIENT
Start: 2021-12-20

## 2021-12-20 NOTE — TELEPHONE ENCOUNTER
Received fax from pharmacy requesting refill on pts medication(s). Pt was last seen in office on 12/7/2021  and has a follow up scheduled for 1/7/2022. Will send request to  [facility]  for authorization.      Requested Prescriptions     Pending Prescriptions Disp Refills    metoprolol succinate (TOPROL XL) 100 MG extended release tablet [Pharmacy Med Name: Metoprolol Succinate  MG Oral Tablet Extended Release 24 Hour] 90 tablet 3     Sig: Take 1 tablet by mouth daily    tiZANidine (ZANAFLEX) 2 MG tablet [Pharmacy Med Name: tiZANidine HCl 2 MG Oral Tablet] 30 tablet 0     Sig: Take 1 tablet by mouth every 8 hours as needed (headache)

## 2021-12-27 ENCOUNTER — OFFICE VISIT (OUTPATIENT)
Dept: PRIMARY CARE CLINIC | Age: 71
End: 2021-12-27
Payer: MEDICARE

## 2021-12-27 VITALS
SYSTOLIC BLOOD PRESSURE: 158 MMHG | HEART RATE: 124 BPM | TEMPERATURE: 97.9 F | BODY MASS INDEX: 39.14 KG/M2 | OXYGEN SATURATION: 97 % | WEIGHT: 228 LBS | DIASTOLIC BLOOD PRESSURE: 88 MMHG

## 2021-12-27 DIAGNOSIS — R50.9 FEVER, UNSPECIFIED FEVER CAUSE: ICD-10-CM

## 2021-12-27 DIAGNOSIS — R00.0 TACHYCARDIA: ICD-10-CM

## 2021-12-27 DIAGNOSIS — R00.0 TACHYCARDIA: Primary | ICD-10-CM

## 2021-12-27 DIAGNOSIS — J01.00 ACUTE NON-RECURRENT MAXILLARY SINUSITIS: ICD-10-CM

## 2021-12-27 DIAGNOSIS — R05.9 COUGH: ICD-10-CM

## 2021-12-27 DIAGNOSIS — J40 BRONCHITIS: ICD-10-CM

## 2021-12-27 LAB
ADENOVIRUS BY PCR: NOT DETECTED
BORDETELLA PARAPERTUSSIS BY PCR: NOT DETECTED
BORDETELLA PERTUSSIS BY PCR: NOT DETECTED
CHLAMYDOPHILIA PNEUMONIAE BY PCR: NOT DETECTED
CORONAVIRUS 229E BY PCR: NOT DETECTED
CORONAVIRUS HKU1 BY PCR: NOT DETECTED
CORONAVIRUS NL63 BY PCR: NOT DETECTED
CORONAVIRUS OC43 BY PCR: NOT DETECTED
HUMAN METAPNEUMOVIRUS BY PCR: NOT DETECTED
HUMAN RHINOVIRUS/ENTEROVIRUS BY PCR: NOT DETECTED
INFLUENZA A ANTIBODY: NEGATIVE
INFLUENZA A BY PCR: NOT DETECTED
INFLUENZA B ANTIBODY: NEGATIVE
INFLUENZA B BY PCR: NOT DETECTED
MYCOPLASMA PNEUMONIAE BY PCR: NOT DETECTED
PARAINFLUENZA VIRUS 1 BY PCR: NOT DETECTED
PARAINFLUENZA VIRUS 2 BY PCR: NOT DETECTED
PARAINFLUENZA VIRUS 3 BY PCR: NOT DETECTED
PARAINFLUENZA VIRUS 4 BY PCR: NOT DETECTED
RESPIRATORY SYNCYTIAL VIRUS BY PCR: NOT DETECTED
S PYO AG THROAT QL: NORMAL
SARS-COV-2, PCR: NOT DETECTED

## 2021-12-27 PROCEDURE — 87880 STREP A ASSAY W/OPTIC: CPT | Performed by: NURSE PRACTITIONER

## 2021-12-27 PROCEDURE — 99214 OFFICE O/P EST MOD 30 MIN: CPT | Performed by: NURSE PRACTITIONER

## 2021-12-27 PROCEDURE — 87804 INFLUENZA ASSAY W/OPTIC: CPT | Performed by: NURSE PRACTITIONER

## 2021-12-27 RX ORDER — DEXTROMETHORPHAN HYDROBROMIDE AND PROMETHAZINE HYDROCHLORIDE 15; 6.25 MG/5ML; MG/5ML
5 SYRUP ORAL 4 TIMES DAILY PRN
Qty: 118 ML | Refills: 0 | Status: SHIPPED | OUTPATIENT
Start: 2021-12-27 | End: 2022-01-03

## 2021-12-27 RX ORDER — METHYLPREDNISOLONE 4 MG/1
TABLET ORAL
Qty: 1 KIT | Refills: 0 | Status: SHIPPED | OUTPATIENT
Start: 2021-12-27 | End: 2022-01-02

## 2021-12-27 RX ORDER — CEFDINIR 300 MG/1
300 CAPSULE ORAL 2 TIMES DAILY
Qty: 20 CAPSULE | Refills: 0 | Status: SHIPPED | OUTPATIENT
Start: 2021-12-27 | End: 2022-01-06

## 2021-12-27 ASSESSMENT — ENCOUNTER SYMPTOMS
ABDOMINAL PAIN: 0
BACK PAIN: 0
SINUS PRESSURE: 0
COUGH: 1
SHORTNESS OF BREATH: 1
WHEEZING: 0

## 2021-12-27 NOTE — PROGRESS NOTES
Ira Roy (:  1950) is a 80 y.o. female,Established patient, here for evaluation of the following chief complaint(s):  Fever (101, body aches, cough, shortness of breath. has been taking delsym, throat spray, coricidin. was seen for this 3 weeks ago and given steroid and never seemed to complete get rid of it. )      ASSESSMENT/PLAN:    ICD-10-CM    1. Tachycardia  R00.0 Miscellaneous Sendout 1  Discussed increase fluids  Monitor at home  If continues to ER or let us know       CANCELED: Miscellaneous Sendout 1   2. Fever, unspecified fever cause  R50.9 POCT Influenza A/B     POCT rapid strep A     Miscellaneous Sendout 1     CANCELED: Miscellaneous Sendout 1   3. Cough  R05.9 promethazine-dextromethorphan (PROMETHAZINE-DM) 6.25-15 MG/5ML syrup  Stop delsym  Increase fluids       methylPREDNISolone (MEDROL DOSEPACK) 4 MG tablet     Miscellaneous Sendout 1     CANCELED: Miscellaneous Sendout 1   4. Acute non-recurrent maxillary sinusitis  J01.00 cefdinir (OMNICEF) 300 MG capsule     methylPREDNISolone (MEDROL DOSEPACK) 4 MG tablet   5. Bronchitis  J40 promethazine-dextromethorphan (PROMETHAZINE-DM) 6.25-15 MG/5ML syrup     methylPREDNISolone (MEDROL DOSEPACK) 4 MG tablet  Due to length of time  Pending covid   Will need to isolate         Return if symptoms worsen or fail to improve.     SUBJECTIVE/OBJECTIVE:  HPI     Patient here for sickness  Started around 1.5 week ago  Reports fever off and on not able to get rid of it  Reports cold chills   Reports that she just cant get over it    Using delysm with no relief  Reports cough is productive  Yellow to green yellow  She had a steroid for back pain    Noted tachycardia  120s at office  Oxygen level 98  Denies any heart racing  She reports her joints are aching and she just feels \"off \"  Worse and upset about not seeing her daughter          BP (!) 158/88   Pulse 124   Temp 97.9 °F (36.6 °C) (Temporal)   Wt 228 lb (103.4 kg)   SpO2 97%   BMI 39.14 kg/m²   Review of Systems   Constitutional: Positive for activity change. Negative for appetite change, fatigue and fever. HENT: Negative for congestion and sinus pressure. Respiratory: Positive for cough and shortness of breath. Negative for wheezing. Cardiovascular: Negative for chest pain, palpitations and leg swelling. Gastrointestinal: Negative for abdominal pain. Genitourinary: Negative for difficulty urinating. Musculoskeletal: Negative for arthralgias and back pain. Skin: Negative for rash. Psychiatric/Behavioral: Negative for behavioral problems, self-injury and sleep disturbance. The patient is not nervous/anxious and is not hyperactive. Physical Exam  Vitals reviewed. Constitutional:       General: She is not in acute distress. Appearance: Normal appearance. She is not ill-appearing. HENT:      Right Ear: There is no impacted cerumen. Left Ear: There is no impacted cerumen. Nose: No rhinorrhea. Mouth/Throat:      Pharynx: Posterior oropharyngeal erythema present. Eyes:      General:         Right eye: No discharge. Left eye: No discharge. Cardiovascular:      Rate and Rhythm: Regular rhythm. Tachycardia present. Pulses: Normal pulses. Heart sounds: No murmur heard. Pulmonary:      Effort: Pulmonary effort is normal.      Breath sounds: Normal breath sounds. No wheezing or rhonchi. Comments: Mild SOA    Abdominal:      General: Bowel sounds are normal.   Skin:     Findings: No rash. Neurological:      General: No focal deficit present. Mental Status: She is alert and oriented to person, place, and time. Psychiatric:         Mood and Affect: Mood normal.         Behavior: Behavior normal.                   An electronic signature was used to authenticate this note.     --LUIZ Flores

## 2021-12-27 NOTE — PATIENT INSTRUCTIONS
Patient Education        Bronchitis: Care Instructions  Your Care Instructions     Bronchitis is inflammation of the bronchial tubes, which carry air to the lungs. The tubes swell and produce mucus, or phlegm. The mucus and inflamed bronchial tubes make you cough. You may have trouble breathing. Most cases of bronchitis are caused by viruses like those that cause colds. Antibiotics usually do not help and they may be harmful. Bronchitis usually develops rapidly and lasts about 2 to 3 weeks in otherwise healthy people. Follow-up care is a key part of your treatment and safety. Be sure to make and go to all appointments, and call your doctor if you are having problems. It's also a good idea to know your test results and keep a list of the medicines you take. How can you care for yourself at home? · Take all medicines exactly as prescribed. Call your doctor if you think you are having a problem with your medicine. · Get some extra rest.  · Take an over-the-counter pain medicine, such as acetaminophen (Tylenol), ibuprofen (Advil, Motrin), or naproxen (Aleve) to reduce fever and relieve body aches. Read and follow all instructions on the label. · Do not take two or more pain medicines at the same time unless the doctor told you to. Many pain medicines have acetaminophen, which is Tylenol. Too much acetaminophen (Tylenol) can be harmful. · Take an over-the-counter cough medicine to help quiet a dry, hacking cough so that you can sleep. Avoid cough medicines that have more than one active ingredient. Read and follow all instructions on the label. · Do not smoke. Smoking can make bronchitis worse. If you need help quitting, talk to your doctor about stop-smoking programs and medicines. These can increase your chances of quitting for good. When should you call for help? Call 911 anytime you think you may need emergency care. For example, call if:    · You have severe trouble breathing.    Call your doctor now or seek immediate medical care if:    · You have new or worse trouble breathing.     · You cough up dark brown or bloody mucus (sputum).     · You have a new or higher fever.     · You have a new rash. Watch closely for changes in your health, and be sure to contact your doctor if:    · You cough more deeply or more often, especially if you notice more mucus or a change in the color of your mucus.     · You are not getting better as expected. Where can you learn more? Go to https://[employer]. org and sign in to your [employer] account. Enter H333 in the [employer] box to learn more about \"Bronchitis: Care Instructions. \"     If you do not have an account, please click on the \"Sign Up Now\" link. Current as of: July 6, 2021               Content Version: 13.1  © 4815-7487 Healthwise, Incorporated. Care instructions adapted under license by [facility] ([facility]). If you have questions about a medical condition or this instruction, always ask your healthcare professional. [address] any warranty or liability for your use of this information.

## 2021-12-28 ENCOUNTER — TELEPHONE (OUTPATIENT)
Dept: PRIMARY CARE CLINIC | Age: 71
End: 2021-12-28

## 2021-12-28 NOTE — TELEPHONE ENCOUNTER
Called and left message for patient letting her know that her resp panel and covid were all negative. Told patient to call back with any questions.

## 2021-12-29 ENCOUNTER — TELEPHONE (OUTPATIENT)
Dept: PRIMARY CARE CLINIC | Age: 71
End: 2021-12-29

## 2021-12-29 NOTE — TELEPHONE ENCOUNTER
----- Message from LUIZ lGeason sent at 12/29/2021  9:13 AM CST -----  Screen negative cont supportive care

## 2021-12-29 NOTE — TELEPHONE ENCOUNTER
LUIZ Coughlin called, pts resp panel was sent to his in basket. He just wanted to make sure you were aware and was able to review it. He states he tried to send it to you, but didn't know if you received it or not.

## 2022-01-07 ENCOUNTER — VIRTUAL VISIT (OUTPATIENT)
Dept: PRIMARY CARE CLINIC | Age: 72
End: 2022-01-07
Payer: MEDICARE

## 2022-01-07 DIAGNOSIS — M54.6 CHRONIC LEFT-SIDED THORACIC BACK PAIN: ICD-10-CM

## 2022-01-07 DIAGNOSIS — G89.29 CHRONIC LEFT-SIDED THORACIC BACK PAIN: ICD-10-CM

## 2022-01-07 DIAGNOSIS — M79.672 PAIN IN BOTH FEET: ICD-10-CM

## 2022-01-07 DIAGNOSIS — G89.29 CHRONIC RIGHT-SIDED HEADACHE: ICD-10-CM

## 2022-01-07 DIAGNOSIS — M79.671 PAIN IN BOTH FEET: ICD-10-CM

## 2022-01-07 DIAGNOSIS — R51.9 CHRONIC RIGHT-SIDED HEADACHE: ICD-10-CM

## 2022-01-07 DIAGNOSIS — G62.9 NEUROPATHY: Primary | ICD-10-CM

## 2022-01-07 PROCEDURE — 99442 PR PHYS/QHP TELEPHONE EVALUATION 11-20 MIN: CPT | Performed by: NURSE PRACTITIONER

## 2022-01-07 RX ORDER — GABAPENTIN 300 MG/1
300 CAPSULE ORAL 3 TIMES DAILY
Qty: 90 CAPSULE | Refills: 3 | Status: SHIPPED | OUTPATIENT
Start: 2022-01-07 | End: 2022-04-21 | Stop reason: SDUPTHER

## 2022-01-07 ASSESSMENT — ENCOUNTER SYMPTOMS
DIARRHEA: 0
COUGH: 0
BACK PAIN: 1
SHORTNESS OF BREATH: 0
VOMITING: 0
SORE THROAT: 0
CONSTIPATION: 0
EYE REDNESS: 0
RHINORRHEA: 0

## 2022-01-14 ENCOUNTER — VIRTUAL VISIT (OUTPATIENT)
Dept: PRIMARY CARE CLINIC | Age: 72
End: 2022-01-14
Payer: MEDICARE

## 2022-01-14 DIAGNOSIS — R42 LIGHTHEADEDNESS: ICD-10-CM

## 2022-01-14 DIAGNOSIS — E53.8 B12 DEFICIENCY: ICD-10-CM

## 2022-01-14 DIAGNOSIS — I95.9 HYPOTENSION, UNSPECIFIED HYPOTENSION TYPE: ICD-10-CM

## 2022-01-14 DIAGNOSIS — R53.1 WEAKNESS: ICD-10-CM

## 2022-01-14 DIAGNOSIS — I95.9 HYPOTENSION, UNSPECIFIED HYPOTENSION TYPE: Primary | ICD-10-CM

## 2022-01-14 DIAGNOSIS — R79.0 LOW MAGNESIUM LEVEL: ICD-10-CM

## 2022-01-14 LAB
ANION GAP SERPL CALCULATED.3IONS-SCNC: 18 MMOL/L (ref 7–19)
BASOPHILS ABSOLUTE: 0 K/UL (ref 0–0.2)
BASOPHILS RELATIVE PERCENT: 0.3 % (ref 0–1)
BUN BLDV-MCNC: 16 MG/DL (ref 8–23)
CALCIUM SERPL-MCNC: 10.5 MG/DL (ref 8.8–10.2)
CHLORIDE BLD-SCNC: 102 MMOL/L (ref 98–111)
CO2: 20 MMOL/L (ref 22–29)
CREAT SERPL-MCNC: 0.9 MG/DL (ref 0.5–0.9)
EOSINOPHILS ABSOLUTE: 0.1 K/UL (ref 0–0.6)
EOSINOPHILS RELATIVE PERCENT: 1 % (ref 0–5)
GFR AFRICAN AMERICAN: >59
GFR NON-AFRICAN AMERICAN: >60
GLUCOSE BLD-MCNC: 111 MG/DL (ref 74–109)
HCT VFR BLD CALC: 43.7 % (ref 37–47)
HEMOGLOBIN: 13.2 G/DL (ref 12–16)
IMMATURE GRANULOCYTES #: 0 K/UL
LYMPHOCYTES ABSOLUTE: 1.5 K/UL (ref 1.1–4.5)
LYMPHOCYTES RELATIVE PERCENT: 13.4 % (ref 20–40)
MAGNESIUM: 1.6 MG/DL (ref 1.6–2.4)
MCH RBC QN AUTO: 29.3 PG (ref 27–31)
MCHC RBC AUTO-ENTMCNC: 30.2 G/DL (ref 33–37)
MCV RBC AUTO: 96.9 FL (ref 81–99)
MONOCYTES ABSOLUTE: 1.1 K/UL (ref 0–0.9)
MONOCYTES RELATIVE PERCENT: 9.5 % (ref 0–10)
NEUTROPHILS ABSOLUTE: 8.3 K/UL (ref 1.5–7.5)
NEUTROPHILS RELATIVE PERCENT: 75.5 % (ref 50–65)
PDW BLD-RTO: 13 % (ref 11.5–14.5)
PLATELET # BLD: 280 K/UL (ref 130–400)
PMV BLD AUTO: 10.2 FL (ref 9.4–12.3)
POTASSIUM SERPL-SCNC: 3.8 MMOL/L (ref 3.5–5)
RBC # BLD: 4.51 M/UL (ref 4.2–5.4)
SODIUM BLD-SCNC: 140 MMOL/L (ref 136–145)
VITAMIN B-12: 974 PG/ML (ref 211–946)
WBC # BLD: 11 K/UL (ref 4.8–10.8)

## 2022-01-14 PROCEDURE — 99442 PR PHYS/QHP TELEPHONE EVALUATION 11-20 MIN: CPT | Performed by: NURSE PRACTITIONER

## 2022-01-14 RX ORDER — AMLODIPINE BESYLATE 2.5 MG/1
2.5 TABLET ORAL DAILY
Qty: 30 TABLET | Refills: 5 | Status: SHIPPED | OUTPATIENT
Start: 2022-01-14 | End: 2022-08-03

## 2022-01-14 ASSESSMENT — ENCOUNTER SYMPTOMS
VOMITING: 0
NAUSEA: 1
SHORTNESS OF BREATH: 0
ABDOMINAL PAIN: 0
COUGH: 0
RHINORRHEA: 0
SORE THROAT: 0
DIARRHEA: 1

## 2022-01-14 NOTE — PROGRESS NOTES
Earline Michele (:  1950) is a 80 y.o. female,Established patient, here for evaluation of the following chief complaint(s): Hypotension    TELE-HEALTH PHONE CALL       ASSESSMENT/PLAN:  1. Hypotension, unspecified hypotension type  -     CBC Auto Differential; Future  -     Basic Metabolic Panel; Future  -     amLODIPine (NORVASC) 2.5 MG tablet; Take 1 tablet by mouth daily, Disp-30 tablet, R-5Please consider 90 day supplies to promote better adherence Normal (decreased from 5 mg)  - Continue Toprol  - Patient is asked to monitor BP at home or work, several times per month and return with written values at next office visit. - Slow position change  - Adequate hydration  2. Weakness  -     CBC Auto Differential; Future  -     Basic Metabolic Panel; Future  3. Lightheadedness  -     CBC Auto Differential; Future  -     Basic Metabolic Panel; Future      Return in about 1 week (around 2022), or if symptoms worsen or fail to improve. SUBJECTIVE/OBJECTIVE:  HPI     HYPOTENSION:  BP has been running 90/50's   (HR )   She has been weak. This has occurred intermittently for the last week   \"It did this a couple of times. \"  \"This morning it was 142/65. \"  She has been taking Toprol  mg & Norvasc 5 mg. She is drinking plenty of water  \"I am sick at my stomach. \"  \"If I am not careful, I feel like I might pass out. \"  \"I feel better when I am lying flat. \"  Reports a decreased appetite    Review of Systems   Constitutional: Positive for appetite change (decreased a little) and fatigue. HENT: Negative for congestion, ear pain, rhinorrhea and sore throat. Respiratory: Negative for cough and shortness of breath. Gastrointestinal: Positive for diarrhea and nausea. Negative for abdominal pain and vomiting. Neurological: Positive for dizziness, weakness and light-headedness. Psychiatric/Behavioral: Negative for sleep disturbance. No flowsheet data found.      Physical Exam   N/A DUE TO TELE-HEALTH PHONE CALL      On this date 1/14/2022 I have spent 14 minutes reviewing previous notes, test results and face to face (virtual) with the patient discussing the diagnosis and importance of compliance with the treatment plan as well as documenting on the day of the visit. Ivanna Coleman, was evaluated through a synchronous (real-time) audio-video encounter. The patient (or guardian if applicable) is aware that this is a billable service. Verbal consent to proceed has been obtained within the past 12 months. The visit was conducted pursuant to the emergency declaration under the [address] [place] [address], [address] [place] [address] authority and the [employer] and [employer] General Act. Patient identification was verified, and a caregiver was present when appropriate. The patient was located in a state where the provider was credentialed to provide care. An electronic signature was used to authenticate this note.     --LUIZ Nash

## 2022-01-17 ENCOUNTER — TELEPHONE (OUTPATIENT)
Dept: PRIMARY CARE CLINIC | Age: 72
End: 2022-01-17

## 2022-01-17 NOTE — TELEPHONE ENCOUNTER
----- Message from LUIZ Craft sent at 1/14/2022  3:58 PM CST -----  Please call patient and let them know the followingSodium and potassium are normal blood sugar is borderline at 111. Normal kidney function. Magnesium is normal  B12 is normal  CBC is within normal limits. This is all great news. Recommend treatment as discussed in office today.

## 2022-01-26 ENCOUNTER — OFFICE VISIT (OUTPATIENT)
Dept: PRIMARY CARE CLINIC | Age: 72
End: 2022-01-26
Payer: MEDICARE

## 2022-01-26 VITALS
BODY MASS INDEX: 38.07 KG/M2 | HEART RATE: 89 BPM | OXYGEN SATURATION: 98 % | HEIGHT: 64 IN | TEMPERATURE: 97.8 F | SYSTOLIC BLOOD PRESSURE: 120 MMHG | DIASTOLIC BLOOD PRESSURE: 60 MMHG | WEIGHT: 223 LBS

## 2022-01-26 DIAGNOSIS — G47.33 OBSTRUCTIVE SLEEP APNEA SYNDROME: ICD-10-CM

## 2022-01-26 DIAGNOSIS — I95.9 HYPOTENSION, UNSPECIFIED HYPOTENSION TYPE: Primary | ICD-10-CM

## 2022-01-26 PROCEDURE — 99213 OFFICE O/P EST LOW 20 MIN: CPT | Performed by: NURSE PRACTITIONER

## 2022-01-26 RX ORDER — TAMSULOSIN HYDROCHLORIDE 0.4 MG/1
CAPSULE ORAL
COMMUNITY
Start: 2022-01-12 | End: 2022-09-07

## 2022-01-26 RX ORDER — HYDRALAZINE HYDROCHLORIDE 10 MG/1
TABLET, FILM COATED ORAL
COMMUNITY
Start: 2022-01-12 | End: 2022-03-11

## 2022-01-26 ASSESSMENT — ENCOUNTER SYMPTOMS
VOMITING: 0
NAUSEA: 0
DIARRHEA: 0
RHINORRHEA: 0
SHORTNESS OF BREATH: 0
SORE THROAT: 0
ABDOMINAL PAIN: 0
COUGH: 0

## 2022-01-26 NOTE — PROGRESS NOTES
Saige Foley (:  1950) is a 80 y.o. female,Established patient, here for evaluation of the following chief complaint(s):  Follow-up      ASSESSMENT/PLAN:    ICD-10-CM    1. Hypotension, secondary to medications I95.9  Much improved  Patient is tolerating lower dose of amlodipine. Continue hydralazine 10, amlodipine 2.5 mg and metoprolol 100 mg daily  Patient is asked to monitor BP at home or work, several times per month and return with written values at next office visit. 2. Obstructive sleep apnea syndrome  G47.33  Continue CPAP       Return if symptoms worsen or fail to improve. SUBJECTIVE/OBJECTIVE:  HPI     HYPOTENSION:  BP is improved. Decreased her Norvasc to 2.5 mg last week. She continued her hydralazine 10 mg and Toprol 100 mg daily. Dizziness, weakness and lightheadedness have improved  BP log reviewed: 119/60, 113/61, 135/67, 131/79, 114/69, , 114/60, 100/62, 128/71, 126/69    SLEEP APNEA:  She follows with [facility].  She sleeps 8-10 hours a night with her CPAP. \"I have been wearing it for 25 years. \"  \"I need a new machine. \"  She sleeps well with CPAP. /60   Pulse 89   Temp 97.8 °F (36.6 °C) (Temporal)   Ht 5' 4\" (1.626 m)   Wt 223 lb (101.2 kg)   SpO2 98%   BMI 38.28 kg/m²     Review of Systems   Constitutional: Negative for appetite change (improved) and fatigue. HENT: Negative for congestion, ear pain, rhinorrhea and sore throat. Sleep apnea: controlled with CPAP   Respiratory: Negative for cough and shortness of breath. Gastrointestinal: Negative for abdominal pain, diarrhea, nausea and vomiting. Genitourinary: Negative for dysuria. Neurological: Negative for dizziness (improved), weakness (improved) and light-headedness (improved). Psychiatric/Behavioral: Negative for sleep disturbance. Physical Exam  Vitals reviewed. Constitutional:       Appearance: She is well-developed. HENT:      Head: Normocephalic.       Right Ear: Tympanic membrane and external ear normal.      Left Ear: Tympanic membrane and external ear normal.      Nose: Nose normal.   Eyes:      General:         Right eye: No discharge. Left eye: No discharge. Cardiovascular:      Rate and Rhythm: Normal rate and regular rhythm. Pulmonary:      Effort: Pulmonary effort is normal.      Breath sounds: Normal breath sounds. Abdominal:      General: Bowel sounds are normal.      Palpations: Abdomen is soft. Musculoskeletal:      Cervical back: Normal range of motion. Skin:     General: Skin is dry. Neurological:      General: No focal deficit present. Mental Status: She is alert and oriented to person, place, and time. Mental status is at baseline. Psychiatric:         Mood and Affect: Mood normal.         Behavior: Behavior normal.         Thought Content: Thought content normal.         Judgment: Judgment normal.               An electronic signature was used to authenticate this note.     --LUIZ Lopez

## 2022-01-27 PROBLEM — G43.719 INTRACTABLE CHRONIC MIGRAINE WITHOUT AURA: Status: ACTIVE | Noted: 2022-01-27

## 2022-02-10 ENCOUNTER — HOSPITAL ENCOUNTER (OUTPATIENT)
Dept: PAIN MANAGEMENT | Age: 72
Discharge: HOME | End: 2022-02-10
Payer: MEDICARE

## 2022-02-10 VITALS
OXYGEN SATURATION: 91 % | SYSTOLIC BLOOD PRESSURE: 135 MMHG | RESPIRATION RATE: 18 BRPM | DIASTOLIC BLOOD PRESSURE: 73 MMHG | TEMPERATURE: 98.2 F | HEART RATE: 93 BPM

## 2022-02-10 PROCEDURE — 64615 CHEMODENERV MUSC MIGRAINE: CPT

## 2022-02-10 PROCEDURE — 6360000002 HC RX W HCPCS

## 2022-02-10 PROCEDURE — 64615 CHEMODENERV MUSC MIGRAINE: CPT | Performed by: NURSE PRACTITIONER

## 2022-02-10 NOTE — PROGRESS NOTES
Procedure:  Level of Consciousness: [x]Alert [x]Oriented []Disoriented []Lethargic  Anxiety Level: [x]Calm []Anxious []Depressed []Other  Skin: []Warm [x]Dry []Cool []Moist []Intact []Other  Cardiovascular: [x]Palpitations: [x]Never []Occasionally []Frequently  Chest Pain: [x]No []Yes  Respiratory:  [x]Unlabored []Labored []Cough ([] Productive []Unproductive)  HCG Required: [x]No []Yes   Results: []Negative []Positive  Knowledge Level:        [x]Patient/Other verbalized understanding of pre-procedure instructions. [x]Assessment of post-op care needs (transportation, responsible caregiver)        [x]Able to discuss health care problems and how to deal with it. Factors that Affect Teaching:        Language Barrier: [x]No []Yes - why:        Hearing Loss:        []No []Yes            Corrective Device:  []Yes []No        Vision Loss:           []No [x]Yes            Corrective Device:  [x]Yes []No        Memory Loss:       [x]No []Yes            []Short Term []Long Term  Motivational Level:  [x]Asks Questions                  []Extremely Anxious       [x]Seems Interested               []Seems Uninterested                  [x]Denies need for Education  Risk for Injury:  [x]Patient oriented to person, place and time  []History of frequent falls/loss of balance  Nutritional:  []Change in appetite   []Weight Gain   []Weight Loss  Functional:  []Requires assistance with ADL's      Botox Assessment  [x] Patient  has had a reduction of at least 100 hours (5 Days) in Migraines since receiving Botox  []  []  []  Factors that Affect Teaching:  Assessment of post-op care needs (transportation, responsible caregiver)        []Able to discuss health care problems and how to deal with it. Factors that Affect Teaching:Patient states that he/she has __2____ headaches out of 30 days each month.   Patient states that he/she has ___10___ migraines out of 30 days each month.monthly

## 2022-03-08 ENCOUNTER — TELEMEDICINE (OUTPATIENT)
Dept: PRIMARY CARE CLINIC | Age: 72
End: 2022-03-08
Payer: MEDICARE

## 2022-03-08 DIAGNOSIS — N39.44 NOCTURNAL ENURESIS: ICD-10-CM

## 2022-03-08 DIAGNOSIS — I10 PRIMARY HYPERTENSION: Primary | ICD-10-CM

## 2022-03-08 DIAGNOSIS — R51.9 HEADACHE DISORDER: ICD-10-CM

## 2022-03-08 PROCEDURE — 99442 PR PHYS/QHP TELEPHONE EVALUATION 11-20 MIN: CPT | Performed by: NURSE PRACTITIONER

## 2022-03-08 ASSESSMENT — ENCOUNTER SYMPTOMS
NAUSEA: 0
SORE THROAT: 0
SHORTNESS OF BREATH: 0
COUGH: 0
ABDOMINAL PAIN: 0
VOMITING: 0
RHINORRHEA: 0
DIARRHEA: 0

## 2022-03-08 NOTE — PROGRESS NOTES
Sarita Lopez (:  1950) is a Established patient, here for evaluation of the following:    TELE-HEALTH PHONE [address]   Below is the assessment and plan developed based on review of pertinent history, physical exam, labs, studies, and medications. 1. Primary hypertension--The current medical regimen is effective;  continue present plan and medications. Patient is asked to monitor BP at home or work, several times per month and return with written values at next office visit. 2. Nocturnal enuresis--monitor    3. Headache disorder--improved, continued current regimen    Return in about 6 months (around 2022), or if symptoms worsen or fail to improve, for Diabetic follow-up, Annual Wellness Exam, 30 minute appointment. Subjective   HPI     BP:  Highest BP: 124/58  Averages BP: 108/69, 96  She continues on Norvasc 2.5 mg, hydralazine 10 mg and Toprol 100 mg. She is tolerating this regimen  BP has been well controlled  Denies dizziness, lightheadedness or weakness    MIGRAINES:  Improved with Botox. \"I had a migraine for a week after the shots but now I haven't had a migraine since then. \"    Review of Systems   Constitutional: Negative for appetite change and fatigue. HENT: Negative for congestion, ear pain, rhinorrhea and sore throat. Sleep apnea: controlled with CPAP   Respiratory: Negative for cough and shortness of breath. Gastrointestinal: Negative for abdominal pain, diarrhea, nausea and vomiting. Genitourinary: Negative for dysuria. Neurological: Negative for dizziness, weakness and light-headedness. Psychiatric/Behavioral: Negative for sleep disturbance.         Objective   Patient-Reported Vitals  No data recorded     Physical Exam  N/A DUE TO TELE-HEALTH PHONE CALL       On this date 3/8/2022 I have spent 13 minutes reviewing previous notes, test results and face to face (virtual) with the patient discussing the diagnosis and importance of compliance with the treatment plan as well as documenting on the day of the visit. Chasxander Sureshbs, was evaluated through a synchronous (real-time) audio-video encounter. The patient (or guardian if applicable) is aware that this is a billable service, which includes applicable co-pays. This Virtual Visit was conducted with patient's (and/or legal guardian's) consent. The visit was conducted pursuant to the emergency declaration under the [address] [place] [address] authority and the [employer] and [employer] General Act. Patient identification was verified, and a caregiver was present when appropriate. The patient was located at home in a state where the provider was licensed to provide care.        --LUIZ Ugalde

## 2022-03-11 RX ORDER — HYDRALAZINE HYDROCHLORIDE 10 MG/1
10 TABLET, FILM COATED ORAL 3 TIMES DAILY
Qty: 90 TABLET | Refills: 5 | Status: SHIPPED | OUTPATIENT
Start: 2022-03-11

## 2022-03-11 NOTE — TELEPHONE ENCOUNTER
Received fax from pharmacy requesting refill on pts medication(s). Pt was last seen in office on 3/8/2022  and has a follow up scheduled for Visit date not found. Will send request to  [facility]  for patient.      Requested Prescriptions     Pending Prescriptions Disp Refills    hydrALAZINE (APRESOLINE) 10 MG tablet [Pharmacy Med Name: hydrALAZINE HCl 10 MG Oral Tablet] 90 tablet 0     Sig: TAKE 1 TABLET BY MOUTH THREE TIMES DAILY

## 2022-03-19 DIAGNOSIS — K21.9 GASTROESOPHAGEAL REFLUX DISEASE WITHOUT ESOPHAGITIS: Chronic | ICD-10-CM

## 2022-03-21 RX ORDER — FAMOTIDINE 20 MG/1
20 TABLET, FILM COATED ORAL 2 TIMES DAILY
Qty: 60 TABLET | Refills: 5 | Status: SHIPPED | OUTPATIENT
Start: 2022-03-21 | End: 2022-08-03

## 2022-03-21 NOTE — TELEPHONE ENCOUNTER
Received fax from pharmacy requesting refill on pts medication(s). Pt was last seen in office on 3/8/2022  and has a follow up scheduled for Visit date not found. Will send request to  [facility]  for authorization.      Requested Prescriptions     Pending Prescriptions Disp Refills    famotidine (PEPCID) 20 MG tablet [Pharmacy Med Name: Famotidine 20 MG Oral Tablet] 60 tablet 5     Sig: Take 1 tablet by mouth 2 times daily

## 2022-04-11 ENCOUNTER — OFFICE VISIT (OUTPATIENT)
Dept: PRIMARY CARE CLINIC | Age: 72
End: 2022-04-11
Payer: MEDICARE

## 2022-04-11 VITALS
HEART RATE: 96 BPM | WEIGHT: 222 LBS | OXYGEN SATURATION: 96 % | BODY MASS INDEX: 38.11 KG/M2 | SYSTOLIC BLOOD PRESSURE: 158 MMHG | DIASTOLIC BLOOD PRESSURE: 90 MMHG

## 2022-04-11 DIAGNOSIS — Z99.89 OSA ON CPAP: ICD-10-CM

## 2022-04-11 DIAGNOSIS — G47.33 OSA ON CPAP: ICD-10-CM

## 2022-04-11 DIAGNOSIS — I10 PRIMARY HYPERTENSION: Primary | ICD-10-CM

## 2022-04-11 DIAGNOSIS — F41.9 ANXIETY: ICD-10-CM

## 2022-04-11 PROCEDURE — 93000 ELECTROCARDIOGRAM COMPLETE: CPT | Performed by: NURSE PRACTITIONER

## 2022-04-11 PROCEDURE — 99214 OFFICE O/P EST MOD 30 MIN: CPT | Performed by: NURSE PRACTITIONER

## 2022-04-11 RX ORDER — LORAZEPAM 0.5 MG/1
0.5 TABLET ORAL EVERY 12 HOURS PRN
Qty: 60 TABLET | Refills: 0 | Status: SHIPPED | OUTPATIENT
Start: 2022-04-11 | End: 2022-05-11

## 2022-04-11 ASSESSMENT — ENCOUNTER SYMPTOMS
SINUS PRESSURE: 0
RHINORRHEA: 0
SORE THROAT: 0
ABDOMINAL PAIN: 0
DIARRHEA: 0
COUGH: 0
CONSTIPATION: 0
SHORTNESS OF BREATH: 0
VOMITING: 0
NAUSEA: 0
TROUBLE SWALLOWING: 0

## 2022-04-11 NOTE — PATIENT INSTRUCTIONS
Decrease caffeine and salt intake  Patient is asked to monitor BP at home or work, once a day and return with written values at next office visit.

## 2022-04-11 NOTE — PROGRESS NOTES
Mathew Sharma (:  1950) is a 71 y.o. female,Established patient, here for evaluation of the following chief complaint(s):  Hypertension (having issues with elevated blood pressure increased heart rate. started 2 weeks ago. denies chest pain but does have shortness of breath at times. very emotional. )      ASSESSMENT/PLAN:    ICD-10-CM    1. Primary hypertension  I10 EKG 12 lead     CO ELECTROCARDIOGRAM, COMPLETE     EKG 12 lead    Sinus rhythm without ectopy, ST or T wave changes. Rate of 100   2. Anxiety  F41.9 LORazepam (ATIVAN) 0.5 MG tablet   3. JAYME on CPAP  G47.33 Will send note to At [facility].     Z99.89        Return in about 1 week (around 2022). SUBJECTIVE/OBJECTIVE:  HPI  Here for high blood pressure and heart rate  Onset couple of weeks. No chest pain  Shortness of breath at times. BP has been running 130-150/70s  On metoprolol, hydralazine, and amlodipine  Have been very emotional over last week. Over last week doesn't matter what I am doing, can be reading, watching tv and can just start crying. Drinks coffee and tea. CPAP   Insurance will not pay for it unless f/u with PCP  Wear it 8-10 hours  Benefits from therapy. Get supplies from [facility].     BP (!) 158/90   Pulse 96   Wt 222 lb (100.7 kg)   SpO2 96%   BMI 38.11 kg/m²     Review of Systems   Constitutional: Negative for activity change, appetite change, fatigue, fever and unexpected weight change. HENT: Negative for congestion, hearing loss, rhinorrhea, sinus pressure, sore throat and trouble swallowing. Eyes: Negative for visual disturbance. Respiratory: Negative for cough and shortness of breath. Cardiovascular: Positive for palpitations. Negative for chest pain and leg swelling. Gastrointestinal: Negative for abdominal pain, constipation, diarrhea, nausea and vomiting. Endocrine: Negative for cold intolerance and heat intolerance.    Genitourinary: Negative for flank pain, menstrual problem, pelvic pain, urgency and vaginal discharge. Musculoskeletal: Negative for arthralgias. Skin: Negative for rash. Neurological: Negative for headaches. Psychiatric/Behavioral: Negative for dysphoric mood and sleep disturbance. The patient is nervous/anxious. All other systems reviewed and are negative. Physical Exam  Vitals reviewed. Constitutional:       Appearance: Normal appearance. HENT:      Head: Normocephalic and atraumatic. Nose:      Comments: masked     Mouth/Throat:      Comments: masked  Eyes:      Conjunctiva/sclera: Conjunctivae normal.   Cardiovascular:      Rate and Rhythm: Normal rate and regular rhythm. Pulses: Normal pulses. Heart sounds: Normal heart sounds. Comments: HR 96 on exam  Pulmonary:      Effort: Pulmonary effort is normal.      Breath sounds: Normal breath sounds. Abdominal:      Palpations: Abdomen is soft. Musculoskeletal:      Cervical back: Normal range of motion and neck supple. Skin:     General: Skin is warm. Neurological:      Mental Status: She is alert and oriented to person, place, and time. Psychiatric:         Mood and Affect: Mood is anxious. Affect is tearful. Speech: Speech normal.         Behavior: Behavior normal.         Thought Content: Thought content normal.         Cognition and Memory: Cognition normal.                 An electronic signature was used to authenticate this note.     --LUIZ Stover

## 2022-04-12 ENCOUNTER — HOSPITAL ENCOUNTER (INPATIENT)
Age: 72
LOS: 6 days | Discharge: HOME | DRG: 394 | End: 2022-04-18
Attending: EMERGENCY MEDICINE | Admitting: INTERNAL MEDICINE
Payer: MEDICARE

## 2022-04-12 ENCOUNTER — APPOINTMENT (OUTPATIENT)
Dept: GENERAL RADIOLOGY | Age: 72
DRG: 394 | End: 2022-04-12
Payer: MEDICARE

## 2022-04-12 ENCOUNTER — APPOINTMENT (OUTPATIENT)
Dept: CT IMAGING | Age: 72
DRG: 394 | End: 2022-04-12
Payer: MEDICARE

## 2022-04-12 ENCOUNTER — TELEPHONE (OUTPATIENT)
Dept: PRIMARY CARE CLINIC | Age: 72
End: 2022-04-12

## 2022-04-12 DIAGNOSIS — R10.9 ABDOMINAL PAIN, UNSPECIFIED ABDOMINAL LOCATION: ICD-10-CM

## 2022-04-12 DIAGNOSIS — R93.89 ABNORMAL CT SCAN: ICD-10-CM

## 2022-04-12 DIAGNOSIS — R07.9 CHEST PAIN, UNSPECIFIED TYPE: Primary | ICD-10-CM

## 2022-04-12 DIAGNOSIS — T18.2XXA FOREIGN BODY IN STOMACH, INITIAL ENCOUNTER: ICD-10-CM

## 2022-04-12 LAB
ALBUMIN SERPL-MCNC: 4.2 G/DL (ref 3.5–5.2)
ALP BLD-CCNC: 82 U/L (ref 35–104)
ALT SERPL-CCNC: 13 U/L (ref 5–33)
ANION GAP SERPL CALCULATED.3IONS-SCNC: 11 MMOL/L (ref 7–19)
AST SERPL-CCNC: 14 U/L (ref 5–32)
BASOPHILS ABSOLUTE: 0 K/UL (ref 0–0.2)
BASOPHILS RELATIVE PERCENT: 0.3 % (ref 0–1)
BILIRUB SERPL-MCNC: 0.3 MG/DL (ref 0.2–1.2)
BILIRUBIN URINE: NEGATIVE
BLOOD, URINE: NEGATIVE
BUN BLDV-MCNC: 17 MG/DL (ref 8–23)
CALCIUM SERPL-MCNC: 9.8 MG/DL (ref 8.8–10.2)
CHLORIDE BLD-SCNC: 105 MMOL/L (ref 98–111)
CLARITY: CLEAR
CO2: 25 MMOL/L (ref 22–29)
COLOR: YELLOW
CREAT SERPL-MCNC: 0.7 MG/DL (ref 0.5–0.9)
EKG P AXIS: 6 DEGREES
EKG P-R INTERVAL: 120 MS
EKG Q-T INTERVAL: 360 MS
EKG QRS DURATION: 76 MS
EKG QTC CALCULATION (BAZETT): 409 MS
EKG T AXIS: 24 DEGREES
EOSINOPHILS ABSOLUTE: 0.1 K/UL (ref 0–0.6)
EOSINOPHILS RELATIVE PERCENT: 0.8 % (ref 0–5)
GFR AFRICAN AMERICAN: >59
GFR NON-AFRICAN AMERICAN: >60
GLUCOSE BLD-MCNC: 131 MG/DL (ref 74–109)
GLUCOSE URINE: NEGATIVE MG/DL
HBA1C MFR BLD: 6.3 % (ref 4–6)
HCT VFR BLD CALC: 39.6 % (ref 37–47)
HEMOGLOBIN: 12.2 G/DL (ref 12–16)
IMMATURE GRANULOCYTES #: 0 K/UL
KETONES, URINE: NEGATIVE MG/DL
LEUKOCYTE ESTERASE, URINE: NEGATIVE
LIPASE: 32 U/L (ref 13–60)
LYMPHOCYTES ABSOLUTE: 1.3 K/UL (ref 1.1–4.5)
LYMPHOCYTES RELATIVE PERCENT: 11.6 % (ref 20–40)
MCH RBC QN AUTO: 29.8 PG (ref 27–31)
MCHC RBC AUTO-ENTMCNC: 30.8 G/DL (ref 33–37)
MCV RBC AUTO: 96.6 FL (ref 81–99)
MONOCYTES ABSOLUTE: 0.7 K/UL (ref 0–0.9)
MONOCYTES RELATIVE PERCENT: 6.3 % (ref 0–10)
NEUTROPHILS ABSOLUTE: 8.7 K/UL (ref 1.5–7.5)
NEUTROPHILS RELATIVE PERCENT: 80.7 % (ref 50–65)
NITRITE, URINE: NEGATIVE
PDW BLD-RTO: 13.1 % (ref 11.5–14.5)
PH UA: 8.5 (ref 5–8)
PLATELET # BLD: 236 K/UL (ref 130–400)
PMV BLD AUTO: 9.5 FL (ref 9.4–12.3)
POTASSIUM SERPL-SCNC: 4.1 MMOL/L (ref 3.5–5)
PROTEIN UA: NEGATIVE MG/DL
RBC # BLD: 4.1 M/UL (ref 4.2–5.4)
SODIUM BLD-SCNC: 141 MMOL/L (ref 136–145)
SPECIFIC GRAVITY UA: >=1.045 (ref 1–1.03)
TOTAL PROTEIN: 6.4 G/DL (ref 6.6–8.7)
TROPONIN: <0.01 NG/ML (ref 0–0.03)
UROBILINOGEN, URINE: 0.2 E.U./DL
WBC # BLD: 10.7 K/UL (ref 4.8–10.8)

## 2022-04-12 PROCEDURE — 74177 CT ABD & PELVIS W/CONTRAST: CPT

## 2022-04-12 PROCEDURE — 71045 X-RAY EXAM CHEST 1 VIEW: CPT

## 2022-04-12 PROCEDURE — 93010 ELECTROCARDIOGRAM REPORT: CPT | Performed by: INTERNAL MEDICINE

## 2022-04-12 PROCEDURE — 83690 ASSAY OF LIPASE: CPT

## 2022-04-12 PROCEDURE — 84484 ASSAY OF TROPONIN QUANT: CPT

## 2022-04-12 PROCEDURE — 1210000000 HC MED SURG R&B

## 2022-04-12 PROCEDURE — 99283 EMERGENCY DEPT VISIT LOW MDM: CPT

## 2022-04-12 PROCEDURE — 81003 URINALYSIS AUTO W/O SCOPE: CPT

## 2022-04-12 PROCEDURE — 71275 CT ANGIOGRAPHY CHEST: CPT

## 2022-04-12 PROCEDURE — 85025 COMPLETE CBC W/AUTO DIFF WBC: CPT

## 2022-04-12 PROCEDURE — 6360000002 HC RX W HCPCS: Performed by: EMERGENCY MEDICINE

## 2022-04-12 PROCEDURE — 83036 HEMOGLOBIN GLYCOSYLATED A1C: CPT

## 2022-04-12 PROCEDURE — 2580000003 HC RX 258: Performed by: EMERGENCY MEDICINE

## 2022-04-12 PROCEDURE — 96374 THER/PROPH/DIAG INJ IV PUSH: CPT

## 2022-04-12 PROCEDURE — 80053 COMPREHEN METABOLIC PANEL: CPT

## 2022-04-12 PROCEDURE — 93005 ELECTROCARDIOGRAM TRACING: CPT | Performed by: EMERGENCY MEDICINE

## 2022-04-12 PROCEDURE — 6360000004 HC RX CONTRAST MEDICATION: Performed by: EMERGENCY MEDICINE

## 2022-04-12 PROCEDURE — 36415 COLL VENOUS BLD VENIPUNCTURE: CPT

## 2022-04-12 PROCEDURE — 99222 1ST HOSP IP/OBS MODERATE 55: CPT | Performed by: SURGERY

## 2022-04-12 RX ORDER — ASPIRIN 81 MG/1
81 TABLET, CHEWABLE ORAL DAILY
Status: DISCONTINUED | OUTPATIENT
Start: 2022-04-14 | End: 2022-04-18 | Stop reason: HOSPADM

## 2022-04-12 RX ORDER — NICOTINE POLACRILEX 4 MG
15 LOZENGE BUCCAL PRN
Status: DISCONTINUED | OUTPATIENT
Start: 2022-04-12 | End: 2022-04-12

## 2022-04-12 RX ORDER — INSULIN GLARGINE 100 [IU]/ML
24 INJECTION, SOLUTION SUBCUTANEOUS NIGHTLY
Status: DISCONTINUED | OUTPATIENT
Start: 2022-04-12 | End: 2022-04-18 | Stop reason: HOSPADM

## 2022-04-12 RX ORDER — ONDANSETRON 4 MG/1
4 TABLET, ORALLY DISINTEGRATING ORAL EVERY 8 HOURS PRN
Status: DISCONTINUED | OUTPATIENT
Start: 2022-04-12 | End: 2022-04-18 | Stop reason: HOSPADM

## 2022-04-12 RX ORDER — FUROSEMIDE 40 MG/1
40 TABLET ORAL DAILY
Status: DISCONTINUED | OUTPATIENT
Start: 2022-04-13 | End: 2022-04-18 | Stop reason: HOSPADM

## 2022-04-12 RX ORDER — HYDRALAZINE HYDROCHLORIDE 20 MG/ML
10 INJECTION INTRAMUSCULAR; INTRAVENOUS EVERY 6 HOURS PRN
Status: DISCONTINUED | OUTPATIENT
Start: 2022-04-12 | End: 2022-04-18 | Stop reason: HOSPADM

## 2022-04-12 RX ORDER — ACETAMINOPHEN 650 MG/1
650 SUPPOSITORY RECTAL EVERY 6 HOURS PRN
Status: DISCONTINUED | OUTPATIENT
Start: 2022-04-12 | End: 2022-04-18 | Stop reason: HOSPADM

## 2022-04-12 RX ORDER — ONDANSETRON 2 MG/ML
4 INJECTION INTRAMUSCULAR; INTRAVENOUS ONCE
Status: COMPLETED | OUTPATIENT
Start: 2022-04-12 | End: 2022-04-12

## 2022-04-12 RX ORDER — METOPROLOL SUCCINATE 50 MG/1
100 TABLET, EXTENDED RELEASE ORAL DAILY
Status: DISCONTINUED | OUTPATIENT
Start: 2022-04-13 | End: 2022-04-18 | Stop reason: HOSPADM

## 2022-04-12 RX ORDER — ONDANSETRON 2 MG/ML
4 INJECTION INTRAMUSCULAR; INTRAVENOUS EVERY 6 HOURS PRN
Status: DISCONTINUED | OUTPATIENT
Start: 2022-04-12 | End: 2022-04-18 | Stop reason: HOSPADM

## 2022-04-12 RX ORDER — MORPHINE SULFATE 2 MG/ML
2 INJECTION, SOLUTION INTRAMUSCULAR; INTRAVENOUS ONCE
Status: COMPLETED | OUTPATIENT
Start: 2022-04-12 | End: 2022-04-12

## 2022-04-12 RX ORDER — ATORVASTATIN CALCIUM 80 MG/1
80 TABLET, FILM COATED ORAL NIGHTLY
Status: DISCONTINUED | OUTPATIENT
Start: 2022-04-12 | End: 2022-04-18 | Stop reason: HOSPADM

## 2022-04-12 RX ORDER — AMLODIPINE BESYLATE 5 MG/1
2.5 TABLET ORAL DAILY
Status: DISCONTINUED | OUTPATIENT
Start: 2022-04-13 | End: 2022-04-18 | Stop reason: HOSPADM

## 2022-04-12 RX ORDER — POLYETHYLENE GLYCOL 3350 17 G/17G
17 POWDER, FOR SOLUTION ORAL DAILY PRN
Status: DISCONTINUED | OUTPATIENT
Start: 2022-04-12 | End: 2022-04-18 | Stop reason: HOSPADM

## 2022-04-12 RX ORDER — ACETAMINOPHEN 325 MG/1
650 TABLET ORAL EVERY 6 HOURS PRN
Status: DISCONTINUED | OUTPATIENT
Start: 2022-04-12 | End: 2022-04-18 | Stop reason: HOSPADM

## 2022-04-12 RX ORDER — EZETIMIBE 10 MG/1
10 TABLET ORAL DAILY
Status: DISCONTINUED | OUTPATIENT
Start: 2022-04-13 | End: 2022-04-18 | Stop reason: HOSPADM

## 2022-04-12 RX ORDER — DEXTROSE MONOHYDRATE 50 MG/ML
100 INJECTION, SOLUTION INTRAVENOUS PRN
Status: DISCONTINUED | OUTPATIENT
Start: 2022-04-12 | End: 2022-04-18 | Stop reason: HOSPADM

## 2022-04-12 RX ORDER — SODIUM CHLORIDE 0.9 % (FLUSH) 0.9 %
5-40 SYRINGE (ML) INJECTION EVERY 12 HOURS SCHEDULED
Status: DISCONTINUED | OUTPATIENT
Start: 2022-04-12 | End: 2022-04-18 | Stop reason: HOSPADM

## 2022-04-12 RX ORDER — DEXTROSE MONOHYDRATE 25 G/50ML
12.5 INJECTION, SOLUTION INTRAVENOUS PRN
Status: DISCONTINUED | OUTPATIENT
Start: 2022-04-12 | End: 2022-04-12

## 2022-04-12 RX ORDER — SODIUM CHLORIDE 9 MG/ML
INJECTION, SOLUTION INTRAVENOUS CONTINUOUS
Status: DISCONTINUED | OUTPATIENT
Start: 2022-04-12 | End: 2022-04-16

## 2022-04-12 RX ORDER — SODIUM CHLORIDE 9 MG/ML
INJECTION, SOLUTION INTRAVENOUS PRN
Status: DISCONTINUED | OUTPATIENT
Start: 2022-04-12 | End: 2022-04-18 | Stop reason: HOSPADM

## 2022-04-12 RX ORDER — SODIUM CHLORIDE 0.9 % (FLUSH) 0.9 %
10 SYRINGE (ML) INJECTION PRN
Status: DISCONTINUED | OUTPATIENT
Start: 2022-04-12 | End: 2022-04-18 | Stop reason: HOSPADM

## 2022-04-12 RX ORDER — NITROGLYCERIN 0.4 MG/1
0.4 TABLET SUBLINGUAL EVERY 5 MIN PRN
Status: DISCONTINUED | OUTPATIENT
Start: 2022-04-12 | End: 2022-04-18 | Stop reason: HOSPADM

## 2022-04-12 RX ADMIN — MORPHINE SULFATE 2 MG: 2 INJECTION, SOLUTION INTRAMUSCULAR; INTRAVENOUS at 17:00

## 2022-04-12 RX ADMIN — IOPAMIDOL 90 ML: 755 INJECTION, SOLUTION INTRAVENOUS at 13:06

## 2022-04-12 RX ADMIN — SODIUM CHLORIDE: 9 INJECTION, SOLUTION INTRAVENOUS at 16:53

## 2022-04-12 RX ADMIN — ONDANSETRON 4 MG: 2 INJECTION INTRAMUSCULAR; INTRAVENOUS at 12:34

## 2022-04-12 ASSESSMENT — ENCOUNTER SYMPTOMS
CHOKING: 0
EYE PAIN: 0
CONSTIPATION: 0
ABDOMINAL PAIN: 1
ABDOMINAL DISTENTION: 0
SHORTNESS OF BREATH: 0
SORE THROAT: 0
FACIAL SWELLING: 0
WHEEZING: 0
VOMITING: 0
DIARRHEA: 0
EYE REDNESS: 0
EYE DISCHARGE: 0
CHEST TIGHTNESS: 0
BACK PAIN: 0

## 2022-04-12 ASSESSMENT — PAIN SCALES - GENERAL: PAINLEVEL_OUTOF10: 7

## 2022-04-12 NOTE — TELEPHONE ENCOUNTER
Nurse triage call. Pt states that she was seen yesterday. Pt is hurting in the left side of her chest, and abdomen into her back. Pt reports SOA and some nausea. Pt was upset and somewhat hard to understand on the phone. I advised pt to go to the ER and I felt that this would be the best plan of treatment for pt. Pt understood. I offered to call ambulance for pt but she said  could take her. They will be going to [facility].

## 2022-04-12 NOTE — CONSULTS
[facility]    Patient ID: Mark Estes  71 y.o.  female  YOB: 1950    Admitting Diagnosis: Chest pain [R07.9]    Chief Complaint:  Chief Complaint   Patient presents with    Chest Pain    Arm Pain       Subjective:    Ms. Jessy Gomez is a 71 y.o. female who presents today with left sided abdominal pain. States she has been having issues on and off for weeks. States she has been having burning in her stomach. Decreased p.o. intake. Decreased appetite. History of EGD colonoscopy many years ago. Denies nausea or vomiting. Did present to the emergency room with these complaints. Underwent CT scan which showed a metallic wire or fishbone-like foreign body in the antrum of the stomach. Patient states that she did eat a piece of catfish last week. Currently in no acute distress.           Past Medical History:   Diagnosis Date    Anxiety     Depression     Edema     GERD (gastroesophageal reflux disease)     Headache(784.0)     migraines    Hyperlipidemia     Hypertension     Mini stroke (HCC)     Sleep apnea     CPAP    TIA (transient ischemic attack)      Past Surgical History:   Procedure Laterality Date    CHOLECYSTECTOMY      COLONOSCOPY  07/01/2015    Dr. Delmer Goltz COLONOSCOPY  06/05/2019    Dr Guillermina Najera (Jolene Wenceslao) Non-bleeding internal hemorrhoids, diverticulosis-Tubular AP (-) dysplasia, 3 yr recall    CYSTOSCOPY Left 12/5/2018    CYSTOSCOPY URETEROSCOPY  PLACEMENT DOUBLE J STENT ON LEFT RIGHT  RETROGRADE PYELOGRAMS performed by Jarvis Dowd MD at [address] Bilateral 1/31/2019    TOTAL LAPAROSCOPIC HYSTERECTOMY BILATERAL SALPINGOOPHERECTOMY WITH Therman Audra performed by Liz Brito MD at [facility]      [place] EGD TRANSORAL BIOPSY SINGLE/MULTIPLE N/A 5/8/2018    Dr Adonis Santizo (-) Kristin (+) Dye's (-) dysplasia--3 yr recall    [place] LAP,CHOLECYSTECTOMY N/A 3/6/2018    CHOLECYSTECTOMY LAPAROSCOPIC performed by Freida Murray MD at [address]  08/26/2015    Dr. Mame Teran  5/23/2017    Dr Osorio-w/balloon dilation, 12-13. 5-15 mm-esophageal narrowing with diverticulum at 29 cm-Kristin (-), hiatal herni, Dye's (+) dysplasia (-)--1st dx--1 yr recall-Ct chest ordered    WRIST FRACTURE SURGERY       Current Facility-Administered Medications   Medication Dose Route Frequency Provider Last Rate Last Admin    0.9 % sodium chloride infusion   IntraVENous Continuous Moises Shaikh MD        morphine (PF) injection 2 mg  2 mg IntraVENous Once Moises Shaikh MD         Current Outpatient Medications   Medication Sig Dispense Refill    LORazepam (ATIVAN) 0.5 MG tablet Take 1 tablet by mouth every 12 hours as needed for Anxiety for up to 30 days. 60 tablet 0    famotidine (PEPCID) 20 MG tablet Take 1 tablet by mouth 2 times daily 60 tablet 5    hydrALAZINE (APRESOLINE) 10 MG tablet Take 1 tablet by mouth 3 times daily 90 tablet 5    tamsulosin (FLOMAX) 0.4 MG capsule TAKE 1 CAPSULE BY MOUTH ONCE DAILY      amLODIPine (NORVASC) 2.5 MG tablet Take 1 tablet by mouth daily 30 tablet 5    gabapentin (NEURONTIN) 300 MG capsule Take 1 capsule by mouth 3 times daily for 30 days. 90 capsule 3    diclofenac sodium (VOLTAREN) 1 % GEL Apply 4 g topically 4 times daily as needed for Pain 350 g 1    metoprolol succinate (TOPROL XL) 100 MG extended release tablet Take 1 tablet by mouth daily 90 tablet 3    atorvastatin (LIPITOR) 80 MG tablet Take 1 tablet by mouth nightly 90 tablet 3    metFORMIN (GLUCOPHAGE) 500 MG tablet Take 1 tablet by mouth 2 times daily (with meals) 180 tablet 3    Ubrogepant (UBRELVY) 100 MG TABS Take 1 tablet at the onset of migraine. May repeat once in 2 hours if no improvement. Do not exceed 2 tablets in 24 hours.  10 tablet 3    omeprazole (PRILOSEC) 20 MG delayed release capsule Take 1 capsule by mouth 2 times daily Temp 98 °F (36.7 °C) (Oral)   Resp 19   Ht 5' 4\" (1.626 m)   Wt 220 lb (99.8 kg)   SpO2 99%   BMI 37.76 kg/m²   No intake or output data in the 24 hours ending 04/12/22 1653  Physical Exam  Constitutional:       Appearance: Normal appearance. She is obese. HENT:      Head: Normocephalic and atraumatic. Right Ear: External ear normal.      Left Ear: External ear normal.      Nose: Nose normal.   Eyes:      Pupils: Pupils are equal, round, and reactive to light. Pulmonary:      Effort: Pulmonary effort is normal.      Breath sounds: Normal breath sounds. Abdominal:      General: Bowel sounds are normal.      Palpations: Abdomen is soft. Tenderness: There is abdominal tenderness in the left upper quadrant. There is no guarding. Musculoskeletal:         General: Normal range of motion. Cervical back: Normal range of motion and neck supple. Skin:     General: Skin is warm and dry. Neurological:      General: No focal deficit present. Mental Status: She is alert and oriented to person, place, and time. Psychiatric:         Mood and Affect: Mood normal.         Behavior: Behavior normal.         Data:  CBC:   Recent Labs     04/12/22  1147   WBC 10.7   RBC 4.10*   HGB 12.2   HCT 39.6   MCV 96.6   RDW 13.1        BMP:   Recent Labs     04/12/22  1147      K 4.1      CO2 25   ANIONGAP 11   GLUCOSE 131*   CREATININE 0.7   LABGLOM >60   CALCIUM 9.8     LFT:   Recent Labs     04/12/22  1147   PROT 6.4*   LABALBU 4.2   BILITOT 0.3   ALKPHOS 82   ALT 13   AST 14     PT/INR:No results for input(s): PROTIME, INR in the last 72 hours. Assessment:     Principal Problem:    Chest pain  Active Problems:    Abdominal pain    Foreign body in stomach  Resolved Problems:    * No resolved hospital problems.  *      Plan:     Recommend GI evaluation for possible EGD and foreign body removal  And the work-up for her splenic lesion as an outpatient  If unable to remove the foreign body endoscopic, patient may require surgical intervention    Thank you for your consult    Electronically signed by Emi Carter DO on 4/12/2022 at 4:53 PM

## 2022-04-12 NOTE — H&P
[facility]      History & Physical    15/15  PCP: LUIZ Restrepo    Date of Admission:4/12/2022    Patient:  Shauna Wharton  MRN: [id number]    Date of Service: Pt seen/examined on 4/12/2022 and Admitted to Inpatient with expected LOS greater than two midnights due to medical therapy       CHIEF COMPLAINT:     Chief Complaint   Patient presents with    Chest Pain    Arm Pain       History Obtained From:  patient, electronic medical record  Primary Care Physician: LUIZ Restrepo    HISTORY OF PRESENT ILLNESS:    Ms. Hodan Jordan, a 71 y.o. female with a history of history of DMT2, HTN, HLD, JAYME, anxiety/depression, GERD, presenting to [facility] ED (04/12/2022), on account of an acute onset of several hours history of, moderate to severe left-sided chest and abdominal  Pain. Patient reports a squeezing type left-sided chest pain/pressure going down to her left abdomen and left arm. Associated symptom reported includes an episode of vomiting the night prior to presentation, as well as dyspnea on exertion. No other associated symptoms reported; no dizziness, lightheadedness, palpitations or diaphoresis. CT abdomen pelvis, reporting a curvilinear radiopaque foreign object (metallic wire vs fishbone-like) which appears to be in the distal part of the stomach and is protruding through the posterior medial wall of the distal stomach/antrum projecting near the neck/proximal body of the pancreas. Patient reportedly had a catfish last night. General surgery and GI both consulted from ED. Patient be admitted to medical service, for further work-up and management.        PAST MEDICAL & SURGICAL HISTORY    Past Medical History:      Diagnosis Date    Anxiety     Depression     Edema     GERD (gastroesophageal reflux disease)     Headache(784.0)     migraines    Hyperlipidemia     Hypertension     Mini stroke ([address] [address]     Sleep apnea     CPAP    TIA (transient ischemic attack)          Past Surgical History:      Procedure Laterality Date    CHOLECYSTECTOMY      COLONOSCOPY  07/01/2015    Dr. Junior Crowley COLONOSCOPY  06/05/2019    Dr Morenita Vargas [facility]) Non-bleeding internal hemorrhoids, diverticulosis-Tubular AP (-) dysplasia, 3 yr recall    CYSTOSCOPY Left 12/5/2018    CYSTOSCOPY URETEROSCOPY  PLACEMENT DOUBLE J STENT ON LEFT RIGHT  RETROGRADE PYELOGRAMS performed by Calvin Pete MD at [address] Bilateral 1/31/2019    TOTAL LAPAROSCOPIC HYSTERECTOMY BILATERAL SALPINGOOPHERECTOMY WITH Patterson Rico performed by Lisbeth Mcelroy MD at [facility], VAGINAL      LA EGD TRANSORAL BIOPSY SINGLE/MULTIPLE N/A 5/8/2018    Dr Joy Vera (-) Kristin (+) Dye's (-) dysplasia--3 yr recall    LA LAP,CHOLECYSTECTOMY N/A 3/6/2018    CHOLECYSTECTOMY LAPAROSCOPIC performed by Fallno Durham MD at [address] [facility]  08/26/2015    Dr. Vale Luna  5/23/2017    Dr Osorio-w/balloon dilation, 12-13. 5-15 mm-esophageal narrowing with diverticulum at 29 cm-Kristin (-), hiatal herni, Dye's (+) dysplasia (-)--1st dx--1 yr recall-Ct chest ordered    WRIST FRACTURE SURGERY            SOCIAL & FAMILY HISTORY:    Social History:   TOBACCO:   reports that she has never smoked. She has never used smokeless tobacco.  ETOH:   reports no history of alcohol use. Family History:       Problem Relation Age of Onset    Heart Disease Mother     Colon Cancer Neg Hx     Colon Polyps Neg Hx     Liver Cancer Neg Hx     Liver Disease Neg Hx     Esophageal Cancer Neg Hx     Rectal Cancer Neg Hx     Stomach Cancer Neg Hx         MEDICATIONS:    Medications Prior to Admission:    Prior to Admission medications    Medication Sig Start Date End Date Taking? Authorizing Provider   LORazepam (ATIVAN) 0.5 MG tablet Take 1 tablet by mouth every 12 hours as needed for Anxiety for up to 30 days.  4/11/22 5/11/22  Giulia Walker LUIZ   famotidine (PEPCID) 20 MG tablet Take 1 tablet by mouth 2 times daily 3/21/22   LUIZ Beaulieu   hydrALAZINE (APRESOLINE) 10 MG tablet Take 1 tablet by mouth 3 times daily 3/11/22   LUIZ Castañeda   tamsulosin (FLOMAX) 0.4 MG capsule TAKE 1 CAPSULE BY MOUTH ONCE DAILY 1/12/22   Historical Provider, MD   amLODIPine (NORVASC) 2.5 MG tablet Take 1 tablet by mouth daily 1/14/22   LUIZ Castañeda   gabapentin (NEURONTIN) 300 MG capsule Take 1 capsule by mouth 3 times daily for 30 days. 1/7/22 2/6/22  LUIZ Castañeda   diclofenac sodium (VOLTAREN) 1 % GEL Apply 4 g topically 4 times daily as needed for Pain 1/7/22   LUIZ Castañeda   metoprolol succinate (TOPROL XL) 100 MG extended release tablet Take 1 tablet by mouth daily 12/20/21   LUIZ Mckeon   atorvastatin (LIPITOR) 80 MG tablet Take 1 tablet by mouth nightly 12/16/21   LUIZ Castañeda   metFORMIN (GLUCOPHAGE) 500 MG tablet Take 1 tablet by mouth 2 times daily (with meals) 12/16/21   LUIZ Castañeda   Ubrogepant (UBRELVY) 100 MG TABS Take 1 tablet at the onset of migraine. May repeat once in 2 hours if no improvement. Do not exceed 2 tablets in 24 hours. 10/18/21   LUIZ Betancourt   omeprazole (PRILOSEC) 20 MG delayed release capsule Take 1 capsule by mouth 2 times daily 9/10/21   LUIZ Castañeda   furosemide (LASIX) 40 MG tablet Take 1 tablet by mouth daily 8/2/21   LUIZ Beaulieu   venlafaxine (EFFEXOR XR) 150 MG extended release capsule Take 1 capsule by mouth daily 8/2/21   LUIZ Beaulieu   Cholecalciferol 50 MCG (2000 UT) CAPS Take by mouth daily    Historical Provider, MD   ezetimibe (ZETIA) 10 MG tablet Take 1 tablet by mouth daily 8/22/19   LUIZ Castañeda   aspirin 81 MG tablet Aspir-81 81 mg tablet,delayed release   Take 1 tablet every day by oral route.     Historical Provider, MD   hydrOXYzine (ATARAX) 50 MG tablet hydroxyzine HCl 50 mg tablet   TAKE ONE TABLET BY MOUTH TWICE DAILY AS NEEDED    Historical Provider, MD   Multiple Vitamins-Minerals (MULTIVITAMIN ADULT PO) Take by mouth    Historical Provider, MD        ALLERGIES:    Allergies:  Patient has no known allergies. REVIEW OF SYSTEMS :    Review of Systems  14 point review of systems is negative except as specifically addressed above. PHYSICAL EXAM:    Physical Exam:    Vitals:   BP (!) 162/77   Pulse 85   Temp 98 °F (36.7 °C) (Oral)   Resp 13   Ht 5' 4\" (1.626 m)   Wt 220 lb (99.8 kg)   SpO2 99%   BMI 37.76 kg/m²     Physical Exam  Vitals and nursing note reviewed. Constitutional:       General: She is not in acute distress. Appearance: Normal appearance. She is obese. She is not ill-appearing, toxic-appearing or diaphoretic. HENT:      Head: Normocephalic and atraumatic. Right Ear: External ear normal.      Left Ear: External ear normal.      Nose: Nose normal. No congestion or rhinorrhea. Mouth/Throat:      Mouth: Mucous membranes are moist.      Pharynx: Oropharynx is clear. No oropharyngeal exudate or posterior oropharyngeal erythema. Eyes:      General: No scleral icterus. Right eye: No discharge. Left eye: No discharge. Extraocular Movements: Extraocular movements intact. Conjunctiva/sclera: Conjunctivae normal.      Pupils: Pupils are equal, round, and reactive to light. Cardiovascular:      Rate and Rhythm: Normal rate and regular rhythm. Pulses: Normal pulses. Heart sounds: Normal heart sounds. No murmur heard. No friction rub. No gallop. Pulmonary:      Effort: Pulmonary effort is normal. No respiratory distress. Breath sounds: Normal breath sounds. No stridor. No wheezing, rhonchi or rales. Chest:      Chest wall: Tenderness present. Abdominal:      General: Bowel sounds are normal. There is no distension. Palpations: Abdomen is soft. Tenderness:  There is abdominal tenderness ( She is tenderness palpation. No guarding, rigidity, rebound tenderness noted. ). There is no guarding or rebound. Musculoskeletal:         General: No swelling, tenderness, deformity or signs of injury. Normal range of motion. Cervical back: Normal range of motion and neck supple. No rigidity. No muscular tenderness. Right lower leg: No edema. Left lower leg: No edema. Skin:     General: Skin is warm and dry. Capillary Refill: Capillary refill takes less than 2 seconds. Coloration: Skin is not jaundiced or pale. Findings: No bruising, erythema, lesion or rash. Neurological:      General: No focal deficit present. Mental Status: She is alert and oriented to person, place, and time. Cranial Nerves: No cranial nerve deficit. Sensory: No sensory deficit. Motor: No weakness. Coordination: Coordination normal.   Psychiatric:         Mood and Affect: Mood normal.         Behavior: Behavior normal.         Thought Content: Thought content normal.         Judgment: Judgment normal.               DIAGNOSTIC STUDIES:    I have reviewedLaboratory and Imaging data report today. Recent Labs     04/12/22  1147   WBC 10.7   HGB 12.2        Recent Labs     04/12/22  1147      K 4.1      CO2 25   BUN 17   CREATININE 0.7   GLUCOSE 131*   AST 14   ALT 13   BILITOT 0.3   ALKPHOS 82     Troponin T:   Recent Labs     04/12/22  1147 04/12/22  1458   TROPONINI <0.01 <0.01     Pro-BNP: No results found for: BNP  Lactate:   Lab Results   Component Value Date    LACTA 2.3 ([facility]) 03/23/2020         ABGs: No results found for: PHART, PO2ART, FQA4USK  INR: No results for input(s): INR in the last 72 hours.   TSH:   Lab Results   Component Value Date    TSH 1.880 10/19/2021    TSHREFLEX 3.502 03/16/2011     URINALYSIS:  Recent Labs     04/12/22  1501   COLORU YELLOW   PHUR 8.5*   CLARITYU Clear   SPECGRAV >=1.045   LEUKOCYTESUR Negative   UROBILINOGEN 0.2   BILIRUBINUR Negative   BLOODU Negative   GLUCOSEU Negative          RADIOLOGY / IMAGING REPORTS:     CT ABDOMEN PELVIS W IV CONTRAST Additional Contrast? None    Result Date: 4/12/2022  Examination. CT ABDOMEN PELVIS W IV CONTRAST 4/12/2022 1:07 PM History: Abdominal pain. DLP: 1488 mGycm. The automated exposure control was utilized to minimize the patient's radiation exposure. The CT scan of the abdomen and pelvis is performed after intravenous contrast enhancement. The images are acquired in axial plane and subsequent reconstruction in coronal and sagittal planes. The comparison is made with the previous study dated 1/22/2019. The lung bases included in the study show dependent atelectatic changes at bilateral bases posteriorly. The limited included cardiomediastinal structures show heavy calcification mitral annulus. No significant cardiomegaly. Mild atheromatous changes of coronary arteries. The liver appears normal. There are ill-defined low-density nodules in the spleen which appears significantly larger than the previous study. A nodule located posteriorly measures 2 cm in diameter. It previously measured 1 cm. The gallbladder is surgically absent. No significant dilatation of the common bile duct. The pancreas is normal. The pancreatic duct is not visualized. The adrenal glands are normal. There are multiple tiny low-density cortical nodules in both kidneys which appears similar to the previous study. No radiopaque calculi. No hydronephrosis. The ureters bilaterally are normal. The urinary bladder is poorly distended. No intrinsic abnormality. The uterus is absent. No adnexal masses. Tiny fat-containing umbilical hernia. There is a small sliding-type hiatal hernia. There is a curvilinear radiopaque foreign object which appears to be in the distal part of the stomach and is protruding through the posterior medial wall of the distal stomach/antrum projecting near the neck/proximal body of the pancreas.  The type of foreign body is not certain. This may represent a metallic wire or a fishbone? [place] Hilt There is a small bubble of air adjacent to the proximal end of this foreign object. The duodenum is normal. Small bowel is nondistended. A retrocecal appendix is normal. The cecum lies low in the pelvis adjacent to the urinary bladder. Moderate gas and stool is seen in the colon. There is diverticulosis of the distal colon. No evidence for diverticulitis. Atheromatous changes of the abdominal aorta and iliac arteries. No aneurysmal dilatation. There is no evidence of abdominal or pelvic lymphadenopathy. The images reviewed in bone window show chronic degenerative changes of the lumbar spine. There is a mild levoscoliosis. No focal bony lesion. 1. A curvilinear radiopaque foreign body in the stomach protruding through the posterior medial wall of the distal body/antrum of the stomach. No free air. This may represent a metallic wire or a fishbone? Clinical correlation and further evaluation is recommended. 2. Multiple very small low density renal nodules which are too small to be further characterized. These are unchanged. 3. The diverticulosis of the colon. No evidence for diverticulitis. 4. The enlarging poorly marginated low-density nodule in the spleen. Etiology is not certain. Possibility of metastatic disease is not excluded. The results were called to ER physician, Dr. Severa Loan, immediately. Signed by Dr Agnes So    Result Date: 4/12/2022  XR CHEST PORTABLE 4/12/2022 12:13 PM HISTORY: Chest pain COMPARISON: 3/23/2020 CXR: A single frontal view of the chest is performed. Findings: Lungs appear mildly hyperinflated. Vascular crowding versus mild venous congestion. Basilar atelectasis. No pleural effusion or pneumothorax. No acute regional bony pathology. 1. Hypoventilated lungs with vascular crowding versus mild venous congestion and basilar atelectasis. . Signed by Dr Kei Pardo Date: 4/12/2022  CTA chest 4/12/2022 1:06 PM HISTORY: Shortness of breath with chest pressure TECHNIQUE: Axial images of the chest were obtained following IV contrast. . Coronal and sagittal as well as maximal intensity projectional reformatted images are reconstructed and reviewed. COMPARISON: 2/27/2018. DLP: 856 mGy cm Automated exposure control was utilized to minimize patient radiation dose. FINDINGS: No pulmonary emboli identified. Thoracic aorta normal in caliber with no aneurysm or dissection. Cardiomegaly. Mild left coronary calcification. No pericardial or pleural effusions. No pathologic intrathoracic or axillary lymphadenopathy. Small hiatal hernia. Please refer to CT abdomen pelvis report for findings below the hemidiaphragms. Basilar atelectasis. Mild atelectatic changes of the lingula. No consolidation. No suspicious pulmonary nodules. No pneumothorax. No endobronchial lesions. No focal destructive osseous lesions. A bony hemangioma suspected within the T11 vertebra. 1. No pulmonary emboli. 2. No thoracic aneurysm or dissection. Mild left coronary artery calcification. Mild cardiomegaly. 3. Small hiatal hernia. 4. Atelectatic changes lungs with no consolidation or suspicious nodularity.  Signed by Dr Gustavo Nicolas          ECHOCARDIOGRAM:     pending        PATIENT SUMMARY      ASSESSMENT / IMPRESSION & PLAN:          Hospital Problems           Last Modified POA    * (Principal) Chest pain 4/12/2022 Yes    Abdominal pain 4/12/2022 Yes    Foreign body in stomach 4/12/2022 Yes    Essential hypertension (Chronic) 4/12/2022 Yes    Mixed hyperlipidemia (Chronic) 4/12/2022 Yes    Gastroesophageal reflux disease without esophagitis (Chronic) 4/12/2022 Yes    Morbidly obese ([address] (Chronic) 4/12/2022 Yes    Overview Signed 8/17/2017 12:54 PM by Prasanth Jones     Last Assessment & Plan:   Obesity: Federal guidelines recommend that people under the age of 72 should have a BMI of 18.5-24.9 and people age 72 and older should have a BMI of 23-30. Morbid obesity is defined as >100 lb overweight or BMI >40. The patient BMI is outside the recommended range and we recommended/discussed today to utilize a diet/exercise program to get back into the appropriate range. Today we encouraged roughly a 1 lb per week weight loss with initial goal of 5% weight loss. The initial step is to document everything that is consumed into a food diary. Studies have shown that patients can lose up to 2x the weight by keeping track of foods. We discussed BEE today and discussed reasonable goal to realize weight loss. Would recommend f/u with PCP to discuss further. Anxiety 4/12/2022 Yes    Moderate episode of recurrent major depressive disorder ([address] 4/12/2022 Yes    Obstructive sleep apnea syndrome 4/12/2022 Yes          Principal Problem:    Chest pain  Active Problems:    Abdominal pain    Foreign body in stomach    Essential hypertension    Mixed hyperlipidemia    Gastroesophageal reflux disease without esophagitis    Morbidly obese (HCC)    Anxiety    Moderate episode of recurrent major depressive disorder ([address]    Obstructive sleep apnea syndrome  Resolved Problems:    * No resolved hospital problems. *            Plan  1. Admit to: PCU  2. Serial vitals, telemetry,  3. Diet: NPO,   4. Activity: Up with assistance. 5. IVF: Normal saline  6. Follow labs: Routine labs, including CBC, CMP, mag, Phos  7. Consults: General surgery, GI, and cardiology  8. Start home medications after reconciliation      Abdominal pain  Foreign body in posterior wall of stomach    · CT abdomen pelvis W IV Con (04/12/2022): Impression: A curvilinear radiopaque foreign body in the stomach protruding through the posterior medial wall of the distal body/antrum of the stomach. No free air. This may represent a metallic wire or a fishbone? Clinical correlation and further evaluation is recommended.  Multiple very small low density renal nodules which are too small to be further characterized. These are unchanged. The diverticulosis of the colon. No evidence for diverticulitis. The enlarging poorly marginated low-density nodule in the spleen. Etiology is not certain. Possibility of metastatic disease is not excluded. · General Surgery and GI consulted  · Continue symptomatic supportive management  · Further management as per specialist recommendations      Chest/Arm Pain   CTA Chest W Con (04/12/2022): Impression: No pulmonary emboli. No thoracic aneurysm or dissection. Mild left coronary artery calcification. Mild cardiomegaly. Small hiatal hernia. Atelectatic changes lungs with no consolidation or suspicious nodularity.  - Initial troponin negative    - Trend troponin   - Serial EKGs for chest pain   - Optimized medical management   --> Nitro glycerin sublingual when necessary for chest pain   - 2D Echocardiogram:    - Continue to monitor on telemetry   - Fasting Lipid panel in a.m    - Famotidine    - Cardiology consult    - NPO after midnight    - Plan for possible Stress test for better risk stratification       Diabetes Mellitus II   Uncontrolled   Hemoglobin A1C    Inpatient Regimens to include;  o - Insulin Glargine (Lantus) 25 units subcu nightly  o - Insulin Lispro (Humalog) 6 units subcu pre-meal 3 times a day  o - Insulin Lispro (Humalog) on a Medium dose sliding scale   Monitor POC glucose, and adjust insulin regimen accordingly based on daily insulin requirement.      Essential Hypertension   Uncontrolled   Resume home regimen   Amlodipine   Hydralazine   Metoprolol succinate 100 mg p.o. daily   Hydralazine 10 mg IV Q6H/PRN  For SBP> 180 and/or DP> 110    Continue to monitor    Mixed Hyperlipidemia  · Ezetimibe 10 mg p.o. daily  · Atorvastatin 80 mg p.o. nightly    Anxiety/depression  · Resume home regimen after reconciliation  · Hydroxyzine 50 mg p.o. daily as needed  · Lorazepam 0.5 mg p.o. every 12 hours/as needed for anxiety        Continue management of other chronic medical conditions - Please see orders above         CONSULTS:    IP CONSULT TO GENERAL SURGERY  IP CONSULT TO GI  IP CONSULT TO CARDIOLOGY        INPATIENT CHECKLIST:      Nutrition: Diet NPO    Prophylaxis Orders:   VTE - Enoxaparin     CODE STATUS: Full Code ISOLATION:       DISCHARGE PLAN: tbd     Total face-to-face time spent with this patient, time spent reviewing medical records, and in coordination of care with the emergency department physician, nursing staff, in the examination, evaluation/assessment, counseling, review of medications and plan, was   70 mins . Electronically signed by   Belinda Salazar MD, MPH, MD  Internal Medicine Hospitalist   4/12/2022 5:31 PM      EMR Dragon/Transcription disclaimer:   Much of this encounter note is an electronic transcription/translation of spoken language to printed text.  The electronic translation of spoken language may permit erroneous, or at times, nonsensical words or phrases to be inadvertently transcribed; although attempts have made to review the note for such errors, some may still exist.

## 2022-04-12 NOTE — ED NOTES
Pt reports to ER c/o CP and left arm, shoulder pain which began approximately 7 am. Pt seen by PCP yesterday and EKG performed.       Tessa Vega RN  04/12/22 2678

## 2022-04-12 NOTE — ED PROVIDER NOTES
[facility]  eMERGENCY dEPARTMENT eNCOUnter      Pt Name: Ivelisse Lawson  MRN: [id number]  [place] 1950  Date of evaluation: 4/12/2022  Provider: Carlos Alberto Silva MD    [address] [place] [address]       Chief Complaint   Patient presents with    Chest Pain    Arm Pain         HISTORY OF PRESENT ILLNESS   (Location/Symptom, Timing/Onset,Context/Setting, Quality, Duration, Modifying Factors, Severity)  Note limiting factors. Ivelisse Lawson is a 71 y.o. female who presents to the emergency department with multiple complaints. Patient said that since this morning she has had pain in the left side of her chest going down to the left abdomen and left arm. Pain constant. No exacerbating or alleviating factors. Had some vomiting last night. No vomiting today. No hematemesis. No urinary complaints. No dyspnea. No history of DVT or PE. No hemoptysis. No urinary symptoms. No unilateral leg swelling or pain    HPI    NursingNotes were reviewed. REVIEW OF SYSTEMS    (2-9 systems for level 4, 10 or more for level 5)     Review of Systems   Constitutional: Negative for fever. Eyes: Negative for pain. Respiratory: Positive for shortness of breath. Cardiovascular: Positive for chest pain. Negative for palpitations and leg swelling. Gastrointestinal: Positive for abdominal pain. Negative for diarrhea and vomiting. Genitourinary: Negative for dysuria. Skin: Negative for rash. Neurological: Negative for weakness, numbness and headaches. All other systems reviewed and are negative. A complete review of systems was performed and is negative except as noted above in the HPI.        PAST MEDICAL HISTORY     Past Medical History:   Diagnosis Date    Anxiety     Depression     Edema     GERD (gastroesophageal reflux disease)     Headache(784.0)     migraines    Hyperlipidemia     Hypertension     Mini stroke ([facility] [address]     Sleep apnea     CPAP    TIA (transient ischemic attack)          SURGICAL HISTORY       Past Surgical History:   Procedure Laterality Date    CHOLECYSTECTOMY      COLONOSCOPY  07/01/2015    Dr Kim Grijalva w/atypia, 5 yr recall    COLONOSCOPY  06/05/2019    Dr Haley Bloom ([place]) Non-bleeding internal hemorrhoids, diverticulosis-Tubular AP (-) dysplasia, 3 yr recall    CYSTOSCOPY Left 12/05/2018    CYSTOSCOPY URETEROSCOPY  PLACEMENT DOUBLE J STENT ON LEFT RIGHT  RETROGRADE PYELOGRAMS performed by Rafael Kaufman MD at [address] Bilateral 01/31/2019    TOTAL LAPAROSCOPIC HYSTERECTOMY BILATERAL SALPINGOOPHERECTOMY WITH Cloyde Saima performed by Deny Puckett MD at [facility], VAGINAL      KS EGD TRANSORAL BIOPSY SINGLE/MULTIPLE N/A 05/08/2018    Dr Vega Hallmark (-) Kristin (+) Dye's (-) dysplasia--3 yr recall    KS LAP,CHOLECYSTECTOMY N/A 03/06/2018    CHOLECYSTECTOMY LAPAROSCOPIC performed by Thi Huerta MD at [address]  08/26/2015    Dr Claudine Herron neg    UPPER GASTROINTESTINAL ENDOSCOPY  05/23/2017    Dr Osorio-w/balloon dilation, 12-13. 5-15 mm-esophageal narrowing with diverticulum at 29 cm-Kristin (-), hiatal herni, Dye's (+) dysplasia (-)--1st dx--1 yr recall-Ct chest ordered    UPPER GASTROINTESTINAL ENDOSCOPY N/A 04/13/2022    Dr Hill Comfort foreign body present   [address]       Current Discharge Medication List      CONTINUE these medications which have NOT CHANGED    Details   LORazepam (ATIVAN) 0.5 MG tablet Take 1 tablet by mouth every 12 hours as needed for Anxiety for up to 30 days.   Qty: 60 tablet, Refills: 0    Associated Diagnoses: Anxiety      famotidine (PEPCID) 20 MG tablet Take 1 tablet by mouth 2 times daily  Qty: 60 tablet, Refills: 5    Associated Diagnoses: Gastroesophageal reflux disease without esophagitis      hydrALAZINE (APRESOLINE) 10 MG tablet Take 1 tablet by mouth 3 times daily  Qty: 90 tablet, Refills: 5      tamsulosin (FLOMAX) 0.4 MG capsule TAKE 1 CAPSULE BY MOUTH ONCE DAILY      amLODIPine (NORVASC) 2.5 MG tablet Take 1 tablet by mouth daily  Qty: 30 tablet, Refills: 5    Comments: Please consider 90 day supplies to promote better adherence  Associated Diagnoses: Hypotension, unspecified hypotension type      gabapentin (NEURONTIN) 300 MG capsule Take 1 capsule by mouth 3 times daily for 30 days. Qty: 90 capsule, Refills: 3    Associated Diagnoses: Neuropathy; Pain in both feet; Chronic left-sided thoracic back pain      diclofenac sodium (VOLTAREN) 1 % GEL Apply 4 g topically 4 times daily as needed for Pain  Qty: 350 g, Refills: 1    Associated Diagnoses: Chronic left-sided thoracic back pain      metoprolol succinate (TOPROL XL) 100 MG extended release tablet Take 1 tablet by mouth daily  Qty: 90 tablet, Refills: 3    Associated Diagnoses: Essential hypertension      atorvastatin (LIPITOR) 80 MG tablet Take 1 tablet by mouth nightly  Qty: 90 tablet, Refills: 3    Associated Diagnoses: Mixed hyperlipidemia      metFORMIN (GLUCOPHAGE) 500 MG tablet Take 1 tablet by mouth 2 times daily (with meals)  Qty: 180 tablet, Refills: 3    Associated Diagnoses: Type 2 diabetes mellitus without complication, without long-term current use of insulin (HCC)      Ubrogepant (UBRELVY) 100 MG TABS Take 1 tablet at the onset of migraine. May repeat once in 2 hours if no improvement. Do not exceed 2 tablets in 24 hours. Qty: 10 tablet, Refills: 3      omeprazole (PRILOSEC) 20 MG delayed release capsule Take 1 capsule by mouth 2 times daily  Qty: 180 capsule, Refills: 3    Associated Diagnoses: Pain of upper abdomen; Gastroesophageal reflux disease      furosemide (LASIX) 40 MG tablet Take 1 tablet by mouth daily  Qty: 30 tablet, Refills: 11    Associated Diagnoses: Localized edema      venlafaxine (EFFEXOR XR) 150 MG extended release capsule Take 1 capsule by mouth daily  Qty: 30 capsule, Refills: 11    Associated Diagnoses:  Moderate episode of recurrent major depressive disorder ([facility] [address] FRANC (generalized anxiety disorder); Crying associated with mood      Cholecalciferol 50 MCG (2000 UT) CAPS Take by mouth daily      ezetimibe (ZETIA) 10 MG tablet Take 1 tablet by mouth daily  Qty: 30 tablet, Refills: 11      aspirin 81 MG tablet Aspir-81 81 mg tablet,delayed release   Take 1 tablet every day by oral route. hydrOXYzine (ATARAX) 50 MG tablet hydroxyzine HCl 50 mg tablet   TAKE ONE TABLET BY MOUTH TWICE DAILY AS NEEDED      Multiple Vitamins-Minerals (MULTIVITAMIN ADULT PO) Take by mouth             ALLERGIES     Patient has no known allergies. FAMILY HISTORY       Family History   Problem Relation Age of Onset    Heart Disease Mother     Colon Cancer Neg Hx     Colon Polyps Neg Hx     Liver Cancer Neg Hx     Liver Disease Neg Hx     Esophageal Cancer Neg Hx     Rectal Cancer Neg Hx     Stomach Cancer Neg Hx           SOCIAL HISTORY       Social History     Socioeconomic History    Marital status:      Spouse name: None    Number of children: None    Years of education: None    Highest education level: None   Occupational History    None   Tobacco Use    Smoking status: Never Smoker    Smokeless tobacco: Never Used   Vaping Use    Vaping Use: Never used   Substance and Sexual Activity    Alcohol use: No     Alcohol/week: 0.0 standard drinks    Drug use: No    Sexual activity: None   Other Topics Concern    None   Social History Narrative    None     Social Determinants of Health     Financial Resource Strain: Low Risk     Difficulty of Paying Living Expenses: Not hard at all   Food Insecurity: No Food Insecurity    Worried About Running Out of Food in the Last Year: Never true    Oh of Food in the Last Year: Never true   Transportation Needs:     Lack of Transportation (Medical): Not on file    Lack of Transportation (Non-Medical):  Not on file   Physical Activity:     Days of Exercise per Week: Not on file    Minutes of Exercise per Session: Not on file   Stress:     Feeling of Stress : Not on file   Social Connections:     Frequency of Communication with Friends and Family: Not on file    Frequency of Social Gatherings with Friends and Family: Not on file    Attends Yarsanism Services: Not on file    Active Member of [address] [place] [address] or Organizations: Not on file    Attends Club or Organization Meetings: Not on file    Marital Status: Not on file   Intimate Partner Violence:     Fear of Current or Ex-Partner: Not on file    Emotionally Abused: Not on file    Physically Abused: Not on file    Sexually Abused: Not on file   Housing Stability:     Unable to Pay for Housing in the Last Year: Not on file    Number of [place] in the Last Year: Not on file    Unstable Housing in the Last Year: Not on file       SCREENINGS    [place] Coma Scale  Eye Opening: Spontaneous  Best Verbal Response: Oriented  Best Motor Response: Obeys commands  [place] Coma Scale Score: 15        PHYSICAL EXAM    (up to 7 for level 4, 8 or more for level 5)     ED Triage Vitals   BP Temp Temp Source Pulse Resp SpO2 Height Weight   04/12/22 1137 04/12/22 1141 04/12/22 1141 -- 04/12/22 1137 04/12/22 1137 04/12/22 1137 04/12/22 1137   (!) 174/81 98 °F (36.7 °C) Oral  18 98 % 5' 4\" (1.626 m) 220 lb (99.8 kg)       Physical Exam  Vitals reviewed. Constitutional:       General: She is not in acute distress. Appearance: She is well-developed. She is not toxic-appearing. HENT:      Head: Normocephalic and atraumatic. Mouth/Throat:      Mouth: Mucous membranes are moist.      Pharynx: Oropharynx is clear. Eyes:      General: No scleral icterus. Pupils: Pupils are equal, round, and reactive to light. Neck:      Vascular: No JVD. Cardiovascular:      Rate and Rhythm: Normal rate and regular rhythm. Heart sounds: Normal heart sounds. Pulmonary:      Effort: Pulmonary effort is normal. No respiratory distress.       Breath sounds: Normal breath sounds. Abdominal:      General: There is no distension. Palpations: Abdomen is soft. There is no pulsatile mass. Tenderness: There is abdominal tenderness in the left upper quadrant. There is no right CVA tenderness, left CVA tenderness, guarding or rebound. Musculoskeletal:         General: No tenderness. Cervical back: Normal range of motion and neck supple. Skin:     General: Skin is warm and dry. Capillary Refill: Capillary refill takes less than 2 seconds. Neurological:      General: No focal deficit present. Mental Status: She is alert and oriented to person, place, and time. Psychiatric:         Mood and Affect: Mood normal.         Behavior: Behavior normal.         DIAGNOSTIC RESULTS     EKG: All EKG's are interpreted by the Emergency Department Physician who either signs or Co-signs this chart in the absence of a cardiologist.    Normal sinus rhythm. Normal QT. Borderline T wave changes anteriorly. RADIOLOGY:   Non-plain film images such as CT, Ultrasound and MRI are read by the radiologist. kinkoners images are visualized and preliminarily interpreted by the emergency physician with the below findings:    Interpretation per the Radiologist below, if available at the time of this note:    NM MYOCARDIAL SPECT REST EXERCISE OR RX   Final Result      CT ABDOMEN PELVIS W IV CONTRAST Additional Contrast? None   Final Result   1. A curvilinear radiopaque foreign body in the stomach protruding   through the posterior medial wall of the distal body/antrum of the   stomach. No free air. This may represent a metallic wire or a   fishbone? Clinical correlation and further evaluation is recommended. 2. Multiple very small low density renal nodules which are too small   to be further characterized. These are unchanged. 3. The diverticulosis of the colon. No evidence for diverticulitis.    4. The enlarging poorly marginated low-density nodule in the spleen. Etiology is not certain. Possibility of metastatic disease is not   excluded. The results were called to ER physician, Dr. Zita Barrera, immediately. Signed by Dr Deisy Stevens   Final Result   1. No pulmonary emboli. 2. No thoracic aneurysm or dissection. Mild left coronary artery   calcification. Mild cardiomegaly. 3. Small hiatal hernia. 4. Atelectatic changes lungs with no consolidation or suspicious   nodularity. Signed by Dr Fabricio Sagastume   Final Result   1. Hypoventilated lungs with vascular crowding versus mild venous   congestion and basilar atelectasis. .   Signed by Dr Tammi Almazan      XR ABDOMEN (KUB) (SINGLE AP VIEW)    (Results Pending)         ED BEDSIDE ULTRASOUND:   Performed by ED Physician - none    LABS:  Labs Reviewed   CBC WITH AUTO DIFFERENTIAL - Abnormal; Notable for the following components:       Result Value    RBC 4.10 (*)     MCHC 30.8 (*)     Neutrophils % 80.7 (*)     Lymphocytes % 11.6 (*)     Neutrophils Absolute 8.7 (*)     All other components within normal limits   COMPREHENSIVE METABOLIC PANEL - Abnormal; Notable for the following components:    Glucose 131 (*)     Total Protein 6.4 (*)     All other components within normal limits   URINALYSIS WITH REFLEX TO CULTURE - Abnormal; Notable for the following components:    pH, UA 8.5 (*)     All other components within normal limits   HEMOGLOBIN A1C - Abnormal; Notable for the following components:    Hemoglobin A1C 6.3 (*)     All other components within normal limits   BASIC METABOLIC PANEL W/ REFLEX TO MG FOR LOW K - Abnormal; Notable for the following components:    Glucose 122 (*)     All other components within normal limits   LIPID PANEL - Abnormal; Notable for the following components:    Cholesterol, Total 131 (*)     Triglycerides 181 (*)     HDL 41 (*)     All other components within normal limits   CBC WITH AUTO DIFFERENTIAL - Abnormal; Notable for the following components:    RBC 3.66 (*)     Hemoglobin 10.8 (*)     Hematocrit 34.9 (*)     MCHC 30.9 (*)     Neutrophils % 73.5 (*)     Lymphocytes % 16.0 (*)     All other components within normal limits   BASIC METABOLIC PANEL W/ REFLEX TO MG FOR LOW K - Abnormal; Notable for the following components:    CO2 20 (*)     Glucose 150 (*)     All other components within normal limits    Narrative:     Collection has been rescheduled by [facility] at 04/14/2022 04:21 Reason:   Failed attempt at venipuncture   CBC WITH AUTO DIFFERENTIAL - Abnormal; Notable for the following components:    RBC 4.11 (*)     MCHC 30.9 (*)     MPV 9.3 (*)     Neutrophils % 87.1 (*)     Lymphocytes % 7.3 (*)     Neutrophils Absolute 8.0 (*)     Lymphocytes Absolute 0.7 (*)     All other components within normal limits    Narrative:     Collection has been rescheduled by [facility] at 04/14/2022 04:21 Reason:   Failed attempt at venipuncture   POCT GLUCOSE - Abnormal; Notable for the following components:    POC Glucose 128 (*)     All other components within normal limits   POCT GLUCOSE - Abnormal; Notable for the following components:    POC Glucose 123 (*)     All other components within normal limits   POCT GLUCOSE - Abnormal; Notable for the following components:    POC Glucose 124 (*)     All other components within normal limits   POCT GLUCOSE - Abnormal; Notable for the following components:    POC Glucose 215 (*)     All other components within normal limits   POCT GLUCOSE - Abnormal; Notable for the following components:    POC Glucose 128 (*)     All other components within normal limits   TROPONIN   LIPASE   TROPONIN   TROPONIN   TROPONIN   PROTIME-INR   APTT   BRAIN NATRIURETIC PEPTIDE    Narrative:     Collection has been rescheduled by Selestine [facility] at 04/13/2022 09:17 Reason: Add    POCT GLUCOSE   POCT GLUCOSE   POCT GLUCOSE   POCT GLUCOSE   POCT GLUCOSE   POCT GLUCOSE   POCT GLUCOSE   POCT GLUCOSE   POCT GLUCOSE       All other labs were within normal range or not returned as of this dictation. EMERGENCY DEPARTMENT COURSE and DIFFERENTIALDIAGNOSIS/MDM:   Vitals:    Vitals:    04/13/22 1555 04/13/22 1642 04/14/22 0005 04/14/22 0600   BP: (!) 145/73 (!) 151/64 (!) 143/76 (!) 150/77   Pulse: 92 85 87 93   Resp: 18 20 18 22   Temp:  97 °F (36.1 °C) 97.2 °F (36.2 °C) 96.8 °F (36 °C)   TempSrc:  Temporal     SpO2: 93% 91% 90% 93%   Weight:       Height:           MDM  Suspected foreign body seen in stomach on CT. Case discussed with Dr. Win Stephen who said he can see in consult as needed but said that Dr. Avril Greenberg, GI, should be consulted to remove suspected foreign body. Spoke with on-call GI, Dr. Avril Greenberg, is agreeable plan of care will see in consult. Case discussed with hospitalist, Dr. Noé Ramirez, who is agreeable plan of care admission. Patient resting comfortably and updated about plan. Patient has had chest pain but we will hold off on aspirin given concern for foreign body embedded in the stomach. CONSULTS:  IP CONSULT TO GENERAL SURGERY  IP CONSULT TO GI  IP CONSULT TO CARDIOLOGY    PROCEDURES:  Unless otherwise notedbelow, none     Procedures    FINAL IMPRESSION     1. Chest pain, unspecified type    2. Abdominal pain, unspecified abdominal location    3. Foreign body in stomach, initial encounter    4.  Abnormal CT scan          DISPOSITION/PLAN   DISPOSITION Admitted 04/12/2022 04:48:59 PM      PATIENT REFERRED TO:  @FUP@    DISCHARGE MEDICATIONS:  Current Discharge Medication List             (Please note that portions of this note were completed with a voice recognition program.  Efforts were made to edit the dictations butoccasionally words are mis-transcribed.)    Luz Salguero MD (electronically signed)  AttendingEmergency Physician          Luz Salguero MD  04/14/22 [id number]

## 2022-04-13 ENCOUNTER — ANESTHESIA EVENT (OUTPATIENT)
Dept: ENDOSCOPY | Age: 72
DRG: 394 | End: 2022-04-13
Payer: MEDICARE

## 2022-04-13 ENCOUNTER — APPOINTMENT (OUTPATIENT)
Dept: NUCLEAR MEDICINE | Age: 72
DRG: 394 | End: 2022-04-13
Payer: MEDICARE

## 2022-04-13 ENCOUNTER — ANESTHESIA (OUTPATIENT)
Dept: ENDOSCOPY | Age: 72
DRG: 394 | End: 2022-04-13
Payer: MEDICARE

## 2022-04-13 VITALS — SYSTOLIC BLOOD PRESSURE: 190 MMHG | OXYGEN SATURATION: 97 % | DIASTOLIC BLOOD PRESSURE: 100 MMHG

## 2022-04-13 LAB
ANION GAP SERPL CALCULATED.3IONS-SCNC: 11 MMOL/L (ref 7–19)
APTT: 28.4 SEC (ref 26–36.2)
BASOPHILS ABSOLUTE: 0 K/UL (ref 0–0.2)
BASOPHILS RELATIVE PERCENT: 0.4 % (ref 0–1)
BUN BLDV-MCNC: 12 MG/DL (ref 8–23)
CALCIUM SERPL-MCNC: 9.5 MG/DL (ref 8.8–10.2)
CHLORIDE BLD-SCNC: 105 MMOL/L (ref 98–111)
CHOLESTEROL, TOTAL: 131 MG/DL (ref 160–199)
CO2: 25 MMOL/L (ref 22–29)
CREAT SERPL-MCNC: 0.7 MG/DL (ref 0.5–0.9)
EKG P AXIS: 25 DEGREES
EKG P AXIS: 7 DEGREES
EKG P-R INTERVAL: 138 MS
EKG P-R INTERVAL: 144 MS
EKG Q-T INTERVAL: 366 MS
EKG Q-T INTERVAL: 382 MS
EKG QRS DURATION: 78 MS
EKG QRS DURATION: 82 MS
EKG QTC CALCULATION (BAZETT): 413 MS
EKG QTC CALCULATION (BAZETT): 418 MS
EKG T AXIS: 55 DEGREES
EKG T AXIS: 58 DEGREES
EOSINOPHILS ABSOLUTE: 0.1 K/UL (ref 0–0.6)
EOSINOPHILS RELATIVE PERCENT: 0.9 % (ref 0–5)
GFR AFRICAN AMERICAN: >59
GFR NON-AFRICAN AMERICAN: >60
GLUCOSE BLD-MCNC: 122 MG/DL (ref 74–109)
GLUCOSE BLD-MCNC: 123 MG/DL (ref 70–99)
GLUCOSE BLD-MCNC: 124 MG/DL (ref 70–99)
GLUCOSE BLD-MCNC: 128 MG/DL (ref 70–99)
GLUCOSE BLD-MCNC: 215 MG/DL (ref 70–99)
HCT VFR BLD CALC: 34.9 % (ref 37–47)
HDLC SERPL-MCNC: 41 MG/DL (ref 65–121)
HEMOGLOBIN: 10.8 G/DL (ref 12–16)
IMMATURE GRANULOCYTES #: 0 K/UL
INR BLD: 1.01 (ref 0.88–1.18)
LDL CHOLESTEROL CALCULATED: 54 MG/DL
LV EF: 86 %
LVEF MODALITY: NORMAL
LYMPHOCYTES ABSOLUTE: 1.2 K/UL (ref 1.1–4.5)
LYMPHOCYTES RELATIVE PERCENT: 16 % (ref 20–40)
MCH RBC QN AUTO: 29.5 PG (ref 27–31)
MCHC RBC AUTO-ENTMCNC: 30.9 G/DL (ref 33–37)
MCV RBC AUTO: 95.4 FL (ref 81–99)
MONOCYTES ABSOLUTE: 0.7 K/UL (ref 0–0.9)
MONOCYTES RELATIVE PERCENT: 8.9 % (ref 0–10)
NEUTROPHILS ABSOLUTE: 5.7 K/UL (ref 1.5–7.5)
NEUTROPHILS RELATIVE PERCENT: 73.5 % (ref 50–65)
PDW BLD-RTO: 13 % (ref 11.5–14.5)
PERFORMED ON: ABNORMAL
PLATELET # BLD: 205 K/UL (ref 130–400)
PMV BLD AUTO: 9.4 FL (ref 9.4–12.3)
POTASSIUM REFLEX MAGNESIUM: 4.2 MMOL/L (ref 3.5–5)
PRO-BNP: 207 PG/ML (ref 0–900)
PROTHROMBIN TIME: 13.2 SEC (ref 12–14.6)
RBC # BLD: 3.66 M/UL (ref 4.2–5.4)
SODIUM BLD-SCNC: 141 MMOL/L (ref 136–145)
TRIGL SERPL-MCNC: 181 MG/DL (ref 0–149)
TROPONIN: <0.01 NG/ML (ref 0–0.03)
WBC # BLD: 7.8 K/UL (ref 4.8–10.8)

## 2022-04-13 PROCEDURE — 2709999900 HC NON-CHARGEABLE SUPPLY: Performed by: INTERNAL MEDICINE

## 2022-04-13 PROCEDURE — 80048 BASIC METABOLIC PNL TOTAL CA: CPT

## 2022-04-13 PROCEDURE — 36415 COLL VENOUS BLD VENIPUNCTURE: CPT

## 2022-04-13 PROCEDURE — 80061 LIPID PANEL: CPT

## 2022-04-13 PROCEDURE — 1210000000 HC MED SURG R&B

## 2022-04-13 PROCEDURE — 7100000000 HC PACU RECOVERY - FIRST 15 MIN: Performed by: INTERNAL MEDICINE

## 2022-04-13 PROCEDURE — 93010 ELECTROCARDIOGRAM REPORT: CPT | Performed by: INTERNAL MEDICINE

## 2022-04-13 PROCEDURE — 85025 COMPLETE CBC W/AUTO DIFF WBC: CPT

## 2022-04-13 PROCEDURE — 43235 EGD DIAGNOSTIC BRUSH WASH: CPT | Performed by: INTERNAL MEDICINE

## 2022-04-13 PROCEDURE — 84484 ASSAY OF TROPONIN QUANT: CPT

## 2022-04-13 PROCEDURE — 99223 1ST HOSP IP/OBS HIGH 75: CPT | Performed by: INTERNAL MEDICINE

## 2022-04-13 PROCEDURE — 7100000001 HC PACU RECOVERY - ADDTL 15 MIN: Performed by: INTERNAL MEDICINE

## 2022-04-13 PROCEDURE — 85730 THROMBOPLASTIN TIME PARTIAL: CPT

## 2022-04-13 PROCEDURE — 2500000003 HC RX 250 WO HCPCS: Performed by: NURSE ANESTHETIST, CERTIFIED REGISTERED

## 2022-04-13 PROCEDURE — 3430000000 HC RX DIAGNOSTIC RADIOPHARMACEUTICAL: Performed by: INTERNAL MEDICINE

## 2022-04-13 PROCEDURE — 93005 ELECTROCARDIOGRAM TRACING: CPT | Performed by: INTERNAL MEDICINE

## 2022-04-13 PROCEDURE — 3700000000 HC ANESTHESIA ATTENDED CARE: Performed by: INTERNAL MEDICINE

## 2022-04-13 PROCEDURE — 6370000000 HC RX 637 (ALT 250 FOR IP): Performed by: INTERNAL MEDICINE

## 2022-04-13 PROCEDURE — 6360000002 HC RX W HCPCS: Performed by: INTERNAL MEDICINE

## 2022-04-13 PROCEDURE — 2580000003 HC RX 258: Performed by: INTERNAL MEDICINE

## 2022-04-13 PROCEDURE — 93017 CV STRESS TEST TRACING ONLY: CPT

## 2022-04-13 PROCEDURE — 85610 PROTHROMBIN TIME: CPT

## 2022-04-13 PROCEDURE — 3700000001 HC ADD 15 MINUTES (ANESTHESIA): Performed by: INTERNAL MEDICINE

## 2022-04-13 PROCEDURE — 0DJ08ZZ INSPECTION OF UPPER INTESTINAL TRACT, VIA NATURAL OR ARTIFICIAL OPENING ENDOSCOPIC: ICD-10-PCS | Performed by: INTERNAL MEDICINE

## 2022-04-13 PROCEDURE — 3609017100 HC EGD: Performed by: INTERNAL MEDICINE

## 2022-04-13 PROCEDURE — 2720000010 HC SURG SUPPLY STERILE: Performed by: INTERNAL MEDICINE

## 2022-04-13 PROCEDURE — 6360000002 HC RX W HCPCS: Performed by: NURSE ANESTHETIST, CERTIFIED REGISTERED

## 2022-04-13 PROCEDURE — 82947 ASSAY GLUCOSE BLOOD QUANT: CPT

## 2022-04-13 PROCEDURE — 99222 1ST HOSP IP/OBS MODERATE 55: CPT | Performed by: INTERNAL MEDICINE

## 2022-04-13 PROCEDURE — 83880 ASSAY OF NATRIURETIC PEPTIDE: CPT

## 2022-04-13 PROCEDURE — A9502 TC99M TETROFOSMIN: HCPCS | Performed by: INTERNAL MEDICINE

## 2022-04-13 PROCEDURE — 99232 SBSQ HOSP IP/OBS MODERATE 35: CPT | Performed by: SURGERY

## 2022-04-13 RX ORDER — FENTANYL CITRATE 50 UG/ML
INJECTION, SOLUTION INTRAMUSCULAR; INTRAVENOUS PRN
Status: DISCONTINUED | OUTPATIENT
Start: 2022-04-13 | End: 2022-04-13 | Stop reason: SDUPTHER

## 2022-04-13 RX ORDER — DEXTROSE MONOHYDRATE 50 MG/ML
INJECTION, SOLUTION INTRAVENOUS CONTINUOUS
Status: DISCONTINUED | OUTPATIENT
Start: 2022-04-13 | End: 2022-04-16

## 2022-04-13 RX ORDER — DIPHENHYDRAMINE HYDROCHLORIDE 50 MG/ML
12.5 INJECTION INTRAMUSCULAR; INTRAVENOUS
Status: DISCONTINUED | OUTPATIENT
Start: 2022-04-13 | End: 2022-04-13 | Stop reason: HOSPADM

## 2022-04-13 RX ORDER — ONDANSETRON 2 MG/ML
INJECTION INTRAMUSCULAR; INTRAVENOUS PRN
Status: DISCONTINUED | OUTPATIENT
Start: 2022-04-13 | End: 2022-04-13 | Stop reason: SDUPTHER

## 2022-04-13 RX ORDER — METOCLOPRAMIDE HYDROCHLORIDE 5 MG/ML
10 INJECTION INTRAMUSCULAR; INTRAVENOUS
Status: DISCONTINUED | OUTPATIENT
Start: 2022-04-13 | End: 2022-04-13 | Stop reason: HOSPADM

## 2022-04-13 RX ORDER — SUCCINYLCHOLINE CHLORIDE 20 MG/ML
INJECTION INTRAMUSCULAR; INTRAVENOUS PRN
Status: DISCONTINUED | OUTPATIENT
Start: 2022-04-13 | End: 2022-04-13 | Stop reason: SDUPTHER

## 2022-04-13 RX ORDER — PROPOFOL 10 MG/ML
INJECTION, EMULSION INTRAVENOUS PRN
Status: DISCONTINUED | OUTPATIENT
Start: 2022-04-13 | End: 2022-04-13 | Stop reason: SDUPTHER

## 2022-04-13 RX ORDER — SODIUM CHLORIDE 9 MG/ML
INJECTION, SOLUTION INTRAVENOUS PRN
Status: DISCONTINUED | OUTPATIENT
Start: 2022-04-13 | End: 2022-04-13 | Stop reason: HOSPADM

## 2022-04-13 RX ORDER — SODIUM CHLORIDE 0.9 % (FLUSH) 0.9 %
5-40 SYRINGE (ML) INJECTION PRN
Status: DISCONTINUED | OUTPATIENT
Start: 2022-04-13 | End: 2022-04-13 | Stop reason: HOSPADM

## 2022-04-13 RX ORDER — SODIUM CHLORIDE 0.9 % (FLUSH) 0.9 %
5-40 SYRINGE (ML) INJECTION EVERY 12 HOURS SCHEDULED
Status: DISCONTINUED | OUTPATIENT
Start: 2022-04-13 | End: 2022-04-13 | Stop reason: HOSPADM

## 2022-04-13 RX ORDER — HYDROMORPHONE HYDROCHLORIDE 1 MG/ML
0.25 INJECTION, SOLUTION INTRAMUSCULAR; INTRAVENOUS; SUBCUTANEOUS EVERY 5 MIN PRN
Status: DISCONTINUED | OUTPATIENT
Start: 2022-04-13 | End: 2022-04-13 | Stop reason: HOSPADM

## 2022-04-13 RX ORDER — HYDROMORPHONE HYDROCHLORIDE 1 MG/ML
0.5 INJECTION, SOLUTION INTRAMUSCULAR; INTRAVENOUS; SUBCUTANEOUS EVERY 5 MIN PRN
Status: DISCONTINUED | OUTPATIENT
Start: 2022-04-13 | End: 2022-04-13 | Stop reason: HOSPADM

## 2022-04-13 RX ORDER — MIDAZOLAM HYDROCHLORIDE 1 MG/ML
INJECTION INTRAMUSCULAR; INTRAVENOUS PRN
Status: DISCONTINUED | OUTPATIENT
Start: 2022-04-13 | End: 2022-04-13 | Stop reason: SDUPTHER

## 2022-04-13 RX ORDER — DEXAMETHASONE SODIUM PHOSPHATE 10 MG/ML
INJECTION, SOLUTION INTRAMUSCULAR; INTRAVENOUS PRN
Status: DISCONTINUED | OUTPATIENT
Start: 2022-04-13 | End: 2022-04-13 | Stop reason: SDUPTHER

## 2022-04-13 RX ORDER — MEPERIDINE HYDROCHLORIDE 25 MG/ML
12.5 INJECTION INTRAMUSCULAR; INTRAVENOUS; SUBCUTANEOUS EVERY 5 MIN PRN
Status: DISCONTINUED | OUTPATIENT
Start: 2022-04-13 | End: 2022-04-13 | Stop reason: HOSPADM

## 2022-04-13 RX ORDER — ROCURONIUM BROMIDE 10 MG/ML
INJECTION, SOLUTION INTRAVENOUS PRN
Status: DISCONTINUED | OUTPATIENT
Start: 2022-04-13 | End: 2022-04-13 | Stop reason: SDUPTHER

## 2022-04-13 RX ADMIN — ACETAMINOPHEN 650 MG: 325 TABLET ORAL at 21:02

## 2022-04-13 RX ADMIN — DEXTROSE MONOHYDRATE: 50 INJECTION, SOLUTION INTRAVENOUS at 11:30

## 2022-04-13 RX ADMIN — REGADENOSON 0.4 MG: 0.08 INJECTION, SOLUTION INTRAVENOUS at 13:15

## 2022-04-13 RX ADMIN — INSULIN LISPRO 2 UNITS: 100 INJECTION, SOLUTION INTRAVENOUS; SUBCUTANEOUS at 23:00

## 2022-04-13 RX ADMIN — TETROFOSMIN 8 MILLICURIE: 1.38 INJECTION, POWDER, LYOPHILIZED, FOR SOLUTION INTRAVENOUS at 13:22

## 2022-04-13 RX ADMIN — ENOXAPARIN SODIUM 40 MG: 40 INJECTION SUBCUTANEOUS at 09:39

## 2022-04-13 RX ADMIN — TETROFOSMIN 24 MILLICURIE: 1.38 INJECTION, POWDER, LYOPHILIZED, FOR SOLUTION INTRAVENOUS at 13:23

## 2022-04-13 RX ADMIN — ONDANSETRON 4 MG: 2 INJECTION INTRAMUSCULAR; INTRAVENOUS at 14:54

## 2022-04-13 RX ADMIN — INSULIN GLARGINE 24 UNITS: 100 INJECTION, SOLUTION SUBCUTANEOUS at 23:00

## 2022-04-13 RX ADMIN — MIDAZOLAM 2 MG: 1 INJECTION INTRAMUSCULAR; INTRAVENOUS at 14:50

## 2022-04-13 RX ADMIN — PROPOFOL 150 MG: 10 INJECTION, EMULSION INTRAVENOUS at 14:56

## 2022-04-13 RX ADMIN — SUCCINYLCHOLINE CHLORIDE 100 MG: 20 INJECTION, SOLUTION INTRAMUSCULAR; INTRAVENOUS at 14:55

## 2022-04-13 RX ADMIN — DEXAMETHASONE SODIUM PHOSPHATE 8 MG: 10 INJECTION INTRAMUSCULAR; INTRAVENOUS at 15:07

## 2022-04-13 RX ADMIN — ATORVASTATIN CALCIUM 80 MG: 80 TABLET, FILM COATED ORAL at 21:02

## 2022-04-13 RX ADMIN — ROCURONIUM BROMIDE 5 MG: 10 SOLUTION INTRAVENOUS at 14:55

## 2022-04-13 RX ADMIN — FENTANYL CITRATE 50 MCG: 50 INJECTION INTRAMUSCULAR; INTRAVENOUS at 14:53

## 2022-04-13 ASSESSMENT — ENCOUNTER SYMPTOMS
DIARRHEA: 0
EYES NEGATIVE: 1
RESPIRATORY NEGATIVE: 1
VOMITING: 0
SHORTNESS OF BREATH: 0
GASTROINTESTINAL NEGATIVE: 1
NAUSEA: 0

## 2022-04-13 ASSESSMENT — PAIN SCALES - GENERAL: PAINLEVEL_OUTOF10: 3

## 2022-04-13 NOTE — OP NOTE
BONI Unbounce OF [facility] [address] [facility]                                OPERATIVE REPORT    PATIENT NAME: Michael Macedo                       :        1950  MED REC NO:   [id number]                              ROOM:       [facility]  ACCOUNT NO:   [de-identified]                           ADMIT DATE: 2022  PROVIDER:     Jose Pollock MD    DATE OF PROCEDURE:  2022    PROCEDURE PERFORMED:  Diagnostic EGD. INDICATION:  A 46-year-old white female was admitted with epigastric  abdominal pain. An imaging suggested that there was a fish bone or  possibly a piece of wire embedded in the distal stomach in the antrum. EGD is done to evaluate and remove. PREMEDICATION:  The patient was intubated for airway protection and  administered propofol per anesthetist.    PROCEDURE:  The Olympus video endoscope was passed by mouth. The entire  length of the esophagus had a normal mucosal lining down to the GE  junction located at 40 cm from the incisor teeth. There was no endoscopic features of Dye's metaplasia. Stomach was  insufflated with air. Photographs were taken to show that there was no  foreign body in the antrum. There was a 1 to 2 mm indentation where the  foreign body had previously penetrated with the foreign body no longer  present. The entire stomach was otherwise normal on both  forward-viewing and scope retroflexion. The duodenal bulb and second  portion of the duodenum were normal with no foreign body present in the  upper GI tract to remove. The stomach was deflated and scope removed. EBL None. The patient tolerated the procedure well. FINDINGS:  No foreign body present during this endoscopy. PLAN:  Full liquid diet and obtain a followup flat plate of the abdomen  tomorrow.         Tanvi Medrano MD    D: 2022 16:45:47      T: 2022 16:49:20     [email or web address]  Job#: [id number]     Doc#: [id number]    CC:

## 2022-04-13 NOTE — CONSULTS
DICKMomo OF [facility] [address] [facility]                                  CONSULTATION    PATIENT NAME: Michael Bryan                       :        1950  MED REC NO:   [id number]                              ROOM:       [facility]  ACCOUNT NO:   [de-identified]                           ADMIT DATE: 2022  PROVIDER:     Arnoldo Alves MD    CONSULT DATE:  2022    ASSESSMENT:  1.  Epigastric-substernal chest pain, noncardiac in origin, with history  of ingestion of suspected foreign body in the antrum of the stomach. 2.  Asymptomatic sigmoid diverticulosis without diverticulitis  clinically. 3.  Remote history of GERD, seemingly an inactive problem. 4.  GERD with biopsy-proven distal Dye's esophagus in 2017. HISTORY OF PRESENT ILLNESS:  This 52-year-old white female has a  background history of borderline type 2 diabetes mellitus,  hyperlipidemia, obesity, and GERD. She presents in the emergency room  with a complaint of developing epigastric-substernal chest pain over the  last day. There is a pertinent history of her having ate a couple of  bites of fish previously. CAT scan of the abdomen now shows a  curvilinear radiopaque foreign body in the stomach protruding through  the posteromedial wall of the antrum either representing a metallic wire  or fish bone. Other incidental finding is diverticulosis of the left  colon without evidence of diverticulitis clinically or radiographically. There is a secondary notation of there also being an enlarging poorly  marginated low density nodule in the spleen of uncertain etiology. The  pain is not brought on by exertion or relieved by rest nor are there any  associated cardiopulmonary complaints. There is no fever or  leukocytosis. There is no free air to suggest any perforation.     Admission labs show WBC 10.7, hemoglobin 12.2 gm, platelets 221, normal  coagulation panel, and normal chemistry, except for borderline elevated  blood sugars. Prior EGD on 05/23/2017, done by Dr. De La Garza Antis showed an esophageal  narrowing with diverticulum at 29 cm and the esophagus underwent balloon  dilatation under direct visualization 12 through 13.5 and 15 mm diameter  at that time. There was mild irregularity of the distal esophagus which  was biopsied and found to represent distal Dye's esophagus. Repeat  EGD on 03/06/2018, with biopsies again showed Dye's esophagus on  biopsies. The patient has not undergone any followup EGD since that  time. She has also had a previous colonoscopy remotely which allowed  removal of one or more benign polyps. PAST MEDICAL HISTORY:  Type 2 diabetes mellitus, hypertension,  hyperlipidemia, obesity, obstructive sleep apnea, GERD, Dye's  esophagus, remote TIA, and recurrent headache diagnosed as migraine with  atypical features. SURGICAL HISTORY:  Laparoscopic cholecystectomy in 2017, laparoscopic  total hysterectomy in 2019, colonoscopy on 06/05/2019, by Dr. Gail Winchester for  nonbleeding internal hemorrhoids, diverticulosis, and removal of a  benign tubular adenoma. MEDICATIONS:  See admission medication reconciliation list.    ALLERGIES TO MEDICATION:  None. SOCIAL HISTORY:   with her  present. No habits of alcohol  or tobacco abuse. FAMILY HISTORY:  No GI malignancies. PHYSICAL EXAMINATION:  GENERAL:  Overweight female, alert, no acute distress, afebrile. VITAL SIGNS:  Normal.  HEENT:  Sclerae white. Conjunctivae pink. Buccal mucosa moist.  NECK:  Supple. LUNGS:  Clear. HEART:  No cardiac murmur or rub. ABDOMEN:  Obese abdomen. Normal bowel sounds. Minimal epigastric  tenderness. No guarding or rigidity. No gross organomegaly palpable. RECTUM:  Empty. The procedure of EGD was discussed with the patient including  indication, alternatives, and risks. Procedure is scheduled for today.         Miguel A Ortega MD    D: 04/13/2022 12:12:07      T: 04/13/2022 12:18:07     [email or web address]  Job#: [id number]     Doc#: [id number]    CC:

## 2022-04-13 NOTE — PROGRESS NOTES
[facility] General Surgery Progress Note    Chief Complaint:   Chief Complaint   Patient presents with    Chest Pain    Arm Pain       SUBJECTIVE:  Ms. Juan Durand is a 71 y.o. female is seen and examined at bedside. Pain is stable. OBJECTIVE:  /70   Pulse 87   Temp 97.7 °F (36.5 °C)   Resp 18   Ht 5' 4\" (1.626 m)   Wt 220 lb (99.8 kg)   SpO2 93%   BMI 37.76 kg/m²   CONSTITUTIONAL: Alert, appropriate, no acute distress  SKIN: warm, dry with no rashes or lesions  HEENT: NCAT, Non icteric, PERR. Trachea Midline. CHEST/LUNGS: CTA bilaterally. Normal respiratory effort. CARDIOVASCULAR: RRR, No edema. ABDOMEN: soft, ND  NEUROLOGIC: CN II-XI grossly intact, no motor or sensory deficits   MUSCULOSKELETAL: No clubbing or cyanosis. PSYCHIATRIC:  Normal Mood and Affect. Alert and Oriented. Labs:  CBC:   Recent Labs     04/12/22  1147 04/13/22  0420   WBC 10.7 7.8   HGB 12.2 10.8*    205     BMP:    Recent Labs     04/12/22  1147 04/13/22  0420    141   K 4.1 4.2    105   CO2 25 25   BUN 17 12   CREATININE 0.7 0.7   GLUCOSE 131* 122*       ASSESSMENT:  Principal Problem:    Chest pain  Active Problems:    Abdominal pain    Essential hypertension    Mixed hyperlipidemia    Gastroesophageal reflux disease without esophagitis    Morbidly obese (HCC)    Anxiety    Moderate episode of recurrent major depressive disorder ([address]    Obstructive sleep apnea syndrome    Foreign body in stomach  Resolved Problems:    * No resolved hospital problems.  *      PLAN:  Recommend GI evaluation for possible EGD and foreign body removal  And the work-up for her splenic lesion as an outpatient  If unable to remove the foreign body endoscopic, patient may require surgical intervention    Kia Ramirez DO   Electronically Signed on 4/13/2022 at 10:09 AM

## 2022-04-13 NOTE — ANESTHESIA PRE PROCEDURE
Department of Anesthesiology  Preprocedure Note       Name:  Amelia Chen   Age:  71 y.o.  :  1950                                          MRN:  [id number]         Date:  2022      Surgeon: Jose Wilson):  Neal Connolly MD    Procedure: Procedure(s):  EGD DIAGNOSTIC ONLY    Medications prior to admission:   Prior to Admission medications    Medication Sig Start Date End Date Taking? Authorizing Provider   LORazepam (ATIVAN) 0.5 MG tablet Take 1 tablet by mouth every 12 hours as needed for Anxiety for up to 30 days. 22  LUIZ Rasmussen   famotidine (PEPCID) 20 MG tablet Take 1 tablet by mouth 2 times daily 3/21/22   LUIZ Rasmussen   hydrALAZINE (APRESOLINE) 10 MG tablet Take 1 tablet by mouth 3 times daily 3/11/22   LUIZ Castañeda   tamsulosin (FLOMAX) 0.4 MG capsule TAKE 1 CAPSULE BY MOUTH ONCE DAILY 22   Historical Provider, MD   amLODIPine (NORVASC) 2.5 MG tablet Take 1 tablet by mouth daily 22   LUIZ Castañeda   gabapentin (NEURONTIN) 300 MG capsule Take 1 capsule by mouth 3 times daily for 30 days. 22  LUIZ Castañeda   diclofenac sodium (VOLTAREN) 1 % GEL Apply 4 g topically 4 times daily as needed for Pain 22   LUIZ Castañeda   metoprolol succinate (TOPROL XL) 100 MG extended release tablet Take 1 tablet by mouth daily 21   LUIZ Willams   atorvastatin (LIPITOR) 80 MG tablet Take 1 tablet by mouth nightly 21   LUIZ Castañeda   metFORMIN (GLUCOPHAGE) 500 MG tablet Take 1 tablet by mouth 2 times daily (with meals) 21   LUIZ Castañeda   Ubrogepant (UBRELVY) 100 MG TABS Take 1 tablet at the onset of migraine. May repeat once in 2 hours if no improvement. Do not exceed 2 tablets in 24 hours.  10/18/21   LUIZ Franklin   omeprazole (PRILOSEC) 20 MG delayed release capsule Take 1 capsule by mouth 2 times daily 9/10/21   LUIZ Castañeda   furosemide (LASIX) 40 MG tablet Take 1 tablet by mouth daily 8/2/21   LUIZ Gonzalez   venlafaxine (EFFEXOR XR) 150 MG extended release capsule Take 1 capsule by mouth daily 8/2/21   LUIZ Gonzalez   Cholecalciferol 50 MCG (2000 UT) CAPS Take by mouth daily    Historical Provider, MD   ezetimibe (ZETIA) 10 MG tablet Take 1 tablet by mouth daily 8/22/19   LUIZ Castañeda   aspirin 81 MG tablet Aspir-81 81 mg tablet,delayed release   Take 1 tablet every day by oral route.     Historical Provider, MD   hydrOXYzine (ATARAX) 50 MG tablet hydroxyzine HCl 50 mg tablet   TAKE ONE TABLET BY MOUTH TWICE DAILY AS NEEDED    Historical Provider, MD   Multiple Vitamins-Minerals (MULTIVITAMIN ADULT PO) Take by mouth    Historical Provider, MD       Current medications:    Current Facility-Administered Medications   Medication Dose Route Frequency Provider Last Rate Last Admin    [MAR Hold] dextrose 5 % solution   IntraVENous Continuous Anum Taylor MD        [facility] Hold] 0.9 % sodium chloride infusion   IntraVENous Continuous Hilda Porter  mL/hr at 04/13/22 1433 NoRateChange at 04/13/22 1433    [MAR Hold] amLODIPine (NORVASC) tablet 2.5 mg  2.5 mg Oral Daily Hilda Porter MD        [facility] Hold] atorvastatin (LIPITOR) tablet 80 mg  80 mg Oral Nightly Hilda Porter MD        [facility] Hold] ezetimibe (ZETIA) tablet 10 mg  10 mg Oral Daily Hilda Porter MD        [facility] Hold] furosemide (LASIX) tablet 40 mg  40 mg Oral Daily Hilda Porter MD        [Held by provider] metoprolol succinate (TOPROL XL) extended release tablet 100 mg  100 mg Oral Daily Hilda Porter MD        [facility]] glucagon (rDNA) injection 1 mg  1 mg IntraMUSCular PRN Hilda Porter MD        [facility] Hold] dextrose 5 % solution  100 mL/hr IntraVENous PRN Hilda Porter MD        [facility] Hold] sodium chloride flush 0.9 % injection 5-40 mL  5-40 mL IntraVENous 2 times per day Hilda Porter MD       [facility] Hold] sodium chloride flush 0.9 % injection 10 mL  10 mL IntraVENous PRN Jhonny Ghosh MD        [facility] Hold] 0.9 % sodium chloride infusion   IntraVENous PRN Jhonny Ghosh MD        [facility] Hold] ondansetron (ZOFRAN-ODT) disintegrating tablet 4 mg  4 mg Oral Q8H PRN Jhonny Ghosh MD        Or   Kate Briscoe [facility] Hold] ondansetron [facility]) injection 4 mg  4 mg IntraVENous Q6H PRN Jhonny Ghosh MD        [facility] Hold] acetaminophen (TYLENOL) tablet 650 mg  650 mg Oral Q6H PRN Jhonny Ghosh MD        Or   Kate Briscoe [facility] Hold] acetaminophen (TYLENOL) suppository 650 mg  650 mg Rectal Q6H PRN Jhonny Ghosh MD        [facility] Hold] polyethylene glycol (GLYCOLAX) packet 17 g  17 g Oral Daily PRN Jhonny Ghosh MD        [facility] Hold] aspirin chewable tablet 81 mg  81 mg Oral Daily Jhonny Ghosh MD        [facility] Hold] nitroGLYCERIN (NITROSTAT) SL tablet 0.4 mg  0.4 mg SubLINGual Q5 Min PRN Jhonny Ghosh MD        [facility] Hold] insulin glargine (LANTUS) injection vial 24 Units  24 Units SubCUTAneous Nightly Jhonny Ghosh MD        [facility] Hold] insulin lispro (HUMALOG) injection vial 6 Units  6 Units SubCUTAneous TID  Jhonny Ghosh MD        [facility] Hold] insulin lispro (HUMALOG) injection vial 0-12 Units  0-12 Units SubCUTAneous TID  Jhonny Ghosh MD        [facility] Hold] insulin lispro (HUMALOG) injection vial 0-6 Units  0-6 Units SubCUTAneous Nightly Jhonny Ghosh MD        [facility] Hold] enoxaparin (LOVENOX) injection 40 mg  40 mg SubCUTAneous Daily Jhonny Ghosh MD   40 mg at 04/13/22 0939    [MAR Hold] hydrALAZINE (APRESOLINE) injection 10 mg  10 mg IntraVENous Q6H PRN Jhonny Ghosh MD        [facility] Hold] glucose chewable tablet 4 each  4 tablet Oral PRN Jhonny Ghosh MD        [facility] Hold] dextrose bolus (hypoglycemia) 10% 125 mL  125 mL IntraVENous PRN Jhonny Ghosh MD        Or   Kate Briscoe [facility] Hold] dextrose bolus (hypoglycemia) 10% 250 mL  250 mL IntraVENous PRN Keely Jefferson MD           Allergies:  No Known Allergies    Problem List:    Patient Active Problem List   Diagnosis Code    Abdominal pain R10.9    Essential hypertension I10    Mixed hyperlipidemia E78.2    Gastroesophageal reflux disease without esophagitis K21.9    Abnormal esophagram R93.3    Morbidly obese (HCC) E66.01    LFTs abnormal R94.5    Viral hepatitis A without hepatic coma B15.9    Dye's esophagus without dysplasia K22.70    History of acute pancreatitis Z87.19    S/P cholecystectomy Z90.49    Anxiety F41.9    Closed dislocation of metacarpal joint of finger of left hand S63.269A    Closed fracture of left radius and ulna with routine healing S52. 92XD, S52.202D    Closed fracture of right patella S82.001A    Knee pain M25.569    Memory impairment R41.3    Complete uterine prolapse N81.3    Personal history of colonic polyps Z86.010    Moderate episode of recurrent major depressive disorder (HCC) F33.1    Obstructive sleep apnea syndrome G47.33    Intractable chronic migraine without aura G43.719    Chest pain R07.9    Foreign body in stomach T18. 2XXA    History of colon polyps Z86.010       Past Medical History:        Diagnosis Date    Anxiety     Depression     Edema     GERD (gastroesophageal reflux disease)     Headache(784.0)     migraines    Hyperlipidemia     Hypertension     Mini stroke (HCC)     Sleep apnea     CPAP    TIA (transient ischemic attack)        Past Surgical History:        Procedure Laterality Date    CHOLECYSTECTOMY      COLONOSCOPY  07/01/2015    Dr. Delmer Goltz COLONOSCOPY  06/05/2019    Dr Guillermina Najera ([facility]) Non-bleeding internal hemorrhoids, diverticulosis-Tubular AP (-) dysplasia, 3 yr recall    CYSTOSCOPY Left 12/5/2018    CYSTOSCOPY URETEROSCOPY  PLACEMENT DOUBLE J STENT ON LEFT RIGHT  RETROGRADE PYELOGRAMS performed by Jarvis Dowd MD at [address] Bilateral 1/31/2019    TOTAL LAPAROSCOPIC HYSTERECTOMY BILATERAL SALPINGOOPHERECTOMY WITH DAVINCI CYSTOSCOPY performed by Deny Puckett MD at [facility] [address] , VAGINAL      [place] EGD TRANSORAL BIOPSY SINGLE/MULTIPLE N/A 5/8/2018    Dr Gary Ramey (-) Kristin (+) Dye's (-) dysplasia--3 yr recall    [place] LAP,CHOLECYSTECTOMY N/A 3/6/2018    CHOLECYSTECTOMY LAPAROSCOPIC performed by Thi Huerta MD at [facility].  08/26/2015    Dr. Daisy Carr  5/23/2017    Dr Osorio-w/balloon dilation, 12-13. 5-15 mm-esophageal narrowing with diverticulum at 29 cm-Kristin (-), hiatal herni, Dye's (+) dysplasia (-)--1st dx--1 yr recall-Ct chest ordered    WRIST FRACTURE SURGERY         Social History:    Social History     Tobacco Use    Smoking status: Never Smoker    Smokeless tobacco: Never Used   Substance Use Topics    Alcohol use: No     Alcohol/week: 0.0 standard drinks                                Counseling given: Not Answered      Vital Signs (Current):   Vitals:    04/12/22 1845 04/13/22 0036 04/13/22 0546 04/13/22 1222   BP: (!) 152/70 123/63 132/70 127/63   Pulse: 94 84 87 88   Resp: 16 20 18 20   Temp: 98 °F (36.7 °C) 97 °F (36.1 °C) 97.7 °F (36.5 °C) 97.3 °F (36.3 °C)   TempSrc: Temporal   Temporal   SpO2: 99% 96% 93% 96%   Weight:       Height:                                                  BP Readings from Last 3 Encounters:   04/13/22 127/63   04/11/22 (!) 158/90   02/10/22 135/73       NPO Status:                                                                                 BMI:   Wt Readings from Last 3 Encounters:   04/12/22 220 lb (99.8 kg)   04/11/22 222 lb (100.7 kg)   01/26/22 223 lb (101.2 kg)     Body mass index is 37.76 kg/m².     CBC:   Lab Results   Component Value Date    WBC 7.8 04/13/2022    RBC 3.66 04/13/2022    HGB 10.8 04/13/2022    HCT 34.9 04/13/2022    HCT 38.2 03/16/2011    MCV 95.4 04/13/2022    RDW 13.0 04/13/2022     04/13/2022     03/16/2011       CMP:   Lab Results   Component Value Date     04/13/2022     03/14/2011    K 4.2 04/13/2022    K 4.0 03/14/2011     04/13/2022     03/14/2011    CO2 25 04/13/2022    BUN 12 04/13/2022    CREATININE 0.7 04/13/2022    CREATININE 0.8 03/14/2011    GFRAA >59 04/13/2022    LABGLOM >60 04/13/2022    GLUCOSE 122 04/13/2022    PROT 6.4 04/12/2022    PROT 7.4 03/14/2011    CALCIUM 9.5 04/13/2022    BILITOT 0.3 04/12/2022    ALKPHOS 82 04/12/2022    ALKPHOS 90 03/14/2011    AST 14 04/12/2022    ALT 13 04/12/2022       POC Tests:   Recent Labs     04/13/22  1223   POCGLU 123*       Coags:   Lab Results   Component Value Date    PROTIME 13.2 04/13/2022    PROTIME 10.80 03/14/2011    INR 1.01 04/13/2022    APTT 28.4 04/13/2022       HCG (If Applicable): No results found for: PREGTESTUR, PREGSERUM, HCG, HCGQUANT     ABGs: No results found for: PHART, PO2ART, OUV5PPV, GCW0CUU, BEART, I7KOHFGF     Type & Screen (If Applicable):  No results found for: LABABO, LABRH    Drug/Infectious Status (If Applicable):  No results found for: HIV, HEPCAB    COVID-19 Screening (If Applicable):   Lab Results   Component Value Date    COVID19 Not Detected 12/27/2021           Anesthesia Evaluation  Patient summary reviewed  Airway: Mallampati: II  TM distance: >3 FB   Neck ROM: full  Mouth opening: < 3 FB Dental: normal exam         Pulmonary:normal exam    (+) sleep apnea:                             Cardiovascular:  Exercise tolerance: poor (<4 METS),   (+) hypertension:, hyperlipidemia         Beta Blocker:  Dose within 24 Hrs         Neuro/Psych:   (+) TIA, psychiatric history:             ROS comment: Chronic benzo use GI/Hepatic/Renal:   (+) GERD:, hepatitis:, liver disease:, morbid obesity          Endo/Other: Negative Endo/Other ROS                    Abdominal:             Vascular:           Other Findings:             Anesthesia Plan      general     ASA 3       Induction: intravenous. Anesthetic plan and risks discussed with patient.                       LUIZ Loja - CRNA   4/13/2022

## 2022-04-13 NOTE — CONSULTS
[id number] [facility]  Cardiology Consult      Requesting MD:  Greta Howe MD   Admit Status:         History obtained from:   [] Patient  [] Other (specify):     Patient:  Pilar Browne  [id number]     Chief Complaint:   Chief Complaint   Patient presents with    Chest Pain    Arm Pain     HPI: Ms. Meek Steward is a 71 y.o. female with a history of diabetes hypertension hyperlipidemia morbid obesity obstructive sleep apnea anxiety depression gastroesophageal reflux disease admitted 4/12/2022 complaints of mid and left substernal chest discomfort rating to the back. Squeezing type discomfort some vomiting the night before no other complaints denies medical noncompliance. Does not describe typical exertional angina. proBNP 207  All troponins are negative. CTA pulmonary no evidence of pulmonary emboli. Cardiology consulted. Review of Systems:  Review of Systems   Constitutional: Negative. Negative for chills, fever and unexpected weight change. HENT: Negative. Eyes: Negative. Respiratory: Negative. Negative for shortness of breath. Cardiovascular: Negative. Negative for chest pain. Gastrointestinal: Negative. Negative for diarrhea, nausea and vomiting. Endocrine: Negative. Genitourinary: Negative. Musculoskeletal: Negative. Skin: Negative. Neurological: Negative. All other systems reviewed and are negative.       Cardiac Specific Data:  Specialty Problems        Cardiology Problems    Essential hypertension        Mixed hyperlipidemia        Intractable chronic migraine without aura        * (Principal) Chest pain              Past Medical History:  Past Medical History:   Diagnosis Date    Anxiety     Depression     Edema     GERD (gastroesophageal reflux disease)     Headache(784.0)     migraines    Hyperlipidemia     Hypertension     Mini stroke ([facility] [address]     Sleep apnea     CPAP    TIA (transient ischemic attack)         Past Surgical History:  Past Surgical History:   Procedure Laterality Date    CHOLECYSTECTOMY      COLONOSCOPY  07/01/2015    Dr. Tor Go COLONOSCOPY  06/05/2019    Dr Lazaro Portillo [facility]) Non-bleeding internal hemorrhoids, diverticulosis-Tubular AP (-) dysplasia, 3 yr recall    CYSTOSCOPY Left 12/5/2018    CYSTOSCOPY URETEROSCOPY  PLACEMENT DOUBLE J STENT ON LEFT RIGHT  RETROGRADE PYELOGRAMS performed by Christine Loya MD at [address] Bilateral 1/31/2019    TOTAL LAPAROSCOPIC HYSTERECTOMY BILATERAL SALPINGOOPHERECTOMY WITH Kp Reid performed by Shilpi Isabel MD at [facility], VAGINAL      [place] EGD TRANSORAL BIOPSY SINGLE/MULTIPLE N/A 5/8/2018    Dr Tiffany Molina (-) Kristin (+) Dye's (-) dysplasia--3 yr recall    [place] LAP,CHOLECYSTECTOMY N/A 3/6/2018    CHOLECYSTECTOMY LAPAROSCOPIC performed by Natan Weathers MD at [address]  08/26/2015    Dr. Abbey Cruz  5/23/2017    Dr Osorio-w/balloon dilation, 12-13. 5-15 mm-esophageal narrowing with diverticulum at 34 cm-Kristin (-), hiatal herni, Dye's (+) dysplasia (-)--1st dx--1 yr recall-Ct chest ordered    WRIST FRACTURE SURGERY         Past Family History:  Family History   Problem Relation Age of Onset    Heart Disease Mother     Colon Cancer Neg Hx     Colon Polyps Neg Hx     Liver Cancer Neg Hx     Liver Disease Neg Hx     Esophageal Cancer Neg Hx     Rectal Cancer Neg Hx     Stomach Cancer Neg Hx        Past Social History:  Social History     Socioeconomic History    Marital status:      Spouse name: Not on file    Number of children: Not on file    Years of education: Not on file    Highest education level: Not on file   Occupational History    Not on file   Tobacco Use    Smoking status: Never Smoker    Smokeless tobacco: Never Used   Vaping Use    Vaping Use: Never used   Substance and Sexual Activity    Alcohol use: No     Alcohol/week: 0.0 standard drinks    Drug use: No    Sexual activity: Not on file   Other Topics Concern    Not on file   Social History Narrative    Not on file     Social Determinants of Health     Financial Resource Strain: Low Risk     Difficulty of Paying Living Expenses: Not hard at all   Food Insecurity: No Food Insecurity    Worried About Running Out of Food in the Last Year: Never true    [address] in the Last Year: Never true   Transportation Needs:     Lack of Transportation (Medical): Not on file    Lack of Transportation (Non-Medical): Not on file   Physical Activity:     Days of Exercise per Week: Not on file    Minutes of Exercise per Session: Not on file   Stress:     Feeling of Stress : Not on file   Social Connections:     Frequency of Communication with Friends and Family: Not on file    Frequency of Social Gatherings with Friends and Family: Not on file    Attends [employer] Services: Not on file    Active Member of [address] [place] [address] or Organizations: Not on file    Attends Club or Organization Meetings: Not on file    Marital Status: Not on file   Intimate Partner Violence:     Fear of Current or Ex-Partner: Not on file    Emotionally Abused: Not on file    Physically Abused: Not on file    Sexually Abused: Not on file   Housing Stability:     Unable to Pay for Housing in the Last Year: Not on file    Number of [place] in the Last Year: Not on file    Unstable Housing in the Last Year: Not on file       Allergies:  No Known Allergies    Home Meds:  Prior to Admission medications    Medication Sig Start Date End Date Taking? Authorizing Provider   LORazepam (ATIVAN) 0.5 MG tablet Take 1 tablet by mouth every 12 hours as needed for Anxiety for up to 30 days.  4/11/22 5/11/22  LUIZ Jacobo   famotidine (PEPCID) 20 MG tablet Take 1 tablet by mouth 2 times daily 3/21/22   LUIZ Jacobo   hydrALAZINE (APRESOLINE) 10 MG tablet Take 1 tablet by mouth 3 times daily 3/11/22 LUIZ Castañeda   tamsulosin (FLOMAX) 0.4 MG capsule TAKE 1 CAPSULE BY MOUTH ONCE DAILY 1/12/22   Historical Provider, MD   amLODIPine (NORVASC) 2.5 MG tablet Take 1 tablet by mouth daily 1/14/22   LUIZ Castañeda   gabapentin (NEURONTIN) 300 MG capsule Take 1 capsule by mouth 3 times daily for 30 days. 1/7/22 2/6/22  LUIZ Castañeda   diclofenac sodium (VOLTAREN) 1 % GEL Apply 4 g topically 4 times daily as needed for Pain 1/7/22   LUIZ Castañeda   metoprolol succinate (TOPROL XL) 100 MG extended release tablet Take 1 tablet by mouth daily 12/20/21   LUIZ Dumont   atorvastatin (LIPITOR) 80 MG tablet Take 1 tablet by mouth nightly 12/16/21   LUIZ Castañeda   metFORMIN (GLUCOPHAGE) 500 MG tablet Take 1 tablet by mouth 2 times daily (with meals) 12/16/21   LUIZ Castañeda   Ubrogepant (UBRELVY) 100 MG TABS Take 1 tablet at the onset of migraine. May repeat once in 2 hours if no improvement. Do not exceed 2 tablets in 24 hours. 10/18/21   LUIZ Mcclure   omeprazole (PRILOSEC) 20 MG delayed release capsule Take 1 capsule by mouth 2 times daily 9/10/21   LUZI Castañeda   furosemide (LASIX) 40 MG tablet Take 1 tablet by mouth daily 8/2/21   LUIZ Quiroz   venlafaxine (EFFEXOR XR) 150 MG extended release capsule Take 1 capsule by mouth daily 8/2/21   LUIZ Quiroz   Cholecalciferol 50 MCG (2000 UT) CAPS Take by mouth daily    Historical Provider, MD   ezetimibe (ZETIA) 10 MG tablet Take 1 tablet by mouth daily 8/22/19   LUIZ Castañeda   aspirin 81 MG tablet Aspir-81 81 mg tablet,delayed release   Take 1 tablet every day by oral route.     Historical Provider, MD   hydrOXYzine (ATARAX) 50 MG tablet hydroxyzine HCl 50 mg tablet   TAKE ONE TABLET BY MOUTH TWICE DAILY AS NEEDED    Historical Provider, MD   Multiple Vitamins-Minerals (MULTIVITAMIN ADULT PO) Take by mouth    Historical Provider, MD       Current Meds:   amLODIPine  2.5 mg Oral Daily    atorvastatin  80 mg Oral Nightly    ezetimibe  10 mg Oral Daily    furosemide  40 mg Oral Daily    [Held by provider] metoprolol succinate  100 mg Oral Daily    sodium chloride flush  5-40 mL IntraVENous 2 times per day    [START ON 4/14/2022] aspirin  81 mg Oral Daily    insulin glargine  24 Units SubCUTAneous Nightly    insulin lispro  6 Units SubCUTAneous TID WC    insulin lispro  0-12 Units SubCUTAneous TID WC    insulin lispro  0-6 Units SubCUTAneous Nightly    enoxaparin  40 mg SubCUTAneous Daily       Current Infused Meds:   dextrose      sodium chloride 100 mL/hr at 04/12/22 1653    dextrose      sodium chloride         Physical Exam:  Vitals:    04/13/22 0546   BP: 132/70   Pulse: 87   Resp: 18   Temp: 97.7 °F (36.5 °C)   SpO2: 93%       Intake/Output Summary (Last 24 hours) at 4/13/2022 1045  Last data filed at 4/13/2022 0949  Gross per 24 hour   Intake 0 ml   Output --   Net 0 ml     Estimated body mass index is 37.76 kg/m² as calculated from the following:    Height as of this encounter: 5' 4\" (1.626 m). Weight as of this encounter: 220 lb (99.8 kg). Physical Exam  Vitals reviewed. Constitutional:       General: She is not in acute distress. Appearance: Normal appearance. She is well-developed. She is obese. She is not ill-appearing, toxic-appearing or diaphoretic. HENT:      Head: Normocephalic and atraumatic. Nose: Nose normal.      Mouth/Throat:      Mouth: Mucous membranes are moist.      Pharynx: Oropharynx is clear. Eyes:      General: No scleral icterus. Extraocular Movements: Extraocular movements intact. Pupils: Pupils are equal, round, and reactive to light. Neck:      Vascular: No carotid bruit or JVD. Cardiovascular:      Rate and Rhythm: Normal rate and regular rhythm. Heart sounds: Normal heart sounds. No murmur heard. No friction rub. No gallop.     Pulmonary:      Effort: Pulmonary effort is normal. No respiratory distress. Breath sounds: Normal breath sounds. No stridor. No wheezing, rhonchi or rales. Chest:      Chest wall: No tenderness. Abdominal:      General: Abdomen is flat. Bowel sounds are normal. There is no distension. Palpations: Abdomen is soft. There is no mass. Tenderness: There is no abdominal tenderness. There is no left CVA tenderness, guarding or rebound. Hernia: No hernia is present. Musculoskeletal:         General: No swelling, tenderness, deformity or signs of injury. Cervical back: Normal range of motion and neck supple. No rigidity or tenderness. Right lower leg: No edema. Left lower leg: No edema. Lymphadenopathy:      Cervical: No cervical adenopathy. Skin:     General: Skin is warm and dry. Neurological:      General: No focal deficit present. Mental Status: She is alert and oriented to person, place, and time. Mental status is at baseline. Cranial Nerves: No cranial nerve deficit. Sensory: No sensory deficit. Motor: No weakness. Coordination: Coordination normal.   Psychiatric:         Mood and Affect: Mood normal.         Behavior: Behavior normal.         Thought Content: Thought content normal.         Judgment: Judgment normal.         Labs:  Recent Labs     04/12/22  1147 04/13/22  0420   WBC 10.7 7.8   HGB 12.2 10.8*    205       Recent Labs     04/12/22  1147 04/13/22  0420    141   K 4.1 4.2    105   CO2 25 25   BUN 17 12   CREATININE 0.7 0.7   LABGLOM >60 >60   CALCIUM 9.8 9.5       CK, CKMB, Troponin: @LABRCNT (CKTOTAL:3, CKMB:3, TROPONINI:3)@    Last 3 BNP:  No results for input(s): BNP in the last 72 hours. IMAGING:  CT ABDOMEN PELVIS W IV CONTRAST Additional Contrast? None    Result Date: 4/12/2022  Examination. CT ABDOMEN PELVIS W IV CONTRAST 4/12/2022 1:07 PM History: Abdominal pain. DLP: 1488 mGycm.  The automated exposure control was utilized to minimize the patient's radiation exposure. The CT scan of the abdomen and pelvis is performed after intravenous contrast enhancement. The images are acquired in axial plane and subsequent reconstruction in coronal and sagittal planes. The comparison is made with the previous study dated 1/22/2019. The lung bases included in the study show dependent atelectatic changes at bilateral bases posteriorly. The limited included cardiomediastinal structures show heavy calcification mitral annulus. No significant cardiomegaly. Mild atheromatous changes of coronary arteries. The liver appears normal. There are ill-defined low-density nodules in the spleen which appears significantly larger than the previous study. A nodule located posteriorly measures 2 cm in diameter. It previously measured 1 cm. The gallbladder is surgically absent. No significant dilatation of the common bile duct. The pancreas is normal. The pancreatic duct is not visualized. The adrenal glands are normal. There are multiple tiny low-density cortical nodules in both kidneys which appears similar to the previous study. No radiopaque calculi. No hydronephrosis. The ureters bilaterally are normal. The urinary bladder is poorly distended. No intrinsic abnormality. The uterus is absent. No adnexal masses. Tiny fat-containing umbilical hernia. There is a small sliding-type hiatal hernia. There is a curvilinear radiopaque foreign object which appears to be in the distal part of the stomach and is protruding through the posterior medial wall of the distal stomach/antrum projecting near the neck/proximal body of the pancreas. The type of foreign body is not certain. This may represent a metallic wire or a fishbone? Tommy Solomon There is a small bubble of air adjacent to the proximal end of this foreign object. The duodenum is normal. Small bowel is nondistended. A retrocecal appendix is normal. The cecum lies low in the pelvis adjacent to the urinary bladder. Moderate gas and stool is seen in the colon.  There is diverticulosis of the distal colon. No evidence for diverticulitis. Atheromatous changes of the abdominal aorta and iliac arteries. No aneurysmal dilatation. There is no evidence of abdominal or pelvic lymphadenopathy. The images reviewed in bone window show chronic degenerative changes of the lumbar spine. There is a mild levoscoliosis. No focal bony lesion. 1. A curvilinear radiopaque foreign body in the stomach protruding through the posterior medial wall of the distal body/antrum of the stomach. No free air. This may represent a metallic wire or a fishbone? Clinical correlation and further evaluation is recommended. 2. Multiple very small low density renal nodules which are too small to be further characterized. These are unchanged. 3. The diverticulosis of the colon. No evidence for diverticulitis. 4. The enlarging poorly marginated low-density nodule in the spleen. Etiology is not certain. Possibility of metastatic disease is not excluded. The results were called to ER physician, Dr. Indiana Churchill, immediately. Signed by Dr Jesse Valdez    Result Date: 4/12/2022  XR CHEST PORTABLE 4/12/2022 12:13 PM HISTORY: Chest pain COMPARISON: 3/23/2020 CXR: A single frontal view of the chest is performed. Findings: Lungs appear mildly hyperinflated. Vascular crowding versus mild venous congestion. Basilar atelectasis. No pleural effusion or pneumothorax. No acute regional bony pathology. 1. Hypoventilated lungs with vascular crowding versus mild venous congestion and basilar atelectasis. . Signed by Dr Buster Garcia    CTA [address]     Result Date: 4/12/2022  CTA chest 4/12/2022 1:06 PM HISTORY: Shortness of breath with chest pressure TECHNIQUE: Axial images of the chest were obtained following IV contrast. . Coronal and sagittal as well as maximal intensity projectional reformatted images are reconstructed and reviewed. COMPARISON: 2/27/2018.  DLP: 856 mGy cm Automated exposure control was utilized to minimize patient radiation dose. FINDINGS: No pulmonary emboli identified. Thoracic aorta normal in caliber with no aneurysm or dissection. Cardiomegaly. Mild left coronary calcification. No pericardial or pleural effusions. No pathologic intrathoracic or axillary lymphadenopathy. Small hiatal hernia. Please refer to CT abdomen pelvis report for findings below the hemidiaphragms. Basilar atelectasis. Mild atelectatic changes of the lingula. No consolidation. No suspicious pulmonary nodules. No pneumothorax. No endobronchial lesions. No focal destructive osseous lesions. A bony hemangioma suspected within the T11 vertebra. 1. No pulmonary emboli. 2. No thoracic aneurysm or dissection. Mild left coronary artery calcification. Mild cardiomegaly. 3. Small hiatal hernia. 4. Atelectatic changes lungs with no consolidation or suspicious nodularity. Signed by Dr Chinmay Kan    EGD    Result Date: 4/13/2022  No dictation       Assessment:  1. Admission 4/12/2022 complaints of mid and left-sided chest pain rating to the left back as well as abdominal pain  2. Complaints of exertional dyspnea  3. Hypertension  4. Hyperlipidemia  5. Gastroesophageal reflux disease without esophagitis  6. Morbid obesity  7. Anxiety  8. Depression  9. Obstructive sleep apnea  10. Abnormal liver function test  11. History of viral hepatitis C  12. Here history of Dye's esophagus  13. History of acute pancreatitis  14. History of memory impairment  15. History of colon polyps  16. History uterine prolapse  17. History of migraine headaches  18. History of left ureteral stone  19. History of rib fractures  20. History of TIAs  21.  CT abdomen pelvis yesterday curvilinear radiopaque foreign body in the stomach protruding through the posterior medial wall the distal body antrum of the stomach may represent metallic wire or a fishbone multiple very small low-density renal nodule too small to further characterize unchanged diverticular disease enlarging poorly marginated low-density nodule in the spleen etiology unclear possible metastatic disease not excluded  22. CTA pulmonary yesterday no pulmonary emboli aneurysm or dissection mild left coronary artery calcification small hiatal hernia atelectatic changes lungs no consolidation or suspicious nodularity  23. MRI of the brain 11/3/2021 mild changes of aging no acute intracranial abnormality      Recommendations:  1. Echocardiogram  2. Lexiscan  3.  Further comments to follow

## 2022-04-13 NOTE — CARE COORDINATION
Initial CM Assessment    Initial Assessment Completed at bedside with:    [x]   Patient  [x]   Family/Caregiver/Guardian - patient's spouse present   []   Other:      Patient Contact Information:  [address] [place]  [phone] (home)   Above information verified? [x]   Yes  []   No    ADLS:  Patient lives at home with her spouse. Prior to admission, patient was independent with ADLs and continues to drive. Support System:   Spouse/Significant Other,Children,Family Members  Plan to return to current housing:   [x]   Yes  []   No    Transportation plan for Discharge:  Spouse    Do you have any unmet social needs that would keep you from returning home safely:  []   Yes  [x]   No              Unmet Social Needs Notes:       Had [address] [facility] prior to admission:    []   Yes  [x]   No    Currently ACTIVE with Home Health CARE:    []   Yes  [x]   No  []   Interested at discharge  [facility]:      Current PCP:  LUIZ Mckeon  PCP verified? [x]   Yes  []   No    Have you been vaccinated for COVID-19 (SARS-CoV-2)? [x]   Yes  []   No                   If so, when? Which :  [x]   [facility]ABS  []   Moderna  []   Kierra Melton  []   Other:       Pharmacy:    [facility] [address] DELIA Serna. [facility] [address], [facility]. [place]  [phone] Leah Bradley [phone]  [facility] [address] [address]  Phone: [phone] Fax: [phone]    Prefer to use Meds to Bed? []   Yes  [x]   No  Potential assistance purchasing medications?      []   Yes  [x]   No    Active with HD/PD prior to admission:           []   Yes  [x]   No  HD Center:       Financial:  Payor: Niyah Mulligan / Plan: Orestes Mehran / Product Type: *No Product type* /     Pre-Cert required for SNF:   [x]   Yes  []   No    Patient Deficits:  []   Yes   [x]   No    If yes:  []   Confusion/Memory  []   Visual  []   Motor/Sensory         []   Right arm         []   Right leg []   Left arm         []   Left leg  []   Language/Speech         []   Aphasia         []   Dysarthria         []   Swallow         [place] Coma Scale  Eye Opening: Spontaneous  Best Verbal Response: Oriented  Best Motor Response: Obeys commands  [place] Coma Scale Score: 15    Patient Deficit Notes:        Additional CM/SW Notes:       Laura Medrano and/or her family were provided with choice of provider:  [x]   Yes   []   No        William Brady RN  [facility]  Electronically signed by William Brady RN on 4/13/2022 at 12:53 PM

## 2022-04-13 NOTE — PROGRESS NOTES
[facility] Progress Note    Patient:  Cathy Way  YOB: 1950  Date of Service: 4/13/2022  MRN: [id number]   Acct: [de-identified]   Primary Care Physician: LUIZ Ariza  Advance Directive: Full Code  Admit Date: 4/12/2022       Hospital Day: 1      CHIEF COMPLAINT:     Chief Complaint   Patient presents with    Chest Pain    Arm Pain       4/13/2022 8:31 AM  Subjective / Interval History:   04/13/2022  Patient seen and examined this AM.  Endorses overall improvement. Denies any active chest pain at this time. Continues to endorse some abdominal to left-sided discomfort. Laying comfortably in bed in no acute distress. Review of Systems:   Review of Systems  ROS: 14 point review of systems is negative except as specifically addressed above.     Diet NPO  No intake or output data in the 24 hours ending 04/13/22 0831    Medications:   sodium chloride 100 mL/hr at 04/12/22 1653    dextrose      sodium chloride       Current Facility-Administered Medications   Medication Dose Route Frequency Provider Last Rate Last Admin    0.9 % sodium chloride infusion   IntraVENous Continuous Jordan Kerns  mL/hr at 04/12/22 1653 New Bag at 04/12/22 1653    amLODIPine (NORVASC) tablet 2.5 mg  2.5 mg Oral Daily Jordan Kerns MD        atorvastatin (LIPITOR) tablet 80 mg  80 mg Oral Nightly Jordan Kerns MD        ezetimibe (ZETIA) tablet 10 mg  10 mg Oral Daily Jordan Kerns MD        furosemide (LASIX) tablet 40 mg  40 mg Oral Daily Jordan Kerns MD        metoprolol succinate (TOPROL XL) extended release tablet 100 mg  100 mg Oral Daily Jordan Kerns MD        glucagon (rDNA) injection 1 mg  1 mg IntraMUSCular PRN Jordan Kerns MD        dextrose 5 % solution  100 mL/hr IntraVENous PRN Jordan Kerns MD        sodium chloride flush 0.9 % injection 5-40 mL  5-40 mL IntraVENous 2 times per day Jordan Kerns MD        sodium chloride flush 0.9 % injection 10 mL  10 mL IntraVENous PRN Lisa Arciniega MD        0.9 % sodium chloride infusion   IntraVENous PRN Lisa Arciniega MD        ondansetron (ZOFRAN-ODT) disintegrating tablet 4 mg  4 mg Oral Q8H PRN Lisa Arciniega MD        Or    ondansetron [facility]) injection 4 mg  4 mg IntraVENous Q6H PRN Lisa Arciniega MD        acetaminophen (TYLENOL) tablet 650 mg  650 mg Oral Q6H PRN Lisa Arciniega MD        Or    acetaminophen (TYLENOL) suppository 650 mg  650 mg Rectal Q6H PRN Lisa Arciniega MD        polyethylene glycol (GLYCOLAX) packet 17 g  17 g Oral Daily PRN Lisa Arciniega MD        [START ON 4/14/2022] aspirin chewable tablet 81 mg  81 mg Oral Daily Lisa Arciniega MD        nitroGLYCERIN (NITROSTAT) SL tablet 0.4 mg  0.4 mg SubLINGual Q5 Min PRJERAMIE Arciniega MD        insulin glargine (LANTUS) injection vial 24 Units  24 Units SubCUTAneous Nightly Lisa Arciniega MD        insulin lispro (HUMALOG) injection vial 6 Units  6 Units SubCUTAneous TID  Lisa Arciniega MD        insulin lispro (HUMALOG) injection vial 0-12 Units  0-12 Units SubCUTAneous TID  Lisa Arciniega MD        insulin lispro (HUMALOG) injection vial 0-6 Units  0-6 Units SubCUTAneous Nightly Lisa Arciniega MD        enoxaparin (LOVENOX) injection 40 mg  40 mg SubCUTAneous Daily Lisa Arciniega MD        hydrALAZINE (APRESOLINE) injection 10 mg  10 mg IntraVENous Q6H PRN Lisa Arciniega MD        glucose chewable tablet 4 each  4 tablet Oral PRN Lisa Arciniega MD        dextrose bolus (hypoglycemia) 10% 125 mL  125 mL IntraVENous PRN Lisa Arciniega MD        Or    dextrose bolus (hypoglycemia) 10% 250 mL  250 mL IntraVENous PRN Lisa Arciniega MD             sodium chloride 100 mL/hr at 04/12/22 1653    dextrose      sodium chloride        amLODIPine  2.5 mg Oral Daily    atorvastatin  80 mg Oral Nightly    ezetimibe  10 mg Oral Daily    furosemide  40 mg Oral Daily    metoprolol succinate  100 mg Oral Daily    sodium chloride flush  5-40 mL IntraVENous 2 times per day    [START ON 4/14/2022] aspirin  81 mg Oral Daily    insulin glargine  24 Units SubCUTAneous Nightly    insulin lispro  6 Units SubCUTAneous TID WC    insulin lispro  0-12 Units SubCUTAneous TID WC    insulin lispro  0-6 Units SubCUTAneous Nightly    enoxaparin  40 mg SubCUTAneous Daily     glucagon (rDNA), dextrose, sodium chloride flush, sodium chloride, ondansetron **OR** ondansetron, acetaminophen **OR** acetaminophen, polyethylene glycol, nitroGLYCERIN, hydrALAZINE, glucose, dextrose bolus (hypoglycemia) **OR** dextrose bolus (hypoglycemia)  Diet NPO       Labs:   CBC with DIFF:  Recent Labs     04/12/22  1147 04/13/22  0420   WBC 10.7 7.8   RBC 4.10* 3.66*   HGB 12.2 10.8*   HCT 39.6 34.9*   MCV 96.6 95.4   MCH 29.8 29.5   MCHC 30.8* 30.9*   RDW 13.1 13.0    205   MPV 9.5 9.4   NEUTOPHILPCT 80.7* 73.5*   LYMPHOPCT 11.6* 16.0*   MONOPCT 6.3 8.9   EOSRELPCT 0.8 0.9   BASOPCT 0.3 0.4   NEUTROABS 8.7* 5.7   LYMPHSABS 1.3 1.2   MONOSABS 0.70 0.70   EOSABS 0.10 0.10   BASOSABS 0.00 0.00       CMP/BMP:  Recent Labs     04/12/22  1147 04/13/22  0420    141   K 4.1 4.2    105   CO2 25 25   ANIONGAP 11 11   GLUCOSE 131* 122*   BUN 17 12   CREATININE 0.7 0.7   LABGLOM >60 >60   CALCIUM 9.8 9.5   PROT 6.4*  --    LABALBU 4.2  --    BILITOT 0.3  --    ALKPHOS 82  --    ALT 13  --    AST 14  --          CRP:  No results for input(s): CRP in the last 72 hours. Sed Rate:  No results for input(s): SEDRATE in the last 72 hours.       HgBA1c:  No components found for: HGBA1C  FLP:    Lab Results   Component Value Date    TRIG 181 04/13/2022    HDL 41 04/13/2022    LDLCALC 54 04/13/2022     TSH:    Lab Results   Component Value Date    TSH 1.880 10/19/2021     Troponin T:   Recent Labs     04/12/22  1458 04/12/22  1943 04/12/22  2319 TROPONINI <0.01 <0.01 <0.01     Pro-BNP: No results for input(s): BNP in the last 72 hours. INR:   Recent Labs     04/13/22  0420   INR 1.01     ABGs: No results found for: PHART, PO2ART, UWJ1CIA  UA:  Recent Labs     04/12/22  1501   COLORU YELLOW   PHUR 8.5*   CLARITYU Clear   SPECGRAV >=1.045   LEUKOCYTESUR Negative   UROBILINOGEN 0.2   BILIRUBINUR Negative   BLOODU Negative   GLUCOSEU Negative         Culture Results:    No results for input(s): CXSURG in the last 720 hours. Blood Culture Recent: No results for input(s): BC in the last 720 hours. No results for input(s): BC, BLOODCULT2, ORG in the last 72 hours. Cultures:   No results for input(s): CULTURE in the last 72 hours. No results for input(s): BC, Gevena Flori in the last 72 hours. No results for input(s): CXSURG in the last 72 hours. No results for input(s): MG, PHOS in the last 72 hours. Recent Labs     04/12/22  1147   AST 14   ALT 13   BILITOT 0.3   ALKPHOS 82         RAD:   CT ABDOMEN PELVIS W IV CONTRAST Additional Contrast? None    Result Date: 4/12/2022  Examination. CT ABDOMEN PELVIS W IV CONTRAST 4/12/2022 1:07 PM History: Abdominal pain. DLP: 1488 mGycm. The automated exposure control was utilized to minimize the patient's radiation exposure. The CT scan of the abdomen and pelvis is performed after intravenous contrast enhancement. The images are acquired in axial plane and subsequent reconstruction in coronal and sagittal planes. The comparison is made with the previous study dated 1/22/2019. The lung bases included in the study show dependent atelectatic changes at bilateral bases posteriorly. The limited included cardiomediastinal structures show heavy calcification mitral annulus. No significant cardiomegaly. Mild atheromatous changes of coronary arteries. The liver appears normal. There are ill-defined low-density nodules in the spleen which appears significantly larger than the previous study.  A nodule located small to be further characterized. These are unchanged. 3. The diverticulosis of the colon. No evidence for diverticulitis. 4. The enlarging poorly marginated low-density nodule in the spleen. Etiology is not certain. Possibility of metastatic disease is not excluded. The results were called to ER physician, Dr. Kira Monroy, immediately. Signed by Dr Vida Stokes    Result Date: 4/12/2022  XR CHEST PORTABLE 4/12/2022 12:13 PM HISTORY: Chest pain COMPARISON: 3/23/2020 CXR: A single frontal view of the chest is performed. Findings: Lungs appear mildly hyperinflated. Vascular crowding versus mild venous congestion. Basilar atelectasis. No pleural effusion or pneumothorax. No acute regional bony pathology. 1. Hypoventilated lungs with vascular crowding versus mild venous congestion and basilar atelectasis. . Signed by Dr Noble Gonzalez    CTA [address]     Result Date: 4/12/2022  CTA chest 4/12/2022 1:06 PM HISTORY: Shortness of breath with chest pressure TECHNIQUE: Axial images of the chest were obtained following IV contrast. . Coronal and sagittal as well as maximal intensity projectional reformatted images are reconstructed and reviewed. COMPARISON: 2/27/2018. DLP: 856 mGy cm Automated exposure control was utilized to minimize patient radiation dose. FINDINGS: No pulmonary emboli identified. Thoracic aorta normal in caliber with no aneurysm or dissection. Cardiomegaly. Mild left coronary calcification. No pericardial or pleural effusions. No pathologic intrathoracic or axillary lymphadenopathy. Small hiatal hernia. Please refer to CT abdomen pelvis report for findings below the hemidiaphragms. Basilar atelectasis. Mild atelectatic changes of the lingula. No consolidation. No suspicious pulmonary nodules. No pneumothorax. No endobronchial lesions. No focal destructive osseous lesions. A bony hemangioma suspected within the T11 vertebra. 1. No pulmonary emboli.  2. No thoracic aneurysm or dissection. Mild left coronary artery calcification. Mild cardiomegaly. 3. Small hiatal hernia. 4. Atelectatic changes lungs with no consolidation or suspicious nodularity. Signed by Dr Bhanu Luis    EGD    Result Date: 4/13/2022  No dictation       Echo:   pending      Objective:   Vitals:   /70   Pulse 87   Temp 97.7 °F (36.5 °C)   Resp 18   Ht 5' 4\" (1.626 m)   Wt 220 lb (99.8 kg)   SpO2 93%   BMI 37.76 kg/m²       Patient Vitals for the past 24 hrs:   BP Temp Temp src Pulse Resp SpO2 Height Weight   04/13/22 0546 132/70 97.7 °F (36.5 °C) -- 87 18 93 % -- --   04/13/22 0036 123/63 97 °F (36.1 °C) -- 84 20 96 % -- --   04/12/22 1845 (!) 152/70 98 °F (36.7 °C) Temporal 94 16 99 % -- --   04/12/22 1300 (!) 162/77 -- -- 85 16 99 % -- --   04/12/22 1230 (!) 150/82 -- -- 86 19 99 % -- --   04/12/22 1141 -- 98 °F (36.7 °C) Oral -- -- -- -- --   04/12/22 1137 (!) 174/81 -- -- -- 18 98 % 5' 4\" (1.626 m) 220 lb (99.8 kg)       24HR INTAKE/OUTPUT:  No intake or output data in the 24 hours ending 04/13/22 0831    Physical Exam  Vitals and nursing note reviewed. Constitutional:       General: She is not in acute distress. Appearance: Normal appearance. She is obese. She is not ill-appearing, toxic-appearing or diaphoretic. HENT:      Head: Normocephalic and atraumatic. Right Ear: External ear normal.      Left Ear: External ear normal.      Nose: Nose normal. No congestion or rhinorrhea. Mouth/Throat:      Mouth: Mucous membranes are moist.      Pharynx: Oropharynx is clear. No oropharyngeal exudate or posterior oropharyngeal erythema. Eyes:      General: No scleral icterus. Right eye: No discharge. Left eye: No discharge. Extraocular Movements: Extraocular movements intact. Conjunctiva/sclera: Conjunctivae normal.      Pupils: Pupils are equal, round, and reactive to light. Cardiovascular:      Rate and Rhythm: Normal rate and regular rhythm. Pulses: Normal pulses. Heart sounds: Normal heart sounds. No murmur heard. No friction rub. No gallop. Pulmonary:      Effort: Pulmonary effort is normal. No respiratory distress. Breath sounds: Normal breath sounds. No stridor. No wheezing, rhonchi or rales. Chest:      Chest wall: No tenderness. Abdominal:      General: Bowel sounds are normal. There is no distension. Palpations: Abdomen is soft. Tenderness: There is abdominal tenderness ( Diffuse tenderness to deep palpation. No guarding, rigidity, rebound tenderness noted. ). There is no guarding or rebound. Musculoskeletal:         General: No swelling, tenderness, deformity or signs of injury. Normal range of motion. Cervical back: Normal range of motion and neck supple. No rigidity. No muscular tenderness. Right lower leg: No edema. Left lower leg: No edema. Skin:     General: Skin is warm and dry. Capillary Refill: Capillary refill takes less than 2 seconds. Coloration: Skin is not jaundiced or pale. Findings: No bruising, erythema, lesion or rash. Neurological:      General: No focal deficit present. Mental Status: She is alert and oriented to person, place, and time. Cranial Nerves: No cranial nerve deficit. Sensory: No sensory deficit. Motor: No weakness. Coordination: Coordination normal.   Psychiatric:         Mood and Affect: Mood normal.         Behavior: Behavior normal.         Thought Content:  Thought content normal.         Judgment: Judgment normal.         Assessment/plan:         Hospital Problems           Last Modified POA    * (Principal) Chest pain 4/12/2022 Yes    Abdominal pain 4/12/2022 Yes    Foreign body in stomach 4/12/2022 Yes    Essential hypertension (Chronic) 4/12/2022 Yes    Mixed hyperlipidemia (Chronic) 4/12/2022 Yes    Gastroesophageal reflux disease without esophagitis (Chronic) 4/12/2022 Yes    Morbidly obese ([address] (Chronic) 4/12/2022 Yes    Overview Signed 8/17/2017 12:54 PM by Anum Lux     Last Assessment & Plan:   Obesity: Federal guidelines recommend that people under the age of 72 should have a BMI of 18.5-24.9 and people age 72 and older should have a BMI of 23-30. Morbid obesity is defined as >100 lb overweight or BMI >40. The patient BMI is outside the recommended range and we recommended/discussed today to utilize a diet/exercise program to get back into the appropriate range. Today we encouraged roughly a 1 lb per week weight loss with initial goal of 5% weight loss. The initial step is to document everything that is consumed into a food diary. Studies have shown that patients can lose up to 2x the weight by keeping track of foods. We discussed BEE today and discussed reasonable goal to realize weight loss. Would recommend f/u with PCP to discuss further. Anxiety 4/12/2022 Yes    Moderate episode of recurrent major depressive disorder ([address] 4/12/2022 Yes    Obstructive sleep apnea syndrome 4/12/2022 Yes          Principal Problem:    Chest pain  Active Problems:    Abdominal pain    Foreign body in stomach    Essential hypertension    Mixed hyperlipidemia    Gastroesophageal reflux disease without esophagitis    Morbidly obese (HCC)    Anxiety    Moderate episode of recurrent major depressive disorder ([address]    Obstructive sleep apnea syndrome  Resolved Problems:    * No resolved hospital problems. *        Brief Summary  Ms. Teto Rios, a 71 y.o. female with a history of history of DMT2, HTN, HLD, JAYME, anxiety/depression, GERD, presenting to [facility] [place] ED (04/12/2022), on account of an acute onset of several hours history of, moderate to severe left-sided chest and abdominal  Pain.     Patient reports a squeezing type left-sided chest pain/pressure going down to her left abdomen and left arm.       Associated symptom reported includes an episode of vomiting the night prior to presentation, as well as dyspnea on exertion.     No other associated symptoms reported; no dizziness, lightheadedness, palpitations or diaphoresis.     CT abdomen pelvis, reporting a curvilinear radiopaque foreign object (metallic wire vs fishbone-like) which appears to be in the distal part of the stomach and is protruding through the posterior medial wall of the distal stomach/antrum projecting near the neck/proximal body of the pancreas.     Patient reportedly had a catfish last night.     General surgery and GI both consulted from ED.     Patient be admitted to medical service, for further work-up and management. Abdominal Pain  Foreign body in posterior wall of stomach  · CT abdomen pelvis W IV Con (04/12/2022): Impression: A curvilinear radiopaque foreign body in the stomach protruding through the posterior medial wall of the distal body/antrum of the stomach. No free air. This may represent a metallic wire or a fishbone? Clinical correlation and further evaluation is recommended. Multiple very small low density renal nodules which are too small to be further characterized. These are unchanged. The diverticulosis of the colon. No evidence for diverticulitis. The enlarging poorly marginated low-density nodule in the spleen. Etiology is not certain. Possibility of metastatic disease is not excluded. · General Surgery and GI consulted  · Continue symptomatic supportive management  · Further management as per specialist recommendations        Chest/Arm Pain  · CTA Chest W Con (04/12/2022): Impression: No pulmonary emboli. No thoracic aneurysm or dissection. Mild left coronary artery calcification. Mild cardiomegaly. Small hiatal hernia.  Atelectatic changes lungs with no consolidation or suspicious nodularity.     · - Initial troponin negative x4 sets  · - Serial EKGs for chest pain  · - Optimized medical management  · --> Nitro glycerin sublingual when necessary for chest pain  · - 2D Echocardiogram:   · - Continue to monitor on telemetry  · - Famotidine   · - Cardiology consult           Diabetes Mellitus II  · Uncontrolled  · Inpatient Regimens to include;  ? - Insulin Glargine (Lantus) 24 units subcu nightly  ? - Insulin Lispro (Humalog) 6 units subcu pre-meal 3 times a day  ? - Insulin Lispro (Humalog) on a Medium dose sliding scale  · Monitor POC glucose, and adjust insulin regimen accordingly based on daily insulin requirement.      Essential Hypertension  · Uncontrolled  · Resume home regimen  · Amlodipine  · Hydralazine  · Metoprolol succinate 100 mg p.o. daily  · Hydralazine 10 mg IV Q6H/PRN  For SBP> 180 and/or DP> 110   · Continue to monitor     Mixed Hyperlipidemia  · Ezetimibe 10 mg p.o. daily  · Atorvastatin 80 mg p.o. nightly     Anxiety/depression  · Resume home regimen after reconciliation  · Hydroxyzine 50 mg p.o. daily as needed  · Lorazepam 0.5 mg p.o. every 12 hours/as needed for anxiety      Continue management of other chronic medical conditions - see above and orders. Advance Directive: Full Code    Diet NPO         Consults Made:   IP CONSULT TO GENERAL SURGERY  IP CONSULT TO GI  IP CONSULT TO CARDIOLOGY    DVT prophylaxis: Enoxaparin      Discharge planning: tbd    Time Spent is 35 mins in the examination, evaluation, counseling and review of medications, assessment and plan.      Electronically signed by   Daniel Amaya MD, MPH, MD,   Internal Medicine Hospitalist   4/13/2022 8:31 AM

## 2022-04-13 NOTE — BRIEF OP NOTE
Brief Postoperative Note      Patient: Janet Hung  YOB: 1950  MRN: [id number]    Date of Procedure: 4/13/2022    Pre-Op Diagnosis: foreign body in stomach    Post-Op Diagnosis: no foreign body present in UGI tract       Procedure(s):  EGD DIAGNOSTIC ONLY    Surgeon(s):  Tamara Aviles MD    Assistant:  Dea Wade RN    Anesthesia: Monitor Anesthesia Care    Estimated Blood Loss (mL): none    Complications: none    Specimens:   * No specimens in log *    Implants:  * No implants in log *      Drains: * No LDAs found *    Findings: no foreign body  Plan: follow-up flat plate of abdomen tomorrow as needed.   Electronically signed by Tamara Aviles MD on 4/13/2022 at 3:13 PM

## 2022-04-14 ENCOUNTER — APPOINTMENT (OUTPATIENT)
Dept: GENERAL RADIOLOGY | Age: 72
DRG: 394 | End: 2022-04-14
Payer: MEDICARE

## 2022-04-14 LAB
ANION GAP SERPL CALCULATED.3IONS-SCNC: 14 MMOL/L (ref 7–19)
BASOPHILS ABSOLUTE: 0 K/UL (ref 0–0.2)
BASOPHILS RELATIVE PERCENT: 0.2 % (ref 0–1)
BUN BLDV-MCNC: 12 MG/DL (ref 8–23)
CALCIUM SERPL-MCNC: 10.1 MG/DL (ref 8.8–10.2)
CHLORIDE BLD-SCNC: 107 MMOL/L (ref 98–111)
CO2: 20 MMOL/L (ref 22–29)
CREAT SERPL-MCNC: 0.6 MG/DL (ref 0.5–0.9)
EOSINOPHILS ABSOLUTE: 0 K/UL (ref 0–0.6)
EOSINOPHILS RELATIVE PERCENT: 0 % (ref 0–5)
GFR AFRICAN AMERICAN: >59
GFR NON-AFRICAN AMERICAN: >60
GLUCOSE BLD-MCNC: 102 MG/DL (ref 70–99)
GLUCOSE BLD-MCNC: 128 MG/DL (ref 70–99)
GLUCOSE BLD-MCNC: 150 MG/DL (ref 74–109)
GLUCOSE BLD-MCNC: 153 MG/DL (ref 70–99)
GLUCOSE BLD-MCNC: 220 MG/DL (ref 70–99)
HCT VFR BLD CALC: 39.1 % (ref 37–47)
HEMOGLOBIN: 12.1 G/DL (ref 12–16)
IMMATURE GRANULOCYTES #: 0.2 K/UL
LV EF: 60 %
LVEF MODALITY: NORMAL
LYMPHOCYTES ABSOLUTE: 0.7 K/UL (ref 1.1–4.5)
LYMPHOCYTES RELATIVE PERCENT: 7.3 % (ref 20–40)
MCH RBC QN AUTO: 29.4 PG (ref 27–31)
MCHC RBC AUTO-ENTMCNC: 30.9 G/DL (ref 33–37)
MCV RBC AUTO: 95.1 FL (ref 81–99)
MONOCYTES ABSOLUTE: 0.3 K/UL (ref 0–0.9)
MONOCYTES RELATIVE PERCENT: 3.7 % (ref 0–10)
NEUTROPHILS ABSOLUTE: 8 K/UL (ref 1.5–7.5)
NEUTROPHILS RELATIVE PERCENT: 87.1 % (ref 50–65)
PDW BLD-RTO: 12.8 % (ref 11.5–14.5)
PERFORMED ON: ABNORMAL
PLATELET # BLD: 253 K/UL (ref 130–400)
PMV BLD AUTO: 9.3 FL (ref 9.4–12.3)
POTASSIUM REFLEX MAGNESIUM: 4.3 MMOL/L (ref 3.5–5)
RBC # BLD: 4.11 M/UL (ref 4.2–5.4)
SODIUM BLD-SCNC: 141 MMOL/L (ref 136–145)
WBC # BLD: 9.2 K/UL (ref 4.8–10.8)

## 2022-04-14 PROCEDURE — 99232 SBSQ HOSP IP/OBS MODERATE 35: CPT | Performed by: SURGERY

## 2022-04-14 PROCEDURE — 85025 COMPLETE CBC W/AUTO DIFF WBC: CPT

## 2022-04-14 PROCEDURE — 2700000000 HC OXYGEN THERAPY PER DAY

## 2022-04-14 PROCEDURE — 82947 ASSAY GLUCOSE BLOOD QUANT: CPT

## 2022-04-14 PROCEDURE — 36415 COLL VENOUS BLD VENIPUNCTURE: CPT

## 2022-04-14 PROCEDURE — 1210000000 HC MED SURG R&B

## 2022-04-14 PROCEDURE — 74018 RADEX ABDOMEN 1 VIEW: CPT

## 2022-04-14 PROCEDURE — 6370000000 HC RX 637 (ALT 250 FOR IP): Performed by: INTERNAL MEDICINE

## 2022-04-14 PROCEDURE — 6360000002 HC RX W HCPCS: Performed by: INTERNAL MEDICINE

## 2022-04-14 PROCEDURE — 94660 CPAP INITIATION&MGMT: CPT

## 2022-04-14 PROCEDURE — 80048 BASIC METABOLIC PNL TOTAL CA: CPT

## 2022-04-14 PROCEDURE — 2580000003 HC RX 258: Performed by: INTERNAL MEDICINE

## 2022-04-14 PROCEDURE — 6360000004 HC RX CONTRAST MEDICATION: Performed by: SURGERY

## 2022-04-14 RX ADMIN — INSULIN GLARGINE 24 UNITS: 100 INJECTION, SOLUTION SUBCUTANEOUS at 20:55

## 2022-04-14 RX ADMIN — SODIUM CHLORIDE, PRESERVATIVE FREE 10 ML: 5 INJECTION INTRAVENOUS at 20:59

## 2022-04-14 RX ADMIN — ASPIRIN 81 MG 81 MG: 81 TABLET ORAL at 08:39

## 2022-04-14 RX ADMIN — ENOXAPARIN SODIUM 40 MG: 40 INJECTION SUBCUTANEOUS at 08:39

## 2022-04-14 RX ADMIN — INSULIN LISPRO 6 UNITS: 100 INJECTION, SOLUTION INTRAVENOUS; SUBCUTANEOUS at 08:40

## 2022-04-14 RX ADMIN — FUROSEMIDE 40 MG: 40 TABLET ORAL at 08:39

## 2022-04-14 RX ADMIN — SODIUM CHLORIDE, PRESERVATIVE FREE 10 ML: 5 INJECTION INTRAVENOUS at 08:39

## 2022-04-14 RX ADMIN — PERFLUTREN 1.65 MG: 6.52 INJECTION, SUSPENSION INTRAVENOUS at 15:13

## 2022-04-14 RX ADMIN — ATORVASTATIN CALCIUM 80 MG: 80 TABLET, FILM COATED ORAL at 20:55

## 2022-04-14 RX ADMIN — INSULIN LISPRO 6 UNITS: 100 INJECTION, SOLUTION INTRAVENOUS; SUBCUTANEOUS at 17:56

## 2022-04-14 RX ADMIN — INSULIN LISPRO 4 UNITS: 100 INJECTION, SOLUTION INTRAVENOUS; SUBCUTANEOUS at 13:09

## 2022-04-14 RX ADMIN — AMLODIPINE BESYLATE 2.5 MG: 5 TABLET ORAL at 08:39

## 2022-04-14 RX ADMIN — EZETIMIBE 10 MG: 10 TABLET ORAL at 08:39

## 2022-04-14 RX ADMIN — INSULIN LISPRO 6 UNITS: 100 INJECTION, SOLUTION INTRAVENOUS; SUBCUTANEOUS at 13:09

## 2022-04-14 ASSESSMENT — ENCOUNTER SYMPTOMS
DIARRHEA: 0
VOMITING: 0
SHORTNESS OF BREATH: 1
ABDOMINAL PAIN: 1
EYE PAIN: 0

## 2022-04-14 NOTE — PROGRESS NOTES
[facility]    Patient:  Flex Sorensen  YOB: 1950  Date of Service: 4/14/2022  MRN: [id number]   Acct: [de-identified]   Primary Care Physician: LUIZ Ozuna  Advance Directive: Full Code  Admit Date: 4/12/2022       Hospital Day: 2      CHIEF COMPLAINT:     Chief Complaint   Patient presents with    Chest Pain    Arm Pain       4/14/2022 8:16 AM  Subjective / Interval History:   04/14/2022  No acute changes or acute overnight event reported. Patient seen and evaluated this AM.  Doing well. Continues to report some upper abdomen to chest heaviness and tenderness. Laying comfortably in bed however no apparent acute distress. Family at bedside. 04/13/2022  Patient seen and examined this AM.  Endorses overall improvement. Denies any active chest pain at this time. Continues to endorse some abdominal to left-sided discomfort. Laying comfortably in bed in no acute distress. Review of Systems:   Review of Systems  ROS: 14 point review of systems is negative except as specifically addressed above. ADULT DIET;  Regular; Low Fat/Low Chol/High Fiber/AMANDA    Intake/Output Summary (Last 24 hours) at 4/14/2022 0816  Last data filed at 4/13/2022 1223  Gross per 24 hour   Intake 0 ml   Output --   Net 0 ml       Medications:   dextrose 100 mL/hr at 04/13/22 1130    sodium chloride 100 mL/hr at 04/13/22 1449    dextrose      sodium chloride       Current Facility-Administered Medications   Medication Dose Route Frequency Provider Last Rate Last Admin    dextrose 5 % solution   IntraVENous Continuous Samantha Gagnon  mL/hr at 04/13/22 1130 New Bag at 04/13/22 1130    0.9 % sodium chloride infusion   IntraVENous Continuous Samantha Gagnon  mL/hr at 04/13/22 1449 NoRateChange at 04/13/22 1449    amLODIPine (NORVASC) tablet 2.5 mg  2.5 mg Oral Daily Samantha Gagnon MD        atorvastatin (LIPITOR) tablet 80 mg  80 mg Oral Nightly Cara Davidson MD [place]   80 mg at 04/13/22 2102    ezetimibe (ZETIA) tablet 10 mg  10 mg Oral Daily Padmini Contreras MD        furosemide (LASIX) tablet 40 mg  40 mg Oral Daily Padmini Contreras MD        [Held by provider] metoprolol succinate (TOPROL XL) extended release tablet 100 mg  100 mg Oral Daily Keely Jefferson MD        glucagon (rDNA) injection 1 mg  1 mg IntraMUSCular PRN Padmini Contreras MD        dextrose 5 % solution  100 mL/hr IntraVENous PRN Padmini Contreras MD        sodium chloride flush 0.9 % injection 5-40 mL  5-40 mL IntraVENous 2 times per day Padmini Contreras MD        sodium chloride flush 0.9 % injection 10 mL  10 mL IntraVENous PRN Padmini Contreras MD        0.9 % sodium chloride infusion   IntraVENous PRN Padmini Contreras MD        ondansetron (ZOFRAN-ODT) disintegrating tablet 4 mg  4 mg Oral Q8H PRN Padmini Contreras MD        Or    ondansetron [facility]) injection 4 mg  4 mg IntraVENous Q6H PRN Padmini Contreras MD        acetaminophen (TYLENOL) tablet 650 mg  650 mg Oral Q6H PRN Padmini Contreras MD   650 mg at 04/13/22 2102    Or    acetaminophen (TYLENOL) suppository 650 mg  650 mg Rectal Q6H PRN Padmini Contreras MD        polyethylene glycol [facility]) packet 17 g  17 g Oral Daily PRN Padmini Contreras MD        aspirin chewable tablet 81 mg  81 mg Oral Daily Padmini Contreras MD        nitroGLYCERIN (NITROSTAT) SL tablet 0.4 mg  0.4 mg SubLINGual Q5 Min PRN Padmini Contreras MD        insulin glargine (LANTUS) injection vial 24 Units  24 Units SubCUTAneous Nightly Padmini Contreras MD   24 Units at 04/13/22 2300    insulin lispro (HUMALOG) injection vial 6 Units  6 Units SubCUTAneous TID  Padmini Contreras MD        insulin lispro (HUMALOG) injection vial 0-12 Units  0-12 Units SubCUTAneous TID  Padmini Contreras MD        insulin lispro (HUMALOG) injection vial 0-6 Units  0-6 Units SubCUTAneous Nightly Beryle Severance, MD   2 Units at 04/13/22 2300    enoxaparin (LOVENOX) injection 40 mg  40 mg SubCUTAneous Daily Beryle Severance, MD   40 mg at 04/13/22 5310    hydrALAZINE (APRESOLINE) injection 10 mg  10 mg IntraVENous Q6H PRN Beryle Severance, MD        glucose chewable tablet 4 each  4 tablet Oral PRN Beryle Severance, MD        dextrose bolus (hypoglycemia) 10% 125 mL  125 mL IntraVENous PRN Beryle Severance, MD        Or    dextrose bolus (hypoglycemia) 10% 250 mL  250 mL IntraVENous PRN Beryle Severance, MD             dextrose 100 mL/hr at 04/13/22 1130    sodium chloride 100 mL/hr at 04/13/22 1449    dextrose      sodium chloride        amLODIPine  2.5 mg Oral Daily    atorvastatin  80 mg Oral Nightly    ezetimibe  10 mg Oral Daily    furosemide  40 mg Oral Daily    [Held by provider] metoprolol succinate  100 mg Oral Daily    sodium chloride flush  5-40 mL IntraVENous 2 times per day    aspirin  81 mg Oral Daily    insulin glargine  24 Units SubCUTAneous Nightly    insulin lispro  6 Units SubCUTAneous TID WC    insulin lispro  0-12 Units SubCUTAneous TID WC    insulin lispro  0-6 Units SubCUTAneous Nightly    enoxaparin  40 mg SubCUTAneous Daily     glucagon (rDNA), dextrose, sodium chloride flush, sodium chloride, ondansetron **OR** ondansetron, acetaminophen **OR** acetaminophen, polyethylene glycol, nitroGLYCERIN, hydrALAZINE, glucose, dextrose bolus (hypoglycemia) **OR** dextrose bolus (hypoglycemia)  ADULT DIET;  Regular; Low Fat/Low Chol/High Fiber/AMANDA       Labs:   CBC with DIFF:  Recent Labs     04/12/22  1147 04/13/22  0420 04/14/22  0454   WBC 10.7 7.8 9.2   RBC 4.10* 3.66* 4.11*   HGB 12.2 10.8* 12.1   HCT 39.6 34.9* 39.1   MCV 96.6 95.4 95.1   MCH 29.8 29.5 29.4   MCHC 30.8* 30.9* 30.9*   RDW 13.1 13.0 12.8    205 253   MPV 9.5 9.4 9.3*   NEUTOPHILPCT 80.7* 73.5* 87.1*   LYMPHOPCT 11.6* 16.0* 7.3*   MONOPCT 6.3 8.9 3.7   EOSRELPCT 0.8 0.9 0.0 BASOPCT 0.3 0.4 0.2   NEUTROABS 8.7* 5.7 8.0*   LYMPHSABS 1.3 1.2 0.7*   MONOSABS 0.70 0.70 0.30   EOSABS 0.10 0.10 0.00   BASOSABS 0.00 0.00 0.00       CMP/BMP:  Recent Labs     04/12/22  1147 04/13/22  0420 04/14/22  0454    141 141   K 4.1 4.2 4.3    105 107   CO2 25 25 20*   ANIONGAP 11 11 14   GLUCOSE 131* 122* 150*   BUN 17 12 12   CREATININE 0.7 0.7 0.6   LABGLOM >60 >60 >60   CALCIUM 9.8 9.5 10.1   PROT 6.4*  --   --    LABALBU 4.2  --   --    BILITOT 0.3  --   --    ALKPHOS 82  --   --    ALT 13  --   --    AST 14  --   --          CRP:  No results for input(s): CRP in the last 72 hours. Sed Rate:  No results for input(s): SEDRATE in the last 72 hours. HgBA1c:  No components found for: HGBA1C  FLP:    Lab Results   Component Value Date    TRIG 181 04/13/2022    HDL 41 04/13/2022    LDLCALC 54 04/13/2022     TSH:    Lab Results   Component Value Date    TSH 1.880 10/19/2021     Troponin T:   Recent Labs     04/12/22  1458 04/12/22  1943 04/12/22  2317   TROPONINI <0.01 <0.01 <0.01     Pro-BNP: No results for input(s): BNP in the last 72 hours. INR:   Recent Labs     04/13/22  0420   INR 1.01     ABGs: No results found for: PHART, PO2ART, EXP3XYC  UA:  Recent Labs     04/12/22  1501   COLORU YELLOW   PHUR 8.5*   CLARITYU Clear   SPECGRAV >=1.045   LEUKOCYTESUR Negative   UROBILINOGEN 0.2   BILIRUBINUR Negative   BLOODU Negative   GLUCOSEU Negative         Culture Results:    No results for input(s): CXSURG in the last 720 hours. Blood Culture Recent: No results for input(s): BC in the last 720 hours. No results for input(s): BC, BLOODCULT2, ORG in the last 72 hours. Cultures:   No results for input(s): CULTURE in the last 72 hours. No results for input(s): BC, Fadia Reading in the last 72 hours. No results for input(s): CXSURG in the last 72 hours. No results for input(s): MG, PHOS in the last 72 hours.   Recent Labs     04/12/22  1147   AST 14   ALT 13   BILITOT 0.3 ALKPHOS 80         RAD:   CT ABDOMEN PELVIS W IV CONTRAST Additional Contrast? None    Result Date: 4/12/2022  Examination. CT ABDOMEN PELVIS W IV CONTRAST 4/12/2022 1:07 PM History: Abdominal pain. DLP: 1488 mGycm. The automated exposure control was utilized to minimize the patient's radiation exposure. The CT scan of the abdomen and pelvis is performed after intravenous contrast enhancement. The images are acquired in axial plane and subsequent reconstruction in coronal and sagittal planes. The comparison is made with the previous study dated 1/22/2019. The lung bases included in the study show dependent atelectatic changes at bilateral bases posteriorly. The limited included cardiomediastinal structures show heavy calcification mitral annulus. No significant cardiomegaly. Mild atheromatous changes of coronary arteries. The liver appears normal. There are ill-defined low-density nodules in the spleen which appears significantly larger than the previous study. A nodule located posteriorly measures 2 cm in diameter. It previously measured 1 cm. The gallbladder is surgically absent. No significant dilatation of the common bile duct. The pancreas is normal. The pancreatic duct is not visualized. The adrenal glands are normal. There are multiple tiny low-density cortical nodules in both kidneys which appears similar to the previous study. No radiopaque calculi. No hydronephrosis. The ureters bilaterally are normal. The urinary bladder is poorly distended. No intrinsic abnormality. The uterus is absent. No adnexal masses. Tiny fat-containing umbilical hernia. There is a small sliding-type hiatal hernia. There is a curvilinear radiopaque foreign object which appears to be in the distal part of the stomach and is protruding through the posterior medial wall of the distal stomach/antrum projecting near the neck/proximal body of the pancreas. The type of foreign body is not certain.  This may represent a metallic wire or a fishbone? Marnell Jose J There is a small bubble of air adjacent to the proximal end of this foreign object. The duodenum is normal. Small bowel is nondistended. A retrocecal appendix is normal. The cecum lies low in the pelvis adjacent to the urinary bladder. Moderate gas and stool is seen in the colon. There is diverticulosis of the distal colon. No evidence for diverticulitis. Atheromatous changes of the abdominal aorta and iliac arteries. No aneurysmal dilatation. There is no evidence of abdominal or pelvic lymphadenopathy. The images reviewed in bone window show chronic degenerative changes of the lumbar spine. There is a mild levoscoliosis. No focal bony lesion. 1. A curvilinear radiopaque foreign body in the stomach protruding through the posterior medial wall of the distal body/antrum of the stomach. No free air. This may represent a metallic wire or a fishbone? Clinical correlation and further evaluation is recommended. 2. Multiple very small low density renal nodules which are too small to be further characterized. These are unchanged. 3. The diverticulosis of the colon. No evidence for diverticulitis. 4. The enlarging poorly marginated low-density nodule in the spleen. Etiology is not certain. Possibility of metastatic disease is not excluded. The results were called to ER physician, Dr. Annmarie Willson, immediately. Signed by Dr Norah Aviles    Result Date: 4/12/2022  XR CHEST PORTABLE 4/12/2022 12:13 PM HISTORY: Chest pain COMPARISON: 3/23/2020 CXR: A single frontal view of the chest is performed. Findings: Lungs appear mildly hyperinflated. Vascular crowding versus mild venous congestion. Basilar atelectasis. No pleural effusion or pneumothorax. No acute regional bony pathology. 1. Hypoventilated lungs with vascular crowding versus mild venous congestion and basilar atelectasis. . Signed by Dr Chinmya Kan    CTA PULMONARY W CONTRAST    Result Date: 4/12/2022  CTA chest 4/12/2022 1:06 PM HISTORY: Shortness of breath with chest pressure TECHNIQUE: Axial images of the chest were obtained following IV contrast. . Coronal and sagittal as well as maximal intensity projectional reformatted images are reconstructed and reviewed. COMPARISON: 2/27/2018. DLP: 856 mGy cm Automated exposure control was utilized to minimize patient radiation dose. FINDINGS: No pulmonary emboli identified. Thoracic aorta normal in caliber with no aneurysm or dissection. Cardiomegaly. Mild left coronary calcification. No pericardial or pleural effusions. No pathologic intrathoracic or axillary lymphadenopathy. Small hiatal hernia. Please refer to CT abdomen pelvis report for findings below the hemidiaphragms. Basilar atelectasis. Mild atelectatic changes of the lingula. No consolidation. No suspicious pulmonary nodules. No pneumothorax. No endobronchial lesions. No focal destructive osseous lesions. A bony hemangioma suspected within the T11 vertebra. 1. No pulmonary emboli. 2. No thoracic aneurysm or dissection. Mild left coronary artery calcification. Mild cardiomegaly. 3. Small hiatal hernia. 4. Atelectatic changes lungs with no consolidation or suspicious nodularity.  Signed by Dr Barbara Sahu    EGD    Result Date: 4/13/2022  No dictation       Echo:   pending      Objective:   Vitals:   BP (!) 150/77   Pulse 93   Temp 96.8 °F (36 °C)   Resp 22   Ht 5' 4\" (1.626 m)   Wt 220 lb (99.8 kg)   SpO2 93%   BMI 37.76 kg/m²       Patient Vitals for the past 24 hrs:   BP Temp Temp src Pulse Resp SpO2   04/14/22 0600 (!) 150/77 96.8 °F (36 °C) -- 93 22 93 %   04/14/22 0005 (!) 143/76 97.2 °F (36.2 °C) -- 87 18 90 %   04/13/22 1642 (!) 151/64 97 °F (36.1 °C) Temporal 85 20 91 %   04/13/22 1555 (!) 145/73 -- -- 92 18 93 %   04/13/22 1550 (!) 151/68 97 °F (36.1 °C) Temporal 97 23 92 %   04/13/22 1545 (!) 148/75 -- -- 99 30 93 %   04/13/22 1540 (!) 144/74 -- -- 96 20 95 %   04/13/22 1535 139/68 -- -- 97 16 93 %   04/13/22 1530 (!) 147/69 -- -- 98 17 95 %   04/13/22 1525 (!) 141/78 -- -- 112 20 93 %   04/13/22 1523 -- 97.6 °F (36.4 °C) Tympanic -- -- --   04/13/22 1522 (!) 141/78 97.3 °F (36.3 °C) Temporal 103 18 92 %   04/13/22 1222 127/63 97.3 °F (36.3 °C) Temporal 88 20 96 %       24HR INTAKE/OUTPUT:      Intake/Output Summary (Last 24 hours) at 4/14/2022 0816  Last data filed at 4/13/2022 1223  Gross per 24 hour   Intake 0 ml   Output --   Net 0 ml       Physical Exam  Vitals and nursing note reviewed. Constitutional:       General: She is not in acute distress. Appearance: Normal appearance. She is obese. She is not ill-appearing, toxic-appearing or diaphoretic. HENT:      Head: Normocephalic and atraumatic. Right Ear: External ear normal.      Left Ear: External ear normal.      Nose: Nose normal. No congestion or rhinorrhea. Mouth/Throat:      Mouth: Mucous membranes are moist.      Pharynx: Oropharynx is clear. No oropharyngeal exudate or posterior oropharyngeal erythema. Eyes:      General: No scleral icterus. Right eye: No discharge. Left eye: No discharge. Extraocular Movements: Extraocular movements intact. Conjunctiva/sclera: Conjunctivae normal.      Pupils: Pupils are equal, round, and reactive to light. Cardiovascular:      Rate and Rhythm: Normal rate and regular rhythm. Pulses: Normal pulses. Heart sounds: Normal heart sounds. No murmur heard. No friction rub. No gallop. Pulmonary:      Effort: Pulmonary effort is normal. No respiratory distress. Breath sounds: Normal breath sounds. No stridor. No wheezing, rhonchi or rales. Chest:      Chest wall: No tenderness. Abdominal:      General: Bowel sounds are normal. There is no distension. Palpations: Abdomen is soft. Tenderness: There is abdominal tenderness ( Diffuse tenderness to deep palpation. No guarding, rigidity, rebound tenderness noted. ). There is no guarding or rebound. Musculoskeletal:         General: No swelling, tenderness, deformity or signs of injury. Normal range of motion. Cervical back: Normal range of motion and neck supple. No rigidity. No muscular tenderness. Right lower leg: No edema. Left lower leg: No edema. Skin:     General: Skin is warm and dry. Capillary Refill: Capillary refill takes less than 2 seconds. Coloration: Skin is not jaundiced or pale. Findings: No bruising, erythema, lesion or rash. Neurological:      General: No focal deficit present. Mental Status: She is alert and oriented to person, place, and time. Cranial Nerves: No cranial nerve deficit. Sensory: No sensory deficit. Motor: No weakness. Coordination: Coordination normal.   Psychiatric:         Mood and Affect: Mood normal.         Behavior: Behavior normal.         Thought Content: Thought content normal.         Judgment: Judgment normal.         Assessment/plan:         Hospital Problems           Last Modified POA    * (Principal) Chest pain 4/12/2022 Yes    Abdominal pain 4/12/2022 Yes    Foreign body in stomach 4/12/2022 Yes    Essential hypertension (Chronic) 4/12/2022 Yes    Mixed hyperlipidemia (Chronic) 4/12/2022 Yes    Gastroesophageal reflux disease without esophagitis (Chronic) 4/12/2022 Yes    Morbidly obese (HCC) (Chronic) 4/12/2022 Yes    Overview Signed 8/17/2017 12:54 PM by Robina Jones     Last Assessment & Plan:   Obesity: Federal guidelines recommend that people under the age of 72 should have a BMI of 18.5-24.9 and people age 72 and older should have a BMI of 23-30. Morbid obesity is defined as >100 lb overweight or BMI >40. The patient BMI is outside the recommended range and we recommended/discussed today to utilize a diet/exercise program to get back into the appropriate range. Today we encouraged roughly a 1 lb per week weight loss with initial goal of 5% weight loss.  The initial step is to document everything that is consumed into a food diary. Studies have shown that patients can lose up to 2x the weight by keeping track of foods. We discussed BEE today and discussed reasonable goal to realize weight loss. Would recommend f/u with PCP to discuss further. Dye's esophagus without dysplasia 4/13/2022 Yes    Anxiety 4/12/2022 Yes    Moderate episode of recurrent major depressive disorder ([address] 4/12/2022 Yes    Obstructive sleep apnea syndrome 4/12/2022 Yes    History of colon polyps 4/13/2022 Yes          Principal Problem:    Chest pain  Active Problems:    Abdominal pain    Foreign body in stomach    Essential hypertension    Mixed hyperlipidemia    Gastroesophageal reflux disease without esophagitis    Morbidly obese (HCC)    Dye's esophagus without dysplasia    Anxiety    Moderate episode of recurrent major depressive disorder ([address]    Obstructive sleep apnea syndrome    History of colon polyps  Resolved Problems:    * No resolved hospital problems. *        Brief Summary  Ms. Roosvelt Nissen, a 71 y.o. female with a history of history of DMT2, HTN, HLD, JAYME, anxiety/depression, GERD, presenting to [facility] ED (04/12/2022), on account of an acute onset of several hours history of, moderate to severe left-sided chest and abdominal  Pain.     Patient reports a squeezing type left-sided chest pain/pressure going down to her left abdomen and left arm.       Associated symptom reported includes an episode of vomiting the night prior to presentation, as well as dyspnea on exertion.     No other associated symptoms reported; no dizziness, lightheadedness, palpitations or diaphoresis.     CT abdomen pelvis, reporting a curvilinear radiopaque foreign object (metallic wire vs fishbone-like) which appears to be in the distal part of the stomach and is protruding through the posterior medial wall of the distal stomach/antrum projecting near the neck/proximal body of the pancreas.     Patient reportedly had a catfish last night.     General surgery and GI both consulted from ED.     Patient be admitted to medical service, for further work-up and management. Abdominal Pain  ? Foreign body in posterior wall of stomach on imagingCT abdomen pelvis W IV Con (04/12/2022): Impression: A curvilinear radiopaque foreign body in the stomach protruding through the posterior medial wall of the distal body/antrum of the stomach. No free air. This may represent a metallic wire or a fishbone? Clinical correlation and further evaluation is recommended. Multiple very small low density renal nodules which are too small to be further characterized. These are unchanged. The diverticulosis of the colon. No evidence for diverticulitis. The enlarging poorly marginated low-density nodule in the spleen. Etiology is not certain. Possibility of metastatic disease is not excluded. · General Surgery and GI consulted  · Continue symptomatic supportive management  · Further management as per specialist recommendations  · Status post EGD (04/13/2022): Findings: No foreign body present on upper GI tract      Chest/Arm Pain  · CTA Chest W Con (04/12/2022): Impression: No pulmonary emboli. No thoracic aneurysm or dissection. Mild left coronary artery calcification. Mild cardiomegaly. Small hiatal hernia. Atelectatic changes lungs with no consolidation or suspicious nodularity.     · - Initial troponin negative x4 sets  · - Serial EKGs for chest pain  · - Optimized medical management  · --> Nitro glycerin sublingual when necessary for chest pain  · - 2D Echocardiogram: (04/13/2022)  · - Continue to monitor on telemetry  · - Famotidine   · - Cardiology on board-appreciate recommendations  · NM regadenoson stress test (04/13/2022): Summary / Impressions: Normal ejection fraction 86% normal perfusion study without evidence of ischemia or previous infarction.   ·        Diabetes Mellitus II  · Uncontrolled  · Inpatient Regimens to include;  ? - Insulin Glargine (Lantus) 24 units subcu nightly  ? - Insulin Lispro (Humalog) 6 units subcu pre-meal 3 times a day  ? - Insulin Lispro (Humalog) on a Medium dose sliding scale  · Monitor POC glucose, and adjust insulin regimen accordingly based on daily insulin requirement.      Essential Hypertension  · Uncontrolled  · Resume home regimen  · Amlodipine  · Hydralazine  · Metoprolol succinate 100 mg p.o. daily  · Hydralazine 10 mg IV Q6H/PRN  For SBP> 180 and/or DP> 110   · Continue to monitor     Mixed Hyperlipidemia  · Ezetimibe 10 mg p.o. daily  · Atorvastatin 80 mg p.o. nightly     Anxiety/depression  · Resume home regimen after reconciliation  · Hydroxyzine 50 mg p.o. daily as needed  · Lorazepam 0.5 mg p.o. every 12 hours/as needed for anxiety      Continue management of other chronic medical conditions - see above and orders. Advance Directive: Full Code    ADULT DIET; Regular; Low Fat/Low Chol/High Fiber/AMANDA         Consults Made:   IP CONSULT TO GENERAL SURGERY  IP CONSULT TO GI  IP CONSULT TO CARDIOLOGY    DVT prophylaxis: Enoxaparin      Discharge planning: tbd    Time Spent is 25 mins in the examination, evaluation, counseling and review of medications, assessment and plan.      Electronically signed by   Wes Giron MD, MPH, MD,   Internal Medicine Hospitalist   4/14/2022 8:16 AM

## 2022-04-14 NOTE — PROGRESS NOTES
[facility] General Surgery Progress Note    Chief Complaint:   Chief Complaint   Patient presents with    Chest Pain    Arm Pain       SUBJECTIVE:  Ms. Peace Villalba is a 71 y.o. female is seen and examined at bedside. Pain has improved. Tolerating a diet. Having regular BMs. OBJECTIVE:  BP (!) 142/71   Pulse 88   Temp 97.3 °F (36.3 °C) (Temporal)   Resp 20   Ht 5' 4\" (1.626 m)   Wt 220 lb (99.8 kg)   SpO2 94%   BMI 37.76 kg/m²   CONSTITUTIONAL: Alert, appropriate, no acute distress  SKIN: warm, dry with no rashes or lesions  HEENT: NCAT, Non icteric, PERR. Trachea Midline. CHEST/LUNGS: CTA bilaterally. Normal respiratory effort. CARDIOVASCULAR: RRR, No edema. ABDOMEN: soft, ND  NEUROLOGIC: CN II-XI grossly intact, no motor or sensory deficits   MUSCULOSKELETAL: No clubbing or cyanosis. PSYCHIATRIC:  Normal Mood and Affect. Alert and Oriented. Labs:  CBC:   Recent Labs     04/12/22  1147 04/13/22  0420 04/14/22  0454   WBC 10.7 7.8 9.2   HGB 12.2 10.8* 12.1    205 253     BMP:    Recent Labs     04/12/22  1147 04/13/22  0420 04/14/22  0454    141 141   K 4.1 4.2 4.3    105 107   CO2 25 25 20*   BUN 17 12 12   CREATININE 0.7 0.7 0.6   GLUCOSE 131* 122* 150*       ASSESSMENT:  Principal Problem:    Chest pain  Active Problems:    Abdominal pain    Essential hypertension    Mixed hyperlipidemia    Gastroesophageal reflux disease without esophagitis    Morbidly obese (HCC)    Dye's esophagus without dysplasia    Anxiety    Moderate episode of recurrent major depressive disorder ([facility] [address]    Obstructive sleep apnea syndrome    Foreign body in stomach    History of colon polyps  Resolved Problems:    * No resolved hospital problems.  *      PLAN:  And the work-up for her splenic lesion as an outpatient  Diet as tolerated  Plan to repeat CT scan tomorrow          Chalo Art DO   Electronically Signed on 4/14/2022 at 1:25 PM

## 2022-04-15 ENCOUNTER — APPOINTMENT (OUTPATIENT)
Dept: CT IMAGING | Age: 72
DRG: 394 | End: 2022-04-15
Payer: MEDICARE

## 2022-04-15 LAB
ANION GAP SERPL CALCULATED.3IONS-SCNC: 13 MMOL/L (ref 7–19)
BASOPHILS ABSOLUTE: 0.1 K/UL (ref 0–0.2)
BASOPHILS RELATIVE PERCENT: 0.4 % (ref 0–1)
BUN BLDV-MCNC: 16 MG/DL (ref 8–23)
CALCIUM SERPL-MCNC: 9.8 MG/DL (ref 8.8–10.2)
CHLORIDE BLD-SCNC: 106 MMOL/L (ref 98–111)
CO2: 23 MMOL/L (ref 22–29)
CREAT SERPL-MCNC: 0.8 MG/DL (ref 0.5–0.9)
EOSINOPHILS ABSOLUTE: 0.1 K/UL (ref 0–0.6)
EOSINOPHILS RELATIVE PERCENT: 0.8 % (ref 0–5)
GFR AFRICAN AMERICAN: >59
GFR NON-AFRICAN AMERICAN: >60
GLUCOSE BLD-MCNC: 104 MG/DL (ref 70–99)
GLUCOSE BLD-MCNC: 112 MG/DL (ref 70–99)
GLUCOSE BLD-MCNC: 119 MG/DL (ref 70–99)
GLUCOSE BLD-MCNC: 130 MG/DL (ref 74–109)
GLUCOSE BLD-MCNC: 135 MG/DL (ref 70–99)
HCT VFR BLD CALC: 38.7 % (ref 37–47)
HEMOGLOBIN: 11.9 G/DL (ref 12–16)
IMMATURE GRANULOCYTES #: 0.1 K/UL
LYMPHOCYTES ABSOLUTE: 2.1 K/UL (ref 1.1–4.5)
LYMPHOCYTES RELATIVE PERCENT: 17.9 % (ref 20–40)
MCH RBC QN AUTO: 29.8 PG (ref 27–31)
MCHC RBC AUTO-ENTMCNC: 30.7 G/DL (ref 33–37)
MCV RBC AUTO: 97 FL (ref 81–99)
MONOCYTES ABSOLUTE: 1.3 K/UL (ref 0–0.9)
MONOCYTES RELATIVE PERCENT: 10.5 % (ref 0–10)
NEUTROPHILS ABSOLUTE: 8.4 K/UL (ref 1.5–7.5)
NEUTROPHILS RELATIVE PERCENT: 70 % (ref 50–65)
PDW BLD-RTO: 13.1 % (ref 11.5–14.5)
PERFORMED ON: ABNORMAL
PLATELET # BLD: 267 K/UL (ref 130–400)
PMV BLD AUTO: 9.6 FL (ref 9.4–12.3)
POTASSIUM REFLEX MAGNESIUM: 4.1 MMOL/L (ref 3.5–5)
RBC # BLD: 3.99 M/UL (ref 4.2–5.4)
SODIUM BLD-SCNC: 142 MMOL/L (ref 136–145)
WBC # BLD: 12 K/UL (ref 4.8–10.8)

## 2022-04-15 PROCEDURE — 82947 ASSAY GLUCOSE BLOOD QUANT: CPT

## 2022-04-15 PROCEDURE — 80048 BASIC METABOLIC PNL TOTAL CA: CPT

## 2022-04-15 PROCEDURE — 36415 COLL VENOUS BLD VENIPUNCTURE: CPT

## 2022-04-15 PROCEDURE — 2580000003 HC RX 258: Performed by: INTERNAL MEDICINE

## 2022-04-15 PROCEDURE — 6370000000 HC RX 637 (ALT 250 FOR IP): Performed by: INTERNAL MEDICINE

## 2022-04-15 PROCEDURE — 74176 CT ABD & PELVIS W/O CONTRAST: CPT

## 2022-04-15 PROCEDURE — 1210000000 HC MED SURG R&B

## 2022-04-15 PROCEDURE — 6360000002 HC RX W HCPCS: Performed by: SURGERY

## 2022-04-15 PROCEDURE — 99232 SBSQ HOSP IP/OBS MODERATE 35: CPT | Performed by: SURGERY

## 2022-04-15 PROCEDURE — 85025 COMPLETE CBC W/AUTO DIFF WBC: CPT

## 2022-04-15 PROCEDURE — 2500000003 HC RX 250 WO HCPCS: Performed by: SURGERY

## 2022-04-15 PROCEDURE — A4216 STERILE WATER/SALINE, 10 ML: HCPCS | Performed by: SURGERY

## 2022-04-15 PROCEDURE — C9113 INJ PANTOPRAZOLE SODIUM, VIA: HCPCS | Performed by: SURGERY

## 2022-04-15 PROCEDURE — 2580000003 HC RX 258: Performed by: SURGERY

## 2022-04-15 PROCEDURE — 6360000002 HC RX W HCPCS: Performed by: INTERNAL MEDICINE

## 2022-04-15 RX ORDER — HYDROXYZINE HYDROCHLORIDE 25 MG/1
50 TABLET, FILM COATED ORAL 2 TIMES DAILY PRN
Status: DISCONTINUED | OUTPATIENT
Start: 2022-04-15 | End: 2022-04-18 | Stop reason: HOSPADM

## 2022-04-15 RX ORDER — LORAZEPAM 2 MG/ML
0.5 INJECTION INTRAMUSCULAR ONCE
Status: COMPLETED | OUTPATIENT
Start: 2022-04-15 | End: 2022-04-15

## 2022-04-15 RX ORDER — CIPROFLOXACIN 2 MG/ML
400 INJECTION, SOLUTION INTRAVENOUS EVERY 12 HOURS
Status: DISCONTINUED | OUTPATIENT
Start: 2022-04-15 | End: 2022-04-18 | Stop reason: HOSPADM

## 2022-04-15 RX ADMIN — ACETAMINOPHEN 650 MG: 325 TABLET ORAL at 20:27

## 2022-04-15 RX ADMIN — LORAZEPAM 0.5 MG: 2 INJECTION INTRAMUSCULAR; INTRAVENOUS at 11:45

## 2022-04-15 RX ADMIN — SODIUM CHLORIDE, PRESERVATIVE FREE 10 ML: 5 INJECTION INTRAVENOUS at 20:27

## 2022-04-15 RX ADMIN — METRONIDAZOLE 500 MG: 500 INJECTION, SOLUTION INTRAVENOUS at 14:28

## 2022-04-15 RX ADMIN — ATORVASTATIN CALCIUM 80 MG: 80 TABLET, FILM COATED ORAL at 20:27

## 2022-04-15 RX ADMIN — SODIUM CHLORIDE, PRESERVATIVE FREE 10 ML: 5 INJECTION INTRAVENOUS at 12:43

## 2022-04-15 RX ADMIN — SODIUM CHLORIDE 40 MG: 9 INJECTION INTRAMUSCULAR; INTRAVENOUS; SUBCUTANEOUS at 14:26

## 2022-04-15 RX ADMIN — CIPROFLOXACIN 400 MG: 2 INJECTION, SOLUTION INTRAVENOUS at 14:26

## 2022-04-15 RX ADMIN — METRONIDAZOLE 500 MG: 500 INJECTION, SOLUTION INTRAVENOUS at 21:52

## 2022-04-15 RX ADMIN — HYDROXYZINE HYDROCHLORIDE 50 MG: 25 TABLET ORAL at 20:28

## 2022-04-15 ASSESSMENT — PAIN DESCRIPTION - ORIENTATION: ORIENTATION: LEFT;UPPER

## 2022-04-15 ASSESSMENT — PAIN DESCRIPTION - ONSET: ONSET: ON-GOING

## 2022-04-15 ASSESSMENT — PAIN DESCRIPTION - PAIN TYPE: TYPE: ACUTE PAIN

## 2022-04-15 ASSESSMENT — PAIN DESCRIPTION - PROGRESSION: CLINICAL_PROGRESSION: NOT CHANGED

## 2022-04-15 ASSESSMENT — PAIN DESCRIPTION - LOCATION: LOCATION: ABDOMEN

## 2022-04-15 ASSESSMENT — PAIN DESCRIPTION - FREQUENCY: FREQUENCY: CONTINUOUS

## 2022-04-15 ASSESSMENT — PAIN SCALES - GENERAL
PAINLEVEL_OUTOF10: 0
PAINLEVEL_OUTOF10: 6

## 2022-04-15 ASSESSMENT — PAIN DESCRIPTION - DESCRIPTORS: DESCRIPTORS: SHOOTING

## 2022-04-15 ASSESSMENT — PAIN - FUNCTIONAL ASSESSMENT: PAIN_FUNCTIONAL_ASSESSMENT: PREVENTS OR INTERFERES SOME ACTIVE ACTIVITIES AND ADLS

## 2022-04-15 NOTE — PROGRESS NOTES
[facility]    Patient:  Vamsi Putnam  YOB: 1950  Date of Service: 4/15/2022  MRN: [id number]   Acct: [de-identified]   Primary Care Physician: LUIZ Sinclair  Advance Directive: Full Code  Admit Date: 4/12/2022       Hospital Day: 3      CHIEF COMPLAINT:     Chief Complaint   Patient presents with    Chest Pain    Arm Pain       4/15/2022 9:45 AM  Subjective / Interval History:   04/15/2022  Patient seen and examined. Doing well. Abdominal pain improved. Denies any active chest pain at this time. Lying comfortably in bed no apparent acute distress. No acute changes or acute overnight event reported. 04/14/2022  No acute changes or acute overnight event reported. Patient seen and evaluated this AM.  Doing well. Continues to report some upper abdomen to chest heaviness and tenderness. Laying comfortably in bed however no apparent acute distress. Family at bedside. 04/13/2022  Patient seen and examined this AM.  Endorses overall improvement. Denies any active chest pain at this time. Continues to endorse some abdominal to left-sided discomfort. Laying comfortably in bed in no acute distress. Review of Systems:   Review of Systems  ROS: 14 point review of systems is negative except as specifically addressed above. ADULT DIET;  Regular; Low Fat/Low Chol/High Fiber/AMANDA    Intake/Output Summary (Last 24 hours) at 4/15/2022 0945  Last data filed at 4/14/2022 1511  Gross per 24 hour   Intake 360 ml   Output --   Net 360 ml       Medications:   dextrose 100 mL/hr at 04/13/22 1130    sodium chloride 100 mL/hr at 04/13/22 1449    dextrose      sodium chloride       Current Facility-Administered Medications   Medication Dose Route Frequency Provider Last Rate Last Admin    perflutren lipid microspheres (DEFINITY) injection 1.65 mg  1.65 mg IntraVENous ONCE PRN Esequiel Kumari DO   1.65 mg at 04/14/22 1513    dextrose 5 % solution   IntraVENous Continuous Kiel Stewart  mL/hr at 04/13/22 1130 New Bag at 04/13/22 1130    0.9 % sodium chloride infusion   IntraVENous Continuous Kiel Stewart  mL/hr at 04/13/22 1449 NoRateChange at 04/13/22 1449    amLODIPine (NORVASC) tablet 2.5 mg  2.5 mg Oral Daily Tejas Rondon MD   2.5 mg at 04/14/22 4170    atorvastatin (LIPITOR) tablet 80 mg  80 mg Oral Nightly Kiel Stewart MD   80 mg at 04/14/22 2055    ezetimibe (ZETIA) tablet 10 mg  10 mg Oral Daily Kiel Stewart MD   10 mg at 04/14/22 9562    furosemide (LASIX) tablet 40 mg  40 mg Oral Daily Kiel Stewart MD   40 mg at 04/14/22 1784    metoprolol succinate (TOPROL XL) extended release tablet 100 mg  100 mg Oral Daily Jesse Sofia MD        glucagon (rDNA) injection 1 mg  1 mg IntraMUSCular PRN Kiel Stewart MD        dextrose 5 % solution  100 mL/hr IntraVENous PRN Kiel Stewart MD        sodium chloride flush 0.9 % injection 5-40 mL  5-40 mL IntraVENous 2 times per day Kiel Stewart MD   10 mL at 04/14/22 2059    sodium chloride flush 0.9 % injection 10 mL  10 mL IntraVENous PRN Kiel Stewart MD        0.9 % sodium chloride infusion   IntraVENous PRN Kiel Stewart MD        ondansetron (ZOFRAN-ODT) disintegrating tablet 4 mg  4 mg Oral Q8H PRN Kiel Stewart MD        Or    ondansetron [facility]) injection 4 mg  4 mg IntraVENous Q6H PRN Kiel Stewart MD        acetaminophen (TYLENOL) tablet 650 mg  650 mg Oral Q6H PRN Kiel Stewart MD   650 mg at 04/13/22 2102    Or    acetaminophen (TYLENOL) suppository 650 mg  650 mg Rectal Q6H PRN Kiel Stewart MD        polyethylene glycol [facility]) packet 17 g  17 g Oral Daily PRN Kiel Stewart MD        aspirin chewable tablet 81 mg  81 mg Oral Daily Kiel Stewart MD   81 mg at 04/14/22 0839    nitroGLYCERIN (NITROSTAT) SL tablet 0.4 mg  0.4 mg SubLINGual Q5 Min PRN Dea Mazariegos Estefani Terrazas MD        insulin glargine (LANTUS) injection vial 24 Units  24 Units SubCUTAneous Nightly Tia Goodson MD   24 Units at 04/14/22 2055    insulin lispro (HUMALOG) injection vial 6 Units  6 Units SubCUTAneous TID  Tia Goodson MD   6 Units at 04/14/22 1756    insulin lispro (HUMALOG) injection vial 0-12 Units  0-12 Units SubCUTAneous TID  Tia Goodson MD   4 Units at 04/14/22 1309    insulin lispro (HUMALOG) injection vial 0-6 Units  0-6 Units SubCUTAneous Nightly Tia Goodson MD   2 Units at 04/13/22 2300    enoxaparin (LOVENOX) injection 40 mg  40 mg SubCUTAneous Daily Tia Goodson MD   40 mg at 04/14/22 1505    hydrALAZINE (APRESOLINE) injection 10 mg  10 mg IntraVENous Q6H PRN Tia Goodson MD        glucose chewable tablet 4 each  4 tablet Oral PRN Tia Goodson MD        dextrose bolus (hypoglycemia) 10% 125 mL  125 mL IntraVENous PRN Tia Goodson MD        Or    dextrose bolus (hypoglycemia) 10% 250 mL  250 mL IntraVENous PRN Tia Goodson MD             dextrose 100 mL/hr at 04/13/22 1130    sodium chloride 100 mL/hr at 04/13/22 1449    dextrose      sodium chloride        amLODIPine  2.5 mg Oral Daily    atorvastatin  80 mg Oral Nightly    ezetimibe  10 mg Oral Daily    furosemide  40 mg Oral Daily    metoprolol succinate  100 mg Oral Daily    sodium chloride flush  5-40 mL IntraVENous 2 times per day    aspirin  81 mg Oral Daily    insulin glargine  24 Units SubCUTAneous Nightly    insulin lispro  6 Units SubCUTAneous TID     insulin lispro  0-12 Units SubCUTAneous TID     insulin lispro  0-6 Units SubCUTAneous Nightly    enoxaparin  40 mg SubCUTAneous Daily     perflutren lipid microspheres, glucagon (rDNA), dextrose, sodium chloride flush, sodium chloride, ondansetron **OR** ondansetron, acetaminophen **OR** acetaminophen, polyethylene glycol, nitroGLYCERIN, hydrALAZINE, glucose, dextrose bolus (hypoglycemia) **OR** dextrose bolus (hypoglycemia)  ADULT DIET; Regular; Low Fat/Low Chol/High Fiber/AMANDA       Labs:   CBC with DIFF:  Recent Labs     04/13/22  0420 04/14/22  0454 04/15/22  0313   WBC 7.8 9.2 12.0*   RBC 3.66* 4.11* 3.99*   HGB 10.8* 12.1 11.9*   HCT 34.9* 39.1 38.7   MCV 95.4 95.1 97.0   MCH 29.5 29.4 29.8   MCHC 30.9* 30.9* 30.7*   RDW 13.0 12.8 13.1    253 267   MPV 9.4 9.3* 9.6   NEUTOPHILPCT 73.5* 87.1* 70.0*   LYMPHOPCT 16.0* 7.3* 17.9*   MONOPCT 8.9 3.7 10.5*   EOSRELPCT 0.9 0.0 0.8   BASOPCT 0.4 0.2 0.4   NEUTROABS 5.7 8.0* 8.4*   LYMPHSABS 1.2 0.7* 2.1   MONOSABS 0.70 0.30 1.30*   EOSABS 0.10 0.00 0.10   BASOSABS 0.00 0.00 0.10       CMP/BMP:  Recent Labs     04/12/22  1147 04/12/22  1147 04/13/22  0420 04/14/22  0454 04/15/22  0313      < > 141 141 142   K 4.1  --  4.2 4.3 4.1      < > 105 107 106   CO2 25   < > 25 20* 23   ANIONGAP 11   < > 11 14 13   GLUCOSE 131*   < > 122* 150* 130*   BUN 17   < > 12 12 16   CREATININE 0.7   < > 0.7 0.6 0.8   LABGLOM >60   < > >60 >60 >60   CALCIUM 9.8   < > 9.5 10.1 9.8   PROT 6.4*  --   --   --   --    LABALBU 4.2  --   --   --   --    BILITOT 0.3  --   --   --   --    ALKPHOS 82  --   --   --   --    ALT 13  --   --   --   --    AST 14  --   --   --   --     < > = values in this interval not displayed. CRP:  No results for input(s): CRP in the last 72 hours. Sed Rate:  No results for input(s): SEDRATE in the last 72 hours. HgBA1c:  No components found for: HGBA1C  FLP:    Lab Results   Component Value Date    TRIG 181 04/13/2022    HDL 41 04/13/2022    LDLCALC 54 04/13/2022     TSH:    Lab Results   Component Value Date    TSH 1.880 10/19/2021     Troponin T:   Recent Labs     04/12/22  1458 04/12/22  1943 04/12/22  2317   TROPONINI <0.01 <0.01 <0.01     Pro-BNP: No results for input(s): BNP in the last 72 hours.   INR:   Recent Labs     04/13/22  0420   INR 1.01     ABGs: No results found for: PHART, PO2ART, ZPS8BKB  UA:  Recent Labs     04/12/22  1501   COLORU YELLOW   PHUR 8.5*   CLARITYU Clear   SPECGRAV >=1.045   LEUKOCYTESUR Negative   UROBILINOGEN 0.2   BILIRUBINUR Negative   BLOODU Negative   GLUCOSEU Negative         Culture Results:    No results for input(s): CXSURG in the last 720 hours. Blood Culture Recent: No results for input(s): BC in the last 720 hours. No results for input(s): BC, BLOODCULT2, ORG in the last 72 hours. Cultures:   No results for input(s): CULTURE in the last 72 hours. No results for input(s): BC, Gevena Flori in the last 72 hours. No results for input(s): CXSURG in the last 72 hours. No results for input(s): MG, PHOS in the last 72 hours. Recent Labs     04/12/22  1147   AST 14   ALT 13   BILITOT 0.3   ALKPHOS 82         RAD:   CT ABDOMEN PELVIS W IV CONTRAST Additional Contrast? None    Result Date: 4/12/2022  Examination. CT ABDOMEN PELVIS W IV CONTRAST 4/12/2022 1:07 PM History: Abdominal pain. DLP: 1488 mGycm. The automated exposure control was utilized to minimize the patient's radiation exposure. The CT scan of the abdomen and pelvis is performed after intravenous contrast enhancement. The images are acquired in axial plane and subsequent reconstruction in coronal and sagittal planes. The comparison is made with the previous study dated 1/22/2019. The lung bases included in the study show dependent atelectatic changes at bilateral bases posteriorly. The limited included cardiomediastinal structures show heavy calcification mitral annulus. No significant cardiomegaly. Mild atheromatous changes of coronary arteries. The liver appears normal. There are ill-defined low-density nodules in the spleen which appears significantly larger than the previous study. A nodule located posteriorly measures 2 cm in diameter. It previously measured 1 cm. The gallbladder is surgically absent. No significant dilatation of the common bile duct.  The pancreas is normal. The pancreatic duct is not visualized. The adrenal glands are normal. There are multiple tiny low-density cortical nodules in both kidneys which appears similar to the previous study. No radiopaque calculi. No hydronephrosis. The ureters bilaterally are normal. The urinary bladder is poorly distended. No intrinsic abnormality. The uterus is absent. No adnexal masses. Tiny fat-containing umbilical hernia. There is a small sliding-type hiatal hernia. There is a curvilinear radiopaque foreign object which appears to be in the distal part of the stomach and is protruding through the posterior medial wall of the distal stomach/antrum projecting near the neck/proximal body of the pancreas. The type of foreign body is not certain. This may represent a metallic wire or a fishbone? [facility] There is a small bubble of air adjacent to the proximal end of this foreign object. The duodenum is normal. Small bowel is nondistended. A retrocecal appendix is normal. The cecum lies low in the pelvis adjacent to the urinary bladder. Moderate gas and stool is seen in the colon. There is diverticulosis of the distal colon. No evidence for diverticulitis. Atheromatous changes of the abdominal aorta and iliac arteries. No aneurysmal dilatation. There is no evidence of abdominal or pelvic lymphadenopathy. The images reviewed in bone window show chronic degenerative changes of the lumbar spine. There is a mild levoscoliosis. No focal bony lesion. 1. A curvilinear radiopaque foreign body in the stomach protruding through the posterior medial wall of the distal body/antrum of the stomach. No free air. This may represent a metallic wire or a fishbone? Clinical correlation and further evaluation is recommended. 2. Multiple very small low density renal nodules which are too small to be further characterized. These are unchanged. 3. The diverticulosis of the colon. No evidence for diverticulitis.  4. The enlarging poorly marginated low-density nodule in the spleen. Etiology is not certain. Possibility of metastatic disease is not excluded. The results were called to ER physician, Dr. Albright Son, immediately. Signed by Dr Ghada Luis    Result Date: 4/12/2022  XR CHEST PORTABLE 4/12/2022 12:13 PM HISTORY: Chest pain COMPARISON: 3/23/2020 CXR: A single frontal view of the chest is performed. Findings: Lungs appear mildly hyperinflated. Vascular crowding versus mild venous congestion. Basilar atelectasis. No pleural effusion or pneumothorax. No acute regional bony pathology. 1. Hypoventilated lungs with vascular crowding versus mild venous congestion and basilar atelectasis. . Signed by Dr Jacob Junior    CTA [address]     Result Date: 4/12/2022  CTA chest 4/12/2022 1:06 PM HISTORY: Shortness of breath with chest pressure TECHNIQUE: Axial images of the chest were obtained following IV contrast. . Coronal and sagittal as well as maximal intensity projectional reformatted images are reconstructed and reviewed. COMPARISON: 2/27/2018. DLP: 856 mGy cm Automated exposure control was utilized to minimize patient radiation dose. FINDINGS: No pulmonary emboli identified. Thoracic aorta normal in caliber with no aneurysm or dissection. Cardiomegaly. Mild left coronary calcification. No pericardial or pleural effusions. No pathologic intrathoracic or axillary lymphadenopathy. Small hiatal hernia. Please refer to CT abdomen pelvis report for findings below the hemidiaphragms. Basilar atelectasis. Mild atelectatic changes of the lingula. No consolidation. No suspicious pulmonary nodules. No pneumothorax. No endobronchial lesions. No focal destructive osseous lesions. A bony hemangioma suspected within the T11 vertebra. 1. No pulmonary emboli. 2. No thoracic aneurysm or dissection. Mild left coronary artery calcification. Mild cardiomegaly. 3. Small hiatal hernia. 4. Atelectatic changes lungs with no consolidation or suspicious nodularity.  Signed by Dr Luis Nieves    EGD    Result Date: 4/13/2022  No dictation       Echo:   pending      Objective:   Vitals:   BP (!) 144/74   Pulse 90   Temp 98.4 °F (36.9 °C) (Temporal)   Resp 18   Ht 5' 4\" (1.626 m)   Wt 220 lb (99.8 kg)   SpO2 97%   BMI 37.76 kg/m²       Patient Vitals for the past 24 hrs:   BP Temp Temp src Pulse Resp SpO2   04/15/22 0559 (!) 144/74 98.4 °F (36.9 °C) Temporal 90 18 97 %   04/15/22 0047 (!) 140/73 97.5 °F (36.4 °C) Temporal 88 20 97 %   04/14/22 1728 (!) 144/74 97.5 °F (36.4 °C) Temporal 91 20 94 %   04/14/22 1144 (!) 142/71 97.3 °F (36.3 °C) Temporal 88 20 94 %       24HR INTAKE/OUTPUT:      Intake/Output Summary (Last 24 hours) at 4/15/2022 0945  Last data filed at 4/14/2022 1511  Gross per 24 hour   Intake 360 ml   Output --   Net 360 ml       Physical Exam  Vitals and nursing note reviewed. Constitutional:       General: She is not in acute distress. Appearance: Normal appearance. She is obese. She is not ill-appearing, toxic-appearing or diaphoretic. HENT:      Head: Normocephalic and atraumatic. Right Ear: External ear normal.      Left Ear: External ear normal.      Nose: Nose normal. No congestion or rhinorrhea. Mouth/Throat:      Mouth: Mucous membranes are moist.      Pharynx: Oropharynx is clear. No oropharyngeal exudate or posterior oropharyngeal erythema. Eyes:      General: No scleral icterus. Right eye: No discharge. Left eye: No discharge. Extraocular Movements: Extraocular movements intact. Conjunctiva/sclera: Conjunctivae normal.      Pupils: Pupils are equal, round, and reactive to light. Cardiovascular:      Rate and Rhythm: Normal rate and regular rhythm. Pulses: Normal pulses. Heart sounds: Normal heart sounds. No murmur heard. No friction rub. No gallop. Pulmonary:      Effort: Pulmonary effort is normal. No respiratory distress. Breath sounds: Normal breath sounds. No stridor.  No wheezing, rhonchi or rales. Chest:      Chest wall: No tenderness. Abdominal:      General: Bowel sounds are normal. There is no distension. Palpations: Abdomen is soft. Tenderness: There is abdominal tenderness ( Diffuse tenderness to deep palpation. No guarding, rigidity, rebound tenderness noted. ). There is no guarding or rebound. Musculoskeletal:         General: No swelling, tenderness, deformity or signs of injury. Normal range of motion. Cervical back: Normal range of motion and neck supple. No rigidity. No muscular tenderness. Right lower leg: No edema. Left lower leg: No edema. Skin:     General: Skin is warm and dry. Capillary Refill: Capillary refill takes less than 2 seconds. Coloration: Skin is not jaundiced or pale. Findings: No bruising, erythema, lesion or rash. Neurological:      General: No focal deficit present. Mental Status: She is alert and oriented to person, place, and time. Cranial Nerves: No cranial nerve deficit. Sensory: No sensory deficit. Motor: No weakness. Coordination: Coordination normal.   Psychiatric:         Mood and Affect: Mood normal.         Behavior: Behavior normal.         Thought Content: Thought content normal.         Judgment: Judgment normal.         Assessment/plan:         Hospital Problems           Last Modified POA    * (Principal) Chest pain 4/12/2022 Yes    Abdominal pain 4/12/2022 Yes    Foreign body in stomach 4/12/2022 Yes    Essential hypertension (Chronic) 4/12/2022 Yes    Mixed hyperlipidemia (Chronic) 4/12/2022 Yes    Gastroesophageal reflux disease without esophagitis (Chronic) 4/12/2022 Yes    Morbidly obese (HCC) (Chronic) 4/12/2022 Yes    Overview Signed 8/17/2017 12:54 PM by Annel Jones     Last Assessment & Plan:   Obesity: Federal guidelines recommend that people under the age of 72 should have a BMI of 18.5-24.9 and people age 72 and older should have a BMI of 23-30. Morbid obesity is defined as >100 lb overweight or BMI >40. The patient BMI is outside the recommended range and we recommended/discussed today to utilize a diet/exercise program to get back into the appropriate range. Today we encouraged roughly a 1 lb per week weight loss with initial goal of 5% weight loss. The initial step is to document everything that is consumed into a food diary. Studies have shown that patients can lose up to 2x the weight by keeping track of foods. We discussed BEE today and discussed reasonable goal to realize weight loss. Would recommend f/u with PCP to discuss further. Dye's esophagus without dysplasia 4/13/2022 Yes    Anxiety 4/12/2022 Yes    Moderate episode of recurrent major depressive disorder ([address] 4/12/2022 Yes    Obstructive sleep apnea syndrome 4/12/2022 Yes    History of colon polyps 4/13/2022 Yes          Principal Problem:    Chest pain  Active Problems:    Abdominal pain    Foreign body in stomach    Essential hypertension    Mixed hyperlipidemia    Gastroesophageal reflux disease without esophagitis    Morbidly obese (HCC)    Dye's esophagus without dysplasia    Anxiety    Moderate episode of recurrent major depressive disorder ([address]    Obstructive sleep apnea syndrome    History of colon polyps  Resolved Problems:    * No resolved hospital problems. *        Brief Summary  Ms. Donovan Calderón, a 71 y.o. female with a history of history of DMT2, HTN, HLD, JAYME, anxiety/depression, GERD, presenting to [facility] ED (04/12/2022), on account of an acute onset of several hours history of, moderate to severe left-sided chest and abdominal  Pain.     Patient reports a squeezing type left-sided chest pain/pressure going down to her left abdomen and left arm.       Associated symptom reported includes an episode of vomiting the night prior to presentation, as well as dyspnea on exertion.     No other associated symptoms reported; no dizziness, lightheadedness, palpitations or diaphoresis.     CT abdomen pelvis, reporting a curvilinear radiopaque foreign object (metallic wire vs fishbone-like) which appears to be in the distal part of the stomach and is protruding through the posterior medial wall of the distal stomach/antrum projecting near the neck/proximal body of the pancreas.     Patient reportedly had a catfish last night.     General surgery and GI both consulted from ED.     Patient be admitted to medical service, for further work-up and management. Abdominal Pain  ? Foreign body in posterior wall of stomach on imaging  Speenic LesionCT abdomen pelvis W IV Con (04/12/2022): Impression: A curvilinear radiopaque foreign body in the stomach protruding through the posterior medial wall of the distal body/antrum of the stomach. No free air. This may represent a metallic wire or a fishbone? Clinical correlation and further evaluation is recommended. Multiple very small low density renal nodules which are too small to be further characterized. These are unchanged. The diverticulosis of the colon. No evidence for diverticulitis. The enlarging poorly marginated low-density nodule in the spleen. Etiology is not certain. Possibility of metastatic disease is not excluded. · General Surgery and GI consulted  · Continue symptomatic supportive management  · Further management as per specialist recommendations  · Status post EGD (04/13/2022): Findings: No foreign body present on upper GI tract  · Repeat CT abdomen pelvis WO Con (04/15/2022): pending      Chest/Arm Pain  · CTA Chest W Con (04/12/2022): Impression: No pulmonary emboli. No thoracic aneurysm or dissection. Mild left coronary artery calcification. Mild cardiomegaly. Small hiatal hernia.  Atelectatic changes lungs with no consolidation or suspicious nodularity.     · - Initial troponin negative x4 sets  · - Serial EKGs for chest pain  · - Optimized medical management  · --> Nitro glycerin sublingual when necessary for chest pain  · - 2D Echocardiogram: (04/13/2022)  · - Continue to monitor on telemetry  · - Famotidine   · - Cardiology on board-appreciate recommendations  · NM regadenoson stress test (04/13/2022): Summary / Impressions: Normal ejection fraction 86% normal perfusion study without evidence of ischemia or previous infarction. ·        Diabetes Mellitus II  · Uncontrolled  · Inpatient Regimens to include;  ? - Insulin Glargine (Lantus) 24 units subcu nightly  ? - Insulin Lispro (Humalog) 6 units subcu pre-meal 3 times a day  ? - Insulin Lispro (Humalog) on a Medium dose sliding scale  · Monitor POC glucose, and adjust insulin regimen accordingly based on daily insulin requirement.      Essential Hypertension  · Uncontrolled  · Resume home regimen  · Amlodipine  · Hydralazine  · Metoprolol succinate 100 mg p.o. daily  · Hydralazine 10 mg IV Q6H/PRN  For SBP> 180 and/or DP> 110   · Continue to monitor     Mixed Hyperlipidemia  · Ezetimibe 10 mg p.o. daily  · Atorvastatin 80 mg p.o. nightly     Anxiety/depression  · Resume home regimen after reconciliation  · Hydroxyzine 50 mg p.o. daily as needed  · Lorazepam 0.5 mg p.o. every 12 hours/as needed for anxiety      Continue management of other chronic medical conditions - see above and orders. Advance Directive: Full Code    ADULT DIET; Regular; Low Fat/Low Chol/High Fiber/AMANDA         Consults Made:   IP CONSULT TO GENERAL SURGERY  IP CONSULT TO GI  IP CONSULT TO CARDIOLOGY    DVT prophylaxis: Enoxaparin      Discharge planning:   Continue work-up and management as above      Time Spent is 25 mins in the examination, evaluation, counseling and review of medications, assessment and plan.      Electronically signed by   Edgar Matthews MD, MPH, MD,   Internal Medicine Hospitalist   4/15/2022 9:45 AM

## 2022-04-15 NOTE — PLAN OF CARE
Problem: Falls - Risk of:  Goal: Will remain free from falls  Description: Will remain free from falls  4/15/2022 0023 by Jennifer East LPN  Outcome: Ongoing  4/14/2022 1857 by Lis Anaya RN  Outcome: Ongoing  Goal: Absence of physical injury  Description: Absence of physical injury  4/15/2022 0023 by Jennifer East LPN  Outcome: Ongoing  4/14/2022 1857 by Lis Anaya RN  Outcome: Ongoing

## 2022-04-15 NOTE — PROGRESS NOTES
[facility] Progress Note    Chief Complaint:   Chief Complaint   Patient presents with    Chest Pain    Arm Pain       SUBJECTIVE:  Ms. Jessy Gomez is a 71 y.o. female is seen and examined at bedside. Pain stable. Appears to have no significant change. CT scanned discussed with the patient. OBJECTIVE:  BP (!) 145/72   Pulse 90   Temp 97.3 °F (36.3 °C) (Temporal)   Resp 20   Ht 5' 4\" (1.626 m)   Wt 220 lb (99.8 kg)   SpO2 94%   BMI 37.76 kg/m²   CONSTITUTIONAL: Alert, appropriate, no acute distress  SKIN: warm, dry with no rashes or lesions  HEENT: NCAT, Non icteric, PERR. Trachea Midline. CHEST/LUNGS: CTA bilaterally. Normal respiratory effort. CARDIOVASCULAR: RRR, No edema. ABDOMEN: soft, mild tenderness in the epigastrium  NEUROLOGIC: CN II-XI grossly intact, no motor or sensory deficits   MUSCULOSKELETAL: No clubbing or cyanosis. PSYCHIATRIC:  Normal Mood and Affect. Alert and Oriented. Labs:  CBC:   Recent Labs     04/13/22  0420 04/14/22  0454 04/15/22  0313   WBC 7.8 9.2 12.0*   HGB 10.8* 12.1 11.9*    253 267     BMP:    Recent Labs     04/13/22  0420 04/14/22  0454 04/15/22  0313    141 142   K 4.2 4.3 4.1    107 106   CO2 25 20* 23   BUN 12 12 16   CREATININE 0.7 0.6 0.8   GLUCOSE 122* 150* 130*       ASSESSMENT:  Principal Problem:    Chest pain  Active Problems:    Abdominal pain    Essential hypertension    Mixed hyperlipidemia    Gastroesophageal reflux disease without esophagitis    Morbidly obese (HCC)    Dye's esophagus without dysplasia    Anxiety    Moderate episode of recurrent major depressive disorder ([address]    Obstructive sleep apnea syndrome    Foreign body in stomach    History of colon polyps  Resolved Problems:    * No resolved hospital problems.  *      PLAN:  And the work-up for her splenic lesion as an outpatient  CT scan reviewed with radiology and the patient   Small area of microperforation in the site of the previous foreign body, appears that the foreign body is not now visualized  Plan to treat this like an microperforated peptic ulcer  PPI  Antibiotics  Okay for clear liquids  Continue to monitor closely  At this time plan for nonoperative management but if the patient continues to have pain throughout the weekend and or worsens we will plan on operative invention            Yasir Maciel DO   Electronically Signed on 4/15/2022 at 6:37 PM

## 2022-04-16 LAB
ANION GAP SERPL CALCULATED.3IONS-SCNC: 13 MMOL/L (ref 7–19)
BASOPHILS ABSOLUTE: 0 K/UL (ref 0–0.2)
BASOPHILS RELATIVE PERCENT: 0.3 % (ref 0–1)
BUN BLDV-MCNC: 13 MG/DL (ref 8–23)
CALCIUM SERPL-MCNC: 9.7 MG/DL (ref 8.8–10.2)
CHLORIDE BLD-SCNC: 106 MMOL/L (ref 98–111)
CO2: 22 MMOL/L (ref 22–29)
CREAT SERPL-MCNC: 0.7 MG/DL (ref 0.5–0.9)
EOSINOPHILS ABSOLUTE: 0.1 K/UL (ref 0–0.6)
EOSINOPHILS RELATIVE PERCENT: 1.3 % (ref 0–5)
GFR AFRICAN AMERICAN: >59
GFR NON-AFRICAN AMERICAN: >60
GLUCOSE BLD-MCNC: 114 MG/DL (ref 74–109)
GLUCOSE BLD-MCNC: 115 MG/DL (ref 70–99)
GLUCOSE BLD-MCNC: 125 MG/DL (ref 70–99)
GLUCOSE BLD-MCNC: 97 MG/DL (ref 70–99)
GLUCOSE BLD-MCNC: 98 MG/DL (ref 70–99)
HCT VFR BLD CALC: 37.7 % (ref 37–47)
HEMOGLOBIN: 11.2 G/DL (ref 12–16)
IMMATURE GRANULOCYTES #: 0 K/UL
LYMPHOCYTES ABSOLUTE: 1.4 K/UL (ref 1.1–4.5)
LYMPHOCYTES RELATIVE PERCENT: 13.9 % (ref 20–40)
MCH RBC QN AUTO: 29.3 PG (ref 27–31)
MCHC RBC AUTO-ENTMCNC: 29.7 G/DL (ref 33–37)
MCV RBC AUTO: 98.7 FL (ref 81–99)
MONOCYTES ABSOLUTE: 1.2 K/UL (ref 0–0.9)
MONOCYTES RELATIVE PERCENT: 12.1 % (ref 0–10)
NEUTROPHILS ABSOLUTE: 7 K/UL (ref 1.5–7.5)
NEUTROPHILS RELATIVE PERCENT: 72.1 % (ref 50–65)
PDW BLD-RTO: 13.2 % (ref 11.5–14.5)
PERFORMED ON: ABNORMAL
PERFORMED ON: ABNORMAL
PERFORMED ON: NORMAL
PERFORMED ON: NORMAL
PLATELET # BLD: 235 K/UL (ref 130–400)
PMV BLD AUTO: 9.3 FL (ref 9.4–12.3)
POTASSIUM REFLEX MAGNESIUM: 4.5 MMOL/L (ref 3.5–5)
RBC # BLD: 3.82 M/UL (ref 4.2–5.4)
SODIUM BLD-SCNC: 141 MMOL/L (ref 136–145)
WBC # BLD: 9.8 K/UL (ref 4.8–10.8)

## 2022-04-16 PROCEDURE — 6370000000 HC RX 637 (ALT 250 FOR IP): Performed by: INTERNAL MEDICINE

## 2022-04-16 PROCEDURE — 1210000000 HC MED SURG R&B

## 2022-04-16 PROCEDURE — 2580000003 HC RX 258: Performed by: INTERNAL MEDICINE

## 2022-04-16 PROCEDURE — 6360000002 HC RX W HCPCS: Performed by: SURGERY

## 2022-04-16 PROCEDURE — 2500000003 HC RX 250 WO HCPCS: Performed by: SURGERY

## 2022-04-16 PROCEDURE — 36415 COLL VENOUS BLD VENIPUNCTURE: CPT

## 2022-04-16 PROCEDURE — 82947 ASSAY GLUCOSE BLOOD QUANT: CPT

## 2022-04-16 PROCEDURE — 6360000002 HC RX W HCPCS: Performed by: INTERNAL MEDICINE

## 2022-04-16 PROCEDURE — 80048 BASIC METABOLIC PNL TOTAL CA: CPT

## 2022-04-16 PROCEDURE — 85025 COMPLETE CBC W/AUTO DIFF WBC: CPT

## 2022-04-16 PROCEDURE — 99232 SBSQ HOSP IP/OBS MODERATE 35: CPT | Performed by: SURGERY

## 2022-04-16 RX ADMIN — INSULIN LISPRO 6 UNITS: 100 INJECTION, SOLUTION INTRAVENOUS; SUBCUTANEOUS at 09:58

## 2022-04-16 RX ADMIN — METOPROLOL SUCCINATE 100 MG: 50 TABLET, EXTENDED RELEASE ORAL at 09:57

## 2022-04-16 RX ADMIN — ACETAMINOPHEN 650 MG: 325 TABLET ORAL at 07:19

## 2022-04-16 RX ADMIN — CIPROFLOXACIN 400 MG: 2 INJECTION, SOLUTION INTRAVENOUS at 01:41

## 2022-04-16 RX ADMIN — ENOXAPARIN SODIUM 40 MG: 40 INJECTION SUBCUTANEOUS at 09:57

## 2022-04-16 RX ADMIN — AMLODIPINE BESYLATE 2.5 MG: 5 TABLET ORAL at 09:57

## 2022-04-16 RX ADMIN — EZETIMIBE 10 MG: 10 TABLET ORAL at 09:57

## 2022-04-16 RX ADMIN — ATORVASTATIN CALCIUM 80 MG: 80 TABLET, FILM COATED ORAL at 20:49

## 2022-04-16 RX ADMIN — METRONIDAZOLE 500 MG: 500 INJECTION, SOLUTION INTRAVENOUS at 05:56

## 2022-04-16 RX ADMIN — SODIUM CHLORIDE, PRESERVATIVE FREE 10 ML: 5 INJECTION INTRAVENOUS at 20:49

## 2022-04-16 RX ADMIN — METRONIDAZOLE 500 MG: 500 INJECTION, SOLUTION INTRAVENOUS at 22:29

## 2022-04-16 RX ADMIN — CIPROFLOXACIN 400 MG: 2 INJECTION, SOLUTION INTRAVENOUS at 13:48

## 2022-04-16 RX ADMIN — METRONIDAZOLE 500 MG: 500 INJECTION, SOLUTION INTRAVENOUS at 14:49

## 2022-04-16 RX ADMIN — FUROSEMIDE 40 MG: 40 TABLET ORAL at 09:57

## 2022-04-16 RX ADMIN — SODIUM CHLORIDE 20 ML: 9 INJECTION, SOLUTION INTRAVENOUS at 13:48

## 2022-04-16 RX ADMIN — ASPIRIN 81 MG 81 MG: 81 TABLET ORAL at 09:57

## 2022-04-16 RX ADMIN — SODIUM CHLORIDE, PRESERVATIVE FREE 5 ML: 5 INJECTION INTRAVENOUS at 10:22

## 2022-04-16 ASSESSMENT — PAIN - FUNCTIONAL ASSESSMENT: PAIN_FUNCTIONAL_ASSESSMENT: PREVENTS OR INTERFERES SOME ACTIVE ACTIVITIES AND ADLS

## 2022-04-16 ASSESSMENT — PAIN DESCRIPTION - PROGRESSION: CLINICAL_PROGRESSION: NOT CHANGED

## 2022-04-16 ASSESSMENT — PAIN SCALES - GENERAL
PAINLEVEL_OUTOF10: 8
PAINLEVEL_OUTOF10: 0
PAINLEVEL_OUTOF10: 0

## 2022-04-16 ASSESSMENT — PAIN DESCRIPTION - FREQUENCY: FREQUENCY: INTERMITTENT

## 2022-04-16 ASSESSMENT — PAIN DESCRIPTION - DESCRIPTORS: DESCRIPTORS: SHOOTING

## 2022-04-16 ASSESSMENT — PAIN DESCRIPTION - ONSET: ONSET: ON-GOING

## 2022-04-16 ASSESSMENT — PAIN DESCRIPTION - PAIN TYPE: TYPE: ACUTE PAIN

## 2022-04-16 ASSESSMENT — PAIN DESCRIPTION - ORIENTATION: ORIENTATION: LEFT;UPPER

## 2022-04-16 ASSESSMENT — PAIN DESCRIPTION - LOCATION: LOCATION: ABDOMEN

## 2022-04-16 NOTE — PROGRESS NOTES
[id number] [facility] Surgery Progress Note    Chief Complaint:   Chief Complaint   Patient presents with    Chest Pain    Arm Pain       SUBJECTIVE:  Ms. Sander Lopez is a 71 y.o. female is seen and examined at bedside. Tolerating diet. Pain appears to be stable. Denies fevers or chills. OBJECTIVE:  /83   Pulse 87   Temp 97.2 °F (36.2 °C)   Resp 18   Ht 5' 4\" (1.626 m)   Wt 220 lb (99.8 kg)   SpO2 100%   BMI 37.76 kg/m²   CONSTITUTIONAL: Alert, appropriate, no acute distress  SKIN: warm, dry with no rashes or lesions  HEENT: NCAT, Non icteric, PERR. Trachea Midline. CHEST/LUNGS: CTA bilaterally. Normal respiratory effort. CARDIOVASCULAR: RRR, No edema. ABDOMEN: soft, mild tenderness in the epigastrium  NEUROLOGIC: CN II-XI grossly intact, no motor or sensory deficits   MUSCULOSKELETAL: No clubbing or cyanosis. PSYCHIATRIC:  Normal Mood and Affect. Alert and Oriented. Labs:  CBC:   Recent Labs     04/14/22 0454 04/15/22  0313 04/16/22  0351   WBC 9.2 12.0* 9.8   HGB 12.1 11.9* 11.2*    267 235     BMP:    Recent Labs     04/14/22 0454 04/15/22  0313 04/16/22  0351    142 141   K 4.3 4.1 4.5    106 106   CO2 20* 23 22   BUN 12 16 13   CREATININE 0.6 0.8 0.7   GLUCOSE 150* 130* 114*       ASSESSMENT:  Principal Problem:    Chest pain  Active Problems:    Abdominal pain    Essential hypertension    Mixed hyperlipidemia    Gastroesophageal reflux disease without esophagitis    Morbidly obese (HCC)    Dye's esophagus without dysplasia    Anxiety    Moderate episode of recurrent major depressive disorder ([address]    Obstructive sleep apnea syndrome    Foreign body in stomach    History of colon polyps  Resolved Problems:    * No resolved hospital problems.  *      PLAN:  Work-up for her splenic lesion as an outpatient  CT scan reviewed with radiology and the patient   Small area of microperforation in the site of the previous foreign body, appears that the foreign body is not now visualized  Plan to treat this like an microperforated peptic ulcer  PPI  Antibiotics  Okay for clear liquids  Continue to monitor closely  At this time plan for nonoperative management but if the patient continues to have pain throughout the weekend and or worsens we will plan on operative invention  Likely repeat CT with PO contrast in the next 24-48 hrs            Sylvia Rodriguez DO   Electronically Signed on 4/16/2022 at 9:22 AM

## 2022-04-16 NOTE — PLAN OF CARE
Problem: Falls - Risk of:  Goal: Will remain free from falls  Description: Will remain free from falls  4/16/2022 1703 by Gaiv Mohamud RN  Outcome: Ongoing  4/16/2022 0319 by Janelle Boo LPN  Outcome: Ongoing  Goal: Absence of physical injury  Description: Absence of physical injury  4/16/2022 1703 by Gavi Mohamud RN  Outcome: Ongoing  4/16/2022 0319 by Janelle Boo LPN  Outcome: Ongoing     Problem: Pain:  Goal: Pain level will decrease  Description: Pain level will decrease  4/16/2022 1703 by Gavi Mohamud RN  Outcome: Ongoing  4/16/2022 0319 by aJnelle Boo LPN  Outcome: Ongoing  Goal: Control of acute pain  Description: Control of acute pain  4/16/2022 1703 by Gavi Mohamud RN  Outcome: Ongoing  4/16/2022 0319 by Janelle Boo LPN  Outcome: Ongoing  Goal: Control of chronic pain  Description: Control of chronic pain  4/16/2022 1703 by Gavi Mohamud RN  Outcome: Ongoing  4/16/2022 0319 by Janelle Boo LPN  Outcome: Ongoing

## 2022-04-16 NOTE — PROGRESS NOTES
[facility] Hospitalists Progress Note    Patient:  Lai Norman  YOB: 1950  Date of Service: 4/16/2022  MRN: [id number]   Acct: [de-identified]   Primary Care Physician: LUIZ Dowling  Advance Directive: Full Code  Admit Date: 4/12/2022       Hospital Day: 4      CHIEF COMPLAINT:     Chief Complaint   Patient presents with    Chest Pain    Arm Pain       4/16/2022 8:43 AM  Subjective / Interval History:   04/16/2022  Patient seen and examined this AM.  Doing well. No new complaints. Continues to report some left-sided upper abdominal pain. Laying comfortably in bed no apparent acute distress. Family at bedside. Patient denies any acute complaints or distress exam.      04/15/2022  Patient seen and examined. Doing well. Abdominal pain improved. Denies any active chest pain at this time. Lying comfortably in bed no apparent acute distress. No acute changes or acute overnight event reported. 04/14/2022  No acute changes or acute overnight event reported. Patient seen and evaluated this AM.  Doing well. Continues to report some upper abdomen to chest heaviness and tenderness. Laying comfortably in bed however no apparent acute distress. Family at bedside. 04/13/2022  Patient seen and examined this AM.  Endorses overall improvement. Denies any active chest pain at this time. Continues to endorse some abdominal to left-sided discomfort. Laying comfortably in bed in no acute distress. Review of Systems:   Review of Systems  ROS: 14 point review of systems is negative except as specifically addressed above. ADULT DIET;  Clear Liquid    Intake/Output Summary (Last 24 hours) at 4/16/2022 0843  Last data filed at 4/16/2022 0656  Gross per 24 hour   Intake 550 ml   Output --   Net 550 ml       Medications:   dextrose 100 mL/hr at 04/13/22 1130    sodium chloride 100 mL/hr at 04/13/22 1449    dextrose      sodium chloride       Current Facility-Administered Medications Medication Dose Route Frequency Provider Last Rate Last Admin    hydrOXYzine (ATARAX) tablet 50 mg  50 mg Oral BID PRN Jhonny Ghosh MD   50 mg at 04/15/22 2028    ciprofloxacin (CIPRO) IVPB 400 mg  400 mg IntraVENous Q12H Devin Pierce DO   Stopped at 04/16/22 0241    metronidazole (FLAGYL) 500 mg in NaCl 100 mL IVPB premix  500 mg IntraVENous Q8H Devin Pierce DO   Stopped at 04/16/22 0656    dextrose 5 % solution   IntraVENous Continuous Francie Knutson  mL/hr at 04/13/22 1130 New Bag at 04/13/22 1130    0.9 % sodium chloride infusion   IntraVENous Continuous Francie Knutson  mL/hr at 04/13/22 1449 NoRateChange at 04/13/22 1449    amLODIPine (NORVASC) tablet 2.5 mg  2.5 mg Oral Daily Tejas Álvarez MD   2.5 mg at 04/14/22 2151    atorvastatin (LIPITOR) tablet 80 mg  80 mg Oral Nightly Francie Knutson MD   80 mg at 04/15/22 2027    ezetimibe (ZETIA) tablet 10 mg  10 mg Oral Daily Francie Knutson MD   10 mg at 04/14/22 5298    furosemide (LASIX) tablet 40 mg  40 mg Oral Daily Francie Knutson MD   40 mg at 04/14/22 2046    metoprolol succinate (TOPROL XL) extended release tablet 100 mg  100 mg Oral Daily Jhonny Ghosh MD        glucagon (rDNA) injection 1 mg  1 mg IntraMUSCular PRN Francie Knutson MD        dextrose 5 % solution  100 mL/hr IntraVENous PRN Francie Knutson MD        sodium chloride flush 0.9 % injection 5-40 mL  5-40 mL IntraVENous 2 times per day Francie Knutson MD   10 mL at 04/15/22 2027    sodium chloride flush 0.9 % injection 10 mL  10 mL IntraVENous PRN Francie Knutson MD        0.9 % sodium chloride infusion   IntraVENous PRN Francie Knutson MD        ondansetron (ZOFRAN-ODT) disintegrating tablet 4 mg  4 mg Oral Q8H PRN Francie Knutson MD        Or    ondansetron [facility]) injection 4 mg  4 mg IntraVENous Q6H PRN Francie Knutson MD        acetaminophen (TYLENOL) tablet 650 mg 650 mg Oral Q6H PRN Tamara Aviles MD   650 mg at 04/16/22 8810    Or    acetaminophen (TYLENOL) suppository 650 mg  650 mg Rectal Q6H PRN Tamara Aviles MD        polyethylene glycol [facility]) packet 17 g  17 g Oral Daily PRN Tamara Aviles MD        aspirin chewable tablet 81 mg  81 mg Oral Daily Tamara Aviles MD   81 mg at 04/14/22 0839    nitroGLYCERIN (NITROSTAT) SL tablet 0.4 mg  0.4 mg SubLINGual Q5 Min PRN Tamara Aviles MD        insulin glargine (LANTUS) injection vial 24 Units  24 Units SubCUTAneous Nightly Tamara Aviles MD   24 Units at 04/14/22 2055    insulin lispro (HUMALOG) injection vial 6 Units  6 Units SubCUTAneous TID  Tamara Aviles MD   6 Units at 04/14/22 1756    insulin lispro (HUMALOG) injection vial 0-12 Units  0-12 Units SubCUTAneous TID  Tamara Aviles MD   4 Units at 04/14/22 1309    insulin lispro (HUMALOG) injection vial 0-6 Units  0-6 Units SubCUTAneous Nightly Tamara Aviles MD   2 Units at 04/13/22 2300    enoxaparin (LOVENOX) injection 40 mg  40 mg SubCUTAneous Daily Tamara Aviles MD   40 mg at 04/14/22 0340    hydrALAZINE (APRESOLINE) injection 10 mg  10 mg IntraVENous Q6H PRN Tamara Aviles MD        glucose chewable tablet 4 each  4 tablet Oral PRN Tamara Aviles MD        dextrose bolus (hypoglycemia) 10% 125 mL  125 mL IntraVENous PRN Tamara Aviles MD        Or    dextrose bolus (hypoglycemia) 10% 250 mL  250 mL IntraVENous PRN Tamara Aviles MD             dextrose 100 mL/hr at 04/13/22 1130    sodium chloride 100 mL/hr at 04/13/22 1449    dextrose      sodium chloride        ciprofloxacin  400 mg IntraVENous Q12H    metroNIDAZOLE  500 mg IntraVENous Q8H    amLODIPine  2.5 mg Oral Daily    atorvastatin  80 mg Oral Nightly    ezetimibe  10 mg Oral Daily    furosemide  40 mg Oral Daily    metoprolol succinate  100 mg Oral Daily    sodium chloride flush  5-40 mL IntraVENous 2 times per day    aspirin  81 mg Oral Daily    insulin glargine  24 Units SubCUTAneous Nightly    insulin lispro  6 Units SubCUTAneous TID WC    insulin lispro  0-12 Units SubCUTAneous TID WC    insulin lispro  0-6 Units SubCUTAneous Nightly    enoxaparin  40 mg SubCUTAneous Daily     hydrOXYzine, glucagon (rDNA), dextrose, sodium chloride flush, sodium chloride, ondansetron **OR** ondansetron, acetaminophen **OR** acetaminophen, polyethylene glycol, nitroGLYCERIN, hydrALAZINE, glucose, dextrose bolus (hypoglycemia) **OR** dextrose bolus (hypoglycemia)  ADULT DIET; Clear Liquid       Labs:   CBC with DIFF:  Recent Labs     04/14/22  0454 04/15/22  0313 04/16/22  0351   WBC 9.2 12.0* 9.8   RBC 4.11* 3.99* 3.82*   HGB 12.1 11.9* 11.2*   HCT 39.1 38.7 37.7   MCV 95.1 97.0 98.7   MCH 29.4 29.8 29.3   MCHC 30.9* 30.7* 29.7*   RDW 12.8 13.1 13.2    267 235   MPV 9.3* 9.6 9.3*   NEUTOPHILPCT 87.1* 70.0* 72.1*   LYMPHOPCT 7.3* 17.9* 13.9*   MONOPCT 3.7 10.5* 12.1*   EOSRELPCT 0.0 0.8 1.3   BASOPCT 0.2 0.4 0.3   NEUTROABS 8.0* 8.4* 7.0   LYMPHSABS 0.7* 2.1 1.4   MONOSABS 0.30 1.30* 1.20*   EOSABS 0.00 0.10 0.10   BASOSABS 0.00 0.10 0.00       CMP/BMP:  Recent Labs     04/14/22  0454 04/15/22  0313 04/16/22  0351    142 141   K 4.3 4.1 4.5    106 106   CO2 20* 23 22   ANIONGAP 14 13 13   GLUCOSE 150* 130* 114*   BUN 12 16 13   CREATININE 0.6 0.8 0.7   LABGLOM >60 >60 >60   CALCIUM 10.1 9.8 9.7         CRP:  No results for input(s): CRP in the last 72 hours. Sed Rate:  No results for input(s): SEDRATE in the last 72 hours. HgBA1c:  No components found for: HGBA1C  FLP:    Lab Results   Component Value Date    TRIG 181 04/13/2022    HDL 41 04/13/2022    LDLCALC 54 04/13/2022     TSH:    Lab Results   Component Value Date    TSH 1.880 10/19/2021     Troponin T:   No results for input(s): TROPONINI in the last 72 hours. Pro-BNP: No results for input(s): BNP in the last 72 hours.   INR: No results for input(s): INR in the last 72 hours. ABGs: No results found for: PHART, PO2ART, KOW6ERA  UA:  No results for input(s): NITRITE, COLORU, PHUR, LABCAST, WBCUA, RBCUA, MUCUS, TRICHOMONAS, YEAST, BACTERIA, CLARITYU, SPECGRAV, LEUKOCYTESUR, UROBILINOGEN, BILIRUBINUR, BLOODU, GLUCOSEU, AMORPHOUS in the last 72 hours. Invalid input(s): [employer]      Culture Results:    No results for input(s): CXSURG in the last 720 hours. Blood Culture Recent: No results for input(s): BC in the last 720 hours. No results for input(s): BC, BLOODCULT2, ORG in the last 72 hours. Cultures:   No results for input(s): CULTURE in the last 72 hours. No results for input(s): BC, Charity Glenis in the last 72 hours. No results for input(s): CXSURG in the last 72 hours. No results for input(s): MG, PHOS in the last 72 hours. No results for input(s): AST, ALT, ALB, BILITOT, ALKPHOS, ALB in the last 72 hours. RAD:   CT ABDOMEN PELVIS W IV CONTRAST Additional Contrast? None    Result Date: 4/12/2022  Examination. CT ABDOMEN PELVIS W IV CONTRAST 4/12/2022 1:07 PM History: Abdominal pain. DLP: 1488 mGycm. The automated exposure control was utilized to minimize the patient's radiation exposure. The CT scan of the abdomen and pelvis is performed after intravenous contrast enhancement. The images are acquired in axial plane and subsequent reconstruction in coronal and sagittal planes. The comparison is made with the previous study dated 1/22/2019. The lung bases included in the study show dependent atelectatic changes at bilateral bases posteriorly. The limited included cardiomediastinal structures show heavy calcification mitral annulus. No significant cardiomegaly. Mild atheromatous changes of coronary arteries. The liver appears normal. There are ill-defined low-density nodules in the spleen which appears significantly larger than the previous study. A nodule located posteriorly measures 2 cm in diameter.  It previously measured 1 cm. The gallbladder is surgically absent. No significant dilatation of the common bile duct. The pancreas is normal. The pancreatic duct is not visualized. The adrenal glands are normal. There are multiple tiny low-density cortical nodules in both kidneys which appears similar to the previous study. No radiopaque calculi. No hydronephrosis. The ureters bilaterally are normal. The urinary bladder is poorly distended. No intrinsic abnormality. The uterus is absent. No adnexal masses. Tiny fat-containing umbilical hernia. There is a small sliding-type hiatal hernia. There is a curvilinear radiopaque foreign object which appears to be in the distal part of the stomach and is protruding through the posterior medial wall of the distal stomach/antrum projecting near the neck/proximal body of the pancreas. The type of foreign body is not certain. This may represent a metallic wire or a fishbone? Darlynn Magic There is a small bubble of air adjacent to the proximal end of this foreign object. The duodenum is normal. Small bowel is nondistended. A retrocecal appendix is normal. The cecum lies low in the pelvis adjacent to the urinary bladder. Moderate gas and stool is seen in the colon. There is diverticulosis of the distal colon. No evidence for diverticulitis. Atheromatous changes of the abdominal aorta and iliac arteries. No aneurysmal dilatation. There is no evidence of abdominal or pelvic lymphadenopathy. The images reviewed in bone window show chronic degenerative changes of the lumbar spine. There is a mild levoscoliosis. No focal bony lesion. 1. A curvilinear radiopaque foreign body in the stomach protruding through the posterior medial wall of the distal body/antrum of the stomach. No free air. This may represent a metallic wire or a fishbone? Clinical correlation and further evaluation is recommended. 2. Multiple very small low density renal nodules which are too small to be further characterized.  These are unchanged. 3. The diverticulosis of the colon. No evidence for diverticulitis. 4. The enlarging poorly marginated low-density nodule in the spleen. Etiology is not certain. Possibility of metastatic disease is not excluded. The results were called to ER physician, Dr. Aislinn Burris, immediately. Signed by Dr Janet Chacko    Result Date: 4/12/2022  XR CHEST PORTABLE 4/12/2022 12:13 PM HISTORY: Chest pain COMPARISON: 3/23/2020 CXR: A single frontal view of the chest is performed. Findings: Lungs appear mildly hyperinflated. Vascular crowding versus mild venous congestion. Basilar atelectasis. No pleural effusion or pneumothorax. No acute regional bony pathology. 1. Hypoventilated lungs with vascular crowding versus mild venous congestion and basilar atelectasis. . Signed by Dr Annie Bellamy    CTA [address]     Result Date: 4/12/2022  CTA chest 4/12/2022 1:06 PM HISTORY: Shortness of breath with chest pressure TECHNIQUE: Axial images of the chest were obtained following IV contrast. . Coronal and sagittal as well as maximal intensity projectional reformatted images are reconstructed and reviewed. COMPARISON: 2/27/2018. DLP: 856 mGy cm Automated exposure control was utilized to minimize patient radiation dose. FINDINGS: No pulmonary emboli identified. Thoracic aorta normal in caliber with no aneurysm or dissection. Cardiomegaly. Mild left coronary calcification. No pericardial or pleural effusions. No pathologic intrathoracic or axillary lymphadenopathy. Small hiatal hernia. Please refer to CT abdomen pelvis report for findings below the hemidiaphragms. Basilar atelectasis. Mild atelectatic changes of the lingula. No consolidation. No suspicious pulmonary nodules. No pneumothorax. No endobronchial lesions. No focal destructive osseous lesions. A bony hemangioma suspected within the T11 vertebra. 1. No pulmonary emboli. 2. No thoracic aneurysm or dissection.  Mild left coronary Temporal 84 16 97 %   04/15/22 2323 -- -- -- 90 -- --   04/15/22 1814 (!) 145/72 97.3 °F (36.3 °C) Temporal 90 20 94 %   04/15/22 1228 (!) 156/81 97.3 °F (36.3 °C) Temporal 109 20 93 %       24HR INTAKE/OUTPUT:      Intake/Output Summary (Last 24 hours) at 4/16/2022 0843  Last data filed at 4/16/2022 0656  Gross per 24 hour   Intake 550 ml   Output --   Net 550 ml       Physical Exam  Vitals and nursing note reviewed. Constitutional:       General: She is not in acute distress. Appearance: Normal appearance. She is obese. She is not ill-appearing, toxic-appearing or diaphoretic. HENT:      Head: Normocephalic and atraumatic. Right Ear: External ear normal.      Left Ear: External ear normal.      Nose: Nose normal. No congestion or rhinorrhea. Mouth/Throat:      Mouth: Mucous membranes are moist.      Pharynx: Oropharynx is clear. No oropharyngeal exudate or posterior oropharyngeal erythema. Eyes:      General: No scleral icterus. Right eye: No discharge. Left eye: No discharge. Extraocular Movements: Extraocular movements intact. Conjunctiva/sclera: Conjunctivae normal.      Pupils: Pupils are equal, round, and reactive to light. Cardiovascular:      Rate and Rhythm: Normal rate and regular rhythm. Pulses: Normal pulses. Heart sounds: Normal heart sounds. No murmur heard. No friction rub. No gallop. Pulmonary:      Effort: Pulmonary effort is normal. No respiratory distress. Breath sounds: Normal breath sounds. No stridor. No wheezing, rhonchi or rales. Chest:      Chest wall: No tenderness. Abdominal:      General: Bowel sounds are normal. There is no distension. Palpations: Abdomen is soft. Tenderness: There is abdominal tenderness ( Diffuse tenderness (improving) to deep palpation. No guarding, rigidity, rebound tenderness noted. ). There is no guarding or rebound.    Musculoskeletal:         General: No swelling, tenderness, deformity or signs of injury. Normal range of motion. Cervical back: Normal range of motion and neck supple. No rigidity. No muscular tenderness. Right lower leg: No edema. Left lower leg: No edema. Skin:     General: Skin is warm and dry. Capillary Refill: Capillary refill takes less than 2 seconds. Coloration: Skin is not jaundiced or pale. Findings: No bruising, erythema, lesion or rash. Neurological:      General: No focal deficit present. Mental Status: She is alert and oriented to person, place, and time. Cranial Nerves: No cranial nerve deficit. Sensory: No sensory deficit. Motor: No weakness. Coordination: Coordination normal.   Psychiatric:         Mood and Affect: Mood normal.         Behavior: Behavior normal.         Thought Content: Thought content normal.         Judgment: Judgment normal.         Assessment/plan:         Hospital Problems           Last Modified POA    * (Principal) Chest pain 4/12/2022 Yes    Abdominal pain 4/12/2022 Yes    Foreign body in stomach 4/12/2022 Yes    Essential hypertension (Chronic) 4/12/2022 Yes    Mixed hyperlipidemia (Chronic) 4/12/2022 Yes    Gastroesophageal reflux disease without esophagitis (Chronic) 4/12/2022 Yes    Morbidly obese (HCC) (Chronic) 4/12/2022 Yes    Overview Signed 8/17/2017 12:54 PM by Prasanth Jones     Last Assessment & Plan:   Obesity: Federal guidelines recommend that people under the age of 72 should have a BMI of 18.5-24.9 and people age 72 and older should have a BMI of 23-30. Morbid obesity is defined as >100 lb overweight or BMI >40. The patient BMI is outside the recommended range and we recommended/discussed today to utilize a diet/exercise program to get back into the appropriate range. Today we encouraged roughly a 1 lb per week weight loss with initial goal of 5% weight loss. The initial step is to document everything that is consumed into a food diary.   Studies have shown that patients can lose up to 2x the weight by keeping track of foods. We discussed BEE today and discussed reasonable goal to realize weight loss. Would recommend f/u with PCP to discuss further. Dye's esophagus without dysplasia 4/13/2022 Yes    Anxiety 4/12/2022 Yes    Moderate episode of recurrent major depressive disorder ([address] 4/12/2022 Yes    Obstructive sleep apnea syndrome 4/12/2022 Yes    History of colon polyps 4/13/2022 Yes          Principal Problem:    Chest pain  Active Problems:    Abdominal pain    Foreign body in stomach    Essential hypertension    Mixed hyperlipidemia    Gastroesophageal reflux disease without esophagitis    Morbidly obese (HCC)    Dye's esophagus without dysplasia    Anxiety    Moderate episode of recurrent major depressive disorder ([address]    Obstructive sleep apnea syndrome    History of colon polyps  Resolved Problems:    * No resolved hospital problems. *        Brief Summary  Ms. Juan Durand, a 71 y.o. female with a history of history of DMT2, HTN, HLD, JAMYE, anxiety/depression, GERD, presenting to [facility] ED (04/12/2022), on account of an acute onset of several hours history of, moderate to severe left-sided chest and abdominal  Pain.     Patient reports a squeezing type left-sided chest pain/pressure going down to her left abdomen and left arm.       Associated symptom reported includes an episode of vomiting the night prior to presentation, as well as dyspnea on exertion.     No other associated symptoms reported; no dizziness, lightheadedness, palpitations or diaphoresis.     CT abdomen pelvis, reporting a curvilinear radiopaque foreign object (metallic wire vs fishbone-like) which appears to be in the distal part of the stomach and is protruding through the posterior medial wall of the distal stomach/antrum projecting near the neck/proximal body of the pancreas.     Patient reportedly had a catfish last night.     General surgery and GI both consulted from ED.     Patient be admitted to medical service, for further work-up and management. Abdominal Pain  ? Foreign body in posterior wall of stomach on imaging  ? MicroPerforation  Speenic LesionCT abdomen pelvis W IV Con (04/12/2022): Impression: A curvilinear radiopaque foreign body in the stomach protruding through the posterior medial wall of the distal body/antrum of the stomach. No free air. This may represent a metallic wire or a fishbone? Clinical correlation and further evaluation is recommended. Multiple very small low density renal nodules which are too small to be further characterized. These are unchanged. The diverticulosis of the colon. No evidence for diverticulitis. The enlarging poorly marginated low-density nodule in the spleen. Etiology is not certain. Possibility of metastatic disease is not excluded. · General Surgery and GI on board  · Continue symptomatic supportive management  · Further management as per specialist recommendations  · Status post EGD (04/13/2022): Findings: No foreign body present on upper GI tract  · Repeat CT abdomen pelvis WO Con (04/15/2022): Impression: The previously seen radiopaque foreign body in the stomach is not visualized in this study. However there is evidence of perforation of the inferior aspect of the distal body/antrum of the stomach with adjacent small air bubble outside the wall and significant surrounding peritoneal/extraperitoneal fat infiltration suggesting inflammatory process. No fluid accumulation or abscess. No significant free air. The similar shaped foreign body is not identified anywhere else in the GI tract. The remaining abdomen and pelvis are similar to the previous study  · Further work-up and management as per general surgery recommendation  · No acute surgical intervention recommended at this time  · Clear liquid diet initiated      Chest/Arm Pain  · CTA Chest W Con (04/12/2022): Impression: No pulmonary emboli.  No thoracic aneurysm or dissection. Mild left coronary artery calcification. Mild cardiomegaly. Small hiatal hernia. Atelectatic changes lungs with no consolidation or suspicious nodularity.     · - Initial troponin negative x4 sets  · - Serial EKGs for chest pain  · - Optimized medical management  · --> Nitro glycerin sublingual when necessary for chest pain  · - 2D Echocardiogram: (04/14/2022): Summary report as noted above  · - Continue to monitor on telemetry  · - Famotidine   · - Cardiology on board-appreciate recommendations  · NM regadenoson stress test (04/13/2022): Summary / Impressions: Normal ejection fraction 86% normal perfusion study without evidence of ischemia or previous infarction. ·        Diabetes Mellitus II  · Uncontrolled  · Inpatient Regimens to include;  ? - Insulin Glargine (Lantus) 24 units subcu nightly  ? - Insulin Lispro (Humalog) 6 units subcu pre-meal 3 times a day  ? - Insulin Lispro (Humalog) on a Medium dose sliding scale  · Monitor POC glucose, and adjust insulin regimen accordingly based on daily insulin requirement.      Essential Hypertension  · Uncontrolled  · Resume home regimen  · Amlodipine  · Hydralazine  · Metoprolol succinate 100 mg p.o. daily  · Hydralazine 10 mg IV Q6H/PRN  For SBP> 180 and/or DP> 110   · Continue to monitor     Mixed Hyperlipidemia  · Ezetimibe 10 mg p.o. daily  · Atorvastatin 80 mg p.o. nightly     Anxiety/depression  · Resume home regimen after reconciliation  · Hydroxyzine 50 mg p.o. daily as needed  · Lorazepam 0.5 mg p.o. every 12 hours/as needed for anxiety      Continue management of other chronic medical conditions - see above and orders. Advance Directive: Full Code    ADULT DIET;  Clear Liquid         Consults Made:   IP CONSULT TO GENERAL SURGERY  IP CONSULT TO GI  IP CONSULT TO CARDIOLOGY    DVT prophylaxis: Enoxaparin      Discharge planning:   Continue work-up and management as above        Time Spent is 25 mins in the examination, evaluation, counseling and review of medications, assessment and plan.      Electronically signed by   Jhonny Ghosh MD, MPH, MD,   Internal Medicine Hospitalist   4/16/2022 8:43 AM

## 2022-04-17 ENCOUNTER — APPOINTMENT (OUTPATIENT)
Dept: CT IMAGING | Age: 72
DRG: 394 | End: 2022-04-17
Payer: MEDICARE

## 2022-04-17 LAB
ANION GAP SERPL CALCULATED.3IONS-SCNC: 12 MMOL/L (ref 7–19)
BASOPHILS ABSOLUTE: 0 K/UL (ref 0–0.2)
BASOPHILS RELATIVE PERCENT: 0.4 % (ref 0–1)
BUN BLDV-MCNC: 8 MG/DL (ref 8–23)
CALCIUM SERPL-MCNC: 9.6 MG/DL (ref 8.8–10.2)
CHLORIDE BLD-SCNC: 107 MMOL/L (ref 98–111)
CO2: 22 MMOL/L (ref 22–29)
CREAT SERPL-MCNC: 0.7 MG/DL (ref 0.5–0.9)
EOSINOPHILS ABSOLUTE: 0.2 K/UL (ref 0–0.6)
EOSINOPHILS RELATIVE PERCENT: 1.9 % (ref 0–5)
GFR AFRICAN AMERICAN: >59
GFR NON-AFRICAN AMERICAN: >60
GLUCOSE BLD-MCNC: 115 MG/DL (ref 70–99)
GLUCOSE BLD-MCNC: 119 MG/DL (ref 70–99)
GLUCOSE BLD-MCNC: 119 MG/DL (ref 74–109)
GLUCOSE BLD-MCNC: 222 MG/DL (ref 70–99)
GLUCOSE BLD-MCNC: 96 MG/DL (ref 70–99)
HCT VFR BLD CALC: 37.8 % (ref 37–47)
HEMOGLOBIN: 11.4 G/DL (ref 12–16)
IMMATURE GRANULOCYTES #: 0 K/UL
LYMPHOCYTES ABSOLUTE: 1.6 K/UL (ref 1.1–4.5)
LYMPHOCYTES RELATIVE PERCENT: 20 % (ref 20–40)
MCH RBC QN AUTO: 29.4 PG (ref 27–31)
MCHC RBC AUTO-ENTMCNC: 30.2 G/DL (ref 33–37)
MCV RBC AUTO: 97.4 FL (ref 81–99)
MONOCYTES ABSOLUTE: 1 K/UL (ref 0–0.9)
MONOCYTES RELATIVE PERCENT: 12.4 % (ref 0–10)
NEUTROPHILS ABSOLUTE: 5.1 K/UL (ref 1.5–7.5)
NEUTROPHILS RELATIVE PERCENT: 65 % (ref 50–65)
PDW BLD-RTO: 13.1 % (ref 11.5–14.5)
PERFORMED ON: ABNORMAL
PERFORMED ON: NORMAL
PLATELET # BLD: 255 K/UL (ref 130–400)
PMV BLD AUTO: 9.7 FL (ref 9.4–12.3)
POTASSIUM REFLEX MAGNESIUM: 4.3 MMOL/L (ref 3.5–5)
RBC # BLD: 3.88 M/UL (ref 4.2–5.4)
SODIUM BLD-SCNC: 141 MMOL/L (ref 136–145)
WBC # BLD: 7.8 K/UL (ref 4.8–10.8)

## 2022-04-17 PROCEDURE — 2580000003 HC RX 258: Performed by: INTERNAL MEDICINE

## 2022-04-17 PROCEDURE — 2500000003 HC RX 250 WO HCPCS: Performed by: SURGERY

## 2022-04-17 PROCEDURE — 80048 BASIC METABOLIC PNL TOTAL CA: CPT

## 2022-04-17 PROCEDURE — 82947 ASSAY GLUCOSE BLOOD QUANT: CPT

## 2022-04-17 PROCEDURE — 6360000002 HC RX W HCPCS: Performed by: SURGERY

## 2022-04-17 PROCEDURE — 99232 SBSQ HOSP IP/OBS MODERATE 35: CPT | Performed by: SURGERY

## 2022-04-17 PROCEDURE — 74176 CT ABD & PELVIS W/O CONTRAST: CPT

## 2022-04-17 PROCEDURE — 1210000000 HC MED SURG R&B

## 2022-04-17 PROCEDURE — 36415 COLL VENOUS BLD VENIPUNCTURE: CPT

## 2022-04-17 PROCEDURE — 6370000000 HC RX 637 (ALT 250 FOR IP): Performed by: INTERNAL MEDICINE

## 2022-04-17 PROCEDURE — 6360000002 HC RX W HCPCS: Performed by: INTERNAL MEDICINE

## 2022-04-17 PROCEDURE — 85025 COMPLETE CBC W/AUTO DIFF WBC: CPT

## 2022-04-17 RX ADMIN — ACETAMINOPHEN 650 MG: 325 TABLET ORAL at 06:16

## 2022-04-17 RX ADMIN — ASPIRIN 81 MG 81 MG: 81 TABLET ORAL at 09:30

## 2022-04-17 RX ADMIN — METOPROLOL SUCCINATE 100 MG: 50 TABLET, EXTENDED RELEASE ORAL at 09:30

## 2022-04-17 RX ADMIN — METRONIDAZOLE 500 MG: 500 INJECTION, SOLUTION INTRAVENOUS at 06:20

## 2022-04-17 RX ADMIN — CIPROFLOXACIN 400 MG: 2 INJECTION, SOLUTION INTRAVENOUS at 14:22

## 2022-04-17 RX ADMIN — AMLODIPINE BESYLATE 2.5 MG: 5 TABLET ORAL at 09:30

## 2022-04-17 RX ADMIN — ATORVASTATIN CALCIUM 80 MG: 80 TABLET, FILM COATED ORAL at 21:07

## 2022-04-17 RX ADMIN — EZETIMIBE 10 MG: 10 TABLET ORAL at 09:30

## 2022-04-17 RX ADMIN — SODIUM CHLORIDE 20 ML: 9 INJECTION, SOLUTION INTRAVENOUS at 14:18

## 2022-04-17 RX ADMIN — METRONIDAZOLE 500 MG: 500 INJECTION, SOLUTION INTRAVENOUS at 14:19

## 2022-04-17 RX ADMIN — INSULIN GLARGINE 24 UNITS: 100 INJECTION, SOLUTION SUBCUTANEOUS at 21:00

## 2022-04-17 RX ADMIN — SODIUM CHLORIDE, PRESERVATIVE FREE 10 ML: 5 INJECTION INTRAVENOUS at 19:40

## 2022-04-17 RX ADMIN — METRONIDAZOLE 500 MG: 500 INJECTION, SOLUTION INTRAVENOUS at 22:25

## 2022-04-17 RX ADMIN — FUROSEMIDE 40 MG: 40 TABLET ORAL at 09:30

## 2022-04-17 RX ADMIN — SODIUM CHLORIDE, PRESERVATIVE FREE 10 ML: 5 INJECTION INTRAVENOUS at 09:30

## 2022-04-17 RX ADMIN — ENOXAPARIN SODIUM 40 MG: 40 INJECTION SUBCUTANEOUS at 09:29

## 2022-04-17 RX ADMIN — CIPROFLOXACIN 400 MG: 2 INJECTION, SOLUTION INTRAVENOUS at 01:20

## 2022-04-17 RX ADMIN — INSULIN LISPRO 2 UNITS: 100 INJECTION, SOLUTION INTRAVENOUS; SUBCUTANEOUS at 21:00

## 2022-04-17 ASSESSMENT — PAIN DESCRIPTION - FREQUENCY: FREQUENCY: INTERMITTENT

## 2022-04-17 ASSESSMENT — PAIN SCALES - GENERAL
PAINLEVEL_OUTOF10: 6
PAINLEVEL_OUTOF10: 6
PAINLEVEL_OUTOF10: 0

## 2022-04-17 ASSESSMENT — PAIN - FUNCTIONAL ASSESSMENT: PAIN_FUNCTIONAL_ASSESSMENT: PREVENTS OR INTERFERES SOME ACTIVE ACTIVITIES AND ADLS

## 2022-04-17 ASSESSMENT — PAIN DESCRIPTION - ORIENTATION: ORIENTATION: LEFT;UPPER;OTHER (COMMENT)

## 2022-04-17 ASSESSMENT — PAIN DESCRIPTION - ONSET: ONSET: ON-GOING

## 2022-04-17 ASSESSMENT — PAIN DESCRIPTION - DESCRIPTORS: DESCRIPTORS: SHOOTING;HEADACHE

## 2022-04-17 ASSESSMENT — PAIN DESCRIPTION - PROGRESSION: CLINICAL_PROGRESSION: NOT CHANGED

## 2022-04-17 ASSESSMENT — PAIN DESCRIPTION - LOCATION: LOCATION: ABDOMEN;HEAD

## 2022-04-17 ASSESSMENT — PAIN DESCRIPTION - PAIN TYPE: TYPE: ACUTE PAIN

## 2022-04-17 NOTE — PROGRESS NOTES
[facility] Progress Note    Patient:  Lai Norman  YOB: 1950  Date of Service: 4/17/2022  MRN: [id number]   Acct: [de-identified]   Primary Care Physician: LUIZ Dowling  Advance Directive: Full Code  Admit Date: 4/12/2022       Hospital Day: 5      CHIEF COMPLAINT:     Chief Complaint   Patient presents with    Chest Pain    Arm Pain       4/17/2022 8:37 AM  Subjective / Interval History:   04/17/2022  No acute changes or acute overnight events reported. Patient endorses some left-sided upper abdominal pain which is decently controlled. Laying comfortably in bed no acute distress. Family at bedside. Patient denies any acute complaints or distress on exam.      04/16/2022  Patient seen and examined this AM.  Doing well. No new complaints. Continues to report some left-sided upper abdominal pain. Laying comfortably in bed no apparent acute distress. Family at bedside. Patient denies any acute complaints or distress exam.      04/15/2022  Patient seen and examined. Doing well. Abdominal pain improved. Denies any active chest pain at this time. Lying comfortably in bed no apparent acute distress. No acute changes or acute overnight event reported. 04/14/2022  No acute changes or acute overnight event reported. Patient seen and evaluated this AM.  Doing well. Continues to report some upper abdomen to chest heaviness and tenderness. Laying comfortably in bed however no apparent acute distress. Family at bedside. 04/13/2022  Patient seen and examined this AM.  Endorses overall improvement. Denies any active chest pain at this time. Continues to endorse some abdominal to left-sided discomfort. Laying comfortably in bed in no acute distress. Review of Systems:   Review of Systems  ROS: 14 point review of systems is negative except as specifically addressed above. ADULT DIET;  Clear Liquid    Intake/Output Summary (Last 24 hours) at 4/17/2022 0837  Last data filed at 4/17/2022 0230  Gross per 24 hour   Intake 2275.34 ml   Output --   Net 2275.34 ml       Medications:   dextrose      sodium chloride Stopped (04/16/22 1449)     Current Facility-Administered Medications   Medication Dose Route Frequency Provider Last Rate Last Admin    hydrOXYzine (ATARAX) tablet 50 mg  50 mg Oral BID PRN Uriel Major MD   50 mg at 04/15/22 2028    ciprofloxacin (CIPRO) IVPB 400 mg  400 mg IntraVENous Q12H [facility], DO   Stopped at 04/17/22 0230    metronidazole (FLAGYL) 500 mg in NaCl 100 mL IVPB premix  500 mg IntraVENous Q8H [facility],  mL/hr at 04/17/22 0620 500 mg at 04/17/22 8982    amLODIPine (NORVASC) tablet 2.5 mg  2.5 mg Oral Daily Tejas Henderson MD   2.5 mg at 04/16/22 0957    atorvastatin (LIPITOR) tablet 80 mg  80 mg Oral Nightly Merced Jacobs MD   80 mg at 04/16/22 2049    ezetimibe (ZETIA) tablet 10 mg  10 mg Oral Daily Merced Jacobs MD   10 mg at 04/16/22 0957    furosemide (LASIX) tablet 40 mg  40 mg Oral Daily Merced Jacobs MD   40 mg at 04/16/22 0957    metoprolol succinate (TOPROL XL) extended release tablet 100 mg  100 mg Oral Daily Uriel Major MD   100 mg at 04/16/22 0957    glucagon (rDNA) injection 1 mg  1 mg IntraMUSCular PRN Merced Jacobs MD        dextrose 5 % solution  100 mL/hr IntraVENous PRN Merced Jacobs MD        sodium chloride flush 0.9 % injection 5-40 mL  5-40 mL IntraVENous 2 times per day Merced Jacobs MD   10 mL at 04/16/22 2049    sodium chloride flush 0.9 % injection 10 mL  10 mL IntraVENous PRN Merced Jacobs MD        0.9 % sodium chloride infusion   IntraVENous PRN Merced Jacobs MD   Stopped at 04/16/22 1449    ondansetron (ZOFRAN-ODT) disintegrating tablet 4 mg  4 mg Oral Q8H PRN Merced Jacobs MD        Or    ondansetron [facility]) injection 4 mg  4 mg IntraVENous Q6H PRN Merced Jacobs MD        acetaminophen (TYLENOL) tablet 650 mg  650 mg Oral Q6H PRN Adin Baxter MD   650 mg at 04/17/22 3415    Or    acetaminophen (TYLENOL) suppository 650 mg  650 mg Rectal Q6H PRN Adin Baxter MD        polyethylene glycol [facility]) packet 17 g  17 g Oral Daily PRN Adin Baxter MD        aspirin chewable tablet 81 mg  81 mg Oral Daily Adin Baxter MD   81 mg at 04/16/22 0957    nitroGLYCERIN (NITROSTAT) SL tablet 0.4 mg  0.4 mg SubLINGual Q5 Min PRN Adin Baxter MD        insulin glargine (LANTUS) injection vial 24 Units  24 Units SubCUTAneous Nightly Adin Baxter MD   24 Units at 04/14/22 2055    insulin lispro (HUMALOG) injection vial 6 Units  6 Units SubCUTAneous TID SONIA Baxter MD   6 Units at 04/16/22 0958    insulin lispro (HUMALOG) injection vial 0-12 Units  0-12 Units SubCUTAneous TID  Adin Baxter MD   4 Units at 04/14/22 1309    insulin lispro (HUMALOG) injection vial 0-6 Units  0-6 Units SubCUTAneous Nightly Adin Baxter MD   2 Units at 04/13/22 2300    enoxaparin (LOVENOX) injection 40 mg  40 mg SubCUTAneous Daily Adin Baxter MD   40 mg at 04/16/22 0957    hydrALAZINE (APRESOLINE) injection 10 mg  10 mg IntraVENous Q6H PRN Adin Baxter MD        glucose chewable tablet 4 each  4 tablet Oral PRN Adin Baxter MD        dextrose bolus (hypoglycemia) 10% 125 mL  125 mL IntraVENous PRN Adin Baxter MD        Or    dextrose bolus (hypoglycemia) 10% 250 mL  250 mL IntraVENous PRN Adin Baxter MD             dextrose      sodium chloride Stopped (04/16/22 1449)      ciprofloxacin  400 mg IntraVENous Q12H    metroNIDAZOLE  500 mg IntraVENous Q8H    amLODIPine  2.5 mg Oral Daily    atorvastatin  80 mg Oral Nightly    ezetimibe  10 mg Oral Daily    furosemide  40 mg Oral Daily    metoprolol succinate  100 mg Oral Daily    sodium chloride flush  5-40 mL IntraVENous 2 times per day    aspirin 81 mg Oral Daily    insulin glargine  24 Units SubCUTAneous Nightly    insulin lispro  6 Units SubCUTAneous TID WC    insulin lispro  0-12 Units SubCUTAneous TID WC    insulin lispro  0-6 Units SubCUTAneous Nightly    enoxaparin  40 mg SubCUTAneous Daily     hydrOXYzine, glucagon (rDNA), dextrose, sodium chloride flush, sodium chloride, ondansetron **OR** ondansetron, acetaminophen **OR** acetaminophen, polyethylene glycol, nitroGLYCERIN, hydrALAZINE, glucose, dextrose bolus (hypoglycemia) **OR** dextrose bolus (hypoglycemia)  ADULT DIET; Clear Liquid       Labs:   CBC with DIFF:  Recent Labs     04/15/22  0313 04/16/22  0351 04/17/22  0703   WBC 12.0* 9.8 7.8   RBC 3.99* 3.82* 3.88*   HGB 11.9* 11.2* 11.4*   HCT 38.7 37.7 37.8   MCV 97.0 98.7 97.4   MCH 29.8 29.3 29.4   MCHC 30.7* 29.7* 30.2*   RDW 13.1 13.2 13.1    235 255   MPV 9.6 9.3* 9.7   NEUTOPHILPCT 70.0* 72.1* 65.0   LYMPHOPCT 17.9* 13.9* 20.0   MONOPCT 10.5* 12.1* 12.4*   EOSRELPCT 0.8 1.3 1.9   BASOPCT 0.4 0.3 0.4   NEUTROABS 8.4* 7.0 5.1   LYMPHSABS 2.1 1.4 1.6   MONOSABS 1.30* 1.20* 1.00*   EOSABS 0.10 0.10 0.20   BASOSABS 0.10 0.00 0.00       CMP/BMP:  Recent Labs     04/15/22  0313 04/16/22  0351 04/17/22  0703    141 141   K 4.1 4.5 4.3    106 107   CO2 23 22 22   ANIONGAP 13 13 12   GLUCOSE 130* 114* 119*   BUN 16 13 8   CREATININE 0.8 0.7 0.7   LABGLOM >60 >60 >60   CALCIUM 9.8 9.7 9.6         CRP:  No results for input(s): CRP in the last 72 hours. Sed Rate:  No results for input(s): SEDRATE in the last 72 hours. HgBA1c:  No components found for: HGBA1C  FLP:    Lab Results   Component Value Date    TRIG 181 04/13/2022    HDL 41 04/13/2022    LDLCALC 54 04/13/2022     TSH:    Lab Results   Component Value Date    TSH 1.880 10/19/2021     Troponin T:   No results for input(s): TROPONINI in the last 72 hours. Pro-BNP: No results for input(s): BNP in the last 72 hours.   INR:   No results for input(s): INR in the last 72 hours. ABGs: No results found for: PHART, PO2ART, GPS9ZSL  UA:  No results for input(s): NITRITE, COLORU, PHUR, LABCAST, WBCUA, RBCUA, MUCUS, TRICHOMONAS, YEAST, BACTERIA, CLARITYU, SPECGRAV, LEUKOCYTESUR, UROBILINOGEN, BILIRUBINUR, BLOODU, GLUCOSEU, AMORPHOUS in the last 72 hours. Invalid input(s): Nabeel Nix      Culture Results:    No results for input(s): CXSURG in the last 720 hours. Blood Culture Recent: No results for input(s): BC in the last 720 hours. No results for input(s): BC, BLOODCULT2, ORG in the last 72 hours. Cultures:   No results for input(s): CULTURE in the last 72 hours. No results for input(s): BC, Arley Zapata in the last 72 hours. No results for input(s): CXSURG in the last 72 hours. No results for input(s): MG, PHOS in the last 72 hours. No results for input(s): AST, ALT, ALB, BILITOT, ALKPHOS, ALB in the last 72 hours. RAD:   CT ABDOMEN PELVIS W IV CONTRAST Additional Contrast? None    Result Date: 4/12/2022  Examination. CT ABDOMEN PELVIS W IV CONTRAST 4/12/2022 1:07 PM History: Abdominal pain. DLP: 1488 mGycm. The automated exposure control was utilized to minimize the patient's radiation exposure. The CT scan of the abdomen and pelvis is performed after intravenous contrast enhancement. The images are acquired in axial plane and subsequent reconstruction in coronal and sagittal planes. The comparison is made with the previous study dated 1/22/2019. The lung bases included in the study show dependent atelectatic changes at bilateral bases posteriorly. The limited included cardiomediastinal structures show heavy calcification mitral annulus. No significant cardiomegaly. Mild atheromatous changes of coronary arteries. The liver appears normal. There are ill-defined low-density nodules in the spleen which appears significantly larger than the previous study. A nodule located posteriorly measures 2 cm in diameter. It previously measured 1 cm.  The gallbladder is surgically absent. No significant dilatation of the common bile duct. The pancreas is normal. The pancreatic duct is not visualized. The adrenal glands are normal. There are multiple tiny low-density cortical nodules in both kidneys which appears similar to the previous study. No radiopaque calculi. No hydronephrosis. The ureters bilaterally are normal. The urinary bladder is poorly distended. No intrinsic abnormality. The uterus is absent. No adnexal masses. Tiny fat-containing umbilical hernia. There is a small sliding-type hiatal hernia. There is a curvilinear radiopaque foreign object which appears to be in the distal part of the stomach and is protruding through the posterior medial wall of the distal stomach/antrum projecting near the neck/proximal body of the pancreas. The type of foreign body is not certain. This may represent a metallic wire or a fishbone? Thuy Natanael There is a small bubble of air adjacent to the proximal end of this foreign object. The duodenum is normal. Small bowel is nondistended. A retrocecal appendix is normal. The cecum lies low in the pelvis adjacent to the urinary bladder. Moderate gas and stool is seen in the colon. There is diverticulosis of the distal colon. No evidence for diverticulitis. Atheromatous changes of the abdominal aorta and iliac arteries. No aneurysmal dilatation. There is no evidence of abdominal or pelvic lymphadenopathy. The images reviewed in bone window show chronic degenerative changes of the lumbar spine. There is a mild levoscoliosis. No focal bony lesion. 1. A curvilinear radiopaque foreign body in the stomach protruding through the posterior medial wall of the distal body/antrum of the stomach. No free air. This may represent a metallic wire or a fishbone? Clinical correlation and further evaluation is recommended. 2. Multiple very small low density renal nodules which are too small to be further characterized. These are unchanged.  3. The diverticulosis of the colon. No evidence for diverticulitis. 4. The enlarging poorly marginated low-density nodule in the spleen. Etiology is not certain. Possibility of metastatic disease is not excluded. The results were called to ER physician, Dr. Kira Monroy, immediately. Signed by Dr Vida Stokes    Result Date: 4/12/2022  XR CHEST PORTABLE 4/12/2022 12:13 PM HISTORY: Chest pain COMPARISON: 3/23/2020 CXR: A single frontal view of the chest is performed. Findings: Lungs appear mildly hyperinflated. Vascular crowding versus mild venous congestion. Basilar atelectasis. No pleural effusion or pneumothorax. No acute regional bony pathology. 1. Hypoventilated lungs with vascular crowding versus mild venous congestion and basilar atelectasis. . Signed by Dr Noble Gonzalez    CTA [address]     Result Date: 4/12/2022  CTA chest 4/12/2022 1:06 PM HISTORY: Shortness of breath with chest pressure TECHNIQUE: Axial images of the chest were obtained following IV contrast. . Coronal and sagittal as well as maximal intensity projectional reformatted images are reconstructed and reviewed. COMPARISON: 2/27/2018. DLP: 856 mGy cm Automated exposure control was utilized to minimize patient radiation dose. FINDINGS: No pulmonary emboli identified. Thoracic aorta normal in caliber with no aneurysm or dissection. Cardiomegaly. Mild left coronary calcification. No pericardial or pleural effusions. No pathologic intrathoracic or axillary lymphadenopathy. Small hiatal hernia. Please refer to CT abdomen pelvis report for findings below the hemidiaphragms. Basilar atelectasis. Mild atelectatic changes of the lingula. No consolidation. No suspicious pulmonary nodules. No pneumothorax. No endobronchial lesions. No focal destructive osseous lesions. A bony hemangioma suspected within the T11 vertebra. 1. No pulmonary emboli. 2. No thoracic aneurysm or dissection. Mild left coronary artery calcification. Mild cardiomegaly.  3. Small hiatal hernia. 4. Atelectatic changes lungs with no consolidation or suspicious nodularity.  Signed by Dr Juan M Gaona    EGD    Result Date: 4/13/2022  No dictation       2D Echo: 04/14/2022  Technically difficult study with poor acoustical window many images of   poor quality   Normal left ventricular size with hyperdynamic systolic function EF   greater than 60%   Mild concentric left ventricular hypertrophy with probable mild [grade 1]   diastolic dysfunction   Normal left atrial size   Mild thickening of the aortic valve which is poorly visualized with   peak/mean gradients of 17/10 mmHg consistent with very mild stenosis and   probable trace to mild insufficiency [tachycardia and absence of pressure   half-time and vena contract make quantification difficult]   Mitral annular calcification with normal mobility of the mitral valve and   trace regurgitation   No evidence of pulmonic valvular stenosis or insufficiency   Normal right-sided chambers with preserved RV systolic function   Trace tricuspid regurgitation inadequate to estimate RVSP   IVC dimension appears within normal limits with inability to assess   respiratory motion   Aortic root and ascending segment measure within normal limits   Pericardial fat pad noted   Definity contrast utilized to better define endocardial borders      Signature   ----------------------------------------------------------------   Electronically signed by Zahida Vaca MD(Interpreting physician)   on 04/14/2022 04:39 PM   ----------------------------------------------------------------           Objective:   Vitals:   /75   Pulse 80   Temp 96.9 °F (36.1 °C) (Temporal)   Resp 17   Ht 5' 4\" (1.626 m)   Wt 220 lb (99.8 kg)   SpO2 94%   BMI 37.76 kg/m²       Patient Vitals for the past 24 hrs:   BP Temp Temp src Pulse Resp SpO2   04/17/22 0609 130/75 96.9 °F (36.1 °C) Temporal 80 17 94 %   04/17/22 0037 -- -- -- 87 -- --   04/16/22 2344 (!) 120/57 97.5 °F (36.4 °C) Temporal 84 18 95 %   04/16/22 1836 124/69 97.7 °F (36.5 °C) Temporal 87 -- 96 %   04/16/22 1216 135/74 96.4 °F (35.8 °C) Temporal 83 -- 94 %       24HR INTAKE/OUTPUT:      Intake/Output Summary (Last 24 hours) at 4/17/2022 0837  Last data filed at 4/17/2022 0230  Gross per 24 hour   Intake 2275.34 ml   Output --   Net 2275.34 ml       Physical Exam  Vitals and nursing note reviewed. Constitutional:       General: She is not in acute distress. Appearance: Normal appearance. She is obese. She is not ill-appearing, toxic-appearing or diaphoretic. HENT:      Head: Normocephalic and atraumatic. Right Ear: External ear normal.      Left Ear: External ear normal.      Nose: Nose normal. No congestion or rhinorrhea. Mouth/Throat:      Mouth: Mucous membranes are moist.      Pharynx: Oropharynx is clear. No oropharyngeal exudate or posterior oropharyngeal erythema. Eyes:      General: No scleral icterus. Right eye: No discharge. Left eye: No discharge. Extraocular Movements: Extraocular movements intact. Conjunctiva/sclera: Conjunctivae normal.      Pupils: Pupils are equal, round, and reactive to light. Cardiovascular:      Rate and Rhythm: Normal rate and regular rhythm. Pulses: Normal pulses. Heart sounds: Normal heart sounds. No murmur heard. No friction rub. No gallop. Pulmonary:      Effort: Pulmonary effort is normal. No respiratory distress. Breath sounds: Normal breath sounds. No stridor. No wheezing, rhonchi or rales. Chest:      Chest wall: No tenderness. Abdominal:      General: Bowel sounds are normal. There is no distension. Palpations: Abdomen is soft. Tenderness: There is abdominal tenderness ( Diffuse tenderness (improving) to deep palpation. No guarding, rigidity, rebound tenderness noted. ). There is no guarding or rebound. Musculoskeletal:         General: No swelling, tenderness, deformity or signs of injury.  Normal range of motion. Cervical back: Normal range of motion and neck supple. No rigidity. No muscular tenderness. Right lower leg: No edema. Left lower leg: No edema. Skin:     General: Skin is warm and dry. Capillary Refill: Capillary refill takes less than 2 seconds. Coloration: Skin is not jaundiced or pale. Findings: No bruising, erythema, lesion or rash. Neurological:      General: No focal deficit present. Mental Status: She is alert and oriented to person, place, and time. Cranial Nerves: No cranial nerve deficit. Sensory: No sensory deficit. Motor: No weakness. Coordination: Coordination normal.   Psychiatric:         Mood and Affect: Mood normal.         Behavior: Behavior normal.         Thought Content: Thought content normal.         Judgment: Judgment normal.         Assessment/plan:         Hospital Problems           Last Modified POA    * (Principal) Chest pain 4/12/2022 Yes    Abdominal pain 4/12/2022 Yes    Foreign body in stomach 4/12/2022 Yes    Essential hypertension (Chronic) 4/12/2022 Yes    Mixed hyperlipidemia (Chronic) 4/12/2022 Yes    Gastroesophageal reflux disease without esophagitis (Chronic) 4/12/2022 Yes    Morbidly obese (HCC) (Chronic) 4/12/2022 Yes    Overview Signed 8/17/2017 12:54 PM by Renetta Jones     Last Assessment & Plan:   Obesity: Federal guidelines recommend that people under the age of 72 should have a BMI of 18.5-24.9 and people age 72 and older should have a BMI of 23-30. Morbid obesity is defined as >100 lb overweight or BMI >40. The patient BMI is outside the recommended range and we recommended/discussed today to utilize a diet/exercise program to get back into the appropriate range. Today we encouraged roughly a 1 lb per week weight loss with initial goal of 5% weight loss. The initial step is to document everything that is consumed into a food diary.   Studies have shown that patients can lose up to 2x the weight by keeping track of foods. We discussed BEE today and discussed reasonable goal to realize weight loss. Would recommend f/u with PCP to discuss further. Dye's esophagus without dysplasia 4/13/2022 Yes    Anxiety 4/12/2022 Yes    Moderate episode of recurrent major depressive disorder ([address] 4/12/2022 Yes    Obstructive sleep apnea syndrome 4/12/2022 Yes    History of colon polyps 4/13/2022 Yes          Principal Problem:    Chest pain  Active Problems:    Abdominal pain    Foreign body in stomach    Essential hypertension    Mixed hyperlipidemia    Gastroesophageal reflux disease without esophagitis    Morbidly obese (HCC)    Dye's esophagus without dysplasia    Anxiety    Moderate episode of recurrent major depressive disorder ([address]    Obstructive sleep apnea syndrome    History of colon polyps  Resolved Problems:    * No resolved hospital problems. *        Brief Summary  Ms. Juan Durand, a 71 y.o. female with a history of history of DMT2, HTN, HLD, JAYME, anxiety/depression, GERD, presenting to [facility] ED (04/12/2022), on account of an acute onset of several hours history of, moderate to severe left-sided chest and abdominal  Pain.     Patient reports a squeezing type left-sided chest pain/pressure going down to her left abdomen and left arm.       Associated symptom reported includes an episode of vomiting the night prior to presentation, as well as dyspnea on exertion.     No other associated symptoms reported; no dizziness, lightheadedness, palpitations or diaphoresis.     CT abdomen pelvis, reporting a curvilinear radiopaque foreign object (metallic wire vs fishbone-like) which appears to be in the distal part of the stomach and is protruding through the posterior medial wall of the distal stomach/antrum projecting near the neck/proximal body of the pancreas.     Patient reportedly had a catfish last night.     General surgery and GI both consulted from ED.     Patient be admitted to medical service, for further work-up and management. Abdominal Pain  ? Foreign body in posterior wall of stomach on imaging  Micro Perforation   Speenic LesionCT abdomen pelvis W IV Con (04/12/2022): Impression: A curvilinear radiopaque foreign body in the stomach protruding through the posterior medial wall of the distal body/antrum of the stomach. No free air. This may represent a metallic wire or a fishbone? Clinical correlation and further evaluation is recommended. Multiple very small low density renal nodules which are too small to be further characterized. These are unchanged. The diverticulosis of the colon. No evidence for diverticulitis. The enlarging poorly marginated low-density nodule in the spleen. Etiology is not certain. Possibility of metastatic disease is not excluded. · General Surgery and GI on board  · Continue symptomatic supportive management  · Further management as per specialist recommendations  · Status post EGD (04/13/2022): Findings: No foreign body present on upper GI tract  · Repeat CT abdomen pelvis WO Con (04/15/2022): Impression: The previously seen radiopaque foreign body in the stomach is not visualized in this study. However there is evidence of perforation of the inferior aspect of the distal body/antrum of the stomach with adjacent small air bubble outside the wall and significant surrounding peritoneal/extraperitoneal fat infiltration suggesting inflammatory process. No fluid accumulation or abscess. No significant free air. The similar shaped foreign body is not identified anywhere else in the GI tract. The remaining abdomen and pelvis are similar to the previous study  · Further work-up and management as per general surgery recommendation  · No acute surgical intervention recommended at this time  · Clear liquid diet initiated      Chest/Arm Pain  · CTA Chest W Con (04/12/2022): Impression: No pulmonary emboli. No thoracic aneurysm or dissection.  Mild left coronary artery calcification. Mild cardiomegaly. Small hiatal hernia. Atelectatic changes lungs with no consolidation or suspicious nodularity.     · - Initial troponin negative x4 sets  · - Serial EKGs for chest pain  · - Optimized medical management  · --> Nitro glycerin sublingual when necessary for chest pain  · - 2D Echocardiogram: (04/14/2022): Summary report as noted above  · - Continue to monitor on telemetry  · - Famotidine   · - Cardiology on board-appreciate recommendations  · NM regadenoson stress test (04/13/2022): Summary / Impressions: Normal ejection fraction 86% normal perfusion study without evidence of ischemia or previous infarction. ·        Diabetes Mellitus II  · Uncontrolled  · Inpatient Regimens to include;  ? - Insulin Glargine (Lantus) 24 units subcu nightly  ? - Insulin Lispro (Humalog) 6 units subcu pre-meal 3 times a day  ? - Insulin Lispro (Humalog) on a Medium dose sliding scale  · Monitor POC glucose, and adjust insulin regimen accordingly based on daily insulin requirement.      Essential Hypertension  · Uncontrolled  · Resume home regimen  · Amlodipine  · Hydralazine  · Metoprolol succinate 100 mg p.o. daily  · Hydralazine 10 mg IV Q6H/PRN  For SBP> 180 and/or DP> 110   · Continue to monitor     Mixed Hyperlipidemia  · Ezetimibe 10 mg p.o. daily  · Atorvastatin 80 mg p.o. nightly     Anxiety/depression  · Resume home regimen after reconciliation  · Hydroxyzine 50 mg p.o. daily as needed  · Lorazepam 0.5 mg p.o. every 12 hours/as needed for anxiety      Continue management of other chronic medical conditions - see above and orders. Advance Directive: Full Code    ADULT DIET;  Clear Liquid         Consults Made:   IP CONSULT TO GENERAL SURGERY  IP CONSULT TO GI  IP CONSULT TO CARDIOLOGY    DVT prophylaxis: Enoxaparin      Discharge planning:   Continue work-up and management as above  Further work-up and management as per general surgery        Time Spent is 25 mins in the examination, evaluation, counseling and review of medications, assessment and plan.      Electronically signed by   Bartolo Stover MD, MPH, MD,   Internal Medicine Hospitalist   4/17/2022 8:37 AM

## 2022-04-17 NOTE — PROGRESS NOTES
[facility] Surgery Progress Note    Chief Complaint:   Chief Complaint   Patient presents with    Chest Pain    Arm Pain       SUBJECTIVE:  Ms. Oumar Ascencio is a 71 y.o. female is seen and examined at bedside. Tolerating diet. Pain much improved today. OBJECTIVE:  /74   Pulse 79   Temp 96.4 °F (35.8 °C) (Temporal)   Resp 18   Ht 5' 4\" (1.626 m)   Wt 220 lb (99.8 kg)   SpO2 95%   BMI 37.76 kg/m²   CONSTITUTIONAL: Alert, appropriate, no acute distress  SKIN: warm, dry with no rashes or lesions  HEENT: NCAT, Non icteric, PERR. Trachea Midline. CHEST/LUNGS: CTA bilaterally. Normal respiratory effort. CARDIOVASCULAR: RRR, No edema. ABDOMEN: soft, mild tenderness in the epigastrium  NEUROLOGIC: CN II-XI grossly intact, no motor or sensory deficits   MUSCULOSKELETAL: No clubbing or cyanosis. PSYCHIATRIC:  Normal Mood and Affect. Alert and Oriented. Labs:  CBC:   Recent Labs     04/15/22  0313 04/16/22  0351 04/17/22  0703   WBC 12.0* 9.8 7.8   HGB 11.9* 11.2* 11.4*    235 255     BMP:    Recent Labs     04/15/22  0313 04/16/22  0351 04/17/22  0703    141 141   K 4.1 4.5 4.3    106 107   CO2 23 22 22   BUN 16 13 8   CREATININE 0.8 0.7 0.7   GLUCOSE 130* 114* 119*       ASSESSMENT:  Principal Problem:    Chest pain  Active Problems:    Abdominal pain    Essential hypertension    Mixed hyperlipidemia    Gastroesophageal reflux disease without esophagitis    Morbidly obese (HCC)    Dye's esophagus without dysplasia    Anxiety    Moderate episode of recurrent major depressive disorder ([facility] [address]    Obstructive sleep apnea syndrome    Foreign body in stomach    History of colon polyps  Resolved Problems:    * No resolved hospital problems.  *      PLAN:  Work-up for her splenic lesion as an outpatient  CT scan reviewed with radiology and the patient   Small area of microperforation in the site of the previous foreign body, appears that the foreign body is not now visualized  Plan to treat this like an microperforated peptic ulcer  PPI  Antibiotics  Okay for clear liquids  Continue to monitor closely  Repeat CT with PO contrast today            Kwasi Thao DO   Electronically Signed on 4/17/2022 at 12:58 PM

## 2022-04-17 NOTE — PLAN OF CARE
Problem: Pain:  Goal: Pain level will decrease  Description: Pain level will decrease  4/17/2022 0326 by Jasper Dhaliwal LPN  Outcome: Met This Shift  4/17/2022 0325 by Jasper Dhaliwal LPN  Outcome: Ongoing  4/16/2022 1703 by Deena Sheikh RN  Outcome: Ongoing  Goal: Control of acute pain  Description: Control of acute pain  4/17/2022 0326 by Jasper Dhaliwal LPN  Outcome: Met This Shift  4/17/2022 0325 by Jasper Dhaliwal LPN  Outcome: Ongoing  4/16/2022 1703 by Deena Sheikh RN  Outcome: Ongoing  Goal: Control of chronic pain  Description: Control of chronic pain  4/17/2022 0326 by Jasper Dhaliwal LPN  Outcome: Met This Shift  4/17/2022 0325 by Jasper Dhaliwal LPN  Outcome: Ongoing  4/16/2022 1703 by Deena Sheikh RN  Outcome: Ongoing     Problem: Falls - Risk of:  Goal: Will remain free from falls  Description: Will remain free from falls  4/17/2022 0326 by Jasper Dhaliwal LPN  Outcome: Ongoing  4/17/2022 0325 by Jasper Dhaliwal LPN  Outcome: Ongoing  4/16/2022 1703 by Deena Sheikh RN  Outcome: Ongoing  Goal: Absence of physical injury  Description: Absence of physical injury  4/17/2022 0326 by Jasper Dhaliwal LPN  Outcome: Ongoing  4/17/2022 0325 by Jasper Dhaliwal LPN  Outcome: Ongoing  4/16/2022 1703 by Deena Sheikh RN  Outcome: Ongoing

## 2022-04-18 VITALS
DIASTOLIC BLOOD PRESSURE: 78 MMHG | WEIGHT: 220 LBS | HEIGHT: 64 IN | HEART RATE: 97 BPM | SYSTOLIC BLOOD PRESSURE: 136 MMHG | TEMPERATURE: 98.1 F | RESPIRATION RATE: 16 BRPM | BODY MASS INDEX: 37.56 KG/M2 | OXYGEN SATURATION: 96 %

## 2022-04-18 LAB
ANION GAP SERPL CALCULATED.3IONS-SCNC: 11 MMOL/L (ref 7–19)
BASOPHILS ABSOLUTE: 0 K/UL (ref 0–0.2)
BASOPHILS RELATIVE PERCENT: 0.4 % (ref 0–1)
BUN BLDV-MCNC: 11 MG/DL (ref 8–23)
CALCIUM SERPL-MCNC: 9.9 MG/DL (ref 8.8–10.2)
CHLORIDE BLD-SCNC: 107 MMOL/L (ref 98–111)
CO2: 23 MMOL/L (ref 22–29)
CREAT SERPL-MCNC: 0.8 MG/DL (ref 0.5–0.9)
EOSINOPHILS ABSOLUTE: 0.2 K/UL (ref 0–0.6)
EOSINOPHILS RELATIVE PERCENT: 2.6 % (ref 0–5)
GFR AFRICAN AMERICAN: >59
GFR NON-AFRICAN AMERICAN: >60
GLUCOSE BLD-MCNC: 108 MG/DL (ref 74–109)
GLUCOSE BLD-MCNC: 118 MG/DL (ref 70–99)
GLUCOSE BLD-MCNC: 153 MG/DL (ref 70–99)
HCT VFR BLD CALC: 37.9 % (ref 37–47)
HEMOGLOBIN: 11.4 G/DL (ref 12–16)
IMMATURE GRANULOCYTES #: 0 K/UL
LYMPHOCYTES ABSOLUTE: 1.4 K/UL (ref 1.1–4.5)
LYMPHOCYTES RELATIVE PERCENT: 17.9 % (ref 20–40)
MCH RBC QN AUTO: 29.6 PG (ref 27–31)
MCHC RBC AUTO-ENTMCNC: 30.1 G/DL (ref 33–37)
MCV RBC AUTO: 98.4 FL (ref 81–99)
MONOCYTES ABSOLUTE: 0.9 K/UL (ref 0–0.9)
MONOCYTES RELATIVE PERCENT: 10.8 % (ref 0–10)
NEUTROPHILS ABSOLUTE: 5.5 K/UL (ref 1.5–7.5)
NEUTROPHILS RELATIVE PERCENT: 67.9 % (ref 50–65)
PDW BLD-RTO: 12.9 % (ref 11.5–14.5)
PERFORMED ON: ABNORMAL
PERFORMED ON: ABNORMAL
PLATELET # BLD: 262 K/UL (ref 130–400)
PMV BLD AUTO: 9.5 FL (ref 9.4–12.3)
POTASSIUM REFLEX MAGNESIUM: 4.3 MMOL/L (ref 3.5–5)
RBC # BLD: 3.85 M/UL (ref 4.2–5.4)
SODIUM BLD-SCNC: 141 MMOL/L (ref 136–145)
WBC # BLD: 8.1 K/UL (ref 4.8–10.8)

## 2022-04-18 PROCEDURE — 6370000000 HC RX 637 (ALT 250 FOR IP): Performed by: INTERNAL MEDICINE

## 2022-04-18 PROCEDURE — 6360000002 HC RX W HCPCS: Performed by: SURGERY

## 2022-04-18 PROCEDURE — 85025 COMPLETE CBC W/AUTO DIFF WBC: CPT

## 2022-04-18 PROCEDURE — 6360000002 HC RX W HCPCS: Performed by: INTERNAL MEDICINE

## 2022-04-18 PROCEDURE — 82947 ASSAY GLUCOSE BLOOD QUANT: CPT

## 2022-04-18 PROCEDURE — 80048 BASIC METABOLIC PNL TOTAL CA: CPT

## 2022-04-18 PROCEDURE — 36415 COLL VENOUS BLD VENIPUNCTURE: CPT

## 2022-04-18 PROCEDURE — 99232 SBSQ HOSP IP/OBS MODERATE 35: CPT | Performed by: SURGERY

## 2022-04-18 PROCEDURE — 2500000003 HC RX 250 WO HCPCS: Performed by: SURGERY

## 2022-04-18 PROCEDURE — 2580000003 HC RX 258: Performed by: INTERNAL MEDICINE

## 2022-04-18 RX ORDER — METRONIDAZOLE 500 MG/1
500 TABLET ORAL 3 TIMES DAILY
Qty: 21 TABLET | Refills: 0 | Status: SHIPPED | OUTPATIENT
Start: 2022-04-18 | End: 2022-04-25

## 2022-04-18 RX ORDER — CIPROFLOXACIN 500 MG/1
500 TABLET, FILM COATED ORAL 2 TIMES DAILY
Qty: 14 TABLET | Refills: 0 | Status: SHIPPED | OUTPATIENT
Start: 2022-04-18 | End: 2022-04-21

## 2022-04-18 RX ADMIN — SODIUM CHLORIDE, PRESERVATIVE FREE 10 ML: 5 INJECTION INTRAVENOUS at 09:55

## 2022-04-18 RX ADMIN — ENOXAPARIN SODIUM 40 MG: 40 INJECTION SUBCUTANEOUS at 08:54

## 2022-04-18 RX ADMIN — CIPROFLOXACIN 400 MG: 2 INJECTION, SOLUTION INTRAVENOUS at 01:32

## 2022-04-18 RX ADMIN — ASPIRIN 81 MG 81 MG: 81 TABLET ORAL at 08:54

## 2022-04-18 RX ADMIN — METRONIDAZOLE 500 MG: 500 INJECTION, SOLUTION INTRAVENOUS at 06:19

## 2022-04-18 RX ADMIN — FUROSEMIDE 40 MG: 40 TABLET ORAL at 08:54

## 2022-04-18 RX ADMIN — EZETIMIBE 10 MG: 10 TABLET ORAL at 08:54

## 2022-04-18 RX ADMIN — AMLODIPINE BESYLATE 2.5 MG: 5 TABLET ORAL at 08:54

## 2022-04-18 NOTE — PROGRESS NOTES
[facility] Surgery Progress Note    Chief Complaint:   Chief Complaint   Patient presents with    Chest Pain    Arm Pain       SUBJECTIVE:  Ms. Alfredo Alonzo is a 71 y.o. female is seen and examined at bedside. CT scan reviewed. Patient started on regular diet. Doing well overall. OBJECTIVE:  BP (!) 107/58   Pulse 86   Temp 98.6 °F (37 °C)   Resp 18   Ht 5' 4\" (1.626 m)   Wt 220 lb (99.8 kg)   SpO2 94%   BMI 37.76 kg/m²   CONSTITUTIONAL: Alert, appropriate, no acute distress  SKIN: warm, dry with no rashes or lesions  HEENT: NCAT, Non icteric, PERR. Trachea Midline. CHEST/LUNGS: CTA bilaterally. Normal respiratory effort. CARDIOVASCULAR: RRR, No edema. ABDOMEN: soft  NEUROLOGIC: CN II-XI grossly intact, no motor or sensory deficits   MUSCULOSKELETAL: No clubbing or cyanosis. PSYCHIATRIC:  Normal Mood and Affect. Alert and Oriented. Labs:  CBC:   Recent Labs     04/16/22  0351 04/17/22  0703 04/18/22  0451   WBC 9.8 7.8 8.1   HGB 11.2* 11.4* 11.4*    255 262     BMP:    Recent Labs     04/16/22  0351 04/17/22  0703 04/18/22  0423    141 141   K 4.5 4.3 4.3    107 107   CO2 22 22 23   BUN 13 8 11   CREATININE 0.7 0.7 0.8   GLUCOSE 114* 119* 108       ASSESSMENT:  Principal Problem:    Chest pain  Active Problems:    Abdominal pain    Essential hypertension    Mixed hyperlipidemia    Gastroesophageal reflux disease without esophagitis    Morbidly obese (HCC)    Dye's esophagus without dysplasia    Anxiety    Moderate episode of recurrent major depressive disorder ([facility] [address]    Obstructive sleep apnea syndrome    Foreign body in stomach    History of colon polyps  Resolved Problems:    * No resolved hospital problems.  *      PLAN:  Work-up for her splenic lesion as an outpatient  Repeat CT shows resolution of microperforation  PPI  Antibiotics x 7 more days  Diet as tolerated  Stable from a surgical standpoint to discharge home  Follow-up in the clinic in 2 weeks            Ja J Rigo Montoya DO   Electronically Signed on 4/18/2022 at 8:08 AM

## 2022-04-18 NOTE — DISCHARGE SUMMARY
[facility] [place] [address] [place] [address] MEDICINE      DISCHARGE SUMMARY:      PATIENT NAME:  Cathy Way  :  1950  MRN:  [id number]    Admission Date:   2022 11:44 AM Attending: Jordan Kerns MD   Discharge Date:   2022              PCP: LUIZ Ariza  Length of Stay: 6 days     Chief Complaint on Admission:   Chief Complaint   Patient presents with    Chest Pain    Arm Pain       Consultants:     IP CONSULT TO GENERAL SURGERY  IP CONSULT TO CARDIOLOGY       Discharge Problem List:   Principal Problem:    Chest pain  Active Problems:    Abdominal pain    Foreign body in stomach    Essential hypertension    Mixed hyperlipidemia    Gastroesophageal reflux disease without esophagitis    Morbidly obese (HCC)    Dye's esophagus without dysplasia    Anxiety    Moderate episode of recurrent major depressive disorder ([address]    Obstructive sleep apnea syndrome    History of colon polyps  Resolved Problems:    * No resolved hospital problems. *           CUMULATIVE  HOSPITAL  COURSE  AND  TREATMENT:  Ms. Melanie Wang 71 y. o. female with a history of history of DMT2, HTN, HLD, JAYME, anxiety/depression, GERD, presenting to [facility] ED (2022), on account of an acute onset of several hours history of, moderate to severe left-sided chest and abdominal  Pain.     Patient reports a squeezing type left-sided chest pain/pressure going down to her left abdomen and left arm.       Associated symptom reported includes an episode of vomiting the night prior to presentation, as well as dyspnea on exertion.     No other associated symptoms reported; no dizziness, lightheadedness, palpitations or diaphoresis.     CT abdomen pelvis, reporting a curvilinear radiopaque foreign object (metallic wire vs fishbone-like) which appears to be in the distal part of the stomach and is protruding through the posterior medial wall of the distal stomach/antrum projecting near the neck/proximal body of the pancreas.     Patient reportedly had a catfish last night.     General surgery and GI both consulted from ED.     Patient be admitted to medical service, for further work-up and management.        Abdominal Pain  ? Foreign body in posterior wall of stomach on imaging  Gastric MicroPerforation-resolved on repeat imaging  Speenic Lesion  · CT abdomen pelvis W IV Con (04/12/2022): Impression: A curvilinear radiopaque foreign body in the stomach protruding through the posterior medial wall of the distal body/antrum of the stomach. No free air. This may represent a metallic wire or a fishbone? Clinical correlation and further evaluation is recommended. Multiple very small low density renal nodules which are too small to be further characterized. These are unchanged. The diverticulosis of the colon. No evidence for diverticulitis. The enlarging poorly marginated low-density nodule in the spleen. Etiology is not certain. Possibility of metastatic disease is not excluded. · General Surgery and GI on board  · Continued symptomatic supportive management  · Further management as per specialist recommendations  · Status post EGD (04/13/2022): Findings: No foreign body present on upper GI tract  · Repeat CT abdomen pelvis WO Con (04/15/2022): Impression: The previously seen radiopaque foreign body in the stomach is not visualized in this study. However there is evidence of perforation of the inferior aspect of the distal body/antrum of the stomach with adjacent small air bubble outside the wall and significant surrounding peritoneal/extraperitoneal fat infiltration suggesting inflammatory process. No fluid accumulation or abscess. No significant free air. The similar shaped foreign body is not identified anywhere else in the GI tract. The remaining abdomen and pelvis are similar to the previous study  · Repeat CT abdomen pelvis WO Con (04/17/2022):  Impression: Inflammatory changes noted in the region of the previously  described gastric perforation. There is inflammation and infiltratingbfat noted. There is no discrete abscess. There is no extraluminal air.     · Further work-up and management as per general surgery recommendation  · No acute surgical intervention recommended at this time  · Clear liquid diet initiated, advanced as tolerated. · Okay for discharge from general surgery standpoint  · Follow-up with general surgery within 2 weeks of discharge. · Follow-up with GI outpatient for further work-up of incidentally reported splenic lesion as indicated. · Metronidazole and Ciprofloxacin for 7 more days upon discharge, as recommended from general surgery standpoint.        Chest/Arm Pain  · CTA Chest W Con (04/12/2022): Impression: No pulmonary emboli. No thoracic aneurysm or dissection. Mild left coronary artery calcification. Mild cardiomegaly. Small hiatal hernia. Atelectatic changes lungs with no consolidation or suspicious nodularity.     · Initial troponin negative x4 sets  · Serial EKGs for chest pain  · Optimized medical management  · Chest pain- resolved, unlikely cardiac etiology - possibly musculoskeletal, vs referred pain from abdominal pain. · 2D Echocardiogram: (04/14/2022): Summary report as noted below  · Continue to monitor on telemetry  · Famotidine   · Seen by Cardiology appreciate recommendations  · NM regadenoson stress test (04/13/2022): Summary / Impressions: Normal ejection fraction 86% normal perfusion study without evidence of ischemia or previous infarction. ·          Diabetes Mellitus II  · Uncontrolled  · Inpatient Regimens to included;  ? - Insulin Glargine (Lantus) 24 units subcu nightly  ? - Insulin Lispro (Humalog) 6 units subcu pre-meal 3 times a day  ?  - Insulin Lispro (Humalog) on a Medium dose sliding scale  · Monitored POC glucose, and adjusted insulin regimen accordingly based on daily insulin requirement.      Essential Hypertension  · Uncontrolled  · Resume home regimen  · Amlodipine  · Hydralazine  · Metoprolol succinate 100 mg p.o. daily  · Continue to monitor     Mixed Hyperlipidemia  · Ezetimibe 10 mg p.o. daily  · Atorvastatin 80 mg p.o. nightly     Anxiety/depression  · Resume home regimen after reconciliation  · Hydroxyzine 50 mg p.o. daily as needed  · Lorazepam 0.5 mg p.o. every 12 hours/as needed for anxiety        Continued management of other chronic medical conditions       Physical Exam:  Vital Signs: /78   Pulse 97   Temp 98.1 °F (36.7 °C) (Temporal)   Resp 16   Ht 5' 4\" (1.626 m)   Wt 220 lb (99.8 kg)   SpO2 96%   BMI 37.76 kg/m²   Physical Exam  Vitals and nursing note reviewed. Constitutional:       General: She is not in acute distress. Appearance: Normal appearance. She is obese. She is not ill-appearing, toxic-appearing or diaphoretic. HENT:      Head: Normocephalic and atraumatic. Right Ear: External ear normal.      Left Ear: External ear normal.      Nose: Nose normal. No congestion or rhinorrhea. Mouth/Throat:      Mouth: Mucous membranes are moist.      Pharynx: Oropharynx is clear. No oropharyngeal exudate or posterior oropharyngeal erythema. Eyes:      General: No scleral icterus. Right eye: No discharge. Left eye: No discharge. Extraocular Movements: Extraocular movements intact. Conjunctiva/sclera: Conjunctivae normal.      Pupils: Pupils are equal, round, and reactive to light. Cardiovascular:      Rate and Rhythm: Normal rate and regular rhythm. Pulses: Normal pulses. Heart sounds: Normal heart sounds. No murmur heard. No friction rub. No gallop. Pulmonary:      Effort: Pulmonary effort is normal. No respiratory distress. Breath sounds: Normal breath sounds. No stridor. No wheezing, rhonchi or rales. Chest:      Chest wall: No tenderness. Abdominal:      General: Bowel sounds are normal. There is no distension. Palpations: Abdomen is soft.       Tenderness: There is no abdominal tenderness. There is no guarding or rebound. Musculoskeletal:         General: No swelling, tenderness, deformity or signs of injury. Normal range of motion. Cervical back: Normal range of motion and neck supple. No rigidity. No muscular tenderness. Right lower leg: No edema. Left lower leg: No edema. Skin:     General: Skin is warm and dry. Capillary Refill: Capillary refill takes less than 2 seconds. Coloration: Skin is not jaundiced or pale. Findings: No bruising, erythema, lesion or rash. Neurological:      General: No focal deficit present. Mental Status: She is alert and oriented to person, place, and time. Cranial Nerves: No cranial nerve deficit. Sensory: No sensory deficit. Motor: No weakness. Coordination: Coordination normal.   Psychiatric:         Mood and Affect: Mood normal.         Behavior: Behavior normal.         Thought Content: Thought content normal.         Judgment: Judgment normal.        Laboratory Data:  Recent Labs     04/16/22  0351 04/17/22  0703 04/18/22  0451   WBC 9.8 7.8 8.1   HGB 11.2* 11.4* 11.4*    255 262     Recent Labs     04/16/22  0351 04/17/22  0703 04/18/22  0423    141 141   K 4.5 4.3 4.3    107 107   CO2 22 22 23   BUN 13 8 11   CREATININE 0.7 0.7 0.8   GLUCOSE 114* 119* 108     No results for input(s): AST, ALT, ALB, BILITOT, ALKPHOS in the last 72 hours. Troponin T: No results for input(s): TROPONINI in the last 72 hours. Pro-BNP: No results for input(s): BNP in the last 72 hours. INR: No results for input(s): INR in the last 72 hours. UA:No results for input(s): NITRITE, COLORU, PHUR, LABCAST, WBCUA, RBCUA, MUCUS, TRICHOMONAS, YEAST, BACTERIA, CLARITYU, SPECGRAV, LEUKOCYTESUR, UROBILINOGEN, BILIRUBINUR, BLOODU, GLUCOSEU, AMORPHOUS in the last 72 hours. Invalid input(s): Derrill Nissen  A1C: No results for input(s): LABA1C in the last 72 hours.   ABG:No results for input(s): PHART, UES9ILN, PO2ART, LLN8LRC, BEART, HGBAE, I2EDIXPB, CARBOXHGBART in the last 72 hours. Impressions of imaging performed in 48 hours before discharge:    CT ABDOMEN PELVIS WO CONTRAST Additional Contrast? Oral    Result Date: 4/17/2022  1. Inflammatory changes noted in the region of the previously described gastric perforation. There is inflammation and infiltrating fat noted. There is no discrete abscess. There is no extraluminal air. . Signed by Dr Fabiana Feliz     2D Echo: 04/14/2022  Technically difficult study with poor acoustical window many images of   poor quality   Normal left ventricular size with hyperdynamic systolic function EF   greater than 60%   Mild concentric left ventricular hypertrophy with probable mild [grade 1]   diastolic dysfunction   Normal left atrial size   Mild thickening of the aortic valve which is poorly visualized with   peak/mean gradients of 17/10 mmHg consistent with very mild stenosis and   probable trace to mild insufficiency [tachycardia and absence of pressure   half-time and vena contract make quantification difficult]   Mitral annular calcification with normal mobility of the mitral valve and   trace regurgitation   No evidence of pulmonic valvular stenosis or insufficiency   Normal right-sided chambers with preserved RV systolic function   Trace tricuspid regurgitation inadequate to estimate RVSP   IVC dimension appears within normal limits with inability to assess   respiratory motion   Aortic root and ascending segment measure within normal limits   Pericardial fat pad noted   Definity contrast utilized to better define endocardial borders      Signature   ----------------------------------------------------------------   Electronically signed by Irvin Cao MD(Interpreting physician)  Marnie Gerber 04/14/2022 04:39 PM   ----------------------------------------------------------------              Discharge Medications:        Medication List      START taking these medications    ciprofloxacin 500 MG tablet  Commonly known as: CIPRO  Take 1 tablet by mouth 2 times daily for 7 days     metroNIDAZOLE 500 MG tablet  Commonly known as: FLAGYL  Take 1 tablet by mouth 3 times daily for 7 days        CONTINUE taking these medications    amLODIPine 2.5 MG tablet  Commonly known as: NORVASC  Take 1 tablet by mouth daily     aspirin 81 MG tablet     atorvastatin 80 MG tablet  Commonly known as: LIPITOR  Take 1 tablet by mouth nightly     Cholecalciferol 50 MCG (2000 UT) Caps     diclofenac sodium 1 % Gel  Commonly known as: VOLTAREN  Apply 4 g topically 4 times daily as needed for Pain     ezetimibe 10 MG tablet  Commonly known as: Zetia  Take 1 tablet by mouth daily     famotidine 20 MG tablet  Commonly known as: PEPCID  Take 1 tablet by mouth 2 times daily     furosemide 40 MG tablet  Commonly known as: LASIX  Take 1 tablet by mouth daily     gabapentin 300 MG capsule  Commonly known as: NEURONTIN  Take 1 capsule by mouth 3 times daily for 30 days. hydrALAZINE 10 MG tablet  Commonly known as: APRESOLINE  Take 1 tablet by mouth 3 times daily     hydrOXYzine 50 MG tablet  Commonly known as: ATARAX     LORazepam 0.5 MG tablet  Commonly known as: Ativan  Take 1 tablet by mouth every 12 hours as needed for Anxiety for up to 30 days. metFORMIN 500 MG tablet  Commonly known as: GLUCOPHAGE  Take 1 tablet by mouth 2 times daily (with meals)     metoprolol succinate 100 MG extended release tablet  Commonly known as: TOPROL XL  Take 1 tablet by mouth daily     MULTIVITAMIN ADULT PO     omeprazole 20 MG delayed release capsule  Commonly known as: PRILOSEC  Take 1 capsule by mouth 2 times daily     tamsulosin 0.4 MG capsule  Commonly known as: FLOMAX     Ubrelvy 100 MG Tabs  Generic drug: Ubrogepant  Take 1 tablet at the onset of migraine. May repeat once in 2 hours if no improvement. Do not exceed 2 tablets in 24 hours.      venlafaxine 150 MG extended release capsule  Commonly known as: EFFEXOR XR  Take 1 capsule by mouth daily           Where to Get Your Medications      These medications were sent to [address] [facility], [address]  [address] [facility], [facility] [place] [address]    Phone: [phone]   · ciprofloxacin 500 MG tablet  · metroNIDAZOLE 500 MG tablet           Discharge Instructions:     Please see the discharge paperwork. Follow up . Advised patient to follow-up closely with PCP with LUIZ Tejada in 7 days and Gen Surgery in 2 weeks, upon discharge for management of chronic medical issues and monitoring of currently resolved gastric microperforation respectively. Take medications as directed. Resume activity as tolerated. Recommended Follow Up:  No follow-up provider specified. Followup Appointments Scheduled at Time of Discharge:  Future Appointments   Date Time Provider Kristyn Christopher   4/21/2022 12:30 PM [facility]   4/26/2022 12:30 PM LUIZ Tejada [facility]   5/2/2022 11:15 AM DO JERAMIE Tovar Gen [facility]   5/5/2022  1:00 PM LUIZ Franklin [facility]   5/19/2022 11:00 AM MD JERAMIE Zhu LPS Cardio [facility]            Diet: ADULT DIET;  Regular        DISCHARGE STATUS:    Condition on Discharge: stable  Discharge Disposition: Home        Extended Emergency Contact Information  Primary Emergency Contact: Tejas Ibanez  Address: [address]  [address] Phone: [phone]  Relation: Spouse  Secondary Emergency Contact: Karimegeneva Olivera  Mobile Phone: [phone]  Relation: Grandchild       Time Spent on discharge is  36 mins in the examination, evaluation, counseling, discussion with the nurse/family, review / addressing discharge medications/scripts and coordination of care for safe discharge       Electronically signed by   Car Rodriguez MD, MPH, MD,   Internal Medicine Hospitalist   4/18/2022 12:27 PM      Thank you LUIZ Valle for the opportunity to be involved in this patient's care.  If you have any questions or concerns please feel free to contact me at [phone]

## 2022-04-18 NOTE — PLAN OF CARE
Problem: Falls - Risk of:  Goal: Will remain free from falls  Description: Will remain free from falls  4/18/2022 0303 by Brett Coffey LPN  Outcome: Ongoing  4/17/2022 1835 by Blair Clarke RN  Outcome: Ongoing  Goal: Absence of physical injury  Description: Absence of physical injury  4/18/2022 0303 by Brett Coffey LPN  Outcome: Ongoing  4/17/2022 1835 by Blair Clarke RN  Outcome: Ongoing     Problem: Pain:  Goal: Pain level will decrease  Description: Pain level will decrease  4/18/2022 0303 by Brett Coffey LPN  Outcome: Ongoing  4/17/2022 1835 by Blair Clarke RN  Outcome: Ongoing  Goal: Control of acute pain  Description: Control of acute pain  4/18/2022 0303 by Brett Coffey LPN  Outcome: Ongoing  4/17/2022 1835 by Blair Clarke RN  Outcome: Ongoing  Goal: Control of chronic pain  Description: Control of chronic pain  4/18/2022 0303 by Brett Coffey LPN  Outcome: Ongoing  4/17/2022 1835 by Blair Clarke RN  Outcome: Ongoing

## 2022-04-18 NOTE — PROGRESS NOTES
Physician Progress Note      Adriana Sahni  CSN #:                  [id number]  :                       1950  ADMIT DATE:       2022 11:44 AM  DISCH DATE:  RESPONDING  PROVIDER #:        Alex Bailey MD          QUERY TEXT:    Pt admitted with Chest Pain and Arm Pain. Pt noted to have \"mid left   coronary artery calcification and mild cardiomegaly\" on the CTA dated   22. . If possible, please document in progress notes and discharge   summary if you are evaluating and/or treating any of the following: The medical record reflects the following:  Risk Factors: GERD< CAD, Small hiatel hernia  Clinical Indicators: CTA: showed small hiatal hernia  and mild left coronary   artery calcification, CTA thought to show FB in the stomach  Treatment: Serial trops x 4 negative, serial EKGS for pain, Famotidine, EGD   found no FB in the stomach    Thank you in advance,    Jamari Pat, RN-BSN, [facility]  Clinical   [facility], [facility] Dimas [email or web address]  Options provided:  -- Chest pain due to CAD with unstable angina  -- Chest pain due to NSTEMI  -- Chest pain due to GERD  -- Chest pain due to hiatal hernia  -- Other - I will add my own diagnosis  -- Disagree - Not applicable / Not valid  -- Disagree - Clinically unable to determine / Unknown  -- Refer to Clinical Documentation Reviewer    PROVIDER RESPONSE TEXT:    Chest pain- resolved, unlikely cardiac etiology ? possibly musculoskeletal,   versus referred pain from abdominal pain. NM regadenoson stress test (2022): Summary / Impressions: Normal   ejection fraction 86% normal perfusion study without evidence of ischemia or   previous infarction.     Query created by: Shruti Garcia on 2022 12:56 PM      Electronically signed by:  Alex Bailey MD 2022 1:18 PM

## 2022-04-18 NOTE — PROGRESS NOTES
[facility] Progress Note    Patient:  Kady Corley  YOB: 1950  Date of Service: 4/18/2022  MRN: [id number]   Acct: [de-identified]   Primary Care Physician: LUIZ Martines  Advance Directive: Full Code  Admit Date: 4/12/2022       Hospital Day: 6      CHIEF COMPLAINT:     Chief Complaint   Patient presents with    Chest Pain    Arm Pain       4/18/2022 9:41 AM  Subjective / Interval History:   04/18/2022  ***      04/17/2022  No acute changes or acute overnight events reported. Patient endorses some left-sided upper abdominal pain which is decently controlled. Laying comfortably in bed no acute distress. Family at bedside. Patient denies any acute complaints or distress on exam.      04/16/2022  Patient seen and examined this AM.  Doing well. No new complaints. Continues to report some left-sided upper abdominal pain. Laying comfortably in bed no apparent acute distress. Family at bedside. Patient denies any acute complaints or distress exam.      04/15/2022  Patient seen and examined. Doing well. Abdominal pain improved. Denies any active chest pain at this time. Lying comfortably in bed no apparent acute distress. No acute changes or acute overnight event reported. 04/14/2022  No acute changes or acute overnight event reported. Patient seen and evaluated this AM.  Doing well. Continues to report some upper abdomen to chest heaviness and tenderness. Laying comfortably in bed however no apparent acute distress. Family at bedside. 04/13/2022  Patient seen and examined this AM.  Endorses overall improvement. Denies any active chest pain at this time. Continues to endorse some abdominal to left-sided discomfort. Laying comfortably in bed in no acute distress. Review of Systems:   Review of Systems  ROS: 14 point review of systems is negative except as specifically addressed above. ADULT DIET;  Regular    Intake/Output Summary (Last 24 hours) at 4/18/2022 0941  Last data filed at 4/18/2022 0520  Gross per 24 hour   Intake 1866.76 ml   Output    Net 1866.76 ml       Medications:   dextrose      sodium chloride 20 mL (04/17/22 1418)     Current Facility-Administered Medications   Medication Dose Route Frequency Provider Last Rate Last Admin    hydrOXYzine (ATARAX) tablet 50 mg  50 mg Oral BID PRN Raul Armando MD   50 mg at 04/15/22 2028    ciprofloxacin (CIPRO) IVPB 400 mg  400 mg IntraVENous Q12H Nirav Gr, DO   Stopped at 04/18/22 0251    metronidazole (FLAGYL) 500 mg in NaCl 100 mL IVPB premix  500 mg IntraVENous Q8H Nirav Gr  mL/hr at 04/18/22 0619 500 mg at 04/18/22 2501    amLODIPine (NORVASC) tablet 2.5 mg  2.5 mg Oral Daily Arleth Renteria MD   2.5 mg at 04/18/22 0854    atorvastatin (LIPITOR) tablet 80 mg  80 mg Oral Nightly Eric Godoy MD   80 mg at 04/17/22 2107    ezetimibe (ZETIA) tablet 10 mg  10 mg Oral Daily Eric Godoy MD   10 mg at 04/18/22 0854    furosemide (LASIX) tablet 40 mg  40 mg Oral Daily Eric Godoy MD   40 mg at 04/18/22 0854    metoprolol succinate (TOPROL XL) extended release tablet 100 mg  100 mg Oral Daily Raul Armando MD   100 mg at 04/17/22 0930    glucagon (rDNA) injection 1 mg  1 mg IntraMUSCular PRN Eric Godoy MD        dextrose 5 % solution  100 mL/hr IntraVENous PRN Eric Godoy MD        sodium chloride flush 0.9 % injection 5-40 mL  5-40 mL IntraVENous 2 times per day Eric Godoy MD   10 mL at 04/17/22 1940    sodium chloride flush 0.9 % injection 10 mL  10 mL IntraVENous PRN Eric Godoy MD        0.9 % sodium chloride infusion   IntraVENous PRN Eric Godoy MD 20 mL/hr at 04/17/22 1418 20 mL at 04/17/22 1418    ondansetron (ZOFRAN-ODT) disintegrating tablet 4 mg  4 mg Oral Q8H PRN Eric Godoy MD        Or    ondansetron [facility]) injection 4 mg  4 mg IntraVENous Q6H PRN Arleth Torrez MD [place]        acetaminophen (TYLENOL) tablet 650 mg  650 mg Oral Q6H PRN Negin Boo MD   650 mg at 04/17/22 3119    Or    acetaminophen (TYLENOL) suppository 650 mg  650 mg Rectal Q6H PRN Negin Boo MD        polyethylene glycol [facility]) packet 17 g  17 g Oral Daily PRN Negin Boo MD        aspirin chewable tablet 81 mg  81 mg Oral Daily Negin Boo MD   81 mg at 04/18/22 0854    nitroGLYCERIN (NITROSTAT) SL tablet 0.4 mg  0.4 mg SubLINGual Q5 Min PRN Negin Boo MD        insulin glargine (LANTUS) injection vial 24 Units  24 Units SubCUTAneous Nightly Negin Boo MD   24 Units at 04/17/22 2100    insulin lispro (HUMALOG) injection vial 6 Units  6 Units SubCUTAneous TID SONIA Boo MD   6 Units at 04/16/22 0958    insulin lispro (HUMALOG) injection vial 0-12 Units  0-12 Units SubCUTAneous TID SONIA Boo MD   4 Units at 04/14/22 1309    insulin lispro (HUMALOG) injection vial 0-6 Units  0-6 Units SubCUTAneous Nightly Negin Boo MD   2 Units at 04/17/22 2100    enoxaparin (LOVENOX) injection 40 mg  40 mg SubCUTAneous Daily Negin Boo MD   40 mg at 04/18/22 0854    hydrALAZINE (APRESOLINE) injection 10 mg  10 mg IntraVENous Q6H PRN Negin Boo MD        glucose chewable tablet 4 each  4 tablet Oral PRN Negin Boo MD        dextrose bolus (hypoglycemia) 10% 125 mL  125 mL IntraVENous PRN Negin Boo MD        Or    dextrose bolus (hypoglycemia) 10% 250 mL  250 mL IntraVENous PRN Negin Boo MD             dextrose      sodium chloride 20 mL (04/17/22 1418)      ciprofloxacin  400 mg IntraVENous Q12H    metroNIDAZOLE  500 mg IntraVENous Q8H    amLODIPine  2.5 mg Oral Daily    atorvastatin  80 mg Oral Nightly    ezetimibe  10 mg Oral Daily    furosemide  40 mg Oral Daily    metoprolol succinate  100 mg Oral Daily    sodium chloride flush  5-40 mL IntraVENous 2 times per day    aspirin  81 mg Oral Daily    insulin glargine  24 Units SubCUTAneous Nightly    insulin lispro  6 Units SubCUTAneous TID WC    insulin lispro  0-12 Units SubCUTAneous TID WC    insulin lispro  0-6 Units SubCUTAneous Nightly    enoxaparin  40 mg SubCUTAneous Daily     hydrOXYzine, glucagon (rDNA), dextrose, sodium chloride flush, sodium chloride, ondansetron **OR** ondansetron, acetaminophen **OR** acetaminophen, polyethylene glycol, nitroGLYCERIN, hydrALAZINE, glucose, dextrose bolus (hypoglycemia) **OR** dextrose bolus (hypoglycemia)  ADULT DIET; Regular       Labs:   CBC with DIFF:  Recent Labs     04/16/22  0351 04/17/22  0703 04/18/22  0451   WBC 9.8 7.8 8.1   RBC 3.82* 3.88* 3.85*   HGB 11.2* 11.4* 11.4*   HCT 37.7 37.8 37.9   MCV 98.7 97.4 98.4   MCH 29.3 29.4 29.6   MCHC 29.7* 30.2* 30.1*   RDW 13.2 13.1 12.9    255 262   MPV 9.3* 9.7 9.5   NEUTOPHILPCT 72.1* 65.0 67.9*   LYMPHOPCT 13.9* 20.0 17.9*   MONOPCT 12.1* 12.4* 10.8*   EOSRELPCT 1.3 1.9 2.6   BASOPCT 0.3 0.4 0.4   NEUTROABS 7.0 5.1 5.5   LYMPHSABS 1.4 1.6 1.4   MONOSABS 1.20* 1.00* 0.90   EOSABS 0.10 0.20 0.20   BASOSABS 0.00 0.00 0.00       CMP/BMP:  Recent Labs     04/16/22  0351 04/17/22  0703 04/18/22  0423    141 141   K 4.5 4.3 4.3    107 107   CO2 22 22 23   ANIONGAP 13 12 11   GLUCOSE 114* 119* 108   BUN 13 8 11   CREATININE 0.7 0.7 0.8   LABGLOM >60 >60 >60   CALCIUM 9.7 9.6 9.9         CRP:  No results for input(s): CRP in the last 72 hours. Sed Rate:  No results for input(s): SEDRATE in the last 72 hours. HgBA1c:  No components found for: HGBA1C  FLP:    Lab Results   Component Value Date    TRIG 181 04/13/2022    HDL 41 04/13/2022    LDLCALC 54 04/13/2022     TSH:    Lab Results   Component Value Date    TSH 1.880 10/19/2021     Troponin T:   No results for input(s): TROPONINI in the last 72 hours. Pro-BNP: No results for input(s): BNP in the last 72 hours.   INR:   No results for input(s): INR in the last 72 hours. ABGs: No results found for: PHART, PO2ART, GDF7VXJ  UA:  No results for input(s): NITRITE, COLORU, PHUR, LABCAST, WBCUA, RBCUA, MUCUS, TRICHOMONAS, YEAST, BACTERIA, CLARITYU, SPECGRAV, LEUKOCYTESUR, UROBILINOGEN, BILIRUBINUR, BLOODU, GLUCOSEU, AMORPHOUS in the last 72 hours. Invalid input(s): Euel Sinks      Culture Results:    No results for input(s): CXSURG in the last 720 hours. Blood Culture Recent: No results for input(s): BC in the last 720 hours. No results for input(s): BC, BLOODCULT2, ORG in the last 72 hours. Cultures:   No results for input(s): CULTURE in the last 72 hours. No results for input(s): BC, April  in the last 72 hours. No results for input(s): CXSURG in the last 72 hours. No results for input(s): MG, PHOS in the last 72 hours. No results for input(s): AST, ALT, ALB, BILITOT, ALKPHOS, ALB in the last 72 hours. RAD:   CT ABDOMEN PELVIS W IV CONTRAST Additional Contrast? None    Result Date: 4/12/2022  Examination. CT ABDOMEN PELVIS W IV CONTRAST 4/12/2022 1:07 PM History: Abdominal pain. DLP: 1488 mGycm. The automated exposure control was utilized to minimize the patient's radiation exposure. The CT scan of the abdomen and pelvis is performed after intravenous contrast enhancement. The images are acquired in axial plane and subsequent reconstruction in coronal and sagittal planes. The comparison is made with the previous study dated 1/22/2019. The lung bases included in the study show dependent atelectatic changes at bilateral bases posteriorly. The limited included cardiomediastinal structures show heavy calcification mitral annulus. No significant cardiomegaly. Mild atheromatous changes of coronary arteries. The liver appears normal. There are ill-defined low-density nodules in the spleen which appears significantly larger than the previous study. A nodule located posteriorly measures 2 cm in diameter.  It previously measured 1 cm. The gallbladder is surgically absent. No significant dilatation of the common bile duct. The pancreas is normal. The pancreatic duct is not visualized. The adrenal glands are normal. There are multiple tiny low-density cortical nodules in both kidneys which appears similar to the previous study. No radiopaque calculi. No hydronephrosis. The ureters bilaterally are normal. The urinary bladder is poorly distended. No intrinsic abnormality. The uterus is absent. No adnexal masses. Tiny fat-containing umbilical hernia. There is a small sliding-type hiatal hernia. There is a curvilinear radiopaque foreign object which appears to be in the distal part of the stomach and is protruding through the posterior medial wall of the distal stomach/antrum projecting near the neck/proximal body of the pancreas. The type of foreign body is not certain. This may represent a metallic wire or a fishbone? Bula Dayhoff There is a small bubble of air adjacent to the proximal end of this foreign object. The duodenum is normal. Small bowel is nondistended. A retrocecal appendix is normal. The cecum lies low in the pelvis adjacent to the urinary bladder. Moderate gas and stool is seen in the colon. There is diverticulosis of the distal colon. No evidence for diverticulitis. Atheromatous changes of the abdominal aorta and iliac arteries. No aneurysmal dilatation. There is no evidence of abdominal or pelvic lymphadenopathy. The images reviewed in bone window show chronic degenerative changes of the lumbar spine. There is a mild levoscoliosis. No focal bony lesion. 1. A curvilinear radiopaque foreign body in the stomach protruding through the posterior medial wall of the distal body/antrum of the stomach. No free air. This may represent a metallic wire or a fishbone? Clinical correlation and further evaluation is recommended. 2. Multiple very small low density renal nodules which are too small to be further characterized.  These are unchanged. 3. The diverticulosis of the colon. No evidence for diverticulitis. 4. The enlarging poorly marginated low-density nodule in the spleen. Etiology is not certain. Possibility of metastatic disease is not excluded. The results were called to ER physician, Dr. Zita Barrera, immediately. Signed by Dr Ray Carmichael    Result Date: 4/12/2022  XR CHEST PORTABLE 4/12/2022 12:13 PM HISTORY: Chest pain COMPARISON: 3/23/2020 CXR: A single frontal view of the chest is performed. Findings: Lungs appear mildly hyperinflated. Vascular crowding versus mild venous congestion. Basilar atelectasis. No pleural effusion or pneumothorax. No acute regional bony pathology. 1. Hypoventilated lungs with vascular crowding versus mild venous congestion and basilar atelectasis. . Signed by Dr Cadena [facility]    CTA [address]    Result Date: 4/12/2022  CTA chest 4/12/2022 1:06 PM HISTORY: Shortness of breath with chest pressure TECHNIQUE: Axial images of the chest were obtained following IV contrast. . Coronal and sagittal as well as maximal intensity projectional reformatted images are reconstructed and reviewed. COMPARISON: 2/27/2018. DLP: 856 mGy cm Automated exposure control was utilized to minimize patient radiation dose. FINDINGS: No pulmonary emboli identified. Thoracic aorta normal in caliber with no aneurysm or dissection. Cardiomegaly. Mild left coronary calcification. No pericardial or pleural effusions. No pathologic intrathoracic or axillary lymphadenopathy. Small hiatal hernia. Please refer to CT abdomen pelvis report for findings below the hemidiaphragms. Basilar atelectasis. Mild atelectatic changes of the lingula. No consolidation. No suspicious pulmonary nodules. No pneumothorax. No endobronchial lesions. No focal destructive osseous lesions. A bony hemangioma suspected within the T11 vertebra. 1. No pulmonary emboli. 2. No thoracic aneurysm or dissection.  Mild left coronary artery calcification. Mild cardiomegaly. 3. Small hiatal hernia. 4. Atelectatic changes lungs with no consolidation or suspicious nodularity.  Signed by Dr Juan M Gaona    EGD    Result Date: 4/13/2022  No dictation       2D Echo: 04/14/2022  Technically difficult study with poor acoustical window many images of   poor quality   Normal left ventricular size with hyperdynamic systolic function EF   greater than 60%   Mild concentric left ventricular hypertrophy with probable mild [grade 1]   diastolic dysfunction   Normal left atrial size   Mild thickening of the aortic valve which is poorly visualized with   peak/mean gradients of 17/10 mmHg consistent with very mild stenosis and   probable trace to mild insufficiency [tachycardia and absence of pressure   half-time and vena contract make quantification difficult]   Mitral annular calcification with normal mobility of the mitral valve and   trace regurgitation   No evidence of pulmonic valvular stenosis or insufficiency   Normal right-sided chambers with preserved RV systolic function   Trace tricuspid regurgitation inadequate to estimate RVSP   IVC dimension appears within normal limits with inability to assess   respiratory motion   Aortic root and ascending segment measure within normal limits   Pericardial fat pad noted   Definity contrast utilized to better define endocardial borders      Signature   ----------------------------------------------------------------   Electronically signed by Zahida Vaca MD(Interpreting physician)   on 04/14/2022 04:39 PM   ----------------------------------------------------------------           Objective:   Vitals:   BP (!) 107/58   Pulse 86   Temp 98.6 °F (37 °C)   Resp 18   Ht 5' 4\" (1.626 m)   Wt 220 lb (99.8 kg)   SpO2 94%   BMI 37.76 kg/m²       Patient Vitals for the past 24 hrs:   BP Temp Temp src Pulse Resp SpO2   04/18/22 0513 (!) 107/58 98.6 °F (37 °C)  86 18 94 %   04/18/22 0039 (!) 101/45 97.9 °F (36.6 °C)  87 18 96 %   04/17/22 2234    87     04/17/22 1643 125/79 96.7 °F (35.9 °C) Temporal 81 16 96 %   04/17/22 1115 122/74 96.4 °F (35.8 °C) Temporal 79 18 95 %       24HR INTAKE/OUTPUT:      Intake/Output Summary (Last 24 hours) at 4/18/2022 0941  Last data filed at 4/18/2022 0520  Gross per 24 hour   Intake 1866.76 ml   Output    Net 1866.76 ml       Physical Exam  Vitals and nursing note reviewed. Constitutional:       General: She is not in acute distress. Appearance: Normal appearance. She is obese. She is not ill-appearing, toxic-appearing or diaphoretic. HENT:      Head: Normocephalic and atraumatic. Right Ear: External ear normal.      Left Ear: External ear normal.      Nose: Nose normal. No congestion or rhinorrhea. Mouth/Throat:      Mouth: Mucous membranes are moist.      Pharynx: Oropharynx is clear. No oropharyngeal exudate or posterior oropharyngeal erythema. Eyes:      General: No scleral icterus. Right eye: No discharge. Left eye: No discharge. Extraocular Movements: Extraocular movements intact. Conjunctiva/sclera: Conjunctivae normal.      Pupils: Pupils are equal, round, and reactive to light. Cardiovascular:      Rate and Rhythm: Normal rate and regular rhythm. Pulses: Normal pulses. Heart sounds: Normal heart sounds. No murmur heard. No friction rub. No gallop. Pulmonary:      Effort: Pulmonary effort is normal. No respiratory distress. Breath sounds: Normal breath sounds. No stridor. No wheezing, rhonchi or rales. Chest:      Chest wall: No tenderness. Abdominal:      General: Bowel sounds are normal. There is no distension. Palpations: Abdomen is soft. Tenderness: There is abdominal tenderness ( Diffuse tenderness (improving) to deep palpation. No guarding, rigidity, rebound tenderness noted. ). There is no guarding or rebound. Musculoskeletal:         General: No swelling, tenderness, deformity or signs of injury. Normal range of motion. Cervical back: Normal range of motion and neck supple. No rigidity. No muscular tenderness. Right lower leg: No edema. Left lower leg: No edema. Skin:     General: Skin is warm and dry. Capillary Refill: Capillary refill takes less than 2 seconds. Coloration: Skin is not jaundiced or pale. Findings: No bruising, erythema, lesion or rash. Neurological:      General: No focal deficit present. Mental Status: She is alert and oriented to person, place, and time. Cranial Nerves: No cranial nerve deficit. Sensory: No sensory deficit. Motor: No weakness. Coordination: Coordination normal.   Psychiatric:         Mood and Affect: Mood normal.         Behavior: Behavior normal.         Thought Content: Thought content normal.         Judgment: Judgment normal.         Assessment/plan:         Hospital Problems           Last Modified POA    * (Principal) Chest pain 4/12/2022 Yes    Abdominal pain 4/12/2022 Yes    Foreign body in stomach 4/12/2022 Yes    Essential hypertension (Chronic) 4/12/2022 Yes    Mixed hyperlipidemia (Chronic) 4/12/2022 Yes    Gastroesophageal reflux disease without esophagitis (Chronic) 4/12/2022 Yes    Morbidly obese (HCC) (Chronic) 4/12/2022 Yes    Overview Signed 8/17/2017 12:54 PM by Krissy Jones     Last Assessment & Plan:   Obesity: Federal guidelines recommend that people under the age of 72 should have a BMI of 18.5-24.9 and people age 72 and older should have a BMI of 23-30. Morbid obesity is defined as >100 lb overweight or BMI >40. The patient BMI is outside the recommended range and we recommended/discussed today to utilize a diet/exercise program to get back into the appropriate range. Today we encouraged roughly a 1 lb per week weight loss with initial goal of 5% weight loss. The initial step is to document everything that is consumed into a food diary.   Studies have shown that patients can lose up to 2x the weight by keeping track of foods. We discussed BEE today and discussed reasonable goal to realize weight loss. Would recommend f/u with PCP to discuss further. Dye's esophagus without dysplasia 4/13/2022 Yes    Anxiety 4/12/2022 Yes    Moderate episode of recurrent major depressive disorder ([address] 4/12/2022 Yes    Obstructive sleep apnea syndrome 4/12/2022 Yes    History of colon polyps 4/13/2022 Yes          Principal Problem:    Chest pain  Active Problems:    Abdominal pain    Foreign body in stomach    Essential hypertension    Mixed hyperlipidemia    Gastroesophageal reflux disease without esophagitis    Morbidly obese (HCC)    Dye's esophagus without dysplasia    Anxiety    Moderate episode of recurrent major depressive disorder ([address]    Obstructive sleep apnea syndrome    History of colon polyps  Resolved Problems:    * No resolved hospital problems. *        Brief Summary  Ms. Esthela Mahtis, a 71 y.o. female with a history of history of DMT2, HTN, HLD, JAYME, anxiety/depression, GERD, presenting to [facility] ED (04/12/2022), on account of an acute onset of several hours history of, moderate to severe left-sided chest and abdominal  Pain.     Patient reports a squeezing type left-sided chest pain/pressure going down to her left abdomen and left arm.       Associated symptom reported includes an episode of vomiting the night prior to presentation, as well as dyspnea on exertion.     No other associated symptoms reported; no dizziness, lightheadedness, palpitations or diaphoresis.     CT abdomen pelvis, reporting a curvilinear radiopaque foreign object (metallic wire vs fishbone-like) which appears to be in the distal part of the stomach and is protruding through the posterior medial wall of the distal stomach/antrum projecting near the neck/proximal body of the pancreas.     Patient reportedly had a catfish last night.     General surgery and GI both consulted from ED.     Patient be admitted to medical service, for further work-up and management. Abdominal Pain  ? Foreign body in posterior wall of stomach on imaging  Gastric MicroPerforation   Speenic LesionCT abdomen pelvis W IV Con (04/12/2022): Impression: A curvilinear radiopaque foreign body in the stomach protruding through the posterior medial wall of the distal body/antrum of the stomach. No free air. This may represent a metallic wire or a fishbone? Clinical correlation and further evaluation is recommended. Multiple very small low density renal nodules which are too small to be further characterized. These are unchanged. The diverticulosis of the colon. No evidence for diverticulitis. The enlarging poorly marginated low-density nodule in the spleen. Etiology is not certain. Possibility of metastatic disease is not excluded. · General Surgery and GI on board  · Continue symptomatic supportive management  · Further management as per specialist recommendations  · Status post EGD (04/13/2022): Findings: No foreign body present on upper GI tract  · Repeat CT abdomen pelvis WO Con (04/15/2022): Impression: The previously seen radiopaque foreign body in the stomach is not visualized in this study. However there is evidence of perforation of the inferior aspect of the distal body/antrum of the stomach with adjacent small air bubble outside the wall and significant surrounding peritoneal/extraperitoneal fat infiltration suggesting inflammatory process. No fluid accumulation or abscess. No significant free air. The similar shaped foreign body is not identified anywhere else in the GI tract. The remaining abdomen and pelvis are similar to the previous study  · Repeat CT abdomen pelvis WO Con (04/17/2022): Impression: Inflammatory changes noted in the region of the previously  described gastric perforation. There is inflammation and infiltratingbfat noted. There is no discrete abscess. There is no extraluminal air.     · Further work-up and management as per general surgery recommendation  · No acute surgical intervention recommended at this time  · Clear liquid diet initiated      Chest/Arm Pain  · CTA Chest W Con (04/12/2022): Impression: No pulmonary emboli. No thoracic aneurysm or dissection. Mild left coronary artery calcification. Mild cardiomegaly. Small hiatal hernia. Atelectatic changes lungs with no consolidation or suspicious nodularity.     · - Initial troponin negative x4 sets  · - Serial EKGs for chest pain  · - Optimized medical management  · --> Nitro glycerin sublingual when necessary for chest pain  · - 2D Echocardiogram: (04/14/2022): Summary report as noted above  · - Continue to monitor on telemetry  · - Famotidine   · - Cardiology on boardappreciate recommendations  · NM regadenoson stress test (04/13/2022): Summary / Impressions: Normal ejection fraction 86% normal perfusion study without evidence of ischemia or previous infarction. ·        Diabetes Mellitus II  · Uncontrolled  · Inpatient Regimens to include;  ? - Insulin Glargine (Lantus) 24 units subcu nightly  ? - Insulin Lispro (Humalog) 6 units subcu pre-meal 3 times a day  ? - Insulin Lispro (Humalog) on a Medium dose sliding scale  · Monitor POC glucose, and adjust insulin regimen accordingly based on daily insulin requirement.      Essential Hypertension  · Uncontrolled  · Resume home regimen  · Amlodipine  · Hydralazine  · Metoprolol succinate 100 mg p.o. daily  · Hydralazine 10 mg IV Q6H/PRN  For SBP> 180 and/or DP> 110   · Continue to monitor     Mixed Hyperlipidemia  · Ezetimibe 10 mg p.o. daily  · Atorvastatin 80 mg p.o. nightly     Anxiety/depression  · Resume home regimen after reconciliation  · Hydroxyzine 50 mg p.o. daily as needed  · Lorazepam 0.5 mg p.o. every 12 hours/as needed for anxiety      Continue management of other chronic medical conditions - see above and orders. Advance Directive: Full Code    ADULT DIET;  Regular         Consults Made:   IP CONSULT TO GENERAL SURGERY  IP CONSULT TO GI  IP CONSULT TO CARDIOLOGY    DVT prophylaxis: Enoxaparin      Discharge planning:   Continue work-up and management as above  Further work-up and management as per general surgery        Time Spent is 25 mins in the examination, evaluation, counseling and review of medications, assessment and plan.      Electronically signed by   Jordan Kerns MD, MPH, MD,   Internal Medicine Hospitalist   4/18/2022 9:41 AM

## 2022-04-19 ENCOUNTER — TELEPHONE (OUTPATIENT)
Dept: PRIMARY CARE CLINIC | Age: 72
End: 2022-04-19

## 2022-04-19 NOTE — TELEPHONE ENCOUNTER
[facility] Transitions Initial Follow Up Call    Outreach made within 2 business days of discharge: Yes    Patient: Zuleyka Roldan Patient : 1950   MRN: [id number]  Reason for Admission: There are no discharge diagnoses documented for the most recent discharge.   Discharge Date: 22       Spoke with: Claudio      Discharge department/facility: [facility]        Scheduled appointment with PCP within 7-14 days    Follow Up  Future Appointments   Date Time Provider Kristyn Christopher   2022 12:30 PM LUIZ Baker [facility]   2022 12:30 PM LUIZ Anand [facility]   2022 11:15 AM DO JERAMIE Aviles [facility]   2022  1:00 PM LUIZ OrtezL PAIN [facility]   2022 11:00 AM MD JERAMIE Gaines [facility] Card[facility]       [place], [place]

## 2022-04-21 ENCOUNTER — OFFICE VISIT (OUTPATIENT)
Dept: PRIMARY CARE CLINIC | Age: 72
End: 2022-04-21
Payer: MEDICARE

## 2022-04-21 VITALS
HEIGHT: 64 IN | DIASTOLIC BLOOD PRESSURE: 60 MMHG | HEART RATE: 74 BPM | WEIGHT: 220 LBS | OXYGEN SATURATION: 98 % | BODY MASS INDEX: 37.56 KG/M2 | SYSTOLIC BLOOD PRESSURE: 90 MMHG | TEMPERATURE: 97.2 F

## 2022-04-21 DIAGNOSIS — E11.9 TYPE 2 DIABETES MELLITUS WITHOUT COMPLICATION, WITHOUT LONG-TERM CURRENT USE OF INSULIN (HCC): ICD-10-CM

## 2022-04-21 DIAGNOSIS — M79.672 PAIN IN BOTH FEET: ICD-10-CM

## 2022-04-21 DIAGNOSIS — G62.9 NEUROPATHY: ICD-10-CM

## 2022-04-21 DIAGNOSIS — F33.1 MODERATE EPISODE OF RECURRENT MAJOR DEPRESSIVE DISORDER (HCC): ICD-10-CM

## 2022-04-21 DIAGNOSIS — F41.9 ANXIETY: ICD-10-CM

## 2022-04-21 DIAGNOSIS — I10 PRIMARY HYPERTENSION: Primary | ICD-10-CM

## 2022-04-21 DIAGNOSIS — M79.671 PAIN IN BOTH FEET: ICD-10-CM

## 2022-04-21 DIAGNOSIS — M54.6 ACUTE LEFT-SIDED THORACIC BACK PAIN: ICD-10-CM

## 2022-04-21 PROCEDURE — 99214 OFFICE O/P EST MOD 30 MIN: CPT | Performed by: NURSE PRACTITIONER

## 2022-04-21 PROCEDURE — 3044F HG A1C LEVEL LT 7.0%: CPT | Performed by: NURSE PRACTITIONER

## 2022-04-21 RX ORDER — VENLAFAXINE HYDROCHLORIDE 75 MG/1
75 CAPSULE, EXTENDED RELEASE ORAL DAILY
Qty: 30 CAPSULE | Refills: 3 | Status: SHIPPED | OUTPATIENT
Start: 2022-04-21 | End: 2022-05-20

## 2022-04-21 RX ORDER — GABAPENTIN 400 MG/1
400 CAPSULE ORAL 3 TIMES DAILY
Qty: 90 CAPSULE | Refills: 3 | Status: ON HOLD | OUTPATIENT
Start: 2022-04-21 | End: 2022-06-07

## 2022-04-21 RX ORDER — CIPROFLOXACIN 500 MG/1
500 TABLET, FILM COATED ORAL 2 TIMES DAILY
Qty: 14 TABLET | Refills: 0
Start: 2022-04-21 | End: 2022-04-28

## 2022-04-21 RX ORDER — TIZANIDINE 2 MG/1
2 TABLET ORAL 2 TIMES DAILY PRN
Qty: 30 TABLET | Refills: 1 | Status: SHIPPED | OUTPATIENT
Start: 2022-04-21 | End: 2022-04-21

## 2022-04-21 RX ORDER — BACLOFEN 5 MG/1
5 TABLET ORAL 2 TIMES DAILY PRN
Qty: 30 TABLET | Refills: 1 | Status: ON HOLD | OUTPATIENT
Start: 2022-04-21 | End: 2022-06-07

## 2022-04-21 ASSESSMENT — ENCOUNTER SYMPTOMS
BACK PAIN: 1
EYE REDNESS: 0
DIARRHEA: 0
VOMITING: 0
CONSTIPATION: 0
SORE THROAT: 0
SHORTNESS OF BREATH: 0
COUGH: 1
RHINORRHEA: 0

## 2022-04-21 NOTE — PROGRESS NOTES
Karen Chavira (:  1950) is a 71 y.o. female,Established patient, here for evaluation of the following chief complaint(s):  Follow-up      ASSESSMENT/PLAN:    ICD-10-CM    1. Primary hypertension  I10 The current medical regimen is effective;  continue present plan and medications. Patient is asked to monitor BP at home or work, several times per month and return with written values at next office visit. 2. Anxiety  F41.9 venlafaxine (EFFEXOR XR) 75 MG extended release capsule  (TOTAL of Effexor 225 mg)  Continue Ativan prn   3. Moderate episode of recurrent major depressive disorder (HCC)  F33.1 venlafaxine (EFFEXOR XR) 75 MG extended release capsule   4. Type 2 diabetes mellitus without complication, without long-term current use of insulin (HCC)  E11.9 Continue Metformin 500 mg BID  ADA diet  Exercise as tolerated   5. Acute left-sided thoracic back pain  M54.6 baclofen (LIORESAL) 5 MG tablet        6. Neuropathy  G62.9 gabapentin (NEURONTIN) 400 MG capsule (increased from 300 mg)   7. Pain in both feet  M79.671 gabapentin (NEURONTIN) 400 MG capsule    M79.672        Return in about 1 month (around 2022), or if symptoms worsen or fail to improve. SUBJECTIVE/OBJECTIVE:  HPI     She was admitted on 2022 with CP and possible foreign body in stomach  2022, EGD, no evidence of foreign body    CT scan of abdomen, 2022:  1. Inflammatory changes noted in the region of the previously   described gastric perforation. There is inflammation and infiltrating   fat noted. There is no discrete abscess. There is no extraluminal   air. .      Chest pain & abdominal pain has now resolved  She continues to have some left sided mid back pain. \"It feels like someone is pushing in that area. \"   \"If I stand up for more than 15 minutes, the pain shoots into my hip. \"  She is being treated with Cipro and Flagyl    She follows up with general surgery on 2022    ANXIETY:  \"I am really emotional.\"  \"I cry all the time. \"  \"I can look at a wall and cry. I can't talk on the phone because as soon as I hear someone's voice I start crying. \"  Nothing has happened to trigger her crying. She is currently on Effexor 150 mg and Ativan prn. She is taking Ativan BID, prn. \"I am still doing the crying. \"   \"It does help a little bit. \"    HTN:  Well controlled. She continues on hydralazine 10 mg, norvasc 2.5 mg, & Toprol 100 mg  She is tolerating this regimen. DM:  She continues on Metformin 500 mg BID. Blood sugar was 122 this morning  Denies any blood sugar lows  A1c: 6.3 (4-)    NEUROPATHY:  Reports burning in bilateral feet. She first noticed it about 3 months ago. \"It feels like I am walking on hot pavement. \"  She is currently taking Neurontin 300 mg TID. BP 90/60   Pulse 74   Temp 97.2 °F (36.2 °C) (Temporal)   Ht 5' 4\" (1.626 m)   Wt 220 lb (99.8 kg)   SpO2 98%   BMI 37.76 kg/m²     Review of Systems   Constitutional: Negative for chills, fatigue and fever. HENT: Negative for congestion, ear pain, rhinorrhea and sore throat. Eyes: Negative for redness. Respiratory: Positive for cough. Negative for shortness of breath. Cardiovascular: Negative for chest pain. Gastrointestinal: Negative for constipation, diarrhea and vomiting. Genitourinary: Negative for dysuria, frequency and urgency. Musculoskeletal: Positive for back pain (left mid back, pressure). Skin: Negative for rash. Neurological: Positive for numbness (bilateral feet). Negative for dizziness and headaches. Psychiatric/Behavioral: The patient is nervous/anxious. Depression  Crying spells       Physical Exam  Vitals reviewed. Constitutional:       Appearance: She is well-developed. She is obese. HENT:      Head: Normocephalic.       Right Ear: Tympanic membrane and external ear normal.      Left Ear: Tympanic membrane and external ear normal.      Nose: Nose normal.   Eyes:      General:         Right eye: No discharge. Left eye: No discharge. Neck:      Vascular: No carotid bruit. Cardiovascular:      Rate and Rhythm: Normal rate and regular rhythm. Pulmonary:      Effort: Pulmonary effort is normal.      Breath sounds: Normal breath sounds. No wheezing, rhonchi or rales. Abdominal:      General: Bowel sounds are normal.      Palpations: Abdomen is soft. Musculoskeletal:      Cervical back: Normal range of motion. Back:    Skin:     General: Skin is dry. Neurological:      General: No focal deficit present. Mental Status: She is alert and oriented to person, place, and time. Mental status is at baseline. Psychiatric:         Mood and Affect: Mood normal.         Behavior: Behavior normal.         Thought Content: Thought content normal.         Judgment: Judgment normal.             An electronic signature was used to authenticate this note.     --LUIZ Ann

## 2022-05-02 ENCOUNTER — OFFICE VISIT (OUTPATIENT)
Dept: SURGERY | Age: 72
End: 2022-05-02
Payer: MEDICARE

## 2022-05-02 VITALS — SYSTOLIC BLOOD PRESSURE: 104 MMHG | DIASTOLIC BLOOD PRESSURE: 60 MMHG | HEART RATE: 76 BPM

## 2022-05-02 DIAGNOSIS — D73.89 SPLENIC LESION: ICD-10-CM

## 2022-05-02 DIAGNOSIS — R10.13 EPIGASTRIC PAIN: Primary | ICD-10-CM

## 2022-05-02 DIAGNOSIS — K21.9 GASTROESOPHAGEAL REFLUX DISEASE WITHOUT ESOPHAGITIS: Chronic | ICD-10-CM

## 2022-05-02 PROCEDURE — 99213 OFFICE O/P EST LOW 20 MIN: CPT | Performed by: SURGERY

## 2022-05-02 ASSESSMENT — ENCOUNTER SYMPTOMS
SORE THROAT: 0
FACIAL SWELLING: 0
CHEST TIGHTNESS: 0
BACK PAIN: 0
SHORTNESS OF BREATH: 0
CHOKING: 0
EYE DISCHARGE: 0
ABDOMINAL PAIN: 1
CONSTIPATION: 0
WHEEZING: 0
VOMITING: 0
ABDOMINAL DISTENTION: 0
EYE PAIN: 0
EYE REDNESS: 0
DIARRHEA: 0

## 2022-05-02 NOTE — PROGRESS NOTES
SUBJECTIVE:  Ms. Sonido Recinos is a 71 y.o. female who presents today with after hospitalization for epigastric pain. Was found to have foreign body on imaging. EGD did not show foreign body. Follow-up CAT scans did show small microperforation of the stomach which was treated medically. Patient doing well. Small lesion seen on CT scan of the spleen. Doing well since discharge. Still having some dysphagia. Patient's medications, allergies, past medical, surgical, social and family histories werereviewed and updated as appropriate. Review of Systems   Constitutional: Positive for fatigue. Negative for activity change, chills and fever. HENT: Negative for facial swelling, hearing loss and sore throat. Eyes: Negative for pain, discharge and redness. Respiratory: Negative for choking, chest tightness, shortness of breath and wheezing. Cardiovascular: Negative for chest pain and palpitations. Gastrointestinal: Positive for abdominal pain. Negative for abdominal distention, constipation, diarrhea and vomiting. Musculoskeletal: Positive for arthralgias and myalgias. Negative for back pain, neck pain and neck stiffness. Neurological: Negative for dizziness, speech difficulty, weakness and numbness. Psychiatric/Behavioral: Negative for agitation, hallucinations and suicidal ideas. OBJECTIVE:  /60 (Site: Right Upper Arm, Position: Sitting, Cuff Size: Medium Adult)   Pulse 76   Physical Exam  Constitutional:       Appearance: Normal appearance. She is obese. HENT:      Head: Normocephalic and atraumatic. Right Ear: External ear normal.      Left Ear: External ear normal.      Nose: Nose normal.   Eyes:      Pupils: Pupils are equal, round, and reactive to light. Pulmonary:      Effort: Pulmonary effort is normal.      Breath sounds: Normal breath sounds. Abdominal:      General: Bowel sounds are normal.      Palpations: Abdomen is soft. Tenderness:  There is no abdominal tenderness. There is no guarding. Musculoskeletal:         General: Normal range of motion. Cervical back: Normal range of motion and neck supple. Skin:     General: Skin is warm and dry. Neurological:      General: No focal deficit present. Mental Status: She is alert and oriented to person, place, and time. Psychiatric:         Mood and Affect: Mood normal.         Behavior: Behavior normal.         ASSESSMENT:   Diagnosis Orders   1. Epigastric pain     2. Gastroesophageal reflux disease without esophagitis     3. Splenic lesion         PLAN:  No orders of the defined types were placed in this encounter. No orders of the defined types were placed in this encounter. OP referral to GI. Ultrasound of the spleen to R/O splenic lesion. No follow-ups on file.     Alexandrea Mendoza DO 5/2/2022 11:38 AM

## 2022-05-05 ENCOUNTER — HOSPITAL ENCOUNTER (OUTPATIENT)
Dept: PAIN MANAGEMENT | Age: 72
Discharge: HOME | End: 2022-05-05
Payer: MEDICARE

## 2022-05-05 VITALS
SYSTOLIC BLOOD PRESSURE: 102 MMHG | HEART RATE: 76 BPM | DIASTOLIC BLOOD PRESSURE: 38 MMHG | RESPIRATION RATE: 18 BRPM | OXYGEN SATURATION: 93 % | TEMPERATURE: 96.6 F

## 2022-05-05 PROCEDURE — A4216 STERILE WATER/SALINE, 10 ML: HCPCS

## 2022-05-05 PROCEDURE — 6360000002 HC RX W HCPCS

## 2022-05-05 PROCEDURE — 64615 CHEMODENERV MUSC MIGRAINE: CPT

## 2022-05-05 PROCEDURE — 64615 CHEMODENERV MUSC MIGRAINE: CPT | Performed by: NURSE PRACTITIONER

## 2022-05-05 PROCEDURE — 2580000003 HC RX 258

## 2022-05-05 RX ORDER — SODIUM CHLORIDE 9 MG/ML
4.5 INJECTION INTRAVENOUS ONCE
Status: DISCONTINUED | OUTPATIENT
Start: 2022-05-05 | End: 2022-05-07 | Stop reason: HOSPADM

## 2022-05-05 NOTE — PROGRESS NOTES
Physician Progress Note      Demarco Ram  CSN #:                  [id number]  :                       1950  ADMIT DATE:       2022 11:44 AM  DISCH DATE:        2022 4:02 PM  RESPONDING  PROVIDER #:        Otoniel Palmer MD          QUERY TEXT:    Pt admitted with with chest pain and abd. pain, with the CT abd. showing   foreign object (metallic wire vs. fishbone). If possible, please document in   progress notes and discharge summary the relationship, if any, between   abdominal pain and foreign body in the abdomen. The medical record reflects the following:  Risk Factors: CAD,  Clinical Indicators: CT results\" A curvilinear radiopaque foreign body in the   stomach protruding through the posterior medial wall of the distal body/antrum   of he stomach\", EGD was completed and there was no foreign body found, but   could see the indentation of where something was. Diverticulosis of the colon   was also seen on the CT of the abd. Treatment: EGD, Flagyl and Cipro    Thank you in advance,    Lucas Castellon RN-BSN, [facility]  Clinical   [facility], [facility] Dimas [email or web address]  Options provided:  -- Abdominal Pain due to foreign body  -- Abdominal pain unrelated to foreign body  -- Abdominal pain due to diverticulosis  -- Other - I will add my own diagnosis  -- Disagree - Not applicable / Not valid  -- Disagree - Clinically unable to determine / Unknown  -- Refer to Clinical Documentation Reviewer    PROVIDER RESPONSE TEXT:    This patient has abdominal pain due to foreign body.     Query created by: Tala Nolan on 2022 9:49 AM      Electronically signed by:  Otoniel Palmer MD 2022 4:14 PM

## 2022-05-05 NOTE — PROGRESS NOTES
Patient states that he/she has __0____ headaches out of 30 days each month.   Patient states that he/she has __0____ migraines out of 30 days each month.monthly

## 2022-05-05 NOTE — PROGRESS NOTES
[id number] [facility] Neurology Botox Procedure Note     Patient:   Noman Guerrero  MR#:    [id number]  Account Number:                   [id number]      YOB: 1950  Date of Evaluation:  5/5/2022  Time of Note:                          2:19 PM  Primary Physician:    LUIZ Gaitan   Consulting Physician:  LUIZ Boyce DNP    Consent was signed and on the chart. Risk, benefits, and side effects discussed. Pt has a clear history of having more than 15 days/month of migraine, lasting more than 4 hours with multiple treatment failures. Vial Exp Date: 5/24  Antonina Merino Lot Number:  [id number]     Botox was diluted with 0.9% NS to yield a final concentration of 50 units / 1 ml. The following muscles were injected in 0.1 ml (5 unit) increments:    -   5 units left, 5 units right  Procerus-      5 units  Frontalis-      20 units divided into 4 sites left and right  Temporalis-  40 units divided into 4 sites left and 4 sites right  Occipitalis-    30 units divided into 3 sites left and 3 sites right  Cervical Paraspinal-  20 units divided into 2 sites left and 2 sites right  Trapezius-     30 units divided into 3 sites left and 3 sites right    Total units injected: 155  Total units unavoidably discarded: 45    Pt tolerated the procedure well. There were no complications. Patient has not had any headaches or migraines since prior Botox procedure. Pt will follow up in 6 weeks to assess effectiveness and will repeat injections in 12 weeks if continues to benefit.       LUIZ Boyce DNP

## 2022-05-19 ENCOUNTER — OFFICE VISIT (OUTPATIENT)
Dept: CARDIOLOGY CLINIC | Age: 72
End: 2022-05-19
Payer: MEDICARE

## 2022-05-19 ENCOUNTER — OFFICE VISIT (OUTPATIENT)
Dept: GASTROENTEROLOGY | Age: 72
End: 2022-05-19
Payer: MEDICARE

## 2022-05-19 VITALS
SYSTOLIC BLOOD PRESSURE: 100 MMHG | DIASTOLIC BLOOD PRESSURE: 60 MMHG | HEART RATE: 82 BPM | WEIGHT: 220 LBS | BODY MASS INDEX: 37.56 KG/M2 | HEIGHT: 64 IN

## 2022-05-19 VITALS
DIASTOLIC BLOOD PRESSURE: 62 MMHG | BODY MASS INDEX: 37.76 KG/M2 | HEART RATE: 92 BPM | SYSTOLIC BLOOD PRESSURE: 102 MMHG | HEIGHT: 64 IN | WEIGHT: 221.2 LBS | OXYGEN SATURATION: 96 %

## 2022-05-19 DIAGNOSIS — K22.70 BARRETT'S ESOPHAGUS WITHOUT DYSPLASIA: ICD-10-CM

## 2022-05-19 DIAGNOSIS — E78.2 MIXED HYPERLIPIDEMIA: Primary | ICD-10-CM

## 2022-05-19 DIAGNOSIS — G89.29 CHRONIC EPIGASTRIC PAIN: ICD-10-CM

## 2022-05-19 DIAGNOSIS — I10 ESSENTIAL HYPERTENSION: ICD-10-CM

## 2022-05-19 DIAGNOSIS — I20.8 OTHER FORMS OF ANGINA PECTORIS (HCC): ICD-10-CM

## 2022-05-19 DIAGNOSIS — R13.10 DYSPHAGIA, UNSPECIFIED TYPE: Primary | ICD-10-CM

## 2022-05-19 DIAGNOSIS — R10.13 CHRONIC EPIGASTRIC PAIN: ICD-10-CM

## 2022-05-19 DIAGNOSIS — Z86.010 PERSONAL HISTORY OF COLONIC POLYPS: ICD-10-CM

## 2022-05-19 PROCEDURE — 99213 OFFICE O/P EST LOW 20 MIN: CPT | Performed by: INTERNAL MEDICINE

## 2022-05-19 PROCEDURE — 99214 OFFICE O/P EST MOD 30 MIN: CPT | Performed by: NURSE PRACTITIONER

## 2022-05-19 ASSESSMENT — ENCOUNTER SYMPTOMS
VOICE CHANGE: 0
SORE THROAT: 0
CONSTIPATION: 0
ABDOMINAL DISTENTION: 0
EYES NEGATIVE: 1
GASTROINTESTINAL NEGATIVE: 1
RECTAL PAIN: 0
SHORTNESS OF BREATH: 0
VOMITING: 0
NAUSEA: 0
ANAL BLEEDING: 0
DIARRHEA: 0
TROUBLE SWALLOWING: 1
SHORTNESS OF BREATH: 0
BACK PAIN: 1
DIARRHEA: 0
VOMITING: 0
RESPIRATORY NEGATIVE: 1
ABDOMINAL PAIN: 1
NAUSEA: 0
COUGH: 0
BLOOD IN STOOL: 0

## 2022-05-19 NOTE — PATIENT INSTRUCTIONS

## 2022-05-19 NOTE — PROGRESS NOTES
Subjective:      Irvin Monte is a79 y.o. female  Chief Complaint   Patient presents with    Other     referred by Dr Brando Eduardo for dysphagia       HPI  PCP: Grier Favre, APRN  Referring Provider: Doreen Medrano DO  Pt is referred here from [facility] gen surg  Per Dr Brando Eduardo 5/2022:  Ms. Esthela Mathis is a 71 y.o. female who presents today with after hospitalization for epigastric pain. Was found to have foreign body on imaging. EGD did not show foreign body. Follow-up CAT scans did show small microperforation of the stomach which was treated medically. Patient doing well. Small lesion seen on CT scan of the spleen. Doing well since discharge. Still having some dysphagia. C/o dysphagia  Noted with foods only  Started 6-9 months ago  Feels like food gets hung every single time she eats  Drinks water or eats a small piece of bread to push the food down  Also has the feeling of \"dry cotton\" in back of throat since this all started  Makes her wants to repeatedly swallow    Reports KASI tenderness  Only noted when palpating or pressing in on KASI region  Chronic for 20 years  CT abd/pelvis 4/2022 per Dr Killian Mullen   1. Inflammatory changes noted in the region of the previously   described gastric perforation. There is inflammation and infiltrating   fat noted. There is no discrete abscess. There is no extraluminal   air. Dinora Vega Has barretts  Overdue for surveillance of this  On 20mg BID as prescribed by her PCP  This works well to keep heartburn controlled    No lower GI complaints  Due for surveillance colonoscopy  She has a personal hx of colon polyps    Family HX:    Pt denies family hx of colon polyps, colon CA, inflammatory bowel dx, gastric CA and esophageal CA.     Past Medical History:   Diagnosis Date    Anxiety     Depression     Edema     GERD (gastroesophageal reflux disease)     Headache(784.0)     migraines    Hyperlipidemia     Hypertension     Mini stroke (HCC)     Sleep apnea     CPAP  TIA (transient ischemic attack)           Past Surgical History:   Procedure Laterality Date    CHOLECYSTECTOMY      COLONOSCOPY  07/01/2015    Dr Mario Mauricio w/atypia, 5 yr recall    COLONOSCOPY  [facility]/05/2019    [facility] Non-bleeding internal hemorrhoids, diverticulosis-Tubular AP (-) dysplasia, 3 yr recall    CYSTOSCOPY Left 12/05/2018    CYSTOSCOPY URETEROSCOPY  PLACEMENT DOUBLE J STENT ON LEFT RIGHT  RETROGRADE PYELOGRAMS performed by Bessy Diaz MD at [address] Bilateral 01/31/2019    TOTAL LAPAROSCOPIC HYSTERECTOMY BILATERAL SALPINGOOPHERECTOMY WITH Cheko Davis performed by Liu Jordan MD at [address], VAGINAL      UT EGD TRANSORAL BIOPSY SINGLE/MULTIPLE N/A 05/08/2018    Dr Tressa English (-) Kristin (+) Dye's (-) dysplasia--3 yr recall    UT LAP,CHOLECYSTECTOMY N/A 03/06/2018    CHOLECYSTECTOMY LAPAROSCOPIC performed by Jasper Brown MD at [address]  08/26/2015    Dr Adelina Segundo neg    UPPER GASTROINTESTINAL ENDOSCOPY  05/23/2017    Dr Osorio-w/balloon dilation, 12-13. 5-15 mm-esophageal narrowing with diverticulum at 29 cm-Kristin (-), hiatal herni, Dye's (+) dysplasia (-)--1st dx--1 yr recall-Ct chest ordered    UPPER GASTROINTESTINAL ENDOSCOPY N/A 04/13/2022    Dr King Quiroga foreign body present    WRIST FRACTURE SURGERY         Social History     Socioeconomic History    Marital status:      Spouse name: None    Number of children: None    Years of education: None    Highest education level: None   Occupational History    None   Tobacco Use    Smoking status: Never Smoker    Smokeless tobacco: Never Used   Vaping Use    Vaping Use: Never used   Substance and Sexual Activity    Alcohol use: No     Alcohol/week: 0.0 standard drinks    Drug use: No    Sexual activity: None   Other Topics Concern    None   Social History Narrative    None     Social Determinants of Health Financial Resource Strain: Low Risk     Difficulty of Paying Living Expenses: Not hard at all   Food Insecurity: No Food Insecurity    Worried About Running Out of Food in the Last Year: Never true    Oh of Food in the Last Year: Never true   Transportation Needs:     Lack of Transportation (Medical): Not on file    Lack of Transportation (Non-Medical): Not on file   Physical Activity:     Days of Exercise per Week: Not on file    Minutes of Exercise per Session: Not on file   Stress:     Feeling of Stress : Not on file   Social Connections:     Frequency of Communication with Friends and Family: Not on file    Frequency of Social Gatherings with Friends and Family: Not on file    Attends Yarsanism Services: Not on file    Active Member of [address] [place] [address] [employer] or Organizations: Not on file    Attends Club or Organization Meetings: Not on file    Marital Status: Not on file   Intimate Partner Violence:     Fear of Current or Ex-Partner: Not on file    Emotionally Abused: Not on file    Physically Abused: Not on file    Sexually Abused: Not on file   Housing Stability:     Unable to Pay for Housing in the Last Year: Not on file    Number of [place] in the Last Year: Not on file    Unstable Housing in the Last Year: Not on file       No Known Allergies    Current Outpatient Medications   Medication Sig Dispense Refill    venlafaxine (EFFEXOR XR) 75 MG extended release capsule Take 1 capsule by mouth daily 30 capsule 3    gabapentin (NEURONTIN) 400 MG capsule Take 1 capsule by mouth 3 times daily for 30 days.  90 capsule 3    baclofen (LIORESAL) 5 MG tablet Take 1 tablet by mouth 2 times daily as needed (muscle spasms) 30 tablet 1    famotidine (PEPCID) 20 MG tablet Take 1 tablet by mouth 2 times daily 60 tablet 5    hydrALAZINE (APRESOLINE) 10 MG tablet Take 1 tablet by mouth 3 times daily 90 tablet 5    tamsulosin (FLOMAX) 0.4 MG capsule TAKE 1 CAPSULE BY MOUTH ONCE DAILY      amLODIPine (NORVASC) 2.5 MG tablet Take 1 tablet by mouth daily 30 tablet 5    diclofenac sodium (VOLTAREN) 1 % GEL Apply 4 g topically 4 times daily as needed for Pain 350 g 1    metoprolol succinate (TOPROL XL) 100 MG extended release tablet Take 1 tablet by mouth daily 90 tablet 3    atorvastatin (LIPITOR) 80 MG tablet Take 1 tablet by mouth nightly 90 tablet 3    metFORMIN (GLUCOPHAGE) 500 MG tablet Take 1 tablet by mouth 2 times daily (with meals) 180 tablet 3    Ubrogepant (UBRELVY) 100 MG TABS Take 1 tablet at the onset of migraine. May repeat once in 2 hours if no improvement. Do not exceed 2 tablets in 24 hours. 10 tablet 3    omeprazole (PRILOSEC) 20 MG delayed release capsule Take 1 capsule by mouth 2 times daily 180 capsule 3    furosemide (LASIX) 40 MG tablet Take 1 tablet by mouth daily 30 tablet 11    venlafaxine (EFFEXOR XR) 150 MG extended release capsule Take 1 capsule by mouth daily 30 capsule 11    Cholecalciferol 50 MCG (2000 UT) CAPS Take by mouth daily      ezetimibe (ZETIA) 10 MG tablet Take 1 tablet by mouth daily 30 tablet 11    aspirin 81 MG tablet Aspir-81 81 mg tablet,delayed release   Take 1 tablet every day by oral route.  hydrOXYzine (ATARAX) 50 MG tablet hydroxyzine HCl 50 mg tablet   TAKE ONE TABLET BY MOUTH TWICE DAILY AS NEEDED      Multiple Vitamins-Minerals (MULTIVITAMIN ADULT PO) Take by mouth daily        No current facility-administered medications for this visit. Review of Systems   Constitutional: Negative for appetite change, fatigue, fever and unexpected weight change. HENT: Positive for trouble swallowing. Negative for sore throat and voice change. Respiratory: Negative for cough and shortness of breath. Cardiovascular: Negative for chest pain, palpitations and leg swelling. Gastrointestinal: Positive for abdominal pain. Negative for abdominal distention, anal bleeding, blood in stool, constipation, diarrhea, nausea, rectal pain and vomiting. Genitourinary: Negative for hematuria. Musculoskeletal: Positive for back pain. Negative for arthralgias and neck pain. Neurological: Negative for dizziness, weakness, light-headedness and headaches. Psychiatric/Behavioral: Negative for dysphoric mood and sleep disturbance. The patient is not nervous/anxious. All other systems reviewed and are negative. Objective:     Physical Exam  Vitals and nursing note reviewed. Constitutional:       Appearance: She is well-developed. Comments: /62   Pulse 92   Ht 5' 4\" (1.626 m)   Wt 221 lb 3.2 oz (100.3 kg)   SpO2 96%   BMI 37.97 kg/m²    Eyes:      General: No scleral icterus. Conjunctiva/sclera: Conjunctivae normal.      Pupils: Pupils are equal, round, and reactive to light. Neck:      Thyroid: No thyromegaly. Cardiovascular:      Rate and Rhythm: Normal rate and regular rhythm. Heart sounds: Normal heart sounds. No murmur heard. No friction rub. No gallop. Pulmonary:      Effort: Pulmonary effort is normal. No respiratory distress. Breath sounds: Normal breath sounds. Abdominal:      General: Bowel sounds are normal. There is no distension. Palpations: Abdomen is soft. Tenderness: There is abdominal tenderness (KASI pain noted with deep palpation). There is no rebound. Musculoskeletal:         General: No deformity. Normal range of motion. Cervical back: Normal range of motion and neck supple. Neurological:      Mental Status: She is alert and oriented to person, place, and time. Cranial Nerves: No cranial nerve deficit. Psychiatric:         Judgment: Judgment normal.           Assessment:       Diagnosis Orders   1. Dysphagia, unspecified type  ESOPHAGOSCOPY / EGD   2. Chronic epigastric pain  ESOPHAGOSCOPY / EGD   3. Dey's esophagus without dysplasia  ESOPHAGOSCOPY / EGD   4.  Personal history of colonic polyps  COLONOSCOPY W/ OR W/O BIOPSY         Plan:      1. Schedule outpatient endoscopy. Patient advised no Aspirin, Fish Oil, Vit E or NSAIDs 5 (five) days before procedure. Follow-up Visit: per Dr. Sofia De La Vega  Pt Education:   Risks, benefits, and alternatives to endoscopy were discussed. Patient voices understanding of risks of, but not limited to, perforation, bleeding, and infection. The risk of perforation is increased with esophageal dilatation. All questions answered to patient's satisfaction. Patient is agreable to proceed. 2. Schedule outpatient colonoscopy. Patient advised no Aspirin, Fish Oil, Vit E or NSAIDs 5 (five) days before procedure. Follow-up Visit: per Dr Sofia De La Vega  Pt education:  Risks, benefits, and alternatives to colonoscopy were discussed. Risks of colonoscopy include, but are not limited to, perforation, bleeding, and infection. We discussed that the risk for perforation is 1-3 in 5,000  at the time of colonoscopy;   and 1-2% risk of bleeding post-polypectomy. All questions answered to the satisfaction of the patient. Pt is agreeable to proceed.

## 2022-05-19 NOTE — PROGRESS NOTES
[facility] Progress Note                            Date:  5/19/2022  Patient: Karen Chavira  Age:  71 y.o., 1950      Reason for evaluation:         SUBJECTIVE:    Returns today follow-up assessment had recent Norah Shawty 4/13/2022 ejection fraction 86% normal perfusion study. Overall doing well. Denies anginal chest pain for the most part had 1 brief chest pain since I saw her last.  Denies palpitations. Patient states that she gets out of breath if she walks too much but for the most part dyspnea is been stable no other complaints or issues reported. Blood pressure 100/60 heart 82. Review of Systems   Constitutional: Negative. Negative for chills, fever and unexpected weight change. HENT: Negative. Eyes: Negative. Respiratory: Negative. Negative for shortness of breath. Cardiovascular: Negative. Negative for chest pain. Gastrointestinal: Negative. Negative for diarrhea, nausea and vomiting. Endocrine: Negative. Genitourinary: Negative. Musculoskeletal: Negative. Skin: Negative. Neurological: Negative. All other systems reviewed and are negative. OBJECTIVE:     /60   Pulse 82   Ht 5' 4\" (1.626 m)   Wt 220 lb (99.8 kg)   BMI 37.76 kg/m²     Labs:   CBC: No results for input(s): WBC, HGB, HCT, PLT in the last 72 hours. BMP:No results for input(s): NA, K, CO2, BUN, CREATININE, LABGLOM, GLUCOSE in the last 72 hours. BNP: No results for input(s): BNP in the last 72 hours. PT/INR: No results for input(s): PROTIME, INR in the last 72 hours. APTT:No results for input(s): APTT in the last 72 hours. CARDIAC ENZYMES:No results for input(s): CKTOTAL, CKMB, CKMBINDEX, TROPONINI in the last 72 hours. FASTING LIPID PANEL:  Lab Results   Component Value Date    HDL 41 04/13/2022    LDLCALC 54 04/13/2022    TRIG 181 04/13/2022     LIVER PROFILE:No results for input(s): AST, ALT, LABALBU in the last 72 hours.         Past Medical History:   Diagnosis Date    Anxiety     Depression     Edema     GERD (gastroesophageal reflux disease)     Headache(784.0)     migraines    Hyperlipidemia     Hypertension     Mini stroke (HCC)     Sleep apnea     CPAP    TIA (transient ischemic attack)      Past Surgical History:   Procedure Laterality Date    CHOLECYSTECTOMY      COLONOSCOPY  07/01/2015    Dr Lacey Reid w/atypia, 5 yr recall    COLONOSCOPY  06/05/2019    Dr Krysta Martinez ([facility]) Non-bleeding internal hemorrhoids, diverticulosis-Tubular AP (-) dysplasia, 3 yr recall    CYSTOSCOPY Left 12/05/2018    CYSTOSCOPY URETEROSCOPY  PLACEMENT DOUBLE J STENT ON LEFT RIGHT  RETROGRADE PYELOGRAMS performed by Amparo Tabares MD at [address] Bilateral 01/31/2019    TOTAL LAPAROSCOPIC HYSTERECTOMY BILATERAL SALPINGOOPHERECTOMY WITH Starling Nick performed by Pamela Kumar MD at [facility]      MD EGD TRANSORAL BIOPSY SINGLE/MULTIPLE N/A 05/08/2018    Dr Shweta Hawkins (-) Kristin (+) Dye's (-) dysplasia--3 yr recall    MD LAP,CHOLECYSTECTOMY N/A 03/06/2018    CHOLECYSTECTOMY LAPAROSCOPIC performed by Laurell Closs, MD at [address]  08/26/2015    Dr Osman Diaz neg    UPPER GASTROINTESTINAL ENDOSCOPY  05/23/2017    Dr Osorio-w/balloon dilation, 12-13. 5-15 mm-esophageal narrowing with diverticulum at 29 cm-Kristin (-), hiatal herni, Dye's (+) dysplasia (-)--1st dx--1 yr recall-Ct chest ordered    UPPER GASTROINTESTINAL ENDOSCOPY N/A 04/13/2022    Dr Sav Newton foreign body present    WRIST FRACTURE SURGERY       Family History   Problem Relation Age of Onset    Heart Disease Mother     Colon Cancer Neg Hx     Colon Polyps Neg Hx     Liver Cancer Neg Hx     Liver Disease Neg Hx     Esophageal Cancer Neg Hx     Rectal Cancer Neg Hx     Stomach Cancer Neg Hx      No Known Allergies  Current Outpatient Medications   Medication Sig Dispense Refill    venlafaxine (EFFEXOR XR) 75 MG extended release capsule Take 1 capsule by mouth daily 30 capsule 3    gabapentin (NEURONTIN) 400 MG capsule Take 1 capsule by mouth 3 times daily for 30 days. 90 capsule 3    baclofen (LIORESAL) 5 MG tablet Take 1 tablet by mouth 2 times daily as needed (muscle spasms) 30 tablet 1    famotidine (PEPCID) 20 MG tablet Take 1 tablet by mouth 2 times daily 60 tablet 5    hydrALAZINE (APRESOLINE) 10 MG tablet Take 1 tablet by mouth 3 times daily 90 tablet 5    tamsulosin (FLOMAX) 0.4 MG capsule TAKE 1 CAPSULE BY MOUTH ONCE DAILY      amLODIPine (NORVASC) 2.5 MG tablet Take 1 tablet by mouth daily 30 tablet 5    diclofenac sodium (VOLTAREN) 1 % GEL Apply 4 g topically 4 times daily as needed for Pain 350 g 1    metoprolol succinate (TOPROL XL) 100 MG extended release tablet Take 1 tablet by mouth daily 90 tablet 3    atorvastatin (LIPITOR) 80 MG tablet Take 1 tablet by mouth nightly 90 tablet 3    metFORMIN (GLUCOPHAGE) 500 MG tablet Take 1 tablet by mouth 2 times daily (with meals) 180 tablet 3    Ubrogepant (UBRELVY) 100 MG TABS Take 1 tablet at the onset of migraine. May repeat once in 2 hours if no improvement. Do not exceed 2 tablets in 24 hours. 10 tablet 3    omeprazole (PRILOSEC) 20 MG delayed release capsule Take 1 capsule by mouth 2 times daily 180 capsule 3    furosemide (LASIX) 40 MG tablet Take 1 tablet by mouth daily 30 tablet 11    venlafaxine (EFFEXOR XR) 150 MG extended release capsule Take 1 capsule by mouth daily 30 capsule 11    Cholecalciferol 50 MCG (2000 UT) CAPS Take by mouth daily      ezetimibe (ZETIA) 10 MG tablet Take 1 tablet by mouth daily 30 tablet 11    aspirin 81 MG tablet Aspir-81 81 mg tablet,delayed release   Take 1 tablet every day by oral route.       hydrOXYzine (ATARAX) 50 MG tablet hydroxyzine HCl 50 mg tablet   TAKE ONE TABLET BY MOUTH TWICE DAILY AS NEEDED      Multiple Vitamins-Minerals (MULTIVITAMIN ADULT PO) Take by mouth       No current facility-administered medications for this visit. Social History     Socioeconomic History    Marital status:      Spouse name: Not on file    Number of children: Not on file    Years of education: Not on file    Highest education level: Not on file   Occupational History    Not on file   Tobacco Use    Smoking status: Never Smoker    Smokeless tobacco: Never Used   Vaping Use    Vaping Use: Never used   Substance and Sexual Activity    Alcohol use: No     Alcohol/week: 0.0 standard drinks    Drug use: No    Sexual activity: Not on file   Other Topics Concern    Not on file   Social History Narrative    Not on file     Social Determinants of Health     Financial Resource Strain: Low Risk     Difficulty of Paying Living Expenses: Not hard at all   Food Insecurity: No Food Insecurity    Worried About [address] in the Last Year: Never true    [address] [employer] in the Last Year: Never true   Transportation Needs:     Lack of Transportation (Medical): Not on file    Lack of Transportation (Non-Medical):  Not on file   Physical Activity:     Days of Exercise per Week: Not on file    Minutes of Exercise per Session: Not on file   Stress:     Feeling of Stress : Not on file   Social Connections:     Frequency of Communication with Friends and Family: Not on file    Frequency of Social Gatherings with Friends and Family: Not on file    Attends Spiritism Services: Not on file    Active Member of [address] [place] [address] or Organizations: Not on file    Attends Club or Organization Meetings: Not on file    Marital Status: Not on file   Intimate Partner Violence:     Fear of Current or Ex-Partner: Not on file    Emotionally Abused: Not on file    Physically Abused: Not on file    Sexually Abused: Not on file   Housing Stability:     Unable to Pay for Housing in the Last Year: Not on file    Number of [place] in the Last Year: Not on file    Unstable Housing in the Last Year: Not on file Physical Examination:  /60   Pulse 82   Ht 5' 4\" (1.626 m)   Wt 220 lb (99.8 kg)   BMI 37.76 kg/m²   Physical Exam  Vitals reviewed. Constitutional:       Appearance: She is well-developed. Neck:      Vascular: No carotid bruit or JVD. Cardiovascular:      Rate and Rhythm: Normal rate and regular rhythm. Heart sounds: Normal heart sounds. No murmur heard. No friction rub. No gallop. Pulmonary:      Effort: Pulmonary effort is normal. No respiratory distress. Breath sounds: Normal breath sounds. No wheezing or rales. Abdominal:      General: There is no distension. Tenderness: There is no abdominal tenderness. Lymphadenopathy:      Cervical: No cervical adenopathy. Skin:     General: Skin is warm and dry. ASSESSMENT:     Diagnosis Orders   1. Mixed hyperlipidemia     2. Essential hypertension     3. Other forms of angina pectoris ([address]         PLAN:  No orders of the defined types were placed in this encounter. No orders of the defined types were placed in this encounter. 1. Continue present medications  2. Recommend follow-up assessment in 6 months    Return in about 6 months (around 11/19/2022) for return to Dr. Romeo Potter only. Reid Prieto MD 5/19/2022 11:20 AM CDT    [facility]      Thisdictation was generated by voice recognition computer software. Although all attempts are made to edit the dictation for accuracy, there may be errors in the transcription that are not intended.

## 2022-05-20 ENCOUNTER — OFFICE VISIT (OUTPATIENT)
Dept: PRIMARY CARE CLINIC | Age: 72
End: 2022-05-20
Payer: MEDICARE

## 2022-05-20 ENCOUNTER — HOSPITAL ENCOUNTER (OUTPATIENT)
Dept: GENERAL RADIOLOGY | Age: 72
Discharge: HOME | End: 2022-05-20
Payer: MEDICARE

## 2022-05-20 ENCOUNTER — TELEPHONE (OUTPATIENT)
Dept: PRIMARY CARE CLINIC | Age: 72
End: 2022-05-20

## 2022-05-20 VITALS
DIASTOLIC BLOOD PRESSURE: 73 MMHG | HEIGHT: 64 IN | RESPIRATION RATE: 15 BRPM | TEMPERATURE: 97.6 F | WEIGHT: 222.38 LBS | HEART RATE: 79 BPM | OXYGEN SATURATION: 99 % | BODY MASS INDEX: 37.97 KG/M2 | SYSTOLIC BLOOD PRESSURE: 132 MMHG

## 2022-05-20 DIAGNOSIS — M54.50 LUMBAR BACK PAIN: ICD-10-CM

## 2022-05-20 DIAGNOSIS — M54.50 LUMBAR BACK PAIN: Primary | ICD-10-CM

## 2022-05-20 DIAGNOSIS — F41.9 ANXIETY: ICD-10-CM

## 2022-05-20 DIAGNOSIS — I10 PRIMARY HYPERTENSION: ICD-10-CM

## 2022-05-20 PROCEDURE — 72100 X-RAY EXAM L-S SPINE 2/3 VWS: CPT

## 2022-05-20 PROCEDURE — 99214 OFFICE O/P EST MOD 30 MIN: CPT | Performed by: NURSE PRACTITIONER

## 2022-05-20 RX ORDER — TIZANIDINE 2 MG/1
2 TABLET ORAL PRN
COMMUNITY
Start: 2022-04-21 | End: 2022-05-20

## 2022-05-20 RX ORDER — HYDROCODONE BITARTRATE AND ACETAMINOPHEN 5; 325 MG/1; MG/1
1 TABLET ORAL EVERY 8 HOURS PRN
Qty: 30 TABLET | Refills: 0 | Status: SHIPPED | OUTPATIENT
Start: 2022-05-20 | End: 2022-06-19

## 2022-05-20 ASSESSMENT — ENCOUNTER SYMPTOMS
DIARRHEA: 0
EYE REDNESS: 0
CONSTIPATION: 0
COUGH: 1
SHORTNESS OF BREATH: 0
VOMITING: 0
BACK PAIN: 1
RHINORRHEA: 0
SORE THROAT: 0

## 2022-05-20 NOTE — TELEPHONE ENCOUNTER
----- Message from Grier Favre, APRN sent at 5/20/2022 12:19 PM CDT -----  Lumbar X-ray--there are degenerative changes present at T12-L4 but there is no evidence of compression deformity.   Recommend treatment as prescribed in office today

## 2022-05-20 NOTE — PROGRESS NOTES
Zuleyka Roldan (:  1950) is a 71 y.o. female,Established patient, here for evaluation of the following chief complaint(s):  Follow-up (f/u pt states feeling better, but pt states she is still having back pain)      ASSESSMENT/PLAN:    ICD-10-CM    1. Lumbar back pain with left sciatica  M54.50 XR LUMBAR SPINE (2-3 VIEWS)     HYDROcodone-acetaminophen (NORCO) 5-325 MG per tablet  Restart Neurontin 400 mg TID  Continue Baclofen prn   2. Anxiety  F41.9 The current medical regimen is effective;  continue present plan and medications. 3. Primary hypertension  I10 The current medical regimen is effective;  continue present plan and medications. NEED active medication list.    Return in about 1 month (around 2022), or if symptoms worsen or fail to improve. SUBJECTIVE/OBJECTIVE:  HPI     HTN:  \"It has been running 100/60 at home. \"  Denies dizziness, lightheadedness or headaches. She continues on hydralazine, Norvasc and Toprol  She is tolerating this regimen    ANXIETY:  She is currently taking Effexor 150 mg  She has not needed the Ativan \"in quite a while. \"  She does not need a refill at this time. Mentally she is in a better place. LUMBAR PAIN:  Reports continued low back pain. \"I can't go on each day due to my back pain. \"  \"It hurts across both sides if I am doing dishes or if I am out walking from one end of [place] to the other end. \"  \"I do my house work in 10 minutes intervals then I have to sit down and rest.\"  \"I have tried ice and gel and that doesn't work. \"  She has not been taking Neurontin. (this was increased from 300 to 400 mg at her last visit)  She takes Baclofen prn at night. \"I just go to sleep so I can't tell if it helps my back. \"  2012: Lumbar X-ray:  Impression:        1. No recent bony abnormality.                  2. Moderate lumbar spondylosis.               3. Levoscoliosis.        EGD and c-scope on 2022    A1c: 6.3 (2022)  She continues on Metformin 500 mg BID    /73 (Site: Right Upper Arm, Position: Sitting, Cuff Size: Medium Adult)   Pulse 79   Temp 97.6 °F (36.4 °C) (Temporal)   Resp 15   Ht 5' 4\" (1.626 m)   Wt 222 lb 6 oz (100.9 kg)   SpO2 99%   BMI 38.17 kg/m²     Review of Systems   Constitutional: Negative for chills, fatigue and fever. HENT: Negative for congestion, ear pain, rhinorrhea and sore throat. Eyes: Negative for redness. Respiratory: Positive for cough. Negative for shortness of breath. Cardiovascular: Negative for chest pain. Gastrointestinal: Negative for constipation, diarrhea and vomiting. Genitourinary: Negative for dysuria, frequency and urgency. Musculoskeletal: Positive for arthralgias (bilateral hips) and back pain (bilateral mid & low back). Skin: Negative for rash. Neurological: Positive for numbness (bilateral feet). Negative for dizziness and headaches. Psychiatric/Behavioral: The patient is nervous/anxious (improved). Depression--improved       Physical Exam  Vitals reviewed. Constitutional:       Appearance: She is well-developed. She is obese. HENT:      Head: Normocephalic. Right Ear: Tympanic membrane and external ear normal.      Left Ear: Tympanic membrane and external ear normal.      Nose: Nose normal.   Eyes:      General:         Right eye: No discharge. Left eye: No discharge. Neck:      Vascular: No carotid bruit. Cardiovascular:      Rate and Rhythm: Normal rate and regular rhythm. Pulmonary:      Effort: Pulmonary effort is normal.      Breath sounds: Normal breath sounds. No wheezing, rhonchi or rales. Abdominal:      General: Bowel sounds are normal.      Palpations: Abdomen is soft. Musculoskeletal:      Cervical back: Normal range of motion. Thoracic back: Tenderness (midline) present. Lumbar back: Tenderness (midline) present. Skin:     General: Skin is dry. Neurological:      General: No focal deficit present. Mental Status: She is alert and oriented to person, place, and time. Mental status is at baseline. Psychiatric:         Mood and Affect: Mood normal.         Behavior: Behavior normal.         Thought Content: Thought content normal.         Judgment: Judgment normal.           An electronic signature was used to authenticate this note.     --LUIZ Sinclair

## 2022-06-07 ENCOUNTER — ANESTHESIA EVENT (OUTPATIENT)
Dept: ENDOSCOPY | Age: 72
End: 2022-06-07
Payer: MEDICARE

## 2022-06-07 ENCOUNTER — ANESTHESIA (OUTPATIENT)
Dept: ENDOSCOPY | Age: 72
End: 2022-06-07
Payer: MEDICARE

## 2022-06-07 ENCOUNTER — HOSPITAL ENCOUNTER (OUTPATIENT)
Age: 72
Setting detail: OUTPATIENT SURGERY
Discharge: HOME | End: 2022-06-07
Attending: INTERNAL MEDICINE | Admitting: INTERNAL MEDICINE
Payer: MEDICARE

## 2022-06-07 VITALS
WEIGHT: 22 LBS | OXYGEN SATURATION: 97 % | SYSTOLIC BLOOD PRESSURE: 127 MMHG | RESPIRATION RATE: 18 BRPM | HEIGHT: 64 IN | DIASTOLIC BLOOD PRESSURE: 48 MMHG | TEMPERATURE: 97.5 F | HEART RATE: 79 BPM | BODY MASS INDEX: 3.76 KG/M2

## 2022-06-07 DIAGNOSIS — R13.10 DYSPHAGIA, UNSPECIFIED TYPE: ICD-10-CM

## 2022-06-07 DIAGNOSIS — Z12.11 SCREEN FOR COLON CANCER: ICD-10-CM

## 2022-06-07 DIAGNOSIS — R10.13 CHRONIC EPIGASTRIC PAIN: ICD-10-CM

## 2022-06-07 DIAGNOSIS — G89.29 CHRONIC EPIGASTRIC PAIN: ICD-10-CM

## 2022-06-07 DIAGNOSIS — Z87.19 HISTORY OF BARRETT'S ESOPHAGUS: ICD-10-CM

## 2022-06-07 LAB
GLUCOSE BLD-MCNC: 114 MG/DL (ref 70–99)
PERFORMED ON: ABNORMAL

## 2022-06-07 PROCEDURE — 2709999900 HC NON-CHARGEABLE SUPPLY: Performed by: INTERNAL MEDICINE

## 2022-06-07 PROCEDURE — 88342 IMHCHEM/IMCYTCHM 1ST ANTB: CPT

## 2022-06-07 PROCEDURE — 6360000002 HC RX W HCPCS

## 2022-06-07 PROCEDURE — 3609012700 HC EGD DILATION SAVORY: Performed by: INTERNAL MEDICINE

## 2022-06-07 PROCEDURE — 43248 EGD GUIDE WIRE INSERTION: CPT | Performed by: INTERNAL MEDICINE

## 2022-06-07 PROCEDURE — 3700000000 HC ANESTHESIA ATTENDED CARE: Performed by: INTERNAL MEDICINE

## 2022-06-07 PROCEDURE — 2500000003 HC RX 250 WO HCPCS

## 2022-06-07 PROCEDURE — 3609012400 HC EGD TRANSORAL BIOPSY SINGLE/MULTIPLE: Performed by: INTERNAL MEDICINE

## 2022-06-07 PROCEDURE — 82947 ASSAY GLUCOSE BLOOD QUANT: CPT

## 2022-06-07 PROCEDURE — 2580000003 HC RX 258: Performed by: INTERNAL MEDICINE

## 2022-06-07 PROCEDURE — 3609010600 HC COLONOSCOPY POLYPECTOMY SNARE/COLD BIOPSY: Performed by: INTERNAL MEDICINE

## 2022-06-07 PROCEDURE — 43239 EGD BIOPSY SINGLE/MULTIPLE: CPT | Performed by: INTERNAL MEDICINE

## 2022-06-07 PROCEDURE — 45380 COLONOSCOPY AND BIOPSY: CPT | Performed by: INTERNAL MEDICINE

## 2022-06-07 PROCEDURE — 3700000001 HC ADD 15 MINUTES (ANESTHESIA): Performed by: INTERNAL MEDICINE

## 2022-06-07 PROCEDURE — 88305 TISSUE EXAM BY PATHOLOGIST: CPT

## 2022-06-07 PROCEDURE — 7100000011 HC PHASE II RECOVERY - ADDTL 15 MIN: Performed by: INTERNAL MEDICINE

## 2022-06-07 PROCEDURE — 7100000010 HC PHASE II RECOVERY - FIRST 15 MIN: Performed by: INTERNAL MEDICINE

## 2022-06-07 RX ORDER — PROPOFOL 10 MG/ML
INJECTION, EMULSION INTRAVENOUS PRN
Status: DISCONTINUED | OUTPATIENT
Start: 2022-06-07 | End: 2022-06-07 | Stop reason: SDUPTHER

## 2022-06-07 RX ORDER — SODIUM CHLORIDE, SODIUM LACTATE, POTASSIUM CHLORIDE, CALCIUM CHLORIDE 600; 310; 30; 20 MG/100ML; MG/100ML; MG/100ML; MG/100ML
INJECTION, SOLUTION INTRAVENOUS CONTINUOUS
Status: DISCONTINUED | OUTPATIENT
Start: 2022-06-07 | End: 2022-06-07 | Stop reason: HOSPADM

## 2022-06-07 RX ORDER — LIDOCAINE HYDROCHLORIDE 10 MG/ML
INJECTION, SOLUTION EPIDURAL; INFILTRATION; INTRACAUDAL; PERINEURAL PRN
Status: DISCONTINUED | OUTPATIENT
Start: 2022-06-07 | End: 2022-06-07 | Stop reason: SDUPTHER

## 2022-06-07 RX ADMIN — PROPOFOL 680 MG: 10 INJECTION, EMULSION INTRAVENOUS at 09:29

## 2022-06-07 RX ADMIN — SODIUM CHLORIDE, POTASSIUM CHLORIDE, SODIUM LACTATE AND CALCIUM CHLORIDE: 600; 310; 30; 20 INJECTION, SOLUTION INTRAVENOUS at 08:16

## 2022-06-07 RX ADMIN — LIDOCAINE HYDROCHLORIDE 100 MG: 10 INJECTION, SOLUTION EPIDURAL; INFILTRATION; INTRACAUDAL; PERINEURAL at 09:29

## 2022-06-07 ASSESSMENT — PAIN - FUNCTIONAL ASSESSMENT: PAIN_FUNCTIONAL_ASSESSMENT: NONE - DENIES PAIN

## 2022-06-07 NOTE — OP NOTE
Patient: Philippe Mooney : 1950  Med Rec#: [id number] Acc#: [id number]   Primary Care Provider LUIZ Bernal    Date of Procedure:  2022    Endoscopist: Nata Palma MD    Referring Provider: LUIZ Bernal,     Operation Performed: Colonoscopy with biopsy    Indications: screening    Anesthesia:  Sedation was administered by anesthesia who monitored the patient during the procedure. I met with Philippe Mooney prior to procedure. We discussed the procedure itself, and I have discussed the risks of endoscopy (including-- but not limited to-- pain, discomfort, bleeding potentially requiring second endoscopic procedure and/or blood transfusion, organ perforation requiring operative repair, damage to organs near the colon, infection, aspiration, cardiopulmonary/allergic reaction), benefits, indications to endoscopy. Additionally, we discussed options other than colonoscopy. The patient expressed understanding. All questions answered. The patient decided to proceed with the procedure. Signed informed consent was placed on the chart. Blood Loss: minimal    Withdrawal time: > 6 minutes  Bowel Prep: adequate    Complications: no immediate complications    DESCRIPTION OF PROCEDURE:     A time out was performed. After written informed consent was obtained, the patient was placed in the left lateral position. The perianal area was inspected, and a digital rectal exam was performed. A rectal exam was performed: normal tone, no palpable lesions. At this point, a forward viewing Olympus colonoscope was inserted into the anus and carefully advanced to the cecum. The cecum was identified by the ileocecal valve and the appendiceal orifice. The colonoscope was then slowly withdrawn with careful inspection of the mucosa in a linear and circumferential fashion. The scope was retroflexed in the rectum. Suction was utilized during the procedure to remove as much air as possible from the bowel.  The colonoscope was removed from the patient, and the procedure was terminated. Findings are listed below. Findings: The mucosa appeared normal throughout the entire examined colon     In the the transverse colon, a 4 mm sessile polyp was removed completely with forceps polypectomy. There was evidence of diverticular disease throughout the sigmoid colon. Retroflexion in the rectum was normal and revealed no further abnormalities         Recommendations:  1. Repeat colonoscopy: pending pathology - max of 5 yrs for screening  2. Await biopsy results. Findings and recommendations were discussed w/ the patient. A copy of the images was provided.     Omar Nageotte, MD  6/7/2022  9:33 AM

## 2022-06-07 NOTE — H&P
Patient Name: Jose Alfredo Banks  : 1950  MRN: [id number]  DATE: 22    Allergies: No Known Allergies     ENDOSCOPY  History and Physical    Procedure:    [] Diagnostic Colonoscopy       [x] Screening Colonoscopy  [x] EGD      [] ERCP      [] EUS       [] Other    [x] Previous office notes/History and Physical reviewed from the patients chart. Please see EMR for further details of HPI. I have examined the patient's status immediately prior to the procedure and:      Indications/HPI:    []Abdominal Pain   []Barretts  [x]Screening/Surveillance   []History of Polyps  [x]Dysphagia            [] +Cologard/DNA testing  []Abnormal Imaging              []EOE Hx              [] Family Hx of CRC/Polyps  []Anemia                            []Food Impaction       []Recent Poor Prep  []GI Bleed             []Lymphadenopathy  []History of Polyps  []Change in bowel habits []Heartburn/Reflux  []Cancer- GI/Lung  []Chest Pain - Non Cardiac []Heme (+) Stool []Ulcers  []Constipation  []Hemoptysis  []Incontinence    []Diarrhea  []Hypoxemia  []Rectal Bleed (BRBPR)  []Nausea/Vomiting   [] Varices  []Crohns/Colitis  []Pancreatic Cyst   [] Cirrhosis   []Pancreatitis    []Abnormal MRCP  []Elevated LFT [] Stent Removal, Previous ERCP  []Other:     Anesthesia:   [x] MAC [] Moderate Sedation   [] General   [] None     ROS: 12 pt Review of Symptoms was negative unless mentioned above    Medications:   Prior to Admission medications    Medication Sig Start Date End Date Taking? Authorizing Provider   HYDROcodone-acetaminophen (NORCO) 5-325 MG per tablet Take 1 tablet by mouth every 8 hours as needed for Pain for up to 30 days. 22  LUIZ Castañeda   gabapentin (NEURONTIN) 400 MG capsule Take 1 capsule by mouth 3 times daily for 30 days.  22  LUIZ Castañeda   baclofen (LIORESAL) 5 MG tablet Take 1 tablet by mouth 2 times daily as needed (muscle spasms) 22   LUIZ Castañeda   famotidine (PEPCID) 20 MG tablet Take 1 tablet by mouth 2 times daily 3/21/22   LUIZ Dutton   hydrALAZINE (APRESOLINE) 10 MG tablet Take 1 tablet by mouth 3 times daily 3/11/22   LUIZ Castañeda   tamsulosin (FLOMAX) 0.4 MG capsule TAKE 1 CAPSULE BY MOUTH ONCE DAILY 1/12/22   Historical Provider, MD   amLODIPine (NORVASC) 2.5 MG tablet Take 1 tablet by mouth daily 1/14/22   LUIZ Castañeda   metoprolol succinate (TOPROL XL) 100 MG extended release tablet Take 1 tablet by mouth daily 12/20/21   LUIZ Dee   atorvastatin (LIPITOR) 80 MG tablet Take 1 tablet by mouth nightly 12/16/21   LUIZ Castañeda   metFORMIN (GLUCOPHAGE) 500 MG tablet Take 1 tablet by mouth 2 times daily (with meals) 12/16/21   LUIZ Castañeda   Ubrogepant (UBRELVY) 100 MG TABS Take 1 tablet at the onset of migraine. May repeat once in 2 hours if no improvement. Do not exceed 2 tablets in 24 hours. 10/18/21   LUIZ Nath   omeprazole (PRILOSEC) 20 MG delayed release capsule Take 1 capsule by mouth 2 times daily 9/10/21   LUIZ Castañeda   furosemide (LASIX) 40 MG tablet Take 1 tablet by mouth daily 8/2/21   LUIZ Dutton   venlafaxine (EFFEXOR XR) 150 MG extended release capsule Take 1 capsule by mouth daily 8/2/21   LUIZ Dutton   Cholecalciferol 50 MCG (2000 UT) CAPS Take by mouth daily    Historical Provider, MD   ezetimibe (ZETIA) 10 MG tablet Take 1 tablet by mouth daily 8/22/19   LUIZ Castañeda   aspirin 81 MG tablet Aspir-81 81 mg tablet,delayed release   Take 1 tablet every day by oral route.     Historical Provider, MD   hydrOXYzine (ATARAX) 50 MG tablet hydroxyzine HCl 50 mg tablet   TAKE ONE TABLET BY MOUTH TWICE DAILY AS NEEDED    Historical Provider, MD   Multiple Vitamins-Minerals (MULTIVITAMIN ADULT PO) Take by mouth daily     Historical Provider, MD       Past Medical History:  Past Medical History:   Diagnosis Date    Anxiety     Depression     Edema  GERD (gastroesophageal reflux disease)     Headache(784.0)     migraines    Hyperlipidemia     Hypertension     Mini stroke (HCC)     Sleep apnea     CPAP    TIA (transient ischemic attack)        Past Surgical History:  Past Surgical History:   Procedure Laterality Date    CHOLECYSTECTOMY      COLONOSCOPY  07/01/2015    Dr Yefri Rolon w/atypia, 5 yr recall    COLONOSCOPY  06/05/2019    Dr Anthony Polo [facility]) Non-bleeding internal hemorrhoids, diverticulosis-Tubular AP (-) dysplasia, 3 yr recall    CYSTOSCOPY Left 12/05/2018    CYSTOSCOPY URETEROSCOPY  PLACEMENT DOUBLE J STENT ON LEFT RIGHT  RETROGRADE PYELOGRAMS performed by Syd Figueroa MD at [address] Bilateral 01/31/2019    TOTAL LAPAROSCOPIC HYSTERECTOMY BILATERAL SALPINGOOPHERECTOMY WITH Rosary Muskrat performed by Ben Lorenzo MD at [facility] , VAGINAL      [place] EGD TRANSORAL BIOPSY SINGLE/MULTIPLE N/A 05/08/2018    Dr Raimundo Lopez (-) Kristin (+) Dye's (-) dysplasia--3 yr recall    MN LAP,CHOLECYSTECTOMY N/A 03/06/2018    CHOLECYSTECTOMY LAPAROSCOPIC performed by Lorna Nelson MD at [facility].  08/26/2015    Dr Sarah Linda neg    UPPER GASTROINTESTINAL ENDOSCOPY  05/23/2017    Dr Osorio-w/balloon dilation, 12-13. 5-15 mm-esophageal narrowing with diverticulum at 29 cm-Kristin (-), hiatal herni, Dye's (+) dysplasia (-)--1st dx--1 yr recall-Ct chest ordered    UPPER GASTROINTESTINAL ENDOSCOPY N/A 04/13/2022    Dr Jt Russ foreign body present    WRIST FRACTURE SURGERY         Social History:  Social History     Tobacco Use    Smoking status: Never Smoker    Smokeless tobacco: Never Used   Vaping Use    Vaping Use: Never used   Substance Use Topics    Alcohol use: No     Alcohol/week: 0.0 standard drinks    Drug use: No       Vital Signs: There were no vitals filed for this visit.      Physical Exam:  Cardiac:  [x]WNL  []Comments:  Pulmonary:  [x]WNL

## 2022-06-07 NOTE — OP NOTE
normal      IMPRESSION:  1. Esophageal dysphagia - dilated  2. S/p esophageal biopsies for Dye's esophagus      RECOMMENDATIONS:    1. Await path results  2. Repeat EGD in 3 yrs for surveillance of Dye's esophagus. 3.  Continue PPI daily (or lowest dose to control reflux symptoms)  4. Repeat EGD with dilation as needed for return of trouble swallowing    The results were discussed with the patient and family. A copy of the images obtained were given to the patient.      Fabiola Briceño MD  6/7/2022  9:30 AM

## 2022-06-07 NOTE — ANESTHESIA POSTPROCEDURE EVALUATION
Department of Anesthesiology  Postprocedure Note    Patient: Philippe Mooney  MRN: [id number]  YOB: 1950  Date of evaluation: 6/7/2022  Time:  9:32 AM     Procedure Summary     Date: 06/07/22 Room / Location: [address]    Anesthesia Start: 5342 Anesthesia Stop: 0932    Procedures:       EGD BIOPSY (N/A Abdomen)      COLONOSCOPY POLYPECTOMY SNARE/COLD BIOPSY (N/A )      EGD DILATION SAVORY Diagnosis:       Chronic epigastric pain      Dysphagia, unspecified type      History of Dye's esophagus      Screen for colon cancer      ([facility] EPIG PAIN, DYSPHAGIA, BARRETTS, SCREEN)    Surgeons: Isaac Ribeiro MD Responsible Provider: LUIZ Devries CRNA    Anesthesia Type: general ASA Status: 3          Anesthesia Type: No value filed. Win Phase I: Win Score: 10    Win Phase II:      Last vitals: Reviewed and per EMR flowsheets.        Anesthesia Post Evaluation    Patient location during evaluation: bedside  Patient participation: complete - patient participated  Level of consciousness: awake and alert  Pain score: 0  Airway patency: patent  Nausea & Vomiting: no nausea and no vomiting  Complications: no  Cardiovascular status: hemodynamically stable  Respiratory status: nonlabored ventilation, spontaneous ventilation, room air and acceptable  Hydration status: hypovolemic

## 2022-06-07 NOTE — ANESTHESIA PRE PROCEDURE
(ZETIA) 10 MG tablet Take 1 tablet by mouth daily 8/22/19   LUIZ Castañeda   aspirin 81 MG tablet Aspir-81 81 mg tablet,delayed release   Take 1 tablet every day by oral route. Historical Provider, MD   hydrOXYzine (ATARAX) 50 MG tablet hydroxyzine HCl 50 mg tablet   TAKE ONE TABLET BY MOUTH TWICE DAILY AS NEEDED    Historical Provider, MD   Multiple Vitamins-Minerals (MULTIVITAMIN ADULT PO) Take by mouth daily     Historical Provider, MD       Current medications:    No current outpatient medications on file. No current facility-administered medications for this visit. Allergies:  No Known Allergies    Problem List:    Patient Active Problem List   Diagnosis Code    Abdominal pain R10.9    Essential hypertension I10    Mixed hyperlipidemia E78.2    Gastroesophageal reflux disease without esophagitis K21.9    Abnormal esophagram R93.3    Morbidly obese (HCC) E66.01    LFTs abnormal R94.5    Viral hepatitis A without hepatic coma B15.9    Dye's esophagus without dysplasia K22.70    History of acute pancreatitis Z87.19    S/P cholecystectomy Z90.49    Anxiety F41.9    Closed dislocation of metacarpal joint of finger of left hand S63.269A    Closed fracture of left radius and ulna with routine healing S52. 92XD, S52.202D    Closed fracture of right patella S82.001A    Knee pain M25.569    Memory impairment R41.3    Complete uterine prolapse N81.3    Personal history of colonic polyps Z86.010    Moderate episode of recurrent major depressive disorder (HCC) F33.1    Obstructive sleep apnea syndrome G47.33    Intractable chronic migraine without aura G43.719    Chest pain R07.9    Foreign body in stomach T18. 2XXA    History of colon polyps Z86.010    Splenic lesion D73.89       Past Medical History:        Diagnosis Date    Anxiety     Depression     Edema     GERD (gastroesophageal reflux disease)     Headache(784.0)     migraines    Hyperlipidemia     Hypertension  Mini stroke (HCC)     Sleep apnea     CPAP    TIA (transient ischemic attack)        Past Surgical History:        Procedure Laterality Date    CHOLECYSTECTOMY      COLONOSCOPY  07/01/2015    Dr Isma Carrillo w/atypia, 5 yr recall    COLONOSCOPY  06/05/2019    Dr Clemencia Porter (Gailen Primer) Non-bleeding internal hemorrhoids, diverticulosis-Tubular AP (-) dysplasia, 3 yr recall    CYSTOSCOPY Left 12/05/2018    CYSTOSCOPY URETEROSCOPY  PLACEMENT DOUBLE J STENT ON LEFT RIGHT  RETROGRADE PYELOGRAMS performed by Michel Alejo MD at [address] Bilateral 01/31/2019    TOTAL LAPAROSCOPIC HYSTERECTOMY BILATERAL SALPINGOOPHERECTOMY WITH Lex Dayhoff performed by Terra Chow MD at [facility], VAGINAL      [place] EGD TRANSORAL BIOPSY SINGLE/MULTIPLE N/A 05/08/2018    Dr Fozia Mayes (-) Kristin (+) Dye's (-) dysplasia--3 yr recall    KS LAP,CHOLECYSTECTOMY N/A 03/06/2018    CHOLECYSTECTOMY LAPAROSCOPIC performed by Federico Mcgowan MD at [address] [facility]  08/26/2015    Dr Fernandez Goldberg neg    UPPER GASTROINTESTINAL ENDOSCOPY  05/23/2017    Dr Osorio-w/balloon dilation, 12-13. 5-15 mm-esophageal narrowing with diverticulum at 29 cm-Kristin (-), hiatal herni, Dye's (+) dysplasia (-)--1st dx--1 yr recall-Ct chest ordered    UPPER GASTROINTESTINAL ENDOSCOPY N/A 04/13/2022    Dr Graciela Minaya foreign body present    WRIST FRACTURE SURGERY         Social History:    Social History     Tobacco Use    Smoking status: Never Smoker    Smokeless tobacco: Never Used   Substance Use Topics    Alcohol use: No     Alcohol/week: 0.0 standard drinks                                Counseling given: Not Answered      Vital Signs (Current): There were no vitals filed for this visit.                                            BP Readings from Last 3 Encounters:   06/07/22 (!) 140/68   05/20/22 132/73   05/19/22 102/62       NPO Status: BMI:   Wt Readings from Last 3 Encounters:   06/07/22 22 lb (9.979 kg)   05/20/22 222 lb 6 oz (100.9 kg)   05/19/22 221 lb 3.2 oz (100.3 kg)     There is no height or weight on file to calculate BMI.    CBC:   Lab Results   Component Value Date    WBC 8.1 04/18/2022    RBC 3.85 04/18/2022    HGB 11.4 04/18/2022    HCT 37.9 04/18/2022    HCT 38.2 03/16/2011    MCV 98.4 04/18/2022    RDW 12.9 04/18/2022     04/18/2022     03/16/2011       CMP:   Lab Results   Component Value Date     04/18/2022     03/14/2011    K 4.3 04/18/2022    K 4.0 03/14/2011     04/18/2022     03/14/2011    CO2 23 04/18/2022    BUN 11 04/18/2022    CREATININE 0.8 04/18/2022    CREATININE 0.8 03/14/2011    GFRAA >59 04/18/2022    LABGLOM >60 04/18/2022    GLUCOSE 108 04/18/2022    PROT 6.4 04/12/2022    PROT 7.4 03/14/2011    CALCIUM 9.9 04/18/2022    BILITOT 0.3 04/12/2022    ALKPHOS 82 04/12/2022    ALKPHOS 90 03/14/2011    AST 14 04/12/2022    ALT 13 04/12/2022       POC Tests:   No results for input(s): POCGLU, POCNA, POCK, POCCL, POCBUN, POCHEMO, POCHCT in the last 72 hours.     Coags:   Lab Results   Component Value Date    PROTIME 13.2 04/13/2022    PROTIME 10.80 03/14/2011    INR 1.01 04/13/2022    APTT 28.4 04/13/2022       HCG (If Applicable): No results found for: PREGTESTUR, PREGSERUM, HCG, HCGQUANT     ABGs: No results found for: PHART, PO2ART, OQO2XKJ, OJZ1KDD, BEART, O9HCMBOV     Type & Screen (If Applicable):  No results found for: LABABO, LABRH    Drug/Infectious Status (If Applicable):  No results found for: HIV, HEPCAB    COVID-19 Screening (If Applicable):   Lab Results   Component Value Date    COVID19 Not Detected 12/27/2021           Anesthesia Evaluation  Patient summary reviewed and Nursing notes reviewed  Airway: Mallampati: II  TM distance: >3 FB   Neck ROM: full  Mouth opening: < 3 FB   Dental:          Pulmonary:   (+) sleep apnea: Cardiovascular:  Exercise tolerance: poor (<4 METS),   (+) hypertension: moderate, hyperlipidemia         Beta Blocker:  Dose within 24 Hrs         Neuro/Psych:   (+) TIA, psychiatric history:depression/anxiety              ROS comment: Chronic benzo use GI/Hepatic/Renal:   (+) GERD:, hepatitis:, liver disease:, morbid obesity          Endo/Other: Negative Endo/Other ROS             Pt had no PAT visit       Abdominal:             Vascular: Other Findings:             Anesthesia Plan      general     ASA 3       Induction: intravenous. Anesthetic plan and risks discussed with patient.                         LUIZ Murguia - CRNA   6/7/2022

## 2022-07-28 ENCOUNTER — HOSPITAL ENCOUNTER (OUTPATIENT)
Dept: PAIN MANAGEMENT | Age: 72
Discharge: HOME | End: 2022-07-28
Payer: MEDICARE

## 2022-07-28 VITALS
HEART RATE: 85 BPM | SYSTOLIC BLOOD PRESSURE: 135 MMHG | TEMPERATURE: 96.3 F | OXYGEN SATURATION: 97 % | RESPIRATION RATE: 18 BRPM | DIASTOLIC BLOOD PRESSURE: 70 MMHG

## 2022-07-28 PROCEDURE — A4216 STERILE WATER/SALINE, 10 ML: HCPCS

## 2022-07-28 PROCEDURE — 64615 CHEMODENERV MUSC MIGRAINE: CPT | Performed by: NURSE PRACTITIONER

## 2022-07-28 PROCEDURE — 6360000002 HC RX W HCPCS: Performed by: NURSE PRACTITIONER

## 2022-07-28 PROCEDURE — 2580000003 HC RX 258

## 2022-07-28 PROCEDURE — 64615 CHEMODENERV MUSC MIGRAINE: CPT

## 2022-07-28 PROCEDURE — 6360000002 HC RX W HCPCS

## 2022-07-28 RX ORDER — SODIUM CHLORIDE 9 MG/ML
4.5 INJECTION INTRAVENOUS ONCE
Status: DISCONTINUED | OUTPATIENT
Start: 2022-07-28 | End: 2022-07-30 | Stop reason: HOSPADM

## 2022-07-28 RX ORDER — SODIUM CHLORIDE 0.9 % (FLUSH) 0.9 %
5-40 SYRINGE (ML) INJECTION 2 TIMES DAILY
Status: DISCONTINUED | OUTPATIENT
Start: 2022-07-28 | End: 2022-07-30 | Stop reason: HOSPADM

## 2022-07-28 NOTE — DISCHARGE INSTRUCTIONS
[address] [place] [address] Physical And Pain Medicine  Post Procedure Discharge Instructions        YOU HAVE HAD THE FOLLOWING PROCEDURE:                                  [] Occipital Nerve Blocks  [] CTS wrist injection(s)  [] Knee Injection(s)         [] Shoulder Injection(s)   [] Elbow Injection(s)     [] Botox Injection  [] Cervical Trigger Point Injections    [] Thoracic Trigger Point Injections    [] Lumbar Trigger Point Injections  [] Piriformis Trigger Point Injections  [] SI Joint Injection(s)     [] Trochanteric Bursa Injection(s)       [] Ankle Injection(s)   [] Plantar Fasciitis   []  ______________  Injection(s) [x] Botox [x]  Migraines [] Spasticity    YOU HAVE RECEIVED THE FOLLOWING MEDICATIONS IN YOUR INJECTION(s)  [] Lidocaine [] Bupivacaine   [] DepoMedrol (steroid) [] Decadron (steroid)  []  Kenalog (steroid)   [] Toradol  [] Supartz [] Padmini Fusi    [x] Botox        PATIENT INFORMATION:   You may experience the following symptoms after your procedure. These symptoms are normal and should not cause concern: You may have an increase in your pain. This may last 24 - 48 hours after your procedure. You may have no change in the pain that you had prior to your injection(s). You may have weakness or numbness in your affected extremity. If this occurs, this may last until numbing the medication wears off. REPORT THE FOLLOWING SYMPTOMS TO YOUR DOCTOR:  Redness, swelling or drainage at the injection site(s)  Unusual pain that interferes with your normal activities of daily living. OTHER INSTRUCTIONS:    [x] I will apply ice to the injection site(s) for at least 24 hours after the procedure. I will rotate the ice on for 20 minutes and off for 20 minutes for at least 24 hours. [] I will not apply heat for at least 48 hours and I will not take a hot bath or shower for at least 24 hours.      [] I understand that if Lidocaine or Bupivacaine was used in my injection(s) that the injection site(s) will be numb for a few hours after the procedure    [] I understand if a steroid was used it will take effect in 3 - 7 days. I understand that as the numbing medication wears off, the pain may return until the steroids start working. [x] I understand that today I will rest for the next 24 hours and drink plenty of water. [x] For Botox for Migraines please do not wear anything constricting around the forehead for 7-10 days post injection. Examples headband, hats, or bandana    [] For Botox for Spasticity start therapy for the affected limb in two weeks. [] Additional instructions: ______________________________________________ ___________________________________________________________________    Sedation:  Was oral sedation given? [] Yes  [x] No    I understand that if I took an oral nerve calming medication I will not drive for  [] 24 hours after taking the medication.     [x] I have received a copy of my discharge instructions and understand the above instructions to the best of my knowledge    Patient Discharged:  [x] Home  [] Hospital  [] Other  ____________________________________________    Via:  [x] Ambulatory  [] Wheelchair   [] Stretcher [] EMS       Accompanied By:   [] Significant other  [] Family Member  [] Friend   [] Hospital Staff  []  Ambulance Staff  [] Other_______________________________________________    Plan:  [x] Will return to the office in   [] 1 month   [] 3 months for:  [] Procedure Follow-up  [] Office Visit   [] Planned Procedure      Patient Signature: _____________________________________________________    Staff Signature: _______________________________________________________        If you have questions, problems or concerns you may call [phone] and follow the prompts    KVNG Argueta, VA-BC

## 2022-07-28 NOTE — PROGRESS NOTES
[id number] [facility] Neurology Botox Procedure Note     Patient:   Anna Moreland  MR#:    [id number]  Account Number:                   [id number]      YOB: 1950  Date of Evaluation:  7/28/2022  Time of Note:                          1:08 PM  Primary Physician:    LUIZ Chávez   Consulting Physician:  LUIZ Denny DNP    Consent was signed and on the chart. Risk, benefits, and side effects discussed. Pt has a clear history of having more than 15 days/month of migraine, lasting more than 4 hours with multiple treatment failures. Vial Exp Date: 6/24 x2  [place] Ja Lot Number:  [id number] x2    Botox was diluted with 0.9% NS to yield a final concentration of 50 units / 1 ml. The following muscles were injected in 0.1 ml (5 unit) increments:    -   5 units left, 5 units right  Procerus-      5 units  Frontalis-      20 units divided into 4 sites left and right  Temporalis-  40 units divided into 4 sites left and 4 sites right  Occipitalis-    30 units divided into 3 sites left and 3 sites right  Cervical Paraspinal-  20 units divided into 2 sites left and 2 sites right  Trapezius-     30 units divided into 3 sites left and 3 sites right    Total units injected: 155  Total units unavoidably discarded: 45    Pt tolerated the procedure well. There were no complications. Pt will follow up in 6 weeks to assess effectiveness and will repeat injections in 12 weeks if continues to benefit.       LUIZ Denny DNP

## 2022-08-03 DIAGNOSIS — K21.9 GASTROESOPHAGEAL REFLUX DISEASE WITHOUT ESOPHAGITIS: Chronic | ICD-10-CM

## 2022-08-03 DIAGNOSIS — I95.9 HYPOTENSION, UNSPECIFIED HYPOTENSION TYPE: ICD-10-CM

## 2022-08-03 RX ORDER — AMLODIPINE BESYLATE 2.5 MG/1
2.5 TABLET ORAL DAILY
Qty: 30 TABLET | Refills: 5 | Status: SHIPPED | OUTPATIENT
Start: 2022-08-03

## 2022-08-03 RX ORDER — FAMOTIDINE 20 MG/1
20 TABLET, FILM COATED ORAL 2 TIMES DAILY
Qty: 180 TABLET | Refills: 3 | Status: SHIPPED | OUTPATIENT
Start: 2022-08-03

## 2022-08-03 RX ORDER — SODIUM CHLORIDE 9 MG/ML
4.5 INJECTION INTRAVENOUS ONCE
Status: ACTIVE | OUTPATIENT
Start: 2022-08-03

## 2022-08-03 NOTE — TELEPHONE ENCOUNTER
Received fax from pharmacy requesting refill on pts medication(s). Pt was last seen in office on 5/20/2022  and has a follow up scheduled for 8/3/2022. Will send request to  [facility]  for authorization.      Requested Prescriptions     Pending Prescriptions Disp Refills    amLODIPine (NORVASC) 2.5 MG tablet [Pharmacy Med Name: amLODIPine Besylate 2.5 MG Oral Tablet] 30 tablet 0     Sig: Take 1 tablet by mouth once daily

## 2022-08-18 DIAGNOSIS — R45.89 CRYING ASSOCIATED WITH MOOD: ICD-10-CM

## 2022-08-18 DIAGNOSIS — F33.1 MODERATE EPISODE OF RECURRENT MAJOR DEPRESSIVE DISORDER (HCC): ICD-10-CM

## 2022-08-18 DIAGNOSIS — F41.1 GAD (GENERALIZED ANXIETY DISORDER): ICD-10-CM

## 2022-08-18 RX ORDER — VENLAFAXINE HYDROCHLORIDE 150 MG/1
150 CAPSULE, EXTENDED RELEASE ORAL DAILY
Qty: 30 CAPSULE | Refills: 5 | Status: SHIPPED | OUTPATIENT
Start: 2022-08-18

## 2022-08-18 NOTE — TELEPHONE ENCOUNTER
Received fax from pharmacy requesting refill on pts medication(s). Pt was last seen in office on 5/20/2022  and has a follow up scheduled for Visit date not found. Will send request to  [facility]  for authorization.      Requested Prescriptions     Pending Prescriptions Disp Refills    venlafaxine (EFFEXOR XR) 150 MG extended release capsule [Pharmacy Med Name: Venlafaxine HCl  MG Oral Capsule Extended Release 24 Hour] 30 capsule 5     Sig: Take 1 capsule by mouth daily

## 2022-08-30 DIAGNOSIS — I10 ESSENTIAL HYPERTENSION: Chronic | ICD-10-CM

## 2022-08-31 RX ORDER — LISINOPRIL 40 MG/1
40 TABLET ORAL DAILY
Qty: 90 TABLET | Refills: 0 | OUTPATIENT
Start: 2022-08-31

## 2022-09-06 DIAGNOSIS — I10 ESSENTIAL HYPERTENSION: Chronic | ICD-10-CM

## 2022-09-07 RX ORDER — TAMSULOSIN HYDROCHLORIDE 0.4 MG/1
0.4 CAPSULE ORAL DAILY
Qty: 30 CAPSULE | Refills: 5 | Status: SHIPPED | OUTPATIENT
Start: 2022-09-07

## 2022-09-07 RX ORDER — LISINOPRIL 40 MG/1
TABLET ORAL
Qty: 90 TABLET | Refills: 0 | OUTPATIENT
Start: 2022-09-07

## 2022-09-07 NOTE — TELEPHONE ENCOUNTER
Received fax from pharmacy requesting refill on pts medication(s). Pt was last seen in office on 5/20/2022  and has a follow up scheduled for Visit date not found. Will send request to  [facility]  for authorization. Pt is no longer taking the Lisinopril (The original prescription was discontinued on 10/15/2021 by Trini Marie for the following reason: [facility].  Renewing this prescription may not be appropriate.)    Requested Prescriptions     Pending Prescriptions Disp Refills    tamsulosin (FLOMAX) 0.4 MG capsule [Pharmacy Med Name: Tamsulosin HCl 0.4 MG Oral Capsule] 30 capsule 5     Sig: Take 1 capsule by mouth daily     Refused Prescriptions Disp Refills    lisinopril (PRINIVIL;ZESTRIL) 40 MG tablet [Pharmacy Med Name: Lisinopril 40 MG Oral Tablet] 90 tablet 0     Sig: Take 1 tablet by mouth once daily

## 2022-09-11 DIAGNOSIS — I10 ESSENTIAL HYPERTENSION: Chronic | ICD-10-CM

## 2022-09-12 RX ORDER — LISINOPRIL 40 MG/1
40 TABLET ORAL DAILY
Qty: 90 TABLET | Refills: 0 | OUTPATIENT
Start: 2022-09-12

## 2022-09-12 NOTE — TELEPHONE ENCOUNTER
Received fax from pharmacy requesting refill on pts medication(s). Pt was last seen in office on 5/20/2022  and has a follow up scheduled for Visit date not found. Will send request to  [facility]  for authorization.      Requested Prescriptions     Pending Prescriptions Disp Refills    lisinopril (PRINIVIL;ZESTRIL) 40 MG tablet [Pharmacy Med Name: Lisinopril 40 MG Oral Tablet] 90 tablet 0     Sig: Take 1 tablet by mouth once daily

## 2022-09-13 ENCOUNTER — TELEPHONE (OUTPATIENT)
Dept: NEUROSURGERY | Age: 72
End: 2022-09-13

## 2022-09-13 NOTE — TELEPHONE ENCOUNTER
Called to confirm appointment . Pt said cancel appointment 9/14/22 and botox procedure 10/20/22. She is going out of town her mom is sick . Pt will call office and reschedule both appointments at a later date.

## 2022-09-21 DIAGNOSIS — R60.0 LOCALIZED EDEMA: ICD-10-CM

## 2022-09-21 NOTE — TELEPHONE ENCOUNTER
Received fax from pharmacy requesting refill on pts medication(s). Pt was last seen in office on 5/20/2022  and has a follow up scheduled for Visit date not found. Will send request to  [facility]  for authorization.      Requested Prescriptions     Pending Prescriptions Disp Refills    furosemide (LASIX) 40 MG tablet [Pharmacy Med Name: Furosemide 40 MG Oral Tablet] 90 tablet 0     Sig: Take 1 tablet by mouth daily

## 2022-09-22 RX ORDER — FUROSEMIDE 40 MG/1
40 TABLET ORAL DAILY
Qty: 90 TABLET | Refills: 3 | Status: SHIPPED | OUTPATIENT
Start: 2022-09-22

## 2022-11-06 DIAGNOSIS — K21.9 GASTROESOPHAGEAL REFLUX DISEASE: ICD-10-CM

## 2022-11-06 DIAGNOSIS — M79.672 PAIN IN BOTH FEET: ICD-10-CM

## 2022-11-06 DIAGNOSIS — G62.9 NEUROPATHY: ICD-10-CM

## 2022-11-06 DIAGNOSIS — M79.671 PAIN IN BOTH FEET: ICD-10-CM

## 2022-11-06 DIAGNOSIS — R10.10 PAIN OF UPPER ABDOMEN: ICD-10-CM

## 2022-11-07 RX ORDER — GABAPENTIN 400 MG/1
400 CAPSULE ORAL 3 TIMES DAILY
Qty: 90 CAPSULE | Refills: 0 | Status: SHIPPED | OUTPATIENT
Start: 2022-11-07 | End: 2022-12-07

## 2022-11-07 RX ORDER — OMEPRAZOLE 20 MG/1
20 CAPSULE, DELAYED RELEASE ORAL DAILY
Qty: 180 CAPSULE | Refills: 0 | Status: SHIPPED | OUTPATIENT
Start: 2022-11-07 | End: 2022-11-08 | Stop reason: SDUPTHER

## 2022-11-07 NOTE — TELEPHONE ENCOUNTER
Received fax from pharmacy requesting refill on pts medication(s). Pt was last seen in office on 5/20/2022  and has a follow up scheduled for Visit date not found. Will send request to  [facility]  for authorization. Requested Prescriptions     Pending Prescriptions Disp Refills    omeprazole (PRILOSEC) 20 MG delayed release capsule [Pharmacy Med Name: Omeprazole 20 MG Oral Capsule Delayed Release] 180 capsule 0     Sig: Take 1 capsule by mouth Daily    gabapentin (NEURONTIN) 400 MG capsule [Pharmacy Med Name: Gabapentin 400 MG Oral Capsule] 90 capsule 0     Sig: Take 1 capsule by mouth 3 times daily for 30 days.

## 2022-11-08 DIAGNOSIS — K21.9 GASTROESOPHAGEAL REFLUX DISEASE: ICD-10-CM

## 2022-11-08 DIAGNOSIS — R10.10 PAIN OF UPPER ABDOMEN: ICD-10-CM

## 2022-11-08 RX ORDER — OMEPRAZOLE 20 MG/1
20 CAPSULE, DELAYED RELEASE ORAL 2 TIMES DAILY
Qty: 180 CAPSULE | Refills: 1 | Status: SHIPPED | OUTPATIENT
Start: 2022-11-08

## 2022-11-08 NOTE — TELEPHONE ENCOUNTER
Received fax from pharmacy requesting refill on pts medication(s). Pt was last seen in office on 5/20/2022  and has a follow up scheduled for Visit date not found. For script clarification. Script was sent to pharmacy for #180 take once daily. Pt has been taking one BID. Will send request to  [facility]  for authorization.       Requested Prescriptions     Pending Prescriptions Disp Refills    omeprazole (PRILOSEC) 20 MG delayed release capsule 180 capsule 1     Sig: Take 1 capsule by mouth 2 times daily

## 2022-11-17 DIAGNOSIS — E78.2 MIXED HYPERLIPIDEMIA: Chronic | ICD-10-CM

## 2022-11-18 RX ORDER — ATORVASTATIN CALCIUM 80 MG/1
80 TABLET, FILM COATED ORAL NIGHTLY
Qty: 90 TABLET | Refills: 0 | Status: SHIPPED | OUTPATIENT
Start: 2022-11-18

## 2022-11-18 NOTE — TELEPHONE ENCOUNTER
Received fax from pharmacy requesting refill on pts medication(s). Pt was last seen in office on 5/20/2022  and has a follow up scheduled for Visit date not found. Will send request to  [facility]  for authorization.      Requested Prescriptions     Pending Prescriptions Disp Refills    atorvastatin (LIPITOR) 80 MG tablet [Pharmacy Med Name: Atorvastatin Calcium 80 MG Oral Tablet] 90 tablet 0     Sig: Take 1 tablet by mouth nightly

## 2022-12-08 ENCOUNTER — TELEPHONE (OUTPATIENT)
Dept: CARDIOLOGY CLINIC | Age: 72
End: 2022-12-08

## 2022-12-26 ENCOUNTER — APPOINTMENT (OUTPATIENT)
Dept: GENERAL RADIOLOGY | Age: 72
End: 2022-12-26
Payer: MEDICARE

## 2022-12-26 ENCOUNTER — HOSPITAL ENCOUNTER (EMERGENCY)
Age: 72
Discharge: HOME | End: 2022-12-27
Attending: EMERGENCY MEDICINE
Payer: MEDICARE

## 2022-12-26 VITALS
HEART RATE: 82 BPM | SYSTOLIC BLOOD PRESSURE: 101 MMHG | WEIGHT: 230 LBS | HEIGHT: 64 IN | BODY MASS INDEX: 39.27 KG/M2 | RESPIRATION RATE: 17 BRPM | TEMPERATURE: 97.9 F | DIASTOLIC BLOOD PRESSURE: 60 MMHG | OXYGEN SATURATION: 100 %

## 2022-12-26 DIAGNOSIS — U07.1 COVID-19 VIRUS INFECTION: Primary | ICD-10-CM

## 2022-12-26 LAB
ALBUMIN SERPL-MCNC: 3.8 G/DL (ref 3.5–5.2)
ALP BLD-CCNC: 87 U/L (ref 35–104)
ALT SERPL-CCNC: 43 U/L (ref 5–33)
ANION GAP SERPL CALCULATED.3IONS-SCNC: 12 MMOL/L (ref 7–19)
AST SERPL-CCNC: 55 U/L (ref 5–32)
BASOPHILS ABSOLUTE: 0 K/UL (ref 0–0.2)
BASOPHILS RELATIVE PERCENT: 0.6 % (ref 0–1)
BILIRUB SERPL-MCNC: <0.2 MG/DL (ref 0.2–1.2)
BUN BLDV-MCNC: 16 MG/DL (ref 8–23)
CALCIUM SERPL-MCNC: 9.7 MG/DL (ref 8.8–10.2)
CHLORIDE BLD-SCNC: 98 MMOL/L (ref 98–111)
CO2: 26 MMOL/L (ref 22–29)
CREAT SERPL-MCNC: 1 MG/DL (ref 0.5–0.9)
EOSINOPHILS ABSOLUTE: 0 K/UL (ref 0–0.6)
EOSINOPHILS RELATIVE PERCENT: 0 % (ref 0–5)
GFR SERPL CREATININE-BSD FRML MDRD: 60 ML/MIN/{1.73_M2}
GLUCOSE BLD-MCNC: 117 MG/DL (ref 74–109)
HCT VFR BLD CALC: 39.1 % (ref 37–47)
HEMOGLOBIN: 12.8 G/DL (ref 12–16)
IMMATURE GRANULOCYTES #: 0 K/UL
LYMPHOCYTES ABSOLUTE: 1 K/UL (ref 1.1–4.5)
LYMPHOCYTES RELATIVE PERCENT: 28.1 % (ref 20–40)
MCH RBC QN AUTO: 30.3 PG (ref 27–31)
MCHC RBC AUTO-ENTMCNC: 32.7 G/DL (ref 33–37)
MCV RBC AUTO: 92.7 FL (ref 81–99)
MONOCYTES ABSOLUTE: 0.8 K/UL (ref 0–0.9)
MONOCYTES RELATIVE PERCENT: 22.9 % (ref 0–10)
NEUTROPHILS ABSOLUTE: 1.8 K/UL (ref 1.5–7.5)
NEUTROPHILS RELATIVE PERCENT: 48.1 % (ref 50–65)
PDW BLD-RTO: 13.7 % (ref 11.5–14.5)
PLATELET # BLD: 186 K/UL (ref 130–400)
PMV BLD AUTO: 9.6 FL (ref 9.4–12.3)
POTASSIUM SERPL-SCNC: 3.6 MMOL/L (ref 3.5–5)
RAPID INFLUENZA  B AGN: NEGATIVE
RAPID INFLUENZA A AGN: NEGATIVE
RBC # BLD: 4.22 M/UL (ref 4.2–5.4)
SARS-COV-2, NAAT: DETECTED
SODIUM BLD-SCNC: 136 MMOL/L (ref 136–145)
TOTAL PROTEIN: 6.8 G/DL (ref 6.6–8.7)
WBC # BLD: 3.6 K/UL (ref 4.8–10.8)

## 2022-12-26 PROCEDURE — 36415 COLL VENOUS BLD VENIPUNCTURE: CPT

## 2022-12-26 PROCEDURE — 6360000002 HC RX W HCPCS: Performed by: EMERGENCY MEDICINE

## 2022-12-26 PROCEDURE — 71045 X-RAY EXAM CHEST 1 VIEW: CPT | Performed by: RADIOLOGY

## 2022-12-26 PROCEDURE — 96374 THER/PROPH/DIAG INJ IV PUSH: CPT

## 2022-12-26 PROCEDURE — 85025 COMPLETE CBC W/AUTO DIFF WBC: CPT

## 2022-12-26 PROCEDURE — 71045 X-RAY EXAM CHEST 1 VIEW: CPT

## 2022-12-26 PROCEDURE — 80053 COMPREHEN METABOLIC PANEL: CPT

## 2022-12-26 PROCEDURE — 2580000003 HC RX 258: Performed by: EMERGENCY MEDICINE

## 2022-12-26 PROCEDURE — 87804 INFLUENZA ASSAY W/OPTIC: CPT

## 2022-12-26 PROCEDURE — 87635 SARS-COV-2 COVID-19 AMP PRB: CPT

## 2022-12-26 PROCEDURE — 99284 EMERGENCY DEPT VISIT MOD MDM: CPT

## 2022-12-26 RX ORDER — ONDANSETRON 4 MG/1
4 TABLET, ORALLY DISINTEGRATING ORAL 3 TIMES DAILY PRN
Qty: 21 TABLET | Refills: 0 | Status: SHIPPED | OUTPATIENT
Start: 2022-12-26

## 2022-12-26 RX ORDER — SODIUM CHLORIDE, SODIUM LACTATE, POTASSIUM CHLORIDE, AND CALCIUM CHLORIDE .6; .31; .03; .02 G/100ML; G/100ML; G/100ML; G/100ML
1000 INJECTION, SOLUTION INTRAVENOUS ONCE
Status: COMPLETED | OUTPATIENT
Start: 2022-12-26 | End: 2022-12-26

## 2022-12-26 RX ORDER — KETOROLAC TROMETHAMINE 30 MG/ML
15 INJECTION, SOLUTION INTRAMUSCULAR; INTRAVENOUS ONCE
Status: COMPLETED | OUTPATIENT
Start: 2022-12-26 | End: 2022-12-26

## 2022-12-26 RX ORDER — ONDANSETRON 2 MG/ML
4 INJECTION INTRAMUSCULAR; INTRAVENOUS ONCE
Status: DISCONTINUED | OUTPATIENT
Start: 2022-12-26 | End: 2022-12-27 | Stop reason: HOSPADM

## 2022-12-26 RX ADMIN — KETOROLAC TROMETHAMINE 15 MG: 30 INJECTION, SOLUTION INTRAMUSCULAR; INTRAVENOUS at 21:21

## 2022-12-26 RX ADMIN — SODIUM CHLORIDE, POTASSIUM CHLORIDE, SODIUM LACTATE AND CALCIUM CHLORIDE 1000 ML: 600; 310; 30; 20 INJECTION, SOLUTION INTRAVENOUS at 21:21

## 2022-12-26 ASSESSMENT — PAIN DESCRIPTION - DESCRIPTORS: DESCRIPTORS: ACHING

## 2022-12-26 ASSESSMENT — ENCOUNTER SYMPTOMS
VOMITING: 0
ABDOMINAL PAIN: 0
EYE REDNESS: 0
COUGH: 1
EYE PAIN: 0
DIARRHEA: 0
RHINORRHEA: 0
SHORTNESS OF BREATH: 0
VOICE CHANGE: 0

## 2022-12-26 ASSESSMENT — PAIN DESCRIPTION - FREQUENCY: FREQUENCY: CONTINUOUS

## 2022-12-26 ASSESSMENT — PAIN SCALES - GENERAL
PAINLEVEL_OUTOF10: 6
PAINLEVEL_OUTOF10: 6

## 2022-12-26 ASSESSMENT — PAIN DESCRIPTION - LOCATION: LOCATION: GENERALIZED

## 2022-12-26 ASSESSMENT — PAIN - FUNCTIONAL ASSESSMENT: PAIN_FUNCTIONAL_ASSESSMENT: 0-10

## 2022-12-27 NOTE — ED PROVIDER NOTES
[facility] EMERGENCY DEPT  EMERGENCY DEPARTMENT ENCOUNTER      Pt Name: Laisha Rodriguez  MRN: [id number]  [place] 1950  Date of evaluation: 12/26/2022  Provider: Beltran Jones MD    [address] [place] [address]       Chief Complaint   Patient presents with    Fever    Generalized Body Aches    Chills    Cough         HISTORY OF PRESENT ILLNESS   (Location/Symptom, Timing/Onset,Context/Setting, Quality, Duration, Modifying Factors, Severity)  Note limiting factors. Laisha Rodriguez is a 71 y.o. female who presents to the emergency department with complaint of generalized weakness and fatigue over the last 3 days. Has had congestion, cough, runny nose, decreased appetite, fevers over the last 5 days. Says her granddaughter is sick with similar illness. Denies any shortness of breath. Her primary concern is the fatigue and weakness. Says that she has required assistance to get up and go to the bathroom over the last couple of days. HPI    NursingNotes were reviewed. REVIEW OF SYSTEMS    (2-9 systems for level 4, 10 or more for level 5)     Review of Systems   Constitutional:  Positive for activity change, appetite change, fatigue and fever. HENT:  Positive for congestion. Negative for rhinorrhea and voice change. Eyes:  Negative for pain and redness. Respiratory:  Positive for cough. Negative for shortness of breath. Cardiovascular:  Negative for chest pain. Gastrointestinal:  Negative for abdominal pain, diarrhea and vomiting. Endocrine: Negative. Genitourinary: Negative. Musculoskeletal:  Negative for arthralgias and gait problem. Skin:  Negative for rash and wound. Neurological:  Positive for weakness. Negative for headaches. Hematological: Negative. Psychiatric/Behavioral: Negative. All other systems reviewed and are negative. A complete review of systems was performed and is negative except as noted above in the HPI.        PAST MEDICAL HISTORY     Past Medical History:   Diagnosis Date    Anxiety     Arthritis     Dye's esophagus     Cerebral artery occlusion with cerebral infarction ([facility] [address]     tia, 15 or 16 years ago, no deficits    Depression     Diabetes mellitus (HCC)     Edema     feet and ankles    GERD (gastroesophageal reflux disease)     Headache(784.0)     migraines    Hepatitis A     Hyperlipidemia     Hypertension     Mini stroke     Obese     Sleep apnea     CPAP    TIA (transient ischemic attack)          SURGICAL HISTORY       Past Surgical History:   Procedure Laterality Date    ANKLE SURGERY Right     CHOLECYSTECTOMY      COLONOSCOPY  2015    Dr Jocelyn Robb w/atypia, 5 yr recall    COLONOSCOPY  2019    Dr Anup [facility]) Non-bleeding internal hemorrhoids, diverticulosis-Tubular AP (-) dysplasia, 3 yr recall    COLONOSCOPY N/A 2022     Reymundo Room disease-AP x 1, 5 yr recall    CYSTOSCOPY Left 2018    CYSTOSCOPY URETEROSCOPY  PLACEMENT DOUBLE J STENT ON LEFT RIGHT  RETROGRADE PYELOGRAMS performed by Akila Cyr MD at [address] [facility] 2019    TOTAL LAPAROSCOPIC HYSTERECTOMY BILATERAL SALPINGOOPHERECTOMY WITH Lucendia Suarez performed by Beck Tate MD at [address], VAGINAL      [place] EGD TRANSORAL BIOPSY SINGLE/MULTIPLE N/A 2018    Dr Merline Marvel (-) Kristin (+) Dye's (-) dysplasia--3 yr recall    [place] LAP,CHOLECYSTECTOMY N/A 2018    CHOLECYSTECTOMY LAPAROSCOPIC performed by Chastity Augustine MD at [facility]  2015    Dr Adam Reynolds neg    UPPER GASTROINTESTINAL ENDOSCOPY  2017    Dr Osorio-w/balloon dilation, 12-13. 5-15 mm-esophageal narrowing with diverticulum at 29 cm-Kristin (-), hiatal herni, Dye's (+) dysplasia (-)--1st dx--1 yr recall-Ct chest ordered    UPPER GASTROINTESTINAL ENDOSCOPY N/A 2022    Dr Vargas Scale foreign body present    UPPER GASTROINTESTINAL ENDOSCOPY N/A 2022    Dr Anup Crook-w/hel-[id number]-Yccekh appearing ring in the distal esophagus,no Dye's, esophagitis, gastritis, 3 yr recall    UPPER GASTROINTESTINAL ENDOSCOPY  06/07/2022    Dr Skyler Crook-w/mxp-[id number]-Tsnqcp appearing ring in the distal esophagus,no Dye's, esophagitis, gastritis, 3 yr recall    WRIST FRACTURE SURGERY Right          CURRENT MEDICATIONS       Discharge Medication List as of 12/26/2022 11:57 PM        CONTINUE these medications which have NOT CHANGED    Details   atorvastatin (LIPITOR) 80 MG tablet Take 1 tablet by mouth nightly, Disp-90 tablet, R-0Normal      omeprazole (PRILOSEC) 20 MG delayed release capsule Take 1 capsule by mouth 2 times daily, Disp-180 capsule, R-1Normal      gabapentin (NEURONTIN) 400 MG capsule Take 1 capsule by mouth 3 times daily for 30 days. , Disp-90 capsule, R-0Normal      furosemide (LASIX) 40 MG tablet Take 1 tablet by mouth daily, Disp-90 tablet, R-3Normal      tamsulosin (FLOMAX) 0.4 MG capsule Take 1 capsule by mouth daily, Disp-30 capsule, R-5Normal      venlafaxine (EFFEXOR XR) 150 MG extended release capsule Take 1 capsule by mouth daily, Disp-30 capsule, R-5Normal      amLODIPine (NORVASC) 2.5 MG tablet Take 1 tablet by mouth in the morning., Disp-30 tablet, R-5Normal      famotidine (PEPCID) 20 MG tablet Take 1 tablet by mouth in the morning and 1 tablet before bedtime. , Disp-180 tablet, R-3Normal      hydrALAZINE (APRESOLINE) 10 MG tablet Take 1 tablet by mouth 3 times daily, Disp-90 tablet, R-5Normal      metoprolol succinate (TOPROL XL) 100 MG extended release tablet Take 1 tablet by mouth daily, Disp-90 tablet, R-3Normal      metFORMIN (GLUCOPHAGE) 500 MG tablet Take 1 tablet by mouth 2 times daily (with meals), Disp-180 tablet, R-3Normal      Ubrogepant (UBRELVY) 100 MG TABS Take 1 tablet at the onset of migraine. May repeat once in 2 hours if no improvement. Do not exceed 2 tablets in 24 hours. , Disp-10 tablet, R-3Normal      ezetimibe (ZETIA) 10 MG tablet Take 1 tablet by mouth daily, Disp-30 tablet, R-11Normal      aspirin 81 MG tablet Aspir-81 81 mg tablet,delayed release   Take 1 tablet every day by oral route. Historical Med      hydrOXYzine (ATARAX) 50 MG tablet hydroxyzine HCl 50 mg tablet   TAKE ONE TABLET BY MOUTH TWICE DAILY AS NEEDEDHistorical Med      Multiple Vitamins-Minerals (MULTIVITAMIN ADULT PO) Take by mouth daily Historical Med             ALLERGIES     Patient has no known allergies. FAMILY HISTORY       Family History   Problem Relation Age of Onset    Heart Disease Mother     Colon Cancer Neg Hx     Colon Polyps Neg Hx     Liver Cancer Neg Hx     Liver Disease Neg Hx     Esophageal Cancer Neg Hx     Rectal Cancer Neg Hx     Stomach Cancer Neg Hx           SOCIAL HISTORY       Social History     Socioeconomic History    Marital status:      Spouse name: None    Number of children: None    Years of education: None    Highest education level: None   Tobacco Use    Smoking status: Never    Smokeless tobacco: Never   Vaping Use    Vaping Use: Never used   Substance and Sexual Activity    Alcohol use: No     Alcohol/week: 0.0 standard drinks    Drug use: No       SCREENINGS    [place] Coma Scale  Eye Opening: Spontaneous  Best Verbal Response: Oriented  Best Motor Response: Obeys commands  [place] Coma Scale Score: 15        PHYSICAL EXAM    (up to 7 for level 4, 8 or more for level 5)     ED Triage Vitals [12/26/22 1907]   BP Temp Temp Source Heart Rate Resp SpO2 Height Weight   101/60 97.9 °F (36.6 °C) Oral 82 17 100 % 5' 4\" (1.626 m) 230 lb (104.3 kg)       Physical Exam  Vitals and nursing note reviewed. Constitutional:       General: She is not in acute distress. Appearance: She is well-developed. She is obese. She is not toxic-appearing or diaphoretic. HENT:      Head: Normocephalic and atraumatic. Mouth/Throat:      Mouth: Mucous membranes are moist.      Pharynx: Oropharynx is clear. Eyes:      General: No scleral icterus. Right eye: No discharge. Left eye: No discharge. Pupils: Pupils are equal, round, and reactive to light. Cardiovascular:      Rate and Rhythm: Normal rate and regular rhythm. Pulmonary:      Effort: Pulmonary effort is normal. No respiratory distress. Breath sounds: No stridor. Abdominal:      General: There is no distension. Musculoskeletal:         General: No deformity. Normal range of motion. Cervical back: Normal range of motion. Skin:     General: Skin is warm and dry. Neurological:      General: No focal deficit present. Mental Status: She is alert and oriented to person, place, and time. GCS: GCS eye subscore is 4. GCS verbal subscore is 5. GCS motor subscore is 6. Cranial Nerves: No cranial nerve deficit. Motor: No abnormal muscle tone. Psychiatric:         Behavior: Behavior normal.         Thought Content: Thought content normal.         Judgment: Judgment normal.       DIAGNOSTIC RESULTS     EKG: All EKG's are interpreted by the Emergency Department Physician who either signs or Co-signs this chart in the absence of a cardiologist.        RADIOLOGY:   Non-plain film images such as CT, Ultrasound and MRI are read by the radiologist. Plainradiographic images are visualized and preliminarily interpreted by the emergency physician with the below findings:        Interpretation per the Radiologist below, if available at the time of this note:    XR CHEST PORTABLE   Final Result   1. Noevidence of acute infiltrate or pleural effusion. 2. Cardiomegaly. Recommendation: Follow up as clinically indicated.     Dictated and Electronically Signed by Lewis Lew DO at 26-Dec-2022 10:40:03 PM                     ED BEDSIDE ULTRASOUND:   Performed by ED Physician - none    LABS:  Labs Reviewed   COVID-19, RAPID - Abnormal; Notable for the following components:       Result Value    SARS-CoV-2, NAAT DETECTED (*)     All other components within normal limits    Narrative:     Kathryn Patel KLED tel. ,  Microbiology results called to and read back by Richard Diaz RN in ED,  12/26/2022 19:43, by [facility]  Microbiology results called to and read back by, 12/26/2022 19:42, by TIM   CBC WITH AUTO DIFFERENTIAL - Abnormal; Notable for the following components:    WBC 3.6 (*)     MCHC 32.7 (*)     Neutrophils % 48.1 (*)     Monocytes % 22.9 (*)     Lymphocytes Absolute 1.0 (*)     All other components within normal limits   COMPREHENSIVE METABOLIC PANEL - Abnormal; Notable for the following components:    Glucose 117 (*)     Creatinine 1.0 (*)     Est, Glom Filt Rate 60 (*)     ALT 43 (*)     AST 55 (*)     All other components within normal limits   RAPID INFLUENZA A/B ANTIGENS       All other labs were within normal range or not returned as of this dictation. EMERGENCY DEPARTMENT COURSE and DIFFERENTIALDIAGNOSIS/MDM:   Vitals:    Vitals:    12/26/22 1907   BP: 101/60   Pulse: 82   Resp: 17   Temp: 97.9 °F (36.6 °C)   TempSrc: Oral   SpO2: 100%   Weight: 230 lb (104.3 kg)   Height: 5' 4\" (1.626 m)       MDM    ED Course as of 12/27/22 0251   Mon Dec 26, 2022   2118 SARS-CoV-2, NAAT(!!): DETECTED [EDEL]   7240 Patient is positive for COVID-19. Evaluation otherwise thus far has been unremarkable. Laboratory evaluation shows no significant electrolyte derangements, renal insufficiency, or other concerning findings. Chest x-ray is clear. Maintaining oxygen saturation on room air with no increased work of breathing. Receiving IV fluids now and plan for ambulation trial afterwards. [EDEL]      ED Course User Index  [EDEL] Taylor Castrejon MD     Evaluation and work-up here revealed no signs of emergent or life-threatening pathology that would necessitate admission for further work-up or management at this time. Patient is felt to be stable for discharge home with return precautions for worsening of the condition or development of new concerning symptoms.   Patient was encouraged to follow-up with their primary care doctor in the appropriate timeframe. Necessary prescriptions and information have been provided for treatment at home. Patient voices understanding and agreement with the plan. CONSULTS:  None    PROCEDURES:  Unless otherwise notedbelow, none     Procedures    FINAL IMPRESSION     1. COVID-19 virus infection          DISPOSITION/PLAN   DISPOSITION Decision To Discharge 12/27/2022 12:03:38 AM      No notes of EC Admission Criteria type on file.     PATIENT REFERRED TO:  [facility] EMERGENCY DEPT  Shahbaz Marin  [phone]    If symptoms worsen    Fariba Acevedo, APRN  [address] [facility]  [phone]      As needed    DISCHARGE MEDICATIONS:  Discharge Medication List as of 12/26/2022 11:57 PM        START taking these medications    Details   ondansetron (ZOFRAN-ODT) 4 MG disintegrating tablet Take 1 tablet by mouth 3 times daily as needed for Nausea or Vomiting, Disp-21 tablet, R-0Normal                (Please note that portions of this note were completed with a voice recognition program.  Efforts were made to edit the dictations butoccasionally words are mis-transcribed.)    Jefferson Miles MD (electronically signed)  Emergency Physician          Jefferson Miles., MD  12/27/22 [id number]

## 2023-01-05 DIAGNOSIS — M79.671 PAIN IN BOTH FEET: ICD-10-CM

## 2023-01-05 DIAGNOSIS — G62.9 NEUROPATHY: ICD-10-CM

## 2023-01-05 DIAGNOSIS — M79.672 PAIN IN BOTH FEET: ICD-10-CM

## 2023-01-05 RX ORDER — GABAPENTIN 400 MG/1
400 CAPSULE ORAL 3 TIMES DAILY
Qty: 90 CAPSULE | Refills: 0 | Status: SHIPPED | OUTPATIENT
Start: 2023-01-05 | End: 2023-02-04

## 2023-01-05 NOTE — TELEPHONE ENCOUNTER
Received fax from pharmacy requesting refill on pts medication(s). Pt was last seen in office on 5/20/2022  and has a follow up scheduled for Visit date not found. Will send request to  [facility]  for patient. Requested Prescriptions     Pending Prescriptions Disp Refills    gabapentin (NEURONTIN) 400 MG capsule 90 capsule 0     Sig: Take 1 capsule by mouth 3 times daily for 30 days.

## 2023-01-05 NOTE — TELEPHONE ENCOUNTER
Refilled Neurontin but patient needs an appointment.   This is controlled and patient needs to be seen every 6 months

## 2023-01-20 ENCOUNTER — OFFICE VISIT (OUTPATIENT)
Dept: FAMILY MEDICINE CLINIC | Age: 73
End: 2023-01-20

## 2023-01-20 VITALS
OXYGEN SATURATION: 97 % | BODY MASS INDEX: 39.84 KG/M2 | TEMPERATURE: 97.4 F | WEIGHT: 233.38 LBS | SYSTOLIC BLOOD PRESSURE: 138 MMHG | HEIGHT: 64 IN | DIASTOLIC BLOOD PRESSURE: 70 MMHG | HEART RATE: 79 BPM

## 2023-01-20 DIAGNOSIS — E78.2 MIXED HYPERLIPIDEMIA: ICD-10-CM

## 2023-01-20 DIAGNOSIS — G47.33 OBSTRUCTIVE SLEEP APNEA SYNDROME: ICD-10-CM

## 2023-01-20 DIAGNOSIS — R29.898 WEAKNESS OF BOTH LOWER EXTREMITIES: ICD-10-CM

## 2023-01-20 DIAGNOSIS — M54.50 LEFT LUMBAR PAIN: ICD-10-CM

## 2023-01-20 DIAGNOSIS — Z12.31 ENCOUNTER FOR SCREENING MAMMOGRAM FOR MALIGNANT NEOPLASM OF BREAST: ICD-10-CM

## 2023-01-20 DIAGNOSIS — Z00.00 MEDICARE ANNUAL WELLNESS VISIT, SUBSEQUENT: Primary | ICD-10-CM

## 2023-01-20 DIAGNOSIS — M17.11 PRIMARY OSTEOARTHRITIS OF RIGHT KNEE: ICD-10-CM

## 2023-01-20 DIAGNOSIS — F51.01 PRIMARY INSOMNIA: ICD-10-CM

## 2023-01-20 DIAGNOSIS — R73.9 ELEVATED BLOOD SUGAR: ICD-10-CM

## 2023-01-20 DIAGNOSIS — G62.9 NEUROPATHY: ICD-10-CM

## 2023-01-20 DIAGNOSIS — R53.82 CHRONIC FATIGUE: ICD-10-CM

## 2023-01-20 DIAGNOSIS — E53.8 VITAMIN B 12 DEFICIENCY: ICD-10-CM

## 2023-01-20 DIAGNOSIS — H91.93 BILATERAL HEARING LOSS, UNSPECIFIED HEARING LOSS TYPE: ICD-10-CM

## 2023-01-20 DIAGNOSIS — I10 ESSENTIAL HYPERTENSION: ICD-10-CM

## 2023-01-20 RX ORDER — PREGABALIN 100 MG/1
100 CAPSULE ORAL 2 TIMES DAILY
Qty: 60 CAPSULE | Refills: 1 | Status: SHIPPED | OUTPATIENT
Start: 2023-01-20 | End: 2023-02-19

## 2023-01-20 RX ORDER — TRAZODONE HYDROCHLORIDE 50 MG/1
50 TABLET ORAL NIGHTLY PRN
Qty: 30 TABLET | Refills: 5 | Status: SHIPPED | OUTPATIENT
Start: 2023-01-20

## 2023-01-20 ASSESSMENT — ENCOUNTER SYMPTOMS
EYE REDNESS: 0
DIARRHEA: 0
SHORTNESS OF BREATH: 0
BACK PAIN: 1
CONSTIPATION: 0
SORE THROAT: 0
VOMITING: 0
RHINORRHEA: 0
COUGH: 0

## 2023-01-20 ASSESSMENT — PATIENT HEALTH QUESTIONNAIRE - PHQ9
9. THOUGHTS THAT YOU WOULD BE BETTER OFF DEAD, OR OF HURTING YOURSELF: 0
SUM OF ALL RESPONSES TO PHQ QUESTIONS 1-9: 8
3. TROUBLE FALLING OR STAYING ASLEEP: 3
7. TROUBLE CONCENTRATING ON THINGS, SUCH AS READING THE NEWSPAPER OR WATCHING TELEVISION: 0
4. FEELING TIRED OR HAVING LITTLE ENERGY: 3
SUM OF ALL RESPONSES TO PHQ QUESTIONS 1-9: 8
SUM OF ALL RESPONSES TO PHQ QUESTIONS 1-9: 8
8. MOVING OR SPEAKING SO SLOWLY THAT OTHER PEOPLE COULD HAVE NOTICED. OR THE OPPOSITE, BEING SO FIGETY OR RESTLESS THAT YOU HAVE BEEN MOVING AROUND A LOT MORE THAN USUAL: 0
5. POOR APPETITE OR OVEREATING: 0
10. IF YOU CHECKED OFF ANY PROBLEMS, HOW DIFFICULT HAVE THESE PROBLEMS MADE IT FOR YOU TO DO YOUR WORK, TAKE CARE OF THINGS AT HOME, OR GET ALONG WITH OTHER PEOPLE: 0
6. FEELING BAD ABOUT YOURSELF - OR THAT YOU ARE A FAILURE OR HAVE LET YOURSELF OR YOUR FAMILY DOWN: 0
SUM OF ALL RESPONSES TO PHQ9 QUESTIONS 1 & 2: 2
1. LITTLE INTEREST OR PLEASURE IN DOING THINGS: 1
SUM OF ALL RESPONSES TO PHQ QUESTIONS 1-9: 8
2. FEELING DOWN, DEPRESSED OR HOPELESS: 1

## 2023-01-20 ASSESSMENT — LIFESTYLE VARIABLES
HOW MANY STANDARD DRINKS CONTAINING ALCOHOL DO YOU HAVE ON A TYPICAL DAY: PATIENT DOES NOT DRINK
HOW OFTEN DO YOU HAVE A DRINK CONTAINING ALCOHOL: NEVER

## 2023-01-20 NOTE — PROGRESS NOTES
Medicare Annual Wellness Visit    Radha Gonzales is here for Medicare AWV and Post-COVID Symptoms (weakness)    Assessment & Plan   Medicare annual wellness visit, subsequent  Left lumbar pain  -     External Referral To Physical Therapy  -     pregabalin (LYRICA) 100 MG capsule; Take 1 capsule by mouth 2 times daily for 30 days. Max Daily Amount: 200 mg, Disp-60 capsule, R-1Normal  Weakness of both lower extremities  -     External Referral To Physical Therapy  Elevated blood sugar  -     Comprehensive Metabolic Panel; Future  -     Hemoglobin A1C; Future  Encounter for screening mammogram for malignant neoplasm of breast  -     [facility] DIGITAL SCREEN W OR WO CAD BILATERAL; Future  Essential hypertension  -     CBC with Auto Differential; Future  -     Comprehensive Metabolic Panel; Future  -     Hemoglobin A1C; Future  -     Lipid Panel; Future  -     Microalbumin / Creatinine Urine Ratio; Future  -     T4, Free; Future  -     TSH; Future  Mixed hyperlipidemia  -     Comprehensive Metabolic Panel; Future  -     Lipid Panel; Future  Chronic fatigue  -     Vitamin B12; Future  Primary insomnia  -     traZODone (DESYREL) 50 MG tablet; Take 1 tablet by mouth nightly as needed for Sleep, Disp-30 tablet, R-5Normal  Obstructive sleep apnea syndrome  Bilateral hearing loss, unspecified hearing loss type  -     [facility], [place], [place], [facility], [place]  Primary osteoarthritis of right knee  Vitamin B 12 deficiency  -     Vitamin B12; Future  Neuropathy  -     pregabalin (LYRICA) 100 MG capsule; Take 1 capsule by mouth 2 times daily for 30 days.  Max Daily Amount: 200 mg, Disp-60 capsule, R-1Normal    Recommendations for Preventive Services Due: see orders and patient instructions/AVS.  Recommended screening schedule for the next 5-10 years is provided to the patient in written form: see Patient Instructions/AVS.     Return in about 3 months (around 4/20/2023), or if symptoms worsen or fail to improve, for 30 minute appointment. Subjective   The following acute and/or chronic problems were also addressed today:  HTN  Post COVID  Leg Weaknes    Patient's complete Health Risk Assessment and screening values have been reviewed and are found in Flowsheets. The following problems were reviewed today and where indicated follow up appointments were made and/or referrals ordered. Positive Risk Factor Screenings with Interventions:    Fall Risk:  Do you feel unsteady or are you worried about falling? : (!) yes  2 or more falls in past year?: no  Fall with injury in past year?: no     Interventions:    Recommend PT     Depression:  PHQ-2 Score: 2  PHQ-9 Total Score: 8    Interpretation:   1-4 = minimal  5-9 = mild  10-14 = moderate  15-19 = moderately severe  20-27 = severe  Interventions:  Patient comments: \"I feel good. \" Will continue Effexor          General HRA Questions:  Select all that apply: (!) New or Increased Fatigue    Fatigue Interventions:  Patient comments: \"I was tired before COVID. \"       Weight and Activity:  Physical Activity: Inactive    Days of Exercise per Week: 0 days    Minutes of Exercise per Session: 0 min     On average, how many days per week do you engage in moderate to strenuous exercise (like a brisk walk)?: 0 days  Have you lost any weight without trying in the past 3 months?: No  Body mass index: (!) 40.05    Inactivity Interventions:  Patient declined any further interventions or treatment       Hearing Screen:  Do you or your family notice any trouble with your hearing that hasn't been managed with hearing aids?: (!) Yes    Interventions:  Referred to Audiology                 Objective   Vitals:    01/20/23 1322   BP: 138/70   Pulse: 79   Temp: 97.4 °F (36.3 °C)   TempSrc: Temporal   SpO2: 97%   Weight: 233 lb 6 oz (105.9 kg)   Height: 5' 4\" (1.626 m)      Body mass index is 40.06 kg/m².           Allergies   Allergen Reactions    Metformin And Related Nausea And Vomiting     Prior to Visit Medications    Medication Sig Taking? Authorizing Provider   traZODone (DESYREL) 50 MG tablet Take 1 tablet by mouth nightly as needed for Sleep Yes LUIZ Castañeda   pregabalin (LYRICA) 100 MG capsule Take 1 capsule by mouth 2 times daily for 30 days. Max Daily Amount: 200 mg Yes LUIZ Castañeda   ondansetron (ZOFRAN-ODT) 4 MG disintegrating tablet Take 1 tablet by mouth 3 times daily as needed for Nausea or Vomiting Yes Noel Lerma MD   atorvastatin (LIPITOR) 80 MG tablet Take 1 tablet by mouth nightly Yes LUIZ Castañeda   omeprazole (PRILOSEC) 20 MG delayed release capsule Take 1 capsule by mouth 2 times daily Yes LUIZ Castañeda   furosemide (LASIX) 40 MG tablet Take 1 tablet by mouth daily Yes LUIZ Castañeda   tamsulosin (FLOMAX) 0.4 MG capsule Take 1 capsule by mouth daily Yes LUIZ Castañeda   venlafaxine (EFFEXOR XR) 150 MG extended release capsule Take 1 capsule by mouth daily Yes LUIZ Castañeda   amLODIPine (NORVASC) 2.5 MG tablet Take 1 tablet by mouth in the morning. Yes LUIZ Castañeda   famotidine (PEPCID) 20 MG tablet Take 1 tablet by mouth in the morning and 1 tablet before bedtime. Yes LUIZ Castañeda   hydrALAZINE (APRESOLINE) 10 MG tablet Take 1 tablet by mouth 3 times daily Yes LUIZ Castañeda   metoprolol succinate (TOPROL XL) 100 MG extended release tablet Take 1 tablet by mouth daily Yes LUIZ Carlin   ezetimibe (ZETIA) 10 MG tablet Take 1 tablet by mouth daily Yes LUIZ Castañeda   aspirin 81 MG tablet Aspir-81 81 mg tablet,delayed release   Take 1 tablet every day by oral route.  Yes Historical Provider, MD   hydrOXYzine (ATARAX) 50 MG tablet hydroxyzine HCl 50 mg tablet   TAKE ONE TABLET BY MOUTH TWICE DAILY AS NEEDED Yes Historical Provider, MD   Multiple Vitamins-Minerals (MULTIVITAMIN ADULT PO) Take by mouth daily  Yes Historical Provider, MD [place] (Including outside providers/suppliers regularly involved in providing care): Patient Care Team:  LUIZ Mckeon as PCP - General (Family Nurse Practitioner)  LUIZ Mckeon as PCP - [facility]  Leslye Marin DO as Consulting Physician (Gastroenterology)  Rosita Sofia MD as Consulting Physician (Obstetrics & Gynecology)  LUIZ Lake as Advanced Practice Nurse (Gastroenterology)  LUIZ Tidwell as Advanced Practice Nurse (Neurology)     Reviewed and updated this visit:  Tobacco  Allergies  Meds  Med Hx  Surg Hx  Soc Hx  Fam Hx               Jennifer Hidalgo (:  1950) is a 71 y.o. female,Established patient, here for evaluation of the following chief complaint(s):  Medicare AWV and Post-COVID Symptoms (weakness)      ASSESSMENT/PLAN:    ICD-10-CM    1. Medicare annual wellness visit, subsequent  Z00.00       2. Left lumbar pain  M54.50 External Referral To Physical Therapy     pregabalin (LYRICA) 100 MG capsule      3. Weakness of both lower extremities  R29.898 External Referral To Physical Therapy      4. Elevated blood sugar  R73.9 Comprehensive Metabolic Panel     Hemoglobin A1C      5. Encounter for screening mammogram for malignant neoplasm of breast  Z12.31 [facility] DIGITAL SCREEN W OR WO CAD BILATERAL      6. Essential hypertension  I10 CBC with Auto Differential     Comprehensive Metabolic Panel     Hemoglobin A1C     Lipid Panel     Microalbumin / Creatinine Urine Ratio     T4, Free     TSH  Continue Norvasc 2.5 mg daily & Toprol 100 mg daily      7. Mixed hyperlipidemia  E78.2 Comprehensive Metabolic Panel     Lipid Panel  Continue Lipitor 80 mg daily      8. Chronic fatigue  R53.82 Vitamin B12      9. Primary insomnia  F51.01 traZODone (DESYREL) 50 MG tablet      10. Obstructive sleep apnea syndrome  G47.33 Continue CPAP      11. Bilateral hearing loss, unspecified hearing loss type  H91.93 [facility] [place], [facility], [place]      12. Primary osteoarthritis of right knee  M17.11       13.  Vitamin B 12 deficiency E53.8 Vitamin B12      14. Neuropathy  G62.9 pregabalin (LYRICA) 100 MG capsule  Wean and discontinue Neurontin          Return in about 3 months (around 4/20/2023), or if symptoms worsen or fail to improve, for 30 minute appointment. SUBJECTIVE/OBJECTIVE:  HPI    WEAKNESS:  \"My legs don't want to hold me up. \"  Reports low back pain. Reports left low back pain. Denies sciatica. She has not fallen. \"I can stand up maybe 10 minutes while I am cooking and I will have to sit down in a chair to finish my cooking. \"  Reports right knee pain  5- Lumbar X-ray:  Hypertrophic degenerative changes present with endplate hypertrophy. This is present this T12-L1, L1-L2, L2-L3 and L3-L4 disc space levels. There is no evidence compression deformity. POST COVID:  She was seen in the ED with COVID on 12-  \"I am breathing okay. \"  \"I am okay except the weakness. \"  \"My right ear feels 1/2 blocked. \"    MOODS:  She continues on Effexor 150 mg. \"I feel normal.\"    INSOMNIA:  \"I don't get a full 8 hours of sleep. \"  \"I am wearing my CPAP. \"  She is only sleeping about 1.5 hour at a time. FOOT PAIN:  \"Both my feet burn. \"  Reports that it feels like there are hot pokers on the top of my feet. She has been taking Neurontin 400 mg TID    /70   Pulse 79   Temp 97.4 °F (36.3 °C) (Temporal)   Ht 5' 4\" (1.626 m)   Wt 233 lb 6 oz (105.9 kg)   SpO2 97%   BMI 40.06 kg/m²     Review of Systems   Constitutional:  Positive for fatigue. Negative for chills and fever. HENT:  Negative for congestion, ear pain, rhinorrhea and sore throat. Eyes:  Negative for redness. Respiratory:  Negative for cough and shortness of breath. Cardiovascular:  Negative for chest pain. Gastrointestinal:  Negative for constipation, diarrhea and vomiting. Musculoskeletal:  Positive for arthralgias, back pain (left lower) and gait problem. Skin:  Negative for rash.    Neurological:  Positive for weakness (bilateral legs) and numbness (R>L foot). Negative for dizziness and headaches. Psychiatric/Behavioral:  Positive for sleep disturbance. Physical Exam  Vitals reviewed. Constitutional:       Appearance: She is well-developed. She is obese. HENT:      Head: Normocephalic. Right Ear: Tympanic membrane and external ear normal.      Left Ear: Tympanic membrane and external ear normal.      Nose: Nose normal.   Eyes:      General:         Right eye: No discharge. Left eye: No discharge. Neck:      Vascular: No carotid bruit. Cardiovascular:      Rate and Rhythm: Normal rate and regular rhythm. Pulmonary:      Effort: Pulmonary effort is normal.      Breath sounds: Decreased breath sounds present. No wheezing, rhonchi or rales. Abdominal:      General: Bowel sounds are normal.      Palpations: Abdomen is soft. Musculoskeletal:      Cervical back: Normal range of motion. Lumbar back: Tenderness (left) present. Skin:     General: Skin is dry. Neurological:      General: No focal deficit present. Mental Status: She is alert and oriented to person, place, and time. Mental status is at baseline. Psychiatric:         Mood and Affect: Mood normal.         Behavior: Behavior normal.         Thought Content: Thought content normal.         Judgment: Judgment normal.         An electronic signature was used to authenticate this note.     --LUIZ Escobar

## 2023-01-20 NOTE — PATIENT INSTRUCTIONS
START Lyrica 100 mg twice daily  WHILE TAKING LYRICA (pregablin), decrease Neurontin (gabapentin) 400 mg down to twice a day x 1 week, then decrease to once a day x1 week, then discontinue. Preventing Falls: Care Instructions  Overview     Getting around your home safely can be a challenge if you have injuries or health problems that make it easy for you to fall. Loose rugs and furniture in walkways are among the dangers for many older people who have problems walking or who have poor eyesight. People who have conditions such as arthritis, osteoporosis, or dementia also have to be careful not to fall. You can make your home safer with a few simple measures. Follow-up care is a key part of your treatment and safety. Be sure to make and go to all appointments, and call your doctor if you are having problems. It's also a good idea to know your test results and keep a list of the medicines you take. How can you care for yourself at home? Taking care of yourself  Exercise regularly to improve your strength, muscle tone, and balance. Walk if you can. Swimming may be a good choice if you cannot walk easily. Have your vision and hearing checked each year or any time you notice a change. If you have trouble seeing and hearing, you might not be able to avoid objects and could lose your balance. Know the side effects of the medicines you take. Ask your doctor or pharmacist whether the medicines you take can affect your balance. Sleeping pills or sedatives can affect your balance. Limit the amount of alcohol you drink. Alcohol can impair your balance and other senses. Ask your doctor whether calluses or corns on your feet need to be removed. If you wear loose-fitting shoes because of calluses or corns, you can lose your balance and fall. Talk to your doctor if you have numbness in your feet. You may get dizzy if you do not drink enough water. To prevent dehydration, drink plenty of fluids.  Choose water and other clear liquids. If you have kidney, heart, or liver disease and have to limit fluids, talk with your doctor before you increase the amount of fluids you drink. Preventing falls at home  Remove raised doorway thresholds, throw rugs, and clutter. Repair loose carpet or raised areas in the floor. Move furniture and electrical cords to keep them out of walking paths. Use nonskid floor wax, and wipe up spills right away, especially on ceramic tile floors. If you use a walker or cane, put rubber tips on it. If you use crutches, clean the bottoms of them regularly with an abrasive pad, such as steel wool. Keep your house well lit, especially stairways, porches, and outside walkways. Use night-lights in areas such as hallways and bathrooms. Add extra light switches or use remote switches (such as switches that go on or off when you clap your hands) to make it easier to turn lights on if you have to get up during the night. Install sturdy handrails on stairways. Move items in your cabinets so that the things you use a lot are on the lower shelves (about waist level). Keep a cordless phone and a flashlight with new batteries by your bed. If possible, put a phone in each of the main rooms of your house, or carry a cell phone in case you fall and cannot reach a phone. Or, you can wear a device around your neck or wrist. You push a button that sends a signal for help. Wear low-heeled shoes that fit well and give your feet good support. Use footwear with nonskid soles. Check the heels and soles of your shoes for wear. Repair or replace worn heels or soles. Do not wear socks without shoes on smooth floors, such as wood. Walk on the grass when the sidewalks are slippery. If you live in an area that gets snow and ice in the winter, sprinkle salt on slippery steps and sidewalks. Or ask a family member or friend to do this for you.   Preventing falls in the bath  Install grab bars and nonskid mats inside and outside your shower or tub and near the toilet and sinks. Use shower chairs and bath benches. Use a hand-held shower head that will allow you to sit while showering. Get into a tub or shower by putting the weaker leg in first. Get out of a tub or shower with your strong side first.  Repair loose toilet seats and consider installing a raised toilet seat to make getting on and off the toilet easier. Keep your bathroom door unlocked while you are in the shower. Where can you learn more? Go to http://www.atkins.com/ and enter G117 to learn more about \"Preventing Falls: Care Instructions. \"  Current as of: May 4, 2022               Content Version: 13.5  © 5888-4649 [employer]. Care instructions adapted under license by [facility] ([facility]). If you have questions about a medical condition or this instruction, always ask your healthcare professional. [place] [address] any warranty or liability for your use of this information. Learning About Mindfulness for Stress  What are mindfulness and stress? Stress is what you feel when you have to handle more than you are used to. A lot of things can cause stress. You may feel stress when you go on a job interview, take a test, or run a race. This kind of short-term stress is normal and even useful. It can help you if you need to work hard or react quickly. Stress also can last a long time. Long-term stress is caused by stressful situations or events. Examples of long-term stress include long-term health problems, ongoing problems at work, and conflicts in your family. Long-term stress can harm your health. Mindfulness is a focus only on things happening in the present moment. It's a process of purposefully paying attention to and being aware of your surroundings, your emotions, your thoughts, and how your body feels. You are aware of these things, but you aren't judging these experiences as \"good\" or \"bad. \" Mindfulness can help you learn to calm your mind and body to help you cope with illness, pain, and stress. How does mindfulness help to relieve stress? Mindfulness can help quiet your mind and relax your body. Studies show that it can help some people sleep better, feel less anxious, and bring their blood pressure down. And it's been shown to help some people live and cope better with certain health problems like heart disease, depression, chronic pain, and cancer. How do you practice mindfulness? To be mindful is to pay attention, to be present, and to be accepting. When you're mindful, you do just one thing and you pay close attention to that one thing. For example, you may sit quietly and notice your emotions or how your food tastes and smells. When you're present, you focus on the things that are happening right now. You let go of your thoughts about the past and the future. When you dwell on the past or the future, you miss moments that can heal and strengthen you. You may miss moments like hearing a child laugh or seeing a friendly face when you think you're all alone. When you're accepting, you don't  the present moment. Instead you accept your thoughts and feelings as they come. You can practice anytime, anywhere, and in any way you choose. You can practice in many ways. Here are a few ideas:  While doing your chores, like washing the dishes, let your mind focus on what's in your hand. What does the dish feel like? Is the water warm or cold? Go outside and take a few deep breaths. What is the air like? Is it warm or cold? When you can, take some time at the start of your day to sit alone and think. Take a slow walk by yourself. Count your steps while you breathe in and out. Try yoga breathing exercises, stretches, and poses to strengthen and relax your muscles. At work, if you can, try to stop for a few moments each hour. Note how your body feels.  Let yourself regroup and let your mind settle before you return to what you were doing. If you struggle with anxiety or \"worry thoughts,\" imagine your mind as a blue wilfrid and your worry thoughts as clouds. Now imagine those worry thoughts floating across your mind's wilfrid. Just let them pass by as you watch. Follow-up care is a key part of your treatment and safety. Be sure to make and go to all appointments, and call your doctor if you are having problems. It's also a good idea to know your test results and keep a list of the medicines you take. Where can you learn more? Go to http://www.atkins.com/ and enter M676 to learn more about \"Learning About Mindfulness for Stress. \"  Current as of: February 9, 2022               Content Version: 13.5  © 2006-2022 Health[employer]. Care instructions adapted under license by [facility] ([facility]). If you have questions about a medical condition or this instruction, always ask your healthcare professional. [place] [address] any warranty or liability for your use of this information. Fatigue: Care Instructions  Your Care Instructions     Fatigue is a feeling of tiredness, exhaustion, or lack of energy. You may feel fatigue because of too much or not enough activity. It can also come from stress, lack of sleep, boredom, and poor diet. Many medical problems, such as viral infections, can cause fatigue. Emotional problems, especially depression, are often the cause of fatigue. Fatigue is most often a symptom of another problem. Treatment for fatigue depends on the cause. For example, if you have fatigue because you have a certain health problem, treating this problem also treats your fatigue. If depression or anxiety is the cause, treatment may help. Follow-up care is a key part of your treatment and safety. Be sure to make and go to all appointments, and call your doctor if you are having problems. It's also a good idea to know your test results and keep a list of the medicines you take.   How can you care for yourself at home? Get regular exercise. But don't overdo it. Go back and forth between rest and exercise. Get plenty of rest.  Eat a healthy diet. Do not skip meals, especially breakfast.  Reduce your use of caffeine, tobacco, and alcohol. Caffeine is most often found in coffee, tea, cola drinks, and chocolate. Limit medicines that can cause fatigue. This includes tranquilizers and cold and allergy medicines. When should you call for help? Watch closely for changes in your health, and be sure to contact your doctor if:    You have new symptoms such as fever or a rash. Your fatigue gets worse. You have been feeling down, depressed, or hopeless. Or you may have lost interest in things that you usually enjoy. You are not getting better as expected. Where can you learn more? Go to http://Worlize.woods.com/ and enter Q083 to learn more about \"Fatigue: Care Instructions. \"  Current as of: February 9, 2022               Content Version: 13.5  © 2006-2022 [employer]. Care instructions adapted under license by [facility] ([facility]). If you have questions about a medical condition or this instruction, always ask your healthcare professional. [place] [address] any warranty or liability for your use of this information. Learning About Being Active as an Older Adult  Why is being active important as you get older? Being active is one of the best things you can do for your health. And it's never too late to start. Being active--or getting active, if you aren't already--has definite benefits. It can:  Give you more energy,  Keep your mind sharp. Improve balance to reduce your risk of falls. Help you manage chronic illness with fewer medicines. No matter how old you are, how fit you are, or what health problems you have, there is a form of activity that will work for you.  And the more physical activity you can do, the better your overall health will be.  What kinds of activity can help you stay healthy?  Being more active will make your daily activities easier. Physical activity includes planned exercise and things you do in daily life. There are four types of activity:  Aerobic.  Doing aerobic activity makes your heart and lungs strong.  Includes walking, dancing, and gardening.  Aim for at least 2½ hours spread throughout the week.  It improves your energy and can help you sleep better.  Muscle-strengthening.  This type of activity can help maintain muscle and strengthen bones.  Includes climbing stairs, using resistance bands, and lifting or carrying heavy loads.  Aim for at least twice a week.  It can help protect the knees and other joints.  Stretching.  Stretching gives you better range of motion in joints and muscles.  Includes upper arm stretches, calf stretches, and gentle yoga.  Aim for at least twice a week, preferably after your muscles are warmed up from other activities.  It can help you function better in daily life.  Balancing.  This helps you stay coordinated and have good posture.  Includes heel-to-toe walking, froylan chi, and certain types of yoga.  Aim for at least 3 days a week.  It can reduce your risk of falling.  Even if you have a hard time meeting the recommendations, it's better to be more active than less active. All activity done in each category counts toward your weekly total. You'd be surprised how daily things like carrying groceries, keeping up with grandchildren, and taking the stairs can add up.  What keeps you from being active?  If you've had a hard time being more active, you're not alone. Maybe you remember being able to do more. Or maybe you've never thought of yourself as being active. It's frustrating when you can't do the things you want. Being more active can help. What's holding you back?  Getting started.  Have a goal, but break it into easy tasks. Small steps build into big accomplishments.  Staying motivated.  If  you feel like skipping your activity, remember your goal. Maybe you want to move better and stay independent. Every activity gets you one step closer. Not feeling your best.  Start with 5 minutes of an activity you enjoy. Prove to yourself you can do it. As you get comfortable, increase your time. You may not be where you want to be. But you're in the process of getting there. Everyone starts somewhere. How can you find safe ways to stay active? Talk with your doctor about any physical challenges you're facing. Make a plan with your doctor if you have a health problem or aren't sure how to get started with activity. If you're already active, ask your doctor if there is anything you should change to stay safe as your body and health change. If you tend to feel dizzy after you take medicine, avoid activity at that time. Try being active before you take your medicine. This will reduce your risk of falls. If you plan to be active at home, make sure to clear your space before you get started. Remove things like TV cords, coffee tables, and throw rugs. It's safest to have plenty of space to move freely. The key to getting more active is to take it slow and steady. Try to improve only a little bit at a time. Pick just one area to improve on at first. And if an activity hurts, stop and talk to your doctor. Where can you learn more? Go to http://www.atkins.com/ and enter P600 to learn more about \"Learning About Being Active as an Older Adult. \"  Current as of: October 10, 2022               Content Version: 13.5  © 2006-2022 Healthwise, [employer]. Care instructions adapted under license by [facility] ([facility]). If you have questions about a medical condition or this instruction, always ask your healthcare professional. [place] [address] any warranty or liability for your use of this information.            Hearing Loss: Care Instructions  Overview     Hearing loss is a sudden or slow decrease in how well you hear. It can range from mild to severe. Permanent hearing loss can occur with aging. It also can happen when you are exposed long-term to loud noise. Examples include listening to loud music, riding motorcycles, or being around other loud machines. Hearing loss can affect your work and home life. It can make you feel lonely or depressed. You may feel that you have lost your independence. But hearing aids and other devices can help you hear better and feel connected to others. Follow-up care is a key part of your treatment and safety. Be sure to make and go to all appointments, and call your doctor if you are having problems. It's also a good idea to know your test results and keep a list of the medicines you take. How can you care for yourself at home? Avoid loud noises whenever possible. This helps keep your hearing from getting worse. Always wear hearing protection around loud noises. Wear a hearing aid as directed. See a professional who can help you pick a hearing aid that fits you. Have hearing tests as your doctor suggests. They can show whether your hearing has changed. Your hearing aid may need to be adjusted. Use other devices as needed. These may include:  Telephone amplifiers and hearing aids that can connect to a television, stereo, radio, or microphone. Devices that use lights or vibrations. These alert you to the doorbell, a ringing telephone, or a baby monitor. Television closed-captioning. This shows the words at the bottom of the screen. Most new TVs can do this. TTY (text telephone). This lets you type messages back and forth on the telephone instead of talking or listening. These devices are also called TDD. When messages are typed on the keyboard, they are sent over the phone line to a receiving TTY. The message is shown on a monitor. Use text messaging, social media, and email if it is hard for you to communicate by telephone.   Try to learn a listening technique called speechreading. It is not lipreading. You pay attention to people's gestures, expressions, posture, and tone of voice. These clues can help you understand what a person is saying. Face the person you are talking to, and have them face you. Make sure the lighting is good. You need to see the other person's face clearly. Think about counseling if you need help to adjust to your hearing loss. When should you call for help? Watch closely for changes in your health, and be sure to contact your doctor if:    You think your hearing is getting worse. You have new symptoms, such as dizziness or nausea. Where can you learn more? Go to http://www.atkins.com/ and enter R798 to learn more about \"Hearing Loss: Care Instructions. \"  Current as of: May 4, 2022               Content Version: 13.5  © 2057-2458 Healthwise, Incorporated. Care instructions adapted under license by [facility] ([facility]). If you have questions about a medical condition or this instruction, always ask your healthcare professional. [place] [address] any warranty or liability for your use of this information. Advance Directives: Care Instructions  Overview  An advance directive is a legal way to state your wishes at the end of your life. It tells your family and your doctor what to do if you can't say what you want. There are two main types of advance directives. You can change them any time your wishes change. Living will. This form tells your family and your doctor your wishes about life support and other treatment. The form is also called a declaration. Medical power of . This form lets you name a person to make treatment decisions for you when you can't speak for yourself. This person is called a health care agent (health care proxy, health care surrogate). The form is also called a durable power of  for health care.   If you do not have an advance directive, decisions about your medical care may be made by a family member, or by a doctor or a  who doesn't know you. It may help to think of an advance directive as a gift to the people who care for you. If you have one, they won't have to make tough decisions by themselves. For more information, including forms for your state, see the [address] website (www.caringinfo.org/planning/advance-directives/). Follow-up care is a key part of your treatment and safety. Be sure to make and go to all appointments, and call your doctor if you are having problems. It's also a good idea to know your test results and keep a list of the medicines you take. What should you include in an advance directive? Many states have a unique advance directive form. (It may ask you to address specific issues.) Or you might use a universal form that's approved by many states. If your form doesn't tell you what to address, it may be hard to know what to include in your advance directive. Use the questions below to help you get started. Who do you want to make decisions about your medical care if you are not able to? What life-support measures do you want if you have a serious illness that gets worse over time or can't be cured? What are you most afraid of that might happen? (Maybe you're afraid of having pain, losing your independence, or being kept alive by machines.)  Where would you prefer to die? (Your home? A hospital? A nursing home?)  Do you want to donate your organs when you die? Do you want certain [employer] practices performed before you die? When should you call for help? Be sure to contact your doctor if you have any questions. Where can you learn more? Go to http://www.atkins.com/ and enter R264 to learn more about \"Advance Directives: Care Instructions. \"  Current as of: June 16, 2022               Content Version: 13.5  © 0637-2924 Healthwise, Incorporated. Care instructions adapted under license by [employer].  If you have questions about a medical condition or this instruction, always ask your healthcare professional. [place] [address] any warranty or liability for your use of this information. Personalized Preventive Plan for Genevieve Alves - 1/20/2023  Medicare offers a range of preventive health benefits. Some of the tests and screenings are paid in full while other may be subject to a deductible, co-insurance, and/or copay. Some of these benefits include a comprehensive review of your medical history including lifestyle, illnesses that may run in your family, and various assessments and screenings as appropriate. After reviewing your medical record and screening and assessments performed today your provider may have ordered immunizations, labs, imaging, and/or referrals for you. A list of these orders (if applicable) as well as your Preventive Care list are included within your After Visit Summary for your review. Other Preventive Recommendations:    A preventive eye exam performed by an eye specialist is recommended every 1-2 years to screen for glaucoma; cataracts, macular degeneration, and other eye disorders. A preventive dental visit is recommended every 6 months. Try to get at least 150 minutes of exercise per week or 10,000 steps per day on a pedometer . Order or download the FREE \"Exercise & Physical Activity: Your Everyday Guide\" from [employer]. Call 8-478.849.8680 or search [employer] online. You need 3119-1842 mg of calcium and 5429-8233 IU of vitamin D per day. It is possible to meet your calcium requirement with diet alone, but a vitamin D supplement is usually necessary to meet this goal.  When exposed to the sun, use a sunscreen that protects against both UVA and UVB radiation with an SPF of 30 or greater. Reapply every 2 to 3 hours or after sweating, drying off with a towel, or swimming.   Always wear a seat belt when traveling in a car. Always wear a helmet when riding a bicycle or motorcycle.

## 2023-01-25 DIAGNOSIS — I10 ESSENTIAL HYPERTENSION: Chronic | ICD-10-CM

## 2023-01-25 RX ORDER — HYDRALAZINE HYDROCHLORIDE 10 MG/1
10 TABLET, FILM COATED ORAL 3 TIMES DAILY
Qty: 90 TABLET | Refills: 3 | Status: SHIPPED | OUTPATIENT
Start: 2023-01-25

## 2023-01-25 RX ORDER — METOPROLOL SUCCINATE 100 MG/1
100 TABLET, EXTENDED RELEASE ORAL DAILY
Qty: 90 TABLET | Refills: 3 | Status: SHIPPED | OUTPATIENT
Start: 2023-01-25

## 2023-01-25 NOTE — TELEPHONE ENCOUNTER
Received fax from pharmacy requesting refill on pts medication(s). Pt was last seen in office on 5/20/2022  and has a follow up scheduled for Visit date not found. Will send request to  [facility]  for authorization.      Requested Prescriptions     Pending Prescriptions Disp Refills    hydrALAZINE (APRESOLINE) 10 MG tablet [Pharmacy Med Name: hydrALAZINE HCl 10 MG Oral Tablet] 90 tablet 0     Sig: TAKE 1 TABLET BY MOUTH THREE TIMES DAILY

## 2023-01-25 NOTE — TELEPHONE ENCOUNTER
Received fax from pharmacy requesting refill on pts medication(s). Pt was last seen in office on 5/20/2022  and has a follow up scheduled for 1/25/2023. Will send request to  [facility]  for authorization.      Requested Prescriptions     Pending Prescriptions Disp Refills    metoprolol succinate (TOPROL XL) 100 MG extended release tablet [Pharmacy Med Name: Metoprolol Succinate  MG Oral Tablet Extended Release 24 Hour] 90 tablet 0     Sig: Take 1 tablet by mouth once daily

## 2023-02-02 DIAGNOSIS — I10 ESSENTIAL HYPERTENSION: ICD-10-CM

## 2023-02-02 DIAGNOSIS — R53.82 CHRONIC FATIGUE: ICD-10-CM

## 2023-02-02 DIAGNOSIS — I95.9 HYPOTENSION, UNSPECIFIED HYPOTENSION TYPE: ICD-10-CM

## 2023-02-02 DIAGNOSIS — E53.8 VITAMIN B 12 DEFICIENCY: ICD-10-CM

## 2023-02-02 DIAGNOSIS — E78.2 MIXED HYPERLIPIDEMIA: ICD-10-CM

## 2023-02-02 DIAGNOSIS — R73.9 ELEVATED BLOOD SUGAR: ICD-10-CM

## 2023-02-02 LAB
ALBUMIN SERPL-MCNC: 3.8 G/DL (ref 3.5–5.2)
ALP BLD-CCNC: 113 U/L (ref 35–104)
ALT SERPL-CCNC: 21 U/L (ref 5–33)
ANION GAP SERPL CALCULATED.3IONS-SCNC: 18 MMOL/L (ref 7–19)
AST SERPL-CCNC: 23 U/L (ref 5–32)
BASOPHILS ABSOLUTE: 0 K/UL (ref 0–0.2)
BASOPHILS RELATIVE PERCENT: 0.4 % (ref 0–1)
BILIRUB SERPL-MCNC: 0.3 MG/DL (ref 0.2–1.2)
BUN BLDV-MCNC: 10 MG/DL (ref 8–23)
CALCIUM SERPL-MCNC: 9.8 MG/DL (ref 8.8–10.2)
CHLORIDE BLD-SCNC: 103 MMOL/L (ref 98–111)
CHOLESTEROL, TOTAL: 179 MG/DL (ref 160–199)
CO2: 22 MMOL/L (ref 22–29)
CREAT SERPL-MCNC: 0.8 MG/DL (ref 0.5–0.9)
CREATININE URINE: 12.1 MG/DL (ref 4.2–622)
EOSINOPHILS ABSOLUTE: 0.1 K/UL (ref 0–0.6)
EOSINOPHILS RELATIVE PERCENT: 0.9 % (ref 0–5)
GFR SERPL CREATININE-BSD FRML MDRD: >60 ML/MIN/{1.73_M2}
GLUCOSE BLD-MCNC: 141 MG/DL (ref 74–109)
HBA1C MFR BLD: 6.5 % (ref 4–6)
HCT VFR BLD CALC: 40.9 % (ref 37–47)
HDLC SERPL-MCNC: 47 MG/DL (ref 65–121)
HEMOGLOBIN: 12.7 G/DL (ref 12–16)
IMMATURE GRANULOCYTES #: 0 K/UL
LDL CHOLESTEROL CALCULATED: 84 MG/DL
LYMPHOCYTES ABSOLUTE: 1.3 K/UL (ref 1.1–4.5)
LYMPHOCYTES RELATIVE PERCENT: 14 % (ref 20–40)
MCH RBC QN AUTO: 29.8 PG (ref 27–31)
MCHC RBC AUTO-ENTMCNC: 31.1 G/DL (ref 33–37)
MCV RBC AUTO: 96 FL (ref 81–99)
MICROALBUMIN UR-MCNC: <1.2 MG/DL (ref 0–19)
MICROALBUMIN/CREAT UR-RTO: NORMAL MG/G
MONOCYTES ABSOLUTE: 0.8 K/UL (ref 0–0.9)
MONOCYTES RELATIVE PERCENT: 8.3 % (ref 0–10)
NEUTROPHILS ABSOLUTE: 6.9 K/UL (ref 1.5–7.5)
NEUTROPHILS RELATIVE PERCENT: 76 % (ref 50–65)
PDW BLD-RTO: 14 % (ref 11.5–14.5)
PLATELET # BLD: 243 K/UL (ref 130–400)
PMV BLD AUTO: 9.7 FL (ref 9.4–12.3)
POTASSIUM SERPL-SCNC: 3.8 MMOL/L (ref 3.5–5)
RBC # BLD: 4.26 M/UL (ref 4.2–5.4)
SODIUM BLD-SCNC: 143 MMOL/L (ref 136–145)
T4 FREE: 0.84 NG/DL (ref 0.93–1.7)
TOTAL PROTEIN: 6.8 G/DL (ref 6.6–8.7)
TRIGL SERPL-MCNC: 241 MG/DL (ref 0–149)
TSH SERPL DL<=0.05 MIU/L-ACNC: 2.57 UIU/ML (ref 0.27–4.2)
VITAMIN B-12: 535 PG/ML (ref 211–946)
WBC # BLD: 9 K/UL (ref 4.8–10.8)

## 2023-02-03 ENCOUNTER — TELEPHONE (OUTPATIENT)
Dept: FAMILY MEDICINE CLINIC | Age: 73
End: 2023-02-03

## 2023-02-03 DIAGNOSIS — E11.9 TYPE 2 DIABETES MELLITUS WITHOUT COMPLICATION, UNSPECIFIED WHETHER LONG TERM INSULIN USE (HCC): Primary | ICD-10-CM

## 2023-02-03 RX ORDER — AMLODIPINE BESYLATE 2.5 MG/1
2.5 TABLET ORAL DAILY
Qty: 90 TABLET | Refills: 3 | Status: SHIPPED | OUTPATIENT
Start: 2023-02-03

## 2023-02-03 NOTE — TELEPHONE ENCOUNTER
----- Message from LUIZ Weaver sent at 2/3/2023 11:53 AM CST -----  A1c is elevated at 6.5. This is gradually increased over the last year. I do recommend treatment for diabetes. I recommend Januvia 100 mg, 1 tablet daily. Dispense #30. Refills #5. Recommend rechecking A1c in 3 months.     No significant microalbumin in the urine

## 2023-02-03 NOTE — TELEPHONE ENCOUNTER
----- Message from LUIZ Gray sent at 2/2/2023  4:32 PM CST -----  Vitamin B12 is normal  Thyroid is normal  Cholesterol is well controlled except triglycerides. This is secondary to dietary intake. Recommend decreasing carbohydrates and sugars. Continue Lipitor 80 mg  CMP is within normal limits except for elevated blood sugar at 141.   This includes normal electrolytes, kidney function and liver function  CBC is normal

## 2023-02-10 ENCOUNTER — PROCEDURE VISIT (OUTPATIENT)
Dept: ENT CLINIC | Age: 73
End: 2023-02-10

## 2023-02-10 ENCOUNTER — OFFICE VISIT (OUTPATIENT)
Dept: ENT CLINIC | Age: 73
End: 2023-02-10
Payer: MEDICARE

## 2023-02-10 VITALS
BODY MASS INDEX: 39.78 KG/M2 | SYSTOLIC BLOOD PRESSURE: 136 MMHG | WEIGHT: 233 LBS | DIASTOLIC BLOOD PRESSURE: 64 MMHG | HEIGHT: 64 IN

## 2023-02-10 DIAGNOSIS — H91.8X9 ASYMMETRICAL HEARING LOSS: Primary | ICD-10-CM

## 2023-02-10 DIAGNOSIS — R51.9 ACUTE NONINTRACTABLE HEADACHE, UNSPECIFIED HEADACHE TYPE: ICD-10-CM

## 2023-02-10 DIAGNOSIS — H69.81 DYSFUNCTION OF RIGHT EUSTACHIAN TUBE: Primary | ICD-10-CM

## 2023-02-10 DIAGNOSIS — H90.3 SENSORINEURAL HEARING LOSS (SNHL) OF BOTH EARS: ICD-10-CM

## 2023-02-10 PROBLEM — H91.8X3 ASYMMETRICAL HEARING LOSS: Status: ACTIVE | Noted: 2023-02-10

## 2023-02-10 PROCEDURE — 1090F PRES/ABSN URINE INCON ASSESS: CPT | Performed by: PHYSICIAN ASSISTANT

## 2023-02-10 PROCEDURE — 99203 OFFICE O/P NEW LOW 30 MIN: CPT | Performed by: PHYSICIAN ASSISTANT

## 2023-02-10 PROCEDURE — 1036F TOBACCO NON-USER: CPT | Performed by: PHYSICIAN ASSISTANT

## 2023-02-10 PROCEDURE — 1123F ACP DISCUSS/DSCN MKR DOCD: CPT | Performed by: PHYSICIAN ASSISTANT

## 2023-02-10 PROCEDURE — G8417 CALC BMI ABV UP PARAM F/U: HCPCS | Performed by: PHYSICIAN ASSISTANT

## 2023-02-10 PROCEDURE — 3078F DIAST BP <80 MM HG: CPT | Performed by: PHYSICIAN ASSISTANT

## 2023-02-10 PROCEDURE — 3075F SYST BP GE 130 - 139MM HG: CPT | Performed by: PHYSICIAN ASSISTANT

## 2023-02-10 PROCEDURE — 3017F COLORECTAL CA SCREEN DOC REV: CPT | Performed by: PHYSICIAN ASSISTANT

## 2023-02-10 PROCEDURE — G8427 DOCREV CUR MEDS BY ELIG CLIN: HCPCS | Performed by: PHYSICIAN ASSISTANT

## 2023-02-10 PROCEDURE — G8484 FLU IMMUNIZE NO ADMIN: HCPCS | Performed by: PHYSICIAN ASSISTANT

## 2023-02-10 PROCEDURE — G8399 PT W/DXA RESULTS DOCUMENT: HCPCS | Performed by: PHYSICIAN ASSISTANT

## 2023-02-10 ASSESSMENT — ENCOUNTER SYMPTOMS
FACIAL SWELLING: 0
SINUS PRESSURE: 0
SORE THROAT: 0
VOICE CHANGE: 0
TROUBLE SWALLOWING: 0
RHINORRHEA: 0
SINUS PAIN: 0
EYE DISCHARGE: 0
EYE PAIN: 0

## 2023-02-10 NOTE — PROGRESS NOTES
[facility] OTOLARYNGOLOGY/ENT  Mrs. Collette Abraham is a pleasant 26-year-old  female that was referred by Lacho Rodriguez due to problems with asymmetrical hearing loss of the right ear. She reports that this was first noted about 6 to 8 months ago and has not returned to normal.  Patient has a history of having a previous TIA about 4-5 years ago. Audiology studies were recently performed due to this asymmetry which demonstrated wide tympanic with of the right TM. The left demonstrated type A. Patient was noted with mild to severe sloping sensorineural hearing loss that was bilateral.  Due to the wide tympanic width, a middle ear dysfunction was felt to be present. Patient denies any prior surgery to her ears. She also denies any traumatic injury. Patient does admit to sporadic episodes of vertigo as well as headaches to the right mastoid region. Allergies: Metformin and related      Current Outpatient Medications   Medication Sig Dispense Refill    amLODIPine (NORVASC) 2.5 MG tablet TAKE 1 TABLET BY MOUTH IN THE MORNING 90 tablet 3    SITagliptin (JANUVIA) 100 MG tablet Take 1 tablet by mouth daily 30 tablet 5    metoprolol succinate (TOPROL XL) 100 MG extended release tablet Take 1 tablet by mouth daily 90 tablet 3    hydrALAZINE (APRESOLINE) 10 MG tablet Take 1 tablet by mouth 3 times daily 90 tablet 3    traZODone (DESYREL) 50 MG tablet Take 1 tablet by mouth nightly as needed for Sleep 30 tablet 5    pregabalin (LYRICA) 100 MG capsule Take 1 capsule by mouth 2 times daily for 30 days.  Max Daily Amount: 200 mg 60 capsule 1    ondansetron (ZOFRAN-ODT) 4 MG disintegrating tablet Take 1 tablet by mouth 3 times daily as needed for Nausea or Vomiting 21 tablet 0    atorvastatin (LIPITOR) 80 MG tablet Take 1 tablet by mouth nightly 90 tablet 0    omeprazole (PRILOSEC) 20 MG delayed release capsule Take 1 capsule by mouth 2 times daily 180 capsule 1    furosemide (LASIX) 40 MG tablet Take 1 tablet by mouth daily 90 tablet 3    tamsulosin (FLOMAX) 0.4 MG capsule Take 1 capsule by mouth daily 30 capsule 5    venlafaxine (EFFEXOR XR) 150 MG extended release capsule Take 1 capsule by mouth daily 30 capsule 5    famotidine (PEPCID) 20 MG tablet Take 1 tablet by mouth in the morning and 1 tablet before bedtime. 180 tablet 3    ezetimibe (ZETIA) 10 MG tablet Take 1 tablet by mouth daily 30 tablet 11    aspirin 81 MG tablet Aspir-81 81 mg tablet,delayed release   Take 1 tablet every day by oral route. hydrOXYzine (ATARAX) 50 MG tablet hydroxyzine HCl 50 mg tablet   TAKE ONE TABLET BY MOUTH TWICE DAILY AS NEEDED      Multiple Vitamins-Minerals (MULTIVITAMIN ADULT PO) Take by mouth daily        No current facility-administered medications for this visit.      Facility-Administered Medications Ordered in Other Visits   Medication Dose Route Frequency Provider Last Rate Last Admin    sodium chloride (PF) 0.9 % injection 4.5 mL  4.5 mL IntraDERmal Once Pheobe Fu, APRN        onabotulinumtoxin A (BOTOX) injection 200 Units  200 Units IntraDERmal Once Pheobe Fu, APRN           Past Surgical History:   Procedure Laterality Date    ANKLE SURGERY Right     CHOLECYSTECTOMY      COLONOSCOPY  07/01/2015    Dr Kristina Chen w/atypia, 5 yr recall    COLONOSCOPY  06/05/2019    Dr Shahid [facility]) Non-bleeding internal hemorrhoids, diverticulosis-Tubular AP (-) dysplasia, 3 yr recall    COLONOSCOPY N/A 06/07/2022    Dr Stacey Loja disease-AP x 1, 5 yr recall    CYSTOSCOPY Left 12/05/2018    CYSTOSCOPY URETEROSCOPY  PLACEMENT DOUBLE J STENT ON LEFT RIGHT  RETROGRADE PYELOGRAMS performed by Levon Berg MD at [facility] Bilateral 01/31/2019    TOTAL LAPAROSCOPIC HYSTERECTOMY BILATERAL SALPINGOOPHERECTOMY WITH Mary Ellen Prow performed by Jared Cooney MD at [address], VAGINAL      CO EGD TRANSORAL BIOPSY SINGLE/MULTIPLE N/A 05/08/2018    Dr Luis Shah (-) Kristin (+) Dye's (-) dysplasia--3 yr recall    HI LAPAROSCOPY SURG CHOLECYSTECTOMY N/A 03/06/2018    CHOLECYSTECTOMY LAPAROSCOPIC performed by Dov Ricketts MD at [facility] [address]  08/26/2015    Dr Mary Beth Luong neg    UPPER GASTROINTESTINAL ENDOSCOPY  05/23/2017    Dr Osorio-w/balloon dilation, 12-13. 5-15 mm-esophageal narrowing with diverticulum at 34 cm-Kristin (-), hiatal herni, Dye's (+) dysplasia (-)--1st dx--1 yr recall-Ct chest ordered    UPPER GASTROINTESTINAL ENDOSCOPY N/A 04/13/2022    Dr Erroll Favre foreign body present    UPPER GASTROINTESTINAL ENDOSCOPY N/A 06/07/2022    Dr Janet Crook-w/[id number]-Rftxzd appearing ring in the distal esophagus,no Dye's, esophagitis, gastritis, 3 yr recall    UPPER GASTROINTESTINAL ENDOSCOPY  06/07/2022    Dr Janet Crook-w/ktj-70A-Pxcxoc appearing ring in the distal esophagus,no Dye's, esophagitis, gastritis, 3 yr recall    WRIST FRACTURE SURGERY Right        Past Medical History:   Diagnosis Date    Anxiety     Arthritis     Dye's esophagus     Cerebral artery occlusion with cerebral infarction ([address]     tia, 15 or 16 years ago, no deficits    Depression     Diabetes mellitus ([address]     Edema     feet and ankles    GERD (gastroesophageal reflux disease)     Headache(784.0)     migraines    Hepatitis A     Hyperlipidemia     Hypertension     Mini stroke     Obese     Sleep apnea     CPAP    TIA (transient ischemic attack)        Family History   Problem Relation Age of Onset    Heart Disease Mother     Colon Cancer Neg Hx     Colon Polyps Neg Hx     Liver Cancer Neg Hx     Liver Disease Neg Hx     Esophageal Cancer Neg Hx     Rectal Cancer Neg Hx     Stomach Cancer Neg Hx        Social History     Tobacco Use    Smoking status: Never    Smokeless tobacco: Never   Substance Use Topics    Alcohol use: No     Alcohol/week: 0.0 standard drinks           REVIEW OF SYSTEMS:  all other systems reviewed and are negative  Review of Systems   Constitutional: Negative for chills and fever. HENT:  Negative for congestion, dental problem, ear discharge, ear pain, facial swelling, hearing loss, nosebleeds, postnasal drip, rhinorrhea, sinus pressure, sinus pain, sneezing, sore throat, tinnitus, trouble swallowing and voice change. Eyes:  Negative for pain and discharge. Neurological:  Negative for dizziness and headaches. Comments:     PHYSICAL EXAM:    /64   Ht 5' 4\" (1.626 m)   Wt 233 lb (105.7 kg)   BMI 39.99 kg/m²   Body mass index is 39.99 kg/m². General Appearance: well developed  and well nourished  Head/ Face: normocephalic and atraumatic  Vocal Quality: good/ normal  Ears: Right Ear: External: external ears normal Otoscopy Ear Canal: canal clear Otoscopy TM: TM's mobile and tympanosclerosis: mild Left Ear: External: external ears normal Otoscopy Ear Canal: canal clear Otoscopy TM: TM's normal and TM's mobile  Hearing: Rinne A>B: Left, Rinne A>B: Right, and Albrecht M  Nose: nares normal and septum midline  Neck: supple and adenopathy none palpable  Thyroid: normal  Oral exam was unremarkable. The pupils were noted to be equal round reactive to light accommodation with extraocular muscles intact bilaterally. Patient was noted to have positive lateral nystagmus bilaterally. Assessment & Plan:    Problem List Items Addressed This Visit       Acute nonintractable headache    Relevant Orders    CT IAC POSTERIOR FOSSA W CONTRAST    Asymmetrical hearing loss - Primary     Asymmetrical hearing loss of the right ear with history of headaches at right mastoid region  Plan: Due to the patient's current findings, I have recommended a CT of the temporal bones for further evaluation. Patient is agreeable and wishes to proceed. I will call the results with further recommendations to follow.          Relevant Orders    CT IAC POSTERIOR FOSSA W CONTRAST       Orders Placed This Encounter   Procedures    CT IAC POSTERIOR FOSSA W CONTRAST     Standing Status:   Future     Standing Expiration Date:   2/10/2024     Order Specific Question:   STAT Creatinine as needed:     Answer:   No     Order Specific Question:   Reason for exam:     Answer:   Asymmetrical hearing loss of the right ear with history of TIA in the past       No orders of the defined types were placed in this encounter. Electronically signed by Devin Chaudhari PA-C on 2/10/23 at 2:18 PM CST        Please note that this chart was generated using dragon dictation software. Although every effort was made to ensure the accuracy of this automated transcription, some errors in transcription may have occurred.

## 2023-02-10 NOTE — ASSESSMENT & PLAN NOTE
Asymmetrical hearing loss of the right ear with history of headaches at right mastoid region  Plan: Due to the patient's current findings, I have recommended a CT of the temporal bones for further evaluation. Patient is agreeable and wishes to proceed. I will call the results with further recommendations to follow.

## 2023-02-10 NOTE — PROGRESS NOTES
History   Rudi Stevenson is a 71 y.o. female who presented to the clinic this date with complaints of decreased hearing in her right ear. She noted often asking for repetition and turning the TV up. She reported hearing has gotten gradually worse over time. She also noted right ear pain. She reported occasional mild tinnitus. Summary   Tympanometry with wide tympanic width suggests middle ear dysfunction in the right ear. Tympanometry left consistent with normal TM mobility. Pure tone testing indicates mild to severe SNHL bilaterally. Results   Otoscopy:   Right: Clear EAC/Normal TM  Left: Clear EAC/Normal TM    Audiometry:   Right:  Mild to severe  sloping SNHL  Left:  Mild to severe  sloping SNHL         Tympanometry:    Right: Wide tympanic width  Left: Type A    Plan   Results of today's testing were discussed with Ms. Estrella Cantu and the following recommendations were made: Follow up with ENT as scheduled. Recheck hearing following medical management.         Audiogram and Acoustic Immittance

## 2023-02-20 ENCOUNTER — HOSPITAL ENCOUNTER (OUTPATIENT)
Dept: CT IMAGING | Age: 73
Discharge: HOME | End: 2023-02-20
Payer: MEDICARE

## 2023-02-20 DIAGNOSIS — R51.9 ACUTE NONINTRACTABLE HEADACHE, UNSPECIFIED HEADACHE TYPE: ICD-10-CM

## 2023-02-20 DIAGNOSIS — H91.8X9 ASYMMETRICAL HEARING LOSS: ICD-10-CM

## 2023-02-20 PROCEDURE — 6360000004 HC RX CONTRAST MEDICATION: Performed by: PHYSICIAN ASSISTANT

## 2023-02-20 PROCEDURE — 70481 CT ORBIT/EAR/FOSSA W/DYE: CPT

## 2023-02-20 RX ADMIN — IOPAMIDOL 60 ML: 755 INJECTION, SOLUTION INTRAVENOUS at 14:04

## 2023-02-21 ENCOUNTER — TELEPHONE (OUTPATIENT)
Dept: ENT CLINIC | Age: 73
End: 2023-02-21

## 2023-02-21 RX ORDER — AMOXICILLIN AND CLAVULANATE POTASSIUM 875; 125 MG/1; MG/1
1 TABLET, FILM COATED ORAL 2 TIMES DAILY
Qty: 42 TABLET | Refills: 0 | Status: SHIPPED | OUTPATIENT
Start: 2023-02-21 | End: 2023-03-14

## 2023-02-21 RX ORDER — PREDNISONE 20 MG/1
TABLET ORAL
Qty: 20 TABLET | Refills: 0 | Status: SHIPPED | OUTPATIENT
Start: 2023-02-21

## 2023-02-21 NOTE — TELEPHONE ENCOUNTER
I called the results of the CT of the temporal bones. Patient was noted with complete opacification of the right maxillary sinus with evidence of fluid into the mastoidal region of the right ear. I recommended treating the patient with 3 weeks of antibiotic therapy and 2 weeks prednisone. Patient is follow-up in 1 month for reevaluation. I am hopeful this may help with her hearing issues that was previously noted. Patient was reminded to call if she has any questions or problems.       Electronically signed by Danica Cha PA-C on 2/21/23 at 5:16 PM CST

## 2023-03-03 DIAGNOSIS — R45.89 CRYING ASSOCIATED WITH MOOD: ICD-10-CM

## 2023-03-03 DIAGNOSIS — F41.1 GAD (GENERALIZED ANXIETY DISORDER): ICD-10-CM

## 2023-03-03 DIAGNOSIS — F33.1 MODERATE EPISODE OF RECURRENT MAJOR DEPRESSIVE DISORDER (HCC): ICD-10-CM

## 2023-03-06 RX ORDER — VENLAFAXINE HYDROCHLORIDE 150 MG/1
150 CAPSULE, EXTENDED RELEASE ORAL DAILY
Qty: 90 CAPSULE | Refills: 3 | Status: SHIPPED | OUTPATIENT
Start: 2023-03-06

## 2023-03-06 NOTE — TELEPHONE ENCOUNTER
Received fax from pharmacy requesting refill on pts medication(s). Pt was last seen in office on 1/20/2023  and has a follow up scheduled for 4/20/2023. Will send request to  [facility]  for authorization.      Requested Prescriptions     Pending Prescriptions Disp Refills    venlafaxine (EFFEXOR XR) 150 MG extended release capsule [Pharmacy Med Name: Venlafaxine HCl  MG Oral Capsule Extended Release 24 Hour] 90 capsule 3     Sig: Take 1 capsule by mouth daily

## 2023-03-24 ENCOUNTER — OFFICE VISIT (OUTPATIENT)
Dept: FAMILY MEDICINE CLINIC | Age: 73
End: 2023-03-24
Payer: MEDICARE

## 2023-03-24 VITALS
BODY MASS INDEX: 40.29 KG/M2 | DIASTOLIC BLOOD PRESSURE: 76 MMHG | SYSTOLIC BLOOD PRESSURE: 122 MMHG | HEART RATE: 81 BPM | HEIGHT: 64 IN | TEMPERATURE: 98.5 F | OXYGEN SATURATION: 97 % | WEIGHT: 236 LBS

## 2023-03-24 DIAGNOSIS — J02.9 ACUTE PHARYNGITIS, UNSPECIFIED ETIOLOGY: Primary | ICD-10-CM

## 2023-03-24 DIAGNOSIS — B96.89 ACUTE BACTERIAL SINUSITIS: ICD-10-CM

## 2023-03-24 DIAGNOSIS — J01.90 ACUTE BACTERIAL SINUSITIS: ICD-10-CM

## 2023-03-24 DIAGNOSIS — J18.9 PNEUMONITIS: ICD-10-CM

## 2023-03-24 LAB
INFLUENZA A ANTIBODY: NORMAL
INFLUENZA B ANTIBODY: NORMAL
S PYO AG THROAT QL: NORMAL

## 2023-03-24 PROCEDURE — 87804 INFLUENZA ASSAY W/OPTIC: CPT | Performed by: PEDIATRICS

## 2023-03-24 PROCEDURE — G8484 FLU IMMUNIZE NO ADMIN: HCPCS | Performed by: PEDIATRICS

## 2023-03-24 PROCEDURE — 99214 OFFICE O/P EST MOD 30 MIN: CPT | Performed by: PEDIATRICS

## 2023-03-24 PROCEDURE — 1090F PRES/ABSN URINE INCON ASSESS: CPT | Performed by: PEDIATRICS

## 2023-03-24 PROCEDURE — G8417 CALC BMI ABV UP PARAM F/U: HCPCS | Performed by: PEDIATRICS

## 2023-03-24 PROCEDURE — 1036F TOBACCO NON-USER: CPT | Performed by: PEDIATRICS

## 2023-03-24 PROCEDURE — 87880 STREP A ASSAY W/OPTIC: CPT | Performed by: PEDIATRICS

## 2023-03-24 PROCEDURE — G8399 PT W/DXA RESULTS DOCUMENT: HCPCS | Performed by: PEDIATRICS

## 2023-03-24 PROCEDURE — 3074F SYST BP LT 130 MM HG: CPT | Performed by: PEDIATRICS

## 2023-03-24 PROCEDURE — 1123F ACP DISCUSS/DSCN MKR DOCD: CPT | Performed by: PEDIATRICS

## 2023-03-24 PROCEDURE — 3078F DIAST BP <80 MM HG: CPT | Performed by: PEDIATRICS

## 2023-03-24 PROCEDURE — G8427 DOCREV CUR MEDS BY ELIG CLIN: HCPCS | Performed by: PEDIATRICS

## 2023-03-24 PROCEDURE — 3017F COLORECTAL CA SCREEN DOC REV: CPT | Performed by: PEDIATRICS

## 2023-03-24 RX ORDER — DOXYCYCLINE HYCLATE 100 MG/1
100 CAPSULE ORAL 2 TIMES DAILY
Qty: 20 CAPSULE | Refills: 0 | Status: SHIPPED | OUTPATIENT
Start: 2023-03-24 | End: 2023-04-03

## 2023-03-24 RX ORDER — METHYLPREDNISOLONE 4 MG/1
TABLET ORAL
Qty: 1 KIT | Refills: 0 | Status: SHIPPED | OUTPATIENT
Start: 2023-03-24 | End: 2023-03-30

## 2023-03-24 ASSESSMENT — ENCOUNTER SYMPTOMS
EYES NEGATIVE: 1
GASTROINTESTINAL NEGATIVE: 1
COUGH: 1
ALLERGIC/IMMUNOLOGIC NEGATIVE: 1
SORE THROAT: 1

## 2023-03-24 NOTE — PROGRESS NOTES
[address] [facility], [address]  Phone [phone]   Fax [phone]      OFFICE VISIT: 3/24/2023    Padmaja Ibanez-: 1950      HPI  Reason For Visit:  Liu Crook is a 68 y.o. Cough (Cough, sore throat, fatigue, body aches for the past 5 days. )    Patient presents with complaints of cough sore throat and generalized malaise for the past several days. Treatment:otc meds  No relief  She is getting worse  Cough productive for thick green     height is 5' 4\" (1.626 m) and weight is 236 lb (107 kg). Her temporal temperature is 98.5 °F (36.9 °C). Her blood pressure is 122/76 and her pulse is 81. Her oxygen saturation is 97%. Body mass index is 40.51 kg/m². I have reviewed the following with the Ms. Ibanez   Lab Review  Orders Only on 2023   Component Date Value    Vitamin B-12 2023 535     TSH 2023 2.570     T4 Free 2023 0.84 (A)     Microalbumin, Random Uri* 2023 <1.20     Creatinine, Ur 2023 12.1     Microalbumin Creatinine * 2023 see below     Cholesterol, Total 2023 179     Triglycerides 2023 241 (A)     HDL 2023 47 (A)     LDL Calculated 2023 84     Hemoglobin A1C 2023 6.5 (A)     Sodium 2023 143     Potassium 2023 3.8     Chloride 2023 103     CO2 2023 22     Anion Gap 2023 18     Glucose 2023 141 (A)     BUN 2023 10     Creatinine 2023 0.8     Est, Glom Filt Rate 2023 >60     Calcium 2023 9.8     Total Protein 2023 6.8     Albumin 2023 3.8     Total Bilirubin 2023 0.3     Alkaline Phosphatase 2023 113 (A)     ALT 2023 21     AST 2023 23     WBC 2023 9.0     RBC 2023 4.26     Hemoglobin 2023 12.7     Hematocrit 2023 40.9     MCV 2023 96.0     MCH 2023 29.8     MCHC 2023 31.1 (A)     RDW 2023 14.0     Platelets  243     MPV 2023 9.7

## 2023-03-27 ENCOUNTER — HOSPITAL ENCOUNTER (OUTPATIENT)
Dept: WOMENS IMAGING | Age: 73
Discharge: HOME | End: 2023-03-27
Payer: MEDICARE

## 2023-03-27 ENCOUNTER — TELEPHONE (OUTPATIENT)
Dept: FAMILY MEDICINE CLINIC | Age: 73
End: 2023-03-27

## 2023-03-27 DIAGNOSIS — Z12.31 ENCOUNTER FOR SCREENING MAMMOGRAM FOR MALIGNANT NEOPLASM OF BREAST: ICD-10-CM

## 2023-03-27 PROCEDURE — 77067 SCR MAMMO BI INCL CAD: CPT | Performed by: GENERAL PRACTICE

## 2023-03-27 PROCEDURE — 77063 BREAST TOMOSYNTHESIS BI: CPT

## 2023-03-27 NOTE — TELEPHONE ENCOUNTER
----- Message from LUIZ Egan sent at 3/27/2023 10:59 AM CDT -----  Mammogram negative recheck in 1 year or changes in monthly SBE

## 2023-04-02 DIAGNOSIS — M54.50 LEFT LUMBAR PAIN: ICD-10-CM

## 2023-04-02 DIAGNOSIS — G62.9 NEUROPATHY: ICD-10-CM

## 2023-04-03 RX ORDER — TAMSULOSIN HYDROCHLORIDE 0.4 MG/1
CAPSULE ORAL
Qty: 30 CAPSULE | Refills: 0 | Status: SHIPPED | OUTPATIENT
Start: 2023-04-03

## 2023-04-03 RX ORDER — PREGABALIN 100 MG/1
CAPSULE ORAL
Qty: 60 CAPSULE | Refills: 0 | Status: SHIPPED | OUTPATIENT
Start: 2023-04-03 | End: 2023-05-03

## 2023-04-03 NOTE — TELEPHONE ENCOUNTER
Received fax from pharmacy requesting refill on pts medication(s). Pt was last seen in office on 3/24/2023  and has a follow up scheduled for 4/20/2023. Will send request to  [facility]  for authorization.      Requested Prescriptions     Pending Prescriptions Disp Refills    pregabalin (LYRICA) 100 MG capsule [Pharmacy Med Name: Pregabalin 100 MG Oral Capsule] 60 capsule 0     Sig: TAKE 1 CAPSULE BY MOUTH TWICE DAILY FOR 30 DAYS    tamsulosin (FLOMAX) 0.4 MG capsule [Pharmacy Med Name: Tamsulosin HCl 0.4 MG Oral Capsule] 30 capsule 0     Sig: Take 1 capsule by mouth once daily

## 2023-04-09 DIAGNOSIS — E78.2 MIXED HYPERLIPIDEMIA: Chronic | ICD-10-CM

## 2023-04-10 RX ORDER — ATORVASTATIN CALCIUM 80 MG/1
80 TABLET, FILM COATED ORAL NIGHTLY
Qty: 90 TABLET | Refills: 3 | Status: SHIPPED | OUTPATIENT
Start: 2023-04-10

## 2023-05-11 DIAGNOSIS — R10.10 PAIN OF UPPER ABDOMEN: ICD-10-CM

## 2023-05-11 DIAGNOSIS — M54.50 LEFT LUMBAR PAIN: ICD-10-CM

## 2023-05-11 DIAGNOSIS — K21.9 GASTROESOPHAGEAL REFLUX DISEASE: ICD-10-CM

## 2023-05-11 DIAGNOSIS — G62.9 NEUROPATHY: ICD-10-CM

## 2023-05-11 DIAGNOSIS — K21.9 GASTROESOPHAGEAL REFLUX DISEASE WITHOUT ESOPHAGITIS: Chronic | ICD-10-CM

## 2023-05-11 RX ORDER — OMEPRAZOLE 20 MG/1
CAPSULE, DELAYED RELEASE ORAL
Qty: 180 CAPSULE | Refills: 3 | Status: SHIPPED | OUTPATIENT
Start: 2023-05-11

## 2023-05-11 RX ORDER — TAMSULOSIN HYDROCHLORIDE 0.4 MG/1
CAPSULE ORAL
Qty: 30 CAPSULE | Refills: 5 | Status: SHIPPED | OUTPATIENT
Start: 2023-05-11 | End: 2023-07-07 | Stop reason: ALTCHOICE

## 2023-05-11 RX ORDER — PREGABALIN 100 MG/1
100 CAPSULE ORAL 2 TIMES DAILY
Qty: 60 CAPSULE | Refills: 0 | Status: SHIPPED | OUTPATIENT
Start: 2023-05-11 | End: 2023-07-07

## 2023-05-11 RX ORDER — FAMOTIDINE 20 MG/1
TABLET, FILM COATED ORAL
Qty: 180 TABLET | Refills: 3 | Status: SHIPPED | OUTPATIENT
Start: 2023-05-11

## 2023-07-07 ENCOUNTER — OFFICE VISIT (OUTPATIENT)
Dept: FAMILY MEDICINE CLINIC | Age: 73
End: 2023-07-07
Payer: MEDICARE

## 2023-07-07 ENCOUNTER — TELEPHONE (OUTPATIENT)
Dept: FAMILY MEDICINE CLINIC | Age: 73
End: 2023-07-07

## 2023-07-07 ENCOUNTER — HOSPITAL ENCOUNTER (OUTPATIENT)
Dept: GENERAL RADIOLOGY | Age: 73
Discharge: HOME | End: 2023-07-07
Payer: MEDICARE

## 2023-07-07 VITALS
SYSTOLIC BLOOD PRESSURE: 120 MMHG | DIASTOLIC BLOOD PRESSURE: 64 MMHG | HEART RATE: 90 BPM | OXYGEN SATURATION: 98 % | TEMPERATURE: 97.9 F | HEIGHT: 64 IN | WEIGHT: 235 LBS | BODY MASS INDEX: 40.12 KG/M2

## 2023-07-07 DIAGNOSIS — R10.84 GENERALIZED ABDOMINAL PAIN: ICD-10-CM

## 2023-07-07 DIAGNOSIS — E11.9 TYPE 2 DIABETES MELLITUS WITHOUT COMPLICATION, WITHOUT LONG-TERM CURRENT USE OF INSULIN (HCC): ICD-10-CM

## 2023-07-07 DIAGNOSIS — R10.84 GENERALIZED ABDOMINAL PAIN: Primary | ICD-10-CM

## 2023-07-07 DIAGNOSIS — R42 DIZZINESS: ICD-10-CM

## 2023-07-07 LAB
ALBUMIN SERPL-MCNC: 4.1 G/DL (ref 3.5–5.2)
ALP SERPL-CCNC: 97 U/L (ref 35–104)
ALT SERPL-CCNC: 17 U/L (ref 5–33)
ANION GAP SERPL CALCULATED.3IONS-SCNC: 13 MMOL/L (ref 7–19)
AST SERPL-CCNC: 18 U/L (ref 5–32)
BASOPHILS # BLD: 0 K/UL (ref 0–0.2)
BASOPHILS NFR BLD: 0.5 % (ref 0–1)
BILIRUB SERPL-MCNC: 0.3 MG/DL (ref 0.2–1.2)
BILIRUB UR QL STRIP: NEGATIVE
BUN SERPL-MCNC: 21 MG/DL (ref 8–23)
CALCIUM SERPL-MCNC: 10.5 MG/DL (ref 8.8–10.2)
CHLORIDE SERPL-SCNC: 104 MMOL/L (ref 98–111)
CLARITY UR: ABNORMAL
CO2 SERPL-SCNC: 26 MMOL/L (ref 22–29)
COLOR UR: YELLOW
CREAT SERPL-MCNC: 1 MG/DL (ref 0.5–0.9)
EOSINOPHIL # BLD: 0.1 K/UL (ref 0–0.6)
EOSINOPHIL NFR BLD: 0.9 % (ref 0–5)
ERYTHROCYTE [DISTWIDTH] IN BLOOD BY AUTOMATED COUNT: 13.4 % (ref 11.5–14.5)
GLUCOSE SERPL-MCNC: 153 MG/DL (ref 74–109)
GLUCOSE UR STRIP.AUTO-MCNC: NEGATIVE MG/DL
HCT VFR BLD AUTO: 39.3 % (ref 37–47)
HGB BLD-MCNC: 12.4 G/DL (ref 12–16)
HGB UR STRIP.AUTO-MCNC: NEGATIVE MG/L
IMM GRANULOCYTES # BLD: 0 K/UL
KETONES UR STRIP.AUTO-MCNC: NEGATIVE MG/DL
LEUKOCYTE ESTERASE UR QL STRIP.AUTO: NEGATIVE
LIPASE SERPL-CCNC: 45 U/L (ref 13–60)
LYMPHOCYTES # BLD: 1.2 K/UL (ref 1.1–4.5)
LYMPHOCYTES NFR BLD: 14.4 % (ref 20–40)
MCH RBC QN AUTO: 29.4 PG (ref 27–31)
MCHC RBC AUTO-ENTMCNC: 31.6 G/DL (ref 33–37)
MCV RBC AUTO: 93.1 FL (ref 81–99)
MONOCYTES # BLD: 0.7 K/UL (ref 0–0.9)
MONOCYTES NFR BLD: 9.3 % (ref 0–10)
NEUTROPHILS # BLD: 6 K/UL (ref 1.5–7.5)
NEUTS SEG NFR BLD: 74.8 % (ref 50–65)
NITRITE UR QL STRIP.AUTO: NEGATIVE
PH UR STRIP.AUTO: 6 [PH] (ref 5–8)
PLATELET # BLD AUTO: 237 K/UL (ref 130–400)
PMV BLD AUTO: 9.8 FL (ref 9.4–12.3)
POTASSIUM SERPL-SCNC: 3.6 MMOL/L (ref 3.5–5)
PROT SERPL-MCNC: 7.1 G/DL (ref 6.6–8.7)
PROT UR STRIP.AUTO-MCNC: NEGATIVE MG/DL
RBC # BLD AUTO: 4.22 M/UL (ref 4.2–5.4)
SODIUM SERPL-SCNC: 143 MMOL/L (ref 136–145)
SP GR UR STRIP.AUTO: 1.02 (ref 1–1.03)
UROBILINOGEN UR STRIP.AUTO-MCNC: 0.2 E.U./DL
WBC # BLD AUTO: 8 K/UL (ref 4.8–10.8)

## 2023-07-07 PROCEDURE — 99214 OFFICE O/P EST MOD 30 MIN: CPT | Performed by: NURSE PRACTITIONER

## 2023-07-07 PROCEDURE — 1090F PRES/ABSN URINE INCON ASSESS: CPT | Performed by: NURSE PRACTITIONER

## 2023-07-07 PROCEDURE — 3017F COLORECTAL CA SCREEN DOC REV: CPT | Performed by: NURSE PRACTITIONER

## 2023-07-07 PROCEDURE — 1123F ACP DISCUSS/DSCN MKR DOCD: CPT | Performed by: NURSE PRACTITIONER

## 2023-07-07 PROCEDURE — 74022 RADEX COMPL AQT ABD SERIES: CPT

## 2023-07-07 PROCEDURE — G8427 DOCREV CUR MEDS BY ELIG CLIN: HCPCS | Performed by: NURSE PRACTITIONER

## 2023-07-07 PROCEDURE — G8399 PT W/DXA RESULTS DOCUMENT: HCPCS | Performed by: NURSE PRACTITIONER

## 2023-07-07 PROCEDURE — 3074F SYST BP LT 130 MM HG: CPT | Performed by: NURSE PRACTITIONER

## 2023-07-07 PROCEDURE — 2022F DILAT RTA XM EVC RTNOPTHY: CPT | Performed by: NURSE PRACTITIONER

## 2023-07-07 PROCEDURE — 3078F DIAST BP <80 MM HG: CPT | Performed by: NURSE PRACTITIONER

## 2023-07-07 PROCEDURE — 3044F HG A1C LEVEL LT 7.0%: CPT | Performed by: NURSE PRACTITIONER

## 2023-07-07 PROCEDURE — G8417 CALC BMI ABV UP PARAM F/U: HCPCS | Performed by: NURSE PRACTITIONER

## 2023-07-07 PROCEDURE — 1036F TOBACCO NON-USER: CPT | Performed by: NURSE PRACTITIONER

## 2023-07-07 ASSESSMENT — ENCOUNTER SYMPTOMS
TROUBLE SWALLOWING: 0
ABDOMINAL PAIN: 0
RHINORRHEA: 0
NAUSEA: 0
CONSTIPATION: 0
SINUS PRESSURE: 0
VOMITING: 0
SHORTNESS OF BREATH: 0
SORE THROAT: 0
COUGH: 0
DIARRHEA: 0

## 2023-07-07 NOTE — PROGRESS NOTES
Pulmonary:      Effort: Pulmonary effort is normal.      Breath sounds: Normal breath sounds. Abdominal:      General: Bowel sounds are normal. There is no distension. Palpations: Abdomen is soft. Tenderness: There is no abdominal tenderness. There is no guarding. Musculoskeletal:      Cervical back: Normal range of motion and neck supple. Skin:     General: Skin is warm. Neurological:      Mental Status: She is alert and oriented to person, place, and time. Psychiatric:         Mood and Affect: Mood normal.         Behavior: Behavior normal.         Thought Content: Thought content normal.         Judgment: Judgment normal.               An electronic signature was used to authenticate this note.     --LUIZ Lima

## 2023-07-21 ENCOUNTER — OFFICE VISIT (OUTPATIENT)
Dept: FAMILY MEDICINE CLINIC | Age: 73
End: 2023-07-21
Payer: MEDICARE

## 2023-07-21 VITALS
DIASTOLIC BLOOD PRESSURE: 60 MMHG | OXYGEN SATURATION: 95 % | WEIGHT: 233 LBS | HEIGHT: 64 IN | BODY MASS INDEX: 39.78 KG/M2 | SYSTOLIC BLOOD PRESSURE: 110 MMHG | TEMPERATURE: 97.4 F | HEART RATE: 74 BPM

## 2023-07-21 DIAGNOSIS — G89.29 CHRONIC BILATERAL LOW BACK PAIN WITHOUT SCIATICA: ICD-10-CM

## 2023-07-21 DIAGNOSIS — R53.82 CHRONIC FATIGUE: ICD-10-CM

## 2023-07-21 DIAGNOSIS — E11.9 TYPE 2 DIABETES MELLITUS WITHOUT COMPLICATION, WITHOUT LONG-TERM CURRENT USE OF INSULIN (HCC): Primary | ICD-10-CM

## 2023-07-21 DIAGNOSIS — F51.01 PRIMARY INSOMNIA: ICD-10-CM

## 2023-07-21 DIAGNOSIS — R11.0 NAUSEA IN ADULT: ICD-10-CM

## 2023-07-21 DIAGNOSIS — K21.9 GASTROESOPHAGEAL REFLUX DISEASE, UNSPECIFIED WHETHER ESOPHAGITIS PRESENT: ICD-10-CM

## 2023-07-21 DIAGNOSIS — M54.50 CHRONIC BILATERAL LOW BACK PAIN WITHOUT SCIATICA: ICD-10-CM

## 2023-07-21 DIAGNOSIS — E66.01 CLASS 2 SEVERE OBESITY DUE TO EXCESS CALORIES WITH SERIOUS COMORBIDITY AND BODY MASS INDEX (BMI) OF 39.0 TO 39.9 IN ADULT (HCC): ICD-10-CM

## 2023-07-21 PROCEDURE — 3078F DIAST BP <80 MM HG: CPT | Performed by: NURSE PRACTITIONER

## 2023-07-21 PROCEDURE — 3044F HG A1C LEVEL LT 7.0%: CPT | Performed by: NURSE PRACTITIONER

## 2023-07-21 PROCEDURE — 1090F PRES/ABSN URINE INCON ASSESS: CPT | Performed by: NURSE PRACTITIONER

## 2023-07-21 PROCEDURE — G8399 PT W/DXA RESULTS DOCUMENT: HCPCS | Performed by: NURSE PRACTITIONER

## 2023-07-21 PROCEDURE — 2022F DILAT RTA XM EVC RTNOPTHY: CPT | Performed by: NURSE PRACTITIONER

## 2023-07-21 PROCEDURE — 1123F ACP DISCUSS/DSCN MKR DOCD: CPT | Performed by: NURSE PRACTITIONER

## 2023-07-21 PROCEDURE — 99214 OFFICE O/P EST MOD 30 MIN: CPT | Performed by: NURSE PRACTITIONER

## 2023-07-21 PROCEDURE — 1036F TOBACCO NON-USER: CPT | Performed by: NURSE PRACTITIONER

## 2023-07-21 PROCEDURE — G8427 DOCREV CUR MEDS BY ELIG CLIN: HCPCS | Performed by: NURSE PRACTITIONER

## 2023-07-21 PROCEDURE — 3017F COLORECTAL CA SCREEN DOC REV: CPT | Performed by: NURSE PRACTITIONER

## 2023-07-21 PROCEDURE — 3074F SYST BP LT 130 MM HG: CPT | Performed by: NURSE PRACTITIONER

## 2023-07-21 PROCEDURE — G8417 CALC BMI ABV UP PARAM F/U: HCPCS | Performed by: NURSE PRACTITIONER

## 2023-07-21 RX ORDER — SEMAGLUTIDE 1.34 MG/ML
0.25 INJECTION, SOLUTION SUBCUTANEOUS WEEKLY
Qty: 1 ADJUSTABLE DOSE PRE-FILLED PEN SYRINGE | Refills: 0 | Status: SHIPPED | OUTPATIENT
Start: 2023-07-21 | End: 2023-08-12

## 2023-07-21 RX ORDER — TRAZODONE HYDROCHLORIDE 50 MG/1
TABLET ORAL
Qty: 60 TABLET | Refills: 5 | Status: SHIPPED | OUTPATIENT
Start: 2023-07-21

## 2023-07-21 RX ORDER — PANTOPRAZOLE SODIUM 40 MG/1
40 TABLET, DELAYED RELEASE ORAL
Qty: 30 TABLET | Refills: 5 | Status: SHIPPED | OUTPATIENT
Start: 2023-07-21

## 2023-07-21 RX ORDER — FAMOTIDINE 40 MG/1
40 TABLET, FILM COATED ORAL EVERY EVENING
Qty: 30 TABLET | Refills: 3 | Status: SHIPPED | OUTPATIENT
Start: 2023-07-21

## 2023-07-21 ASSESSMENT — ENCOUNTER SYMPTOMS
COUGH: 0
ABDOMINAL PAIN: 1
VOMITING: 0
DIARRHEA: 0
BACK PAIN: 1
NAUSEA: 1
RHINORRHEA: 0
SORE THROAT: 0
CONSTIPATION: 0
SHORTNESS OF BREATH: 0
EYE REDNESS: 0

## 2023-07-21 NOTE — PROGRESS NOTES
Nakul Retana (:  1950) is a 68 y.o. female,Established patient, here for evaluation of the following chief complaint(s):  Follow-up (Abd pain and dizziness) and Nausea      ASSESSMENT/PLAN:    ICD-10-CM    1. Type 2 diabetes mellitus without complication, without long-term current use of insulin (HCC)  E11.9 Semaglutide,0.25 or 0.5MG/DOS, (OZEMPIC, 0.25 OR 0.5 MG/DOSE,) 2 MG/1.5ML SOPN    Stop Januvia    Long discussion with patient about the mechanism of action of Ozempic and that this can cause further GI issues. Patient is aware of this and wishes to still start this medication. Told patient to notify the office if nausea worsens. 2. Gastroesophageal reflux disease, unspecified whether esophagitis present  K21.9 famotidine (PEPCID) 40 MG tablet nightly (increased from 20 mg)     pantoprazole (PROTONIX) 40 MG tablet  Stop omeprazole  All foods cause the stomach to produce acid, although foods can affect people in different ways. Following is a list of foods and beverages that may aggravate your stomach. You may want to eat them less often. Fried foods or fatty foods  Heavy seasoning and spicy foods  Coffee  Onions  Orange juice, grapefruit juice, and tomato juice  Alcoholic beverages  Chocolate  Peppermint     Try these lifestyle changes:    Stop smoking  Exercise to help control your weight  Eat small well-balanced meals  Reduce abdominal pressure (ie) tight belts  Avoid eating within 2 hours of bedtime  Elevate the head of the bed 6 to 8 inches higher than the foot of the bed. 3. Nausea in adult  R11.0 famotidine (PEPCID) 40 MG tablet     pantoprazole (PROTONIX) 40 MG tablet      4. Class 2 severe obesity due to excess calories with serious comorbidity and body mass index (BMI) of 39.0 to 39.9 in adult [facility])  E66.01 Recommend diet and exercise    Z68.39       5. Chronic fatigue  R53.82 Patient took trazodone 100 mg last night for the first night.   She reports she slept all night and

## 2023-07-25 DIAGNOSIS — F51.01 PRIMARY INSOMNIA: ICD-10-CM

## 2023-07-25 RX ORDER — HYDRALAZINE HYDROCHLORIDE 10 MG/1
TABLET, FILM COATED ORAL
Qty: 90 TABLET | Refills: 3 | Status: SHIPPED | OUTPATIENT
Start: 2023-07-25

## 2023-07-25 RX ORDER — TRAZODONE HYDROCHLORIDE 50 MG/1
TABLET ORAL
Qty: 30 TABLET | Refills: 0 | OUTPATIENT
Start: 2023-07-25

## 2023-07-25 NOTE — TELEPHONE ENCOUNTER
Received fax from pharmacy requesting refill on pts medication(s). Pt was last seen in office on 7/21/2023  and has a follow up scheduled for 8/18/2023. Will send request to  [facility]  for authorization.      Requested Prescriptions     Pending Prescriptions Disp Refills    traZODone (DESYREL) 50 MG tablet [Pharmacy Med Name: traZODone HCl 50 MG Oral Tablet] 30 tablet 0     Sig: TAKE 1 TABLET BY MOUTH NIGHTLY AS NEEDED FOR SLEEP    hydrALAZINE (APRESOLINE) 10 MG tablet [Pharmacy Med Name: hydrALAZINE HCl 10 MG Oral Tablet] 90 tablet 0     Sig: TAKE 1 TABLET BY MOUTH THREE TIMES DAILY

## 2023-07-31 DIAGNOSIS — E11.9 TYPE 2 DIABETES MELLITUS WITHOUT COMPLICATION, UNSPECIFIED WHETHER LONG TERM INSULIN USE (HCC): ICD-10-CM

## 2023-07-31 RX ORDER — SITAGLIPTIN 100 MG/1
TABLET, FILM COATED ORAL
Qty: 30 TABLET | Refills: 0 | OUTPATIENT
Start: 2023-07-31

## 2023-07-31 NOTE — TELEPHONE ENCOUNTER
Received fax from pharmacy requesting refill on pts medication(s). Pt was last seen in office on 7/21/2023  and has a follow up scheduled for 8/18/2023. Will send request to  [facility]  for authorization.      Requested Prescriptions     Pending Prescriptions Disp Refills    JANUVIA 100 MG tablet [Pharmacy Med Name: Januvia 100 MG Oral Tablet] 30 tablet 0     Sig: Take 1 tablet by mouth once daily

## 2023-08-03 NOTE — TELEPHONE ENCOUNTER
Left message for patient to return call at convenience regarding med change, need to clarify patient is not taking both medications.

## 2023-08-18 ENCOUNTER — OFFICE VISIT (OUTPATIENT)
Dept: FAMILY MEDICINE CLINIC | Age: 73
End: 2023-08-18
Payer: MEDICARE

## 2023-08-18 VITALS
SYSTOLIC BLOOD PRESSURE: 128 MMHG | BODY MASS INDEX: 38.28 KG/M2 | OXYGEN SATURATION: 96 % | DIASTOLIC BLOOD PRESSURE: 71 MMHG | WEIGHT: 223 LBS | TEMPERATURE: 97 F | HEART RATE: 87 BPM

## 2023-08-18 DIAGNOSIS — R10.10 UPPER ABDOMINAL PAIN: ICD-10-CM

## 2023-08-18 DIAGNOSIS — E11.9 TYPE 2 DIABETES MELLITUS WITHOUT COMPLICATION, UNSPECIFIED WHETHER LONG TERM INSULIN USE (HCC): ICD-10-CM

## 2023-08-18 DIAGNOSIS — R11.0 NAUSEA: ICD-10-CM

## 2023-08-18 DIAGNOSIS — R10.9 ABDOMINAL CRAMPING: ICD-10-CM

## 2023-08-18 DIAGNOSIS — E11.9 TYPE 2 DIABETES MELLITUS WITHOUT COMPLICATION, UNSPECIFIED WHETHER LONG TERM INSULIN USE (HCC): Primary | ICD-10-CM

## 2023-08-18 DIAGNOSIS — R14.0 BLOATING SYMPTOM: ICD-10-CM

## 2023-08-18 LAB
ALBUMIN SERPL-MCNC: 4.3 G/DL (ref 3.5–5.2)
ALP SERPL-CCNC: 94 U/L (ref 35–104)
ALT SERPL-CCNC: 19 U/L (ref 5–33)
ANION GAP SERPL CALCULATED.3IONS-SCNC: 11 MMOL/L (ref 7–19)
AST SERPL-CCNC: 22 U/L (ref 5–32)
BASOPHILS # BLD: 0 K/UL (ref 0–0.2)
BASOPHILS NFR BLD: 0.5 % (ref 0–1)
BILIRUB SERPL-MCNC: 0.3 MG/DL (ref 0.2–1.2)
BUN SERPL-MCNC: 14 MG/DL (ref 8–23)
CALCIUM SERPL-MCNC: 10.8 MG/DL (ref 8.8–10.2)
CHLORIDE SERPL-SCNC: 102 MMOL/L (ref 98–111)
CO2 SERPL-SCNC: 28 MMOL/L (ref 22–29)
CREAT SERPL-MCNC: 1 MG/DL (ref 0.5–0.9)
EOSINOPHIL # BLD: 0.1 K/UL (ref 0–0.6)
EOSINOPHIL NFR BLD: 0.7 % (ref 0–5)
ERYTHROCYTE [DISTWIDTH] IN BLOOD BY AUTOMATED COUNT: 13.5 % (ref 11.5–14.5)
GLUCOSE SERPL-MCNC: 98 MG/DL (ref 74–109)
HCT VFR BLD AUTO: 42.1 % (ref 37–47)
HGB BLD-MCNC: 13.5 G/DL (ref 12–16)
IMM GRANULOCYTES # BLD: 0 K/UL
LIPASE SERPL-CCNC: 44 U/L (ref 13–60)
LYMPHOCYTES # BLD: 1.4 K/UL (ref 1.1–4.5)
LYMPHOCYTES NFR BLD: 17 % (ref 20–40)
MCH RBC QN AUTO: 29.7 PG (ref 27–31)
MCHC RBC AUTO-ENTMCNC: 32.1 G/DL (ref 33–37)
MCV RBC AUTO: 92.7 FL (ref 81–99)
MONOCYTES # BLD: 0.8 K/UL (ref 0–0.9)
MONOCYTES NFR BLD: 9.6 % (ref 0–10)
NEUTROPHILS # BLD: 6.1 K/UL (ref 1.5–7.5)
NEUTS SEG NFR BLD: 71.7 % (ref 50–65)
PLATELET # BLD AUTO: 247 K/UL (ref 130–400)
PMV BLD AUTO: 10.1 FL (ref 9.4–12.3)
POTASSIUM SERPL-SCNC: 3.6 MMOL/L (ref 3.5–5)
PROT SERPL-MCNC: 7.2 G/DL (ref 6.6–8.7)
RBC # BLD AUTO: 4.54 M/UL (ref 4.2–5.4)
SODIUM SERPL-SCNC: 141 MMOL/L (ref 136–145)
WBC # BLD AUTO: 8.5 K/UL (ref 4.8–10.8)

## 2023-08-18 PROCEDURE — 3074F SYST BP LT 130 MM HG: CPT | Performed by: NURSE PRACTITIONER

## 2023-08-18 PROCEDURE — 1090F PRES/ABSN URINE INCON ASSESS: CPT | Performed by: NURSE PRACTITIONER

## 2023-08-18 PROCEDURE — 1123F ACP DISCUSS/DSCN MKR DOCD: CPT | Performed by: NURSE PRACTITIONER

## 2023-08-18 PROCEDURE — G8399 PT W/DXA RESULTS DOCUMENT: HCPCS | Performed by: NURSE PRACTITIONER

## 2023-08-18 PROCEDURE — 1036F TOBACCO NON-USER: CPT | Performed by: NURSE PRACTITIONER

## 2023-08-18 PROCEDURE — 3078F DIAST BP <80 MM HG: CPT | Performed by: NURSE PRACTITIONER

## 2023-08-18 PROCEDURE — G8427 DOCREV CUR MEDS BY ELIG CLIN: HCPCS | Performed by: NURSE PRACTITIONER

## 2023-08-18 PROCEDURE — 99214 OFFICE O/P EST MOD 30 MIN: CPT | Performed by: NURSE PRACTITIONER

## 2023-08-18 PROCEDURE — 2022F DILAT RTA XM EVC RTNOPTHY: CPT | Performed by: NURSE PRACTITIONER

## 2023-08-18 PROCEDURE — 3044F HG A1C LEVEL LT 7.0%: CPT | Performed by: NURSE PRACTITIONER

## 2023-08-18 PROCEDURE — 3017F COLORECTAL CA SCREEN DOC REV: CPT | Performed by: NURSE PRACTITIONER

## 2023-08-18 PROCEDURE — G8417 CALC BMI ABV UP PARAM F/U: HCPCS | Performed by: NURSE PRACTITIONER

## 2023-08-18 RX ORDER — SEMAGLUTIDE 1.34 MG/ML
0.5 INJECTION, SOLUTION SUBCUTANEOUS WEEKLY
Qty: 1.5 ML | Refills: 0 | Status: SHIPPED | OUTPATIENT
Start: 2023-08-18 | End: 2023-09-09

## 2023-08-18 ASSESSMENT — ENCOUNTER SYMPTOMS
COUGH: 0
SHORTNESS OF BREATH: 0
EYE REDNESS: 0
CONSTIPATION: 0
DIARRHEA: 0
NAUSEA: 1
ABDOMINAL PAIN: 1
RHINORRHEA: 0
VOMITING: 0
SORE THROAT: 0
BACK PAIN: 1

## 2023-08-18 NOTE — PROGRESS NOTES
Johnathon Mcardle (:  1950) is a 68 y.o. female,Established patient, here for evaluation of the following chief complaint(s):  Follow-up (Patient has still been having stomach issues. Every time she eats she gets \"sour stomach\" and passes a lot of gas. )      ASSESSMENT/PLAN:    ICD-10-CM    1. Type 2 diabetes mellitus without complication, unspecified whether long term insulin use (HCC)  E11.9 Semaglutide,0.25 or 0.5MG/DOS, (OZEMPIC, 0.25 OR 0.5 MG/DOSE,) 2 MG/1.5ML SOPN    LONG DISCUSSION: Will check labs due to patient's continued abdominal symptoms. She does not want to stop the Ozempic at this time. Told patient if labs were elevated then would have to switch to another class of diabetic medication. Also, instructed patient on the importance of taking the correct dose of her Ozempic        CBC with Auto Differential     Comprehensive Metabolic Panel      2. Abdominal cramping  R10.9 Lipase      3. Bloating symptom  R14.0       4. Nausea  R11.0 Lipase  Improving  Continue Protonix and Pepcid      5. Upper abdominal pain  R10.10 Lipase          Return in about 5 weeks (around 2023), or if symptoms worsen or fail to improve. SUBJECTIVE/OBJECTIVE:  HPI    DM:  She started Ozempic 0.25 mg weekly. \"I mixed up my Ozempic with my husbands. \"  \"I took the 0.5 mg once, then I took 1 mg two weeks in a row. \"  She has lost 10 pounds in the last month. Blood sugars: 126, 124, 120, 127, 124, 127, 124, 118, 119, 112. 120    GI:  She continues on Protonix 40 mg and Pepcid 40 mg daily. \"I am not nauseous when I bend over. \"  Reports upper abdominal cramping. \"This has been going on for years. \"   \"I can't stand any weight on my stomach. \"  23, Lipase: 45  \"I have a lot of gas. \"  \"Sometimes when I eat, I get a sick feeling in my stomach. \"  \"I have had more problems with my stomach these past two weeks. \"  \"My bowels were doing good up until the last 3 days. \"    /71 (Site: Right Upper Arm, Position:

## 2023-08-21 ENCOUNTER — TELEPHONE (OUTPATIENT)
Dept: FAMILY MEDICINE CLINIC | Age: 73
End: 2023-08-21

## 2023-08-21 NOTE — TELEPHONE ENCOUNTER
----- Message from LUIZ Johnson sent at 8/21/2023  7:33 AM CDT -----  Please call patient and let them know results.    Metabolic panel is normal  Normal blood counts  Lipase which is a pancreatic enzyme is negative

## 2023-09-19 RX ORDER — BLOOD-GLUCOSE METER
1 EACH MISCELLANEOUS PRN
Qty: 1 KIT | Refills: 0 | Status: SHIPPED | OUTPATIENT
Start: 2023-09-19

## 2023-09-19 RX ORDER — BLOOD SUGAR DIAGNOSTIC
1 STRIP MISCELLANEOUS DAILY
Qty: 100 EACH | Refills: 3 | Status: SHIPPED | OUTPATIENT
Start: 2023-09-19

## 2023-09-19 RX ORDER — LANCETS
100 EACH MISCELLANEOUS 4 TIMES DAILY
Qty: 100 EACH | Refills: 3 | Status: SHIPPED | OUTPATIENT
Start: 2023-09-19

## 2023-09-19 NOTE — TELEPHONE ENCOUNTER
Received fax from pharmacy requesting refill on pts medication(s). Pt was last seen in office on 2023  and has a follow up scheduled for 2023. Will send request to  [facility]  for authorization. Requested Prescriptions     Pending Prescriptions Disp Refills    blood glucose test strips (ACCU-CHEK GUIDE) strip 100 each 3     Si each by In Vitro route daily As needed.     Blood Glucose Monitoring Suppl (ACCU-CHEK GUIDE) w/Device KIT 1 kit 0     Si each by Does not apply route    Accu-Chek Softclix Lancets MISC 100 each 3     Si each by Does not apply route 4 times daily

## 2023-09-22 ENCOUNTER — OFFICE VISIT (OUTPATIENT)
Dept: FAMILY MEDICINE CLINIC | Age: 73
End: 2023-09-22
Payer: MEDICARE

## 2023-09-22 VITALS
OXYGEN SATURATION: 95 % | HEART RATE: 84 BPM | TEMPERATURE: 97.3 F | SYSTOLIC BLOOD PRESSURE: 120 MMHG | BODY MASS INDEX: 37.22 KG/M2 | DIASTOLIC BLOOD PRESSURE: 70 MMHG | WEIGHT: 218 LBS | HEIGHT: 64 IN

## 2023-09-22 DIAGNOSIS — F51.01 PRIMARY INSOMNIA: ICD-10-CM

## 2023-09-22 DIAGNOSIS — M79.671 PAIN IN BOTH FEET: ICD-10-CM

## 2023-09-22 DIAGNOSIS — G62.9 NEUROPATHY: ICD-10-CM

## 2023-09-22 DIAGNOSIS — E11.42 TYPE 2 DIABETES MELLITUS WITH DIABETIC POLYNEUROPATHY, WITHOUT LONG-TERM CURRENT USE OF INSULIN (HCC): Primary | ICD-10-CM

## 2023-09-22 DIAGNOSIS — M79.672 PAIN IN BOTH FEET: ICD-10-CM

## 2023-09-22 PROCEDURE — 99214 OFFICE O/P EST MOD 30 MIN: CPT | Performed by: NURSE PRACTITIONER

## 2023-09-22 PROCEDURE — 2022F DILAT RTA XM EVC RTNOPTHY: CPT | Performed by: NURSE PRACTITIONER

## 2023-09-22 PROCEDURE — 3017F COLORECTAL CA SCREEN DOC REV: CPT | Performed by: NURSE PRACTITIONER

## 2023-09-22 PROCEDURE — 3074F SYST BP LT 130 MM HG: CPT | Performed by: NURSE PRACTITIONER

## 2023-09-22 PROCEDURE — 3078F DIAST BP <80 MM HG: CPT | Performed by: NURSE PRACTITIONER

## 2023-09-22 PROCEDURE — G8399 PT W/DXA RESULTS DOCUMENT: HCPCS | Performed by: NURSE PRACTITIONER

## 2023-09-22 PROCEDURE — G8417 CALC BMI ABV UP PARAM F/U: HCPCS | Performed by: NURSE PRACTITIONER

## 2023-09-22 PROCEDURE — 1036F TOBACCO NON-USER: CPT | Performed by: NURSE PRACTITIONER

## 2023-09-22 PROCEDURE — G8427 DOCREV CUR MEDS BY ELIG CLIN: HCPCS | Performed by: NURSE PRACTITIONER

## 2023-09-22 PROCEDURE — 1123F ACP DISCUSS/DSCN MKR DOCD: CPT | Performed by: NURSE PRACTITIONER

## 2023-09-22 PROCEDURE — 3044F HG A1C LEVEL LT 7.0%: CPT | Performed by: NURSE PRACTITIONER

## 2023-09-22 PROCEDURE — 1090F PRES/ABSN URINE INCON ASSESS: CPT | Performed by: NURSE PRACTITIONER

## 2023-09-22 RX ORDER — TEMAZEPAM 15 MG/1
15 CAPSULE ORAL NIGHTLY PRN
Qty: 30 CAPSULE | Refills: 0 | Status: SHIPPED | OUTPATIENT
Start: 2023-09-22 | End: 2023-10-22

## 2023-09-22 RX ORDER — SEMAGLUTIDE 0.68 MG/ML
INJECTION, SOLUTION SUBCUTANEOUS
COMMUNITY
Start: 2023-08-18 | End: 2023-09-22

## 2023-09-22 ASSESSMENT — ENCOUNTER SYMPTOMS
COUGH: 0
EYE REDNESS: 0
CONSTIPATION: 0
VOMITING: 0
NAUSEA: 0
RHINORRHEA: 0
ABDOMINAL PAIN: 0
SORE THROAT: 0
SHORTNESS OF BREATH: 0
DIARRHEA: 0
BACK PAIN: 1

## 2023-09-22 NOTE — PROGRESS NOTES
Lesions:none    Right Foot:    Left Foot:  Normal sensation at 1, 4-5, 7-8   Normal sensation at 1-3, 7-8     No sensation at 10, 2-3, 6, 9   No sensation at 4-6, 9, 10                 An electronic signature was used to authenticate this note.     --Racehl Kenney, APRN

## 2023-09-28 ENCOUNTER — TELEPHONE (OUTPATIENT)
Dept: FAMILY MEDICINE CLINIC | Age: 73
End: 2023-09-28

## 2023-09-28 NOTE — TELEPHONE ENCOUNTER
Pt called, needs a letter to her insurance stating why she needs her diabetic shoes and then they will decide if she can have them or not. She said that they didn't give her any fax, etc to send this to. I told her that I have never heard of us having to do this before, but I would send this over to y ou.

## 2023-09-29 NOTE — TELEPHONE ENCOUNTER
Spoke to Corine at [facility], they have all the documentation needed to get the patient her diabetic shoes.  She confirmed she does not need a letter

## 2023-10-24 ENCOUNTER — OFFICE VISIT (OUTPATIENT)
Dept: FAMILY MEDICINE CLINIC | Age: 73
End: 2023-10-24
Payer: MEDICARE

## 2023-10-24 VITALS
WEIGHT: 210 LBS | SYSTOLIC BLOOD PRESSURE: 100 MMHG | TEMPERATURE: 97.1 F | BODY MASS INDEX: 35.85 KG/M2 | DIASTOLIC BLOOD PRESSURE: 70 MMHG | OXYGEN SATURATION: 96 % | HEIGHT: 64 IN | HEART RATE: 79 BPM

## 2023-10-24 DIAGNOSIS — G25.0 ESSENTIAL TREMOR: ICD-10-CM

## 2023-10-24 DIAGNOSIS — F51.01 PRIMARY INSOMNIA: ICD-10-CM

## 2023-10-24 DIAGNOSIS — E11.42 TYPE 2 DIABETES MELLITUS WITH DIABETIC POLYNEUROPATHY, WITHOUT LONG-TERM CURRENT USE OF INSULIN (HCC): Primary | ICD-10-CM

## 2023-10-24 DIAGNOSIS — Z23 NEED FOR INFLUENZA VACCINATION: ICD-10-CM

## 2023-10-24 PROCEDURE — G8484 FLU IMMUNIZE NO ADMIN: HCPCS | Performed by: NURSE PRACTITIONER

## 2023-10-24 PROCEDURE — 90694 VACC AIIV4 NO PRSRV 0.5ML IM: CPT | Performed by: NURSE PRACTITIONER

## 2023-10-24 PROCEDURE — G8427 DOCREV CUR MEDS BY ELIG CLIN: HCPCS | Performed by: NURSE PRACTITIONER

## 2023-10-24 PROCEDURE — 99214 OFFICE O/P EST MOD 30 MIN: CPT | Performed by: NURSE PRACTITIONER

## 2023-10-24 PROCEDURE — G8417 CALC BMI ABV UP PARAM F/U: HCPCS | Performed by: NURSE PRACTITIONER

## 2023-10-24 PROCEDURE — 1123F ACP DISCUSS/DSCN MKR DOCD: CPT | Performed by: NURSE PRACTITIONER

## 2023-10-24 PROCEDURE — G8399 PT W/DXA RESULTS DOCUMENT: HCPCS | Performed by: NURSE PRACTITIONER

## 2023-10-24 PROCEDURE — G0008 ADMIN INFLUENZA VIRUS VAC: HCPCS | Performed by: NURSE PRACTITIONER

## 2023-10-24 PROCEDURE — 3017F COLORECTAL CA SCREEN DOC REV: CPT | Performed by: NURSE PRACTITIONER

## 2023-10-24 PROCEDURE — 3078F DIAST BP <80 MM HG: CPT | Performed by: NURSE PRACTITIONER

## 2023-10-24 PROCEDURE — 1036F TOBACCO NON-USER: CPT | Performed by: NURSE PRACTITIONER

## 2023-10-24 PROCEDURE — 3044F HG A1C LEVEL LT 7.0%: CPT | Performed by: NURSE PRACTITIONER

## 2023-10-24 PROCEDURE — 1090F PRES/ABSN URINE INCON ASSESS: CPT | Performed by: NURSE PRACTITIONER

## 2023-10-24 PROCEDURE — 2022F DILAT RTA XM EVC RTNOPTHY: CPT | Performed by: NURSE PRACTITIONER

## 2023-10-24 PROCEDURE — 3074F SYST BP LT 130 MM HG: CPT | Performed by: NURSE PRACTITIONER

## 2023-10-24 RX ORDER — PRIMIDONE 50 MG/1
50 TABLET ORAL 2 TIMES DAILY
Qty: 60 TABLET | Refills: 1 | Status: SHIPPED | OUTPATIENT
Start: 2023-10-24

## 2023-10-24 RX ORDER — TEMAZEPAM 15 MG/1
15 CAPSULE ORAL NIGHTLY PRN
Qty: 30 CAPSULE | Refills: 0 | Status: SHIPPED | OUTPATIENT
Start: 2023-10-24 | End: 2023-11-23

## 2023-10-24 ASSESSMENT — ENCOUNTER SYMPTOMS
EYE REDNESS: 0
NAUSEA: 0
COUGH: 0
SHORTNESS OF BREATH: 0
BACK PAIN: 1
VOMITING: 0
RHINORRHEA: 0
ABDOMINAL PAIN: 0
SORE THROAT: 0
CONSTIPATION: 0
DIARRHEA: 0

## 2023-10-24 NOTE — PROGRESS NOTES
Vaccine Information Sheet, \"Influenza - Inactivated\"  given to Dio Watt, or parent/legal guardian of  Dio Watt and verbalized understanding. Patient responses:    Have you ever had a reaction to a flu vaccine? NO  Do you have an adverse reaction when you eat eggs? NO  Do you have any current illness? NO  Have you ever had [place] Syndrome? NO    Flu vaccine given per order. Please see immunization tab.

## 2023-10-25 ENCOUNTER — TELEPHONE (OUTPATIENT)
Dept: FAMILY MEDICINE CLINIC | Age: 73
End: 2023-10-25

## 2023-10-25 NOTE — TELEPHONE ENCOUNTER
----- Message from LUIZ Macias sent at 10/24/2023  9:26 PM CDT -----  A1c is well controlled at 5.9  Keep up the good work

## 2023-10-26 ENCOUNTER — TELEPHONE (OUTPATIENT)
Dept: FAMILY MEDICINE CLINIC | Age: 73
End: 2023-10-26

## 2023-10-26 DIAGNOSIS — T78.40XA ALLERGIC REACTION, INITIAL ENCOUNTER: Primary | ICD-10-CM

## 2023-10-26 RX ORDER — CETIRIZINE HYDROCHLORIDE 10 MG/1
10 TABLET ORAL DAILY
Qty: 30 TABLET | Refills: 0 | Status: SHIPPED | OUTPATIENT
Start: 2023-10-26 | End: 2023-11-25

## 2023-10-26 NOTE — TELEPHONE ENCOUNTER
Got flu shot 10/24   Noticed 10/25 site of injection has a \"knot\" and \"fever\"   Red ring around it that gets bigger. Slightly itched 10/25 yesterday. Per the patient the knot is staying the same but the red ring around it is getting bigger. Please advise.

## 2023-10-26 NOTE — TELEPHONE ENCOUNTER
Sounds like a localized reaction to the injection. Recommend ice as needed on the area  Recommend Zyrtec 10 mg, 1 tablet daily. Dispense #30. Refills #0. This is an antihistamine that will help with the redness and itching.

## 2023-11-13 DIAGNOSIS — R11.0 NAUSEA IN ADULT: ICD-10-CM

## 2023-11-13 DIAGNOSIS — K21.9 GASTROESOPHAGEAL REFLUX DISEASE, UNSPECIFIED WHETHER ESOPHAGITIS PRESENT: ICD-10-CM

## 2023-11-13 RX ORDER — FAMOTIDINE 40 MG/1
40 TABLET, FILM COATED ORAL
Qty: 30 TABLET | Refills: 3 | Status: SHIPPED | OUTPATIENT
Start: 2023-11-13 | End: 2023-12-05

## 2023-11-13 NOTE — TELEPHONE ENCOUNTER
Received fax from pharmacy requesting refill on pts medication(s). Pt was last seen in office on 10/24/2023  and has a follow up scheduled for 11/16/2023. Will send request to  Teodora Larson  for authorization.     Requested Prescriptions     Pending Prescriptions Disp Refills    famotidine (PEPCID) 40 MG tablet [Pharmacy Med Name: Famotidine 40 MG Oral Tablet] 30 tablet 0     Sig: TAKE 1 TABLET BY MOUTH ONCE DAILY IN THE EVENING

## 2023-11-16 ENCOUNTER — OFFICE VISIT (OUTPATIENT)
Dept: FAMILY MEDICINE CLINIC | Age: 73
End: 2023-11-16
Payer: MEDICARE

## 2023-11-16 VITALS
WEIGHT: 209.5 LBS | OXYGEN SATURATION: 97 % | TEMPERATURE: 97.6 F | HEIGHT: 64 IN | BODY MASS INDEX: 35.77 KG/M2 | HEART RATE: 72 BPM | DIASTOLIC BLOOD PRESSURE: 70 MMHG | SYSTOLIC BLOOD PRESSURE: 110 MMHG

## 2023-11-16 DIAGNOSIS — E11.42 TYPE 2 DIABETES MELLITUS WITH DIABETIC POLYNEUROPATHY, WITHOUT LONG-TERM CURRENT USE OF INSULIN (HCC): ICD-10-CM

## 2023-11-16 DIAGNOSIS — J30.1 SEASONAL ALLERGIC RHINITIS DUE TO POLLEN: ICD-10-CM

## 2023-11-16 DIAGNOSIS — F51.01 PRIMARY INSOMNIA: Primary | ICD-10-CM

## 2023-11-16 DIAGNOSIS — E66.01 CLASS 2 SEVERE OBESITY DUE TO EXCESS CALORIES WITH SERIOUS COMORBIDITY AND BODY MASS INDEX (BMI) OF 35.0 TO 35.9 IN ADULT (HCC): ICD-10-CM

## 2023-11-16 PROCEDURE — G8399 PT W/DXA RESULTS DOCUMENT: HCPCS | Performed by: NURSE PRACTITIONER

## 2023-11-16 PROCEDURE — 3017F COLORECTAL CA SCREEN DOC REV: CPT | Performed by: NURSE PRACTITIONER

## 2023-11-16 PROCEDURE — 3044F HG A1C LEVEL LT 7.0%: CPT | Performed by: NURSE PRACTITIONER

## 2023-11-16 PROCEDURE — G8417 CALC BMI ABV UP PARAM F/U: HCPCS | Performed by: NURSE PRACTITIONER

## 2023-11-16 PROCEDURE — 1123F ACP DISCUSS/DSCN MKR DOCD: CPT | Performed by: NURSE PRACTITIONER

## 2023-11-16 PROCEDURE — G8427 DOCREV CUR MEDS BY ELIG CLIN: HCPCS | Performed by: NURSE PRACTITIONER

## 2023-11-16 PROCEDURE — 3074F SYST BP LT 130 MM HG: CPT | Performed by: NURSE PRACTITIONER

## 2023-11-16 PROCEDURE — 2022F DILAT RTA XM EVC RTNOPTHY: CPT | Performed by: NURSE PRACTITIONER

## 2023-11-16 PROCEDURE — 1090F PRES/ABSN URINE INCON ASSESS: CPT | Performed by: NURSE PRACTITIONER

## 2023-11-16 PROCEDURE — 99214 OFFICE O/P EST MOD 30 MIN: CPT | Performed by: NURSE PRACTITIONER

## 2023-11-16 PROCEDURE — 3078F DIAST BP <80 MM HG: CPT | Performed by: NURSE PRACTITIONER

## 2023-11-16 PROCEDURE — 1036F TOBACCO NON-USER: CPT | Performed by: NURSE PRACTITIONER

## 2023-11-16 PROCEDURE — G8484 FLU IMMUNIZE NO ADMIN: HCPCS | Performed by: NURSE PRACTITIONER

## 2023-11-16 RX ORDER — TEMAZEPAM 15 MG/1
15 CAPSULE ORAL NIGHTLY PRN
Qty: 30 CAPSULE | Refills: 0 | Status: SHIPPED | OUTPATIENT
Start: 2023-11-16 | End: 2023-12-16

## 2023-11-16 RX ORDER — FEXOFENADINE HCL 180 MG/1
180 TABLET ORAL DAILY
Qty: 30 TABLET | Refills: 5 | Status: SHIPPED | OUTPATIENT
Start: 2023-11-16

## 2023-11-16 ASSESSMENT — ENCOUNTER SYMPTOMS
DIARRHEA: 0
SORE THROAT: 0
BACK PAIN: 1
RHINORRHEA: 1
VOMITING: 0
NAUSEA: 0
COUGH: 0
SHORTNESS OF BREATH: 0
CONSTIPATION: 0
ABDOMINAL PAIN: 0
EYE REDNESS: 0

## 2023-11-28 DIAGNOSIS — K21.9 GASTROESOPHAGEAL REFLUX DISEASE, UNSPECIFIED WHETHER ESOPHAGITIS PRESENT: ICD-10-CM

## 2023-11-28 DIAGNOSIS — T78.40XA ALLERGIC REACTION, INITIAL ENCOUNTER: ICD-10-CM

## 2023-11-28 DIAGNOSIS — R11.0 NAUSEA IN ADULT: ICD-10-CM

## 2023-11-29 RX ORDER — FAMOTIDINE 40 MG/1
40 TABLET, FILM COATED ORAL
Qty: 90 TABLET | Refills: 0 | OUTPATIENT
Start: 2023-11-29

## 2023-11-29 RX ORDER — CETIRIZINE HYDROCHLORIDE 10 MG/1
10 TABLET ORAL DAILY
Qty: 30 TABLET | Refills: 0 | OUTPATIENT
Start: 2023-11-29

## 2023-12-02 DIAGNOSIS — K21.9 GASTROESOPHAGEAL REFLUX DISEASE, UNSPECIFIED WHETHER ESOPHAGITIS PRESENT: ICD-10-CM

## 2023-12-02 DIAGNOSIS — T78.40XA ALLERGIC REACTION, INITIAL ENCOUNTER: ICD-10-CM

## 2023-12-02 DIAGNOSIS — R11.0 NAUSEA IN ADULT: ICD-10-CM

## 2023-12-04 NOTE — TELEPHONE ENCOUNTER
Received fax from pharmacy requesting refill on pts medication(s). Pt was last seen in office on 11/16/2023  and has a follow up scheduled for 12/21/2023. Will send request to  [facility] [place]  for authorization.      Requested Prescriptions     Pending Prescriptions Disp Refills    cetirizine (ZYRTEC) 10 MG tablet [Pharmacy Med Name: Cetirizine HCl 10 MG Oral Tablet] 30 tablet 0     Sig: Take 1 tablet by mouth once daily    famotidine (PEPCID) 40 MG tablet [Pharmacy Med Name: Famotidine 40 MG Oral Tablet] 90 tablet 0     Sig: TAKE 1 TABLET BY MOUTH ONCE DAILY IN THE EVENING

## 2023-12-05 RX ORDER — FAMOTIDINE 40 MG/1
40 TABLET, FILM COATED ORAL
Qty: 90 TABLET | Refills: 3 | Status: SHIPPED | OUTPATIENT
Start: 2023-12-05

## 2023-12-05 RX ORDER — CETIRIZINE HYDROCHLORIDE 10 MG/1
10 TABLET ORAL DAILY
Qty: 30 TABLET | Refills: 0 | OUTPATIENT
Start: 2023-12-05

## 2023-12-14 DIAGNOSIS — T78.40XA ALLERGIC REACTION, INITIAL ENCOUNTER: ICD-10-CM

## 2023-12-15 RX ORDER — CETIRIZINE HYDROCHLORIDE 10 MG/1
10 TABLET ORAL DAILY
Qty: 30 TABLET | Refills: 0 | OUTPATIENT
Start: 2023-12-15

## 2024-01-11 DIAGNOSIS — G25.0 ESSENTIAL TREMOR: ICD-10-CM

## 2024-01-11 RX ORDER — PRIMIDONE 50 MG/1
50 TABLET ORAL 2 TIMES DAILY
Qty: 60 TABLET | Refills: 5 | Status: SHIPPED | OUTPATIENT
Start: 2024-01-11

## 2024-01-11 NOTE — TELEPHONE ENCOUNTER
Received fax from pharmacy requesting refill on pts medication(s). Pt was last seen in office on 12/21/2023  and has a follow up scheduled for 1/24/2024. Will send request to  Teodora Larson  for authorization.     Requested Prescriptions     Pending Prescriptions Disp Refills    primidone (MYSOLINE) 50 MG tablet [Pharmacy Med Name: Primidone 50 MG Oral Tablet] 60 tablet 0     Sig: TAKE 1 TABLET BY MOUTH IN THE MORNING AND AT BEDTIME

## 2024-01-12 DIAGNOSIS — K21.9 GASTROESOPHAGEAL REFLUX DISEASE, UNSPECIFIED WHETHER ESOPHAGITIS PRESENT: ICD-10-CM

## 2024-01-12 DIAGNOSIS — R11.0 NAUSEA IN ADULT: ICD-10-CM

## 2024-01-12 RX ORDER — PANTOPRAZOLE SODIUM 40 MG/1
40 TABLET, DELAYED RELEASE ORAL DAILY
Qty: 90 TABLET | Refills: 3 | Status: SHIPPED | OUTPATIENT
Start: 2024-01-12

## 2024-01-12 NOTE — TELEPHONE ENCOUNTER
Received fax from pharmacy requesting refill on pts medication(s). Pt was last seen in office on 12/21/2023  and has a follow up scheduled for 1/24/2024. Will send request to  Teodora Larson  for authorization.     Requested Prescriptions     Pending Prescriptions Disp Refills    pantoprazole (PROTONIX) 40 MG tablet [Pharmacy Med Name: Pantoprazole Sodium 40 MG Oral Tablet Delayed Release] 90 tablet 3     Sig: Take 1 tablet by mouth daily

## 2024-01-24 ENCOUNTER — OFFICE VISIT (OUTPATIENT)
Dept: FAMILY MEDICINE CLINIC | Age: 74
End: 2024-01-24
Payer: MEDICARE

## 2024-01-24 VITALS
SYSTOLIC BLOOD PRESSURE: 100 MMHG | BODY MASS INDEX: 33.84 KG/M2 | DIASTOLIC BLOOD PRESSURE: 60 MMHG | HEIGHT: 64 IN | HEART RATE: 95 BPM | TEMPERATURE: 96.9 F | WEIGHT: 198.25 LBS | OXYGEN SATURATION: 96 %

## 2024-01-24 DIAGNOSIS — E66.09 CLASS 1 OBESITY DUE TO EXCESS CALORIES WITH SERIOUS COMORBIDITY AND BODY MASS INDEX (BMI) OF 34.0 TO 34.9 IN ADULT: ICD-10-CM

## 2024-01-24 DIAGNOSIS — E11.42 TYPE 2 DIABETES MELLITUS WITH DIABETIC POLYNEUROPATHY, WITHOUT LONG-TERM CURRENT USE OF INSULIN (HCC): ICD-10-CM

## 2024-01-24 DIAGNOSIS — G47.33 OBSTRUCTIVE SLEEP APNEA SYNDROME: ICD-10-CM

## 2024-01-24 DIAGNOSIS — Z00.00 MEDICARE ANNUAL WELLNESS VISIT, SUBSEQUENT: Primary | ICD-10-CM

## 2024-01-24 DIAGNOSIS — F41.9 ANXIETY: ICD-10-CM

## 2024-01-24 DIAGNOSIS — F51.01 PRIMARY INSOMNIA: ICD-10-CM

## 2024-01-24 DIAGNOSIS — G25.0 ESSENTIAL TREMOR: ICD-10-CM

## 2024-01-24 DIAGNOSIS — K21.9 GASTROESOPHAGEAL REFLUX DISEASE WITHOUT ESOPHAGITIS: Chronic | ICD-10-CM

## 2024-01-24 PROBLEM — M25.569 KNEE PAIN: Status: RESOLVED | Noted: 2018-11-02 | Resolved: 2024-01-24

## 2024-01-24 PROBLEM — R07.9 CHEST PAIN: Status: RESOLVED | Noted: 2022-04-12 | Resolved: 2024-01-24

## 2024-01-24 PROCEDURE — 1036F TOBACCO NON-USER: CPT | Performed by: NURSE PRACTITIONER

## 2024-01-24 PROCEDURE — G8427 DOCREV CUR MEDS BY ELIG CLIN: HCPCS | Performed by: NURSE PRACTITIONER

## 2024-01-24 PROCEDURE — 2022F DILAT RTA XM EVC RTNOPTHY: CPT | Performed by: NURSE PRACTITIONER

## 2024-01-24 PROCEDURE — 99213 OFFICE O/P EST LOW 20 MIN: CPT | Performed by: NURSE PRACTITIONER

## 2024-01-24 PROCEDURE — 3017F COLORECTAL CA SCREEN DOC REV: CPT | Performed by: NURSE PRACTITIONER

## 2024-01-24 PROCEDURE — 3078F DIAST BP <80 MM HG: CPT | Performed by: NURSE PRACTITIONER

## 2024-01-24 PROCEDURE — 3046F HEMOGLOBIN A1C LEVEL >9.0%: CPT | Performed by: NURSE PRACTITIONER

## 2024-01-24 PROCEDURE — G8399 PT W/DXA RESULTS DOCUMENT: HCPCS | Performed by: NURSE PRACTITIONER

## 2024-01-24 PROCEDURE — 1090F PRES/ABSN URINE INCON ASSESS: CPT | Performed by: NURSE PRACTITIONER

## 2024-01-24 PROCEDURE — G8417 CALC BMI ABV UP PARAM F/U: HCPCS | Performed by: NURSE PRACTITIONER

## 2024-01-24 PROCEDURE — G0439 PPPS, SUBSEQ VISIT: HCPCS | Performed by: NURSE PRACTITIONER

## 2024-01-24 PROCEDURE — G8484 FLU IMMUNIZE NO ADMIN: HCPCS | Performed by: NURSE PRACTITIONER

## 2024-01-24 PROCEDURE — 1123F ACP DISCUSS/DSCN MKR DOCD: CPT | Performed by: NURSE PRACTITIONER

## 2024-01-24 PROCEDURE — 3074F SYST BP LT 130 MM HG: CPT | Performed by: NURSE PRACTITIONER

## 2024-01-24 RX ORDER — TEMAZEPAM 15 MG/1
15 CAPSULE ORAL NIGHTLY PRN
Qty: 30 CAPSULE | Refills: 0 | Status: SHIPPED | OUTPATIENT
Start: 2024-01-24 | End: 2024-02-23

## 2024-01-24 SDOH — ECONOMIC STABILITY: FOOD INSECURITY: WITHIN THE PAST 12 MONTHS, THE FOOD YOU BOUGHT JUST DIDN'T LAST AND YOU DIDN'T HAVE MONEY TO GET MORE.: NEVER TRUE

## 2024-01-24 SDOH — ECONOMIC STABILITY: HOUSING INSECURITY
IN THE LAST 12 MONTHS, WAS THERE A TIME WHEN YOU DID NOT HAVE A STEADY PLACE TO SLEEP OR SLEPT IN A SHELTER (INCLUDING NOW)?: NO

## 2024-01-24 SDOH — ECONOMIC STABILITY: FOOD INSECURITY: WITHIN THE PAST 12 MONTHS, YOU WORRIED THAT YOUR FOOD WOULD RUN OUT BEFORE YOU GOT MONEY TO BUY MORE.: NEVER TRUE

## 2024-01-24 SDOH — ECONOMIC STABILITY: INCOME INSECURITY: HOW HARD IS IT FOR YOU TO PAY FOR THE VERY BASICS LIKE FOOD, HOUSING, MEDICAL CARE, AND HEATING?: NOT HARD AT ALL

## 2024-01-24 ASSESSMENT — PATIENT HEALTH QUESTIONNAIRE - PHQ9
6. FEELING BAD ABOUT YOURSELF - OR THAT YOU ARE A FAILURE OR HAVE LET YOURSELF OR YOUR FAMILY DOWN: 0
7. TROUBLE CONCENTRATING ON THINGS, SUCH AS READING THE NEWSPAPER OR WATCHING TELEVISION: 0
10. IF YOU CHECKED OFF ANY PROBLEMS, HOW DIFFICULT HAVE THESE PROBLEMS MADE IT FOR YOU TO DO YOUR WORK, TAKE CARE OF THINGS AT HOME, OR GET ALONG WITH OTHER PEOPLE: 0
SUM OF ALL RESPONSES TO PHQ QUESTIONS 1-9: 0
2. FEELING DOWN, DEPRESSED OR HOPELESS: 0
SUM OF ALL RESPONSES TO PHQ QUESTIONS 1-9: 0
8. MOVING OR SPEAKING SO SLOWLY THAT OTHER PEOPLE COULD HAVE NOTICED. OR THE OPPOSITE, BEING SO FIGETY OR RESTLESS THAT YOU HAVE BEEN MOVING AROUND A LOT MORE THAN USUAL: 0
4. FEELING TIRED OR HAVING LITTLE ENERGY: 0
SUM OF ALL RESPONSES TO PHQ9 QUESTIONS 1 & 2: 0
5. POOR APPETITE OR OVEREATING: 0
9. THOUGHTS THAT YOU WOULD BE BETTER OFF DEAD, OR OF HURTING YOURSELF: 0
3. TROUBLE FALLING OR STAYING ASLEEP: 0
1. LITTLE INTEREST OR PLEASURE IN DOING THINGS: 0
SUM OF ALL RESPONSES TO PHQ QUESTIONS 1-9: 0
SUM OF ALL RESPONSES TO PHQ QUESTIONS 1-9: 0

## 2024-01-24 ASSESSMENT — ENCOUNTER SYMPTOMS
CONSTIPATION: 0
BACK PAIN: 1
SORE THROAT: 0
SHORTNESS OF BREATH: 0
ABDOMINAL PAIN: 0
VOMITING: 0
NAUSEA: 0
COUGH: 0
DIARRHEA: 0
EYE REDNESS: 0

## 2024-01-24 NOTE — PROGRESS NOTES
Medicare Annual Wellness Visit    Anabel Ibanez is here for Medicare AWV, Diabetes Care Management, and Medication Refill    Assessment & Plan   Medicare annual wellness visit, subsequent  Primary insomnia  -     temazepam (RESTORIL) 15 MG capsule; Take 1 capsule by mouth nightly as needed for Sleep for up to 30 days. Max Daily Amount: 15 mg, Disp-30 capsule, R-0Normal  Type 2 diabetes mellitus with diabetic polyneuropathy, without long-term current use of insulin (HCC)  -     semaglutide, 2 MG/DOSE, (OZEMPIC) 8 MG/3ML SOPN sc injection; Inject 0.75 mLs into the skin once a week, Disp-3 mL, R-1Normal  -     CBC with Auto Differential; Future  -     Comprehensive Metabolic Panel; Future  -     TSH; Future  -     T4, Free; Future  -     Lipid Panel; Future  -     Microalbumin / Creatinine Urine Ratio; Future  -     Hemoglobin A1C; Future  Essential tremor  Class 1 obesity due to excess calories with serious comorbidity and body mass index (BMI) of 34.0 to 34.9 in adult  Gastroesophageal reflux disease without esophagitis  Anxiety  Obstructive sleep apnea syndrome    Recommendations for Preventive Services Due: see orders and patient instructions/AVS.  Recommended screening schedule for the next 5-10 years is provided to the patient in written form: see Patient Instructions/AVS.     Return in about 1 month (around 2/24/2024), or if symptoms worsen or fail to improve.     Subjective   The following acute and/or chronic problems were also addressed today:  Insomnia  HTN  Hyperlipidemia    Patient's complete Health Risk Assessment and screening values have been reviewed and are found in Flowsheets. The following problems were reviewed today and where indicated follow up appointments were made and/or referrals ordered.    Positive Risk Factor Screenings with Interventions:                Activity, Diet, and Weight:  On average, how many days per week do you engage in moderate to strenuous exercise (like a brisk walk)?: 7

## 2024-01-27 DIAGNOSIS — I95.9 HYPOTENSION, UNSPECIFIED HYPOTENSION TYPE: ICD-10-CM

## 2024-01-27 DIAGNOSIS — I10 ESSENTIAL HYPERTENSION: Chronic | ICD-10-CM

## 2024-01-29 RX ORDER — AMLODIPINE BESYLATE 2.5 MG/1
2.5 TABLET ORAL DAILY
Qty: 90 TABLET | Refills: 3 | Status: SHIPPED | OUTPATIENT
Start: 2024-01-29

## 2024-01-29 RX ORDER — METOPROLOL SUCCINATE 100 MG/1
100 TABLET, EXTENDED RELEASE ORAL DAILY
Qty: 90 TABLET | Refills: 3 | Status: SHIPPED | OUTPATIENT
Start: 2024-01-29

## 2024-01-29 NOTE — TELEPHONE ENCOUNTER
Received fax from pharmacy requesting refill on pts medication(s). Pt was last seen in office on 1/24/2024  and has a follow up scheduled for 2/23/2024. Will send request to  Teodora Larson  for authorization.     Requested Prescriptions     Pending Prescriptions Disp Refills    metoprolol succinate (TOPROL XL) 100 MG extended release tablet [Pharmacy Med Name: Metoprolol Succinate  MG Oral Tablet Extended Release 24 Hour] 90 tablet 0     Sig: Take 1 tablet by mouth once daily    amLODIPine (NORVASC) 2.5 MG tablet [Pharmacy Med Name: amLODIPine Besylate 2.5 MG Oral Tablet] 90 tablet 0     Sig: TAKE 1 TABLET BY MOUTH IN THE MORNING

## 2024-02-19 DIAGNOSIS — R05.1 ACUTE COUGH: Primary | ICD-10-CM

## 2024-02-19 RX ORDER — HYDRALAZINE HYDROCHLORIDE 10 MG/1
TABLET, FILM COATED ORAL
Qty: 90 TABLET | Refills: 5 | Status: SHIPPED | OUTPATIENT
Start: 2024-02-19

## 2024-02-19 NOTE — TELEPHONE ENCOUNTER
Pt called, she has a dry cough and would like med to [facility] for this. It is a dry constant cough. Non productive.

## 2024-02-20 RX ORDER — BENZONATATE 200 MG/1
200 CAPSULE ORAL 3 TIMES DAILY PRN
Qty: 30 CAPSULE | Refills: 0 | Status: SHIPPED | OUTPATIENT
Start: 2024-02-20

## 2024-02-23 ENCOUNTER — OFFICE VISIT (OUTPATIENT)
Dept: FAMILY MEDICINE CLINIC | Age: 74
End: 2024-02-23
Payer: MEDICARE

## 2024-02-23 VITALS
DIASTOLIC BLOOD PRESSURE: 72 MMHG | BODY MASS INDEX: 33.34 KG/M2 | TEMPERATURE: 98 F | SYSTOLIC BLOOD PRESSURE: 124 MMHG | OXYGEN SATURATION: 96 % | HEART RATE: 83 BPM | WEIGHT: 194.25 LBS

## 2024-02-23 DIAGNOSIS — F51.01 PRIMARY INSOMNIA: Primary | ICD-10-CM

## 2024-02-23 DIAGNOSIS — B37.2 SKIN YEAST INFECTION: ICD-10-CM

## 2024-02-23 DIAGNOSIS — E11.42 TYPE 2 DIABETES MELLITUS WITH DIABETIC POLYNEUROPATHY, WITHOUT LONG-TERM CURRENT USE OF INSULIN (HCC): ICD-10-CM

## 2024-02-23 DIAGNOSIS — R05.1 ACUTE COUGH: ICD-10-CM

## 2024-02-23 PROCEDURE — 3046F HEMOGLOBIN A1C LEVEL >9.0%: CPT | Performed by: NURSE PRACTITIONER

## 2024-02-23 PROCEDURE — 3017F COLORECTAL CA SCREEN DOC REV: CPT | Performed by: NURSE PRACTITIONER

## 2024-02-23 PROCEDURE — 1123F ACP DISCUSS/DSCN MKR DOCD: CPT | Performed by: NURSE PRACTITIONER

## 2024-02-23 PROCEDURE — G8427 DOCREV CUR MEDS BY ELIG CLIN: HCPCS | Performed by: NURSE PRACTITIONER

## 2024-02-23 PROCEDURE — G8484 FLU IMMUNIZE NO ADMIN: HCPCS | Performed by: NURSE PRACTITIONER

## 2024-02-23 PROCEDURE — 1036F TOBACCO NON-USER: CPT | Performed by: NURSE PRACTITIONER

## 2024-02-23 PROCEDURE — 2022F DILAT RTA XM EVC RTNOPTHY: CPT | Performed by: NURSE PRACTITIONER

## 2024-02-23 PROCEDURE — G8417 CALC BMI ABV UP PARAM F/U: HCPCS | Performed by: NURSE PRACTITIONER

## 2024-02-23 PROCEDURE — 3078F DIAST BP <80 MM HG: CPT | Performed by: NURSE PRACTITIONER

## 2024-02-23 PROCEDURE — G8399 PT W/DXA RESULTS DOCUMENT: HCPCS | Performed by: NURSE PRACTITIONER

## 2024-02-23 PROCEDURE — 1090F PRES/ABSN URINE INCON ASSESS: CPT | Performed by: NURSE PRACTITIONER

## 2024-02-23 PROCEDURE — 99214 OFFICE O/P EST MOD 30 MIN: CPT | Performed by: NURSE PRACTITIONER

## 2024-02-23 PROCEDURE — 3074F SYST BP LT 130 MM HG: CPT | Performed by: NURSE PRACTITIONER

## 2024-02-23 RX ORDER — NYSTATIN 100000 [USP'U]/G
POWDER TOPICAL
Qty: 60 G | Refills: 1 | Status: SHIPPED | OUTPATIENT
Start: 2024-02-23

## 2024-02-23 RX ORDER — TEMAZEPAM 15 MG/1
15 CAPSULE ORAL NIGHTLY PRN
Qty: 30 CAPSULE | Refills: 0 | Status: SHIPPED | OUTPATIENT
Start: 2024-02-23 | End: 2024-03-24

## 2024-02-23 ASSESSMENT — ENCOUNTER SYMPTOMS
SHORTNESS OF BREATH: 0
CONSTIPATION: 0
VOMITING: 0
NAUSEA: 0
DIARRHEA: 0
RHINORRHEA: 1
SORE THROAT: 0
BACK PAIN: 1
COUGH: 1
ABDOMINAL PAIN: 0

## 2024-02-23 NOTE — PROGRESS NOTES
Anabel Ibanez (:  1950) is a 74 y.o. female,Established patient, here for evaluation of the following chief complaint(s):  Follow-up      ASSESSMENT/PLAN:    ICD-10-CM    1. Primary insomnia  F51.01 temazepam (RESTORIL) 15 MG capsule      2. Type 2 diabetes mellitus with diabetic polyneuropathy, without long-term current use of insulin (HCC)  E11.42 semaglutide, 2 MG/DOSE, (OZEMPIC) 8 MG/3ML SOPN sc injection      3. Acute cough  R05.1 Improving  Continue Tessalon perles prn      4. Skin yeast infection  B37.2 nystatin (MYCOSTATIN) 345220 UNIT/GM powder  Keep area dry.          Return in about 5 weeks (around 3/29/2024), or if symptoms worsen or fail to improve.    SUBJECTIVE/OBJECTIVE:  HPI    WEIGHT/DM:  She lost 4 more pounds in the last month.  She continues on Ozempic 2 mg weekly   Denies abdominal pain or nausea  Her blood sugars have been doing great.  Denies any known blood sugar lows.  She follows with optometry yearly.   Last appt was 2023  A1c: 5.9 (10-)    INSOMNIA:  She continues on Restoril 15 mg nightly.  Last fill was 2024, #30  She is requesting refill later in the month.  \"I have been sleeping throughout the night.\"    COUGH:  She has been taking tessalon perles with improvement in her cough.  Reports nasal drainage    RASH:  Reports a red rash along her abdomen  The area is itching.  \"It is driving me up the wall.\"    /72   Pulse 83   Temp 98 °F (36.7 °C) (Temporal)   Wt 88.1 kg (194 lb 4 oz)   SpO2 96%   BMI 33.34 kg/m²     Review of Systems   Constitutional:  Negative for chills, fatigue and fever.   HENT:  Positive for rhinorrhea. Negative for congestion, ear pain and sore throat.    Respiratory:  Positive for cough (improving). Negative for shortness of breath.    Cardiovascular:  Negative for chest pain.   Gastrointestinal:  Negative for abdominal pain (resolved), constipation, diarrhea, nausea (resolved) and vomiting.   Musculoskeletal:  Positive for

## 2024-02-26 ENCOUNTER — TELEPHONE (OUTPATIENT)
Dept: FAMILY MEDICINE CLINIC | Age: 74
End: 2024-02-26

## 2024-02-26 DIAGNOSIS — B37.2 SKIN YEAST INFECTION: Primary | ICD-10-CM

## 2024-02-26 RX ORDER — FLUCONAZOLE 150 MG/1
150 TABLET ORAL
Qty: 4 TABLET | Refills: 0 | Status: SHIPPED | OUTPATIENT
Start: 2024-02-26 | End: 2024-03-19

## 2024-02-26 NOTE — TELEPHONE ENCOUNTER
Pt called stating she was seen on Friday and that the nystatin powder is not helping at. This yeast is spreading up to her chest and on her back.     Will send to provider for recommendations.

## 2024-02-26 NOTE — TELEPHONE ENCOUNTER
I advised Patient of Keno Vazquez recommendations.   Patient did voice understanding     Sent rx to pharmacy for pt    Requested Prescriptions     Signed Prescriptions Disp Refills    fluconazole (DIFLUCAN) 150 MG tablet 4 tablet 0     Sig: Take 1 tablet by mouth every 7 days for 4 doses     Authorizing Provider: BORA JAIME     Ordering User: LUIZ KLEIN

## 2024-03-01 ENCOUNTER — TELEPHONE (OUTPATIENT)
Dept: FAMILY MEDICINE CLINIC | Age: 74
End: 2024-03-01

## 2024-03-01 DIAGNOSIS — F33.1 MODERATE EPISODE OF RECURRENT MAJOR DEPRESSIVE DISORDER (HCC): ICD-10-CM

## 2024-03-01 DIAGNOSIS — F41.1 GAD (GENERALIZED ANXIETY DISORDER): ICD-10-CM

## 2024-03-01 DIAGNOSIS — R45.89 CRYING ASSOCIATED WITH MOOD: ICD-10-CM

## 2024-03-01 RX ORDER — VENLAFAXINE HYDROCHLORIDE 150 MG/1
150 CAPSULE, EXTENDED RELEASE ORAL DAILY
Qty: 90 CAPSULE | Refills: 3 | Status: SHIPPED | OUTPATIENT
Start: 2024-03-01

## 2024-03-01 NOTE — TELEPHONE ENCOUNTER
Received fax from pharmacy requesting refill on pts medication(s). Pt was last seen in office on 2/23/2024  and has a follow up scheduled for 3/1/2024. Will send request to  Teodora Larson  for authorization.     Requested Prescriptions     Pending Prescriptions Disp Refills    venlafaxine (EFFEXOR XR) 150 MG extended release capsule [Pharmacy Med Name: Venlafaxine HCl  MG Oral Capsule Extended Release 24 Hour] 90 capsule 0     Sig: Take 1 capsule by mouth once daily

## 2024-03-01 NOTE — TELEPHONE ENCOUNTER
Pt called earlier and had yeast infection on her chest. She was given meds. Now it is spread onto her arms and back and her chest has not cleared up. States it is up her neck and into her hairline and behind her right knee.  She isnt sweating or anything. Nystatin powder isnt helping, lotrimin isnt helping, diflucan isnt helping.    I told her that it didn't sound like yeast if spreading all of those places and not being warm and moist and none of the meds helping at all.    We are full today. Would you want to work her in? She states she is miserable

## 2024-03-01 NOTE — TELEPHONE ENCOUNTER
LVM with recommendations in case pt would like to go to urgent care     I am routing this to the pool to call pt Monday to see how she is doing

## 2024-03-07 ENCOUNTER — OFFICE VISIT (OUTPATIENT)
Dept: FAMILY MEDICINE CLINIC | Age: 74
End: 2024-03-07
Payer: MEDICARE

## 2024-03-07 VITALS
DIASTOLIC BLOOD PRESSURE: 76 MMHG | WEIGHT: 193.13 LBS | BODY MASS INDEX: 33.15 KG/M2 | TEMPERATURE: 97.7 F | OXYGEN SATURATION: 98 % | SYSTOLIC BLOOD PRESSURE: 124 MMHG | HEART RATE: 94 BPM

## 2024-03-07 DIAGNOSIS — L30.9 DERMATITIS: ICD-10-CM

## 2024-03-07 DIAGNOSIS — B86 SCABIES: Primary | ICD-10-CM

## 2024-03-07 PROCEDURE — 99213 OFFICE O/P EST LOW 20 MIN: CPT | Performed by: NURSE PRACTITIONER

## 2024-03-07 PROCEDURE — G8484 FLU IMMUNIZE NO ADMIN: HCPCS | Performed by: NURSE PRACTITIONER

## 2024-03-07 PROCEDURE — 1123F ACP DISCUSS/DSCN MKR DOCD: CPT | Performed by: NURSE PRACTITIONER

## 2024-03-07 PROCEDURE — G8417 CALC BMI ABV UP PARAM F/U: HCPCS | Performed by: NURSE PRACTITIONER

## 2024-03-07 PROCEDURE — G8399 PT W/DXA RESULTS DOCUMENT: HCPCS | Performed by: NURSE PRACTITIONER

## 2024-03-07 PROCEDURE — 3078F DIAST BP <80 MM HG: CPT | Performed by: NURSE PRACTITIONER

## 2024-03-07 PROCEDURE — 1090F PRES/ABSN URINE INCON ASSESS: CPT | Performed by: NURSE PRACTITIONER

## 2024-03-07 PROCEDURE — G8427 DOCREV CUR MEDS BY ELIG CLIN: HCPCS | Performed by: NURSE PRACTITIONER

## 2024-03-07 PROCEDURE — 1036F TOBACCO NON-USER: CPT | Performed by: NURSE PRACTITIONER

## 2024-03-07 PROCEDURE — 3074F SYST BP LT 130 MM HG: CPT | Performed by: NURSE PRACTITIONER

## 2024-03-07 PROCEDURE — 3017F COLORECTAL CA SCREEN DOC REV: CPT | Performed by: NURSE PRACTITIONER

## 2024-03-07 RX ORDER — HYDROXYZINE HYDROCHLORIDE 25 MG/1
25 TABLET, FILM COATED ORAL EVERY 8 HOURS PRN
Qty: 30 TABLET | Refills: 0 | Status: SHIPPED | OUTPATIENT
Start: 2024-03-07 | End: 2024-03-17

## 2024-03-07 RX ORDER — METHYLPREDNISOLONE 4 MG/1
TABLET ORAL
Qty: 1 KIT | Refills: 0 | Status: SHIPPED | OUTPATIENT
Start: 2024-03-07 | End: 2024-03-13

## 2024-03-07 RX ORDER — TRIAMCINOLONE ACETONIDE 1 MG/G
OINTMENT TOPICAL 2 TIMES DAILY
Qty: 80 G | Refills: 1 | Status: SHIPPED | OUTPATIENT
Start: 2024-03-07 | End: 2024-03-14

## 2024-03-07 RX ORDER — PERMETHRIN 50 MG/G
CREAM TOPICAL
Qty: 60 G | Refills: 0 | Status: SHIPPED | OUTPATIENT
Start: 2024-03-07

## 2024-03-07 RX ORDER — CETIRIZINE HYDROCHLORIDE 10 MG/1
10 TABLET ORAL DAILY
Qty: 90 TABLET | Refills: 1 | Status: SHIPPED | OUTPATIENT
Start: 2024-03-07

## 2024-03-07 NOTE — PROGRESS NOTES
Anabel Ibanez (:  1950) is a 74 y.o. female,Established patient, here for evaluation of the following chief complaint(s):  Rash (Patient also went to [facility] for this was given a steroid cream and shot, oatmeal baths along with lots of others)      ASSESSMENT/PLAN:    ICD-10-CM    1. Scabies  B86 permethrin (ELIMITE) 5 % cream  Wash all clothing, towels, and linens that refused within the last 72 hours      2. Dermatitis  L30.9 cetirizine (ZYRTEC) 10 MG tablet     hydrOXYzine HCl (ATARAX) 25 MG tablet     triamcinolone (KENALOG) 0.1 % ointment     methylPREDNISolone (MEDROL DOSEPACK) 4 MG tablet          Return if symptoms worsen or fail to improve.    SUBJECTIVE/OBJECTIVE:  HPI    RASH:  The rash started two weeks.  It was initially on her abdomen.   It was thought to be yeast initially.   She was treated with Nystatin without improvement.  Now the rash has spread to chest, back and arms.  She was seen at [facility] last Friday.  She was treated with steroid injection and topical steroid cream.  This helped for about a day.  She has tried Calamine lotion, steroid cream, oatmeal baths and cream for jock itch.  She has also taken diflucan without improvement of symptoms.    Denies any new medications.    She has not changed soaps, shampoos, laundry detergents or dish soap.  \"I haven't changed anything.\"    The rash is really itchy.  The rash will wake her up at night.    /76   Pulse 94   Temp 97.7 °F (36.5 °C) (Temporal)   Wt 87.6 kg (193 lb 2 oz)   SpO2 98%   BMI 33.15 kg/m²     Review of Systems   Constitutional:  Positive for fatigue.   Skin:  Positive for rash (chest, back, arms and abdomen).   Psychiatric/Behavioral:  Positive for sleep disturbance (due to itch).      Physical Exam  Vitals reviewed.   Constitutional:       Appearance: She is well-developed.   HENT:      Head: Normocephalic.      Right Ear: External ear normal.      Left Ear: External ear normal.      Nose: Nose normal.

## 2024-03-14 ENCOUNTER — TELEPHONE (OUTPATIENT)
Dept: FAMILY MEDICINE CLINIC | Age: 74
End: 2024-03-14

## 2024-03-14 DIAGNOSIS — E11.42 TYPE 2 DIABETES MELLITUS WITH DIABETIC POLYNEUROPATHY, WITHOUT LONG-TERM CURRENT USE OF INSULIN (HCC): ICD-10-CM

## 2024-03-14 LAB
ALBUMIN SERPL-MCNC: 3.8 G/DL (ref 3.5–5.2)
ALP SERPL-CCNC: 111 U/L (ref 35–104)
ALT SERPL-CCNC: 18 U/L (ref 5–33)
ANION GAP SERPL CALCULATED.3IONS-SCNC: 15 MMOL/L (ref 7–19)
AST SERPL-CCNC: 16 U/L (ref 5–32)
BASOPHILS # BLD: 0.1 K/UL (ref 0–0.2)
BASOPHILS NFR BLD: 1.1 % (ref 0–1)
BILIRUB SERPL-MCNC: 0.3 MG/DL (ref 0.2–1.2)
BUN SERPL-MCNC: 17 MG/DL (ref 8–23)
CALCIUM SERPL-MCNC: 10.4 MG/DL (ref 8.8–10.2)
CHLORIDE SERPL-SCNC: 100 MMOL/L (ref 98–111)
CHOLEST SERPL-MCNC: 160 MG/DL (ref 160–199)
CO2 SERPL-SCNC: 27 MMOL/L (ref 22–29)
CREAT SERPL-MCNC: 0.9 MG/DL (ref 0.5–0.9)
CREAT UR-MCNC: 311.2 MG/DL (ref 28–217)
EOSINOPHIL # BLD: 0.3 K/UL (ref 0–0.6)
EOSINOPHIL NFR BLD: 2.5 % (ref 0–5)
ERYTHROCYTE [DISTWIDTH] IN BLOOD BY AUTOMATED COUNT: 13.2 % (ref 11.5–14.5)
GLUCOSE SERPL-MCNC: 109 MG/DL (ref 74–109)
HBA1C MFR BLD: 6 % (ref 4–6)
HCT VFR BLD AUTO: 41 % (ref 37–47)
HDLC SERPL-MCNC: 50 MG/DL (ref 65–121)
HGB BLD-MCNC: 13.1 G/DL (ref 12–16)
IMM GRANULOCYTES # BLD: 0 K/UL
LDLC SERPL CALC-MCNC: 72 MG/DL
LYMPHOCYTES # BLD: 1.9 K/UL (ref 1.1–4.5)
LYMPHOCYTES NFR BLD: 18.6 % (ref 20–40)
MCH RBC QN AUTO: 30.7 PG (ref 27–31)
MCHC RBC AUTO-ENTMCNC: 32 G/DL (ref 33–37)
MCV RBC AUTO: 96 FL (ref 81–99)
MICROALBUMIN UR-MCNC: 2.3 MG/DL (ref 0–19)
MICROALBUMIN/CREAT UR-RTO: 7.4 MG/G
MONOCYTES # BLD: 0.9 K/UL (ref 0–0.9)
MONOCYTES NFR BLD: 8.7 % (ref 0–10)
NEUTROPHILS # BLD: 6.9 K/UL (ref 1.5–7.5)
NEUTS SEG NFR BLD: 68.7 % (ref 50–65)
PLATELET # BLD AUTO: 274 K/UL (ref 130–400)
PMV BLD AUTO: 10.2 FL (ref 9.4–12.3)
POTASSIUM SERPL-SCNC: 3.9 MMOL/L (ref 3.5–5)
PROT SERPL-MCNC: 7 G/DL (ref 6.6–8.7)
RBC # BLD AUTO: 4.27 M/UL (ref 4.2–5.4)
SODIUM SERPL-SCNC: 142 MMOL/L (ref 136–145)
T4 FREE SERPL-MCNC: 1.03 NG/DL (ref 0.93–1.7)
TRIGL SERPL-MCNC: 190 MG/DL (ref 0–149)
TSH SERPL DL<=0.005 MIU/L-ACNC: 2.78 UIU/ML (ref 0.27–4.2)
WBC # BLD AUTO: 10.1 K/UL (ref 4.8–10.8)

## 2024-03-14 NOTE — TELEPHONE ENCOUNTER
Susanne Larson, APRJERAMIE  3/14/2024  3:46 PM CDT Back to Top      No microalbumin in urine    Susannedaniele Roylisbeth, APRN  3/14/2024  3:13 PM CDT       Cholesterol is well controlled. Continue Lipitor.  CMP: WNL  CBC: WNL  A1c: WNL  Thyroid: WNL

## 2024-03-22 ENCOUNTER — OFFICE VISIT (OUTPATIENT)
Dept: FAMILY MEDICINE CLINIC | Age: 74
End: 2024-03-22
Payer: MEDICARE

## 2024-03-22 VITALS
WEIGHT: 189.13 LBS | OXYGEN SATURATION: 98 % | DIASTOLIC BLOOD PRESSURE: 62 MMHG | BODY MASS INDEX: 32.46 KG/M2 | HEART RATE: 92 BPM | SYSTOLIC BLOOD PRESSURE: 98 MMHG | TEMPERATURE: 98 F

## 2024-03-22 DIAGNOSIS — R21 RASH AND NONSPECIFIC SKIN ERUPTION: ICD-10-CM

## 2024-03-22 DIAGNOSIS — E11.42 TYPE 2 DIABETES MELLITUS WITH DIABETIC POLYNEUROPATHY, WITHOUT LONG-TERM CURRENT USE OF INSULIN (HCC): ICD-10-CM

## 2024-03-22 DIAGNOSIS — F51.01 PRIMARY INSOMNIA: ICD-10-CM

## 2024-03-22 DIAGNOSIS — R21 RASH AND NONSPECIFIC SKIN ERUPTION: Primary | ICD-10-CM

## 2024-03-22 PROCEDURE — G8417 CALC BMI ABV UP PARAM F/U: HCPCS | Performed by: NURSE PRACTITIONER

## 2024-03-22 PROCEDURE — 1123F ACP DISCUSS/DSCN MKR DOCD: CPT | Performed by: NURSE PRACTITIONER

## 2024-03-22 PROCEDURE — G8484 FLU IMMUNIZE NO ADMIN: HCPCS | Performed by: NURSE PRACTITIONER

## 2024-03-22 PROCEDURE — 3078F DIAST BP <80 MM HG: CPT | Performed by: NURSE PRACTITIONER

## 2024-03-22 PROCEDURE — 3017F COLORECTAL CA SCREEN DOC REV: CPT | Performed by: NURSE PRACTITIONER

## 2024-03-22 PROCEDURE — 3074F SYST BP LT 130 MM HG: CPT | Performed by: NURSE PRACTITIONER

## 2024-03-22 PROCEDURE — G8399 PT W/DXA RESULTS DOCUMENT: HCPCS | Performed by: NURSE PRACTITIONER

## 2024-03-22 PROCEDURE — 99214 OFFICE O/P EST MOD 30 MIN: CPT | Performed by: NURSE PRACTITIONER

## 2024-03-22 PROCEDURE — G8427 DOCREV CUR MEDS BY ELIG CLIN: HCPCS | Performed by: NURSE PRACTITIONER

## 2024-03-22 PROCEDURE — 2022F DILAT RTA XM EVC RTNOPTHY: CPT | Performed by: NURSE PRACTITIONER

## 2024-03-22 PROCEDURE — 1036F TOBACCO NON-USER: CPT | Performed by: NURSE PRACTITIONER

## 2024-03-22 PROCEDURE — 1090F PRES/ABSN URINE INCON ASSESS: CPT | Performed by: NURSE PRACTITIONER

## 2024-03-22 PROCEDURE — 3044F HG A1C LEVEL LT 7.0%: CPT | Performed by: NURSE PRACTITIONER

## 2024-03-22 RX ORDER — TEMAZEPAM 15 MG/1
15 CAPSULE ORAL NIGHTLY PRN
Qty: 30 CAPSULE | Refills: 0 | Status: SHIPPED | OUTPATIENT
Start: 2024-03-22 | End: 2024-04-21

## 2024-03-22 RX ORDER — DEXAMETHASONE SODIUM PHOSPHATE 10 MG/ML
10 INJECTION, SOLUTION INTRAMUSCULAR; INTRAVENOUS ONCE
Status: COMPLETED | OUTPATIENT
Start: 2024-03-22 | End: 2024-03-22

## 2024-03-22 RX ORDER — DEXAMETHASONE SODIUM PHOSPHATE 10 MG/ML
10 INJECTION, SOLUTION INTRAMUSCULAR; INTRAVENOUS ONCE
Status: SHIPPED | OUTPATIENT
Start: 2024-03-22

## 2024-03-22 RX ORDER — DEXAMETHASONE SODIUM PHOSPHATE 4 MG/ML
4 INJECTION, SOLUTION INTRA-ARTICULAR; INTRALESIONAL; INTRAMUSCULAR; INTRAVENOUS; SOFT TISSUE ONCE
Status: SHIPPED | OUTPATIENT
Start: 2024-03-22

## 2024-03-22 RX ORDER — TRIAMCINOLONE ACETONIDE 40 MG/ML
40 INJECTION, SUSPENSION INTRA-ARTICULAR; INTRAMUSCULAR ONCE
Status: COMPLETED | OUTPATIENT
Start: 2024-03-22 | End: 2024-03-22

## 2024-03-22 RX ADMIN — DEXAMETHASONE SODIUM PHOSPHATE 10 MG: 10 INJECTION, SOLUTION INTRAMUSCULAR; INTRAVENOUS at 14:01

## 2024-03-22 RX ADMIN — TRIAMCINOLONE ACETONIDE 40 MG: 40 INJECTION, SUSPENSION INTRA-ARTICULAR; INTRAMUSCULAR at 14:02

## 2024-03-22 ASSESSMENT — ENCOUNTER SYMPTOMS
NAUSEA: 0
RHINORRHEA: 0
SHORTNESS OF BREATH: 0
VOMITING: 0
CONSTIPATION: 0
BACK PAIN: 1
SORE THROAT: 0
ABDOMINAL PAIN: 0

## 2024-03-22 NOTE — PROGRESS NOTES
After obtaining consent, and per orders of Laxmi DEE, injection of kenalog given in Left upper quad. gluteus by Laury Melchor MA. Patient instructed to remain in clinic for 20 minutes afterwards, and to report any adverse reaction to me immediately.   
abdominal pain (resolved), constipation, diarrhea, nausea (resolved) and vomiting.   Musculoskeletal:  Positive for arthralgias and back pain.   Skin:  Positive for rash (chest, arms and abdomen).   Neurological:  Positive for tremors (head, improved). Negative for dizziness.   Psychiatric/Behavioral:  Positive for sleep disturbance (due to itch).        Physical Exam  Vitals reviewed.   Constitutional:       Appearance: She is well-developed.   HENT:      Head: Normocephalic.      Right Ear: External ear normal.      Left Ear: External ear normal.      Nose: Nose normal.   Eyes:      General:         Right eye: No discharge.         Left eye: No discharge.   Cardiovascular:      Rate and Rhythm: Normal rate and regular rhythm.   Pulmonary:      Effort: Pulmonary effort is normal.      Breath sounds: Normal breath sounds. No wheezing, rhonchi or rales.   Abdominal:      General: Bowel sounds are normal.      Palpations: Abdomen is soft.   Musculoskeletal:      Cervical back: Normal range of motion.   Skin:     General: Skin is dry.      Findings: Rash (erythematous, hive-like rash on left arm and waistline/abdomen) present.   Neurological:      General: No focal deficit present.      Mental Status: She is alert and oriented to person, place, and time. Mental status is at baseline.   Psychiatric:         Mood and Affect: Mood normal.         Behavior: Behavior normal.         Thought Content: Thought content normal.         Judgment: Judgment normal.                 An electronic signature was used to authenticate this note.    --LUIZ Castañeda   
PRE-OP DIAGNOSIS:  Saddle nose 16-Aug-2023 17:16:02  Italo Chen  Nasal valve stenosis 16-Aug-2023 17:16:14  Italo Chen  Deviated nasal septum 16-Aug-2023 17:16:24  Italo Chen  Hypertrophy of nasal turbinates 16-Aug-2023 17:16:34  Italo Chen

## 2024-03-25 ENCOUNTER — TELEPHONE (OUTPATIENT)
Dept: FAMILY MEDICINE CLINIC | Age: 74
End: 2024-03-25

## 2024-03-25 LAB
ALLERGEN,FOOD, ALPHA-GAL IGE: <0.1 KU/L
BARLEY IGE QN: <0.1 KU/L
BEEF IGE QN: <0.1 KU/L
BEEF IGE QN: <0.1 KU/L
BELL PEPPER IGE QN: <0.1 KU/L
CABBAGE IGE QN: <0.1 KU/L
CARROT IGE QN: <0.1 KU/L
CHICKEN SERUM PROT IGE QN: <0.1 KU/L
CODFISH IGE QN: <0.1 KU/L
CORN IGE QN: <0.1 KU/L
COW MILK IGE QN: <0.1 KU/L
CRAB IGE QN: <0.1 KU/L
DEPRECATED MISC ALLERGEN IGE RAST QL: NORMAL
DEPRECATED MISC ALLERGEN IGE RAST QL: NORMAL
EGG WHITE IGE QN: <0.1 KU/L
GRAPE IGE QN: <0.1 KU/L
LAMB IGE QN: <0.1 KU/L
LETTUCE IGE QN: <0.1 KU/L
OAT IGE QN: <0.1 KU/L
ORANGE IGE QN: <0.1 KU/L
PEANUT IGE QN: <0.1 KU/L
PORK IGE QN: <0.1 KU/L
PORK IGE QN: <0.1 KU/L
POTATO IGE QN: <0.1 KU/L
RICE IGE QN: <0.1 KU/L
RYE IGE QN: <0.1 KU/L
SHRIMP IGE QN: <0.1 KU/L
SOYBEAN IGE QN: <0.1 KU/L
TOMATO IGE QN: <0.1 KU/L
TUNA IGE QN: <0.1 KU/L
WHEAT IGE QN: <0.1 KU/L
WHITE BEAN IGE QN: <0.1 KU/L

## 2024-03-25 NOTE — TELEPHONE ENCOUNTER
----- Message from LUIZ San sent at 3/25/2024  8:16 AM CDT -----  Please call patient and let them know results.   Allergy panel negative  Alpha gal negative

## 2024-04-13 DIAGNOSIS — E78.2 MIXED HYPERLIPIDEMIA: Chronic | ICD-10-CM

## 2024-04-15 RX ORDER — ATORVASTATIN CALCIUM 80 MG/1
80 TABLET, FILM COATED ORAL NIGHTLY
Qty: 90 TABLET | Refills: 3 | Status: SHIPPED | OUTPATIENT
Start: 2024-04-15

## 2024-04-15 NOTE — TELEPHONE ENCOUNTER
Received fax from pharmacy requesting refill on pts medication(s). Pt was last seen in office on 3/22/2024  and has a follow up scheduled for 4/19/2024. Will send request to  Teodora Larson  for authorization.     Requested Prescriptions     Pending Prescriptions Disp Refills    atorvastatin (LIPITOR) 80 MG tablet [Pharmacy Med Name: Atorvastatin Calcium 80 MG Oral Tablet] 90 tablet 0     Sig: Take 1 tablet by mouth nightly

## 2024-04-19 ENCOUNTER — OFFICE VISIT (OUTPATIENT)
Dept: FAMILY MEDICINE CLINIC | Age: 74
End: 2024-04-19
Payer: MEDICARE

## 2024-04-19 VITALS
WEIGHT: 186 LBS | TEMPERATURE: 96.9 F | HEART RATE: 86 BPM | SYSTOLIC BLOOD PRESSURE: 100 MMHG | BODY MASS INDEX: 31.76 KG/M2 | DIASTOLIC BLOOD PRESSURE: 70 MMHG | OXYGEN SATURATION: 95 % | HEIGHT: 64 IN

## 2024-04-19 DIAGNOSIS — F51.01 PRIMARY INSOMNIA: Primary | ICD-10-CM

## 2024-04-19 DIAGNOSIS — F41.1 GAD (GENERALIZED ANXIETY DISORDER): ICD-10-CM

## 2024-04-19 DIAGNOSIS — E11.42 TYPE 2 DIABETES MELLITUS WITH DIABETIC POLYNEUROPATHY, WITHOUT LONG-TERM CURRENT USE OF INSULIN (HCC): ICD-10-CM

## 2024-04-19 PROCEDURE — 3074F SYST BP LT 130 MM HG: CPT | Performed by: NURSE PRACTITIONER

## 2024-04-19 PROCEDURE — G8417 CALC BMI ABV UP PARAM F/U: HCPCS | Performed by: NURSE PRACTITIONER

## 2024-04-19 PROCEDURE — 3017F COLORECTAL CA SCREEN DOC REV: CPT | Performed by: NURSE PRACTITIONER

## 2024-04-19 PROCEDURE — G8399 PT W/DXA RESULTS DOCUMENT: HCPCS | Performed by: NURSE PRACTITIONER

## 2024-04-19 PROCEDURE — 3044F HG A1C LEVEL LT 7.0%: CPT | Performed by: NURSE PRACTITIONER

## 2024-04-19 PROCEDURE — 1090F PRES/ABSN URINE INCON ASSESS: CPT | Performed by: NURSE PRACTITIONER

## 2024-04-19 PROCEDURE — 2022F DILAT RTA XM EVC RTNOPTHY: CPT | Performed by: NURSE PRACTITIONER

## 2024-04-19 PROCEDURE — 99214 OFFICE O/P EST MOD 30 MIN: CPT | Performed by: NURSE PRACTITIONER

## 2024-04-19 PROCEDURE — 3078F DIAST BP <80 MM HG: CPT | Performed by: NURSE PRACTITIONER

## 2024-04-19 PROCEDURE — 1036F TOBACCO NON-USER: CPT | Performed by: NURSE PRACTITIONER

## 2024-04-19 PROCEDURE — G8427 DOCREV CUR MEDS BY ELIG CLIN: HCPCS | Performed by: NURSE PRACTITIONER

## 2024-04-19 PROCEDURE — 1123F ACP DISCUSS/DSCN MKR DOCD: CPT | Performed by: NURSE PRACTITIONER

## 2024-04-19 RX ORDER — TEMAZEPAM 15 MG/1
15 CAPSULE ORAL NIGHTLY PRN
Qty: 30 CAPSULE | Refills: 0 | Status: SHIPPED | OUTPATIENT
Start: 2024-04-19 | End: 2024-05-19

## 2024-04-19 ASSESSMENT — ENCOUNTER SYMPTOMS
NAUSEA: 0
RHINORRHEA: 0
ABDOMINAL PAIN: 0
COUGH: 0
DIARRHEA: 0
CONSTIPATION: 0

## 2024-04-19 NOTE — PROGRESS NOTES
Anabel Ibanez (:  1950) is a 74 y.o. female,Established patient, here for evaluation of the following chief complaint(s):  Follow-up (insomnia) and Medication Refill      ASSESSMENT/PLAN:    ICD-10-CM    1. Primary insomnia  F51.01 temazepam (RESTORIL) 15 MG capsule      2. Type 2 diabetes mellitus with diabetic polyneuropathy, without long-term current use of insulin (HCC)  E11.42 Continue Ozempic 2 mg weekly  Recommend ADA diet  Recommend exercise as tolerated      3. FRANC (generalized anxiety disorder)  F41.1 Stable  Continue Effexor 150 mg daily          Return in about 6 weeks (around 2024), or if symptoms worsen or fail to improve.    SUBJECTIVE/OBJECTIVE:  HPI    RASH  Patient states rash is better. She has a small rash in bend of left elbow and forearm  The rash area itches  She is using ivy dry and caladryl lotion.  The rash was all over but now just located on arm.     DIABETES  Patient is on ozempic and not having reflux or constipation.   She has lost weight. Patient was 194 in March and 186 today.   Patient is monitoring BG at home.   24 103 4/15/24 107 24 111 24 108 24 104 24 106    INSOMNIA  Patient is sleeping from 1130 pm to 830 in the morning and takes a 1.5 hour nap during the day.   She is taking restoril at night.   Last fill was 2024, #30  She is requesting refill next month.    MOOD   Patient is not having issues with depression and anxiety.   Meds seem to be controlling moods.     /70   Pulse 86   Temp 96.9 °F (36.1 °C) (Temporal)   Ht 1.626 m (5' 4\")   Wt 84.4 kg (186 lb)   SpO2 95%   BMI 31.93 kg/m²     Review of Systems   Constitutional:  Negative for fatigue.   HENT:  Negative for congestion, postnasal drip and rhinorrhea.    Respiratory:  Negative for cough.    Cardiovascular:  Negative for chest pain and palpitations.   Gastrointestinal:  Negative for abdominal pain, constipation, diarrhea and nausea.   Genitourinary:  Negative

## 2024-05-13 DIAGNOSIS — J30.1 SEASONAL ALLERGIC RHINITIS DUE TO POLLEN: ICD-10-CM

## 2024-05-13 RX ORDER — FEXOFENADINE HCL 180 MG/1
TABLET ORAL DAILY
Qty: 90 TABLET | Refills: 0 | Status: SHIPPED | OUTPATIENT
Start: 2024-05-13

## 2024-05-28 ENCOUNTER — OFFICE VISIT (OUTPATIENT)
Dept: FAMILY MEDICINE CLINIC | Age: 74
End: 2024-05-28
Payer: MEDICARE

## 2024-05-28 VITALS
WEIGHT: 187.25 LBS | HEIGHT: 64 IN | HEART RATE: 83 BPM | OXYGEN SATURATION: 98 % | TEMPERATURE: 98.3 F | BODY MASS INDEX: 31.97 KG/M2 | SYSTOLIC BLOOD PRESSURE: 110 MMHG | DIASTOLIC BLOOD PRESSURE: 80 MMHG

## 2024-05-28 DIAGNOSIS — F51.01 PRIMARY INSOMNIA: ICD-10-CM

## 2024-05-28 DIAGNOSIS — E11.42 TYPE 2 DIABETES MELLITUS WITH DIABETIC POLYNEUROPATHY, WITHOUT LONG-TERM CURRENT USE OF INSULIN (HCC): ICD-10-CM

## 2024-05-28 DIAGNOSIS — F33.42 RECURRENT MAJOR DEPRESSIVE DISORDER, IN FULL REMISSION (HCC): ICD-10-CM

## 2024-05-28 DIAGNOSIS — G25.0 ESSENTIAL TREMOR: ICD-10-CM

## 2024-05-28 DIAGNOSIS — K59.04 CHRONIC IDIOPATHIC CONSTIPATION: Primary | ICD-10-CM

## 2024-05-28 DIAGNOSIS — F41.1 GAD (GENERALIZED ANXIETY DISORDER): ICD-10-CM

## 2024-05-28 PROCEDURE — 1123F ACP DISCUSS/DSCN MKR DOCD: CPT | Performed by: NURSE PRACTITIONER

## 2024-05-28 PROCEDURE — 1036F TOBACCO NON-USER: CPT | Performed by: NURSE PRACTITIONER

## 2024-05-28 PROCEDURE — 2022F DILAT RTA XM EVC RTNOPTHY: CPT | Performed by: NURSE PRACTITIONER

## 2024-05-28 PROCEDURE — 3044F HG A1C LEVEL LT 7.0%: CPT | Performed by: NURSE PRACTITIONER

## 2024-05-28 PROCEDURE — G8427 DOCREV CUR MEDS BY ELIG CLIN: HCPCS | Performed by: NURSE PRACTITIONER

## 2024-05-28 PROCEDURE — G8399 PT W/DXA RESULTS DOCUMENT: HCPCS | Performed by: NURSE PRACTITIONER

## 2024-05-28 PROCEDURE — 3074F SYST BP LT 130 MM HG: CPT | Performed by: NURSE PRACTITIONER

## 2024-05-28 PROCEDURE — 3017F COLORECTAL CA SCREEN DOC REV: CPT | Performed by: NURSE PRACTITIONER

## 2024-05-28 PROCEDURE — G8417 CALC BMI ABV UP PARAM F/U: HCPCS | Performed by: NURSE PRACTITIONER

## 2024-05-28 PROCEDURE — 1090F PRES/ABSN URINE INCON ASSESS: CPT | Performed by: NURSE PRACTITIONER

## 2024-05-28 PROCEDURE — 3079F DIAST BP 80-89 MM HG: CPT | Performed by: NURSE PRACTITIONER

## 2024-05-28 PROCEDURE — 99214 OFFICE O/P EST MOD 30 MIN: CPT | Performed by: NURSE PRACTITIONER

## 2024-05-28 RX ORDER — PRIMIDONE 50 MG/1
50 TABLET ORAL 3 TIMES DAILY
Qty: 90 TABLET | Refills: 5 | Status: SHIPPED | OUTPATIENT
Start: 2024-05-28

## 2024-05-28 RX ORDER — VENLAFAXINE HYDROCHLORIDE 75 MG/1
75 CAPSULE, EXTENDED RELEASE ORAL DAILY
Qty: 30 CAPSULE | Refills: 5 | Status: SHIPPED | OUTPATIENT
Start: 2024-05-28

## 2024-05-28 RX ORDER — DOCUSATE SODIUM 100 MG/1
100 CAPSULE, LIQUID FILLED ORAL 2 TIMES DAILY
Qty: 60 CAPSULE | Refills: 1 | Status: SHIPPED | OUTPATIENT
Start: 2024-05-28 | End: 2024-07-27

## 2024-05-28 RX ORDER — TEMAZEPAM 15 MG/1
15 CAPSULE ORAL NIGHTLY PRN
Qty: 30 CAPSULE | Refills: 0 | Status: SHIPPED | OUTPATIENT
Start: 2024-05-28 | End: 2024-06-27

## 2024-05-28 ASSESSMENT — ENCOUNTER SYMPTOMS
NAUSEA: 0
COUGH: 0
VOMITING: 0
CONSTIPATION: 1
ABDOMINAL PAIN: 0
DIARRHEA: 0
BACK PAIN: 1
SHORTNESS OF BREATH: 0
SORE THROAT: 0
RHINORRHEA: 0

## 2024-05-28 NOTE — PROGRESS NOTES
Anabel Ibanez (:  1950) is a 74 y.o. female,Established patient, here for evaluation of the following chief complaint(s):  Diabetes Care Management, Medication Refill, Constipation, and Tremors      ASSESSMENT/PLAN:    ICD-10-CM    1. Chronic idiopathic constipation  K59.04 docusate sodium (COLACE) 100 MG capsule  Recommend increase fluids      2. Primary insomnia  F51.01 temazepam (RESTORIL) 15 MG capsule      3. Recurrent major depressive disorder, in full remission (HCC)  F33.42 venlafaxine (EFFEXOR XR) 75 MG extended release capsule (decreased from 150 mg to 75 mg)      4. FRANC (generalized anxiety disorder)  F41.1 venlafaxine (EFFEXOR XR) 75 MG extended release capsule      5. Essential tremor  G25.0 primidone (MYSOLINE) 50 MG tablet (increased from BID to TID)  Decreased Effexor from 150 mg down to 75 mg  Discussed neurology referral if tremors do not improve with medication changes      6. Type 2 diabetes mellitus with diabetic polyneuropathy, without long-term current use of insulin (HCC)  E11.42 Stable  Continue ADA diet  Continue Ozempic          Return in about 1 month (around 2024), or if symptoms worsen or fail to improve.    SUBJECTIVE/OBJECTIVE:  HPI    DM:  Blood sugar log reviewed: 109, 107, 106, 109, 105, 110, 107, 105, 110, 112, 104, 107, 106, 110, 103  Weight is stable.  She continues on Ozempic 2 mg weekly  A1c: 6.0 (3-)    CONSTIPATION:  Reports increased constipation.  \"Sometimes I don't go for a week.\"  \"I tried prune juice and that did not seem to work.\"    INSOMNIA:  She is sleeping good with the Restoril  This was last filled on 2024, #30  She will need refill next month.  She is sleeping 7-8 (+) hours a night.    TREMORS:  \"My tremors are worse.\"  \"Sometimes I have trouble holding stuff.\"  She has been taking Primidone 50 mg BID.  This initially helped her tremors    /80   Pulse 83   Temp 98.3 °F (36.8 °C) (Temporal)   Ht 1.626 m (5' 4\")   Wt 84.9 kg

## 2024-06-21 ENCOUNTER — APPOINTMENT (OUTPATIENT)
Dept: CT IMAGING | Age: 74
End: 2024-06-21
Payer: MEDICARE

## 2024-06-21 ENCOUNTER — HOSPITAL ENCOUNTER (EMERGENCY)
Age: 74
Discharge: HOME | End: 2024-06-21
Attending: EMERGENCY MEDICINE
Payer: MEDICARE

## 2024-06-21 VITALS
RESPIRATION RATE: 80 BRPM | HEIGHT: 64 IN | OXYGEN SATURATION: 96 % | WEIGHT: 195 LBS | BODY MASS INDEX: 33.29 KG/M2 | DIASTOLIC BLOOD PRESSURE: 83 MMHG | SYSTOLIC BLOOD PRESSURE: 171 MMHG | HEART RATE: 72 BPM | TEMPERATURE: 97.7 F

## 2024-06-21 DIAGNOSIS — R10.9 RIGHT FLANK PAIN: Primary | ICD-10-CM

## 2024-06-21 DIAGNOSIS — R10.9 ABDOMINAL PAIN, UNSPECIFIED ABDOMINAL LOCATION: ICD-10-CM

## 2024-06-21 LAB
ALBUMIN SERPL-MCNC: 4.1 G/DL (ref 3.5–5.2)
ALP SERPL-CCNC: 116 U/L (ref 35–104)
ALT SERPL-CCNC: 15 U/L (ref 5–33)
ANION GAP SERPL CALCULATED.3IONS-SCNC: 14 MMOL/L (ref 7–19)
AST SERPL-CCNC: 19 U/L (ref 5–32)
BACTERIA URNS QL MICRO: NEGATIVE /HPF
BASOPHILS # BLD: 0.1 K/UL (ref 0–0.2)
BASOPHILS NFR BLD: 0.4 % (ref 0–1)
BILIRUB SERPL-MCNC: 0.3 MG/DL (ref 0.2–1.2)
BILIRUB UR QL STRIP: NEGATIVE
BUN SERPL-MCNC: 12 MG/DL (ref 8–23)
CALCIUM SERPL-MCNC: 10.2 MG/DL (ref 8.8–10.2)
CHLORIDE SERPL-SCNC: 99 MMOL/L (ref 98–111)
CLARITY UR: CLEAR
CO2 SERPL-SCNC: 25 MMOL/L (ref 22–29)
COLOR UR: YELLOW
CREAT SERPL-MCNC: 0.8 MG/DL (ref 0.5–0.9)
CRYSTALS URNS MICRO: ABNORMAL /HPF
EOSINOPHIL # BLD: 0.1 K/UL (ref 0–0.6)
EOSINOPHIL NFR BLD: 0.5 % (ref 0–5)
EPI CELLS #/AREA URNS AUTO: 1 /HPF (ref 0–5)
ERYTHROCYTE [DISTWIDTH] IN BLOOD BY AUTOMATED COUNT: 12.2 % (ref 11.5–14.5)
GLUCOSE SERPL-MCNC: 190 MG/DL (ref 74–109)
GLUCOSE UR STRIP.AUTO-MCNC: NEGATIVE MG/DL
HCT VFR BLD AUTO: 39.2 % (ref 37–47)
HGB BLD-MCNC: 12.8 G/DL (ref 12–16)
HGB UR STRIP.AUTO-MCNC: ABNORMAL MG/L
HYALINE CASTS #/AREA URNS AUTO: 0 /HPF (ref 0–8)
IMM GRANULOCYTES # BLD: 0.1 K/UL
KETONES UR STRIP.AUTO-MCNC: NEGATIVE MG/DL
LEUKOCYTE ESTERASE UR QL STRIP.AUTO: NEGATIVE
LIPASE SERPL-CCNC: 46 U/L (ref 13–60)
LYMPHOCYTES # BLD: 0.8 K/UL (ref 1.1–4.5)
LYMPHOCYTES NFR BLD: 7 % (ref 20–40)
MCH RBC QN AUTO: 30 PG (ref 27–31)
MCHC RBC AUTO-ENTMCNC: 32.7 G/DL (ref 33–37)
MCV RBC AUTO: 91.8 FL (ref 81–99)
MONOCYTES # BLD: 0.7 K/UL (ref 0–0.9)
MONOCYTES NFR BLD: 5.5 % (ref 0–10)
NEUTROPHILS # BLD: 10.3 K/UL (ref 1.5–7.5)
NEUTS SEG NFR BLD: 86.2 % (ref 50–65)
NITRITE UR QL STRIP.AUTO: NEGATIVE
PH UR STRIP.AUTO: 7.5 [PH] (ref 5–8)
PLATELET # BLD AUTO: 229 K/UL (ref 130–400)
PMV BLD AUTO: 9.3 FL (ref 9.4–12.3)
POTASSIUM SERPL-SCNC: 3.7 MMOL/L (ref 3.5–5)
PROT SERPL-MCNC: 6.6 G/DL (ref 6.6–8.7)
PROT UR STRIP.AUTO-MCNC: NEGATIVE MG/DL
RBC # BLD AUTO: 4.27 M/UL (ref 4.2–5.4)
RBC #/AREA URNS AUTO: >900 /HPF (ref 0–4)
SODIUM SERPL-SCNC: 138 MMOL/L (ref 136–145)
SP GR UR STRIP.AUTO: 1.01 (ref 1–1.03)
UROBILINOGEN UR STRIP.AUTO-MCNC: 0.2 E.U./DL
WBC # BLD AUTO: 11.9 K/UL (ref 4.8–10.8)
WBC #/AREA URNS AUTO: 8 /HPF (ref 0–5)

## 2024-06-21 PROCEDURE — 80053 COMPREHEN METABOLIC PANEL: CPT

## 2024-06-21 PROCEDURE — 74150 CT ABDOMEN W/O CONTRAST: CPT

## 2024-06-21 PROCEDURE — 6360000002 HC RX W HCPCS: Performed by: EMERGENCY MEDICINE

## 2024-06-21 PROCEDURE — 96375 TX/PRO/DX INJ NEW DRUG ADDON: CPT

## 2024-06-21 PROCEDURE — 96376 TX/PRO/DX INJ SAME DRUG ADON: CPT

## 2024-06-21 PROCEDURE — 96374 THER/PROPH/DIAG INJ IV PUSH: CPT

## 2024-06-21 PROCEDURE — 36415 COLL VENOUS BLD VENIPUNCTURE: CPT

## 2024-06-21 PROCEDURE — 2580000003 HC RX 258: Performed by: EMERGENCY MEDICINE

## 2024-06-21 PROCEDURE — 81001 URINALYSIS AUTO W/SCOPE: CPT

## 2024-06-21 PROCEDURE — 85025 COMPLETE CBC W/AUTO DIFF WBC: CPT

## 2024-06-21 PROCEDURE — 99284 EMERGENCY DEPT VISIT MOD MDM: CPT

## 2024-06-21 PROCEDURE — 83690 ASSAY OF LIPASE: CPT

## 2024-06-21 RX ORDER — ONDANSETRON 4 MG/1
4 TABLET, FILM COATED ORAL 3 TIMES DAILY PRN
Qty: 15 TABLET | Refills: 0 | Status: SHIPPED | OUTPATIENT
Start: 2024-06-21

## 2024-06-21 RX ORDER — MORPHINE SULFATE 4 MG/ML
4 INJECTION, SOLUTION INTRAMUSCULAR; INTRAVENOUS ONCE
Status: COMPLETED | OUTPATIENT
Start: 2024-06-21 | End: 2024-06-21

## 2024-06-21 RX ORDER — ONDANSETRON 2 MG/ML
4 INJECTION INTRAMUSCULAR; INTRAVENOUS ONCE
Status: COMPLETED | OUTPATIENT
Start: 2024-06-21 | End: 2024-06-21

## 2024-06-21 RX ORDER — 0.9 % SODIUM CHLORIDE 0.9 %
1000 INTRAVENOUS SOLUTION INTRAVENOUS ONCE
Status: COMPLETED | OUTPATIENT
Start: 2024-06-21 | End: 2024-06-21

## 2024-06-21 RX ORDER — HYDROCODONE BITARTRATE AND ACETAMINOPHEN 5; 325 MG/1; MG/1
1 TABLET ORAL EVERY 6 HOURS PRN
Qty: 12 TABLET | Refills: 0 | Status: SHIPPED | OUTPATIENT
Start: 2024-06-21 | End: 2024-06-24

## 2024-06-21 RX ADMIN — MORPHINE SULFATE 4 MG: 4 INJECTION, SOLUTION INTRAMUSCULAR; INTRAVENOUS at 22:58

## 2024-06-21 RX ADMIN — ONDANSETRON 4 MG: 2 INJECTION INTRAMUSCULAR; INTRAVENOUS at 21:37

## 2024-06-21 RX ADMIN — MORPHINE SULFATE 4 MG: 4 INJECTION, SOLUTION INTRAMUSCULAR; INTRAVENOUS at 21:38

## 2024-06-21 RX ADMIN — SODIUM CHLORIDE 1000 ML: 9 INJECTION, SOLUTION INTRAVENOUS at 21:36

## 2024-06-21 ASSESSMENT — ENCOUNTER SYMPTOMS
VOMITING: 0
DIARRHEA: 0
RHINORRHEA: 0
ABDOMINAL PAIN: 1
SORE THROAT: 0
COUGH: 0
NAUSEA: 1
SHORTNESS OF BREATH: 0
BACK PAIN: 0

## 2024-06-21 ASSESSMENT — PAIN SCALES - GENERAL
PAINLEVEL_OUTOF10: 8
PAINLEVEL_OUTOF10: 10
PAINLEVEL_OUTOF10: 8

## 2024-06-21 ASSESSMENT — PAIN - FUNCTIONAL ASSESSMENT: PAIN_FUNCTIONAL_ASSESSMENT: 0-10

## 2024-06-21 ASSESSMENT — PAIN DESCRIPTION - ORIENTATION: ORIENTATION: RIGHT

## 2024-06-21 ASSESSMENT — PAIN DESCRIPTION - LOCATION: LOCATION: BACK

## 2024-06-22 NOTE — ED PROVIDER NOTES
ureteral stone labs otherwise reassuring.  CT scan shows some mild right hydronephrosis and hydroureter but no urolithiasis suspect likely she has already passed a stone.  Patient feeling better well-appearing stable for discharge and outpatient follow-up understands return precautions.      CONSULTS:  None    :  Unless otherwise noted below, none     Procedures    FINAL IMPRESSION      1. Right flank pain    2. Abdominal pain, unspecified abdominal location          DISPOSITION/PLAN   DISPOSITION Decision To Discharge 06/21/2024 10:47:06 PM      PATIENT REFERRED TO:  Susanne Larson APRN  [address] [facility] [address]  [phone]          [facility] EMERGENCY DEPT  [address] [facility] [address]  [phone]          DISCHARGE MEDICATIONS:  New Prescriptions    HYDROCODONE-ACETAMINOPHEN (NORCO) 5-325 MG PER TABLET    Take 1 tablet by mouth every 6 hours as needed for Pain for up to 3 days. Intended supply: 3 days. Take lowest dose possible to manage pain Max Daily Amount: 4 tablets    ONDANSETRON (ZOFRAN) 4 MG TABLET    Take 1 tablet by mouth 3 times daily as needed for Nausea or Vomiting          (Please note that portions of this note were completed with a voice recognition program.  Efforts were made to edit thedictations but occasionally words are mis-transcribed.)    LINK CARRIZALES MD (electronically signed)Emergency Physician       Link Carrizales MD  06/21/24 [id number]

## 2024-06-27 ENCOUNTER — OFFICE VISIT (OUTPATIENT)
Dept: FAMILY MEDICINE CLINIC | Age: 74
End: 2024-06-27
Payer: MEDICARE

## 2024-06-27 VITALS
DIASTOLIC BLOOD PRESSURE: 70 MMHG | HEIGHT: 64 IN | HEART RATE: 87 BPM | TEMPERATURE: 97.7 F | BODY MASS INDEX: 32.27 KG/M2 | OXYGEN SATURATION: 97 % | SYSTOLIC BLOOD PRESSURE: 110 MMHG | WEIGHT: 189 LBS

## 2024-06-27 DIAGNOSIS — R25.1 TREMOR OF BOTH HANDS: Primary | ICD-10-CM

## 2024-06-27 DIAGNOSIS — F51.01 PRIMARY INSOMNIA: ICD-10-CM

## 2024-06-27 DIAGNOSIS — E11.42 TYPE 2 DIABETES MELLITUS WITH DIABETIC POLYNEUROPATHY, WITHOUT LONG-TERM CURRENT USE OF INSULIN (HCC): ICD-10-CM

## 2024-06-27 DIAGNOSIS — K59.04 CHRONIC IDIOPATHIC CONSTIPATION: ICD-10-CM

## 2024-06-27 DIAGNOSIS — G25.0 ESSENTIAL TREMOR: ICD-10-CM

## 2024-06-27 DIAGNOSIS — R51.9 CHRONIC RIGHT-SIDED HEADACHE: ICD-10-CM

## 2024-06-27 DIAGNOSIS — G89.29 CHRONIC RIGHT-SIDED HEADACHE: ICD-10-CM

## 2024-06-27 PROCEDURE — G8427 DOCREV CUR MEDS BY ELIG CLIN: HCPCS | Performed by: NURSE PRACTITIONER

## 2024-06-27 PROCEDURE — 2022F DILAT RTA XM EVC RTNOPTHY: CPT | Performed by: NURSE PRACTITIONER

## 2024-06-27 PROCEDURE — 99214 OFFICE O/P EST MOD 30 MIN: CPT | Performed by: NURSE PRACTITIONER

## 2024-06-27 PROCEDURE — 1036F TOBACCO NON-USER: CPT | Performed by: NURSE PRACTITIONER

## 2024-06-27 PROCEDURE — 3074F SYST BP LT 130 MM HG: CPT | Performed by: NURSE PRACTITIONER

## 2024-06-27 PROCEDURE — G8417 CALC BMI ABV UP PARAM F/U: HCPCS | Performed by: NURSE PRACTITIONER

## 2024-06-27 PROCEDURE — 3078F DIAST BP <80 MM HG: CPT | Performed by: NURSE PRACTITIONER

## 2024-06-27 PROCEDURE — 1090F PRES/ABSN URINE INCON ASSESS: CPT | Performed by: NURSE PRACTITIONER

## 2024-06-27 PROCEDURE — 3044F HG A1C LEVEL LT 7.0%: CPT | Performed by: NURSE PRACTITIONER

## 2024-06-27 PROCEDURE — G8399 PT W/DXA RESULTS DOCUMENT: HCPCS | Performed by: NURSE PRACTITIONER

## 2024-06-27 PROCEDURE — 3017F COLORECTAL CA SCREEN DOC REV: CPT | Performed by: NURSE PRACTITIONER

## 2024-06-27 PROCEDURE — 1123F ACP DISCUSS/DSCN MKR DOCD: CPT | Performed by: NURSE PRACTITIONER

## 2024-06-27 RX ORDER — TEMAZEPAM 15 MG/1
15 CAPSULE ORAL NIGHTLY PRN
Qty: 30 CAPSULE | Refills: 0 | Status: SHIPPED | OUTPATIENT
Start: 2024-06-27 | End: 2024-07-27

## 2024-06-27 RX ORDER — PRIMIDONE 125 MG/1
125 TABLET ORAL 2 TIMES DAILY
Qty: 60 TABLET | Refills: 1 | Status: SHIPPED | OUTPATIENT
Start: 2024-06-27

## 2024-06-27 ASSESSMENT — ENCOUNTER SYMPTOMS
CONSTIPATION: 1
NAUSEA: 1
ABDOMINAL PAIN: 0
RHINORRHEA: 0
SORE THROAT: 0
COUGH: 0
SHORTNESS OF BREATH: 0
DIARRHEA: 0
BACK PAIN: 1
VOMITING: 0

## 2024-06-27 ASSESSMENT — PATIENT HEALTH QUESTIONNAIRE - PHQ9
4. FEELING TIRED OR HAVING LITTLE ENERGY: NOT AT ALL
2. FEELING DOWN, DEPRESSED OR HOPELESS: NOT AT ALL
SUM OF ALL RESPONSES TO PHQ QUESTIONS 1-9: 0
7. TROUBLE CONCENTRATING ON THINGS, SUCH AS READING THE NEWSPAPER OR WATCHING TELEVISION: NOT AT ALL
6. FEELING BAD ABOUT YOURSELF - OR THAT YOU ARE A FAILURE OR HAVE LET YOURSELF OR YOUR FAMILY DOWN: NOT AT ALL
1. LITTLE INTEREST OR PLEASURE IN DOING THINGS: NOT AT ALL
10. IF YOU CHECKED OFF ANY PROBLEMS, HOW DIFFICULT HAVE THESE PROBLEMS MADE IT FOR YOU TO DO YOUR WORK, TAKE CARE OF THINGS AT HOME, OR GET ALONG WITH OTHER PEOPLE: NOT DIFFICULT AT ALL
SUM OF ALL RESPONSES TO PHQ9 QUESTIONS 1 & 2: 0
SUM OF ALL RESPONSES TO PHQ QUESTIONS 1-9: 0
9. THOUGHTS THAT YOU WOULD BE BETTER OFF DEAD, OR OF HURTING YOURSELF: NOT AT ALL
8. MOVING OR SPEAKING SO SLOWLY THAT OTHER PEOPLE COULD HAVE NOTICED. OR THE OPPOSITE, BEING SO FIGETY OR RESTLESS THAT YOU HAVE BEEN MOVING AROUND A LOT MORE THAN USUAL: NOT AT ALL
5. POOR APPETITE OR OVEREATING: NOT AT ALL
3. TROUBLE FALLING OR STAYING ASLEEP: NOT AT ALL
SUM OF ALL RESPONSES TO PHQ QUESTIONS 1-9: 0
SUM OF ALL RESPONSES TO PHQ QUESTIONS 1-9: 0

## 2024-06-27 NOTE — PROGRESS NOTES
Anabel Ibanez (:  1950) is a 74 y.o. female,Established patient, here for evaluation of the following chief complaint(s):  Diabetes Care Management and Medication Refill      ASSESSMENT/PLAN:    ICD-10-CM    1. Tremor of both hands  R25.1 Alisha Khan APRN, Neurology, [place]     Primidone 125 MG TABS (increased from 50 mg TID to 125 mg BID)      2. Primary insomnia  F51.01 temazepam (RESTORIL) 15 MG capsule      3. Chronic idiopathic constipation  K59.04 Improved  Continue Colace BID      4. Type 2 diabetes mellitus with diabetic polyneuropathy, without long-term current use of insulin (HCC)  E11.42 Stable  Continue Ozempic  Recommend ADA diet  Recommend exercise as tolerated      5. Chronic right-sided headache  R51.9 Alisha Khan APRN, Neurology, [place]    G89.29       6. Essential tremor  G25.0 Primidone 125 MG TABS          Return in about 1 month (around 2024), or if symptoms worsen or fail to improve.    SUBJECTIVE/OBJECTIVE:  HPI    She was seen in the ED on 2024  CT scan of kidney:  Impression:  1.  Mild right hydronephrosis and hydroureter.  No urolithiasis is seen.  Correlate for possible passed calculus versus etiology of unilateral obstruction  2.  Pan colonic diverticulosis     Flank pain has since resolved.    CONSTIPATION:  \"I am doing real good.\"  She is taking Colace BID.  \"I am now going every other day.\"    DM:  Blood sugars: 105, 102, 111, 108, 110, 104, 109, 103, 107, 112  She continues on Ozempic 2 mg weekly (I didn't get it for 3 weeks.)  Denies any blood sugar lows.    TREMORS:  Reports her tremors are now in both arms.  \"Sometimes, I get the shakes so bad, it is like I am freezing.\"  She is now taking primidone 50 mg TID.   She is taking the lower dose of Effexor at 75 mg.  She reports increased right sided headaches.   Denies visual changes    INSOMNIA:  She continues to sleep with Restoril  She is sleeping good.  She gets at least 7-8

## 2024-07-26 ENCOUNTER — OFFICE VISIT (OUTPATIENT)
Dept: FAMILY MEDICINE CLINIC | Age: 74
End: 2024-07-26
Payer: MEDICARE

## 2024-07-26 VITALS
SYSTOLIC BLOOD PRESSURE: 122 MMHG | WEIGHT: 189 LBS | TEMPERATURE: 97.5 F | DIASTOLIC BLOOD PRESSURE: 72 MMHG | HEART RATE: 84 BPM | BODY MASS INDEX: 32.44 KG/M2 | OXYGEN SATURATION: 98 %

## 2024-07-26 DIAGNOSIS — Z12.31 ENCOUNTER FOR SCREENING MAMMOGRAM FOR MALIGNANT NEOPLASM OF BREAST: ICD-10-CM

## 2024-07-26 DIAGNOSIS — F51.01 PRIMARY INSOMNIA: ICD-10-CM

## 2024-07-26 DIAGNOSIS — R25.1 TREMOR OF BOTH HANDS: ICD-10-CM

## 2024-07-26 DIAGNOSIS — J30.0 VASOMOTOR RHINITIS: ICD-10-CM

## 2024-07-26 DIAGNOSIS — E11.42 TYPE 2 DIABETES MELLITUS WITH DIABETIC POLYNEUROPATHY, WITHOUT LONG-TERM CURRENT USE OF INSULIN (HCC): Primary | ICD-10-CM

## 2024-07-26 PROCEDURE — 3078F DIAST BP <80 MM HG: CPT | Performed by: NURSE PRACTITIONER

## 2024-07-26 PROCEDURE — 99214 OFFICE O/P EST MOD 30 MIN: CPT | Performed by: NURSE PRACTITIONER

## 2024-07-26 PROCEDURE — 3044F HG A1C LEVEL LT 7.0%: CPT | Performed by: NURSE PRACTITIONER

## 2024-07-26 PROCEDURE — 3074F SYST BP LT 130 MM HG: CPT | Performed by: NURSE PRACTITIONER

## 2024-07-26 PROCEDURE — 1123F ACP DISCUSS/DSCN MKR DOCD: CPT | Performed by: NURSE PRACTITIONER

## 2024-07-26 RX ORDER — IPRATROPIUM BROMIDE 42 UG/1
2 SPRAY, METERED NASAL 4 TIMES DAILY
Qty: 15 ML | Refills: 3 | Status: SHIPPED | OUTPATIENT
Start: 2024-07-26

## 2024-07-26 ASSESSMENT — ENCOUNTER SYMPTOMS
CONSTIPATION: 0
BACK PAIN: 1
DIARRHEA: 0
NAUSEA: 0
VOMITING: 0
SHORTNESS OF BREATH: 0
ABDOMINAL PAIN: 0
SORE THROAT: 0
COUGH: 0
RHINORRHEA: 1

## 2024-07-26 NOTE — PROGRESS NOTES
Anabel Ibanez (:  1950) is a 74 y.o. female,Established patient, here for evaluation of the following chief complaint(s):  1 Month Follow-Up      ASSESSMENT/PLAN:    ICD-10-CM    1. Type 2 diabetes mellitus with diabetic polyneuropathy, without long-term current use of insulin (HCC)  E11.42 semaglutide, 2 MG/DOSE, (OZEMPIC) 8 MG/3ML SOPN sc injection  Recommend ADA diet  Recommend exercise as tolerated      2. Vasomotor rhinitis  J30.0 ipratropium (ATROVENT) 0.06 % nasal spray      3. Tremor of both hands  R25.1 Awaiting neurology consultation.  Continue primidone 125 mg twice daily      4. Primary insomnia  F51.01 Stable  Continue temazepam 15 mg nightly      5. Encounter for screening mammogram for malignant neoplasm of breast  Z12.31 MYLES DIGITAL SCREEN W OR WO CAD BILATERAL          Return in about 1 month (around 2024), or if symptoms worsen or fail to improve.    SUBJECTIVE/OBJECTIVE:  HPI    DM:  Weight is stable.  She continues on Ozempic 2 mg weekly.   Denies abdominal pain.   Denies constipation   Denies nausea.  Blood sugars: 109, 111, 103, 107, 105, 110, 113, 109, 105, 102, 107  Denies excessive thirst and urination.  Denies any blood sugar lows.  Her last eye exam was last year.   \"I need to set up an appointment.\"    NASAL DRAINAGE:  \"My nose just runs constantly.\"  Denies nasal congestion, sneezing or cough.  Denies sore throat.    TREMORS:  She has an appointment with neurology on .  She is now taking Primidone 125 mg BID.  She continues to tremor but the tremors seem to be worse in the afternoon than the morning.  \"I now have it in both arms.\"    INSOMNIA:  She is sleeping good.  She continues on Restoril nightly.  Last fill of Restoril was 2024, #30.  \"I called them in yesterday and they said I had a prescription ready.\"    Moods are stable.    /72 (Site: Left Upper Arm, Position: Sitting, Cuff Size: Small Adult)   Pulse 84   Temp 97.5 °F (36.4 °C) (Temporal)

## 2024-08-23 ENCOUNTER — HOSPITAL ENCOUNTER (OUTPATIENT)
Dept: GENERAL RADIOLOGY | Age: 74
Discharge: HOME | End: 2024-08-23
Payer: MEDICARE

## 2024-08-23 ENCOUNTER — TELEPHONE (OUTPATIENT)
Dept: FAMILY MEDICINE CLINIC | Age: 74
End: 2024-08-23

## 2024-08-23 ENCOUNTER — OFFICE VISIT (OUTPATIENT)
Dept: FAMILY MEDICINE CLINIC | Age: 74
End: 2024-08-23
Payer: MEDICARE

## 2024-08-23 VITALS
DIASTOLIC BLOOD PRESSURE: 70 MMHG | HEIGHT: 64 IN | BODY MASS INDEX: 31.41 KG/M2 | TEMPERATURE: 97.4 F | HEART RATE: 85 BPM | SYSTOLIC BLOOD PRESSURE: 110 MMHG | WEIGHT: 184 LBS | OXYGEN SATURATION: 95 %

## 2024-08-23 DIAGNOSIS — F51.01 PRIMARY INSOMNIA: ICD-10-CM

## 2024-08-23 DIAGNOSIS — R07.81 RIB PAIN ON LEFT SIDE: ICD-10-CM
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
## 2024-08-23 DIAGNOSIS — E11.42 TYPE 2 DIABETES MELLITUS WITH DIABETIC POLYNEUROPATHY, WITHOUT LONG-TERM CURRENT USE OF INSULIN (HCC): ICD-10-CM
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
## 2024-08-23 DIAGNOSIS — M25.512 ACUTE PAIN OF LEFT SHOULDER: ICD-10-CM

## 2024-08-23 PROBLEM — S63.269A: Status: RESOLVED | Noted: 2017-12-05 | Resolved: 2024-08-23

## 2024-08-23 PROBLEM — S52.202D CLOSED FRACTURE OF LEFT RADIUS AND ULNA WITH ROUTINE HEALING: Status: RESOLVED | Noted: 2017-12-05 | Resolved: 2024-08-23

## 2024-08-23 PROBLEM — S52.92XD CLOSED FRACTURE OF LEFT RADIUS AND ULNA WITH ROUTINE HEALING: Status: RESOLVED | Noted: 2017-12-05 | Resolved: 2024-08-23

## 2024-08-23 PROBLEM — S82.001A CLOSED FRACTURE OF RIGHT PATELLA: Status: RESOLVED | Noted: 2017-12-05 | Resolved: 2024-08-23

## 2024-08-23 LAB
ALBUMIN SERPL-MCNC: 4.2 G/DL (ref 3.5–5.2)
ALP SERPL-CCNC: 122 U/L (ref 35–104)
ALT SERPL-CCNC: 12 U/L (ref 5–33)
ANION GAP SERPL CALCULATED.3IONS-SCNC: 11 MMOL/L (ref 7–19)
AST SERPL-CCNC: 18 U/L (ref 5–32)
BASOPHILS # BLD: 0.1 K/UL (ref 0–0.2)
BASOPHILS NFR BLD: 0.6 % (ref 0–1)
BILIRUB SERPL-MCNC: 0.3 MG/DL (ref 0.2–1.2)
BUN SERPL-MCNC: 10 MG/DL (ref 8–23)
CALCIUM SERPL-MCNC: 10.4 MG/DL (ref 8.8–10.2)
CHLORIDE SERPL-SCNC: 99 MMOL/L (ref 98–111)
CO2 SERPL-SCNC: 30 MMOL/L (ref 22–29)
CREAT SERPL-MCNC: 0.8 MG/DL (ref 0.5–0.9)
EOSINOPHIL # BLD: 0.1 K/UL (ref 0–0.6)
EOSINOPHIL NFR BLD: 1.4 % (ref 0–5)
ERYTHROCYTE [DISTWIDTH] IN BLOOD BY AUTOMATED COUNT: 12.2 % (ref 11.5–14.5)
GLUCOSE SERPL-MCNC: 84 MG/DL (ref 70–99)
HBA1C MFR BLD: 5.5 % (ref 4–5.6)
HCT VFR BLD AUTO: 42.1 % (ref 37–47)
HGB BLD-MCNC: 13.5 G/DL (ref 12–16)
IMM GRANULOCYTES # BLD: 0 K/UL
LYMPHOCYTES # BLD: 1.7 K/UL (ref 1.1–4.5)
LYMPHOCYTES NFR BLD: 19.4 % (ref 20–40)
MCH RBC QN AUTO: 30.5 PG (ref 27–31)
MCHC RBC AUTO-ENTMCNC: 32.1 G/DL (ref 33–37)
MCV RBC AUTO: 95.2 FL (ref 81–99)
MONOCYTES # BLD: 0.8 K/UL (ref 0–0.9)
MONOCYTES NFR BLD: 9.1 % (ref 0–10)
NEUTROPHILS # BLD: 6 K/UL (ref 1.5–7.5)
NEUTS SEG NFR BLD: 69.3 % (ref 50–65)
PLATELET # BLD AUTO: 284 K/UL (ref 130–400)
PMV BLD AUTO: 10.1 FL (ref 9.4–12.3)
POTASSIUM SERPL-SCNC: 4.1 MMOL/L (ref 3.5–5)
PROT SERPL-MCNC: 7.1 G/DL (ref 6.6–8.7)
RBC # BLD AUTO: 4.42 M/UL (ref 4.2–5.4)
SODIUM SERPL-SCNC: 140 MMOL/L (ref 136–145)
WBC # BLD AUTO: 8.7 K/UL (ref 4.8–10.8)

## 2024-08-23 PROCEDURE — 3044F HG A1C LEVEL LT 7.0%: CPT | Performed by: NURSE PRACTITIONER

## 2024-08-23 PROCEDURE — 3074F SYST BP LT 130 MM HG: CPT | Performed by: NURSE PRACTITIONER

## 2024-08-23 PROCEDURE — 99214 OFFICE O/P EST MOD 30 MIN: CPT | Performed by: NURSE PRACTITIONER

## 2024-08-23 PROCEDURE — 73030 X-RAY EXAM OF SHOULDER: CPT

## 2024-08-23 PROCEDURE — 3078F DIAST BP <80 MM HG: CPT | Performed by: NURSE PRACTITIONER

## 2024-08-23 PROCEDURE — 1123F ACP DISCUSS/DSCN MKR DOCD: CPT | Performed by: NURSE PRACTITIONER

## 2024-08-23 PROCEDURE — 71101 X-RAY EXAM UNILAT RIBS/CHEST: CPT

## 2024-08-23 RX ORDER — TEMAZEPAM 15 MG/1
15 CAPSULE ORAL NIGHTLY PRN
Qty: 30 CAPSULE | Refills: 0 | Status: SHIPPED | OUTPATIENT
Start: 2024-08-23 | End: 2024-09-22

## 2024-08-23 ASSESSMENT — ENCOUNTER SYMPTOMS
BACK PAIN: 1
ABDOMINAL PAIN: 0
SORE THROAT: 0
CONSTIPATION: 0
RHINORRHEA: 1
VOMITING: 0
SHORTNESS OF BREATH: 0
COUGH: 0
NAUSEA: 0
DIARRHEA: 0

## 2024-08-23 NOTE — PROGRESS NOTES
normal.         Thought Content: Thought content normal.         Judgment: Judgment normal.           An electronic signature was used to authenticate this note.    --LUIZ Castañeda

## 2024-08-23 NOTE — TELEPHONE ENCOUNTER
----- Message from LUIZ Castañeda sent at 8/23/2024  4:10 PM CDT -----  CMP: Normal sodium and potassium.  Blood glucose is 84.  Normal kidney function and normal liver function  CBC is normal  A1c is normal at 5.5

## 2024-08-23 NOTE — TELEPHONE ENCOUNTER
----- Message from LUIZ Castañeda sent at 8/23/2024  4:12 PM CDT -----  Rib x-ray shows no displaced fractures.  Overall normal.  Recommend bracing with a pillow for deep breathing and/or cough    Left shoulder x-ray shows no definite fracture.  There is a probable spur.  However if pain continues recommend follow-up imaging to reevaluate for potential fracture

## 2024-08-31 DIAGNOSIS — J30.1 SEASONAL ALLERGIC RHINITIS DUE TO POLLEN: ICD-10-CM

## 2024-09-03 RX ORDER — FEXOFENADINE HCL 180 MG/1
TABLET ORAL DAILY
Qty: 90 TABLET | Refills: 3 | Status: SHIPPED | OUTPATIENT
Start: 2024-09-03

## 2024-09-03 NOTE — TELEPHONE ENCOUNTER
Received fax from pharmacy requesting refill on pts medication(s). Pt was last seen in office on 8/23/2024  and has a follow up scheduled for 9/25/2024. Will send request to  Teodora Larson  for authorization.     Requested Prescriptions     Pending Prescriptions Disp Refills    fexofenadine (ALLEGRA) 180 MG tablet [Pharmacy Med Name: Fexofenadine HCl 180 MG Oral Tablet] 90 tablet 3     Sig: Take 1 tablet by mouth once daily

## 2024-09-04 ENCOUNTER — TELEPHONE (OUTPATIENT)
Dept: FAMILY MEDICINE CLINIC | Age: 74
End: 2024-09-04

## 2024-09-04 ENCOUNTER — HOSPITAL ENCOUNTER (OUTPATIENT)
Dept: WOMENS IMAGING | Age: 74
Discharge: HOME | End: 2024-09-04
Payer: MEDICARE

## 2024-09-04 DIAGNOSIS — Z12.31 ENCOUNTER FOR SCREENING MAMMOGRAM FOR MALIGNANT NEOPLASM OF BREAST: ICD-10-CM

## 2024-09-04 PROCEDURE — 77063 BREAST TOMOSYNTHESIS BI: CPT

## 2024-09-04 NOTE — TELEPHONE ENCOUNTER
----- Message from LUIZ Castañeda sent at 9/4/2024  1:00 PM CDT -----  Mammogram is negative.  Recommend routine annual mammography

## 2024-09-05 DIAGNOSIS — K59.04 CHRONIC IDIOPATHIC CONSTIPATION: ICD-10-CM

## 2024-09-05 RX ORDER — DOCUSATE SODIUM 100 MG/1
100 CAPSULE, LIQUID FILLED ORAL 2 TIMES DAILY
Qty: 60 CAPSULE | Refills: 5 | Status: SHIPPED | OUTPATIENT
Start: 2024-09-05

## 2024-09-05 NOTE — TELEPHONE ENCOUNTER
Received fax from pharmacy requesting refill on pts medication(s). Pt was last seen in office on 8/23/2024  and has a follow up scheduled for 9/25/2024. Will send request to  Teodora Larson  for authorization.     Requested Prescriptions     Pending Prescriptions Disp Refills    EQUATE STOOL SOFTENER 100 MG capsule [Pharmacy Med Name: EQ Stool Softener 100 MG Oral Capsule] 60 capsule 0     Sig: Take 1 capsule by mouth twice daily

## 2024-09-08 NOTE — TELEPHONE ENCOUNTER
Called and left patient a message to verify script. Told her to call our office back.
Received fax from pharmacy requesting refill on pts medication(s). Pt was last seen in office on 5/20/2022  and has a follow up scheduled for Visit date not found. Will send request to  [facility]  for authorization.      Requested Prescriptions     Pending Prescriptions Disp Refills    lisinopril (PRINIVIL;ZESTRIL) 40 MG tablet [Pharmacy Med Name: Lisinopril 40 MG Oral Tablet] 90 tablet 0     Sig: Take 1 tablet by mouth daily
Statement Selected

## 2024-09-16 ENCOUNTER — TELEPHONE (OUTPATIENT)
Dept: FAMILY MEDICINE CLINIC | Age: 74
End: 2024-09-16

## 2024-09-16 DIAGNOSIS — R25.1 TREMOR OF BOTH HANDS: ICD-10-CM

## 2024-09-16 DIAGNOSIS — G25.0 ESSENTIAL TREMOR: ICD-10-CM

## 2024-09-16 DIAGNOSIS — M25.512 ACUTE PAIN OF LEFT SHOULDER: Primary | ICD-10-CM

## 2024-09-16 RX ORDER — PRIMIDONE 125 MG/1
1 TABLET ORAL 2 TIMES DAILY
Qty: 60 TABLET | Refills: 11 | Status: SHIPPED | OUTPATIENT
Start: 2024-09-16

## 2024-09-27 DIAGNOSIS — L30.9 DERMATITIS: ICD-10-CM

## 2024-09-27 RX ORDER — CETIRIZINE HYDROCHLORIDE 10 MG/1
10 TABLET, FILM COATED ORAL DAILY
Qty: 90 TABLET | Refills: 3 | Status: SHIPPED | OUTPATIENT
Start: 2024-09-27

## 2024-10-03 ENCOUNTER — OFFICE VISIT (OUTPATIENT)
Dept: FAMILY MEDICINE CLINIC | Age: 74
End: 2024-10-03

## 2024-10-03 ENCOUNTER — TELEPHONE (OUTPATIENT)
Dept: FAMILY MEDICINE CLINIC | Age: 74
End: 2024-10-03

## 2024-10-03 ENCOUNTER — HOSPITAL ENCOUNTER (OUTPATIENT)
Dept: GENERAL RADIOLOGY | Age: 74
Discharge: HOME | End: 2024-10-03
Payer: MEDICARE

## 2024-10-03 VITALS
BODY MASS INDEX: 30.8 KG/M2 | SYSTOLIC BLOOD PRESSURE: 110 MMHG | TEMPERATURE: 96.9 F | DIASTOLIC BLOOD PRESSURE: 80 MMHG | OXYGEN SATURATION: 97 % | HEIGHT: 64 IN | WEIGHT: 180.38 LBS | HEART RATE: 89 BPM

## 2024-10-03 DIAGNOSIS — M79.622 LEFT UPPER ARM PAIN: ICD-10-CM

## 2024-10-03 DIAGNOSIS — J06.9 VIRAL URI: ICD-10-CM

## 2024-10-03 DIAGNOSIS — R93.6 ABNORMAL X-RAY OF SHOULDER: ICD-10-CM

## 2024-10-03 DIAGNOSIS — F51.01 PRIMARY INSOMNIA: ICD-10-CM
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
## 2024-10-03 DIAGNOSIS — M25.512 ACUTE PAIN OF LEFT SHOULDER: ICD-10-CM

## 2024-10-03 DIAGNOSIS — I20.89 OTHER FORMS OF ANGINA PECTORIS (HCC): ICD-10-CM

## 2024-10-03 DIAGNOSIS — R05.1 ACUTE COUGH: ICD-10-CM
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
## 2024-10-03 DIAGNOSIS — E11.42 TYPE 2 DIABETES MELLITUS WITH DIABETIC POLYNEUROPATHY, WITHOUT LONG-TERM CURRENT USE OF INSULIN (HCC): ICD-10-CM

## 2024-10-03 DIAGNOSIS — F33.0 MILD EPISODE OF RECURRENT MAJOR DEPRESSIVE DISORDER (HCC): ICD-10-CM

## 2024-10-03 DIAGNOSIS — M25.512 ACUTE PAIN OF LEFT SHOULDER: Primary | ICD-10-CM

## 2024-10-03 PROBLEM — F33.1 MODERATE EPISODE OF RECURRENT MAJOR DEPRESSIVE DISORDER (HCC): Status: RESOLVED | Noted: 2020-05-13 | Resolved: 2024-10-03

## 2024-10-03 PROCEDURE — 73030 X-RAY EXAM OF SHOULDER: CPT

## 2024-10-03 PROCEDURE — 73060 X-RAY EXAM OF HUMERUS: CPT

## 2024-10-03 RX ORDER — TEMAZEPAM 15 MG/1
15 CAPSULE ORAL NIGHTLY PRN
Qty: 30 CAPSULE | Refills: 0 | Status: SHIPPED | OUTPATIENT
Start: 2024-10-03 | End: 2024-11-02

## 2024-10-03 RX ORDER — BENZONATATE 200 MG/1
200 CAPSULE ORAL 3 TIMES DAILY PRN
Qty: 30 CAPSULE | Refills: 0 | Status: SHIPPED | OUTPATIENT
Start: 2024-10-03 | End: 2024-10-13

## 2024-10-03 RX ORDER — GUAIFENESIN 600 MG/1
600 TABLET, EXTENDED RELEASE ORAL 2 TIMES DAILY
Qty: 30 TABLET | Refills: 0 | Status: SHIPPED | OUTPATIENT
Start: 2024-10-03 | End: 2024-10-18

## 2024-10-03 RX ORDER — HYDROCODONE BITARTRATE AND ACETAMINOPHEN 7.5; 325 MG/1; MG/1
1 TABLET ORAL EVERY 6 HOURS PRN
Qty: 12 TABLET | Refills: 0 | Status: SHIPPED | OUTPATIENT
Start: 2024-10-03 | End: 2024-10-06

## 2024-10-03 ASSESSMENT — ENCOUNTER SYMPTOMS
SORE THROAT: 1
NAUSEA: 0
DIARRHEA: 0
SHORTNESS OF BREATH: 0
VOMITING: 0
COUGH: 1

## 2024-10-03 NOTE — PROGRESS NOTES
Anabel Ibanez (:  1950) is a 74 y.o. female,Established patient, here for evaluation of the following chief complaint(s):  Follow-up (insomnia), Cough, Medication Refill, and Shoulder Pain (Left shoulder and arm)      ASSESSMENT/PLAN:    ICD-10-CM    1. Fall, subsequent encounter  W19.XXXD XR SHOULDER LEFT (MIN 2 VIEWS)     XR HUMERUS LEFT (MIN 2 VIEWS)      2. Primary insomnia  F51.01 temazepam (RESTORIL) 15 MG capsule      3. Mild episode of recurrent major depressive disorder (HCC)  F33.0 Stable  Continue Effexor      4. Other forms of angina pectoris (Formerly [facility])  I20.89 Resolved  Denies CP      5. Acute pain of left shoulder  M25.512 XR SHOULDER LEFT (MIN 2 VIEWS)     HYDROcodone-acetaminophen (NORCO) 7.5-325 MG per tablet      6. Left upper arm pain  M79.622 XR HUMERUS LEFT (MIN 2 VIEWS)     HYDROcodone-acetaminophen (NORCO) 7.5-325 MG per tablet (3 days of acute worsening pain)      7. Viral URI  J06.9 benzonatate (TESSALON) 200 MG capsule     guaiFENesin (MUCINEX) 600 MG extended release tablet      8. Acute cough  R05.1 benzonatate (TESSALON) 200 MG capsule     guaiFENesin (MUCINEX) 600 MG extended release tablet      9. Type 2 diabetes mellitus with diabetic polyneuropathy, without long-term current use of insulin (Formerly [facility])  E11.42 Recommend ADA diet  Recommend exercise as tolerated  Continue Ozempic 2 mg weekly          Return in about 1 month (around 11/3/2024), or if symptoms worsen or fail to improve.    SUBJECTIVE/OBJECTIVE:  HPI    She started coughing Tuesday night.  Reports PND  \"It feels like it is coming from the back of my throat.\"  \"I have a headache.\"  \"Last night, I woke up and my bed was wet.\"  Reports fatigue.  Reports sore throat  Denies otalgia.  Denies SOA  Denies N, V or D  She is taking OTC medication for cold and flu.  Denies any sick contacts.    She continues on Restoril 15 mg nightly for sleep.  \"It helps me sleep.\"  \"It does a good job.\"  Last fill of Restoril was 2024,

## 2024-10-03 NOTE — TELEPHONE ENCOUNTER
----- Message from LUIZ Castañeda sent at 10/3/2024  4:22 PM CDT -----  Left shoulder x-ray showed mild degenerative disease.  There is narrowing and bony spur formation but there is no displaced fracture or dislocation    Left humerus x-ray shows no acute fracture.    However, due to patient's worsening pain, mild swelling and decreased range of motion recommend referral to orthopedics

## 2024-10-04 NOTE — TELEPHONE ENCOUNTER
Called patient, spoke with: Patient regarding the results of the patients most recent xray.  I advised Patient of Teodora Larson recommendations.   Patient did voice understanding       Referral placed

## 2024-10-12 DIAGNOSIS — R60.0 LOCALIZED EDEMA: ICD-10-CM

## 2024-10-14 RX ORDER — FUROSEMIDE 40 MG
40 TABLET ORAL DAILY
Qty: 30 TABLET | Refills: 0 | Status: SHIPPED | OUTPATIENT
Start: 2024-10-14

## 2024-10-14 NOTE — TELEPHONE ENCOUNTER
Received fax from pharmacy requesting refill on pts medication(s). Pt was last seen in office on 10/3/2024  and has a follow up scheduled for 11/5/2024. Will send request to  Teodora Larson  for authorization.     Requested Prescriptions     Pending Prescriptions Disp Refills    furosemide (LASIX) 40 MG tablet [Pharmacy Med Name: Furosemide 40 MG Oral Tablet] 90 tablet 3     Sig: Take 1 tablet by mouth once daily

## 2024-11-06 ENCOUNTER — OFFICE VISIT (OUTPATIENT)
Age: 74
End: 2024-11-06
Payer: MEDICARE

## 2024-11-06 ENCOUNTER — OFFICE VISIT (OUTPATIENT)
Dept: FAMILY MEDICINE CLINIC | Age: 74
End: 2024-11-06

## 2024-11-06 VITALS
HEIGHT: 64 IN | BODY MASS INDEX: 30.3 KG/M2 | WEIGHT: 177.5 LBS | OXYGEN SATURATION: 97 % | TEMPERATURE: 97 F | SYSTOLIC BLOOD PRESSURE: 116 MMHG | DIASTOLIC BLOOD PRESSURE: 80 MMHG | HEART RATE: 96 BPM

## 2024-11-06 VITALS — HEIGHT: 64 IN | WEIGHT: 179 LBS | BODY MASS INDEX: 30.56 KG/M2

## 2024-11-06 DIAGNOSIS — E11.42 TYPE 2 DIABETES MELLITUS WITH DIABETIC POLYNEUROPATHY, WITHOUT LONG-TERM CURRENT USE OF INSULIN (HCC): ICD-10-CM

## 2024-11-06 DIAGNOSIS — Z23 NEED FOR IMMUNIZATION AGAINST INFLUENZA: ICD-10-CM

## 2024-11-06 DIAGNOSIS — F51.01 PRIMARY INSOMNIA: Primary | ICD-10-CM

## 2024-11-06 DIAGNOSIS — S46.012A TRAUMATIC COMPLETE TEAR OF LEFT ROTATOR CUFF, INITIAL ENCOUNTER: Primary | ICD-10-CM

## 2024-11-06 DIAGNOSIS — M25.512 ACUTE PAIN OF LEFT SHOULDER: ICD-10-CM

## 2024-11-06 PROCEDURE — 1123F ACP DISCUSS/DSCN MKR DOCD: CPT

## 2024-11-06 PROCEDURE — 1159F MED LIST DOCD IN RCRD: CPT

## 2024-11-06 PROCEDURE — 99203 OFFICE O/P NEW LOW 30 MIN: CPT

## 2024-11-06 RX ORDER — HYDROCODONE BITARTRATE AND ACETAMINOPHEN 5; 325 MG/1; MG/1
1 TABLET ORAL 2 TIMES DAILY PRN
Qty: 10 TABLET | Refills: 0 | Status: SHIPPED | OUTPATIENT
Start: 2024-11-06 | End: 2024-11-11

## 2024-11-06 RX ORDER — TEMAZEPAM 15 MG/1
15 CAPSULE ORAL NIGHTLY PRN
Qty: 30 CAPSULE | Refills: 0 | Status: SHIPPED | OUTPATIENT
Start: 2024-11-06 | End: 2024-12-06

## 2024-11-06 ASSESSMENT — ENCOUNTER SYMPTOMS
SHORTNESS OF BREATH: 0
NAUSEA: 0
BACK PAIN: 1
RHINORRHEA: 0
ABDOMINAL PAIN: 0
SORE THROAT: 0
COUGH: 0
VOMITING: 0
CONSTIPATION: 0
DIARRHEA: 0

## 2024-11-06 NOTE — PROGRESS NOTES
Vaccine Information Sheet, \"Influenza - Inactivated\"  given to Anabel Ibanez, or parent/legal guardian of  Anabel Ibanez and verbalized understanding.    Patient responses:    Have you ever had a reaction to a flu vaccine? NO  Do you have an adverse reaction when you eat eggs? NO  Do you have any current illness?  NO  Have you ever had [place] Syndrome?  NO    Flu vaccine given per order. Please see immunization tab.

## 2024-11-06 NOTE — PROGRESS NOTES
Anabel Ibanez (:  1950) is a 74 y.o. female,Established patient, here for evaluation of the following chief complaint(s):  Diabetes Care Management, Medication Refill, and Immunizations (Flu shot)      ASSESSMENT/PLAN:    ICD-10-CM    1. Primary insomnia  F51.01 temazepam (RESTORIL) 15 MG capsule      2. Acute pain of left shoulder  M25.512 Keep follow-up with orthopaedics  Awaiting MRI of left shoulder      3. Type 2 diabetes mellitus with diabetic polyneuropathy, without long-term current use of insulin (HCC)  E11.42 Continue Ozempic  Recommend ADA diet  Recommend exercise as tolerated      4. Need for immunization against influenza  Z23 Influenza, FLUAD Trivalent, (age 65 y+), IM, Preservative Free, 0.5mL          Return in about 4 weeks (around 2024), or if symptoms worsen or fail to improve.    SUBJECTIVE/OBJECTIVE:  HPI    LEFT SHOULDER PAIN:  Reports continued left shoulder pain.  \"I can't move it very much. If I do, it sends shooting pains.\"  Reports decreased ROM.  She saw orthopaedics today.   She is awaiting MRI of left shoulder.    INSOMNIA:  \"The shoulder pain is never gone.\"  She continues on Restoril 15 mg nightly prn sleep.  Last fill was 10-3-2024, #30  This helps her sleep some nights  She is requesting refill today.    DM:  She continues on Ozempic 2 mg weekly.  She is tolerating this medication.  She has lost 3 pounds in the last month.  Blood sugar lo, 103, 101, 106, 102, 99, 101, 105, 101, 104, 105, 100  A1c: 5.5 (2024)  \"My mouth stays dry all the time, so I have to have something to drink constantly.\"  She needs to get an eye exam.    /80 (Site: Right Upper Arm, Position: Sitting, Cuff Size: Medium Adult)   Pulse 96   Temp 97 °F (36.1 °C) (Temporal)   Ht 1.626 m (5' 4\")   Wt 80.5 kg (177 lb 8 oz)   SpO2 97%   BMI 30.47 kg/m²     Review of Systems   Constitutional:  Negative for chills, fatigue and fever.   HENT:  Negative for congestion, ear pain,

## 2024-11-06 NOTE — PROGRESS NOTES
extremity exam:  There is no tenderness to palpation about the hip, knee, ankle or foot.  Unrestricted full motion is present.  Stability is normal with provocative tests, 5/5 strength, and skin is normal.     Left lower extremity exam:  There is no tenderness to palpation about the hip, knee, ankle or foot.  Unrestricted full motion is present.  Stability is normal with provocative tests, 5/5 strength, and skin is normal.      NECK: No tenderness to palpation or spinous processes, musculature. Normal flexion, extension, lateral bending and rotation. Stability, strength, and skin normal.  BACK: No tenderness to palpation or spinous processes, musculature. Normal flexion, extension, lateral bending and rotation. Stability, strength, and skin normal.    Radiology:   EXAM:  THREE VIEWS OF THE LEFT SHOULDER     HISTORY: Fall.     COMPARISON:  Left humerus x-ray same day and left shoulder x-ray 08/23/2024     FINDINGS: There is narrowing and osteophyte formation of the glenohumeral joint.  The AC joint demonstrates osteophyte formation.  There is no displaced fracture or dislocation.  There is no abnormality of the acromion soft tissues are normal.     IMPRESSION:  Mild degenerative disease           ______________________________________   Electronically signed by: IVON JUARES M.D.  Date:     10/03/2024  Time:    15:59            Exam Ended: 10/03/24 14:53 CDT          AP internal rotation, AP external rotation, scapular Y views of the left shoulder were reviewed from an outside facility, reviewed and interpreted by me. The radiology reports has been reviewed as well.  Imaging quality is adequate for review.  Images show no acute fracture or dislocation.  Bone and soft tissue appear normal.  Mild to moderate degenerative changes to AC and glenohumeral joint.    Assessment:   1. Traumatic complete tear of left rotator cuff, initial encounter  -     MRI SHOULDER LEFT WO CONTRAST; Future       Plan:  Return for MRI

## 2024-11-07 ENCOUNTER — TELEPHONE (OUTPATIENT)
Age: 74
End: 2024-11-07

## 2024-11-12 ENCOUNTER — OFFICE VISIT (OUTPATIENT)
Dept: NEUROLOGY | Age: 74
End: 2024-11-12

## 2024-11-12 VITALS
HEIGHT: 64 IN | HEART RATE: 88 BPM | SYSTOLIC BLOOD PRESSURE: 119 MMHG | BODY MASS INDEX: 30.22 KG/M2 | DIASTOLIC BLOOD PRESSURE: 80 MMHG | RESPIRATION RATE: 18 BRPM | WEIGHT: 177 LBS

## 2024-11-12 DIAGNOSIS — G25.0 ESSENTIAL TREMOR: ICD-10-CM

## 2024-11-12 DIAGNOSIS — R25.1 TREMOR OF BOTH HANDS: ICD-10-CM

## 2024-11-12 DIAGNOSIS — R60.0 LOCALIZED EDEMA: ICD-10-CM

## 2024-11-12 RX ORDER — HYDROCODONE BITARTRATE AND ACETAMINOPHEN 5; 325 MG/1; MG/1
1 TABLET ORAL EVERY 6 HOURS PRN
COMMUNITY

## 2024-11-12 RX ORDER — FUROSEMIDE 40 MG/1
40 TABLET ORAL DAILY
Qty: 30 TABLET | Refills: 1 | Status: SHIPPED | OUTPATIENT
Start: 2024-11-12

## 2024-11-12 RX ORDER — PRIMIDONE 50 MG/1
TABLET ORAL
Qty: 90 TABLET | Refills: 3 | Status: SHIPPED | OUTPATIENT
Start: 2024-11-12

## 2024-11-12 NOTE — TELEPHONE ENCOUNTER
Received fax from pharmacy requesting refill on pts medication(s). Pt was last seen in office on 11/6/2024  and has a follow up scheduled for 12/5/2024. Will send request to  Teodora Larson  for authorization.     Requested Prescriptions     Pending Prescriptions Disp Refills    furosemide (LASIX) 40 MG tablet [Pharmacy Med Name: Furosemide 40 MG Oral Tablet] 30 tablet 11     Sig: Take 1 tablet by mouth once daily

## 2024-11-12 NOTE — PATIENT INSTRUCTIONS
Primidone instructions:     Week 1 and 2: take 3 tablets (150mg) in the morning and 3 tablets (150mg) at night   Week 3 and on: take 4 tablets (200mg) in the morning and 4 tablets (200mg) at night   as per DASI, score 9.89

## 2024-11-12 NOTE — PROGRESS NOTES
REVIEW OF SYSTEMS    Constitutional: []Fever []Sweats []Chills [] Recent Injury [x] Denies all unless marked  HEENT:[]Headache  [] Head Injury [] Hearing Loss  [] Sore Throat  [] Ear Ache [x] Denies all unless marked  Spine:  [] Neck pain  [] Back pain  [] Sciaticia  [x] Denies all unless marked  Cardiovascular:[]Heart Disease []Palpitations [] Chest Pain   [x] Denies all unless marked  Pulmonary: []Shortness of Breath []Cough   [x] Denies all unless marked  Psychiatric/Behavioral:[] Depression [] Anxiety [x] Denies all unless marked  Gastrointestinal: []Nausea  []Vomiting  []Abdominal Pain  []Constipation  []Diarrhea  [x] Denies all unless marked  Genitourinary:   [] Frequency  [] Urgency  [] Dysuria [] Incontinence  [x] Denies all unless marked  Extremities: []Pain  []Swelling  [x] Denies all unless marked  Musculoskeletal: [] Myalgias  [] Joint Pain  [] Arthritis [] Muscle Cramps [] Muscle Twitches  [x] Denies all unless marked  Sleep: []Insomnia[]Snoring []Restless Legs  []Sleep Apnea  []Daytime Sleepiness  [x] Denies all unless marked  Skin:[] Rash [] Color Change [x] Denies all unless marked   Neurological:[]Visual Disturbance [] Memory Loss []Loss of Balance []Slurred Speech []Weakness []Seizures  [] Dizziness [x] Denies all unless marked    
CRP 0.34 08/22/2019        XR SHOULDER LEFT (MIN 2 VIEWS)    Result Date: 10/3/2024    EXAM:  THREE VIEWS OF THE LEFT SHOULDER  HISTORY: Fall.  COMPARISON:  Left humerus x-ray same day and left shoulder x-ray 08/23/2024  FINDINGS: There is narrowing and osteophyte formation of the glenohumeral joint.  The AC joint demonstrates osteophyte formation.  There is no displaced fracture or dislocation.  There is no abnormality of the acromion soft tissues are normal.      Mild degenerative disease    ______________________________________ Electronically signed by: IVON JUARES M.D. Date:     10/03/2024 Time:    15:59     XR HUMERUS LEFT (MIN 2 VIEWS)    Result Date: 10/3/2024  EXAM: LEFT HUMERUS RADIOGRAPHS.  HISTORY: Trauma. Left humerus pain.  TECHNIQUE: Two views.  Frontal and lateral.  COMPARISON: None.      There is no acute fracture.  No osseous lesions are identified.  The soft tissues are unremarkable.  Adjacent joint spaces are remarkable for glenohumeral and AC joint arthritic change.   ______________________________________ Electronically signed by: IVON PLAZA M.D. Date:     10/03/2024 Time:    15:58     ASSESSMENT:    Anabel Ibanez is a 74 y.o. year old female here for evaluation of tremor, history of headaches.  She was last seen in 2022 for headaches.  She received several rounds of Botox therapy which was quite beneficial for her headaches.  She reports a few months ago she had return of the headaches but they have since resolved.  Her biggest concern today is a tremor that began several months ago now.  On exam she has a mixed tremor with both resting and intention components.  Head titubation noted as well. Questionable rigidity over the right, slowed ANNA, loss of arm swing over the right as well.  Her gait is not shuffling, no clear bradykinesia.  At this time she has subtle parkinsonian symptoms but essential tremor is more predominant.  Will plan for MRI brain to exclude central source.  Will

## 2024-11-13 ENCOUNTER — HOSPITAL ENCOUNTER (OUTPATIENT)
Dept: MRI IMAGING | Age: 74
Discharge: HOME | End: 2024-11-13
Payer: MEDICARE

## 2024-11-13 DIAGNOSIS — S46.012A TRAUMATIC COMPLETE TEAR OF LEFT ROTATOR CUFF, INITIAL ENCOUNTER: ICD-10-CM

## 2024-11-13 PROCEDURE — 73221 MRI JOINT UPR EXTREM W/O DYE: CPT

## 2024-12-03 NOTE — PROGRESS NOTES
Orthopaedic Clinic Note - Established Patient    NAME:  Anabel Ibanez   : 1950  MRN: [id number]    2024    CHIEF COMPLAINT:  follow up for left shoulder pain with MRI results    HISTORY OF PRESENT ILLNESS:   The patient is a 74 y.o. female who returns today for follow up of left shoulder pain following a fall 3 to 4 months ago.  Since last visit, pain has remained. She returns to clinic today to go over MRI results.    Past Medical History:        Diagnosis Date    Anxiety     Arthritis     Dye's esophagus     Cerebral artery occlusion with cerebral infarction (HCC)     tia, 15 or 16 years ago, no deficits    Closed dislocation of metacarpal joint of finger of left hand 2017    Closed fracture of left radius and ulna with routine healing 2017    Closed fracture of right patella 2017    Depression     Diabetes mellitus (HCC)     Edema     feet and ankles    GERD (gastroesophageal reflux disease)     Headache(784.0)     migraines    Hepatitis A     Hyperlipidemia     Hypertension     Mini stroke     Obese     Sleep apnea     CPAP    TIA (transient ischemic attack)        Past Surgical History:        Procedure Laterality Date    ANKLE SURGERY Right     CHOLECYSTECTOMY      COLONOSCOPY  2015    Dr Osorio-AP w/atypia, 5 yr recall    COLONOSCOPY  2019    Dr HARINI Crook ([facility]) Non-bleeding internal hemorrhoids, diverticulosis-Tubular AP (-) dysplasia, 3 yr recall    COLONOSCOPY N/A 2022    Dr HARINI Crook-Diverticular disease-AP x 1, 5 yr recall    CYSTOSCOPY Left 2018    CYSTOSCOPY URETEROSCOPY  PLACEMENT DOUBLE J STENT ON LEFT RIGHT  RETROGRADE PYELOGRAMS performed by Shadi Qureshi MD at [facility] OR    HYSTERECTOMY ABDOMINAL Bilateral 2019    TOTAL LAPAROSCOPIC HYSTERECTOMY BILATERAL SALPINGOOPHERECTOMY WITH DAVINCI CYSTOSCOPY performed by Analisa Quezada MD at [facility] OR    HYSTERECTOMY, VAGINAL      GA EGD TRANSORAL BIOPSY SINGLE/MULTIPLE N/A 2018

## 2024-12-04 ENCOUNTER — OFFICE VISIT (OUTPATIENT)
Age: 74
End: 2024-12-04

## 2024-12-04 VITALS — BODY MASS INDEX: 30.38 KG/M2 | HEIGHT: 64 IN

## 2024-12-04 DIAGNOSIS — S46.012D TRAUMATIC INCOMPLETE TEAR OF LEFT ROTATOR CUFF, SUBSEQUENT ENCOUNTER: Primary | ICD-10-CM

## 2024-12-04 DIAGNOSIS — M75.52 ACUTE BURSITIS OF LEFT SHOULDER: ICD-10-CM

## 2024-12-04 RX ORDER — TRIAMCINOLONE ACETONIDE 40 MG/ML
40 INJECTION, SUSPENSION INTRA-ARTICULAR; INTRAMUSCULAR ONCE
Status: COMPLETED | OUTPATIENT
Start: 2024-12-04 | End: 2024-12-04

## 2024-12-04 RX ORDER — LIDOCAINE HYDROCHLORIDE 10 MG/ML
2 INJECTION, SOLUTION INFILTRATION; PERINEURAL ONCE
Status: COMPLETED | OUTPATIENT
Start: 2024-12-04 | End: 2024-12-04

## 2024-12-04 RX ORDER — BUPIVACAINE HYDROCHLORIDE 2.5 MG/ML
2 INJECTION, SOLUTION INFILTRATION; PERINEURAL ONCE
Status: COMPLETED | OUTPATIENT
Start: 2024-12-04 | End: 2024-12-04

## 2024-12-04 RX ADMIN — LIDOCAINE HYDROCHLORIDE 2 ML: 10 INJECTION, SOLUTION INFILTRATION; PERINEURAL at 11:15

## 2024-12-04 RX ADMIN — TRIAMCINOLONE ACETONIDE 40 MG: 40 INJECTION, SUSPENSION INTRA-ARTICULAR; INTRAMUSCULAR at 11:15

## 2024-12-04 RX ADMIN — BUPIVACAINE HYDROCHLORIDE 5 MG: 2.5 INJECTION, SOLUTION INFILTRATION; PERINEURAL at 11:14

## 2024-12-05 ENCOUNTER — OFFICE VISIT (OUTPATIENT)
Dept: FAMILY MEDICINE CLINIC | Age: 74
End: 2024-12-05

## 2024-12-05 VITALS
DIASTOLIC BLOOD PRESSURE: 64 MMHG | TEMPERATURE: 98.1 F | WEIGHT: 176.25 LBS | SYSTOLIC BLOOD PRESSURE: 124 MMHG | HEART RATE: 82 BPM | OXYGEN SATURATION: 98 % | HEIGHT: 64 IN | BODY MASS INDEX: 30.09 KG/M2

## 2024-12-05 DIAGNOSIS — G89.29 CHRONIC LEFT SHOULDER PAIN: ICD-10-CM

## 2024-12-05 DIAGNOSIS — M25.512 CHRONIC LEFT SHOULDER PAIN: ICD-10-CM

## 2024-12-05 DIAGNOSIS — F51.01 PRIMARY INSOMNIA: Primary | ICD-10-CM

## 2024-12-05 DIAGNOSIS — Z79.899 MEDICATION MANAGEMENT: ICD-10-CM

## 2024-12-05 DIAGNOSIS — E11.42 TYPE 2 DIABETES MELLITUS WITH DIABETIC POLYNEUROPATHY, WITHOUT LONG-TERM CURRENT USE OF INSULIN (HCC): ICD-10-CM

## 2024-12-05 LAB
ALCOHOL URINE: NORMAL
AMPHETAMINE SCREEN URINE: NORMAL
BARBITURATE SCREEN URINE: NORMAL
BENZODIAZEPINE SCREEN, URINE: POSITIVE
BUPRENORPHINE URINE: NORMAL
COCAINE METABOLITE SCREEN URINE: NORMAL
FENTANYL SCREEN, URINE: NORMAL
GABAPENTIN SCREEN, URINE: NORMAL
MDMA, URINE: NORMAL
METHADONE SCREEN, URINE: NORMAL
METHAMPHETAMINE, URINE: NORMAL
OPIATE SCREEN URINE: NORMAL
OXYCODONE SCREEN URINE: NORMAL
PHENCYCLIDINE SCREEN URINE: NORMAL
PROPOXYPHENE SCREEN, URINE: NORMAL
SYNTHETIC CANNABINOIDS(K2) SCREEN, URINE: NORMAL
THC SCREEN, URINE: NORMAL
TRAMADOL SCREEN URINE: NORMAL
TRICYCLIC ANTIDEPRESSANTS, UR: NORMAL

## 2024-12-05 RX ORDER — TEMAZEPAM 15 MG/1
15 CAPSULE ORAL NIGHTLY PRN
Qty: 30 CAPSULE | Refills: 0 | Status: SHIPPED | OUTPATIENT
Start: 2024-12-05 | End: 2025-01-04

## 2024-12-05 ASSESSMENT — ENCOUNTER SYMPTOMS
SORE THROAT: 0
ABDOMINAL PAIN: 0
CONSTIPATION: 0
DIARRHEA: 0
RHINORRHEA: 0
BACK PAIN: 1
COUGH: 0
VOMITING: 0
SHORTNESS OF BREATH: 0
NAUSEA: 0

## 2024-12-05 NOTE — PROGRESS NOTES
Anabel Ibanez (:  1950) is a 74 y.o. female,Established patient, here for evaluation of the following chief complaint(s):  Insomnia      ASSESSMENT/PLAN:    ICD-10-CM    1. Primary insomnia  F51.01 temazepam (RESTORIL) 15 MG capsule      2. Medication management  Z79.899 POCT Rapid Drug Screen: consistent      3. Type 2 diabetes mellitus with diabetic polyneuropathy, without long-term current use of insulin (HCC)  E11.42 Stable.  Continue Ozempic.  Recommend ADA diet and exercise as tolerated      4. Chronic left shoulder pain  M25.512 Keep follow-up with orthopaedics    G89.29           Return in about 4 weeks (around 2025), or if symptoms worsen or fail to improve.    SUBJECTIVE/OBJECTIVE:  HPI    LEFT SHOULDER PAIN:  She saw orthopaedics yesterday.  The had a joint injection and she starts PT on Wednesday.  She has not seen any improvement in her shoulder pain yet.  Reports continued left shoulder pain and decreased ROM    She saw neurology on 2024  She is now taking Primidone 150 mg BID.  Tremors continue.    INSOMNIA:  She is taking Restoril 15 mg nightly.  \"My shoulder is not waking me up as much.\"   \"When I get up in the morning, it really hurts.\"  Last fill was 2024, #30  She is sleeping 9-10 hours a night.  She is requesting refill today.    DM:  Blood sugar log reviewed: 112, 109, 104, 107, 109, 105, 103, 107, 105, 106, 104, 108  She continues on Ozempic 2 mg nightly weeky  She has lost 1 pound since last visit.  \"I haven't gotten my eyes checked.\"    /64 (Site: Right Upper Arm, Position: Sitting, Cuff Size: Small Adult)   Pulse 82   Temp 98.1 °F (36.7 °C) (Temporal)   Ht 1.626 m (5' 4\")   Wt 79.9 kg (176 lb 4 oz)   SpO2 98%   BMI 30.25 kg/m²     Review of Systems   Constitutional:  Negative for chills, fatigue and fever.   HENT:  Negative for congestion, ear pain, rhinorrhea and sore throat.    Eyes:  Negative for visual disturbance.   Respiratory:  Negative for cough

## 2025-01-07 ENCOUNTER — OFFICE VISIT (OUTPATIENT)
Age: 75
End: 2025-01-07
Payer: MEDICARE

## 2025-01-07 VITALS
BODY MASS INDEX: 29.43 KG/M2 | SYSTOLIC BLOOD PRESSURE: 120 MMHG | DIASTOLIC BLOOD PRESSURE: 80 MMHG | HEART RATE: 97 BPM | TEMPERATURE: 97 F | WEIGHT: 172.38 LBS | HEIGHT: 64 IN | OXYGEN SATURATION: 97 %

## 2025-01-07 DIAGNOSIS — G89.29 CHRONIC LEFT SHOULDER PAIN: ICD-10-CM

## 2025-01-07 DIAGNOSIS — R25.1 TREMOR OF BOTH HANDS: ICD-10-CM

## 2025-01-07 DIAGNOSIS — E11.42 TYPE 2 DIABETES MELLITUS WITH DIABETIC POLYNEUROPATHY, WITHOUT LONG-TERM CURRENT USE OF INSULIN (HCC): ICD-10-CM

## 2025-01-07 DIAGNOSIS — F51.01 PRIMARY INSOMNIA: Primary | ICD-10-CM

## 2025-01-07 DIAGNOSIS — M25.512 CHRONIC LEFT SHOULDER PAIN: ICD-10-CM

## 2025-01-07 PROCEDURE — 3046F HEMOGLOBIN A1C LEVEL >9.0%: CPT | Performed by: NURSE PRACTITIONER

## 2025-01-07 PROCEDURE — 1090F PRES/ABSN URINE INCON ASSESS: CPT | Performed by: NURSE PRACTITIONER

## 2025-01-07 PROCEDURE — 1123F ACP DISCUSS/DSCN MKR DOCD: CPT | Performed by: NURSE PRACTITIONER

## 2025-01-07 PROCEDURE — 99214 OFFICE O/P EST MOD 30 MIN: CPT | Performed by: NURSE PRACTITIONER

## 2025-01-07 PROCEDURE — G8399 PT W/DXA RESULTS DOCUMENT: HCPCS | Performed by: NURSE PRACTITIONER

## 2025-01-07 PROCEDURE — 3079F DIAST BP 80-89 MM HG: CPT | Performed by: NURSE PRACTITIONER

## 2025-01-07 PROCEDURE — 1159F MED LIST DOCD IN RCRD: CPT | Performed by: NURSE PRACTITIONER

## 2025-01-07 PROCEDURE — M1299 PR FLU IMMUNIZE ORDER/ADMIN: HCPCS | Performed by: NURSE PRACTITIONER

## 2025-01-07 PROCEDURE — G8427 DOCREV CUR MEDS BY ELIG CLIN: HCPCS | Performed by: NURSE PRACTITIONER

## 2025-01-07 PROCEDURE — G8417 CALC BMI ABV UP PARAM F/U: HCPCS | Performed by: NURSE PRACTITIONER

## 2025-01-07 PROCEDURE — 3074F SYST BP LT 130 MM HG: CPT | Performed by: NURSE PRACTITIONER

## 2025-01-07 RX ORDER — TEMAZEPAM 15 MG/1
15 CAPSULE ORAL NIGHTLY PRN
Qty: 30 CAPSULE | Refills: 0 | Status: SHIPPED | OUTPATIENT
Start: 2025-01-07 | End: 2025-02-06

## 2025-01-07 ASSESSMENT — ENCOUNTER SYMPTOMS
RHINORRHEA: 0
NAUSEA: 0
CONSTIPATION: 0
SHORTNESS OF BREATH: 0
BACK PAIN: 1
COUGH: 0
VOMITING: 0
SORE THROAT: 0
ABDOMINAL PAIN: 0
DIARRHEA: 0

## 2025-01-07 ASSESSMENT — PATIENT HEALTH QUESTIONNAIRE - PHQ9
5. POOR APPETITE OR OVEREATING: NOT AT ALL
8. MOVING OR SPEAKING SO SLOWLY THAT OTHER PEOPLE COULD HAVE NOTICED. OR THE OPPOSITE, BEING SO FIGETY OR RESTLESS THAT YOU HAVE BEEN MOVING AROUND A LOT MORE THAN USUAL: NOT AT ALL
2. FEELING DOWN, DEPRESSED OR HOPELESS: NOT AT ALL
10. IF YOU CHECKED OFF ANY PROBLEMS, HOW DIFFICULT HAVE THESE PROBLEMS MADE IT FOR YOU TO DO YOUR WORK, TAKE CARE OF THINGS AT HOME, OR GET ALONG WITH OTHER PEOPLE: NOT DIFFICULT AT ALL
SUM OF ALL RESPONSES TO PHQ QUESTIONS 1-9: 3
9. THOUGHTS THAT YOU WOULD BE BETTER OFF DEAD, OR OF HURTING YOURSELF: NOT AT ALL
SUM OF ALL RESPONSES TO PHQ9 QUESTIONS 1 & 2: 0
SUM OF ALL RESPONSES TO PHQ QUESTIONS 1-9: 3
3. TROUBLE FALLING OR STAYING ASLEEP: NOT AT ALL
7. TROUBLE CONCENTRATING ON THINGS, SUCH AS READING THE NEWSPAPER OR WATCHING TELEVISION: NOT AT ALL
1. LITTLE INTEREST OR PLEASURE IN DOING THINGS: NOT AT ALL
6. FEELING BAD ABOUT YOURSELF - OR THAT YOU ARE A FAILURE OR HAVE LET YOURSELF OR YOUR FAMILY DOWN: NOT AT ALL
SUM OF ALL RESPONSES TO PHQ QUESTIONS 1-9: 3
4. FEELING TIRED OR HAVING LITTLE ENERGY: NEARLY EVERY DAY
SUM OF ALL RESPONSES TO PHQ QUESTIONS 1-9: 3

## 2025-01-07 NOTE — PROGRESS NOTES
Anabel Ibanez (:  1950) is a 74 y.o. female,Established patient, here for evaluation of the following chief complaint(s):  Follow-up (insomnia) and Diabetes Care Management      ASSESSMENT/PLAN:    ICD-10-CM    1. Primary insomnia  F51.01 temazepam (RESTORIL) 15 MG capsule      2. Type 2 diabetes mellitus with diabetic polyneuropathy, without long-term current use of insulin (HCC)  E11.42 Stable  Continue Ozempic 2 mg weekly  Recommend ADA diet  Recommend exercise as tolerated      3. Chronic left shoulder pain  M25.512 Improving  Finish physical therapy    G89.29       4. Tremor of both hands  R25.1 Keep follow-up with neurology  Continue primidone          Return in about 4 weeks (around 2025), or if symptoms worsen or fail to improve, for Annual Wellness Exam.    SUBJECTIVE/OBJECTIVE:  HPI    LEFT SHOULDER PAIN:  \"It much improved.\"  \"Therapy has really been helping.\"  She now has increased ROM of her left shoulder.    DM:  She has lost 4 pounds over the last month.  She continues on Ozempic 2 mg weekly.  Blood sugars: 111, 107, 105, 110, 110, 107, 111, 109, 112, 110, 108, 104, 117, 108, 110, 104, 107  Denies any blood sugar lows.  She still has not made her eye appointment yet.  A1c: 5.5 (2024)    INSOMNIA:  \"I am sleeping real good.\"  She continues on Restoril 15 mg nightly.  She is sleeping much better.  Last fill of Restoril was 2024, #30  She is requesting refill today.    Her tremors are continued.  She is taking Primidone 200 mg BID  She is following with neurology    /80 (Site: Right Upper Arm, Position: Sitting, Cuff Size: Medium Adult)   Pulse 97   Temp 97 °F (36.1 °C) (Temporal)   Ht 1.626 m (5' 4\")   Wt 78.2 kg (172 lb 6 oz)   SpO2 97%   BMI 29.59 kg/m²     Review of Systems   Constitutional:  Negative for chills, fatigue and fever.   HENT:  Negative for congestion, ear pain, rhinorrhea and sore throat.    Eyes:  Negative for visual disturbance.   Respiratory:

## 2025-01-17 DIAGNOSIS — I10 ESSENTIAL HYPERTENSION: Chronic | ICD-10-CM

## 2025-01-17 DIAGNOSIS — I95.9 HYPOTENSION, UNSPECIFIED HYPOTENSION TYPE: ICD-10-CM

## 2025-01-17 RX ORDER — METOPROLOL SUCCINATE 100 MG/1
100 TABLET, EXTENDED RELEASE ORAL DAILY
Qty: 90 TABLET | Refills: 3 | Status: SHIPPED | OUTPATIENT
Start: 2025-01-17

## 2025-01-17 RX ORDER — AMLODIPINE BESYLATE 2.5 MG/1
2.5 TABLET ORAL DAILY
Qty: 90 TABLET | Refills: 3 | Status: SHIPPED | OUTPATIENT
Start: 2025-01-17

## 2025-01-17 NOTE — TELEPHONE ENCOUNTER
Received fax from pharmacy requesting refill on pts medication(s). Pt was last seen in office on 1/7/2025 and has a follow up scheduled for 2/5/2025. Will send request to LUIZ Castañeda for authorization.     Requested Prescriptions     Pending Prescriptions Disp Refills    metoprolol succinate (TOPROL XL) 100 MG extended release tablet [Pharmacy Med Name: Metoprolol Succinate  MG Oral Tablet Extended Release 24 Hour] 90 tablet 3     Sig: Take 1 tablet by mouth once daily    amLODIPine (NORVASC) 2.5 MG tablet [Pharmacy Med Name: amLODIPine Besylate 2.5 MG Oral Tablet] 90 tablet 3     Sig: TAKE 1 TABLET BY MOUTH IN THE MORNING

## 2025-02-05 ENCOUNTER — OFFICE VISIT (OUTPATIENT)
Age: 75
End: 2025-02-05

## 2025-02-05 VITALS
TEMPERATURE: 97 F | BODY MASS INDEX: 29.09 KG/M2 | HEIGHT: 64 IN | DIASTOLIC BLOOD PRESSURE: 70 MMHG | SYSTOLIC BLOOD PRESSURE: 110 MMHG | OXYGEN SATURATION: 98 % | HEART RATE: 85 BPM | WEIGHT: 170.38 LBS

## 2025-02-05 DIAGNOSIS — F51.01 PRIMARY INSOMNIA: ICD-10-CM

## 2025-02-05 DIAGNOSIS — F41.1 GAD (GENERALIZED ANXIETY DISORDER): ICD-10-CM

## 2025-02-05 DIAGNOSIS — F33.42 RECURRENT MAJOR DEPRESSIVE DISORDER, IN FULL REMISSION (HCC): ICD-10-CM

## 2025-02-05 DIAGNOSIS — I10 ESSENTIAL HYPERTENSION: ICD-10-CM

## 2025-02-05 DIAGNOSIS — E11.42 TYPE 2 DIABETES MELLITUS WITH DIABETIC POLYNEUROPATHY, WITHOUT LONG-TERM CURRENT USE OF INSULIN (HCC): ICD-10-CM

## 2025-02-05 DIAGNOSIS — R51.9 ACUTE NONINTRACTABLE HEADACHE, UNSPECIFIED HEADACHE TYPE: ICD-10-CM

## 2025-02-05 DIAGNOSIS — Z00.00 MEDICARE ANNUAL WELLNESS VISIT, SUBSEQUENT: Primary | ICD-10-CM

## 2025-02-05 DIAGNOSIS — E78.2 MIXED HYPERLIPIDEMIA: ICD-10-CM

## 2025-02-05 DIAGNOSIS — R25.1 TREMOR OF BOTH HANDS: ICD-10-CM

## 2025-02-05 RX ORDER — TEMAZEPAM 15 MG/1
15 CAPSULE ORAL NIGHTLY PRN
Qty: 30 CAPSULE | Refills: 0 | Status: SHIPPED | OUTPATIENT
Start: 2025-02-05 | End: 2025-03-07

## 2025-02-05 RX ORDER — VENLAFAXINE HYDROCHLORIDE 150 MG/1
150 CAPSULE, EXTENDED RELEASE ORAL DAILY
Qty: 90 CAPSULE | Refills: 3 | Status: SHIPPED | OUTPATIENT
Start: 2025-02-05

## 2025-02-05 RX ORDER — BUTALBITAL, ACETAMINOPHEN AND CAFFEINE 50; 325; 40 MG/1; MG/1; MG/1
1 TABLET ORAL EVERY 8 HOURS PRN
Qty: 30 TABLET | Refills: 0 | Status: SHIPPED | OUTPATIENT
Start: 2025-02-05

## 2025-02-05 SDOH — ECONOMIC STABILITY: FOOD INSECURITY: WITHIN THE PAST 12 MONTHS, THE FOOD YOU BOUGHT JUST DIDN'T LAST AND YOU DIDN'T HAVE MONEY TO GET MORE.: NEVER TRUE

## 2025-02-05 SDOH — ECONOMIC STABILITY: FOOD INSECURITY: WITHIN THE PAST 12 MONTHS, YOU WORRIED THAT YOUR FOOD WOULD RUN OUT BEFORE YOU GOT MONEY TO BUY MORE.: NEVER TRUE

## 2025-02-05 ASSESSMENT — ENCOUNTER SYMPTOMS
CONSTIPATION: 0
SHORTNESS OF BREATH: 0
ABDOMINAL PAIN: 0
BACK PAIN: 1
DIARRHEA: 0
COUGH: 0
VOMITING: 0
NAUSEA: 0
RHINORRHEA: 0
SORE THROAT: 0

## 2025-02-05 ASSESSMENT — PATIENT HEALTH QUESTIONNAIRE - PHQ9
SUM OF ALL RESPONSES TO PHQ9 QUESTIONS 1 & 2: 0
7. TROUBLE CONCENTRATING ON THINGS, SUCH AS READING THE NEWSPAPER OR WATCHING TELEVISION: NOT AT ALL
5. POOR APPETITE OR OVEREATING: NOT AT ALL
SUM OF ALL RESPONSES TO PHQ QUESTIONS 1-9: 0
SUM OF ALL RESPONSES TO PHQ QUESTIONS 1-9: 0
2. FEELING DOWN, DEPRESSED OR HOPELESS: NOT AT ALL
SUM OF ALL RESPONSES TO PHQ QUESTIONS 1-9: 0
1. LITTLE INTEREST OR PLEASURE IN DOING THINGS: NOT AT ALL
4. FEELING TIRED OR HAVING LITTLE ENERGY: NOT AT ALL
SUM OF ALL RESPONSES TO PHQ QUESTIONS 1-9: 0
8. MOVING OR SPEAKING SO SLOWLY THAT OTHER PEOPLE COULD HAVE NOTICED. OR THE OPPOSITE, BEING SO FIGETY OR RESTLESS THAT YOU HAVE BEEN MOVING AROUND A LOT MORE THAN USUAL: NOT AT ALL
3. TROUBLE FALLING OR STAYING ASLEEP: NOT AT ALL
6. FEELING BAD ABOUT YOURSELF - OR THAT YOU ARE A FAILURE OR HAVE LET YOURSELF OR YOUR FAMILY DOWN: NOT AT ALL
9. THOUGHTS THAT YOU WOULD BE BETTER OFF DEAD, OR OF HURTING YOURSELF: NOT AT ALL
10. IF YOU CHECKED OFF ANY PROBLEMS, HOW DIFFICULT HAVE THESE PROBLEMS MADE IT FOR YOU TO DO YOUR WORK, TAKE CARE OF THINGS AT HOME, OR GET ALONG WITH OTHER PEOPLE: NOT DIFFICULT AT ALL

## 2025-02-05 ASSESSMENT — LIFESTYLE VARIABLES
HOW OFTEN DO YOU HAVE A DRINK CONTAINING ALCOHOL: NEVER
HOW MANY STANDARD DRINKS CONTAINING ALCOHOL DO YOU HAVE ON A TYPICAL DAY: PATIENT DOES NOT DRINK

## 2025-02-06 DIAGNOSIS — R11.0 NAUSEA IN ADULT: ICD-10-CM

## 2025-02-06 DIAGNOSIS — K21.9 GASTROESOPHAGEAL REFLUX DISEASE, UNSPECIFIED WHETHER ESOPHAGITIS PRESENT: ICD-10-CM

## 2025-02-06 RX ORDER — FAMOTIDINE 40 MG/1
40 TABLET, FILM COATED ORAL
Qty: 90 TABLET | Refills: 0 | Status: SHIPPED | OUTPATIENT
Start: 2025-02-06

## 2025-02-06 NOTE — TELEPHONE ENCOUNTER
Anabel called requesting a refill of the below medication which has been pended for you:     Requested Prescriptions     Pending Prescriptions Disp Refills    famotidine (PEPCID) 40 MG tablet [Pharmacy Med Name: Famotidine 40 MG Oral Tablet] 90 tablet 0     Sig: TAKE 1 TABLET BY MOUTH ONCE DAILY IN THE EVENING       Last Appointment Date: 2/5/2025  Next Appointment Date: Visit date not found    Allergies   Allergen Reactions    Metformin And Related Nausea And Vomiting

## 2025-02-24 DIAGNOSIS — R11.0 NAUSEA IN ADULT: ICD-10-CM

## 2025-02-24 DIAGNOSIS — E11.42 TYPE 2 DIABETES MELLITUS WITH DIABETIC POLYNEUROPATHY, WITHOUT LONG-TERM CURRENT USE OF INSULIN (HCC): ICD-10-CM

## 2025-02-24 DIAGNOSIS — K21.9 GASTROESOPHAGEAL REFLUX DISEASE, UNSPECIFIED WHETHER ESOPHAGITIS PRESENT: ICD-10-CM

## 2025-02-24 DIAGNOSIS — R25.1 TREMOR OF BOTH HANDS: Primary | ICD-10-CM

## 2025-02-25 RX ORDER — SEMAGLUTIDE 2.68 MG/ML
INJECTION, SOLUTION SUBCUTANEOUS
Qty: 3 ML | Refills: 3 | Status: SHIPPED | OUTPATIENT
Start: 2025-02-25

## 2025-02-25 RX ORDER — PRIMIDONE 50 MG/1
TABLET ORAL
Qty: 90 TABLET | Refills: 0 | Status: SHIPPED | OUTPATIENT
Start: 2025-02-25

## 2025-02-25 RX ORDER — PANTOPRAZOLE SODIUM 40 MG/1
40 TABLET, DELAYED RELEASE ORAL DAILY
Qty: 90 TABLET | Refills: 3 | Status: SHIPPED | OUTPATIENT
Start: 2025-02-25

## 2025-02-25 NOTE — TELEPHONE ENCOUNTER
Requested Prescriptions     Pending Prescriptions Disp Refills    primidone (MYSOLINE) 50 MG tablet [Pharmacy Med Name: Primidone 50 MG Oral Tablet] 90 tablet 0     Sig: TAKE 4 TABLETS BY MOUTH IN THE MORNING AND 4 AT NIGHT       Last Office Visit: 11/12/2024  Next Office Visit: 4/2/2025  Last Medication Refill: 11/12/24 rf 3

## 2025-02-25 NOTE — TELEPHONE ENCOUNTER
Received fax from pharmacy requesting refill on pts medication(s). Pt was last seen in office on 2/5/25 and has a follow up scheduled for 3/6/25. Will send request to  Teodora Larson  for patient.     Requested Prescriptions     Pending Prescriptions Disp Refills    pantoprazole (PROTONIX) 40 MG tablet [Pharmacy Med Name: Pantoprazole Sodium 40 MG Oral Tablet Delayed Release] 90 tablet 0     Sig: Take 1 tablet by mouth once daily    OZEMPIC, 2 MG/DOSE, 8 MG/3ML SOPN sc injection [Pharmacy Med Name: Ozempic (2 MG/DOSE) 8 MG/3ML Subcutaneous Solution Pen-injector] 3 mL 0     Sig: INJECT 2 MG SUBCUTANEOUSLY ONCE A WEEK

## 2025-03-06 ENCOUNTER — OFFICE VISIT (OUTPATIENT)
Age: 75
End: 2025-03-06
Payer: MEDICARE

## 2025-03-06 VITALS
TEMPERATURE: 97.7 F | HEART RATE: 83 BPM | HEIGHT: 64 IN | OXYGEN SATURATION: 95 % | BODY MASS INDEX: 29.53 KG/M2 | SYSTOLIC BLOOD PRESSURE: 120 MMHG | DIASTOLIC BLOOD PRESSURE: 70 MMHG | WEIGHT: 173 LBS

## 2025-03-06 DIAGNOSIS — R25.1 TREMOR OF BOTH HANDS: ICD-10-CM

## 2025-03-06 DIAGNOSIS — F51.01 PRIMARY INSOMNIA: Primary | ICD-10-CM

## 2025-03-06 DIAGNOSIS — E11.42 TYPE 2 DIABETES MELLITUS WITH DIABETIC POLYNEUROPATHY, WITHOUT LONG-TERM CURRENT USE OF INSULIN (HCC): ICD-10-CM

## 2025-03-06 PROCEDURE — 3046F HEMOGLOBIN A1C LEVEL >9.0%: CPT | Performed by: NURSE PRACTITIONER

## 2025-03-06 PROCEDURE — 3074F SYST BP LT 130 MM HG: CPT | Performed by: NURSE PRACTITIONER

## 2025-03-06 PROCEDURE — 99214 OFFICE O/P EST MOD 30 MIN: CPT | Performed by: NURSE PRACTITIONER

## 2025-03-06 PROCEDURE — G8417 CALC BMI ABV UP PARAM F/U: HCPCS | Performed by: NURSE PRACTITIONER

## 2025-03-06 PROCEDURE — G8399 PT W/DXA RESULTS DOCUMENT: HCPCS | Performed by: NURSE PRACTITIONER

## 2025-03-06 PROCEDURE — 1123F ACP DISCUSS/DSCN MKR DOCD: CPT | Performed by: NURSE PRACTITIONER

## 2025-03-06 PROCEDURE — G8427 DOCREV CUR MEDS BY ELIG CLIN: HCPCS | Performed by: NURSE PRACTITIONER

## 2025-03-06 PROCEDURE — 1090F PRES/ABSN URINE INCON ASSESS: CPT | Performed by: NURSE PRACTITIONER

## 2025-03-06 PROCEDURE — 3078F DIAST BP <80 MM HG: CPT | Performed by: NURSE PRACTITIONER

## 2025-03-06 PROCEDURE — 1159F MED LIST DOCD IN RCRD: CPT | Performed by: NURSE PRACTITIONER

## 2025-03-06 RX ORDER — TEMAZEPAM 15 MG/1
15 CAPSULE ORAL NIGHTLY PRN
Qty: 30 CAPSULE | Refills: 0 | Status: SHIPPED | OUTPATIENT
Start: 2025-03-06 | End: 2025-04-05

## 2025-03-06 RX ORDER — PRIMIDONE 50 MG/1
TABLET ORAL
Qty: 240 TABLET | Refills: 0 | Status: SHIPPED | OUTPATIENT
Start: 2025-03-06

## 2025-03-06 SDOH — ECONOMIC STABILITY: FOOD INSECURITY: WITHIN THE PAST 12 MONTHS, THE FOOD YOU BOUGHT JUST DIDN'T LAST AND YOU DIDN'T HAVE MONEY TO GET MORE.: NEVER TRUE

## 2025-03-06 SDOH — ECONOMIC STABILITY: FOOD INSECURITY: WITHIN THE PAST 12 MONTHS, YOU WORRIED THAT YOUR FOOD WOULD RUN OUT BEFORE YOU GOT MONEY TO BUY MORE.: NEVER TRUE

## 2025-03-06 ASSESSMENT — ENCOUNTER SYMPTOMS
CONSTIPATION: 0
ABDOMINAL PAIN: 0
SORE THROAT: 0
RHINORRHEA: 0
NAUSEA: 0
VOMITING: 0
COUGH: 0
DIARRHEA: 0
SHORTNESS OF BREATH: 0
BACK PAIN: 1

## 2025-03-06 ASSESSMENT — PATIENT HEALTH QUESTIONNAIRE - PHQ9
1. LITTLE INTEREST OR PLEASURE IN DOING THINGS: NOT AT ALL
SUM OF ALL RESPONSES TO PHQ QUESTIONS 1-9: 0
SUM OF ALL RESPONSES TO PHQ QUESTIONS 1-9: 0
2. FEELING DOWN, DEPRESSED OR HOPELESS: NOT AT ALL
8. MOVING OR SPEAKING SO SLOWLY THAT OTHER PEOPLE COULD HAVE NOTICED. OR THE OPPOSITE, BEING SO FIGETY OR RESTLESS THAT YOU HAVE BEEN MOVING AROUND A LOT MORE THAN USUAL: NOT AT ALL
SUM OF ALL RESPONSES TO PHQ QUESTIONS 1-9: 0
10. IF YOU CHECKED OFF ANY PROBLEMS, HOW DIFFICULT HAVE THESE PROBLEMS MADE IT FOR YOU TO DO YOUR WORK, TAKE CARE OF THINGS AT HOME, OR GET ALONG WITH OTHER PEOPLE: NOT DIFFICULT AT ALL
4. FEELING TIRED OR HAVING LITTLE ENERGY: NOT AT ALL
3. TROUBLE FALLING OR STAYING ASLEEP: NOT AT ALL
6. FEELING BAD ABOUT YOURSELF - OR THAT YOU ARE A FAILURE OR HAVE LET YOURSELF OR YOUR FAMILY DOWN: NOT AT ALL
7. TROUBLE CONCENTRATING ON THINGS, SUCH AS READING THE NEWSPAPER OR WATCHING TELEVISION: NOT AT ALL
SUM OF ALL RESPONSES TO PHQ QUESTIONS 1-9: 0
9. THOUGHTS THAT YOU WOULD BE BETTER OFF DEAD, OR OF HURTING YOURSELF: NOT AT ALL

## 2025-03-06 NOTE — PROGRESS NOTES
headaches (resolved).   Psychiatric/Behavioral:  Positive for sleep disturbance (improved with Restoril, requesting refill).        Physical Exam  Vitals reviewed.   Constitutional:       Appearance: She is well-developed. She is obese.   HENT:      Head: Normocephalic.      Right Ear: Tympanic membrane, ear canal and external ear normal.      Left Ear: Tympanic membrane, ear canal and external ear normal.      Nose: Nose normal.   Eyes:      General:         Right eye: No discharge.         Left eye: No discharge.   Neck:      Vascular: No carotid bruit.   Cardiovascular:      Rate and Rhythm: Normal rate and regular rhythm.   Pulmonary:      Effort: Pulmonary effort is normal.      Breath sounds: No decreased breath sounds, wheezing, rhonchi or rales.   Abdominal:      General: Bowel sounds are normal.      Palpations: Abdomen is soft.      Tenderness: There is no abdominal tenderness.   Musculoskeletal:      Left shoulder: Normal range of motion.      Cervical back: Normal range of motion.   Skin:     General: Skin is dry.   Neurological:      General: No focal deficit present.      Mental Status: She is alert and oriented to person, place, and time. Mental status is at baseline.      Comments: Right hand tremor   Psychiatric:         Mood and Affect: Mood normal.         Behavior: Behavior normal.         Thought Content: Thought content normal.         Judgment: Judgment normal.             An electronic signature was used to authenticate this note.    --LUIZ Castañeda

## 2025-03-10 DIAGNOSIS — R60.0 LOCALIZED EDEMA: ICD-10-CM

## 2025-03-10 RX ORDER — FUROSEMIDE 40 MG/1
40 TABLET ORAL DAILY
Qty: 30 TABLET | Refills: 9 | Status: SHIPPED | OUTPATIENT
Start: 2025-03-10

## 2025-03-10 NOTE — TELEPHONE ENCOUNTER
Received fax from pharmacy requesting refill on pts medication(s). Pt was last seen in office on 12/5/2024  and has a follow up scheduled for Visit date not found. Will send request to  Teodora Larson  for authorization.     Requested Prescriptions     Pending Prescriptions Disp Refills    furosemide (LASIX) 40 MG tablet [Pharmacy Med Name: Furosemide 40 MG Oral Tablet] 30 tablet 9     Sig: Take 1 tablet by mouth once daily

## 2025-03-29 DIAGNOSIS — K59.04 CHRONIC IDIOPATHIC CONSTIPATION: ICD-10-CM

## 2025-03-31 RX ORDER — DOCUSATE SODIUM 100 MG/1
100 CAPSULE, LIQUID FILLED ORAL 2 TIMES DAILY
Qty: 60 CAPSULE | Refills: 0 | Status: SHIPPED | OUTPATIENT
Start: 2025-03-31

## 2025-03-31 NOTE — TELEPHONE ENCOUNTER
Anabel called requesting a refill of the below medication which has been pended for you:     Requested Prescriptions     Pending Prescriptions Disp Refills    EQUATE STOOL SOFTENER 100 MG capsule [Pharmacy Med Name: EQ Stool Softener 100 MG Oral Capsule] 60 capsule 0     Sig: Take 1 capsule by mouth twice daily       Last Appointment Date: 3/6/2025  Next Appointment Date: Visit date not found    Allergies   Allergen Reactions    Metformin And Related Nausea And Vomiting

## 2025-04-02 ENCOUNTER — OFFICE VISIT (OUTPATIENT)
Age: 75
End: 2025-04-02
Payer: MEDICARE

## 2025-04-02 VITALS
SYSTOLIC BLOOD PRESSURE: 128 MMHG | HEIGHT: 64 IN | WEIGHT: 169.25 LBS | OXYGEN SATURATION: 95 % | HEART RATE: 102 BPM | BODY MASS INDEX: 28.89 KG/M2 | DIASTOLIC BLOOD PRESSURE: 70 MMHG | TEMPERATURE: 97.3 F

## 2025-04-02 DIAGNOSIS — G47.10 HYPERSOMNIA: ICD-10-CM

## 2025-04-02 DIAGNOSIS — E55.9 VITAMIN D DEFICIENCY: ICD-10-CM

## 2025-04-02 DIAGNOSIS — F33.2 SEVERE EPISODE OF RECURRENT MAJOR DEPRESSIVE DISORDER, WITHOUT PSYCHOTIC FEATURES (HCC): Primary | ICD-10-CM

## 2025-04-02 DIAGNOSIS — R53.82 CHRONIC FATIGUE: ICD-10-CM

## 2025-04-02 DIAGNOSIS — R25.1 TREMOR OF BOTH HANDS: ICD-10-CM

## 2025-04-02 DIAGNOSIS — R53.1 WEAKNESS: ICD-10-CM

## 2025-04-02 LAB
25(OH)D3 SERPL-MCNC: 25.9 NG/ML
ALBUMIN SERPL-MCNC: 4.3 G/DL (ref 3.5–5.2)
ALP SERPL-CCNC: 121 U/L (ref 35–104)
ALT SERPL-CCNC: 14 U/L (ref 10–35)
ANION GAP SERPL CALCULATED.3IONS-SCNC: 12 MMOL/L (ref 8–16)
AST SERPL-CCNC: 23 U/L (ref 10–35)
BASOPHILS # BLD: 0.1 K/UL (ref 0–0.2)
BASOPHILS NFR BLD: 0.6 % (ref 0–1)
BILIRUB SERPL-MCNC: 0.3 MG/DL (ref 0.2–1.2)
BUN SERPL-MCNC: 12 MG/DL (ref 8–23)
CALCIUM SERPL-MCNC: 10.9 MG/DL (ref 8.8–10.2)
CHLORIDE SERPL-SCNC: 104 MMOL/L (ref 98–107)
CO2 SERPL-SCNC: 26 MMOL/L (ref 22–29)
CREAT SERPL-MCNC: 0.8 MG/DL (ref 0.5–0.9)
EOSINOPHIL # BLD: 0.1 K/UL (ref 0–0.6)
EOSINOPHIL NFR BLD: 0.8 % (ref 0–5)
ERYTHROCYTE [DISTWIDTH] IN BLOOD BY AUTOMATED COUNT: 12.2 % (ref 11.5–14.5)
GLUCOSE SERPL-MCNC: 92 MG/DL (ref 70–99)
HCT VFR BLD AUTO: 43.7 % (ref 37–47)
HGB BLD-MCNC: 14.4 G/DL (ref 12–16)
IMM GRANULOCYTES # BLD: 0 K/UL
LYMPHOCYTES # BLD: 1.7 K/UL (ref 1.1–4.5)
LYMPHOCYTES NFR BLD: 22.6 % (ref 20–40)
MAGNESIUM SERPL-MCNC: 1.9 MG/DL (ref 1.6–2.4)
MCH RBC QN AUTO: 31 PG (ref 27–31)
MCHC RBC AUTO-ENTMCNC: 33 G/DL (ref 33–37)
MCV RBC AUTO: 94 FL (ref 81–99)
MONOCYTES # BLD: 0.7 K/UL (ref 0–0.9)
MONOCYTES NFR BLD: 8.9 % (ref 0–10)
NEUTROPHILS # BLD: 5.2 K/UL (ref 1.5–7.5)
NEUTS SEG NFR BLD: 66.8 % (ref 50–65)
PLATELET # BLD AUTO: 266 K/UL (ref 130–400)
PMV BLD AUTO: 10 FL (ref 9.4–12.3)
POTASSIUM SERPL-SCNC: 3.7 MMOL/L (ref 3.5–5.1)
PROT SERPL-MCNC: 7 G/DL (ref 6.4–8.3)
RBC # BLD AUTO: 4.65 M/UL (ref 4.2–5.4)
SODIUM SERPL-SCNC: 142 MMOL/L (ref 136–145)
TSH SERPL DL<=0.005 MIU/L-ACNC: 2.76 UIU/ML (ref 0.27–4.2)
VIT B12 SERPL-MCNC: 385 PG/ML (ref 232–1245)
WBC # BLD AUTO: 7.7 K/UL (ref 4.8–10.8)

## 2025-04-02 PROCEDURE — G8428 CUR MEDS NOT DOCUMENT: HCPCS | Performed by: NURSE PRACTITIONER

## 2025-04-02 PROCEDURE — G8399 PT W/DXA RESULTS DOCUMENT: HCPCS | Performed by: NURSE PRACTITIONER

## 2025-04-02 PROCEDURE — 3078F DIAST BP <80 MM HG: CPT | Performed by: NURSE PRACTITIONER

## 2025-04-02 PROCEDURE — 99214 OFFICE O/P EST MOD 30 MIN: CPT | Performed by: NURSE PRACTITIONER

## 2025-04-02 PROCEDURE — G8417 CALC BMI ABV UP PARAM F/U: HCPCS | Performed by: NURSE PRACTITIONER

## 2025-04-02 PROCEDURE — 1090F PRES/ABSN URINE INCON ASSESS: CPT | Performed by: NURSE PRACTITIONER

## 2025-04-02 PROCEDURE — 1123F ACP DISCUSS/DSCN MKR DOCD: CPT | Performed by: NURSE PRACTITIONER

## 2025-04-02 PROCEDURE — 3074F SYST BP LT 130 MM HG: CPT | Performed by: NURSE PRACTITIONER

## 2025-04-02 ASSESSMENT — PATIENT HEALTH QUESTIONNAIRE - PHQ9
10. IF YOU CHECKED OFF ANY PROBLEMS, HOW DIFFICULT HAVE THESE PROBLEMS MADE IT FOR YOU TO DO YOUR WORK, TAKE CARE OF THINGS AT HOME, OR GET ALONG WITH OTHER PEOPLE: VERY DIFFICULT
6. FEELING BAD ABOUT YOURSELF - OR THAT YOU ARE A FAILURE OR HAVE LET YOURSELF OR YOUR FAMILY DOWN: NOT AT ALL
3. TROUBLE FALLING OR STAYING ASLEEP: NEARLY EVERY DAY
1. LITTLE INTEREST OR PLEASURE IN DOING THINGS: NEARLY EVERY DAY
5. POOR APPETITE OR OVEREATING: NOT AT ALL
SUM OF ALL RESPONSES TO PHQ QUESTIONS 1-9: 16
SUM OF ALL RESPONSES TO PHQ QUESTIONS 1-9: 16
8. MOVING OR SPEAKING SO SLOWLY THAT OTHER PEOPLE COULD HAVE NOTICED. OR THE OPPOSITE, BEING SO FIGETY OR RESTLESS THAT YOU HAVE BEEN MOVING AROUND A LOT MORE THAN USUAL: SEVERAL DAYS
2. FEELING DOWN, DEPRESSED OR HOPELESS: NEARLY EVERY DAY
7. TROUBLE CONCENTRATING ON THINGS, SUCH AS READING THE NEWSPAPER OR WATCHING TELEVISION: NEARLY EVERY DAY
4. FEELING TIRED OR HAVING LITTLE ENERGY: NEARLY EVERY DAY
SUM OF ALL RESPONSES TO PHQ QUESTIONS 1-9: 16
SUM OF ALL RESPONSES TO PHQ QUESTIONS 1-9: 16
9. THOUGHTS THAT YOU WOULD BE BETTER OFF DEAD, OR OF HURTING YOURSELF: NOT AT ALL

## 2025-04-02 ASSESSMENT — ENCOUNTER SYMPTOMS
SHORTNESS OF BREATH: 0
NAUSEA: 0
DIARRHEA: 0
ABDOMINAL PAIN: 0
VOMITING: 0

## 2025-04-02 NOTE — PROGRESS NOTES
Devon-Kristin neg    UPPER GASTROINTESTINAL ENDOSCOPY  05/23/2017    Dr Osorio-w/balloon dilation, 12-13.5-15 mm-esophageal narrowing with diverticulum at 29 cm-Kristin (-), hiatal herni, Dye's (+) dysplasia (-)--1st dx--1 yr recall-Ct chest ordered    UPPER GASTROINTESTINAL ENDOSCOPY N/A 04/13/2022    Dr Renteria-No foreign body present    UPPER GASTROINTESTINAL ENDOSCOPY N/A 06/07/2022    Dr HARINI Crook-w/[id number] appearing ring in the distal esophagus,no Dye's, esophagitis, gastritis, 3 yr recall    UPPER GASTROINTESTINAL ENDOSCOPY  06/07/2022    Dr HARINI Crook-w/[id number] appearing ring in the distal esophagus,no Dye's, esophagitis, gastritis, 3 yr recall    WRIST FRACTURE SURGERY Right        Family History   Problem Relation Age of Onset    Heart Disease Mother     Breast Cancer Sister     Colon Cancer Neg Hx     Colon Polyps Neg Hx     Liver Cancer Neg Hx     Liver Disease Neg Hx     Esophageal Cancer Neg Hx     Rectal Cancer Neg Hx     Stomach Cancer Neg Hx          Review of Systems   Constitutional:  Positive for fatigue.   Respiratory:  Negative for shortness of breath.    Cardiovascular:  Negative for chest pain and palpitations.   Gastrointestinal:  Negative for abdominal pain, diarrhea, nausea and vomiting.   Neurological:  Positive for tremors (increased), weakness, light-headedness and headaches (variable).   Psychiatric/Behavioral:  The patient is nervous/anxious.         Sleeping too much  Depressed          Objective   Blood pressure 128/70, pulse (!) 102, temperature 97.3 °F (36.3 °C), temperature source Temporal, height 1.626 m (5' 4\"), weight 76.8 kg (169 lb 4 oz), SpO2 95%, not currently breastfeeding.       Physical Exam  Vitals reviewed.   Constitutional:       Appearance: She is well-developed. She is obese.   HENT:      Head: Normocephalic.      Right Ear: Tympanic membrane, ear canal and external ear normal.      Left Ear: Tympanic membrane, ear canal and external ear normal.

## 2025-04-03 ENCOUNTER — RESULTS FOLLOW-UP (OUTPATIENT)
Age: 75
End: 2025-04-03

## 2025-04-12 DIAGNOSIS — E78.2 MIXED HYPERLIPIDEMIA: Chronic | ICD-10-CM

## 2025-04-14 RX ORDER — ATORVASTATIN CALCIUM 80 MG/1
80 TABLET, FILM COATED ORAL NIGHTLY
Qty: 90 TABLET | Refills: 3 | Status: SHIPPED | OUTPATIENT
Start: 2025-04-14

## 2025-04-14 NOTE — TELEPHONE ENCOUNTER
Received fax from pharmacy requesting refill on pts medication(s). Pt was last seen in office on 4/2/2025  and has a follow up scheduled for 04/16/2025 Will send request to  Teodora Larson  for patient.     Requested Prescriptions     Pending Prescriptions Disp Refills    atorvastatin (LIPITOR) 80 MG tablet [Pharmacy Med Name: Atorvastatin Calcium 80 MG Oral Tablet] 90 tablet 3     Sig: Take 1 tablet by mouth nightly

## 2025-04-15 ENCOUNTER — RESULTS FOLLOW-UP (OUTPATIENT)
Age: 75
End: 2025-04-15

## 2025-04-15 DIAGNOSIS — E78.2 MIXED HYPERLIPIDEMIA: Chronic | ICD-10-CM

## 2025-04-15 LAB
CHOLEST SERPL-MCNC: 163 MG/DL (ref 0–199)
HDLC SERPL-MCNC: 43 MG/DL (ref 40–60)
LDLC SERPL CALC-MCNC: 89 MG/DL
TRIGL SERPL-MCNC: 155 MG/DL (ref 0–149)

## 2025-04-16 ENCOUNTER — OFFICE VISIT (OUTPATIENT)
Age: 75
End: 2025-04-16
Payer: MEDICARE

## 2025-04-16 VITALS
OXYGEN SATURATION: 96 % | HEIGHT: 64 IN | HEART RATE: 102 BPM | BODY MASS INDEX: 28.24 KG/M2 | WEIGHT: 165.38 LBS | SYSTOLIC BLOOD PRESSURE: 102 MMHG | TEMPERATURE: 97 F | DIASTOLIC BLOOD PRESSURE: 68 MMHG

## 2025-04-16 DIAGNOSIS — I95.1 ORTHOSTATIC HYPOTENSION: ICD-10-CM

## 2025-04-16 DIAGNOSIS — F33.2 SEVERE EPISODE OF RECURRENT MAJOR DEPRESSIVE DISORDER, WITHOUT PSYCHOTIC FEATURES (HCC): Primary | ICD-10-CM

## 2025-04-16 DIAGNOSIS — R00.2 HEART PALPITATIONS: ICD-10-CM

## 2025-04-16 DIAGNOSIS — F51.01 PRIMARY INSOMNIA: ICD-10-CM

## 2025-04-16 DIAGNOSIS — R00.2 PALPITATIONS: ICD-10-CM

## 2025-04-16 DIAGNOSIS — R42 EPISODIC LIGHTHEADEDNESS: ICD-10-CM

## 2025-04-16 DIAGNOSIS — I10 ESSENTIAL HYPERTENSION: Chronic | ICD-10-CM

## 2025-04-16 DIAGNOSIS — E53.8 VITAMIN B12 DEFICIENCY: ICD-10-CM

## 2025-04-16 DIAGNOSIS — E55.9 VITAMIN D DEFICIENCY: ICD-10-CM

## 2025-04-16 PROCEDURE — 93005 ELECTROCARDIOGRAM TRACING: CPT | Performed by: NURSE PRACTITIONER

## 2025-04-16 RX ORDER — CYANOCOBALAMIN 1000 UG/ML
1000 INJECTION, SOLUTION INTRAMUSCULAR; SUBCUTANEOUS ONCE
Status: COMPLETED | OUTPATIENT
Start: 2025-04-16 | End: 2025-04-16

## 2025-04-16 RX ORDER — TEMAZEPAM 15 MG/1
15 CAPSULE ORAL NIGHTLY PRN
Qty: 30 CAPSULE | Refills: 0 | Status: SHIPPED | OUTPATIENT
Start: 2025-04-16 | End: 2025-05-16

## 2025-04-16 RX ORDER — BUPROPION HYDROCHLORIDE 75 MG/1
75 TABLET ORAL 2 TIMES DAILY
Qty: 60 TABLET | Refills: 3 | Status: SHIPPED | OUTPATIENT
Start: 2025-04-16

## 2025-04-16 RX ORDER — MULTIVIT-MIN/IRON/FOLIC ACID/K 18-600-40
2000 CAPSULE ORAL DAILY
Qty: 30 CAPSULE | Refills: 5 | Status: SHIPPED | OUTPATIENT
Start: 2025-04-16

## 2025-04-16 RX ADMIN — CYANOCOBALAMIN 1000 MCG: 1000 INJECTION, SOLUTION INTRAMUSCULAR; SUBCUTANEOUS at 11:21

## 2025-04-16 ASSESSMENT — ENCOUNTER SYMPTOMS
SHORTNESS OF BREATH: 0
DIARRHEA: 0
NAUSEA: 0
VOMITING: 0
ABDOMINAL PAIN: 0

## 2025-04-16 NOTE — PROGRESS NOTES
After obtaining consent, and per orders of Susanne DEE, injection of B12 given in Right deltoid by Laury Melchor MA. Patient instructed to remain in clinic for 20 minutes afterwards, and to report any adverse reaction to me immediately.

## 2025-04-16 NOTE — PROGRESS NOTES
Anabel Ibanez (:  1950) is a 75 y.o. female, Established patient, here for evaluation of the following chief complaint(s):  Depression (Some improvement) and Insomnia (Some improvement but still tossing and waking up through out the ng)     Assessment & Plan  1. Depression: Stable. Reduction in crying episodes noted, but reports lack of interest in activities and fatigue. Normal sodium, potassium, sugar, kidney, and liver function; low vitamin D and B12 levels.  - Addition of bupropion 75 mg twice daily to current Effexor regimen to help with energy and motivation.  - Continue Effexor 150 mg.    2. Insomnia: Reports restless sleep and frequent nighttime awakenings.   - Reintroduce temazepam with advisement to monitor for excessive daytime sleepiness and discontinue if necessary.  - Counseling on sleep hygiene and monitoring sleep patterns.    3. Fatigue: May be related to low vitamin D and B12 levels. Low vitamin D and B12 levels.  - Administer B12 injection today, with another in 2 weeks, followed by monthly injections.  - Initiate vitamin D supplementation at 2000 units daily.  - Patient is also noted to be orthostatic hypotensive.  Recommend stopping amlodipine.  Recommend slow position changes.    4. Lightheadedness: Experiences lightheadedness after 20 minutes of activity. Blood pressure readings are within normal limits.  - Discontinue amlodipine.  -Patient has orthostatic blood pressure readings in office today.  Recommend stopping Norvasc.  Recommend adequate hydration and slow position changes.  - Monitor for improvement in lightheadedness and dizziness.    5. Heart fluttering: Reports periodic heart fluttering without shortness of breath. Normal heart rhythm upon auscultation.  - Perform EKG today  -Zio patch  - Counseling on monitoring heart symptoms and follow-up plan.    Follow-up  - Follow-up appointment scheduled in 2 weeks.    Results  Labs   - Sodium: Normal   - Potassium: Normal   -

## 2025-05-02 ENCOUNTER — OFFICE VISIT (OUTPATIENT)
Age: 75
End: 2025-05-02
Payer: MEDICARE

## 2025-05-02 VITALS
TEMPERATURE: 96.9 F | DIASTOLIC BLOOD PRESSURE: 80 MMHG | HEIGHT: 64 IN | HEART RATE: 81 BPM | SYSTOLIC BLOOD PRESSURE: 110 MMHG | OXYGEN SATURATION: 95 % | BODY MASS INDEX: 28.04 KG/M2 | WEIGHT: 164.25 LBS

## 2025-05-02 DIAGNOSIS — I10 ESSENTIAL HYPERTENSION: Chronic | ICD-10-CM

## 2025-05-02 DIAGNOSIS — F41.1 GAD (GENERALIZED ANXIETY DISORDER): ICD-10-CM

## 2025-05-02 DIAGNOSIS — R11.0 NAUSEA IN ADULT: ICD-10-CM

## 2025-05-02 DIAGNOSIS — E11.42 TYPE 2 DIABETES MELLITUS WITH DIABETIC POLYNEUROPATHY, WITHOUT LONG-TERM CURRENT USE OF INSULIN (HCC): ICD-10-CM

## 2025-05-02 DIAGNOSIS — K21.9 GASTROESOPHAGEAL REFLUX DISEASE, UNSPECIFIED WHETHER ESOPHAGITIS PRESENT: ICD-10-CM

## 2025-05-02 DIAGNOSIS — F51.01 PRIMARY INSOMNIA: Primary | ICD-10-CM

## 2025-05-02 DIAGNOSIS — E53.8 VITAMIN B12 DEFICIENCY: ICD-10-CM

## 2025-05-02 PROCEDURE — 1123F ACP DISCUSS/DSCN MKR DOCD: CPT | Performed by: NURSE PRACTITIONER

## 2025-05-02 PROCEDURE — G8427 DOCREV CUR MEDS BY ELIG CLIN: HCPCS | Performed by: NURSE PRACTITIONER

## 2025-05-02 PROCEDURE — 1159F MED LIST DOCD IN RCRD: CPT | Performed by: NURSE PRACTITIONER

## 2025-05-02 PROCEDURE — 3079F DIAST BP 80-89 MM HG: CPT | Performed by: NURSE PRACTITIONER

## 2025-05-02 PROCEDURE — 99214 OFFICE O/P EST MOD 30 MIN: CPT | Performed by: NURSE PRACTITIONER

## 2025-05-02 PROCEDURE — 3074F SYST BP LT 130 MM HG: CPT | Performed by: NURSE PRACTITIONER

## 2025-05-02 PROCEDURE — 3046F HEMOGLOBIN A1C LEVEL >9.0%: CPT | Performed by: NURSE PRACTITIONER

## 2025-05-02 PROCEDURE — 1090F PRES/ABSN URINE INCON ASSESS: CPT | Performed by: NURSE PRACTITIONER

## 2025-05-02 PROCEDURE — G8399 PT W/DXA RESULTS DOCUMENT: HCPCS | Performed by: NURSE PRACTITIONER

## 2025-05-02 PROCEDURE — 96372 THER/PROPH/DIAG INJ SC/IM: CPT | Performed by: NURSE PRACTITIONER

## 2025-05-02 PROCEDURE — G8417 CALC BMI ABV UP PARAM F/U: HCPCS | Performed by: NURSE PRACTITIONER

## 2025-05-02 RX ORDER — TEMAZEPAM 15 MG/1
15 CAPSULE ORAL NIGHTLY PRN
Qty: 30 CAPSULE | Refills: 0 | Status: SHIPPED | OUTPATIENT
Start: 2025-05-02 | End: 2025-06-01

## 2025-05-02 RX ORDER — METOPROLOL SUCCINATE 50 MG/1
50 TABLET, EXTENDED RELEASE ORAL DAILY
Qty: 90 TABLET | Refills: 3 | Status: SHIPPED | OUTPATIENT
Start: 2025-05-02

## 2025-05-02 RX ORDER — CYANOCOBALAMIN 1000 UG/ML
1000 INJECTION, SOLUTION INTRAMUSCULAR; SUBCUTANEOUS ONCE
Status: COMPLETED | OUTPATIENT
Start: 2025-05-02 | End: 2025-05-02

## 2025-05-02 RX ORDER — PANTOPRAZOLE SODIUM 40 MG/1
40 TABLET, DELAYED RELEASE ORAL 2 TIMES DAILY
Qty: 180 TABLET | Refills: 3 | Status: SHIPPED | OUTPATIENT
Start: 2025-05-02

## 2025-05-02 RX ADMIN — CYANOCOBALAMIN 1000 MCG: 1000 INJECTION, SOLUTION INTRAMUSCULAR; SUBCUTANEOUS at 12:02

## 2025-05-02 SDOH — ECONOMIC STABILITY: FOOD INSECURITY: WITHIN THE PAST 12 MONTHS, YOU WORRIED THAT YOUR FOOD WOULD RUN OUT BEFORE YOU GOT MONEY TO BUY MORE.: NEVER TRUE

## 2025-05-02 SDOH — ECONOMIC STABILITY: FOOD INSECURITY: WITHIN THE PAST 12 MONTHS, THE FOOD YOU BOUGHT JUST DIDN'T LAST AND YOU DIDN'T HAVE MONEY TO GET MORE.: NEVER TRUE

## 2025-05-02 ASSESSMENT — PATIENT HEALTH QUESTIONNAIRE - PHQ9
1. LITTLE INTEREST OR PLEASURE IN DOING THINGS: NOT AT ALL
9. THOUGHTS THAT YOU WOULD BE BETTER OFF DEAD, OR OF HURTING YOURSELF: NOT AT ALL
8. MOVING OR SPEAKING SO SLOWLY THAT OTHER PEOPLE COULD HAVE NOTICED. OR THE OPPOSITE, BEING SO FIGETY OR RESTLESS THAT YOU HAVE BEEN MOVING AROUND A LOT MORE THAN USUAL: NOT AT ALL
10. IF YOU CHECKED OFF ANY PROBLEMS, HOW DIFFICULT HAVE THESE PROBLEMS MADE IT FOR YOU TO DO YOUR WORK, TAKE CARE OF THINGS AT HOME, OR GET ALONG WITH OTHER PEOPLE: NOT DIFFICULT AT ALL
6. FEELING BAD ABOUT YOURSELF - OR THAT YOU ARE A FAILURE OR HAVE LET YOURSELF OR YOUR FAMILY DOWN: NOT AT ALL
SUM OF ALL RESPONSES TO PHQ QUESTIONS 1-9: 2
2. FEELING DOWN, DEPRESSED OR HOPELESS: NOT AT ALL
5. POOR APPETITE OR OVEREATING: NOT AT ALL
4. FEELING TIRED OR HAVING LITTLE ENERGY: MORE THAN HALF THE DAYS
3. TROUBLE FALLING OR STAYING ASLEEP: NOT AT ALL
SUM OF ALL RESPONSES TO PHQ QUESTIONS 1-9: 2
SUM OF ALL RESPONSES TO PHQ QUESTIONS 1-9: 2
7. TROUBLE CONCENTRATING ON THINGS, SUCH AS READING THE NEWSPAPER OR WATCHING TELEVISION: NOT AT ALL
SUM OF ALL RESPONSES TO PHQ QUESTIONS 1-9: 2

## 2025-05-02 ASSESSMENT — ENCOUNTER SYMPTOMS
SHORTNESS OF BREATH: 0
ABDOMINAL PAIN: 0
NAUSEA: 0
VOMITING: 0
DIARRHEA: 0

## 2025-05-02 NOTE — PROGRESS NOTES
Anabel Ibanez (:  1950) is a 75 y.o. female, Established patient, here for evaluation of the following chief complaint(s):  Insomnia, Medication Refill, Gastroesophageal Reflux, Injections, and Fatigue     Assessment & Plan  1. Depression: Stable.  - Mood has improved significantly with the addition of bupropion 75 mg twice daily to the existing Effexor 150 mg regimen.  - Reports feeling back to normal and has not experienced severe crying spells.  - Continue current medications and monitor for any changes in mood or symptoms.    2. Insomnia: Stable.  - Sleep cycle has normalized after resuming temazepam.  - A prescription for Restoril will be sent to be filled on 2025.    3. Heartburn: Acute.  - Reports significant heartburn with belching and a burning sensation in the stomach.  - Currently taking Pepcid in the evening and Protonix in the morning.  - Increase Protonix to twice daily, before breakfast and supper, and take Pepcid at bedtime for 6 to 8 weeks    4. Fatigue: Chronic.  - Vitamin D and B12 levels are being monitored and supplemented.  - Administer B12 injection today, and continue monthly injections.    5. Lightheadedness: Chronic.  - Blood pressure is stable at 110/80 mmHg.  - Reports lightheadedness that improves after sitting down for a while.  - Reduce metoprolol dosage from 100 mg to 50 mg daily to help manage lightheadedness and maintain blood pressure control without causing hypotension.  - Monitor blood pressure and symptoms closely following the dosage adjustment.    6. Palpitations: Acute.  - Experienced palpitations on three occasions while wearing the heart monitor: on 2025 at 8:30 AM, on 2025 at 3:24 PM, and on 2025 at 12:37 PM.  - Awaiting results from the Zio monitor to further assess the condition.  - Plan to review Zio monitor results upon receipt and adjust management as needed.    Follow-up  - Follow up in 4 weeks.    Results  Labs   - Blood sugar

## 2025-05-02 NOTE — PROGRESS NOTES
After obtaining consent, and per orders of ASHLEY DEE, injection of B12 given in Right arm  by Renata Jones. Patient tolerated well. Medication was not supplied by patient.

## 2025-05-06 DIAGNOSIS — K21.9 GASTROESOPHAGEAL REFLUX DISEASE, UNSPECIFIED WHETHER ESOPHAGITIS PRESENT: ICD-10-CM

## 2025-05-06 DIAGNOSIS — R11.0 NAUSEA IN ADULT: ICD-10-CM

## 2025-05-06 RX ORDER — FAMOTIDINE 40 MG/1
40 TABLET, FILM COATED ORAL
Qty: 90 TABLET | Refills: 3 | Status: SHIPPED | OUTPATIENT
Start: 2025-05-06

## 2025-05-06 NOTE — TELEPHONE ENCOUNTER
Received fax from pharmacy requesting refill on pts medication(s). Pt was last seen in office on 5/2/2025  and has a follow up scheduled for 5/30/2025. Will send request to  Teodora Larson  for authorization.     Requested Prescriptions     Pending Prescriptions Disp Refills    famotidine (PEPCID) 40 MG tablet [Pharmacy Med Name: Famotidine 40 MG Oral Tablet] 90 tablet 3     Sig: TAKE 1 TABLET BY MOUTH ONCE DAILY IN THE EVENING

## 2025-05-17 DIAGNOSIS — K59.04 CHRONIC IDIOPATHIC CONSTIPATION: ICD-10-CM

## 2025-05-19 NOTE — TELEPHONE ENCOUNTER
Anabel called requesting a refill of the below medication which has been pended for you:     Requested Prescriptions     Pending Prescriptions Disp Refills    EQUATE STOOL SOFTENER 100 MG capsule [Pharmacy Med Name: EQ Stool Softener 100 MG Oral Capsule] 60 capsule 0     Sig: Take 1 capsule by mouth twice daily       Last Appointment Date: 5/2/2025  Next Appointment Date: 5/30/2025    Allergies   Allergen Reactions    Metformin And Related Nausea And Vomiting

## 2025-05-20 RX ORDER — DOCUSATE SODIUM 100 MG/1
100 CAPSULE, LIQUID FILLED ORAL 2 TIMES DAILY
Qty: 60 CAPSULE | Refills: 3 | Status: SHIPPED | OUTPATIENT
Start: 2025-05-20

## 2025-05-29 ENCOUNTER — RESULTS FOLLOW-UP (OUTPATIENT)
Age: 75
End: 2025-05-29

## 2025-05-29 DIAGNOSIS — R00.2 HEART PALPITATIONS: ICD-10-CM

## 2025-05-29 DIAGNOSIS — R00.2 PALPITATIONS: ICD-10-CM

## 2025-05-30 ENCOUNTER — OFFICE VISIT (OUTPATIENT)
Age: 75
End: 2025-05-30
Payer: MEDICARE

## 2025-05-30 VITALS
BODY MASS INDEX: 27.74 KG/M2 | TEMPERATURE: 98.5 F | HEART RATE: 86 BPM | HEIGHT: 64 IN | SYSTOLIC BLOOD PRESSURE: 114 MMHG | WEIGHT: 162.5 LBS | DIASTOLIC BLOOD PRESSURE: 82 MMHG | OXYGEN SATURATION: 95 %

## 2025-05-30 DIAGNOSIS — F51.01 PRIMARY INSOMNIA: ICD-10-CM

## 2025-05-30 DIAGNOSIS — R60.0 LOCALIZED EDEMA: ICD-10-CM

## 2025-05-30 DIAGNOSIS — R42 EPISODIC LIGHTHEADEDNESS: ICD-10-CM

## 2025-05-30 DIAGNOSIS — E53.8 VITAMIN B12 DEFICIENCY: ICD-10-CM

## 2025-05-30 DIAGNOSIS — K21.9 GASTROESOPHAGEAL REFLUX DISEASE, UNSPECIFIED WHETHER ESOPHAGITIS PRESENT: ICD-10-CM

## 2025-05-30 DIAGNOSIS — F33.0 MILD EPISODE OF RECURRENT MAJOR DEPRESSIVE DISORDER: Primary | ICD-10-CM

## 2025-05-30 PROCEDURE — 1160F RVW MEDS BY RX/DR IN RCRD: CPT | Performed by: NURSE PRACTITIONER

## 2025-05-30 PROCEDURE — G8427 DOCREV CUR MEDS BY ELIG CLIN: HCPCS | Performed by: NURSE PRACTITIONER

## 2025-05-30 PROCEDURE — 3079F DIAST BP 80-89 MM HG: CPT | Performed by: NURSE PRACTITIONER

## 2025-05-30 PROCEDURE — 1090F PRES/ABSN URINE INCON ASSESS: CPT | Performed by: NURSE PRACTITIONER

## 2025-05-30 PROCEDURE — 96372 THER/PROPH/DIAG INJ SC/IM: CPT | Performed by: NURSE PRACTITIONER

## 2025-05-30 PROCEDURE — G8417 CALC BMI ABV UP PARAM F/U: HCPCS | Performed by: NURSE PRACTITIONER

## 2025-05-30 PROCEDURE — 1159F MED LIST DOCD IN RCRD: CPT | Performed by: NURSE PRACTITIONER

## 2025-05-30 PROCEDURE — 99214 OFFICE O/P EST MOD 30 MIN: CPT | Performed by: NURSE PRACTITIONER

## 2025-05-30 PROCEDURE — 3074F SYST BP LT 130 MM HG: CPT | Performed by: NURSE PRACTITIONER

## 2025-05-30 PROCEDURE — 1123F ACP DISCUSS/DSCN MKR DOCD: CPT | Performed by: NURSE PRACTITIONER

## 2025-05-30 PROCEDURE — G8399 PT W/DXA RESULTS DOCUMENT: HCPCS | Performed by: NURSE PRACTITIONER

## 2025-05-30 RX ORDER — TEMAZEPAM 15 MG/1
15 CAPSULE ORAL NIGHTLY PRN
Qty: 30 CAPSULE | Refills: 0 | Status: SHIPPED | OUTPATIENT
Start: 2025-05-30 | End: 2025-06-29

## 2025-05-30 RX ORDER — FUROSEMIDE 20 MG/1
20 TABLET ORAL DAILY
Qty: 90 TABLET | Refills: 1 | Status: SHIPPED | OUTPATIENT
Start: 2025-05-30

## 2025-05-30 RX ORDER — CYANOCOBALAMIN 1000 UG/ML
1000 INJECTION, SOLUTION INTRAMUSCULAR; SUBCUTANEOUS ONCE
Status: COMPLETED | OUTPATIENT
Start: 2025-05-30 | End: 2025-05-30

## 2025-05-30 RX ORDER — DEXLANSOPRAZOLE 60 MG/1
60 CAPSULE, DELAYED RELEASE ORAL DAILY
Qty: 90 CAPSULE | Refills: 3 | Status: SHIPPED | OUTPATIENT
Start: 2025-05-30

## 2025-05-30 RX ADMIN — CYANOCOBALAMIN 1000 MCG: 1000 INJECTION, SOLUTION INTRAMUSCULAR; SUBCUTANEOUS at 11:40

## 2025-05-30 ASSESSMENT — ENCOUNTER SYMPTOMS
NAUSEA: 1
VOMITING: 0
SHORTNESS OF BREATH: 0
ABDOMINAL PAIN: 0
DIARRHEA: 0

## 2025-05-30 NOTE — PROGRESS NOTES
Anbael Ibanez (:  1950) is a 75 y.o. female, Established patient, here for evaluation of the following chief complaint(s):  1 Month Follow-Up (For insomnia )     Assessment & Plan  1. Nausea: Acute.  - Reports occasional nausea after eating certain foods, such as bread.  - Symptom onset was last month.  - If symptoms persist, alternative medications will be considered.    2. Heartburn: Acute.  - Experienced increased heartburn recently.  - Currently taking famotidine 1 tablet at bedtime and pantoprazole twice a day.  - Will discontinue pantoprazole and switch to Dexilant, to be taken once daily in the morning.  - If symptoms persist, alternative PPIs such as Prilosec, Nexium, or Prevacid will be considered.    3. Mood disorder: Stable.  - Mood has significantly improved with the addition of Wellbutrin 75 mg twice daily.  - Feels back to baseline mood.  - Current regimen of Effexor and Wellbutrin will be maintained.    4. Lightheadedness: Chronic.  - Continues to experience lightheadedness, especially upon standing up quickly.  - Blood pressure today is 114/82, which is on the lower end but stable.  - Dosage of Lasix will be reduced from 40 mg to 20 mg to help alleviate the lightheadedness.  - Advised to cut current tablets in half to achieve the new dosage.  - Continue metoprolol 50 mg daily    5. Vitamin B12 deficiency: Chronic.  - B12 levels were on the lower end of normal in 2025.  - Will continue with the B12 injections.    Follow-up  - A refill for B12 injections will be provided.    Results  Labs   - Fasting blood glucose: 101, 110, 112, 108, 106, 109 mg/dL   - Vitamin B12: 2025, Lower end of normal   - Vitamin D: 2025, Low   - Thyroid function test: 2025, Normal   - Complete Blood Count (CBC): 2025, No anemia      ICD-10-CM    1. 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 Primary

## 2025-05-30 NOTE — PROGRESS NOTES
After obtaining consent, and per orders of MARITA DEE, injection of B12 given in Left deltoid by Marie Salcedo MA.

## 2025-06-19 DIAGNOSIS — E11.42 TYPE 2 DIABETES MELLITUS WITH DIABETIC POLYNEUROPATHY, WITHOUT LONG-TERM CURRENT USE OF INSULIN (HCC): ICD-10-CM

## 2025-06-19 RX ORDER — SEMAGLUTIDE 2.68 MG/ML
INJECTION, SOLUTION SUBCUTANEOUS
Qty: 3 ML | Refills: 3 | Status: SHIPPED | OUTPATIENT
Start: 2025-06-19

## 2025-06-19 NOTE — TELEPHONE ENCOUNTER
Received fax from pharmacy requesting refill on pts medication(s). Pt was last seen in office on 5/30/2025  and has a follow up scheduled for 6/27/2025. Will send request to  Teodora Larson  for patient.     Requested Prescriptions     Pending Prescriptions Disp Refills    OZEMPIC, 2 MG/DOSE, 8 MG/3ML SOPN sc injection [Pharmacy Med Name: Ozempic (2 MG/DOSE) 8 MG/3ML Subcutaneous Solution Pen-injector] 3 mL 0     Sig: INJECT 2 MG SUBCUTANEOUSLY ONCE A WEEK

## 2025-06-27 ENCOUNTER — OFFICE VISIT (OUTPATIENT)
Age: 75
End: 2025-06-27
Payer: MEDICARE

## 2025-06-27 VITALS
BODY MASS INDEX: 27.17 KG/M2 | HEART RATE: 86 BPM | SYSTOLIC BLOOD PRESSURE: 90 MMHG | TEMPERATURE: 98 F | DIASTOLIC BLOOD PRESSURE: 60 MMHG | OXYGEN SATURATION: 98 % | WEIGHT: 159.13 LBS | HEIGHT: 64 IN

## 2025-06-27 DIAGNOSIS — E11.42 TYPE 2 DIABETES MELLITUS WITH DIABETIC POLYNEUROPATHY, WITHOUT LONG-TERM CURRENT USE OF INSULIN (HCC): ICD-10-CM

## 2025-06-27 DIAGNOSIS — R13.14 PHARYNGOESOPHAGEAL DYSPHAGIA: ICD-10-CM

## 2025-06-27 DIAGNOSIS — I10 ESSENTIAL HYPERTENSION: Chronic | ICD-10-CM

## 2025-06-27 DIAGNOSIS — R42 EPISODIC LIGHTHEADEDNESS: ICD-10-CM

## 2025-06-27 DIAGNOSIS — F51.01 PRIMARY INSOMNIA: Primary | ICD-10-CM

## 2025-06-27 DIAGNOSIS — I95.1 ORTHOSTATIC HYPOTENSION: ICD-10-CM

## 2025-06-27 PROCEDURE — 99214 OFFICE O/P EST MOD 30 MIN: CPT | Performed by: NURSE PRACTITIONER

## 2025-06-27 PROCEDURE — 1160F RVW MEDS BY RX/DR IN RCRD: CPT | Performed by: NURSE PRACTITIONER

## 2025-06-27 PROCEDURE — 3074F SYST BP LT 130 MM HG: CPT | Performed by: NURSE PRACTITIONER

## 2025-06-27 PROCEDURE — G8399 PT W/DXA RESULTS DOCUMENT: HCPCS | Performed by: NURSE PRACTITIONER

## 2025-06-27 PROCEDURE — 3078F DIAST BP <80 MM HG: CPT | Performed by: NURSE PRACTITIONER

## 2025-06-27 PROCEDURE — 1123F ACP DISCUSS/DSCN MKR DOCD: CPT | Performed by: NURSE PRACTITIONER

## 2025-06-27 PROCEDURE — 1159F MED LIST DOCD IN RCRD: CPT | Performed by: NURSE PRACTITIONER

## 2025-06-27 PROCEDURE — G8427 DOCREV CUR MEDS BY ELIG CLIN: HCPCS | Performed by: NURSE PRACTITIONER

## 2025-06-27 PROCEDURE — 1090F PRES/ABSN URINE INCON ASSESS: CPT | Performed by: NURSE PRACTITIONER

## 2025-06-27 PROCEDURE — 3046F HEMOGLOBIN A1C LEVEL >9.0%: CPT | Performed by: NURSE PRACTITIONER

## 2025-06-27 PROCEDURE — G8417 CALC BMI ABV UP PARAM F/U: HCPCS | Performed by: NURSE PRACTITIONER

## 2025-06-27 RX ORDER — METOPROLOL SUCCINATE 25 MG/1
25 TABLET, EXTENDED RELEASE ORAL DAILY
Qty: 90 TABLET | Refills: 3 | Status: SHIPPED | OUTPATIENT
Start: 2025-06-27

## 2025-06-27 RX ORDER — TEMAZEPAM 15 MG/1
15 CAPSULE ORAL NIGHTLY PRN
Qty: 30 CAPSULE | Refills: 0 | Status: SHIPPED | OUTPATIENT
Start: 2025-06-27 | End: 2025-07-27

## 2025-06-27 SDOH — ECONOMIC STABILITY: FOOD INSECURITY: WITHIN THE PAST 12 MONTHS, YOU WORRIED THAT YOUR FOOD WOULD RUN OUT BEFORE YOU GOT MONEY TO BUY MORE.: NEVER TRUE

## 2025-06-27 SDOH — ECONOMIC STABILITY: FOOD INSECURITY: WITHIN THE PAST 12 MONTHS, THE FOOD YOU BOUGHT JUST DIDN'T LAST AND YOU DIDN'T HAVE MONEY TO GET MORE.: NEVER TRUE

## 2025-06-27 ASSESSMENT — ENCOUNTER SYMPTOMS
DIARRHEA: 0
NAUSEA: 1
SHORTNESS OF BREATH: 0
TROUBLE SWALLOWING: 1
ABDOMINAL PAIN: 0
VOMITING: 0

## 2025-06-27 ASSESSMENT — PATIENT HEALTH QUESTIONNAIRE - PHQ9
1. LITTLE INTEREST OR PLEASURE IN DOING THINGS: NOT AT ALL
10. IF YOU CHECKED OFF ANY PROBLEMS, HOW DIFFICULT HAVE THESE PROBLEMS MADE IT FOR YOU TO DO YOUR WORK, TAKE CARE OF THINGS AT HOME, OR GET ALONG WITH OTHER PEOPLE: NOT DIFFICULT AT ALL
2. FEELING DOWN, DEPRESSED OR HOPELESS: NOT AT ALL
7. TROUBLE CONCENTRATING ON THINGS, SUCH AS READING THE NEWSPAPER OR WATCHING TELEVISION: NOT AT ALL
SUM OF ALL RESPONSES TO PHQ QUESTIONS 1-9: 0
SUM OF ALL RESPONSES TO PHQ QUESTIONS 1-9: 0
5. POOR APPETITE OR OVEREATING: NOT AT ALL
8. MOVING OR SPEAKING SO SLOWLY THAT OTHER PEOPLE COULD HAVE NOTICED. OR THE OPPOSITE, BEING SO FIGETY OR RESTLESS THAT YOU HAVE BEEN MOVING AROUND A LOT MORE THAN USUAL: NOT AT ALL
4. FEELING TIRED OR HAVING LITTLE ENERGY: NOT AT ALL
3. TROUBLE FALLING OR STAYING ASLEEP: NOT AT ALL
SUM OF ALL RESPONSES TO PHQ QUESTIONS 1-9: 0
6. FEELING BAD ABOUT YOURSELF - OR THAT YOU ARE A FAILURE OR HAVE LET YOURSELF OR YOUR FAMILY DOWN: NOT AT ALL
SUM OF ALL RESPONSES TO PHQ QUESTIONS 1-9: 0
9. THOUGHTS THAT YOU WOULD BE BETTER OFF DEAD, OR OF HURTING YOURSELF: NOT AT ALL

## 2025-06-27 NOTE — PROGRESS NOTES
LUIZ Skinner   ezetimibe (ZETIA) 10 MG tablet Take 1 tablet by mouth daily Yes Susanne Larson APRN   aspirin 81 MG tablet Aspir-81 81 mg tablet,delayed release   Take 1 tablet every day by oral route. Yes Provider, Historical, MD        Allergies   Allergen Reactions    Metformin And Related Nausea And Vomiting       Past Medical History:   Diagnosis Date    Anxiety     Arthritis     Dye's esophagus     Cerebral artery occlusion with cerebral infarction (HCC)     tia, 15 or 16 years ago, no deficits    Closed dislocation of metacarpal joint of finger of left hand 12/05/2017    Closed fracture of left radius and ulna with routine healing 12/05/2017    Closed fracture of right patella 12/05/2017    Depression     Diabetes mellitus (HCC)     Edema     feet and ankles    GERD (gastroesophageal reflux disease)     Headache(784.0)     migraines    Hepatitis A     Hyperlipidemia     Hypertension     Mini stroke     Obese     Sleep apnea     CPAP    TIA (transient ischemic attack)        Past Surgical History:   Procedure Laterality Date    ANKLE SURGERY Right     CHOLECYSTECTOMY      COLONOSCOPY  07/01/2015    Dr Osorio-AP w/atypia, 5 yr recall    COLONOSCOPY  06/05/2019    Dr HARINI Crook ([facility]) Non-bleeding internal hemorrhoids, diverticulosis-Tubular AP (-) dysplasia, 3 yr recall    COLONOSCOPY N/A 06/07/2022    Dr HARINI Crook-Diverticular disease-AP x 1, 5 yr recall    CYSTOSCOPY Left 12/05/2018    CYSTOSCOPY URETEROSCOPY  PLACEMENT DOUBLE J STENT ON LEFT RIGHT  RETROGRADE PYELOGRAMS performed by Shadi Qureshi MD at [facility] OR    HYSTERECTOMY ABDOMINAL Bilateral 01/31/2019    TOTAL LAPAROSCOPIC HYSTERECTOMY BILATERAL SALPINGOOPHERECTOMY WITH DAVINCI CYSTOSCOPY performed by Analisa Quezada MD at [facility] OR    HYSTERECTOMY, VAGINAL      OR EGD TRANSORAL BIOPSY SINGLE/MULTIPLE N/A 05/08/2018    Dr Osorio (-) Kristin (+) Dye's (-) dysplasia--3 yr recall    OR LAPAROSCOPY SURG CHOLECYSTECTOMY N/A 03/06/2018

## 2025-07-01 DIAGNOSIS — R13.14 PHARYNGOESOPHAGEAL DYSPHAGIA: ICD-10-CM

## 2025-07-22 ENCOUNTER — TELEPHONE (OUTPATIENT)
Age: 75
End: 2025-07-22

## 2025-07-22 NOTE — TELEPHONE ENCOUNTER
Letter    PROVIDER FEEDBACK LOOP CALLED 3X     Patient:Anabel Ibanez  : 1950  Referring Provider: BORA JAIME  Referral Type:  Eval and Treat     Procedures:  REF25 (Custom) - AMB REFERRAL TO GASTROENTEROLOGY  Date Service Ordered 7/15/2025        We were unable to reach Anabel Ibanez to schedule the test ordered by your office after 3 outreach attempts via either text, email and/or phone call.  Please call/follow up with your patient to explain the significance of the ordered test and direct the patient to call Central Scheduling to schedule the test at their earliest convenience.     Please complete one of the following actions from Quick Actions buttons:     Route to Provider:  Route message to ordering provider to seek next steps in care plan.     Telephone Encounter:  Telephone encounter will open.  Call patient to explain significance of ordered test and direct patient to call Central Scheduling to schedule test then Document details of call.     Open Referral:  Review referral notes or details if needed.     Close Referral:  Referral will open.  Document in Notes section of referral why the referral is being closed.  Examples of referral closure:  Patient had test done outside of of an office in the [facility], Patient refuses test, Patient no longer having symptoms, Unable to reach patient.  Only close the referral if you are sure the test will not proceed.     Thank you     Pre-Access Scheduling Team      Summary    Auto: [id number] PROVIDER FEEDBACK LOOP CALLED 3X

## 2025-07-23 ENCOUNTER — OFFICE VISIT (OUTPATIENT)
Age: 75
End: 2025-07-23
Payer: MEDICARE

## 2025-07-23 VITALS
HEIGHT: 64 IN | DIASTOLIC BLOOD PRESSURE: 80 MMHG | BODY MASS INDEX: 27.53 KG/M2 | WEIGHT: 161.25 LBS | SYSTOLIC BLOOD PRESSURE: 136 MMHG | TEMPERATURE: 97.6 F | HEART RATE: 83 BPM | OXYGEN SATURATION: 98 %

## 2025-07-23 DIAGNOSIS — E53.8 VITAMIN B12 DEFICIENCY: ICD-10-CM

## 2025-07-23 DIAGNOSIS — F33.42 RECURRENT MAJOR DEPRESSIVE DISORDER, IN FULL REMISSION: ICD-10-CM

## 2025-07-23 DIAGNOSIS — F51.01 PRIMARY INSOMNIA: Primary | ICD-10-CM

## 2025-07-23 DIAGNOSIS — K59.03 DRUG-INDUCED CONSTIPATION: ICD-10-CM

## 2025-07-23 DIAGNOSIS — F41.1 GAD (GENERALIZED ANXIETY DISORDER): ICD-10-CM

## 2025-07-23 DIAGNOSIS — R13.14 PHARYNGOESOPHAGEAL DYSPHAGIA: ICD-10-CM

## 2025-07-23 PROCEDURE — G8427 DOCREV CUR MEDS BY ELIG CLIN: HCPCS | Performed by: NURSE PRACTITIONER

## 2025-07-23 PROCEDURE — 3079F DIAST BP 80-89 MM HG: CPT | Performed by: NURSE PRACTITIONER

## 2025-07-23 PROCEDURE — 1090F PRES/ABSN URINE INCON ASSESS: CPT | Performed by: NURSE PRACTITIONER

## 2025-07-23 PROCEDURE — 1159F MED LIST DOCD IN RCRD: CPT | Performed by: NURSE PRACTITIONER

## 2025-07-23 PROCEDURE — 96372 THER/PROPH/DIAG INJ SC/IM: CPT | Performed by: NURSE PRACTITIONER

## 2025-07-23 PROCEDURE — 99214 OFFICE O/P EST MOD 30 MIN: CPT | Performed by: NURSE PRACTITIONER

## 2025-07-23 PROCEDURE — G8399 PT W/DXA RESULTS DOCUMENT: HCPCS | Performed by: NURSE PRACTITIONER

## 2025-07-23 PROCEDURE — G8417 CALC BMI ABV UP PARAM F/U: HCPCS | Performed by: NURSE PRACTITIONER

## 2025-07-23 PROCEDURE — 1160F RVW MEDS BY RX/DR IN RCRD: CPT | Performed by: NURSE PRACTITIONER

## 2025-07-23 PROCEDURE — 1123F ACP DISCUSS/DSCN MKR DOCD: CPT | Performed by: NURSE PRACTITIONER

## 2025-07-23 PROCEDURE — 3075F SYST BP GE 130 - 139MM HG: CPT | Performed by: NURSE PRACTITIONER

## 2025-07-23 RX ORDER — TEMAZEPAM 15 MG/1
15 CAPSULE ORAL NIGHTLY PRN
Qty: 30 CAPSULE | Refills: 0 | Status: SHIPPED | OUTPATIENT
Start: 2025-07-23 | End: 2025-08-22

## 2025-07-23 RX ORDER — CYANOCOBALAMIN 1000 UG/ML
1000 INJECTION, SOLUTION INTRAMUSCULAR; SUBCUTANEOUS ONCE
Status: COMPLETED | OUTPATIENT
Start: 2025-07-23 | End: 2025-07-23

## 2025-07-23 RX ORDER — VENLAFAXINE HYDROCHLORIDE 150 MG/1
150 CAPSULE, EXTENDED RELEASE ORAL DAILY
Qty: 90 CAPSULE | Refills: 3 | Status: SHIPPED | OUTPATIENT
Start: 2025-07-23

## 2025-07-23 RX ORDER — VENLAFAXINE HYDROCHLORIDE 75 MG/1
75 CAPSULE, EXTENDED RELEASE ORAL DAILY
Qty: 90 CAPSULE | Refills: 3 | Status: CANCELLED | OUTPATIENT
Start: 2025-07-23

## 2025-07-23 RX ADMIN — CYANOCOBALAMIN 1000 MCG: 1000 INJECTION, SOLUTION INTRAMUSCULAR; SUBCUTANEOUS at 11:56

## 2025-07-23 ASSESSMENT — ENCOUNTER SYMPTOMS
NAUSEA: 0
ABDOMINAL PAIN: 0
TROUBLE SWALLOWING: 1
DIARRHEA: 0
VOMITING: 0
SHORTNESS OF BREATH: 0
CONSTIPATION: 1

## 2025-07-23 NOTE — PROGRESS NOTES
After obtaining consent, and per orders of ASHLEY DEE, injection of B12 given in Right deltoid  by Renata Jones. Patient tolerated well. Medication was not supplied by patient.  
Susanne Larson APRN   ezetimibe (ZETIA) 10 MG tablet Take 1 tablet by mouth daily Yes Susanne Larson APRN   aspirin 81 MG tablet Aspir-81 81 mg tablet,delayed release   Take 1 tablet every day by oral route. Yes Provider, Historical, MD        Allergies   Allergen Reactions    Metformin And Related Nausea And Vomiting       Past Medical History:   Diagnosis Date    Anxiety     Arthritis     Dye's esophagus     Cerebral artery occlusion with cerebral infarction (HCC)     tia, 15 or 16 years ago, no deficits    Closed dislocation of metacarpal joint of finger of left hand 12/05/2017    Closed fracture of left radius and ulna with routine healing 12/05/2017    Closed fracture of right patella 12/05/2017    Depression     Diabetes mellitus (HCC)     Edema     feet and ankles    GERD (gastroesophageal reflux disease)     Headache(784.0)     migraines    Hepatitis A     Hyperlipidemia     Hypertension     Mini stroke     Obese     Sleep apnea     CPAP    TIA (transient ischemic attack)        Past Surgical History:   Procedure Laterality Date    ANKLE SURGERY Right     CHOLECYSTECTOMY      COLONOSCOPY  07/01/2015    Dr Osorio-AP w/atypia, 5 yr recall    COLONOSCOPY  06/05/2019    Dr HARINI Crook ([facility]) Non-bleeding internal hemorrhoids, diverticulosis-Tubular AP (-) dysplasia, 3 yr recall    COLONOSCOPY N/A 06/07/2022    Dr HARINI Crook-Diverticular disease-AP x 1, 5 yr recall    CYSTOSCOPY Left 12/05/2018    CYSTOSCOPY URETEROSCOPY  PLACEMENT DOUBLE J STENT ON LEFT RIGHT  RETROGRADE PYELOGRAMS performed by Shadi Qureshi MD at [facility] OR    HYSTERECTOMY ABDOMINAL Bilateral 01/31/2019    TOTAL LAPAROSCOPIC HYSTERECTOMY BILATERAL SALPINGOOPHERECTOMY WITH DAVINCI CYSTOSCOPY performed by Analisa Quezada MD at [facility] OR    HYSTERECTOMY, VAGINAL      CO EGD TRANSORAL BIOPSY SINGLE/MULTIPLE N/A 05/08/2018    Dr Osorio (-) Kristin (+) Dye's (-) dysplasia--3 yr recall    CO LAPAROSCOPY SURG CHOLECYSTECTOMY N/A 03/06/2018

## 2025-08-11 ENCOUNTER — OFFICE VISIT (OUTPATIENT)
Dept: GASTROENTEROLOGY | Age: 75
End: 2025-08-11
Payer: MEDICARE

## 2025-08-11 VITALS
OXYGEN SATURATION: 97 % | SYSTOLIC BLOOD PRESSURE: 122 MMHG | WEIGHT: 160 LBS | BODY MASS INDEX: 27.31 KG/M2 | HEIGHT: 64 IN | HEART RATE: 88 BPM | DIASTOLIC BLOOD PRESSURE: 70 MMHG

## 2025-08-11 DIAGNOSIS — R10.33 PERIUMBILICAL PAIN: ICD-10-CM

## 2025-08-11 DIAGNOSIS — K22.70 BARRETT'S ESOPHAGUS WITHOUT DYSPLASIA: Primary | ICD-10-CM

## 2025-08-11 DIAGNOSIS — K21.9 GASTROESOPHAGEAL REFLUX DISEASE WITHOUT ESOPHAGITIS: Chronic | ICD-10-CM

## 2025-08-11 DIAGNOSIS — R10.30 LOWER ABDOMINAL PAIN: ICD-10-CM

## 2025-08-11 DIAGNOSIS — K59.00 CONSTIPATION, UNSPECIFIED CONSTIPATION TYPE: ICD-10-CM

## 2025-08-11 DIAGNOSIS — R13.19 ESOPHAGEAL DYSPHAGIA: ICD-10-CM

## 2025-08-11 PROCEDURE — 1159F MED LIST DOCD IN RCRD: CPT

## 2025-08-11 PROCEDURE — 1123F ACP DISCUSS/DSCN MKR DOCD: CPT

## 2025-08-11 PROCEDURE — 3078F DIAST BP <80 MM HG: CPT

## 2025-08-11 PROCEDURE — 1160F RVW MEDS BY RX/DR IN RCRD: CPT

## 2025-08-11 PROCEDURE — G8427 DOCREV CUR MEDS BY ELIG CLIN: HCPCS

## 2025-08-11 PROCEDURE — G8417 CALC BMI ABV UP PARAM F/U: HCPCS

## 2025-08-11 PROCEDURE — 1036F TOBACCO NON-USER: CPT

## 2025-08-11 PROCEDURE — 99215 OFFICE O/P EST HI 40 MIN: CPT

## 2025-08-11 PROCEDURE — 1090F PRES/ABSN URINE INCON ASSESS: CPT

## 2025-08-11 PROCEDURE — 3074F SYST BP LT 130 MM HG: CPT

## 2025-08-11 PROCEDURE — G8399 PT W/DXA RESULTS DOCUMENT: HCPCS

## 2025-08-11 PROCEDURE — 3017F COLORECTAL CA SCREEN DOC REV: CPT

## 2025-08-11 RX ORDER — POLYETHYLENE GLYCOL 3350 17 G/17G
17 POWDER, FOR SOLUTION ORAL DAILY
COMMUNITY
Start: 2025-08-11 | End: 2025-09-10

## 2025-08-11 RX ORDER — SUCRALFATE 1 G/1
1 TABLET ORAL 4 TIMES DAILY
Qty: 120 TABLET | Refills: 3 | Status: SHIPPED | OUTPATIENT
Start: 2025-08-11

## 2025-08-11 ASSESSMENT — ENCOUNTER SYMPTOMS
ANAL BLEEDING: 0
CHOKING: 1
EYES NEGATIVE: 1
NAUSEA: 0
ABDOMINAL PAIN: 1
ALLERGIC/IMMUNOLOGIC NEGATIVE: 1
COUGH: 1
TROUBLE SWALLOWING: 1
DIARRHEA: 0
CONSTIPATION: 1
BLOOD IN STOOL: 0
VOMITING: 0

## 2025-08-18 ENCOUNTER — TELEPHONE (OUTPATIENT)
Dept: GASTROENTEROLOGY | Age: 75
End: 2025-08-18

## 2025-08-21 ENCOUNTER — ANESTHESIA (OUTPATIENT)
Dept: OPERATING ROOM | Age: 75
End: 2025-08-21

## 2025-08-21 ENCOUNTER — TELEPHONE (OUTPATIENT)
Dept: GASTROENTEROLOGY | Age: 75
End: 2025-08-21

## 2025-08-21 ENCOUNTER — HOSPITAL ENCOUNTER (OUTPATIENT)
Age: 75
Setting detail: OUTPATIENT SURGERY
Discharge: HOME | End: 2025-08-21
Attending: INTERNAL MEDICINE | Admitting: INTERNAL MEDICINE

## 2025-08-21 ENCOUNTER — ANESTHESIA EVENT (OUTPATIENT)
Dept: OPERATING ROOM | Age: 75
End: 2025-08-21

## 2025-08-21 ENCOUNTER — APPOINTMENT (OUTPATIENT)
Dept: OPERATING ROOM | Age: 75
End: 2025-08-21
Attending: INTERNAL MEDICINE

## 2025-08-21 VITALS
DIASTOLIC BLOOD PRESSURE: 77 MMHG | WEIGHT: 157.5 LBS | TEMPERATURE: 97.3 F | SYSTOLIC BLOOD PRESSURE: 113 MMHG | HEIGHT: 64 IN | BODY MASS INDEX: 26.89 KG/M2 | OXYGEN SATURATION: 94 % | HEART RATE: 75 BPM | RESPIRATION RATE: 16 BRPM

## 2025-08-21 PROCEDURE — 43239 EGD BIOPSY SINGLE/MULTIPLE: CPT

## 2025-08-21 PROCEDURE — 43235 EGD DIAGNOSTIC BRUSH WASH: CPT

## 2025-08-21 RX ORDER — SODIUM CHLORIDE 9 MG/ML
INJECTION, SOLUTION INTRAVENOUS CONTINUOUS
Status: DISCONTINUED | OUTPATIENT
Start: 2025-08-21 | End: 2025-08-21 | Stop reason: HOSPADM

## 2025-08-21 RX ORDER — PROPOFOL 10 MG/ML
INJECTION, EMULSION INTRAVENOUS
Status: DISCONTINUED | OUTPATIENT
Start: 2025-08-21 | End: 2025-08-21 | Stop reason: SDUPTHER

## 2025-08-21 RX ORDER — LIDOCAINE HYDROCHLORIDE 10 MG/ML
INJECTION, SOLUTION EPIDURAL; INFILTRATION; INTRACAUDAL; PERINEURAL
Status: DISCONTINUED | OUTPATIENT
Start: 2025-08-21 | End: 2025-08-21 | Stop reason: SDUPTHER

## 2025-08-21 RX ADMIN — SODIUM CHLORIDE: 9 INJECTION, SOLUTION INTRAVENOUS at 08:28

## 2025-08-21 RX ADMIN — PROPOFOL 100 MG: 10 INJECTION, EMULSION INTRAVENOUS at 09:08

## 2025-08-21 RX ADMIN — LIDOCAINE HYDROCHLORIDE 50 MG: 10 INJECTION, SOLUTION EPIDURAL; INFILTRATION; INTRACAUDAL; PERINEURAL at 09:04

## 2025-08-21 ASSESSMENT — PAIN - FUNCTIONAL ASSESSMENT: PAIN_FUNCTIONAL_ASSESSMENT: 0-10

## 2025-09-02 ENCOUNTER — TELEPHONE (OUTPATIENT)
Age: 75
End: 2025-09-02

## 2025-09-03 ENCOUNTER — OFFICE VISIT (OUTPATIENT)
Age: 75
End: 2025-09-03
Payer: MEDICARE

## 2025-09-03 VITALS
HEIGHT: 64 IN | WEIGHT: 162.38 LBS | HEART RATE: 75 BPM | TEMPERATURE: 97 F | BODY MASS INDEX: 27.72 KG/M2 | SYSTOLIC BLOOD PRESSURE: 118 MMHG | OXYGEN SATURATION: 98 % | DIASTOLIC BLOOD PRESSURE: 70 MMHG

## 2025-09-03 DIAGNOSIS — R07.9 CHEST PAIN, UNSPECIFIED TYPE: ICD-10-CM

## 2025-09-03 DIAGNOSIS — K59.04 CHRONIC IDIOPATHIC CONSTIPATION: ICD-10-CM

## 2025-09-03 DIAGNOSIS — I10 ESSENTIAL HYPERTENSION: Chronic | ICD-10-CM

## 2025-09-03 DIAGNOSIS — E11.42 TYPE 2 DIABETES MELLITUS WITH DIABETIC POLYNEUROPATHY, WITHOUT LONG-TERM CURRENT USE OF INSULIN (HCC): ICD-10-CM

## 2025-09-03 DIAGNOSIS — F51.01 PRIMARY INSOMNIA: Primary | ICD-10-CM

## 2025-09-03 PROCEDURE — 1159F MED LIST DOCD IN RCRD: CPT | Performed by: NURSE PRACTITIONER

## 2025-09-03 PROCEDURE — 3074F SYST BP LT 130 MM HG: CPT | Performed by: NURSE PRACTITIONER

## 2025-09-03 PROCEDURE — G8427 DOCREV CUR MEDS BY ELIG CLIN: HCPCS | Performed by: NURSE PRACTITIONER

## 2025-09-03 PROCEDURE — 1090F PRES/ABSN URINE INCON ASSESS: CPT | Performed by: NURSE PRACTITIONER

## 2025-09-03 PROCEDURE — 3078F DIAST BP <80 MM HG: CPT | Performed by: NURSE PRACTITIONER

## 2025-09-03 PROCEDURE — 99214 OFFICE O/P EST MOD 30 MIN: CPT | Performed by: NURSE PRACTITIONER

## 2025-09-03 PROCEDURE — 93010 ELECTROCARDIOGRAM REPORT: CPT | Performed by: NURSE PRACTITIONER

## 2025-09-03 PROCEDURE — 93005 ELECTROCARDIOGRAM TRACING: CPT | Performed by: NURSE PRACTITIONER

## 2025-09-03 PROCEDURE — G8417 CALC BMI ABV UP PARAM F/U: HCPCS | Performed by: NURSE PRACTITIONER

## 2025-09-03 PROCEDURE — 3046F HEMOGLOBIN A1C LEVEL >9.0%: CPT | Performed by: NURSE PRACTITIONER

## 2025-09-03 PROCEDURE — 1123F ACP DISCUSS/DSCN MKR DOCD: CPT | Performed by: NURSE PRACTITIONER

## 2025-09-03 PROCEDURE — 1160F RVW MEDS BY RX/DR IN RCRD: CPT | Performed by: NURSE PRACTITIONER

## 2025-09-03 PROCEDURE — G8399 PT W/DXA RESULTS DOCUMENT: HCPCS | Performed by: NURSE PRACTITIONER

## 2025-09-03 RX ORDER — TEMAZEPAM 15 MG/1
15 CAPSULE ORAL NIGHTLY PRN
Qty: 30 CAPSULE | Refills: 0 | Status: SHIPPED | OUTPATIENT
Start: 2025-09-03 | End: 2025-10-03

## 2025-09-03 SDOH — ECONOMIC STABILITY: FOOD INSECURITY: WITHIN THE PAST 12 MONTHS, THE FOOD YOU BOUGHT JUST DIDN'T LAST AND YOU DIDN'T HAVE MONEY TO GET MORE.: NEVER TRUE

## 2025-09-03 SDOH — ECONOMIC STABILITY: FOOD INSECURITY: WITHIN THE PAST 12 MONTHS, YOU WORRIED THAT YOUR FOOD WOULD RUN OUT BEFORE YOU GOT MONEY TO BUY MORE.: NEVER TRUE

## 2025-09-03 ASSESSMENT — PATIENT HEALTH QUESTIONNAIRE - PHQ9
2. FEELING DOWN, DEPRESSED OR HOPELESS: NOT AT ALL
SUM OF ALL RESPONSES TO PHQ QUESTIONS 1-9: 0
SUM OF ALL RESPONSES TO PHQ QUESTIONS 1-9: 0
1. LITTLE INTEREST OR PLEASURE IN DOING THINGS: NOT AT ALL
SUM OF ALL RESPONSES TO PHQ QUESTIONS 1-9: 0
SUM OF ALL RESPONSES TO PHQ QUESTIONS 1-9: 0

## 2025-09-03 ASSESSMENT — ENCOUNTER SYMPTOMS
RHINORRHEA: 0
CONSTIPATION: 1

## (undated) DEVICE — PAD,EYE,1-5/8X2 5/8,STERILE,LF,1/PK: Brand: MEDLINE

## (undated) DEVICE — Z INACTIVE USE 2660664 SOLUTION IRRIG 3000ML 0.9% SOD CHL USP UROMATIC PLAS CONT

## (undated) DEVICE — ENDO KIT,LOURDES HOSPITAL: Brand: MEDLINE INDUSTRIES, INC.

## (undated) DEVICE — Y-TYPE TUR/BLADDER IRRIGATION SET, REGULATING CLAMP

## (undated) DEVICE — COLON KIT WITH 1.1 OZ ORCA HYDRA SEAL 2 GOWN

## (undated) DEVICE — TIP COVER ACCESSORY

## (undated) DEVICE — PUMP SUC IRR TBNG L10FT W/ HNDPC ASSEMB STRYKEFLOW 2

## (undated) DEVICE — SURGICAL PROCEDURE PACK CYTOSCOPY

## (undated) DEVICE — Device

## (undated) DEVICE — ADHESIVE SKIN CLSR 0.7ML TOP DERMBND ADV

## (undated) DEVICE — SUTURE MNCRYL 0 UNDYED CT1 Y496H

## (undated) DEVICE — SOLUTION IV 1000ML 0.9% SOD CHL FOR IRRIG PLAS CONT

## (undated) DEVICE — CATHETER URET 5FR L70CM OPN END SGL LUMN INJ HUB FLEXIMA

## (undated) DEVICE — SUTURE VCRL SZ 3-0 L27IN ABSRB UD L26MM SH 1/2 CIR J416H

## (undated) DEVICE — DAVINCI: Brand: MEDLINE INDUSTRIES, INC.

## (undated) DEVICE — INTENDED TO AID IN THE PASSING OF SUTURES THROUGH BONE AND SOFT TISSUE DURING ORTHOPEDIC SURGERY: Brand: HOFFEE SUTURE RETRIEVER

## (undated) DEVICE — DRESSING,GAUZE,PETROLATUM,CURAD,3"X9",ST: Brand: CURAD

## (undated) DEVICE — SEAL ENDO INSTR SELF SEAL UROLOGY

## (undated) DEVICE — FORCEPS BX L240CM JAW DIA2.4MM ORNG L CAP W/ NDL DISP RAD

## (undated) DEVICE — SUTURE SZ 0 27IN 5/8 CIR UR-6  TAPER PT VIOLET ABSRB VICRYL J603H

## (undated) DEVICE — SOLUTION IV 1000ML LAC RINGERS PH 6.5 INJ USP VIAFLX PLAS

## (undated) DEVICE — INFLATION DEVICE: Brand: ENCORE™ 26

## (undated) DEVICE — CANNULA NSL AD L7FT DIV O2 CO2 W/ M LUERLOCK TRMPT CONN

## (undated) DEVICE — BIPOLAR ELECTROHEMOSTASIS CATHETER: Brand: INJECTION GOLD PROBE

## (undated) DEVICE — STERILE LATEX POWDER FREE SURGICAL GLOVES WITH HYDROGEL COATING: Brand: PROTEXIS

## (undated) DEVICE — VCARE MEDIUM, UTERINE MANIPULATOR, VAGINAL-CERVICAL-AHLUWALIA'S-RETRACTOR-ELEVATOR: Brand: VCARE

## (undated) DEVICE — CANNULA SEAL

## (undated) DEVICE — BRUSH ENDOSCP 2 END CHN HEDGEHOG

## (undated) DEVICE — SUTURE MCRYL SZ 4-0 L18IN ABSRB UD L19MM PS-2 3/8 CIR PRIM Y496G

## (undated) DEVICE — GLOVE SURG SZ 7 CRM LTX FREE POLYISOPRENE POLYMER BEAD ANTI

## (undated) DEVICE — GUIDEWIRE ENDOSCP L150CM DIA0.035IN TIP 3CM PTFE NIT

## (undated) DEVICE — FORCEPS BX L240CM DIA2.4MM L NDL RAD JAW 4 133340

## (undated) DEVICE — BITE BLOCK ENDOSCP AD 60 FR W/ ADJ STRP PLAS GRN BLOX

## (undated) DEVICE — SUTURE PDS II SZ 0 L27IN ABSRB VLT L26MM CT-2 1/2 CIR Z334H

## (undated) DEVICE — DOUBLE BASIN PLUS 2-LF: Brand: MEDLINE INDUSTRIES, INC.

## (undated) DEVICE — Z DUP USE 2522782 SOLUTION IRRIG 1000ML STRL H2O PLAS CONTAINER UROMATIC

## (undated) DEVICE — BLADE LARYNGOSCOPE MILLER 2

## (undated) DEVICE — TRI-LUMEN FILTERED TUBE SET WITH ACTIVATED CHARCOAL FILTER: Brand: AIRSEAL

## (undated) DEVICE — ADAPTER CLEANING PORPOISE CLEANING

## (undated) DEVICE — GENERAL LAP CDS

## (undated) DEVICE — SUPPLEMENT DIGESTIVE H2O SOL GI-EASE

## (undated) DEVICE — GARMENT COMPR STD FOR 17IN CALF UNIF THER FLOTRN

## (undated) DEVICE — ESOPHAGEAL/PYLORIC/COLONIC/BILIARY WIREGUIDED BALLOON DILATATION CATHETER: Brand: CRE™ PRO

## (undated) DEVICE — TRAY SURG PROC CHOLE FLX

## (undated) DEVICE — GLOVE SURG SZ 65 L12IN FNGR THK79MIL GRN LTX FREE

## (undated) DEVICE — BAG DRNGE COMB PK

## (undated) DEVICE — TISSUE RETRIEVAL SYSTEM: Brand: INZII RETRIEVAL SYSTEM

## (undated) DEVICE — SOLUTION IRRIG 1000ML H2O PIC PLAS SHATTERPROOF CONTAINER

## (undated) DEVICE — CONTRAST IOTHALAMATE MEGLUMINE 60% 50 ML INJ CONRAY 60

## (undated) DEVICE — BLADELESS OBTURATOR: Brand: WECK VISTA

## (undated) DEVICE — BALLOON DILATATION CATHETER: Brand: UROMAX ULTRA

## (undated) DEVICE — FORCEPS BX 240CM 2.4MM L NDL RAD JAW 4 M00513334

## (undated) DEVICE — ARM DRAPE

## (undated) DEVICE — INTEGRATED INFLATION/LITHO DEVICE: Brand: ALLIANCE II

## (undated) DEVICE — GLOVE ORTHO 8   MSG9480

## (undated) DEVICE — ASTOUND STANDARD SURGICAL GOWN, XXL: Brand: CONVERTORS

## (undated) DEVICE — DECANTER VI VENT W/ VLV FOR ASEP TRNSF OF FLD

## (undated) DEVICE — MANIFOLD SUCT SMK EVAC SGL PRT DISP NEPTUNE 2

## (undated) DEVICE — Z DUPLICATE USE 2738952 SYSTEM VENT M AD NSL PAP DEV HD STRP 2L HYPRINFL BG MRI

## (undated) DEVICE — SUTURE VCRL SZ 0 L36IN ABSRB UD L36MM CT-1 1/2 CIR J946H

## (undated) DEVICE — TIBURON DRAPE TOWELS: Brand: CONVERTORS

## (undated) DEVICE — AIRSEAL 8 MM ACCESS PORT AND LOW PROFILE OBTURATOR WITH BLADELESS OPTICAL TIP, 120 MM LENGTH: Brand: AIRSEAL

## (undated) DEVICE — HYDROGEL COATED LATEX FOLEY CATHETER, 5 CC, 2-WAY, 16 FR (5.3 MM): Brand: DOVER

## (undated) DEVICE — TUBE ENDOTRACH LASR CUF ORL MURPHY 7MM